# Patient Record
Sex: FEMALE | Race: WHITE | NOT HISPANIC OR LATINO | Employment: PART TIME | URBAN - NONMETROPOLITAN AREA
[De-identification: names, ages, dates, MRNs, and addresses within clinical notes are randomized per-mention and may not be internally consistent; named-entity substitution may affect disease eponyms.]

---

## 2017-03-20 ENCOUNTER — OFFICE VISIT - GICH (OUTPATIENT)
Dept: FAMILY MEDICINE | Facility: OTHER | Age: 47
End: 2017-03-20

## 2017-03-20 ENCOUNTER — HISTORY (OUTPATIENT)
Dept: FAMILY MEDICINE | Facility: OTHER | Age: 47
End: 2017-03-20

## 2017-03-20 DIAGNOSIS — J40 BRONCHITIS: ICD-10-CM

## 2017-03-20 LAB — INFLUENZA ANTIGEN - HISTORICAL: NORMAL

## 2017-07-11 ENCOUNTER — OFFICE VISIT - GICH (OUTPATIENT)
Dept: FAMILY MEDICINE | Facility: OTHER | Age: 47
End: 2017-07-11

## 2017-07-11 ENCOUNTER — HISTORY (OUTPATIENT)
Dept: FAMILY MEDICINE | Facility: OTHER | Age: 47
End: 2017-07-11

## 2017-07-11 DIAGNOSIS — J45.909 UNCOMPLICATED ASTHMA: ICD-10-CM

## 2017-11-01 ENCOUNTER — OFFICE VISIT - GICH (OUTPATIENT)
Dept: FAMILY MEDICINE | Facility: OTHER | Age: 47
End: 2017-11-01

## 2017-11-01 ENCOUNTER — HISTORY (OUTPATIENT)
Dept: FAMILY MEDICINE | Facility: OTHER | Age: 47
End: 2017-11-01

## 2017-11-01 ENCOUNTER — HOSPITAL ENCOUNTER (OUTPATIENT)
Dept: RADIOLOGY | Facility: OTHER | Age: 47
End: 2017-11-01
Attending: PHYSICIAN ASSISTANT

## 2017-11-01 DIAGNOSIS — I82.622 ACUTE EMBOLISM AND THROMBOSIS OF DEEP VEIN OF LEFT UPPER EXTREMITY (H): ICD-10-CM

## 2017-11-01 DIAGNOSIS — M79.602 PAIN OF LEFT ARM: ICD-10-CM

## 2017-11-01 DIAGNOSIS — M79.89 OTHER SPECIFIED SOFT TISSUE DISORDERS (CODE): ICD-10-CM

## 2017-11-01 LAB
ABSOLUTE BASOPHILS - HISTORICAL: 0.1 THOU/CU MM
ABSOLUTE EOSINOPHILS - HISTORICAL: 0.1 THOU/CU MM
ABSOLUTE IMMATURE GRANULOCYTES(METAS,MYELOS,PROS) - HISTORICAL: 0.1 THOU/CU MM
ABSOLUTE LYMPHOCYTES - HISTORICAL: 3.1 THOU/CU MM (ref 0.9–2.9)
ABSOLUTE MONOCYTES - HISTORICAL: 0.6 THOU/CU MM
ABSOLUTE NEUTROPHILS - HISTORICAL: 10.3 THOU/CU MM (ref 1.7–7)
ANION GAP - HISTORICAL: 10 (ref 5–18)
BASOPHILS # BLD AUTO: 0.4 %
BUN SERPL-MCNC: 8 MG/DL (ref 7–25)
BUN/CREAT RATIO - HISTORICAL: 10
CALCIUM SERPL-MCNC: 9.2 MG/DL (ref 8.6–10.3)
CHLORIDE SERPLBLD-SCNC: 105 MMOL/L (ref 98–107)
CO2 SERPL-SCNC: 24 MMOL/L (ref 21–31)
CREAT SERPL-MCNC: 0.78 MG/DL (ref 0.7–1.3)
D-DIMER, QUANTITATIVE NG/ML - HISTORICAL: 823 NG/ML
EOSINOPHIL NFR BLD AUTO: 0.8 %
ERYTHROCYTE [DISTWIDTH] IN BLOOD BY AUTOMATED COUNT: 12.3 % (ref 11.5–15.5)
GFR IF NOT AFRICAN AMERICAN - HISTORICAL: >60 ML/MIN/1.73M2
GLUCOSE SERPL-MCNC: 99 MG/DL (ref 70–105)
HCT VFR BLD AUTO: 45.7 % (ref 33–51)
HEMOGLOBIN: 15.4 G/DL (ref 12–16)
IMMATURE GRANULOCYTES(METAS,MYELOS,PROS) - HISTORICAL: 0.4 %
LYMPHOCYTES NFR BLD AUTO: 21.9 % (ref 20–44)
MCH RBC QN AUTO: 30.1 PG (ref 26–34)
MCHC RBC AUTO-ENTMCNC: 33.7 G/DL (ref 32–36)
MCV RBC AUTO: 89 FL (ref 80–100)
MONOCYTES NFR BLD AUTO: 4.4 %
NEUTROPHILS NFR BLD AUTO: 72.1 % (ref 42–72)
PLATELET # BLD AUTO: 294 THOU/CU MM (ref 140–440)
PMV BLD: 10.2 FL (ref 6.5–11)
POTASSIUM SERPL-SCNC: 4.3 MMOL/L (ref 3.5–5.1)
RED BLOOD COUNT - HISTORICAL: 5.12 MIL/CU MM (ref 4–5.2)
SODIUM SERPL-SCNC: 139 MMOL/L (ref 133–143)
WHITE BLOOD COUNT - HISTORICAL: 14.2 THOU/CU MM (ref 4.5–11)

## 2017-11-06 ENCOUNTER — ANTICOAGULATION - GICH (OUTPATIENT)
Dept: INTERNAL MEDICINE | Facility: OTHER | Age: 47
End: 2017-11-06

## 2017-11-06 ENCOUNTER — COMMUNICATION - GICH (OUTPATIENT)
Dept: FAMILY MEDICINE | Facility: OTHER | Age: 47
End: 2017-11-06

## 2017-11-06 DIAGNOSIS — I82.622 ACUTE EMBOLISM AND THROMBOSIS OF DEEP VEIN OF LEFT UPPER EXTREMITY (H): ICD-10-CM

## 2017-11-06 LAB — INR - HISTORICAL: 1

## 2017-11-09 ENCOUNTER — ANTICOAGULATION - GICH (OUTPATIENT)
Dept: INTERNAL MEDICINE | Facility: OTHER | Age: 47
End: 2017-11-09

## 2017-11-09 ENCOUNTER — HISTORY (OUTPATIENT)
Dept: ONCOLOGY | Facility: OTHER | Age: 47
End: 2017-11-09

## 2017-11-09 ENCOUNTER — OFFICE VISIT - GICH (OUTPATIENT)
Dept: ONCOLOGY | Facility: OTHER | Age: 47
End: 2017-11-09

## 2017-11-09 DIAGNOSIS — I82.622 ACUTE EMBOLISM AND THROMBOSIS OF DEEP VEIN OF LEFT UPPER EXTREMITY (H): ICD-10-CM

## 2017-11-09 LAB
A/G RATIO - HISTORICAL: 1.2 (ref 1–2)
ABSOLUTE BASOPHILS - HISTORICAL: 0.1 THOU/CU MM
ABSOLUTE EOSINOPHILS - HISTORICAL: 0.2 THOU/CU MM
ABSOLUTE IMMATURE GRANULOCYTES(METAS,MYELOS,PROS) - HISTORICAL: 0 THOU/CU MM
ABSOLUTE LYMPHOCYTES - HISTORICAL: 3.8 THOU/CU MM (ref 0.9–2.9)
ABSOLUTE MONOCYTES - HISTORICAL: 0.5 THOU/CU MM
ABSOLUTE NEUTROPHILS - HISTORICAL: 6.3 THOU/CU MM (ref 1.7–7)
ALBUMIN SERPL-MCNC: 4.2 G/DL (ref 3.5–5.7)
ALP SERPL-CCNC: 50 IU/L (ref 34–104)
ALT (SGPT) - HISTORICAL: 22 IU/L (ref 7–52)
ANION GAP - HISTORICAL: 13 (ref 5–18)
AST SERPL-CCNC: 17 IU/L (ref 13–39)
BASOPHILS # BLD AUTO: 0.5 %
BILIRUB SERPL-MCNC: 0.2 MG/DL (ref 0.3–1)
BUN SERPL-MCNC: 9 MG/DL (ref 7–25)
BUN/CREAT RATIO - HISTORICAL: 12
CALCIUM SERPL-MCNC: 9.6 MG/DL (ref 8.6–10.3)
CHLORIDE SERPLBLD-SCNC: 102 MMOL/L (ref 98–107)
CO2 SERPL-SCNC: 24 MMOL/L (ref 21–31)
CREAT SERPL-MCNC: 0.74 MG/DL (ref 0.7–1.3)
D-DIMER, QUANTITATIVE NG/ML - HISTORICAL: 559 NG/ML
EOSINOPHIL NFR BLD AUTO: 1.4 %
ERYTHROCYTE [DISTWIDTH] IN BLOOD BY AUTOMATED COUNT: 12 % (ref 11.5–15.5)
ERYTHROCYTE [SEDIMENTATION RATE] IN BLOOD: 16 MM/HR
GFR IF NOT AFRICAN AMERICAN - HISTORICAL: >60 ML/MIN/1.73M2
GLOBULIN - HISTORICAL: 3.5 G/DL (ref 2–3.7)
GLUCOSE SERPL-MCNC: 93 MG/DL (ref 70–105)
HCT VFR BLD AUTO: 44.1 % (ref 33–51)
HEMOGLOBIN: 15.2 G/DL (ref 12–16)
IMMATURE GRANULOCYTES(METAS,MYELOS,PROS) - HISTORICAL: 0.2 %
INR - HISTORICAL: 1.4
LDH SERPL-CCNC: 170 IU/L (ref 140–271)
LYMPHOCYTES NFR BLD AUTO: 34.9 % (ref 20–44)
MCH RBC QN AUTO: 30.6 PG (ref 26–34)
MCHC RBC AUTO-ENTMCNC: 34.5 G/DL (ref 32–36)
MCV RBC AUTO: 89 FL (ref 80–100)
MONOCYTES NFR BLD AUTO: 4.6 %
NEUTROPHILS NFR BLD AUTO: 58.4 % (ref 42–72)
PLATELET # BLD AUTO: 461 THOU/CU MM (ref 140–440)
PMV BLD: 10.2 FL (ref 6.5–11)
POTASSIUM SERPL-SCNC: 4.1 MMOL/L (ref 3.5–5.1)
PROT SERPL-MCNC: 7.7 G/DL (ref 6.4–8.9)
RED BLOOD COUNT - HISTORICAL: 4.96 MIL/CU MM (ref 4–5.2)
RHEUMATOID FACTOR - HISTORICAL: <14 IU/ML
SODIUM SERPL-SCNC: 139 MMOL/L (ref 133–143)
WHITE BLOOD COUNT - HISTORICAL: 10.8 THOU/CU MM (ref 4.5–11)

## 2017-11-10 ENCOUNTER — COMMUNICATION - GICH (OUTPATIENT)
Dept: FAMILY MEDICINE | Facility: OTHER | Age: 47
End: 2017-11-10

## 2017-11-10 LAB
HOMOCYSTEINE, CARDIAC - HISTORICAL: 11.3 UMOL/L (ref 5–15.4)
Lab: 16 SEC

## 2017-11-13 ENCOUNTER — ANTICOAGULATION - GICH (OUTPATIENT)
Dept: INTERNAL MEDICINE | Facility: OTHER | Age: 47
End: 2017-11-13

## 2017-11-13 DIAGNOSIS — I82.622 ACUTE EMBOLISM AND THROMBOSIS OF DEEP VEIN OF LEFT UPPER EXTREMITY (H): ICD-10-CM

## 2017-11-13 LAB
ANA INTERPRETATION: NEGATIVE
DRVVT RATIO - HISTORICAL: 0.85
ELP INTERP,SERUM - HISTORICAL: NORMAL
ELP,ALBUMIN - HISTORICAL: 4.14 G/DL (ref 3.31–5.31)
ELP,ALPHA 1 - HISTORICAL: 0.37 G/DL (ref 0.19–0.42)
ELP,ALPHA 2 - HISTORICAL: 0.92 G/DL (ref 0.44–1.03)
ELP,BETA - HISTORICAL: 0.93 G/DL (ref 0.52–1.05)
ELP,GAMMA - HISTORICAL: 0.95 G/DL (ref 0.59–1.46)
INR - HISTORICAL: 2.4
Lab: 1.05
Lab: 107 % (ref 79–131)
PROT SERPL-MCNC: 7.3 G/DL (ref 6–8)
PTT-LA RATIO - HISTORICAL: 1.15

## 2017-11-14 ENCOUNTER — ANTICOAGULATION - GICH (OUTPATIENT)
Dept: INTERNAL MEDICINE | Facility: OTHER | Age: 47
End: 2017-11-14

## 2017-11-14 DIAGNOSIS — I82.622 ACUTE EMBOLISM AND THROMBOSIS OF DEEP VEIN OF LEFT UPPER EXTREMITY (H): ICD-10-CM

## 2017-11-14 LAB — INR - HISTORICAL: 2.5

## 2017-11-15 LAB
BETA 2 GP1 AB IGA - HISTORICAL: <4 CU
BETA 2 GP1 AB IGG - HISTORICAL: <6.4 CU
BETA 2 GP1 AB IGM - HISTORICAL: 6.4 CU

## 2017-11-16 ENCOUNTER — AMBULATORY - GICH (OUTPATIENT)
Dept: SCHEDULING | Facility: OTHER | Age: 47
End: 2017-11-16

## 2017-11-17 ENCOUNTER — ANTICOAGULATION - GICH (OUTPATIENT)
Dept: INTERNAL MEDICINE | Facility: OTHER | Age: 47
End: 2017-11-17

## 2017-11-17 DIAGNOSIS — I82.622 ACUTE EMBOLISM AND THROMBOSIS OF DEEP VEIN OF LEFT UPPER EXTREMITY (H): ICD-10-CM

## 2017-11-17 LAB — INR - HISTORICAL: 2.6

## 2017-11-20 ENCOUNTER — ANTICOAGULATION - GICH (OUTPATIENT)
Dept: INTERNAL MEDICINE | Facility: OTHER | Age: 47
End: 2017-11-20

## 2017-11-20 ENCOUNTER — AMBULATORY - GICH (OUTPATIENT)
Dept: ONCOLOGY | Facility: OTHER | Age: 47
End: 2017-11-20

## 2017-11-20 DIAGNOSIS — I82.622 ACUTE EMBOLISM AND THROMBOSIS OF DEEP VEIN OF LEFT UPPER EXTREMITY (H): ICD-10-CM

## 2017-11-20 LAB — INR - HISTORICAL: 3.7

## 2017-11-24 ENCOUNTER — ANTICOAGULATION - GICH (OUTPATIENT)
Dept: INTERNAL MEDICINE | Facility: OTHER | Age: 47
End: 2017-11-24

## 2017-11-24 DIAGNOSIS — I82.622 ACUTE EMBOLISM AND THROMBOSIS OF DEEP VEIN OF LEFT UPPER EXTREMITY (H): ICD-10-CM

## 2017-11-24 LAB — INR - HISTORICAL: 2.2

## 2017-11-27 ENCOUNTER — ANTICOAGULATION - GICH (OUTPATIENT)
Dept: INTERNAL MEDICINE | Facility: OTHER | Age: 47
End: 2017-11-27

## 2017-11-27 DIAGNOSIS — I82.622 ACUTE EMBOLISM AND THROMBOSIS OF DEEP VEIN OF LEFT UPPER EXTREMITY (H): ICD-10-CM

## 2017-11-27 LAB — INR - HISTORICAL: 3

## 2017-11-29 ENCOUNTER — OFFICE VISIT - GICH (OUTPATIENT)
Dept: ONCOLOGY | Facility: OTHER | Age: 47
End: 2017-11-29

## 2017-11-29 ENCOUNTER — HISTORY (OUTPATIENT)
Dept: ONCOLOGY | Facility: OTHER | Age: 47
End: 2017-11-29

## 2017-11-29 DIAGNOSIS — I82.622 ACUTE EMBOLISM AND THROMBOSIS OF DEEP VEIN OF LEFT UPPER EXTREMITY (H): ICD-10-CM

## 2017-12-01 ENCOUNTER — ANTICOAGULATION - GICH (OUTPATIENT)
Dept: INTERNAL MEDICINE | Facility: OTHER | Age: 47
End: 2017-12-01

## 2017-12-01 DIAGNOSIS — I82.622 ACUTE EMBOLISM AND THROMBOSIS OF DEEP VEIN OF LEFT UPPER EXTREMITY (H): ICD-10-CM

## 2017-12-01 LAB
FACTOR II GENE MUTATION - HISTORICAL: NORMAL
FACTOR II INTERPRETATION - HISTORICAL: NORMAL
FACTOR V LEIDEN - HISTORICAL: ABNORMAL
FACTOR V LEIDEN INTERPRETATION - HISTORICAL: ABNORMAL
INR - HISTORICAL: 2.2

## 2017-12-03 LAB
CARDIOLIPIN IGA - HISTORICAL: <1.4 CU
CARDIOLIPIN IGG - HISTORICAL: <2.6 CU
CARDIOLIPIN IGM - HISTORICAL: 4.4 CU

## 2017-12-06 ENCOUNTER — HISTORY (OUTPATIENT)
Dept: FAMILY MEDICINE | Facility: OTHER | Age: 47
End: 2017-12-06

## 2017-12-06 ENCOUNTER — COMMUNICATION - GICH (OUTPATIENT)
Dept: ONCOLOGY | Facility: OTHER | Age: 47
End: 2017-12-06

## 2017-12-06 ENCOUNTER — OFFICE VISIT - GICH (OUTPATIENT)
Dept: FAMILY MEDICINE | Facility: OTHER | Age: 47
End: 2017-12-06

## 2017-12-06 ENCOUNTER — HOSPITAL ENCOUNTER (OUTPATIENT)
Dept: RADIOLOGY | Facility: OTHER | Age: 47
End: 2017-12-06
Attending: FAMILY MEDICINE

## 2017-12-06 DIAGNOSIS — I82.622 ACUTE EMBOLISM AND THROMBOSIS OF DEEP VEIN OF LEFT UPPER EXTREMITY (H): ICD-10-CM

## 2017-12-06 DIAGNOSIS — M79.89 OTHER SPECIFIED SOFT TISSUE DISORDERS (CODE): ICD-10-CM

## 2017-12-06 LAB
A/G RATIO - HISTORICAL: 1.3 (ref 1–2)
ABSOLUTE BASOPHILS - HISTORICAL: 0.1 THOU/CU MM
ABSOLUTE EOSINOPHILS - HISTORICAL: 0.1 THOU/CU MM
ABSOLUTE IMMATURE GRANULOCYTES(METAS,MYELOS,PROS) - HISTORICAL: 0 THOU/CU MM
ABSOLUTE LYMPHOCYTES - HISTORICAL: 3.1 THOU/CU MM (ref 0.9–2.9)
ABSOLUTE MONOCYTES - HISTORICAL: 0.4 THOU/CU MM
ABSOLUTE NEUTROPHILS - HISTORICAL: 7.1 THOU/CU MM (ref 1.7–7)
ALBUMIN SERPL-MCNC: 4 G/DL (ref 3.5–5.7)
ALP SERPL-CCNC: 53 IU/L (ref 34–104)
ALT (SGPT) - HISTORICAL: 17 IU/L (ref 7–52)
ANION GAP - HISTORICAL: 5 (ref 5–18)
AST SERPL-CCNC: 12 IU/L (ref 13–39)
BASOPHILS # BLD AUTO: 0.5 %
BILIRUB SERPL-MCNC: 0.2 MG/DL (ref 0.3–1)
BUN SERPL-MCNC: 8 MG/DL (ref 7–25)
BUN/CREAT RATIO - HISTORICAL: 10
CALCIUM SERPL-MCNC: 8.6 MG/DL (ref 8.6–10.3)
CHLORIDE SERPLBLD-SCNC: 111 MMOL/L (ref 98–107)
CO2 SERPL-SCNC: 23 MMOL/L (ref 21–31)
CREAT SERPL-MCNC: 0.78 MG/DL (ref 0.7–1.3)
D-DIMER, QUANTITATIVE NG/ML - HISTORICAL: 292 NG/ML
EOSINOPHIL NFR BLD AUTO: 1.1 %
ERYTHROCYTE [DISTWIDTH] IN BLOOD BY AUTOMATED COUNT: 12.6 % (ref 11.5–15.5)
GFR IF NOT AFRICAN AMERICAN - HISTORICAL: >60 ML/MIN/1.73M2
GLOBULIN - HISTORICAL: 3 G/DL (ref 2–3.7)
GLUCOSE SERPL-MCNC: 76 MG/DL (ref 70–105)
HCT VFR BLD AUTO: 45.2 % (ref 33–51)
HEMOGLOBIN: 15.6 G/DL (ref 12–16)
IMMATURE GRANULOCYTES(METAS,MYELOS,PROS) - HISTORICAL: 0.3 %
INR - HISTORICAL: 2
LDH SERPL-CCNC: 152 IU/L (ref 140–271)
LYMPHOCYTES NFR BLD AUTO: 28.5 % (ref 20–44)
MCH RBC QN AUTO: 30.7 PG (ref 26–34)
MCHC RBC AUTO-ENTMCNC: 34.5 G/DL (ref 32–36)
MCV RBC AUTO: 89 FL (ref 80–100)
MONOCYTES NFR BLD AUTO: 3.4 %
NEUTROPHILS NFR BLD AUTO: 66.2 % (ref 42–72)
PLATELET # BLD AUTO: 379 THOU/CU MM (ref 140–440)
PMV BLD: 10.3 FL (ref 6.5–11)
POTASSIUM SERPL-SCNC: 4 MMOL/L (ref 3.5–5.1)
PROT SERPL-MCNC: 7 G/DL (ref 6.4–8.9)
PROTIME - HISTORICAL: 23.4 SEC (ref 11.9–15.2)
RED BLOOD COUNT - HISTORICAL: 5.08 MIL/CU MM (ref 4–5.2)
SODIUM SERPL-SCNC: 139 MMOL/L (ref 133–143)
WHITE BLOOD COUNT - HISTORICAL: 10.8 THOU/CU MM (ref 4.5–11)

## 2017-12-08 ENCOUNTER — ANTICOAGULATION - GICH (OUTPATIENT)
Dept: INTERNAL MEDICINE | Facility: OTHER | Age: 47
End: 2017-12-08

## 2017-12-08 DIAGNOSIS — I82.622 ACUTE EMBOLISM AND THROMBOSIS OF DEEP VEIN OF LEFT UPPER EXTREMITY (H): ICD-10-CM

## 2017-12-08 LAB — INR - HISTORICAL: 2.5

## 2017-12-09 ENCOUNTER — COMMUNICATION - GICH (OUTPATIENT)
Dept: FAMILY MEDICINE | Facility: OTHER | Age: 47
End: 2017-12-09

## 2017-12-09 DIAGNOSIS — I82.622 ACUTE EMBOLISM AND THROMBOSIS OF DEEP VEIN OF LEFT UPPER EXTREMITY (H): ICD-10-CM

## 2017-12-11 ENCOUNTER — HISTORY (OUTPATIENT)
Dept: RADIOLOGY | Facility: OTHER | Age: 47
End: 2017-12-11

## 2017-12-11 ENCOUNTER — HOSPITAL ENCOUNTER (OUTPATIENT)
Dept: RADIOLOGY | Facility: OTHER | Age: 47
End: 2017-12-11
Attending: FAMILY MEDICINE

## 2017-12-11 ENCOUNTER — COMMUNICATION - GICH (OUTPATIENT)
Dept: ONCOLOGY | Facility: OTHER | Age: 47
End: 2017-12-11

## 2017-12-11 DIAGNOSIS — I82.622 ACUTE EMBOLISM AND THROMBOSIS OF DEEP VEIN OF LEFT UPPER EXTREMITY (H): ICD-10-CM

## 2017-12-11 DIAGNOSIS — Z12.31 ENCOUNTER FOR SCREENING MAMMOGRAM FOR MALIGNANT NEOPLASM OF BREAST: ICD-10-CM

## 2017-12-15 ENCOUNTER — ANTICOAGULATION - GICH (OUTPATIENT)
Dept: INTERNAL MEDICINE | Facility: OTHER | Age: 47
End: 2017-12-15

## 2017-12-15 DIAGNOSIS — I82.622 ACUTE EMBOLISM AND THROMBOSIS OF DEEP VEIN OF LEFT UPPER EXTREMITY (H): ICD-10-CM

## 2017-12-15 LAB — INR - HISTORICAL: 2.7

## 2017-12-22 ENCOUNTER — AMBULATORY - GICH (OUTPATIENT)
Dept: LAB | Facility: OTHER | Age: 47
End: 2017-12-22

## 2017-12-22 DIAGNOSIS — I82.622 ACUTE EMBOLISM AND THROMBOSIS OF DEEP VEIN OF LEFT UPPER EXTREMITY (H): ICD-10-CM

## 2017-12-22 LAB
A/G RATIO - HISTORICAL: 1.4 (ref 1–2)
ABSOLUTE BASOPHILS - HISTORICAL: 0 THOU/CU MM
ABSOLUTE EOSINOPHILS - HISTORICAL: 0.1 THOU/CU MM
ABSOLUTE IMMATURE GRANULOCYTES(METAS,MYELOS,PROS) - HISTORICAL: 0 THOU/CU MM
ABSOLUTE LYMPHOCYTES - HISTORICAL: 3.4 THOU/CU MM (ref 0.9–2.9)
ABSOLUTE MONOCYTES - HISTORICAL: 0.5 THOU/CU MM
ABSOLUTE NEUTROPHILS - HISTORICAL: 5.2 THOU/CU MM (ref 1.7–7)
ALBUMIN SERPL-MCNC: 4.1 G/DL (ref 3.5–5.7)
ALP SERPL-CCNC: 52 IU/L (ref 34–104)
ALT (SGPT) - HISTORICAL: 17 IU/L (ref 7–52)
ANION GAP - HISTORICAL: 7 (ref 5–18)
AST SERPL-CCNC: 14 IU/L (ref 13–39)
BASOPHILS # BLD AUTO: 0.4 %
BILIRUB SERPL-MCNC: 0.3 MG/DL (ref 0.3–1)
BUN SERPL-MCNC: 8 MG/DL (ref 7–25)
BUN/CREAT RATIO - HISTORICAL: 10
CALCIUM SERPL-MCNC: 8.9 MG/DL (ref 8.6–10.3)
CHLORIDE SERPLBLD-SCNC: 103 MMOL/L (ref 98–107)
CO2 SERPL-SCNC: 24 MMOL/L (ref 21–31)
CREAT SERPL-MCNC: 0.77 MG/DL (ref 0.7–1.3)
EOSINOPHIL NFR BLD AUTO: 1.2 %
ERYTHROCYTE [DISTWIDTH] IN BLOOD BY AUTOMATED COUNT: 12.8 % (ref 11.5–15.5)
GFR IF NOT AFRICAN AMERICAN - HISTORICAL: >60 ML/MIN/1.73M2
GLOBULIN - HISTORICAL: 2.9 G/DL (ref 2–3.7)
GLUCOSE SERPL-MCNC: 91 MG/DL (ref 70–105)
HCT VFR BLD AUTO: 44.9 % (ref 33–51)
HEMOGLOBIN: 15.2 G/DL (ref 12–16)
IMMATURE GRANULOCYTES(METAS,MYELOS,PROS) - HISTORICAL: 0.3 %
LDH SERPL-CCNC: 154 IU/L (ref 140–271)
LYMPHOCYTES NFR BLD AUTO: 36.5 % (ref 20–44)
MCH RBC QN AUTO: 30.3 PG (ref 26–34)
MCHC RBC AUTO-ENTMCNC: 33.9 G/DL (ref 32–36)
MCV RBC AUTO: 89 FL (ref 80–100)
MONOCYTES NFR BLD AUTO: 5.4 %
NEUTROPHILS NFR BLD AUTO: 56.2 % (ref 42–72)
PLATELET # BLD AUTO: 360 THOU/CU MM (ref 140–440)
PMV BLD: 10.3 FL (ref 6.5–11)
POTASSIUM SERPL-SCNC: 3.8 MMOL/L (ref 3.5–5.1)
PROT SERPL-MCNC: 7 G/DL (ref 6.4–8.9)
RED BLOOD COUNT - HISTORICAL: 5.02 MIL/CU MM (ref 4–5.2)
SODIUM SERPL-SCNC: 134 MMOL/L (ref 133–143)
WHITE BLOOD COUNT - HISTORICAL: 9.3 THOU/CU MM (ref 4.5–11)

## 2017-12-27 ENCOUNTER — HISTORY (OUTPATIENT)
Dept: ONCOLOGY | Facility: OTHER | Age: 47
End: 2017-12-27

## 2017-12-27 ENCOUNTER — OFFICE VISIT - GICH (OUTPATIENT)
Dept: ONCOLOGY | Facility: OTHER | Age: 47
End: 2017-12-27

## 2017-12-27 ENCOUNTER — TRANSFERRED RECORDS (OUTPATIENT)
Dept: HEALTH INFORMATION MANAGEMENT | Facility: HOSPITAL | Age: 47
End: 2017-12-27

## 2017-12-27 DIAGNOSIS — I82.622 ACUTE EMBOLISM AND THROMBOSIS OF DEEP VEIN OF LEFT UPPER EXTREMITY (H): ICD-10-CM

## 2017-12-27 DIAGNOSIS — F17.200 NICOTINE DEPENDENCE, UNCOMPLICATED: ICD-10-CM

## 2017-12-27 NOTE — PROGRESS NOTES
Patient Information     Patient Name MRN Sex Daniela Calvo [id number] Female 1970      Progress Notes by Magda Kitchen MD at 2017  2:30 PM     Author:  Magda Kitchen MD Service:  (none) Author Type:  Physician     Filed:  2017  5:03 PM Encounter Date:  2017 Status:  Signed     :  Magda Kitchen MD (Physician)            This note has been dictated. The encounter number is [id number].

## 2017-12-28 NOTE — TELEPHONE ENCOUNTER
Patient Information     Patient Name MRN Sex Daniela Calvo [id number] Female 1970      Telephone Encounter by Maria C Mistry at 11/10/2017  1:11 PM     Author:  Maria C Mistry Service:  (none) Author Type:  (none)     Filed:  11/10/2017  1:12 PM Encounter Date:  11/10/2017 Status:  Signed     :  Maria C Mistry            Talked to pharmacy and gave verbal order for warfarin.   Maria C Mistry LPN ....................11/10/2017  1:12 PM

## 2017-12-28 NOTE — PROGRESS NOTES
Patient Information     Patient Name MRN Daniela Urbina [id number] Female 1970      Progress Notes signed by Magda Kitchen MD at 2017  1:16 PM      Author:  Magda Kitchen MD Service:  (none) Author Type:  Physician     Filed:  2017  1:16 PM Encounter Date:  2017 Status:  Signed     :  Magda Kitchen MD (Physician)            DATE OF SERVICE:  2017    HISTORY OF PRESENT ILLNESS:  Ms. Ladd returns for followup of left upper extremity DVT.  We had seen her in consultation 2017 for evaluation of left upper extremity DVT.  Apparently she had presented to [facility], on 2017 with left arm pain and redness associated with swelling.  It apparently happened overnight.  She woke up and noted pain on her left side.  Apparently it happened overnight when she developed acute onset of pain and swelling in the left upper extremity.  She subsequently was seen by [facility], who ordered a left upper extremity venous Doppler, which revealed an occlusive thrombus within the subclavian, axillary and basilic veins.  The cephalic antecubital and brachial veins were patent.  The patient was started on Lovenox and was transitioned to Coumadin.  She stated that she has a strong family history of DVT including her mother and grandmother.  She also stated her grandmother had history of CREST syndrome.  She stated that her mother had 4 stents in her groin.  She is a heavy smoker and smoked at least 1-1/2 packs per day for at least 30 years, cut back to less than a 1/1 pack per day recently.  She denies any antecedent trauma or previous surgery.  She worked at a [employer] store.      When we saw her on , she had been on Lovenox and her swelling had reduced considerably and she was transitioned to Coumadin.  We elected to rule out hypercoagulable state by obtaining antithrombin III levels which were negative.  Lupus profile was  obtained including beta 2 glycoprotein antibodies which were negative but  anticardiolipin antibodies were not drawn.  We also obtained antithrombin III activity, which was normal.  We obtained a homocysteine level, which was normal.  We also repeated the venous Doppler of the left upper extremity that revealed that there was improving left upper extremity DVT with residual thrombus in the left axillary vein and distal left subclavian vein.  They were still thrombosed, and the brachial and basilic veins were recanalized.  We also obtained scans to rule out occult malignancy and obtained a CT neck, which revealed subcutaneous nodule in right shoulder, likely a sebaceous cyst.  There was fat stranding in the left axillary region likely sequelae of an acute DVT.  Otherwise, no evidence of mass or lymphadenopathy in the neck.  She also had a CT chest which revealed a few very small subpleural ground glass opacities measuring 5 mm or less.  A 3-6 month followup was recommended.  CT abdomen and pelvis was essentially negative.  No evidence of mass or lymphadenopathy within the abdomen or pelvis.      The patient states that her swelling in the upper extremity has improved.  She still has carpal tunnel symptoms in her wrist, and she would like to resume using the brace.  She says she occasionally gets some right-sided neck pain.  Denies any shortness of breath, chest pain, abdominal pain, fevers, night sweats, weight loss.  She has not had her mammograms yet.  She does not have a factor V Leiden or prothrombin 2 mutation drawn.    PHYSICAL EXAMINATION:  GENERAL:  She is a middle-aged white female in no acute distress.  VITAL SIGNS:  Reveal blood pressure 146/92, pulse 96, temperature 98.2.  HEENT:  Atraumatic, normocephalic.  Oropharynx nonerythematous.  NECK:  Supple.  LUNGS:  Clear to auscultation and percussion.  HEART:  Regular rhythm.  S1, S2 normal.  ABDOMEN:  Soft.  Normoactive bowel sounds.  No masses.  LYMPHATICS:   No cervical, supraclavicular, axillary or inguinal nodes.  EXTREMITIES:  No edema.  NEUROLOGIC:  No focal findings.    LABORATORY DATA:  As above.  Serum protein electrophoresis was negative.  CBC:  White count 10.8, H and H 15.2/44.1, platelet count 461.  LYNDA is negative.  Rheumatoid factor is negative.  D-dimer is 559. BUN 9, creatinine 0.74.  LFTs are normal.  Calcium is normal.    ASSESSMENT AND PLAN:  Unprovoked left lower extremity deep venous thrombosis, rule out hypercoagulable state.  Patient is currently on Coumadin.  D-dimer has dropped.  She still has evidence of residual thrombosis in her left upper extremity.  Hypercoagulability workup has been negative so far.  She has not had a factor V Leiden, prothrombin 2 mutation or anticardiolipin antibodies.  I would like to have these drawn.  She will be due for mammograms as well.  We will see her in late December after her mammogram is obtained.  CBC, CMP, LDH.      Forty minutes was spent with the patient, greater than half of the time was spent on counseling and coordination of care.      CHYNA HENDRICKSON MD ACP/edmundo  D:11/29/2017 16:27:50  T:11/29/2017 18:11:38  VJID: [id number]  TJID: [id number]    cc:KYLAH KINGSTON MD, RUBI ASHTON

## 2017-12-28 NOTE — ADDENDUM NOTE
Patient Information     Patient Name MRN Daniela Urbina [id number] Female 1970      Addendum Note by Alycia Francis at 11/10/2017  8:35 AM     Author:  Alycia Francis Service:  (none) Author Type:  NURS- Registered Nurse     Filed:  11/10/2017  8:35 AM Encounter Date:  2017 Status:  Signed     :  Alycia Francis (NURS- Registered Nurse)       Addended by: ALYCIA FRANCIS on: 11/10/2017 08:35 AM        Modules accepted: Orders

## 2017-12-28 NOTE — PATIENT INSTRUCTIONS
Patient Information     Patient Name MRN Daniela Urbina [id number] Female 1970      Patient Instructions by Yoly Crowe RN at 2017  1:45 PM     Author:  Yoly Crowe RN Service:  (none) Author Type:  NURS- Registered Nurse     Filed:  2017  2:10 PM Encounter Date:  2017 Status:  Signed     :  Yoly Crowe RN (NURS- Registered Nurse)            Daniela Ladd is here today for initial visit in the Coumadin Clinic. Booklet reviewed: Guide to Coumadin/Warfarin Therapy.  Discussed the following items with patient:    What is Coumadin Therapy    What is a Protime Blood Test    What is the significance of my INR number    What can I expect at a Protime appointment    How often will I need a Protime test    What is my pink Protime card    What if I miss a dose of Coumadin/Warfarin    What if I am pregnant or planning to get pregnant    Patients INR goal range.    Possible side effects of Coumadin/Warfarin, including signs of bleeding or dangers of falling and hitting head.    Use of other medications while on Coumadin/Warfarin    Diet while on Coumadin/Warfarin    Sharing information with your other doctors.    What to do if you become sick.    Coumadin Clinic phone numbers and Emergency phone numbers given to patient.    Patient verbalized understanding.    2017 Details    Sun Mon  Fri Sat        1               2               3               4                 5               6      6 mg   See details      7      6 mg         8      6 mg         9               10               11                 12               13               14               15               16               17               18                 19               20               21               22               23               24               25                 26               27               28               29               30                  Date Details    11/06 This INR check       Date of next INR:  11/9/2017         How to take your warfarin dose     To take:  6 mg Take three of the 2 mg tablets.             Description          Take 6 mg x 3 days and recheck on Thurs 11/9/17. Yoly Crowe RN    11/6/2017  2:05 PM          Anticoagulation Summary as of 11/6/2017     INR goal 2.0-3.0    Today's INR 1.0    Next INR check 11/9/2017          Call your Anticoagulation Clinic at Dept: [phone]   if:   1. Any medications are started, stopped, or there is a change in dose.  2. You experience any bleeding that is not easily stopped or if it is recurrent.  3. You notice an increase in bruising or any bruising that does not heal.  4. You are scheduled for surgery, colonoscopy, dental extraction or any other procedure where you may need to stop your Coumadin (warfarin).

## 2017-12-28 NOTE — PROGRESS NOTES
Patient Information     Patient Name MRN Daniela Urbina [id number] Female 1970      Progress Notes by Krystin Black MD at 2017  1:30 PM     Author:  Krystin Black MD Service:  (none) Author Type:  Physician     Filed:  2017  1:52 PM Encounter Date:  2017 Status:  Signed     :  Krystin Black MD (Physician)            Subjective    Daniela Ladd is a 46 y.o. female who complains of a cough.Started on 7/3/2017 with initially head congestion and then a few days later had more chest symptoms.   Duration: 8 days.   Severity: moderate  Modifiers: Mucinex DM last 3 days.   Trend of symptoms: worsening  Fever: none  Cough is dry, paroxysmal and at no particular time of day.   Other symptoms include: runny nose  History of: no significant respiratory history  History of same illness: no prior history of similar illness  Ill contacts: others at home with similar illness  Tobacco use is usually 1- 1 1/2 PPD but now cannot take a deep breath so has reduced it. She did try the NicoDerm patches but got a skin reaction to the adhesive and had to stop using them.    Social History     Social History        Marital status:       Spouse name: Luigi     Number of children:  1     Years of education:  N/A     Occupational History        [employer]     Social History Main Topics        Smoking status:  Current Every Day Smoker     Packs/day: 0.50     Years: 30.00     Types: Cigarettes     Smokeless tobacco:  Never Used     Alcohol use  No     Drug use:  No     Sexual activity:  Yes     Partners: Male     Birth control/ protection: Surgical     Other Topics  Concern     Not on file      Social History Narrative     She is currently working part time at [employer]     Sibling 2 half brothers by her mother    Sibling 2 half brothers by her father    Son - Orlando Damico,      - Luigi                           Objective    /84  Pulse 88   "Temp 97.8  F (36.6  C) (Temporal)   Ht 1.7 m (5' 6.93\")  Wt 89.4 kg (197 lb)  SpO2 96%  BMI 30.92 kg/m2  General appearance: healthy, alert and cooperative. Scratchy voice.   Left Ear: normal; external ear canal and TM clear, nontender.  Right Ear: normal; external ear canal and TM clear, nontender.  Nose: clear rhinorrhea  Sinuses: normal; sinuses nontender  Oropharynx: mild erythema  Neck: normal; supple with no masses or nodes.  Lungs: mild and off and on inspiratory wheezing heard both lower lobes  Heart: not examined  CXR: not indicated      Assessment    1. Asthmatic bronchitis, unspecified asthma severity, uncomplicated            Plan    Meds as per orders-prescriptions for zithromax and prednisone.  May continue mucinex DM or fill the tessalon perles previously given.   Symptomatic therapy suggested: use increased fluids and rest and acetaminophen prn.  Call or return to clinic prn if these symptoms worsen or fail to improve as anticipated.  Krystin Black MD  1:42 PM 7/11/2017             "

## 2017-12-28 NOTE — PATIENT INSTRUCTIONS
Patient Information     Patient Name MRN Daniela Urbina [id number] Female 1970      Patient Instructions by Surekha Amador RN at 2017  9:00 AM     Author:  Surekha Amador RN Service:  (none) Author Type:  NURS- Registered Nurse     Filed:  2017  9:07 AM Encounter Date:  2017 Status:  Signed     :  Surekha Amador RN (NURS- Registered Nurse)            2017 Details    Sun  Fri Sat        1               2               3               4                 5               6               7               8               9               10               11                 12               13      6 mg   See details      14      8 mg         15               16               17               18                 19               20               21               22               23               24               25                 26               27               28               29               30                  Date Details    This INR check       Date of next INR:  2017         How to take your warfarin dose     To take:  6 mg Take three of the 2 mg tablets.    To take:  8 mg Take four of the 2 mg tablets.             Description          Take 6 mg today and recheck tomorrow 17. Continue with Lovenox.  Surekha Amador RN .............. 2017  9:07 AM          Anticoagulation Summary as of 2017     INR goal 2.0-3.0    Today's INR 2.4    Next INR check 2017          Call your Anticoagulation Clinic at Dept: [phone]   if:   1. Any medications are started, stopped, or there is a change in dose.  2. You experience any bleeding that is not easily stopped or if it is recurrent.  3. You notice an increase in bruising or any bruising that does not heal.  4. You are scheduled for surgery, colonoscopy, dental extraction or any other procedure where you may need to stop your Coumadin (warfarin).

## 2017-12-28 NOTE — PROGRESS NOTES
Patient Information     Patient Name MRN Sex Daniela Calvo [id number] Female 1970      Progress Notes by Meh[facility]. [facility]) at 2017 10:52 AM     Author:  Mehreen Reveles R.T. ([facility]) Service:  (none) Author Type:  [employer] - Registered Radiologic Technologist     Filed:  2017 10:52 AM Date of Service:  2017 10:52 AM Status:  Signed     :  Mehreen Reveles R.T. (AR[facility]) ([facility] - Registered Radiologic Technologist)            Falls Risk Criteria:    Age 65 and older or under age 4        Sensory deficits    Poor vision    Use of ambulatory aides    Impaired judgment    Unable to walk independently    Meets High Risk criteria for falls:  no

## 2017-12-28 NOTE — PROGRESS NOTES
Patient Information     Patient Name MRN Daniela Urbina [id number] Female 1970      Progress Notes signed by Magda Kitchen MD at 11/15/2017  6:25 PM      Author:  Magda Kitchen MD Service:  (none) Author Type:  Physician     Filed:  11/15/2017  6:25 PM Encounter Date:  2017 Status:  Signed     :  Magda Kitchen MD (Physician)            DATE OF SERVICE:  2017    HEMATOLOGY/ONCOLOGY CONSULTATION    REASON FOR CONSULTATION:  Left upper extremity DVT.    REQUESTING PHYSICIAN:   RUBI MEDINA PA-C AND DR. KINGSTON     Mrs. Ladd is a 47-year-old white female with a history of tobacco abuse who we are asked to evaluate concerning left upper extremity DVT.  Apparently she had presented to [facility] on 2017 with left arm pain and redness associated with swelling. It apparently happened overnight.  She woke up and noted pain on her left side. It apparently happened overnight when she developed acute onset of pain and swelling in the left upper extremity.  She subsequently was seen by [facility], who ordered a left upper extremity venous Doppler, which revealed an occlusive thrombus within the subclavian, axillary, and basilic veins.  The cephalic antecubital and brachial veins were patent.  Patient was started on Lovenox and is currently transitioned to Coumadin.  She states there is a strong family history of DVT including her mother and grandmother.  She also states that her grandmother has a history of CREST syndrome.  She states that her mother had 4 stents in her groin.  She is a heavy smoker.  She smoked at least 1-1/2 packs per day for at least 30 years.  She has cut back to less than a half pack per day recently.  Denies any antecedent trauma or previous surgery.  She is not obese. She does not have a sedentary job.  She works at a [employer] store.  She does note there is a family history of cancer.  Currently, since  starting Lovenox her left upper extremity pain and swelling have been considerably reduced. She is doing much better.     Her D-dimer upon diagnosis was 823.     PAST MEDICAL HISTORY:   As above.  History of tobacco abuse, history of PVCs, Raynaud's syndrome, hidradenitis suppurativa, recurrent eczema, depression--situational, abnormal Pap smears--status post cryotherapy August 2003.  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    PAST SURGICAL HISTORY:  Includes salpingo-oophorectomy, tonsillectomy, tubal ligation, wisdom teeth extraction, leg and ankle surgery.    ALLERGIES:  SHE IS ALLERGIC TO ADHESIVE TAPE, AUGMENTIN, AND FLEXERIL.    CURRENT MEDICATIONS:   1.  Warfarin 8 mg daily as per protime clinic.   2.  Aleve 440 mg b.i.d. p.r.n.   3.  Lactobacillus acidophilus 1 billion cell cap daily.    SOCIAL HISTORY:  She smoked at least 30 years, mostly 1 to 1-1/2 packs per day, recently cut down to a half pack per day.  Alcohol is negative.  She works at [employer] store as a .    FAMILY HISTORY:  Significant for DVT history in her mother and grandmother.  Also family history of cancer with a grandfather having lung cancer, grandmother having breast cancer.    REVIEW OF SYSTEMS:  As per HPI.   CNS is unremarkable.  No headaches.  RESPIRATORY:  Negative for shortness of breath, hemoptysis.  She does get occasional cough.  CARDIAC:  Negative for chest pain or palpitations.    GASTROINTESTINAL:  Negative for change in bowel habits.  She may have occasional abdominal pain.  MUSCULOSKELETAL: Significant for neck pain that comes and goes.  GENITOURINARY:  Negative for dysuria, urgency, frequency.   CONSTITUTIONAL:  Negative for fevers or night sweats.  She said she had a 10-12 pound weight loss recently due to dietary interventions.    PHYSICAL EXAMINATION:  GENERAL:  This is a well-developed, well-nourished white female in no acute distress.  VITAL SIGNS:  Blood pressure 110/76, pulse 72, temperature 97.  HEENT:  Atraumatic,  normocephalic.  Oropharynx nonerythematous.  NECK:  Supple.  LUNGS:  Clear to auscultation and percussion.  HEART:  Regular rhythm.  S1, S2 normal.  ABDOMEN:  Soft.  Normal bowel sounds. No masses or tenderness.    LYMPHATICS:  No cervical, supraclavicular, axillary, or inguinal nodes.  EXTREMITIES:  Without edema.  NEUROLOGIC:  Nonfocal.    LABORATORY DATA:  Pending.    IMPRESSION:    Unprovoked left upper extremity DVT, rule out hypercoagulable state.  Will obtain factor V Leiden, prothrombin 2 mutation, lupus anticoagulant, anticardiolipin antibodies, beta 2 glycoprotein antibodies, serum protein electrophoresis, homocysteine level, antithrombin III activity thrombin time, D-dimer.  Given her history of tobacco abuse and complaints of neck pain, we would like to rule out underlying malignancy, which can precipitate unprovoked DVTs, by obtaining a CT neck, chest, abdomen, and pelvis.  Otherwise, I will see the patient after the above workup.  She was encouraged to continue Coumadin for now and also discontinue smoking.  Otherwise, will see the patient after the above workup.     70 minutes were spent with the patient; greater than half the time was spent on counseling and coordination of care.      CHYNA HENDRICKSON MD ACP/  D:11/09/2017 16:49:32  T:11/09/2017 20:43:39  VJID: [id number]  TJID: [id number]    cc:KYLAH KINGSTON MD, RUBI ASHTON

## 2017-12-28 NOTE — PROGRESS NOTES
Patient Information     Patient Name MRN Sex Daniela Calvo [id number] Female 1970      Progress Notes by Ling Trammell PA-C at 2017  9:30 AM     Author:  Ling Trammell PA-C  Service:  (none) Author Type:  PHYS- Physician Assistant     Filed:  11/3/2017 12:28 PM  Encounter Date:  2017 Status:  Addendum     :  Ling Trammell PA-C (PHYS- Physician Assistant)        Related Notes: Original Note by Ling Trammell PA-C (PHYS- Physician Assistant) filed at 11/3/2017 12:28 PM            Nursing Notes:   Jessica Canales  2017  9:52 AM  Signed  Patient presents to the clinic for left arm pain and redness.  Patient states that this started yesterday.  Jessica Canales LPN........................2017  9:38 AM     Patient Due for :  Flu Shot--- Patient declines  Mammogram--- Patient is aware and will schedule.      HPI:    Daniela Ladd is a 47 y.o. female who presents for left arm redness and pain. Woke up yesterday on left side. Hx carpel tunnel. Wear braces while sleeping.  Arm sore yesterday. Left arm red up to left shoulder. Purple and blue on left upper arm. Fingers turning blue.  Still sore.  Left arm feels constantly flexed and heavy.  Feel fine otherwise.  Warm.  Better with putting arm up on head and elevated resting. Worse with putting arm down. Tingling on whole hand.  Left 5th finger itchy.  No fevers. Dry cough. She has PVCs - symptomatic. Better in the past few years. GM had crest syndrome and CHF.   No hx blood clots. Mom has had 4 stents in groin. Hx raynauds - this is different.   No arm injury. No personal history of blood clots. Mom and grandmother have both had blood clots.     Past Medical History:     Diagnosis  Date     Abnormal Pap smear     S/P cryotherapy Aug 2003.  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)      Depression     Situational related to marital discord      ECZEMA, RECURRENT     Atrophic dermatitis       HIDRADENITIS SUPPURATIVA 2010      Hx of pregnancy 10/00     III, para 1-0-2-1, vaginal delivery , two spontaneous first trimester miscarriage      Other acne     Facial acne       PELVIC  PAIN 2010     PVC's (premature ventricular contractions) 10/25/2013     Raynaud's syndrome 2010     TOBACCO ABUSE 2010       Past Surgical History:      Procedure  Laterality Date     LEG/ANKLE SURGERY Left     [place]       SALPINGO-OOPHORECTOMY  13    Planned Lap RSO       TONSILLECTOMY  1980s     TUBAL LIGATION  2004     WISDOM TEETH EXTRACTION         Social History      Substance Use Topics        Smoking status:  Current Every Day Smoker     Packs/day: 0.50     Years: 30.00     Types: Cigarettes     Smokeless tobacco:  Never Used     Alcohol use  No       Current Outpatient Prescriptions       Medication  Sig Dispense Refill     Lactobacillus acidophilus 1 billion cell cap Take 1 capsule by mouth once daily.       naproxen (ALEVE) 220 mg tablet Take 2 tablets by mouth every morning and 1 tablet by mouth in the evening as needed       No current facility-administered medications for this visit.      Medications have been reviewed by me and are current to the best of my knowledge and ability.      Allergies      Allergen   Reactions     Adhesive Tape-Silicones  Rash     Nicoderm patch      Augmentin [Amoxicillin-Pot Clavulanate]  Nausea Only     Flexeril [Cyclobenzaprine]  Rash       REVIEW OF SYSTEMS:  Refer to HPI.    EXAM:   Vitals:    /66  Pulse 88  Wt 87.1 kg (192 lb)  Breastfeeding? No  BMI 30.14 kg/m2    General Appearance: Pleasant, alert, appropriate appearance for age. No acute distress  Chest/Respiratory Exam: Normal chest wall and respirations. Clear to auscultation.  Cardiovascular Exam: Regular rate and rhythm. S1, S2, no murmur, click, gallop, or rubs.  Musculoskeletal Exam: Mild left anterior and superior shoulder pain with palpation. Left arm is minimally swollen throughout. Left arm is  minimally erythematous throughout as compared to the right arm.  Skin: no rash or abnormalities  Neurologic Exam: Nonfocal, symmetric DTRs, normal gross motor, tone coordination and no tremor.  Psychiatric Exam: Alert and oriented - appropriate affect.    PHQ Depression Screen  Date of PHQ exam: 11/01/17  Over the last 2 weeks, how often have you been bothered by any of the following problems?  1. Little interest or pleasure in doing things: 0 - Not at all  2. Feeling down, depressed, or hopeless: 0 - Not at all     Results for orders placed or performed in visit on 11/01/17      BASIC METABOLIC PANEL      Result  Value Ref Range    SODIUM 139 133 - 143 mmol/L    POTASSIUM 4.3 3.5 - 5.1 mmol/L    CHLORIDE 105 98 - 107 mmol/L    CO2,TOTAL 24 21 - 31 mmol/L    ANION GAP 10 5 - 18                    GLUCOSE 99 70 - 105 mg/dL    CALCIUM 9.2 8.6 - 10.3 mg/dL    BUN 8 7 - 25 mg/dL    CREATININE 0.78 0.70 - 1.30 mg/dL    BUN/CREAT RATIO           10                    GFR if African American >60 >60 ml/min/1.73m2    GFR if not African American >60 >60 ml/min/1.73m2   D-DIMER      Result  Value Ref Range    D-DIMER, QUANTITATIVE  823 (H) >199 - 230 ng/mL   CBC WITH AUTO DIFFERENTIAL      Result  Value Ref Range    WHITE BLOOD COUNT         14.2 (H) 4.5 - 11.0 thou/cu mm    RED BLOOD COUNT           5.12 4.00 - 5.20 mil/cu mm    HEMOGLOBIN                15.4 12.0 - 16.0 g/dL    HEMATOCRIT                45.7 33.0 - 51.0 %    MCV                       89 80 - 100 fL    MCH                       30.1 26.0 - 34.0 pg    MCHC                      33.7 32.0 - 36.0 g/dL    RDW                       12.3 11.5 - 15.5 %    PLATELET COUNT            294 140 - 440 thou/cu mm    MPV                       10.2 6.5 - 11.0 fL    NEUTROPHILS               72.1 (H) 42.0 - 72.0 %    LYMPHOCYTES               21.9 20.0 - 44.0 %    MONOCYTES                 4.4 <12.0 %    EOSINOPHILS               0.8 <8.0 %    BASOPHILS                 0.4  <3.0 %    IMMATURE GRANULOCYTES(METAS,MYELOS,PROS) 0.4 %    ABSOLUTE NEUTROPHILS      10.3 (H) 1.7 - 7.0 thou/cu mm    ABSOLUTE LYMPHOCYTES      3.1 (H) 0.9 - 2.9 thou/cu mm    ABSOLUTE MONOCYTES        0.6 <0.9 thou/cu mm    ABSOLUTE EOSINOPHILS      0.1 <0.5 thou/cu mm    ABSOLUTE BASOPHILS        0.1 <0.3 thou/cu mm    ABSOLUTE IMMATURE GRANULOCYTES(METAS,MYELOS,PROS) 0.1 <=0.3 thou/cu mm       11/1/2017  Procedure: US VENOUS UPPER EXTREMITY LEFT     HISTORY:  Left arm pain.     TECHNIQUE: Ultrasound of the left upper extremity venous system was performed.     COMPARISON: None.     FINDINGS:     There is occlusive thrombus within the subclavian, axillary, and basilic veins. The cephalic, antecubital, and brachial veins are patent.     IMPRESSION: Positive for DVT in the subclavian, axillary, and upper portion of the basilic vein.     These results were discussed with Dr. RUBI MEDINA on 11/1/2017 10:57 AM.            Electronically Signed By: Surekha Marrero M.D. on 11/1/2017 10:58 AM        ASSESSMENT AND PLAN:      ICD-10-CM    1. Arm DVT (deep venous thromboembolism), acute, left (HC) I82.622 enoxaparin (LOVENOX) 80 mg/0.8 mL injection      warfarin (COUMADIN) 2 mg tablet      AMB CONSULT TO HEMATOLOGY ONCOLOGY      AMB CONSULT TO ANTICOAGULATION CLINIC   2. Left arm pain M79.602 CBC WITH DIFFERENTIAL      BASIC METABOLIC PANEL      D-DIMER      US VENOUS UPPER EXTREMITY LEFT      CBC WITH DIFFERENTIAL      BASIC METABOLIC PANEL      D-DIMER      CBC WITH AUTO DIFFERENTIAL   3. Left arm swelling M79.89 CBC WITH DIFFERENTIAL      BASIC METABOLIC PANEL      D-DIMER      US VENOUS UPPER EXTREMITY LEFT      CBC WITH DIFFERENTIAL      BASIC METABOLIC PANEL      D-DIMER      CBC WITH AUTO DIFFERENTIAL       Completed lab testing. Patient had a mildly increased with blood cell count at 14.2. Otherwise unremarkable CBC. Patient had an unremarkable BMP. D-dimer was elevated at 823.    Completed the venous ultrasound  of the left upper extremity. Ultrasound was positive for DVT in the subclavian, axillary and upper portion of the basilic vein. Discussed results with radiologist.    Consulted with Dr. Colon in oncology. Patient will be started on Lovenox injections twice daily for 5 days along with Coumadin. Patient was started at 2 mg daily. Patient was referred to the anticoagulation clinic. Appointment scheduled for repeat INR on 11/6/2017.    Patient was referred to oncology to have a coagulation workup along with ruling out other concerns.    Gave warning signs and symptoms. Return to clinic or emergency room with any change or worsening of symptoms.    Greater than 25 minutes were spent in counseling and coordination of care.     Patient Instructions   Started on Lovenox injections. Also started on warfarin.  Return for recheck with oncologist to rule out concerns.  Referred to warfarin clinic.       Index [place]   Deep Vein Thrombosis   ________________________________________________________________________  KEY POINTS    Deep vein thrombosis is a blood clot that forms in a deep vein, usually in the legs.    You will need treatment with anticoagulant (blood thinner) medicines.    Tell all of your other healthcare providers, such as dentists or podiatrists, that you are taking a blood thinner.    Your healthcare provider will tell you what kinds of physical activity or physical therapy are safe for you.  ________________________________________________________________________  What is deep vein thrombosis?  Deep vein thrombosis (DVT) is a blood clot that forms in a deep-lying vein, usually in the legs. A blood clot in a deep vein may break loose and travel through your bloodstream. If the clot blocks an artery in your brain, it can cause a stroke. A blood clot that travels to your lungs can cause life-threatening problems.  What is the cause?  DVT may happen when your blood moves slower than normal through the deep  veins in your body, usually in your legs. Your blood may move slowly if you are bedridden after surgery or because of a serious illness, or if you sit still for a long time such as during a long plane flight. Blood clots are more likely to form after an injury, during a major illness, or while taking certain medicines such as birth control pills or hormone replacement therapy.  Your risk of having DVT increases if you have some conditions, including:    Being unable to walk    A stroke    Heart failure    Severe varicose veins    Poor blood flow in the legs over a long time    Some cancers    Pregnancy    Obesity (having too much body fat)    Blood clotting disorders that you are born with, such as sickle cell disease    Infections  Smoking cigarettes also increases the risk you will have a blood clot.  What are the symptoms?  You may have no symptoms until a clot blocks a major vein. When DVT blocks blood flow, symptoms may include:    Swelling, warmth, and pain    Red or bluish areas on the skin  How is it diagnosed?  Your healthcare provider will ask about your symptoms and medical history and examine you. Tests may include:    Blood tests    IPG (impedance plethysmogram), which uses a pressure cuff on your arm or leg to measure blood flow in the veins of the arm or leg    Ultrasound, which uses sound waves to show pictures of the veins in your arm or leg    A contrast venogram, which uses X-rays and a dye injected into a vein to show any blockages in your veins    CT scan or MRI scan, which use X-rays and a computer to show detailed pictures of the veins  How is it treated?  DVT is treated with anticoagulant medicines. These medicines are sometimes called blood thinners. They don t thin the blood, but they do decrease your blood s ability to clot. They are used to prevent a clot from getting bigger and to prevent new clots. You may start your treatment at the hospital. When you go home, you will keep taking a  blood thinner. There are 3 ways you may get blood thinning medicine. You may learn how to give yourself shots of your medicine, a home health nurse may visit to give you the medicine, or you may be switched to medicine that you can take by mouth.  Depending on what type of blood thinner medicine you take, you may need to have regular blood tests as long as you take the blood thinner. You may need to take a blood thinner for at least 6 months. If you have a condition that keeps you at high risk for blood clots, you may need to take a blood thinner for the rest of your life.  Some types of physical activity may increase your risk for more blood clots once you start treatment. Your healthcare provider will tell you what kinds of physical activity or physical therapy are safe for you.  How can I take care of myself?  Follow the full course of treatment prescribed by your healthcare provider. In addition:    Be sure to take the right amount of medicine at the right time each day.    Keep appointments for regular blood tests to check how well your blood clots.    Tell all of your other healthcare providers, such as dentists or podiatrists, that you are taking a blood thinner. Wear a medical alert bracelet or necklace that lists the drugs you take.    Before taking any new medicines, even nonprescription drugs, contact your healthcare provider. Most medicines, including some antibiotics, can change the effects of blood thinners. You can also ask your pharmacist about possible interactions if you get all your medicines at the same pharmacy.    Don t take aspirin unless your provider tells you to do so.    If you are currently being treated for DVT, do not massage your legs. Massage could cause the clot to break loose.  Ask your provider:    If you need follow up blood tests and how often you need to have them    How and when you will get your test results    How long it will take to recover    If there are activities you  should avoid and when you can return to your normal activities    How to take care of yourself at home    What symptoms or problems you should watch for and what to do if you have them  Make sure you know when you should come back for a checkup. Keep all appointments for provider visits or tests.  If you have been diagnosed with DVT and you suddenly have shortness of breath or chest pain, or you cough up blood, the clot may have broken loose and moved to your lungs. Call 911 or your local emergency service right away. This can be a life-threatening emergency.  How can I help prevent deep vein thrombosis?  You can help prevent DVT if you:    Keep your legs raised when you are in bed or sitting down. Keeping your legs up helps the return of blood from the leg veins.    Avoid sitting or standing for long periods of time. When you are traveling, move your feet and legs often. Go for short walks if possible.    Avoid crossing your legs and ankles when you sit.    Exercise as recommended by your healthcare provider.    Avoid wearing control-top pantyhose, leg garters, and other tight-fitting garments.    Keep a healthy weight. If you are overweight, try to lose some weight.    Stop smoking. Smoking increases the risk for blood clots.    Ask your provider if support hose or special stockings would be helpful for you help prevent clots. If so, make sure you know how to wear them correctly.  Developed by [employer].  Adult Advisor 2016.3 published by [employer].  Last modified: 2016-03-23  Last reviewed: 2015-08-27  This content is reviewed periodically and is subject to change as new health information becomes available. The information is intended to inform and educate and is not a replacement for medical evaluation, advice, diagnosis or treatment by a healthcare professional.  References   Adult Advisor 2016.3 Index    Copyright   2016 [employer], a division of [employer] All rights reserved.              Ling Trammell PA-C..................11/1/2017 9:53 AM

## 2017-12-28 NOTE — TELEPHONE ENCOUNTER
Patient Information     Patient Name MRN Sex Daniela Calvo [id number] Female 1970      Telephone Encounter by Yoly Crowe RN at 2017  2:07 PM     Author:  Yoly Crowe RN Service:  (none) Author Type:  NURS- Registered Nurse     Filed:  2017  2:18 PM Encounter Date:  2017 Status:  Signed     :  Yoly Crowe RN (NURS- Registered Nurse)            Patient og DR Thao was in protime today INR still only 1.0. Patient will need refill of lovenox to continue until next INR on Thurs. Please review prescription attached.  Yoly Crowe RN    2017  2:15 PM

## 2017-12-28 NOTE — PROGRESS NOTES
Patient Information     Patient Name MRN Daniela Urbina [id number] Female 1970      Progress Notes by Magda Kitchen MD at 2017  3:00 PM     Author:  Magda Kitchen MD Service:  (none) Author Type:  Physician     Filed:  2017  6:00 PM Encounter Date:  2017 Status:  Signed     :  Magda Kitchen MD (Physician)            This note has been dictated. The encounter number is [id number].

## 2017-12-29 ENCOUNTER — ANTICOAGULATION - GICH (OUTPATIENT)
Dept: INTERNAL MEDICINE | Facility: OTHER | Age: 47
End: 2017-12-29

## 2017-12-29 DIAGNOSIS — I82.622 ACUTE EMBOLISM AND THROMBOSIS OF DEEP VEIN OF LEFT UPPER EXTREMITY (H): ICD-10-CM

## 2017-12-29 LAB — INR - HISTORICAL: 2.3

## 2017-12-29 NOTE — PATIENT INSTRUCTIONS
Patient Information     Patient Name MRN Daniela Urbina [id number] Female 1970      Patient Instructions by Renee Kevin RN at 2017  2:15 PM     Author:  Renee Kevin RN Service:  (none) Author Type:  NURS- Registered Nurse     Filed:  2017  2:19 PM Encounter Date:  2017 Status:  Signed     :  Renee Kevin RN (NURS- Registered Nurse)            2017 Details    Sun  Fri Sat        1               2               3               4                 5               6               7               8               9      8 mg   See details      10      8 mg         11      8 mg           12      8 mg         13               14               15               16               17               18                 19               20               21               22               23               24               25                 26               27               28               29               30                  Date Details    This INR check       Date of next INR:  2017         How to take your warfarin dose     To take:  8 mg Take four of the 2 mg tablets.             Description          Take 8 mg x four days and recheck INR 17. Continue with Lovenox.  RENEE KEVIN RN ....................  2017   2:19 PM          Anticoagulation Summary as of 2017     INR goal 2.0-3.0    Today's INR 1.4!    Next INR check 2017          Call your Anticoagulation Clinic at Dept: [phone]   if:   1. Any medications are started, stopped, or there is a change in dose.  2. You experience any bleeding that is not easily stopped or if it is recurrent.  3. You notice an increase in bruising or any bruising that does not heal.  4. You are scheduled for surgery, colonoscopy, dental extraction or any other procedure where you may need to stop your Coumadin (warfarin).

## 2017-12-29 NOTE — PATIENT INSTRUCTIONS
Patient Information     Patient Name MRN Daniela Urbina [id number] Female 1970      Patient Instructions by Yoly Crowe RN at 2017  9:00 AM     Author:  Yoly Crowe RN Service:  (none) Author Type:  NURS- Registered Nurse     Filed:  2017  9:02 AM Encounter Date:  2017 Status:  Signed     :  Yoly Crowe RN (NURS- Registered Nurse)            2017 Details    Sun Mon Tue Wed Thu Fri Sat        1               2               3               4                 5               6               7               8               9               10               11                 12               13               14               15               16               17               18                 19               20               21               22               23               24               25                 26               27      4 mg   See details      28      8 mg         29      4 mg         30      8 mg            Date Details    This INR check               How to take your warfarin dose     To take:  4 mg Take two of the 2 mg tablets.    To take:  8 mg Take four of the 2 mg tablets.           2017 Details    Sun Mon Tue Wed Thu Fri Sat          1      4 mg         2                 3               4               5               6               7               8               9                 10               11               12               13               14               15               16                 17               18               19               20               21               22               23                 24               25               26               27               28               29               30                 31                      Date Details   No additional details    Date of next INR:  2017         How to take your warfarin dose     To take:  4 mg Take two of the 2 mg  tablets.             Description          Take 8 mg x 2 days and 4 mg x 2 days. Recheck on 12/1/17. Yoly Crowe RN    11/27/2017  9:02 AM                  Anticoagulation Summary as of 11/27/2017     INR goal 2.0-3.0    Today's INR 3.0    Next INR check 12/1/2017          Call your Anticoagulation Clinic at Dept: [phone]   if:   1. Any medications are started, stopped, or there is a change in dose.  2. You experience any bleeding that is not easily stopped or if it is recurrent.  3. You notice an increase in bruising or any bruising that does not heal.  4. You are scheduled for surgery, colonoscopy, dental extraction or any other procedure where you may need to stop your Coumadin (warfarin).

## 2017-12-29 NOTE — PATIENT INSTRUCTIONS
Patient Information     Patient Name MRN Daniela Urbina [id number] Female 1970      Patient Instructions by Yoly Crowe RN at 2017  9:00 AM     Author:  Yoly Crowe RN Service:  (none) Author Type:  NURS- Registered Nurse     Filed:  2017  9:02 AM Encounter Date:  2017 Status:  Signed     :  Yoly Crowe RN (NURS- Registered Nurse)            2017 Details    Sun  Sat        1               2               3               4                 5               6               7               8               9               10               11                 12               13               14      8 mg   See details      15      6 mg         16      8 mg         17      6 mg         18                 19               20               21               22               23               24               25                 26               27               28               29               30                  Date Details    This INR check       Date of next INR:  2017         How to take your warfarin dose     To take:  6 mg Take three of the 2 mg tablets.    To take:  8 mg Take four of the 2 mg tablets.             Description          Take 8 mg x 2 days and 6 mg x 1 day. Recheck on 17. Yoly Crowe RN    2017  8:58 AM            Anticoagulation Summary as of 2017     INR goal 2.0-3.0    Today's INR 2.5    Next INR check 2017          Call your Anticoagulation Clinic at Dept: [phone]   if:   1. Any medications are started, stopped, or there is a change in dose.  2. You experience any bleeding that is not easily stopped or if it is recurrent.  3. You notice an increase in bruising or any bruising that does not heal.  4. You are scheduled for surgery, colonoscopy, dental extraction or any other procedure where you may need to stop your Coumadin (warfarin).

## 2017-12-29 NOTE — PATIENT INSTRUCTIONS
Patient Information     Patient Name MRN Daniela Urbina [id number] Female 1970      Patient Instructions by Ling Trammell PA-C at 2017 11:47 AM     Author:  Ling Trammell PA-C  Service:  (none) Author Type:  PHYS- Physician Assistant     Filed:  2017 11:47 AM  Encounter Date:  2017 Status:  Addendum     :  Ling Trammell PA-C (PHYS- Physician Assistant)        Related Notes: Original Note by Ling Trammell PA-C (PHYS- Physician Assistant) filed at 2017 11:47 AM            Started on Lovenox injections. Also started on warfarin.  Return for recheck with oncologist to rule out concerns.  Referred to warfarin clinic.       Index Hebrew   Deep Vein Thrombosis   ________________________________________________________________________  KEY POINTS    Deep vein thrombosis is a blood clot that forms in a deep vein, usually in the legs.    You will need treatment with anticoagulant (blood thinner) medicines.    Tell all of your other healthcare providers, such as dentists or podiatrists, that you are taking a blood thinner.    Your healthcare provider will tell you what kinds of physical activity or physical therapy are safe for you.  ________________________________________________________________________  What is deep vein thrombosis?  Deep vein thrombosis (DVT) is a blood clot that forms in a deep-lying vein, usually in the legs. A blood clot in a deep vein may break loose and travel through your bloodstream. If the clot blocks an artery in your brain, it can cause a stroke. A blood clot that travels to your lungs can cause life-threatening problems.  What is the cause?  DVT may happen when your blood moves slower than normal through the deep veins in your body, usually in your legs. Your blood may move slowly if you are bedridden after surgery or because of a serious illness, or if you sit still for a long time such as during a long plane flight. Blood clots are  more likely to form after an injury, during a major illness, or while taking certain medicines such as birth control pills or hormone replacement therapy.  Your risk of having DVT increases if you have some conditions, including:    Being unable to walk    A stroke    Heart failure    Severe varicose veins    Poor blood flow in the legs over a long time    Some cancers    Pregnancy    Obesity (having too much body fat)    Blood clotting disorders that you are born with, such as sickle cell disease    Infections  Smoking cigarettes also increases the risk you will have a blood clot.  What are the symptoms?  You may have no symptoms until a clot blocks a major vein. When DVT blocks blood flow, symptoms may include:    Swelling, warmth, and pain    Red or bluish areas on the skin  How is it diagnosed?  Your healthcare provider will ask about your symptoms and medical history and examine you. Tests may include:    Blood tests    IPG (impedance plethysmogram), which uses a pressure cuff on your arm or leg to measure blood flow in the veins of the arm or leg    Ultrasound, which uses sound waves to show pictures of the veins in your arm or leg    A contrast venogram, which uses X-rays and a dye injected into a vein to show any blockages in your veins    CT scan or MRI scan, which use X-rays and a computer to show detailed pictures of the veins  How is it treated?  DVT is treated with anticoagulant medicines. These medicines are sometimes called blood thinners. They don t thin the blood, but they do decrease your blood s ability to clot. They are used to prevent a clot from getting bigger and to prevent new clots. You may start your treatment at the hospital. When you go home, you will keep taking a blood thinner. There are 3 ways you may get blood thinning medicine. You may learn how to give yourself shots of your medicine, a home health nurse may visit to give you the medicine, or you may be switched to medicine that you  can take by mouth.  Depending on what type of blood thinner medicine you take, you may need to have regular blood tests as long as you take the blood thinner. You may need to take a blood thinner for at least 6 months. If you have a condition that keeps you at high risk for blood clots, you may need to take a blood thinner for the rest of your life.  Some types of physical activity may increase your risk for more blood clots once you start treatment. Your healthcare provider will tell you what kinds of physical activity or physical therapy are safe for you.  How can I take care of myself?  Follow the full course of treatment prescribed by your healthcare provider. In addition:    Be sure to take the right amount of medicine at the right time each day.    Keep appointments for regular blood tests to check how well your blood clots.    Tell all of your other healthcare providers, such as dentists or podiatrists, that you are taking a blood thinner. Wear a medical alert bracelet or necklace that lists the drugs you take.    Before taking any new medicines, even nonprescription drugs, contact your healthcare provider. Most medicines, including some antibiotics, can change the effects of blood thinners. You can also ask your pharmacist about possible interactions if you get all your medicines at the same pharmacy.    Don t take aspirin unless your provider tells you to do so.    If you are currently being treated for DVT, do not massage your legs. Massage could cause the clot to break loose.  Ask your provider:    If you need follow up blood tests and how often you need to have them    How and when you will get your test results    How long it will take to recover    If there are activities you should avoid and when you can return to your normal activities    How to take care of yourself at home    What symptoms or problems you should watch for and what to do if you have them  Make sure you know when you should come back  for a checkup. Keep all appointments for provider visits or tests.  If you have been diagnosed with DVT and you suddenly have shortness of breath or chest pain, or you cough up blood, the clot may have broken loose and moved to your lungs. Call 911 or your local emergency service right away. This can be a life-threatening emergency.  How can I help prevent deep vein thrombosis?  You can help prevent DVT if you:    Keep your legs raised when you are in bed or sitting down. Keeping your legs up helps the return of blood from the leg veins.    Avoid sitting or standing for long periods of time. When you are traveling, move your feet and legs often. Go for short walks if possible.    Avoid crossing your legs and ankles when you sit.    Exercise as recommended by your healthcare provider.    Avoid wearing control-top pantyhose, leg garters, and other tight-fitting garments.    Keep a healthy weight. If you are overweight, try to lose some weight.    Stop smoking. Smoking increases the risk for blood clots.    Ask your provider if support hose or special stockings would be helpful for you help prevent clots. If so, make sure you know how to wear them correctly.  Developed by [employer].  Adult Advisor 2016.3 published by [employer].  Last modified: 2016-03-23  Last reviewed: 2015-08-27  This content is reviewed periodically and is subject to change as new health information becomes available. The information is intended to inform and educate and is not a replacement for medical evaluation, advice, diagnosis or treatment by a healthcare professional.  References   Adult Advisor 2016.3 Index    Copyright   2016 [employer], a division of [employer] All rights reserved.

## 2017-12-29 NOTE — PATIENT INSTRUCTIONS
Patient Information     Patient Name MRN Daniela Urbina [id number] Female 1970      Patient Instructions by Krystin Black MD at 2017  1:42 PM     Author:  Krystin Black MD  Service:  (none) Author Type:  Physician     Filed:  2017  1:43 PM  Encounter Date:  2017 Status:  Addendum     :  Krystin Black MD (Physician)        Related Notes: Original Note by Krystin Black MD (Physician) filed at 2017  1:42 PM               Index Chinese   Acute Bronchitis: Brief Version   What is acute bronchitis?  When you have acute bronchitis, the airways between your windpipe and your lungs are swollen and irritated. It is also called a chest cold.  What is the cause?  Acute bronchitis is most often caused by a virus, like a cold or the flu. Less often, it can be caused by bacteria.  Most of the time, it clears up in several days, but the cough can take longer to go away. It may take you longer to get better if:    You smoke cigarettes.    You have a heart or lung disease or other health problems.    There s a lot of air pollution or secondhand smoke where you live or work.  What are the symptoms?  You may:    Have a deep cough with yellowish or greenish mucus.    Feel pain in your chest when you breathe deeply or cough.    Wheeze or feel short of breath.    Have a fever or chills.  How can I take care of myself?  Rest at home and drink plenty of fluids to keep the mucus loose. Don t smoke, and stay away from others who are smoking. You should get better in a several days.  If your symptoms are severe or you have heart or lung disease or diabetes, you may need to see your healthcare provider or take medicine.  How can I help prevent acute bronchitis?  You can lower your chances of getting bronchitis if you wash your hands after using the restroom, coughing, sneezing, or blowing your nose. Also wash your hands before eating or touching your eyes.  Developed by  [employer].  Adult Advisor 2016.3 published by [employer].  Last modified: 2016-06-29  Last reviewed: 2016-06-08  This content is reviewed periodically and is subject to change as new health information becomes available. The information is intended to inform and educate and is not a replacement for medical evaluation, advice, diagnosis or treatment by a healthcare professional.  References   Adult Advisor 2016.3 Index    Copyright   2016 [employer], a division of [employer] All rights reserved.

## 2017-12-29 NOTE — PATIENT INSTRUCTIONS
Patient Information     Patient Name MRN Daniela Urbina [id number] Female 1970      Patient Instructions by Yoly Crowe RN at 2017  8:30 AM     Author:  Yoly Crowe RN Service:  (none) Author Type:  NURS- Registered Nurse     Filed:  2017  8:38 AM Encounter Date:  2017 Status:  Signed     :  Yoly Crowe RN (NURS- Registered Nurse)            2017 Details    Sun  Sat        1               2               3               4                 5               6               7               8               9               10               11                 12               13               14               15               16               17      8 mg   See details      18      8 mg           19      8 mg         20      6 mg         21               22               23               24               25                 26               27               28               29               30                  Date Details    This INR check       Date of next INR:  2017         How to take your warfarin dose     To take:  6 mg Take three of the 2 mg tablets.    To take:  8 mg Take four of the 2 mg tablets.             Description          Take 8 mg x 3 days 17. Yoly Crowe RN    2017  8:37 AM            Anticoagulation Summary as of 2017     INR goal 2.0-3.0    Today's INR 2.6    Next INR check 2017          Call your Anticoagulation Clinic at Dept: [phone]   if:   1. Any medications are started, stopped, or there is a change in dose.  2. You experience any bleeding that is not easily stopped or if it is recurrent.  3. You notice an increase in bruising or any bruising that does not heal.  4. You are scheduled for surgery, colonoscopy, dental extraction or any other procedure where you may need to stop your Coumadin (warfarin).

## 2017-12-29 NOTE — PATIENT INSTRUCTIONS
Patient Information     Patient Name MRN Daniela Urbina [id number] Female 1970      Patient Instructions by Surekha Amador RN at 2017  8:45 AM     Author:  Surekha Amador RN Service:  (none) Author Type:  NURS- Registered Nurse     Filed:  2017  8:45 AM Encounter Date:  2017 Status:  Signed     :  Surekha Amador RN (NURS- Registered Nurse)            2017 Details    Sun  Sat        1               2               3               4                 5               6               7               8               9               10               11                 12               13               14               15               16               17               18                 19               20               21               22               23               24      8 mg   See details      25      8 mg           26      8 mg         27      4 mg         28               29               30                  Date Details    This INR check       Date of next INR:  2017         How to take your warfarin dose     To take:  4 mg Take two of the 2 mg tablets.    To take:  8 mg Take four of the 2 mg tablets.             Description          Take 8 mg Fri, Sat, Sun. Recheck 17. Surekha Amador RN .............. 2017  8:42 AM                  Anticoagulation Summary as of 2017     INR goal 2.0-3.0    Today's INR 2.2    Next INR check 2017          Call your Anticoagulation Clinic at Dept: [phone]   if:   1. Any medications are started, stopped, or there is a change in dose.  2. You experience any bleeding that is not easily stopped or if it is recurrent.  3. You notice an increase in bruising or any bruising that does not heal.  4. You are scheduled for surgery, colonoscopy, dental extraction or any other procedure where you may need to stop your Coumadin (warfarin).

## 2017-12-29 NOTE — PATIENT INSTRUCTIONS
Patient Information     Patient Name MRDaniela Huertas [id number] Female 1970      Patient Instructions by Surekha Amador RN at 2017 10:15 AM     Author:  Surekha Amador RN Service:  (none) Author Type:  NURS- Registered Nurse     Filed:  2017 10:07 AM Encounter Date:  2017 Status:  Signed     :  Surekha Amador RN (NURS- Registered Nurse)            2017 Details    Sun  Sat        1               2               3               4                 5               6               7               8               9               10               11                 12               13               14               15               16               17               18                 19               20      4 mg   See details      21      4 mg         22      4 mg         23      8 mg         24      4 mg         25                 26               27               28               29               30                  Date Details    This INR check       Date of next INR:  2017         How to take your warfarin dose     To take:  4 mg Take two of the 2 mg tablets.    To take:  8 mg Take four of the 2 mg tablets.             Description          Take 4 mg x 3 days Monday, Tuesday, Wednesday. Take 8 mg Thursday. Recheck 17. Surehka Amador RN .............. 2017  10:07 AM              Anticoagulation Summary as of 2017     INR goal 2.0-3.0    Today's INR 3.7    Next INR check 2017          Call your Anticoagulation Clinic at Dept: [phone]   if:   1. Any medications are started, stopped, or there is a change in dose.  2. You experience any bleeding that is not easily stopped or if it is recurrent.  3. You notice an increase in bruising or any bruising that does not heal.  4. You are scheduled for surgery, colonoscopy, dental extraction or any other procedure where you may need to stop your  Coumadin (warfarin).

## 2017-12-30 NOTE — NURSING NOTE
Patient Information     Patient Name MRN Sex Daniela Calvo [id number] Female 1970      Nursing Note by Alycia Rodriguez at 2017  2:30 PM     Author:  Alycia Rodriguez Service:  (none) Author Type:  NURS- Registered Nurse     Filed:  2017  2:42 PM Encounter Date:  2017 Status:  Signed     :  Alycia Rodriguez (NURS- Registered Nurse)            Pt here for consult left arm DVT. Freda Thao MD primary care provider. Alycia Rodriguez RN..............2017 2:37 PM

## 2017-12-30 NOTE — NURSING NOTE
Patient Information     Patient Name MRN Sex Daniela Calvo [id number] Female 1970      Nursing Note by Jane Carolina at 2017  3:00 PM     Author:  Jane Carolina Service:  (none) Author Type:  (none)     Filed:  2017  3:49 PM Encounter Date:  2017 Status:  Signed     :  Jane Carolina            Patient presents for a follow up of LUE DVT.  Complains of her left arm swelling and being sore on and off, especially if she wears a tighter bra.  Has some right sided neck pain and felt a lump there on .  Left elbow pain since IV insertion there.  Jane Carolina CMA (AAMA).....................2017  3:02 PM

## 2017-12-30 NOTE — NURSING NOTE
Patient Information     Patient Name MRN Daniela Urbina [id number] Female 1970      Nursing Note by Alycia Rodriguez at 2017  3:00 PM     Author:  Alycia Rodriguez Service:  (none) Author Type:  NURS- Registered Nurse     Filed:  2017  3:21 PM Encounter Date:  2017 Status:  Signed     :  Alycia Rodriguez (NURS- Registered Nurse)            Labs ordered per provider and sent to be co-signed by provider. Alycia Rodriguez RN 2017  3:21 PM

## 2017-12-30 NOTE — NURSING NOTE
Patient Information     Patient Name MRN Daniela Urbina [id number] Female 1970      Nursing Note by Jessica Canales at 2017  9:30 AM     Author:  Jessica Canales Service:  (none) Author Type:  (none)     Filed:  2017  9:52 AM Encounter Date:  2017 Status:  Signed     :  Jessica Canales            Patient presents to the clinic for left arm pain and redness.  Patient states that this started yesterday.  Jessica Canales LPN........................2017  9:38 AM     Patient Due for :  Flu Shot--- Patient declines  Mammogram--- Patient is aware and will schedule.

## 2018-01-03 ENCOUNTER — AMBULATORY - GICH (OUTPATIENT)
Dept: ONCOLOGY | Facility: OTHER | Age: 48
End: 2018-01-03

## 2018-01-03 NOTE — PROGRESS NOTES
Patient Information     Patient Name MRN Sex Daniela Calvo [id number] Female 1970      Progress Notes by Freda Thao MD at 3/20/2017  2:00 PM     Author:  Freda Thao MD Service:  (none) Author Type:  Physician     Filed:  3/20/2017  5:56 PM Encounter Date:  3/20/2017 Status:  Signed     :  Freda Thao MD (Physician)            SUBJECTIVE:    Daniela Ladd is a 46 y.o. female who presents for cough for the past 5 days.  Has had a dry cough.  Has a pain in her chest from coughing so much.  Throat was burning.  Feels like sinuses are plugged, but nothing is coming out.  Has sneezed a couple of times.  Had felt feverish the first couple of days, but didn't check her temperature.  Cannot lay flat or she coughs.  Has had to sleep in recliner.  If she takes cold medicine, gets more pvcs.  She did take a small dose of nyquil last night.  Has been using a lot of aleve, which helps the achiness.    HPI  I personally reviewed medications/allergies/history listed below:     Allergies     Allergen  Reactions     Augmentin [Amoxicillin-Pot Clavulanate] Nausea Only     Flexeril [Cyclobenzaprine] Rash   ,   Family History       Problem   Relation Age of Onset     Allergies  Mother      Heart Disease  Mother      Diabetes  Mother      Other  Mother      Peripheral vascular disese       Unknown  Father      Heart Disease  Paternal Grandfather      CAD       Other  Other      endometriosis       Other  Brother      by her mother/by her father - lymphedema after knee surgery       Good Health  Brother      by her mother/by her father       Good Health  Son      Diabetes  Maternal Aunt      also has had a borderline ovarian mass (not benign or cancerous)       Cancer  Maternal Grandfather      lung cancer, dm       Cancer-breast  Maternal Grandmother      CREST syndrome, Raynaud's, scleroderma,  of CHF, small veins and arteries       Thyroid Disease  Other      several  members of family with thyroid disease.     ,   Current Outpatient Prescriptions on File Prior to Visit       Medication  Sig Dispense Refill     nicotine 14 mg/24 hr (NICODERM; HABITROL) 14 mg/24 hr patch Apply 1 Patch on dry, clean, hairless skin once daily. 30 Patch 0     nicotine 21 mg/24 hr (NICODERM; HABITROL) 21 mg/24 hr patch Apply 1 Patch on dry, clean, hairless skin once daily. 14 Patch 0     nicotine 7 mg/24 hr (NICODERM; HABITROL) 7 mg/24 hr patch Apply 1 Patch on dry, clean, hairless skin once daily. 30 Patch 0     No current facility-administered medications on file prior to visit.    ,   Past Medical History      Diagnosis   Date     Abnormal Pap smear       S/P cryotherapy Aug 2003.  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)      Depression       Situational related to marital discord      ECZEMA, RECURRENT       Atrophic dermatitis       HIDRADENITIS SUPPURATIVA  2010     Hx of pregnancy  10/00      III, para 1-0-2-1, vaginal delivery , two spontaneous first trimester miscarriage      Other acne       Facial acne       PELVIC  PAIN  2010     PVC's (premature ventricular contractions)  10/25/2013     Raynaud's syndrome  2010     TOBACCO ABUSE  2010   ,   Patient Active Problem List     Diagnosis  Code     TOBACCO ABUSE F17.200     RAYNAUD'S SYNDROME I73.00     HIDRADENITIS SUPPURATIVA L73.2     PARESTHESIA, HANDS R20.9     OTHER ACNE L70.8     PVC's (premature ventricular contractions) I49.3     Obesity (BMI 30-39.9) E66.9     Dyshidrotic hand dermatitis L30.1     Endometriosis N80.9     Migraine with aura and without status migrainosus, not intractable G43.109    and   Past Surgical History       Procedure   Laterality Date     Tonsillectomy   1980s     Gonzales teeth extraction        Tubal ligation   2004     Salpingo-oophorectomy   11     Planned Lap RSO       Leg/ankle surgery  Left      Lizzie       Social History     Social History        Marital status:        Spouse name: Luigi     Number of children:  1     Years of education:  N/A     Occupational History        [employer]     Social History Main Topics        Smoking status:  Current Every Day Smoker     Packs/day: 0.50     Years: 30.00     Types: Cigarettes     Smokeless tobacco:  Never Used     Alcohol use  No     Drug use:  No     Sexual activity:  Yes     Partners: Male     Birth control/ protection: Surgical     Other Topics  Concern     Not on file      Social History Narrative     She is currently working part time at [employer]     Sibling 2 half brothers by her mother    Sibling 2 half brothers by her father    Son - Orlando Damico, 1999     - Luigi                      REVIEW OF SYSTEMS:  Review of Systems   All other systems reviewed and are negative.      OBJECTIVE:  /88  Pulse 92  Temp 98.5  F (36.9  C) (Tympanic)  Wt 89.8 kg (198 lb)  LMP 03/09/2017  SpO2 97%  Breastfeeding? No  BMI 31.24 kg/m2    EXAM:   Physical Exam   Constitutional: She is well-developed, well-nourished, and in no distress.   HENT:   Head: Normocephalic.   Right Ear: External ear normal.   Left Ear: External ear normal.   Mouth/Throat: Oropharynx is clear and moist.   Eyes: Pupils are equal, round, and reactive to light.   Neck: Normal range of motion. Neck supple. No thyromegaly present.   Cardiovascular: Normal rate, regular rhythm and normal heart sounds.    No murmur heard.  Pulmonary/Chest: Effort normal and breath sounds normal. No respiratory distress. She has no wheezes. She has no rales.   Lymphadenopathy:     She has no cervical adenopathy.   Psychiatric: Affect normal.   PHQ Depression Screen  Date of PHQ exam: 03/20/17  Over the last 2 weeks, how often have you been bothered by any of the following problems?  1. Little interest or pleasure in doing things: 0 - Not at all  2. Feeling down, depressed, or hopeless: 0 - Not at all                                           Results for orders placed  or performed in visit on 03/20/17       INFLUENZA ANTIGEN A & B  CLINIC IN HOUSE       Result   Value Ref Range    INFLUENZA ANTIGEN                                 Negative for Influenza A antigen; Negative for Influenza B antigen         ASSESSMENT/PLAN:    ICD-10-CM    1. Bronchitis J40 benzonatate (TESSALON PERLES) 100 mg capsule      INFLUENZA ANTIGEN A & B  CLINIC IN HOUSE      INFLUENZA ANTIGEN A & B  CLINIC IN HOUSE      CANCELED: [facility] ONLY INFLUENZA A/B PCR      CANCELED: [facility] ONLY INFLUENZA A/B PCR      CANCELED: INFLUENZA ANTIGEN        Plan:    1. Rapid influenza screen was negative today. Discussed with patient that it still possible that she has had influenza, although at this point if she's had it for already 5 days, she is past the point where Tamiflu would be helpful. Recommended symptomatic care of her symptoms. Did give her prescription for Tessalon Perles for the cough. Follow-up if symptoms are worsening or not improving over the next several days.  Freda Thao MD

## 2018-01-12 ENCOUNTER — ANTICOAGULATION - GICH (OUTPATIENT)
Dept: INTERNAL MEDICINE | Facility: OTHER | Age: 48
End: 2018-01-12

## 2018-01-12 DIAGNOSIS — I82.622 ACUTE EMBOLISM AND THROMBOSIS OF DEEP VEIN OF LEFT UPPER EXTREMITY (H): ICD-10-CM

## 2018-01-12 LAB — INR - HISTORICAL: 2.5

## 2018-01-26 VITALS
HEART RATE: 92 BPM | OXYGEN SATURATION: 97 % | SYSTOLIC BLOOD PRESSURE: 118 MMHG | DIASTOLIC BLOOD PRESSURE: 88 MMHG | WEIGHT: 198 LBS | TEMPERATURE: 98.5 F | BODY MASS INDEX: 32.2 KG/M2

## 2018-01-26 VITALS
TEMPERATURE: 98.2 F | BODY MASS INDEX: 30.29 KG/M2 | WEIGHT: 193 LBS | HEART RATE: 96 BPM | SYSTOLIC BLOOD PRESSURE: 146 MMHG | HEIGHT: 67 IN | DIASTOLIC BLOOD PRESSURE: 92 MMHG

## 2018-01-26 VITALS
WEIGHT: 192 LBS | DIASTOLIC BLOOD PRESSURE: 84 MMHG | DIASTOLIC BLOOD PRESSURE: 66 MMHG | OXYGEN SATURATION: 96 % | SYSTOLIC BLOOD PRESSURE: 120 MMHG | HEART RATE: 88 BPM | WEIGHT: 197 LBS | HEART RATE: 88 BPM | BODY MASS INDEX: 30.92 KG/M2 | SYSTOLIC BLOOD PRESSURE: 122 MMHG | BODY MASS INDEX: 30.14 KG/M2 | TEMPERATURE: 97.8 F | HEIGHT: 67 IN

## 2018-01-26 VITALS
HEART RATE: 92 BPM | TEMPERATURE: 97 F | SYSTOLIC BLOOD PRESSURE: 110 MMHG | WEIGHT: 189 LBS | HEIGHT: 67 IN | BODY MASS INDEX: 29.66 KG/M2 | DIASTOLIC BLOOD PRESSURE: 76 MMHG

## 2018-02-09 ENCOUNTER — ANTICOAGULATION - GICH (OUTPATIENT)
Dept: INTERNAL MEDICINE | Facility: OTHER | Age: 48
End: 2018-02-09

## 2018-02-09 VITALS
DIASTOLIC BLOOD PRESSURE: 68 MMHG | SYSTOLIC BLOOD PRESSURE: 118 MMHG | WEIGHT: 198.6 LBS | HEART RATE: 80 BPM | TEMPERATURE: 97.1 F | BODY MASS INDEX: 31.17 KG/M2

## 2018-02-09 VITALS
HEIGHT: 67 IN | BODY MASS INDEX: 30.61 KG/M2 | DIASTOLIC BLOOD PRESSURE: 82 MMHG | WEIGHT: 195 LBS | HEART RATE: 86 BPM | TEMPERATURE: 98.4 F | SYSTOLIC BLOOD PRESSURE: 118 MMHG

## 2018-02-09 DIAGNOSIS — I82.622 ACUTE EMBOLISM AND THROMBOSIS OF DEEP VEIN OF LEFT UPPER EXTREMITY (H): ICD-10-CM

## 2018-02-09 LAB — INR - HISTORICAL: 1.9

## 2018-02-11 PROBLEM — I82.622 ACUTE EMBOLISM AND THROMBOSIS OF DEEP VEINS OF LEFT UPPER EXTREMITY: Status: ACTIVE | Noted: 2018-02-11

## 2018-02-12 NOTE — PATIENT INSTRUCTIONS
Patient Information     Patient Name MRN Daniela Urbina [id number] Female 1970      Patient Instructions by Yoly Crowe RN at 2017  8:45 AM     Author:  Yoly Crowe RN Service:  (none) Author Type:  NURS- Registered Nurse     Filed:  2017  8:50 AM Encounter Date:  2017 Status:  Signed     :  Yoly Crowe RN (NURS- Registered Nurse)            2017 Details    Sun Mon  Fri Sat          1      4 mg   See details      2      8 mg           3      8 mg         4      4 mg         5      8 mg         6      8 mg         7      8 mg         8      4 mg         9                 10               11               12               13               14               15               16                 17               18               19               20               21               22               23                 24               25               26               27               28               29               30                 31                      Date Details    This INR check       Date of next INR:  2017       How to take your warfarin dose     To take:  4 mg Take two of the 2 mg tablets.    To take:  8 mg Take four of the 2 mg tablets.             Description          Take 4 mg x 2 days and 8 mg x 5 days recheck in 1 week. Yoly Crowe RN    2017  8:50 AM                    Anticoagulation Summary as of 2017     INR goal 2.0-3.0    Today's INR 2.2    Next INR check 2017          Call your Anticoagulation Clinic at Dept: [phone]   if:   1. Any medications are started, stopped, or there is a change in dose.  2. You experience any bleeding that is not easily stopped or if it is recurrent.  3. You notice an increase in bruising or any bruising that does not heal.  4. You are scheduled for surgery, colonoscopy, dental extraction or any other procedure where you may need to stop your Coumadin  (warfarin).

## 2018-02-12 NOTE — TELEPHONE ENCOUNTER
Patient Information     Patient Name MRN Daniela Urbina [id number] Female 1970      Telephone Encounter by Alycia Rodriguez at 2017  9:02 AM     Author:  Alycia Rodriguez Service:  (none) Author Type:  NURS- Registered Nurse     Filed:  2017  9:10 AM Encounter Date:  2017 Status:  Signed     :  Alycia Rodriguez (NURS- Registered Nurse)            States that arm is more swollen. Advised to be seen today. Assisted pt in getting appointment today. Alycia Rodriguez RN..............2017 9:10 AM

## 2018-02-12 NOTE — NURSING NOTE
Patient Information     Patient Name MRN Daniela Urbina [id number] Female 1970      Nursing Note by Lisa Aguilar at 2017 10:00 AM     Author:  Lisa Aguilar Service:  (none) Author Type:  (none)     Filed:  2017 10:15 AM Encounter Date:  2017 Status:  Signed     :  Lisa Aguilar            Patient here for left arm swelling. Has a blood clot in her left arm. Talked to Dr santana nurse who told her she needs to have her arm re-ultrasounded.   Lisa Aguilar LPN ..........2017 9:58 AM

## 2018-02-12 NOTE — PROGRESS NOTES
Patient Information     Patient Name MRN Sex Daniela Calvo [id number] Female 1970      Progress Notes by [facility]. [facility] at 2017 11:15 AM     Author:  Mehreen Reveles R.T. (ARRT) Service:  (none) Author Type:  [employer] - Registered Radiologic Technologist     Filed:  2017 11:15 AM Date of Service:  2017 11:15 AM Status:  Signed     :  Mehreen Reveles R.T. (ARRT) ([employer] - Registered Radiologic Technologist)            Falls Risk Criteria:    Age 65 and older or under age 4        Sensory deficits    Poor vision    Use of ambulatory aides    Impaired judgment    Unable to walk independently    Meets High Risk criteria for falls:  no

## 2018-02-12 NOTE — PATIENT INSTRUCTIONS
Patient Information     Patient Name MRN Daniela Urbina [id number] Female 1970      Patient Instructions by Renee Kevin RN at 2017  9:15 AM     Author:  Renee Kevin RN Service:  (none) Author Type:  NURS- Registered Nurse     Filed:  2017  9:20 AM Encounter Date:  2017 Status:  Signed     :  Renee Kevin RN (NURS- Registered Nurse)            2017 Details    Sun  Fri Sat          1               2                 3               4               5               6               7               8      4 mg   See details      9      8 mg           10      8 mg         11      4 mg         12      8 mg         13      8 mg         14      8 mg         15      4 mg         16                 17               18               19               20               21               22               23                 24               25               26               27               28               29               30                 31                      Date Details    This INR check       Date of next INR:  12/15/2017         How to take your warfarin dose     To take:  4 mg Take two of the 2 mg tablets.    To take:  8 mg Take four of the 2 mg tablets.             Description          Continue same Warfarin dose and recheck in 1 week.  RENEE KEVIN RN   2017    9:17 AM                  Anticoagulation Summary as of 2017     INR goal 2.0-3.0    Today's INR 2.5    Next INR check 12/15/2017          Call your Anticoagulation Clinic at Dept: [phone]   if:   1. Any medications are started, stopped, or there is a change in dose.  2. You experience any bleeding that is not easily stopped or if it is recurrent.  3. You notice an increase in bruising or any bruising that does not heal.  4. You are scheduled for surgery, colonoscopy, dental extraction or any other procedure where you may need to stop your Coumadin  (warfarin).

## 2018-02-12 NOTE — TELEPHONE ENCOUNTER
Patient Information     Patient Name MRN Sex Daniela Calvo [id number] Female 1970      Telephone Encounter by Renee Kevin RN at 2017 10:11 AM     Author:  Renee Kevin RN Service:  (none) Author Type:  NURS- Registered Nurse     Filed:  2017 10:14 AM Encounter Date:  2017 Status:  Signed     :  Renee Kevin RN (NURS- Registered Nurse)            Anticoagulant    Office visit in the past 12 months.    Last visit with KYLAH KINGSTON was on: 2017 in [facility]  Next visit with KYLAH KINGSTON is on: No future appointment listed with this provider  Next visit with Family Practice is on: No future appointment listed in this department    Lab tests:  PT/INR at least monthly    INR (no units)    Date Value   2017 2.5 (H)     HEMOGLOBIN                (g/dL)    Date Value   2017 15.6     Prescription refilled per RN Medication Refill Policy.................... RENEE KEVIN RN ....................  2017   10:11 AM

## 2018-02-12 NOTE — PATIENT INSTRUCTIONS
Patient Information     Patient Name MRDaniela Huertas [id number] Female 1970      Patient Instructions by Fawn Wasserman RN at 12/15/2017  9:15 AM     Author:  Fawn Wasserman RN Service:  (none) Author Type:  NURS- Registered Nurse     Filed:  12/15/2017  9:17 AM Encounter Date:  12/15/2017 Status:  Signed     :  Fawn Wasserman RN (NURS- Registered Nurse)            2017 Details    Sun  Fri Sat          1               2                 3               4               5               6               7               8               9                 10               11               12               13               14               15      4 mg   See details      16      8 mg           17      8 mg         18      4 mg         19      8 mg         20      8 mg         21      8 mg         22      4 mg         23      8 mg           24      8 mg         25      4 mg         26      8 mg         27      8 mg         28      8 mg         29      4 mg         30                 31                      Date Details   12/15 This INR check 2.7       Date of next INR:  2017         How to take your warfarin dose     To take:  4 mg Take two of the 2 mg tablets.    To take:  8 mg Take four of the 2 mg tablets.             Description          Continue same Warfarin dose and recheck in 2 weeks FAWN WASSERMAN RN ....................  12/15/2017   9:17 AM                    Anticoagulation Summary as of 12/15/2017     INR goal 2.0-3.0    Today's INR 2.7    Next INR check 2017          Call your Anticoagulation Clinic at Dept: [phone]   if:   1. Any medications are started, stopped, or there is a change in dose.  2. You experience any bleeding that is not easily stopped or if it is recurrent.  3. You notice an increase in bruising or any bruising that does not heal.  4. You are scheduled for surgery, colonoscopy, dental extraction or any other procedure where  you may need to stop your Coumadin (warfarin).

## 2018-02-12 NOTE — PROGRESS NOTES
Patient Information     Patient Name MRN Sex Daniela Calvo [id number] Female 1970      Progress Notes by Lisa Dukes MD at 2017 10:00 AM     Author:  Lisa Dukes MD Service:  (none) Author Type:  Physician     Filed:  2017 12:44 PM Encounter Date:  2017 Status:  Signed     :  Lisa Dukes MD (Physician)            SUBJECTIVE:    Daniela Ladd is a 47 y.o. female who presents for increased arm pain with forearm swelling.    HPI Comments: Patient presents with increased arm pain, more swelling in her forearm. She was recently diagnosed with a DVT subclavian and axillary veins. Etiology is not clear at this time, she is undergoing a workup for hypercoagulability. She did have some improvement, last ultrasound was done on . INR has generally been within therapeutic range. She was advised to be seen today for an ultrasound due to the change in presentation.      REVIEW OF SYSTEMS:  ROS see history of present illness, review of systems is otherwise negative.    OBJECTIVE:  /68  Pulse 80  Temp 97.1  F (36.2  C) (Tympanic)  Wt 90.1 kg (198 lb 9.6 oz)  LMP 2017  BMI 31.17 kg/m2    EXAM:   Physical Exam   Constitutional: She is oriented to person, place, and time and well-developed, well-nourished, and in no distress.   Eyes: Conjunctivae are normal.   Cardiovascular: Normal rate.    Pulmonary/Chest: Effort normal.   Neurological: She is alert and oriented to person, place, and time.   Skin: No rash noted.   Psychiatric: Mood and affect normal.     HEMOGLOBIN                (g/dL)    Date Value   2017 15.6     HISTORY:  Arm DVT (deep venous thromboembolism), acute, left (HC).     TECHNIQUE: Ultrasound of the left upper extremity was performed..     COMPARISON: 2017     FINDINGS:     There is thrombus in the left subclavian and axillary veins, similar to the prior study. The brachial, cephalic, and basilic veins are patent and normally  compressible. The internal jugular vein is patent.     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      ASSESSMENT/PLAN:    ICD-10-CM    1. Arm DVT (deep venous thromboembolism), acute, left (HC) I82.622 PROTIME-INR      US VENOUS UPPER EXTREMITY LEFT      CBC AND DIFFERENTIAL      COMP METABOLIC PANEL      LD,TOTAL      D-DIMER,QUANTITATIVE      PROTIME-INR      CBC WITH AUTO DIFFERENTIAL   2. Left arm swelling M79.89 US VENOUS UPPER EXTREMITY LEFT        Plan:  Patient is reassured that ultrasound is unchanged. INR is within therapeutic range. Follow-up with hematology as scheduled.      Lisa Dukes MD

## 2018-02-12 NOTE — TELEPHONE ENCOUNTER
Patient Information     Patient Name MRN Daniela Urbina [id number] Female 1970      Telephone Encounter by Jessica Hayes at 2017  2:15 PM     Author:  Jessica Hayes Service:  (none) Author Type:  (none)     Filed:  2017  2:16 PM Encounter Date:  2017 Status:  Signed     :  Jessica Hayes            This patient is coming for lab on , please place order  Thank you

## 2018-02-12 NOTE — NURSING NOTE
Patient Information     Patient Name MRN Daniela Urbina [id number] Female 1970      Nursing Note by Jane Carolina at 2017  3:00 PM     Author:  Jane Carolina Service:  (none) Author Type:  (none)     Filed:  2017  5:12 PM Encounter Date:  2017 Status:  Signed     :  Jane Carolina            Patient presents for a follow up of LUE DVT.  No current complaints or side effects.   Jane Carolina CMA (AAMA).....................2017  2:59 PM

## 2018-02-12 NOTE — TELEPHONE ENCOUNTER
Patient Information     Patient Name MRN Sex Daniela Calvo [id number] Female 1970      Telephone Encounter by Alycia Rodriguez at 2017  2:55 PM     Author:  Alycia Rodriguez Service:  (none) Author Type:  NURS- Registered Nurse     Filed:  2017  2:56 PM Encounter Date:  2017 Status:  Signed     :  Alycia Rodriguez (NURS- Registered Nurse)            Labs ordered per provider and sent to be co-signed by provider. Alycia Rodriguez RN 2017  2:55 PM

## 2018-02-12 NOTE — NURSING NOTE
Patient Information     Patient Name MRN Sex Daniela Calvo [id number] Female 1970      Nursing Note by Alycia Rodriguez at 2017  3:00 PM     Author:  Alycia Rodriguez Service:  (none) Author Type:  NURS- Registered Nurse     Filed:  2017  3:46 PM Encounter Date:  2017 Status:  Signed     :  Alycia Rodriguez (NURS- Registered Nurse)            Labs, CT chest, venous doppler ultrasound ordered per provider and sent to be co-signed by provider. Alycia Rodriguez RN 2017  3:46 PM

## 2018-02-12 NOTE — PROGRESS NOTES
Patient Information     Patient Name MRN Daniela Urbina [id number] Female 1970      Progress Notes by Magda Kitchen MD at 2017  3:00 PM     Author:  Magda Kitchen MD Service:  (none) Author Type:  Physician     Filed:  2017  5:14 PM Encounter Date:  2017 Status:  Signed     :  Magda Kitchen MD (Physician)            This note has been dictated. The encounter number is [id number].

## 2018-02-12 NOTE — PROGRESS NOTES
Patient Information     Patient Name MRN Sex Daniela Calvo [id number] Female 1970      Progress Notes by Carmina Serra R.T. (ARRT) at 2017 10:21 AM     Author:  Carmina Serra R.T. (ARRT) Service:  (none) Author Type:  [employer] - Registered Radiologic Technologist     Filed:  2017 10:21 AM Date of Service:  2017 10:21 AM Status:  Signed     :  Carmina Serra R.T. (KATHERINET) ([employer] - Registered Radiologic Technologist)            Falls Risk Criteria:    Age 65 and older or under age 4        Sensory deficits    Poor vision    Use of ambulatory aides    Impaired judgment    Unable to walk independently    Meets High Risk criteria for falls:  no

## 2018-02-13 NOTE — PATIENT INSTRUCTIONS
Patient Information     Patient Name MRN Daniela Urbina [id number] Female 1970      Patient Instructions by Yoly Crowe RN at 2017 11:00 AM     Author:  Yoly Crowe RN Service:  (none) Author Type:  NURS- Registered Nurse     Filed:  2017 10:58 AM Encounter Date:  2017 Status:  Signed     :  Yoly Crowe RN (NURS- Registered Nurse)            2017 Details    Sun Mon Tue Wed Thu Fri Sat          1               2                 3               4               5               6               7               8               9                 10               11               12               13               14               15               16                 17               18               19               20               21               22               23                 24               25               26               27               28               29      4 mg   See details      30      8 mg           31      8 mg                Date Details    This INR check               How to take your warfarin dose     To take:  4 mg Take two of the 2 mg tablets.    To take:  8 mg Take four of the 2 mg tablets.           2018 Details    Sun Mon Tue Wed Thu Fri Sat      1      4 mg         2      8 mg         3      8 mg         4      8 mg         5      4 mg         6      8 mg           7      8 mg         8      4 mg         9      8 mg         10      8 mg         11      8 mg         12      4 mg         13                 14               15               16               17               18               19               20                 21               22               23               24               25               26               27                 28               29               30               31                   Date Details   No additional details    Date of next INR:  2018         How to take your  warfarin dose     To take:  4 mg Take two of the 2 mg tablets.    To take:  8 mg Take four of the 2 mg tablets.             Description          Continue same Warfarin dose and recheck in 2 weeks  Yoly Crowe RN    12/29/2017  10:57 AM                    Anticoagulation Summary as of 12/29/2017     INR goal 2.0-3.0    Today's INR 2.3    Next INR check 1/12/2018          Call your Anticoagulation Clinic at Dept: [phone]   if:   1. Any medications are started, stopped, or there is a change in dose.  2. You experience any bleeding that is not easily stopped or if it is recurrent.  3. You notice an increase in bruising or any bruising that does not heal.  4. You are scheduled for surgery, colonoscopy, dental extraction or any other procedure where you may need to stop your Coumadin (warfarin).

## 2018-02-13 NOTE — PATIENT INSTRUCTIONS
Patient Information     Patient Name MRN Daniela Urbina [id number] Female 1970      Patient Instructions by Yoly Crowe RN at 2018  8:45 AM     Author:  Yoly Crowe RN Service:  (none) Author Type:  NURS- Registered Nurse     Filed:  2018  8:45 AM Encounter Date:  2018 Status:  Signed     :  Yoly Crowe RN (NURS- Registered Nurse)            2018 Details    Sun Mon Tue Wed Thu Fri Sat      1               2               3               4               5               6                 7               8               9               10               11               12      4 mg   See details      13      8 mg           14      8 mg         15      4 mg         16      8 mg         17      8 mg         18      8 mg         19      4 mg         20      8 mg           21      8 mg         22      4 mg         23      8 mg         24      8 mg         25      8 mg         26      4 mg         27      8 mg           28      8 mg         29      4 mg         30      8 mg         31      8 mg             Date Details    This INR check               How to take your warfarin dose     To take:  4 mg Take two of the 2 mg tablets.    To take:  8 mg Take four of the 2 mg tablets.           2018 Details    Sun Mon Tue Wed Thu Fri Sat         1      8 mg         2      4 mg         3      8 mg           4      8 mg         5      4 mg         6      8 mg         7      8 mg         8      8 mg         9      4 mg         10                 11               12               13               14               15               16               17                 18               19               20               21               22               23               24                 25               26               27               28                   Date Details   No additional details    Date of next INR:  2018         How to take your warfarin  dose     To take:  4 mg Take two of the 2 mg tablets.    To take:  8 mg Take four of the 2 mg tablets.             Description          Continue same Warfarin dose and recheck in 1 month.  Yoly Crowe RN   1/12/2018    8:44 AM                      Anticoagulation Summary as of 1/12/2018     INR goal 2.0-3.0    Today's INR 2.5    Next INR check 2/9/2018          Call your Anticoagulation Clinic at Dept: [phone]   if:   1. Any medications are started, stopped, or there is a change in dose.  2. You experience any bleeding that is not easily stopped or if it is recurrent.  3. You notice an increase in bruising or any bruising that does not heal.  4. You are scheduled for surgery, colonoscopy, dental extraction or any other procedure where you may need to stop your Coumadin (warfarin).

## 2018-02-13 NOTE — PROGRESS NOTES
Patient Information     Patient Name MRN Daniela Urbina [id number] Female 1970      Progress Notes signed by Magda Kitchen MD at 2017  5:38 PM      Author:  Magda Kitchen MD Service:  (none) Author Type:  Physician     Filed:  2017  5:38 PM Encounter Date:  2017 Status:  Signed     :  Magda Kitchen MD (Physician)            DATE OF SERVICE:  2017    HEMATOLOGY/ONCOLOGY CLINIC NOTE    Mrs. Ladd returns for followup of left upper extremity DVT.  We had seen her in  consultation on 2017 for evaluation of left upper extremity DVT.  Apparently, she had presented to [facility] on 2017 with left arm pain and redness associated with swelling that apparently happened overnight.  She woke up and noted pain on her left side, probably happened overnight when she developed acute onset of pain and swelling in the left upper extremity.  Subsequently was seen by [facility], who ordered a left upper extremity venous Doppler, which revealed occlusive thrombus in subclavian, axillary and basilic veins.  Cephalic, antecubital and brachial veins were patent.  The patient was started on Lovenox and was transitioned to Coumadin.  She has a strong family history of DVT including her mother and grandmother.  She also stated that her grandmother had history of CREST syndrome.  She stated that her mother had 4 stents in her groin.  She was a heavy smoker and smoked at least 1-1/2 packs per day for at least 30 years and cut back to less than a pack per day recently.  She denies any antecedent trauma or previous surgery.  She worked at [employer] store.  When we saw her on 2017, she had been on Lovenox and her swelling had  reduced considerably.  She was transitioned to Coumadin.  We elected to rule out hypercoagulable state by obtaining antithrombin III levels which were negative.  Lupus profile was obtained including beta 2  glycoprotein antibodies which were negative, but anticardiolipin antibodies were not drawn.  We also obtained antithrombin III activity, which was normal.  We obtained a homocysteine level, which was normal.  We also repeated the venous Doppler of the left upper extremity that revealed that there was improving left upper extremity DVT with residual thrombus in the left axillary vein and distal left subclavian vein, that was still thrombosed in the brachial and cephalic basilic veins and were recanalized.  We obtained scans to rule out occult malignancy and obtained a CT neck which revealed subcutaneous nodule in the right shoulder, likely a sebaceous cyst.  There was fat stranding in the left axillary region, likely sequelae of an acute DVT.  Otherwise, no evidence of mass or lymphadenopathy in the neck.  She also had a CT chest which revealed a few very small subpleural ground glass opacities measuring 5 mm.  For this, a 3-6 month followup was recommended.  CT abdomen and pelvis was essentially negative.  No evidence of mass or lymphadenopathy within the abdomen or pelvis.  When we saw her last on 11/29/2017, she had not had her factor V Leiden or prothrombin 2 mutation drawn, as well as anticardiolipin antibodies.  Anticardiolipin antibodies were negative.  Prothrombin 2 mutation was negative, but factor V Leiden was positive with heterozygous mutation, consistent with increased risk of developing thrombosis.  The patient continues developing thrombosis.  She continues to smoke at least 1-1/2 packs per day.  She says she is trying to cut back.  She would like to try Nicorette gum.  Otherwise, she did have a repeat venous Doppler of the left upper extremity, which was essentially improved overall from the original ultrasound, but had not changed from the previous ultrasound done on 11/20/2017.  There was thrombus in the left subclavian and axillary veins.  The brachiocephalic and basilic veins were patent and  normally compressible.  Internal jugular vein was patent.  She also had bilateral mammograms which were normal.  Given the fact she has hypercoagulable state, she will need to be on lifelong Coumadin, especially if she continues to smoke.  She denies any shortness of breath, chest pain, abdominal pain, fevers, night sweats or weight loss.    PHYSICAL EXAMINATION:  GENERAL:  She is a middle-aged white female in no acute distress.  VITAL SIGNS:  Reveal blood pressure 118/82, pulse 86, temperature 98.4.  HEENT:  Atraumatic, normocephalic.  Oropharynx nonerythematous.  NECK:  Supple.  LUNGS:  Clear to auscultation and percussion.  HEART:  Regular rhythm, S1 and S2 normal.  ABDOMEN:  Soft, normoactive bowel sounds.  No masses, nontender.  LYMPHATICS:  No cervical, supraclavicular, axillary or inguinal nodes.  EXTREMITIES:  Reveals she has some mild swelling of the left upper extremity.  NEUROLOGIC:  Grossly nonfocal.      LABORATORIES:  As above.    IMPRESSION:  Unprovoked left  upper extremity deep venous thrombosis, likely due hypercoagulable state given the fact she is factor V Leiden heterozygote, as well as a smoker.  The patient was encouraged to discontinue smoking.  Otherwise, she will need lifelong Coumadin.  We have prescribed Nicorette gum for her.  Otherwise, she will return to clinic in 3 months for repeat CT chest as well as repeat venous Doppler of the left upper extremity, as well as CBC, CMP, LDH and D-dimer.    40 minutes was spent with the patient, greater than half of the time was spent on counseling and coordination of care.        CHYNA HENDRICKSON MD      ACP/an  D:12/27/2017 16:47:05  T:12/28/2017 09:50:05  VJID: [id number]  TJID: [id number]    cc:KYLAH KINGSTON MD, RUBI ASHTON

## 2018-02-13 NOTE — PATIENT INSTRUCTIONS
Patient Information     Patient Name MRN Daniela Urbina [id number] Female 1970      Patient Instructions by Yoly Crowe RN at 2018  9:00 AM     Author:  Yoly Crowe RN Service:  (none) Author Type:  NURS- Registered Nurse     Filed:  2018  9:00 AM Encounter Date:  2018 Status:  Signed     :  Yoly Crowe RN (NURS- Registered Nurse)            2018 Details    Sun Mon Tue Wed Thu Fri Sat         1               2               3                 4               5               6               7               8               9      8 mg   See details      10      8 mg           11      8 mg         12      4 mg         13      8 mg         14      8 mg         15      8 mg         16      4 mg         17      8 mg           18      8 mg         19      4 mg         20      8 mg         21      8 mg         22      8 mg         23      4 mg         24      8 mg           25      8 mg         26      4 mg         27      8 mg         28      8 mg             Date Details    This INR check               How to take your warfarin dose     To take:  4 mg Take two of the 2 mg tablets.    To take:  8 mg Take four of the 2 mg tablets.           2018 Details    Sun Mon Tue Wed Thu Fri Sat         1      8 mg         2      4 mg         3                 4               5               6               7               8               9               10                 11               12               13               14               15               16               17                 18               19               20               21               22               23               24                 25               26               27               28               29               30               31                Date Details   No additional details    Date of next INR:  3/2/2018         How to take your warfarin dose     To take:  4 mg Take two  of the 2 mg tablets.    To take:  8 mg Take four of the 2 mg tablets.             Description          Take extra 4 mg today then resume dose and recheck in 3 weeks. Yoly Crowe RN    2/9/2018  9:00 AM                        Anticoagulation Summary as of 2/9/2018     INR goal 2.0-3.0    Today's INR 1.9    Next INR check 3/2/2018          Call your Anticoagulation Clinic at Dept: [phone]   if:   1. Any medications are started, stopped, or there is a change in dose.  2. You experience any bleeding that is not easily stopped or if it is recurrent.  3. You notice an increase in bruising or any bruising that does not heal.  4. You are scheduled for surgery, colonoscopy, dental extraction or any other procedure where you may need to stop your Coumadin (warfarin).

## 2018-02-14 ENCOUNTER — OFFICE VISIT (OUTPATIENT)
Dept: FAMILY MEDICINE | Facility: OTHER | Age: 48
End: 2018-02-14
Attending: FAMILY MEDICINE
Payer: COMMERCIAL

## 2018-02-14 VITALS
BODY MASS INDEX: 31.17 KG/M2 | SYSTOLIC BLOOD PRESSURE: 130 MMHG | DIASTOLIC BLOOD PRESSURE: 90 MMHG | HEIGHT: 67 IN | WEIGHT: 198.6 LBS | HEART RATE: 84 BPM

## 2018-02-14 DIAGNOSIS — S86.819A STRAIN OF CALF MUSCLE, INITIAL ENCOUNTER: Primary | ICD-10-CM

## 2018-02-14 DIAGNOSIS — D68.51 FACTOR 5 LEIDEN MUTATION, HETEROZYGOUS (H): ICD-10-CM

## 2018-02-14 PROBLEM — L70.8 OTHER ACNE: Status: ACTIVE | Noted: 2018-02-14

## 2018-02-14 PROBLEM — I82.622 ARM DVT (DEEP VENOUS THROMBOEMBOLISM), ACUTE, LEFT (H): Status: ACTIVE | Noted: 2018-02-11

## 2018-02-14 PROCEDURE — 99213 OFFICE O/P EST LOW 20 MIN: CPT | Performed by: FAMILY MEDICINE

## 2018-02-14 RX ORDER — ACETAMINOPHEN 500 MG
1000 TABLET ORAL EVERY 6 HOURS PRN
COMMUNITY
Start: 2017-11-29

## 2018-02-14 RX ORDER — WARFARIN SODIUM 2 MG/1
TABLET ORAL
Refills: 0 | COMMUNITY
Start: 2017-12-11 | End: 2018-03-02

## 2018-02-14 NOTE — PATIENT INSTRUCTIONS
Preventing Deep Vein Thrombosis  Healthcare providers use the term venous thromboembolism (VTE) to describe two conditions, deep vein thrombosis (DVT) and pulmonary embolism (PE). They use the term VTE because the two conditions are very closely related. And, because their prevention and treatment are closely related.   DVT is a blood clot or thrombus in a deep vein. Most of these clots develop in the leg or thigh. But, they may form in a vein in the arm, or other part of the body.   Part of the blood clot may separate from the vein. This is called an embolus. It may travel to the lungs and form a pulmonary embolus. This can cut off the flow of blood to a portion of or to the entire lung. A blood clot in the lungs is a medical emergency and may cause death.  Over time, blood clots can also permanently damage veins. They must be treated right away to prevent problems.   Risk factors  Anyone can develop a blood clot. But the following risk factors make a blood clot more likely to happen:    Being inactive for a long period, such as when you re in the hospital, or traveling by plane or car    Injury to a vein from an accident, a broken bone, or surgery    Having blood clots in the past or a family history of blood clots    Blood clotting disorder    Recent surgery    Cancer and certain cancer treatments    Smoking  Other factors can also put you at higher risk for a blood clot. They include:    Age over 60 years    Pregnancy    Taking birth control pills or hormone replacement    Having other vein problems, such as varicose veins     Being overweight    Having a pacemaker or a central venous catheter. They increase the chance of a blood clot forming in an arm.    Injection drug use. This also increases the chance of a blood clot forming in an arm.  How to prevent DVT  Preventing a blood clot means improving blood flow back to your heart. To help prevent a blood clot:    Talk with your healthcare provider about a  program of regular exercise.    If your legs feel swollen or heavy, take a break and sit comfortably or lie down with your feet up.    Keep a healthy weight.    Quit smoking, if you smoke.    Avoid sitting, standing, or lying down for long periods without moving your legs and feet:    When traveling by car, make frequent stops to get out and move around.    On long airplane, train, or bus rides, get up and move around when possible.    If you can t get up, wiggle your toes and tighten your calves to keep your blood moving, as pictured below.  If you need to have surgery, talk with your healthcare provider about a plan to prevent blood clots.   If you are in the hospital, your risk for blood clots increases. Your healthcare provider may prescribe an anticoagulant medicine or a blood thinner to help prevent blood clots. Or your healthcare provider may prescribe a sequential compression device (SCD) or intermittent pneumatic compression (IPC). The device has sleeves that fit around your legs. It applies gentle pressure to help with blood flow and prevent blood clots. Remove the sleeves so that you do not trip or fall when you are walking, like when you use the bathroom or shower. If you need help removing the sleeves, ask the nurse or aid. You may also want to try the following:    When to seek immediate medical attention  If you have symptoms of a blood clot in your lungs, call 911 or get emergency help. The symptoms are:    Chest pain    Trouble breathing    Fast heartbeat    Coughing (may cough up blood)    Sweating    Fainting  When to call your healthcare provider  If you have symptoms of a blood clot, call your healthcare provider. The symptoms are:    Pain    Swelling    Redness or discoloration in a leg, arm, or other area   Date Last Reviewed: 5/1/2016 2000-2017 [employer]. [address] [place] [address], [place] [address]. All rights reserved. This information is not intended as a substitute for  professional medical care. Always follow your healthcare professional's instructions.

## 2018-02-14 NOTE — NURSING NOTE
Patient presents to clinic with right calf discomfort.    Deborah Dennis LPN ...... 2/14/2018 2:40 PM

## 2018-02-14 NOTE — PROGRESS NOTES
"  SUBJECTIVE:   Daniela Ladd is a 47 year old female who presents to clinic today for the following health issues:  Concerns for blood clot in her right calf and the muscle there at times feels \"tighter\" than the left. She was newly diagnosed with a clot in her left upper extremity and found to have factor V Leiden-heterozygous currently on Coumadin and is therapeutic.  She continues her Coumadin regularly.  She has noted no swelling of the foot ankle or changes in coloration of her right lower extremity.  She denies any respiratory symptoms dyspnea or other discomfort or pleuritic symptoms.  She also has had a bit of difference in calf size and ankle size since she had a previous left ankle fracture and long-term immobilization which cause some mild atrophy on that side.  She is also requesting change of PCP today which was done.    HPI    Patient Active Problem List   Diagnosis     Arm DVT (deep venous thromboembolism), acute, left (H)     Dyshidrotic hand dermatitis     Endometriosis     Migraine with aura and without status migrainosus, not intractable     Other acne     Raynaud's syndrome     Tobacco abuse     Factor 5 Leiden mutation, heterozygous (H)     Past Surgical History:   Procedure Laterality Date     ANKLE SURGERY      2010,[place]     EXTRACTION(S) DENTAL      No Comments Provided     LAPAROSCOPIC TUBAL LIGATION      08/2004     SALPINGO-OOPHORECTOMY BILATERAL      4/14/11,Planned Lap RSO     TONSILLECTOMY      1980s       Social History   Substance Use Topics     Smoking status: Current Every Day Smoker     Packs/day: 0.50     Years: 30.00     Types: Cigarettes     Smokeless tobacco: Never Used     Alcohol use No     Family History   Problem Relation Age of Onset     Allergy (Severe) Mother      Allergies     HEART DISEASE Mother      Heart Disease     DIABETES Mother      Diabetes     Other - See Comments Mother      Peripheral vascular disese     Unknown/Adopted Father      Unknown     " "HEART DISEASE Paternal Grandfather      Heart Disease,CAD     Other - See Comments Other      endometriosis     Other - See Comments Brother      by her mother/by her father - lymphedema after knee surgery     Family History Negative Brother      Good Health,by her mother/by her father     Family History Negative Son      Good Health     DIABETES Maternal Aunt      Diabetes,also has had a borderline ovarian mass (not benign or cancerous)     CANCER Maternal Grandfather      Cancer,lung cancer, dm     Breast Cancer Maternal Grandmother      Cancer-breast,CREST syndrome, Raynaud's, scleroderma,  of CHF, small veins and arteries     Thyroid Disease Other      Thyroid Disease,several members of family with thyroid disease.         Current Outpatient Prescriptions   Medication Sig Dispense Refill     acetaminophen (TYLENOL) 500 MG tablet Take 1,000 mg by mouth       warfarin (COUMADIN) 2 MG tablet TAKE 2 TABS BY MOUTH FOR 2 DAYS/WEEK (MON & FRI) AND 4 TABS BY MOUTH FOR 5 DAYS A WEEK.  0     Allergies   Allergen Reactions     Amoxicillin-Pot Clavulanate Nausea     Cyclobenzaprine Rash     No Clinical Screening - See Comments Rash     Nicoderm patch       Review of Systems     OBJECTIVE:     /90 (BP Location: Right arm, Patient Position: Sitting)  Pulse 84  Ht 5' 6.5\" (1.689 m)  Wt 198 lb 9.6 oz (90.1 kg)  LMP 2018  BMI 31.57 kg/m2  Body mass index is 31.57 kg/(m^2).  Physical Exam   Constitutional: She is oriented to person, place, and time. She appears well-developed and well-nourished.   Pulmonary/Chest: Effort normal and breath sounds normal. No respiratory distress.   Musculoskeletal:   Lower extremities appear normal in appearance without evidence of changes in coloration and normal capillary refill.  She has no significant edema.  Her right calf is only 0.5 mm greater than her left calf and has no significant tenderness.  Negative Homans.   Neurological: She is alert and oriented to person, " place, and time.   Psychiatric: She has a normal mood and affect. Judgment normal.   Nursing note and vitals reviewed.      Diagnostic Test Results:  None    ASSESSMENT/PLAN:       1. Factor 5 Leiden mutation, heterozygous (H)  Continue Coumadin at current dosing with follow-up at [facility] clinic.    2. Strain of calf muscle, initial encounter  Reassurance as her findings are very low risk for recurrence of clot while on Coumadin and with negative findings on exam of her right lower extremity.  I think this is musculoskeletal.  Moist heat use of topicals addressed.  Reassurance again given about what to look for if clotting should recur.  Follow-up with Dr Kitchen as planned.         Krystin Black MD  [facility]  Portions of this dictation were created using the Dragon [employer] voice recognition system. Proofreading was completed but there may be errors in text.

## 2018-02-14 NOTE — MR AVS SNAPSHOT
After Visit Summary   2/14/2018    Daniela Ladd    MRN: [id number]           Patient Information     Date Of Birth          1970        Visit Information        Provider Department      2/14/2018 2:30 PM Krystin Black MD [facility]        Today's Diagnoses     Strain of calf muscle, initial encounter    -  1    Factor 5 Leiden mutation, heterozygous (H)          Care Instructions      Preventing Deep Vein Thrombosis  Healthcare providers use the term venous thromboembolism (VTE) to describe two conditions, deep vein thrombosis (DVT) and pulmonary embolism (PE). They use the term VTE because the two conditions are very closely related. And, because their prevention and treatment are closely related.   DVT is a blood clot or thrombus in a deep vein. Most of these clots develop in the leg or thigh. But, they may form in a vein in the arm, or other part of the body.   Part of the blood clot may separate from the vein. This is called an embolus. It may travel to the lungs and form a pulmonary embolus. This can cut off the flow of blood to a portion of or to the entire lung. A blood clot in the lungs is a medical emergency and may cause death.  Over time, blood clots can also permanently damage veins. They must be treated right away to prevent problems.   Risk factors  Anyone can develop a blood clot. But the following risk factors make a blood clot more likely to happen:    Being inactive for a long period, such as when you re in the hospital, or traveling by plane or car    Injury to a vein from an accident, a broken bone, or surgery    Having blood clots in the past or a family history of blood clots    Blood clotting disorder    Recent surgery    Cancer and certain cancer treatments    Smoking  Other factors can also put you at higher risk for a blood clot. They include:    Age over 60 years    Pregnancy    Taking birth control pills or hormone  replacement    Having other vein problems, such as varicose veins     Being overweight    Having a pacemaker or a central venous catheter. They increase the chance of a blood clot forming in an arm.    Injection drug use. This also increases the chance of a blood clot forming in an arm.  How to prevent DVT  Preventing a blood clot means improving blood flow back to your heart. To help prevent a blood clot:    Talk with your healthcare provider about a program of regular exercise.    If your legs feel swollen or heavy, take a break and sit comfortably or lie down with your feet up.    Keep a healthy weight.    Quit smoking, if you smoke.    Avoid sitting, standing, or lying down for long periods without moving your legs and feet:    When traveling by car, make frequent stops to get out and move around.    On long airplane, train, or bus rides, get up and move around when possible.    If you can t get up, wiggle your toes and tighten your calves to keep your blood moving, as pictured below.  If you need to have surgery, talk with your healthcare provider about a plan to prevent blood clots.   If you are in the hospital, your risk for blood clots increases. Your healthcare provider may prescribe an anticoagulant medicine or a blood thinner to help prevent blood clots. Or your healthcare provider may prescribe a sequential compression device (SCD) or intermittent pneumatic compression (IPC). The device has sleeves that fit around your legs. It applies gentle pressure to help with blood flow and prevent blood clots. Remove the sleeves so that you do not trip or fall when you are walking, like when you use the bathroom or shower. If you need help removing the sleeves, ask the nurse or aid. You may also want to try the following:    When to seek immediate medical attention  If you have symptoms of a blood clot in your lungs, call 911 or get emergency help. The symptoms are:    Chest pain    Trouble breathing    Fast  "heartbeat    Coughing (may cough up blood)    Sweating    Fainting  When to call your healthcare provider  If you have symptoms of a blood clot, call your healthcare provider. The symptoms are:    Pain    Swelling    Redness or discoloration in a leg, arm, or other area   Date Last Reviewed: 5/1/2016 2000-2017 [email or web address]. [address] [place] [address]. All rights reserved. This information is not intended as a substitute for professional medical care. Always follow your healthcare professional's instructions.                Follow-ups after your visit        Your next 10 appointments already scheduled     Mar 02, 2018  9:15 AM CST   Anticoagulation Visit with GH ANTI COAG 1   [facility] ([facility]    [address]  [place] [address] [phone]              Who to contact     If you have questions or need follow up information about today's clinic visit or your schedule please contact [facility] directly at [phone].  Normal or non-critical lab and imaging results will be communicated to you by [employer], letter or phone within 4 business days after the clinic has received the results. If you do not hear from us within 7 days, please contact the clinic through [employer] or phone. If you have a critical or abnormal lab result, we will notify you by phone as soon as possible.  Submit refill requests through [employer] or call your pharmacy and they will forward the refill request to us. Please allow 3 business days for your refill to be completed.          Additional Information About Your Visit        [employer] Information     [employer] lets you send messages to your doctor, view your test results, renew your prescriptions, schedule appointments and more. To sign up, go to [email or web address] . Click on \"Log in\" on the left side of the screen, which will take you to the Welcome page. Then click on \"Sign " "up Now\" on the right side of the page.     You will be asked to enter the access code listed below, as well as some personal information. Please follow the directions to create your username and password.     Your access code is: [email or web address]  Expires: 5/15/2018  3:04 PM     Your access code will  in 90 days. If you need help or a new code, please call your [facility] or [phone].        Care EveryWhere ID     This is your Care EveryWhere ID. This could be used by other organizations to access your [place] medical records  [id number]        Your Vitals Were     Pulse Height Last Period BMI (Body Mass Index)          84 5' 6.5\" (1.689 m) 2018 31.57 kg/m2         Blood Pressure from Last 3 Encounters:   18 130/90   17 118/82   17 118/68    Weight from Last 3 Encounters:   18 198 lb 9.6 oz (90.1 kg)   17 195 lb (88.5 kg)   17 198 lb 9.6 oz (90.1 kg)              Today, you had the following     No orders found for display       Primary Care Provider Office Phone # Fax #    Krystinkat Black [phone]       [address] [facility] [address]        Equal Access to Services     [facility]: Hadii li wilde hadgenny Soaline, waaxda luqadaha, qaybta kaaljazz paul, job mayer . So [facility] [phone].    ATENCIÓN: Si habla español, tiene a egan disposición servicios gratuitos de asistencia lingüística. Ishmael al [phone].    We comply with applicable federal civil rights laws and [place] laws. We do not discriminate on the basis of race, color, national origin, age, disability, sex, sexual orientation, or gender identity.            Thank you!     Thank you for choosing [facility] hospitals  for your care. Our goal is always to provide you with excellent care. Hearing back from our patients is one way we can continue to improve our services. Please take a few minutes to complete the written " survey that you may receive in the mail after your visit with us. Thank you!             Your Updated Medication List - Protect others around you: Learn how to safely use, store and throw away your medicines at www.disposemymeds.org.          This list is accurate as of 2/14/18  3:21 PM.  Always use your most recent med list.                   Brand Name Dispense Instructions for use Diagnosis    acetaminophen 500 MG tablet    TYLENOL     Take 1,000 mg by mouth        warfarin 2 MG tablet    COUMADIN     TAKE 2 TABS BY MOUTH FOR 2 DAYS/WEEK (MON & FRI) AND 4 TABS BY MOUTH FOR 5 DAYS A WEEK.

## 2018-02-19 ENCOUNTER — HEALTH MAINTENANCE LETTER (OUTPATIENT)
Age: 48
End: 2018-02-19

## 2018-03-02 ENCOUNTER — TELEPHONE (OUTPATIENT)
Dept: ANTICOAGULATION | Facility: OTHER | Age: 48
End: 2018-03-02

## 2018-03-02 ENCOUNTER — ANTICOAGULATION THERAPY VISIT (OUTPATIENT)
Dept: ANTICOAGULATION | Facility: OTHER | Age: 48
End: 2018-03-02
Attending: FAMILY MEDICINE
Payer: COMMERCIAL

## 2018-03-02 DIAGNOSIS — I82.622 ARM DVT (DEEP VENOUS THROMBOEMBOLISM), ACUTE, LEFT (H): Primary | ICD-10-CM

## 2018-03-02 DIAGNOSIS — Z79.01 LONG-TERM (CURRENT) USE OF ANTICOAGULANTS: ICD-10-CM

## 2018-03-02 DIAGNOSIS — D68.51 FACTOR 5 LEIDEN MUTATION, HETEROZYGOUS (H): ICD-10-CM

## 2018-03-02 DIAGNOSIS — I82.622 ARM DVT (DEEP VENOUS THROMBOEMBOLISM), ACUTE, LEFT (H): ICD-10-CM

## 2018-03-02 LAB — INR POINT OF CARE: 2.3 (ref 2–3)

## 2018-03-02 PROCEDURE — 36416 COLLJ CAPILLARY BLOOD SPEC: CPT | Performed by: INTERNAL MEDICINE

## 2018-03-02 PROCEDURE — 99207 ZZC NO CHARGE NURSE ONLY: CPT | Performed by: INTERNAL MEDICINE

## 2018-03-02 PROCEDURE — 85610 PROTHROMBIN TIME: CPT | Mod: QW | Performed by: INTERNAL MEDICINE

## 2018-03-02 RX ORDER — WARFARIN SODIUM 2 MG/1
TABLET ORAL
Qty: 288 TABLET | Refills: 1 | Status: SHIPPED | OUTPATIENT
Start: 2018-03-02 | End: 2018-03-02

## 2018-03-02 RX ORDER — WARFARIN SODIUM 2 MG/1
TABLET ORAL
Qty: 288 TABLET | Refills: 1 | Status: SHIPPED | OUTPATIENT
Start: 2018-03-02 | End: 2018-03-08

## 2018-03-02 NOTE — PROGRESS NOTES
ANTICOAGULATION FOLLOW-UP CLINIC VISIT    Patient Name:  Daniela Ladd  Date:  3/2/2018  Contact Type:  Face to Face    SUBJECTIVE:     Patient Findings     Positives No Problem Findings           OBJECTIVE    INR Protime   Date Value Ref Range Status   03/02/2018 2.3 2 - 3 Final       ASSESSMENT / PLAN  INR assessment THER    Recheck INR In: 6 WEEKS    INR Location Clinic      Anticoagulation Summary as of 3/2/2018     INR goal 2.0-3.0   Today's INR 2.3   Maintenance plan 4 mg (2 mg x 2) on Mon, Fri; 8 mg (2 mg x 4) all other days   Full instructions 4 mg on Mon, Fri; 8 mg all other days   Weekly total 48 mg   No change documented Yoly Crowe, MARIPOSA   Next INR check 4/13/2018   Priority INR   Target end date Indefinite    Indications   Arm DVT (deep venous thromboembolism)  acute  left (H) [I82.622]  Factor 5 Leiden mutation  heterozygous (H) [D68.51]  Long-term (current) use of anticoagulants [Z79.01] [Z79.01]         Anticoagulation Episode Summary     INR check location     Preferred lab     Send INR reminders to  INR    Comments       Anticoagulation Care Providers     Provider Role Specialty Phone number    Krystin Black MD [facility] [phone]            See the Encounter Report to view Anticoagulation Flowsheet and Dosing Calendar (Go to Encounters tab in chart review, and find the Anticoagulation Therapy Visit)        Yoly Crowe, RN

## 2018-03-02 NOTE — TELEPHONE ENCOUNTER
Has the patient previously taken warfarin? yes  If yes, for what indication? DVT    Does the patient have any of the following indications for a higher range of 2.5-3.5:    Mitral position mechanical valve? no    Ritu-Shiley, Ball and Cage or Monoleaflet valve (regardless of position) no    Other (if yes, please explain) no

## 2018-03-02 NOTE — MR AVS SNAPSHOT
"              After Visit Summary   3/2/2018    Daniela Ladd    MRN: [id number]           Patient Information     Date Of Birth          1970        Visit Information        Provider Department      3/2/2018 9:15 AM  ANTI COAG 1 [facility]        Today's Diagnoses     Arm DVT (deep venous thromboembolism), acute, left (H)        Long-term (current) use of anticoagulants        Factor 5 Leiden mutation, heterozygous (H)           Follow-ups after your visit        Your next 10 appointments already scheduled     Apr 13, 2018  9:15 AM CDT   Anticoagulation Visit with  ANTI COAG 1   [facility]    [address]  [place] [address] [phone]              Who to contact     If you have questions or need follow up information about today's clinic visit or your schedule please contact [facility] directly at [phone].  Normal or non-critical lab and imaging results will be communicated to you by [employer], letter or phone within 4 business days after the clinic has received the results. If you do not hear from us within 7 days, please contact the clinic through [employer] or phone. If you have a critical or abnormal lab result, we will notify you by phone as soon as possible.  Submit refill requests through [employer] or call your pharmacy and they will forward the refill request to us. Please allow 3 business days for your refill to be completed.          Additional Information About Your Visit        [employer] Information     [employer] lets you send messages to your doctor, view your test results, renew your prescriptions, schedule appointments and more. To sign up, go to [email or web address] . Click on \"Log in\" on the left side of the screen, which will take you to the Welcome page. Then click on \"Sign up Now\" on the right side of the page.     You will be asked to enter the access code " listed below, as well as some personal information. Please follow the directions to create your username and password.     Your access code is: [email or web address]  Expires: 5/15/2018  3:04 PM     Your access code will  in 90 days. If you need help or a new code, please call your [place] clinic or [phone].        Care EveryWhere ID     This is your Care EveryWhere ID. This could be used by other organizations to access your [place] medical records  [id number]         Blood Pressure from Last 3 Encounters:   18 130/90   17 118/82   17 118/68    Weight from Last 3 Encounters:   18 90.1 kg (198 lb 9.6 oz)   17 88.5 kg (195 lb)   17 90.1 kg (198 lb 9.6 oz)              We Performed the Following     INR point of care          Today's Medication Changes          These changes are accurate as of 3/2/18  9:26 AM.  If you have any questions, ask your nurse or doctor.               Start taking these medicines.        Dose/Directions    warfarin 2 MG tablet   Commonly known as:  COUMADIN   Used for:  Arm DVT (deep venous thromboembolism), acute, left (H), Long-term (current) use of anticoagulants        TAKE 2 TABS BY MOUTH FOR 2 DAYS/WEEK (MON & FRI) AND 4 TABS BY MOUTH FOR 5 DAYS A WEEK.   Quantity:  288 tablet   Refills:  1            Where to get your medicines      These medications were sent to [place] [address] [facility] [place], [place] - [address] [facility] [address]     Phone:  [phone]     warfarin 2 MG tablet                Primary Care Provider Office Phone # Fax #    Krystin Black [phone]       [address]  [facility] [address]        Equal Access to Services     FRANKO BOOTH : Max Mcclendon, huong perrin, qagoldie kaashlee paul, job boston. [facility] [phone].    ATENCIÓN: Si habla español, tiene a egna disposición servicios gratuitos de  asistencia lingüística. Ishmael al [phone].    We comply with applicable federal civil rights laws and [place] laws. We do not discriminate on the basis of race, color, national origin, age, disability, sex, sexual orientation, or gender identity.            Thank you!     Thank you for choosing [facility]  for your care. Our goal is always to provide you with excellent care. Hearing back from our patients is one way we can continue to improve our services. Please take a few minutes to complete the written survey that you may receive in the mail after your visit with us. Thank you!             Your Updated Medication List - Protect others around you: Learn how to safely use, store and throw away your medicines at www.disposemymeds.org.          This list is accurate as of 3/2/18  9:26 AM.  Always use your most recent med list.                   Brand Name Dispense Instructions for use Diagnosis    acetaminophen 500 MG tablet    TYLENOL     Take 1,000 mg by mouth        warfarin 2 MG tablet    COUMADIN    288 tablet    TAKE 2 TABS BY MOUTH FOR 2 DAYS/WEEK (MON & FRI) AND 4 TABS BY MOUTH FOR 5 DAYS A WEEK.    Arm DVT (deep venous thromboembolism), acute, left (H), Long-term (current) use of anticoagulants

## 2018-03-02 NOTE — MR AVS SNAPSHOT
Daniela NANCY Ladd   3/2/2018 9:15 AM   Anticoagulation Therapy Visit    Description:  47 year old female   Provider:  JOCE ANTI COAG 1   Department:  [facility]           INR as of 3/2/2018     Today's INR 2.3      Anticoagulation Summary as of 3/2/2018     INR goal 2.0-3.0   Today's INR 2.3   Full instructions 4 mg on Mon, Fri; 8 mg all other days   Next INR check 4/13/2018    Indications   Arm DVT (deep venous thromboembolism)  acute  left (H) [I82.622]  Factor 5 Leiden mutation  heterozygous (H) [D68.51]  Long-term (current) use of anticoagulants [Z79.01] [Z79.01]         Description     Continue same dose and recheck in 6 weeks. ...............Yoly Crowe RN    3/2/2018    9:19 AM        March 2018 Details    Sun Mon Tue Wed Thu Fri Sat         1               2      4 mg   See details      3      8 mg           4      8 mg         5      4 mg         6      8 mg         7      8 mg         8      8 mg         9      4 mg         10      8 mg           11      8 mg         12      4 mg         13      8 mg         14      8 mg         15      8 mg         16      4 mg         17      8 mg           18      8 mg         19      4 mg         20      8 mg         21      8 mg         22      8 mg         23      4 mg         24      8 mg           25      8 mg         26      4 mg         27      8 mg         28      8 mg         29      8 mg         30      4 mg         31      8 mg          Date Details   03/02 This INR check               How to take your warfarin dose     To take:  4 mg Take 2 of the 2 mg tablets.    To take:  8 mg Take 4 of the 2 mg tablets.           April 2018 Details    Sun Mon Tue Wed Thu Fri Sat     1      8 mg         2      4 mg         3      8 mg         4      8 mg         5      8 mg         6      4 mg         7      8 mg           8      8 mg         9      4 mg         10      8 mg         11      8 mg         12      8 mg         13            14                 15                16               17               18               19               20               21                 22               23               24               25               26               27               28                 29               30                     Date Details   No additional details    Date of next INR:  4/13/2018         How to take your warfarin dose     To take:  4 mg Take 2 of the 2 mg tablets.    To take:  8 mg Take 4 of the 2 mg tablets.

## 2018-03-07 ENCOUNTER — TELEPHONE (OUTPATIENT)
Dept: ONCOLOGY | Facility: OTHER | Age: 48
End: 2018-03-07

## 2018-03-07 DIAGNOSIS — D68.51 FACTOR 5 LEIDEN MUTATION, HETEROZYGOUS (H): ICD-10-CM

## 2018-03-07 DIAGNOSIS — I82.622 ARM DVT (DEEP VENOUS THROMBOEMBOLISM), ACUTE, LEFT (H): Primary | ICD-10-CM

## 2018-03-07 NOTE — TELEPHONE ENCOUNTER
Lab ordered per verbal read back order from provider and sent to provider to co-sign.  Alycia Rodriguez RN...........3/7/2018 12:58 PM

## 2018-03-07 NOTE — TELEPHONE ENCOUNTER
Patient called and was wondering about a lab order that was in the old system and she was told it is no longer in the new system. Wondering if it just needs to be added in the new system. Please call pt in regards to this at her primary ph#. Thanks.    Susi Ayon on 3/7/2018 at 12:13 PM

## 2018-03-08 DIAGNOSIS — I82.622 ARM DVT (DEEP VENOUS THROMBOEMBOLISM), ACUTE, LEFT (H): Primary | ICD-10-CM

## 2018-03-08 DIAGNOSIS — Z79.01 LONG-TERM (CURRENT) USE OF ANTICOAGULANTS: ICD-10-CM

## 2018-03-08 RX ORDER — WARFARIN SODIUM 2 MG/1
TABLET ORAL
Qty: 288 TABLET | Refills: 0 | Status: SHIPPED | OUTPATIENT
Start: 2018-03-08 | End: 2018-03-21

## 2018-03-08 NOTE — TELEPHONE ENCOUNTER
Last INR on 03/02/18:  2.3  Last office visit with TJR was 02/14/18    Prescription approved per [facility] Refill Protocol.

## 2018-03-14 ENCOUNTER — HOSPITAL ENCOUNTER (OUTPATIENT)
Dept: CT IMAGING | Facility: OTHER | Age: 48
Discharge: HOME | End: 2018-03-14
Attending: INTERNAL MEDICINE | Admitting: INTERNAL MEDICINE
Payer: COMMERCIAL

## 2018-03-14 ENCOUNTER — HOSPITAL ENCOUNTER (OUTPATIENT)
Dept: ULTRASOUND IMAGING | Facility: OTHER | Age: 48
End: 2018-03-14
Attending: INTERNAL MEDICINE
Payer: COMMERCIAL

## 2018-03-14 DIAGNOSIS — I82.622 ARM DVT (DEEP VENOUS THROMBOEMBOLISM), ACUTE, LEFT (H): ICD-10-CM

## 2018-03-14 DIAGNOSIS — D68.51 FACTOR 5 LEIDEN MUTATION, HETEROZYGOUS (H): ICD-10-CM

## 2018-03-14 LAB
ALBUMIN SERPL-MCNC: 3.7 G/DL (ref 3.5–5.7)
ALP SERPL-CCNC: 44 U/L (ref 34–104)
ALT SERPL W P-5'-P-CCNC: 16 U/L (ref 7–52)
ANION GAP SERPL CALCULATED.3IONS-SCNC: 5 MMOL/L (ref 3–14)
AST SERPL W P-5'-P-CCNC: 13 U/L (ref 13–39)
BASOPHILS # BLD AUTO: 0 10E9/L (ref 0–0.2)
BASOPHILS NFR BLD AUTO: 0.4 %
BILIRUB SERPL-MCNC: 0.2 MG/DL (ref 0.3–1)
BUN SERPL-MCNC: 7 MG/DL (ref 7–25)
CALCIUM SERPL-MCNC: 8.5 MG/DL (ref 8.6–10.3)
CHLORIDE SERPL-SCNC: 103 MMOL/L (ref 98–107)
CO2 SERPL-SCNC: 25 MMOL/L (ref 21–31)
CREAT SERPL-MCNC: 0.74 MG/DL (ref 0.6–1.2)
D DIMER PPP DDU-MCNC: 260 NG/ML D-DU (ref 0–230)
DIFFERENTIAL METHOD BLD: NORMAL
EOSINOPHIL # BLD AUTO: 0.1 10E9/L (ref 0–0.7)
EOSINOPHIL NFR BLD AUTO: 1.1 %
ERYTHROCYTE [DISTWIDTH] IN BLOOD BY AUTOMATED COUNT: 12.9 % (ref 10–15)
GFR SERPL CREATININE-BSD FRML MDRD: 85 ML/MIN/1.7M2
GLUCOSE SERPL-MCNC: 94 MG/DL (ref 70–105)
HCT VFR BLD AUTO: 39.8 % (ref 35–47)
HGB BLD-MCNC: 13.6 G/DL (ref 11.7–15.7)
IMM GRANULOCYTES # BLD: 0 10E9/L (ref 0–0.4)
IMM GRANULOCYTES NFR BLD: 0.4 %
LDH SERPL L TO P-CCNC: 164 U/L (ref 140–271)
LYMPHOCYTES # BLD AUTO: 3.7 10E9/L (ref 0.8–5.3)
LYMPHOCYTES NFR BLD AUTO: 33.5 %
MCH RBC QN AUTO: 30.6 PG (ref 26.5–33)
MCHC RBC AUTO-ENTMCNC: 34.2 G/DL (ref 31.5–36.5)
MCV RBC AUTO: 90 FL (ref 78–100)
MONOCYTES # BLD AUTO: 0.5 10E9/L (ref 0–1.3)
MONOCYTES NFR BLD AUTO: 4.5 %
NEUTROPHILS # BLD AUTO: 6.6 10E9/L (ref 1.6–8.3)
NEUTROPHILS NFR BLD AUTO: 60.1 %
PLATELET # BLD AUTO: 328 10E9/L (ref 150–450)
POTASSIUM SERPL-SCNC: 4 MMOL/L (ref 3.5–5.1)
PROT SERPL-MCNC: 6.3 G/DL (ref 6.4–8.9)
RBC # BLD AUTO: 4.44 10E12/L (ref 3.8–5.2)
SODIUM SERPL-SCNC: 133 MMOL/L (ref 134–144)
WBC # BLD AUTO: 11 10E9/L (ref 4–11)

## 2018-03-14 PROCEDURE — 80053 COMPREHEN METABOLIC PANEL: CPT | Performed by: INTERNAL MEDICINE

## 2018-03-14 PROCEDURE — 36415 COLL VENOUS BLD VENIPUNCTURE: CPT | Performed by: INTERNAL MEDICINE

## 2018-03-14 PROCEDURE — 85025 COMPLETE CBC W/AUTO DIFF WBC: CPT | Performed by: INTERNAL MEDICINE

## 2018-03-14 PROCEDURE — 93971 EXTREMITY STUDY: CPT | Mod: LT

## 2018-03-14 PROCEDURE — 85379 FIBRIN DEGRADATION QUANT: CPT | Performed by: INTERNAL MEDICINE

## 2018-03-14 PROCEDURE — 83615 LACTATE (LD) (LDH) ENZYME: CPT | Performed by: INTERNAL MEDICINE

## 2018-03-14 PROCEDURE — 71260 CT THORAX DX C+: CPT

## 2018-03-14 PROCEDURE — 25000125 ZZHC RX 250: Performed by: INTERNAL MEDICINE

## 2018-03-14 RX ADMIN — IOHEXOL 100 ML: 350 INJECTION, SOLUTION INTRAVENOUS at 12:47

## 2018-03-14 NOTE — DISCHARGE INSTRUCTIONS
IV Contrast- Discharge Instructions After Your CT Scan      The IV contrast you received today will be filtered from your bloodstream by your kidneys during the next 24 hours and pass from the body in urine.  You will not be aware of this process and your urine will not change in color.  To help this process you should drink at least 4 additional glasses of water or juice today.  This reduces stress on your kidneys.    Most contrast reactions are immediate.  Should you develop symptoms of concern after discharge, contact the department at the number below.  After hours you should contact your personal physician.  If you develop breathing distress or wheezing, call 911.

## 2018-03-21 ENCOUNTER — ANTICOAGULATION THERAPY VISIT (OUTPATIENT)
Dept: ANTICOAGULATION | Facility: OTHER | Age: 48
End: 2018-03-21
Attending: INTERNAL MEDICINE
Payer: COMMERCIAL

## 2018-03-21 ENCOUNTER — ONCOLOGY VISIT (OUTPATIENT)
Dept: ONCOLOGY | Facility: OTHER | Age: 48
End: 2018-03-21
Attending: INTERNAL MEDICINE
Payer: COMMERCIAL

## 2018-03-21 VITALS
HEART RATE: 80 BPM | HEIGHT: 66 IN | DIASTOLIC BLOOD PRESSURE: 90 MMHG | TEMPERATURE: 97.1 F | WEIGHT: 202 LBS | BODY MASS INDEX: 32.47 KG/M2 | SYSTOLIC BLOOD PRESSURE: 132 MMHG

## 2018-03-21 DIAGNOSIS — Z79.01 LONG-TERM (CURRENT) USE OF ANTICOAGULANTS: ICD-10-CM

## 2018-03-21 DIAGNOSIS — I82.622 ARM DVT (DEEP VENOUS THROMBOEMBOLISM), ACUTE, LEFT (H): Primary | ICD-10-CM

## 2018-03-21 DIAGNOSIS — D68.51 FACTOR 5 LEIDEN MUTATION, HETEROZYGOUS (H): ICD-10-CM

## 2018-03-21 DIAGNOSIS — R91.8 PULMONARY NODULES: ICD-10-CM

## 2018-03-21 DIAGNOSIS — I82.622 ARM DVT (DEEP VENOUS THROMBOEMBOLISM), ACUTE, LEFT (H): ICD-10-CM

## 2018-03-21 LAB — INR POINT OF CARE: 1.7 (ref 2.5–3.5)

## 2018-03-21 PROCEDURE — 36416 COLLJ CAPILLARY BLOOD SPEC: CPT

## 2018-03-21 PROCEDURE — 99215 OFFICE O/P EST HI 40 MIN: CPT | Performed by: INTERNAL MEDICINE

## 2018-03-21 PROCEDURE — 85610 PROTHROMBIN TIME: CPT | Mod: QW

## 2018-03-21 RX ORDER — WARFARIN SODIUM 2 MG/1
TABLET ORAL
Qty: 288 TABLET | Refills: 0 | COMMUNITY
Start: 2018-03-21 | End: 2018-05-10

## 2018-03-21 ASSESSMENT — PAIN SCALES - GENERAL: PAINLEVEL: MILD PAIN (3)

## 2018-03-21 NOTE — NURSING NOTE
Parameters for INR changed to 2.5-3.5. Yoly SHIN RN in protime notified.  Alycia Rodriguez RN...........3/21/2018 10:02 AM

## 2018-03-21 NOTE — PROGRESS NOTES
Visit Date:   03/21/2018      HISTORY OF PRESENT ILLNESS:  Ms. Ladd returns for followup of left upper extremity DVT.  We had seen her in consultation on 11/09/2017 for evaluation of left upper extremity DVT.  Apparently, she had presented to [facility] on 11/01/2017 with left arm pain and redness associated with swelling that apparently happened overnight.  She woke up and noted pain in her left side, probably happening overnight when she developed acute onset of pain and swelling of the left upper extremity.  Subsequently, she was seen by [facility] who ordered a left upper extremity venous Doppler which revealed occlusive thrombus in subclavian, axillary and basilic veins.  Cephalic, antecubital and brachial veins were pending.  The patient was started on Lovenox and was transitioned to Coumadin.  She has a strong family history of DVT including her mother and grandmother.  She also stated that her grandmother had history of CREST syndrome.  She stated that her mother had 4 stents in her groin.  She was a heavy smoker and smoked at least 1-1/2 packs per day for at least 30 years and cut back to less than a pack per day.  She denied any antecedent trauma or previous surgery.  She worked at [employer] store.  We saw her on 11/09/2017, she had been on Lovenox and her swelling had reduced considerably.  She was transitioned to Coumadin.  We elected to rule out hypercoagulable state by obtaining antithrombin III levels which were negative.  Lupus profile was negative including beta 2 glycoprotein antibodies and anticardiolipin antibodies.  We also obtain antithrombin III activity which was normal.  We obtained homocystine levels which was normal.  Prothrombin 2 mutation was negative, but factor V Leiden was positive for heterozygous mutation consistent with increased risk of developing thrombosis.  We repeated the venous Doppler of the left upper extremity and it revealed there was improving left  upper extremity DVT with residual thrombus in the left axillary vein and distal left subclavian vein.  There was still thrombosis in the brachial and cephalic.  Basilic veins were recanalized.  Obtain scans to rule out occult malignancy including CT neck which revealed subcutaneous nodule in the right shoulder that was most likely a sebaceous cyst.  There was fat stranding in the left axillary region, likely sequelae of an acute DVT.  Otherwise, no evidence of mass or lymphadenopathy in the neck.  She also had a CT chest which revealed a few very small subpleural ground-glass opacities measuring 5 mm.  A 3-6 month followup was recommended.  CT of the abdomen and pelvis was essentially negative.  The patient continues to smoke at least 1-1/2 packs per day and was trying to cut back and was using Nicorette gum.  We felt that she would need to be on lifelong Coumadin so she continues to smoke.  She comes in now for followup.  She says she is doing reasonably well.  She does notice some occasional soreness over the left shoulder into the left chest.  She had a repeat venous Doppler of the left upper extremity which revealed that there was improvement in the left axillary vein, which was now patent.  The remainder of the left upper extremity venous structures cephalic, basilic and brachial and radial veins were patent.  Internal jugular vein was patent but this persisted in occlusive thrombus in the subclavian vein that was unchanged.  She also had a CT chest, which revealed numerous tiny pulmonary nodules again present.  They were unchanged and primarily in the upper lobes as sarcoidosis or other autoimmune disease within differential.  The patient otherwise states that her INRs have been on the low side of normal and occasionally subnormal subtherapeutic.  She has been getting them tested every 6 weeks.  She denies any shortness of breath, chest pain, abdominal pain, fevers, night sweats, weight loss.      PHYSICAL  EXAMINATION:   GENERAL:  She is a middle-aged white female in no acute distress.   VITAL SIGNS:  Reveal blood pressure 132/90, pulse 80, temperature 97.6.   HEENT:  Atraumatic, normocephalic.  Oropharynx nonerythematous.   NECK:  Supple.   LUNGS:  Clear to auscultation and percussion.     HEART:  Regular rhythm, S1, normal.   ABDOMEN:  Soft, normoactive bowel sounds.  No mass, nontender.   LYMPHATICS:  No cervical nodes.   EXTREMITIES:  No edema or swelling.  Negative Homans' sign.   NEUROLOGIC:  Grossly nonfocal.      LABORATORY DATA:  Reveals CBC is within normal limits.  BUN 7, creatinine 0.74.  LFTs are normal.  LDH is 164, D-dimer is 260.      IMPRESSION:     1.  Unprovoked left upper extremity deep venous thrombosis, likely due to hypercoagulable state given the fact she has factor V Leiden heterozygote, as well as smoker.  The patient still has a persistent subclavian thrombosis.  We would like to increase her parameters for Coumadin dosing for INR to be therapeutic between 2.5 to 3.5 INR.  She will have this tested on an every 2-week basis until we can repeat the venous Doppler of the left upper extremity.  The patient was encouraged to discontinue smoking.     2.  Pulmonary micronodules.  Rule out sarcoid versus possible autoimmune disease.  Given the her family history of CREST, we elected to refer this patient to Dr. Otto Ellis of pulmonary at [facility].  Otherwise, we will see the patient in approximately 2 months.  Repeat venous Doppler of the left lower extremity.  Repeat CBC, CMP, LDH, D-dimer.        Forty minutes was spent with the patient, greater than half the time spent in counseling and coordination of care.           CHYNA HENDRICKSON MD             D: 2018   T: 2018   MT: VALENTIN      Name:     MIKO SANTOS   MRN:      [id number]        Account:      [id number]   :      1970           Visit Date:   2018      Document: [id number]       cc: Freda  Master Ellis MD

## 2018-03-21 NOTE — MR AVS SNAPSHOT
Daniela Ladd   3/21/2018 10:15 AM   Anticoagulation Therapy Visit    Description:  47 year old female   Provider:  [facility]   Department:  [facility]           INR as of 3/21/2018     Today's INR 1.7!      Anticoagulation Summary as of 3/21/2018     INR goal 2.5-3.5   Today's INR 1.7!   Full instructions 8 mg every day   Next INR check 3/26/2018    Indications   Arm DVT (deep venous thromboembolism)  acute  left (H) [I82.622]  Factor 5 Leiden mutation  heterozygous (H) [D68.51]  Long-term (current) use of anticoagulants [Z79.01] [Z79.01]         Description     Increase dose and recheck in 1 week. .............Yoly Crowe RN.......... 3/21/2018  10:31 AM       Your next Anticoagulation Clinic appointment(s)     Apr 13, 2018  9:15 AM CDT   Anticoagulation Visit with [facility] ANTI COAG 1   [facility] ([facility])    [address]  [place] [address]   [phone]              March 2018 Details    Sun Mon Tue Wed Thu Fri Sat         1               2               3                 4               5               6               7               8               9               10                 11               12               13               14               15               16               17                 18               19               20               21      8 mg   See details      22      8 mg         23      8 mg         24      8 mg           25      8 mg         26            27               28               29               30               31                Date Details   03/21 This INR check       Date of next INR:  3/26/2018         How to take your warfarin dose     To take:  8 mg Take 4 of the 2 mg tablets.

## 2018-03-21 NOTE — MR AVS SNAPSHOT
After Visit Summary   3/21/2018    Daniela Ladd    MRN: [id number]           Patient Information     Date Of Birth          1970        Visit Information        Provider Department      3/21/2018 9:00 AM Magda Kitchen MD [facility]        Today's Diagnoses     Arm DVT (deep venous thromboembolism), acute, left (H)    -  1    Factor 5 Leiden mutation, heterozygous (H)        Pulmonary nodules           Follow-ups after your visit        Additional Services     PULMONARY MEDICINE REFERRAL       Your provider has referred you to: Dr. Ellis at [facility]-bilateral pulmonary micronodules    Please be aware that coverage of these services is subject to the terms and limitations of your health insurance plan.  Call member services at your health plan with any benefit or coverage questions.      Please bring the following with you to your appointment:    (1) Any X-Rays, CTs or MRIs which have been performed.  Contact the facility where they were done to arrange for  prior to your scheduled appointment.    (2) List of current medications   (3) This referral request   (4) Any documents/labs given to you for this referral                  Your next 10 appointments already scheduled     Mar 26, 2018  9:00 AM CDT   Anticoagulation Visit with GH ANTI COAG 1   [facility] ([facility])    [address]  [place] [address]   [phone]            Apr 13, 2018  9:15 AM CDT   Anticoagulation Visit with GH ANTI COAG 1   [facility] ([facility])    [address]  [place] [address]   [phone]            May 14, 2018  9:50 AM CDT   LAB with GH LAB   [facility] ([facility])    [address] [facility] [address]   [phone]           Please do not eat 10-12 hours before your appointment if you are  coming in fasting for labs on lipids, cholesterol, or glucose (sugar). This does not apply to pregnant women. Water, hot tea and black coffee (with nothing added) are okay. Do not drink other fluids, diet soda or chew gum.            May 14, 2018 10:15 AM CDT   US VENOUS with GIL   [facility]    [address]  [facility] [address]   [phone]           Please bring a list of your medicines (including vitamins, minerals and over-the-counter drugs). Also, tell your doctor about any allergies you may have. Wear comfortable clothes and leave your valuables at home.  You do not need to do anything special to prepare for your exam.  Please call the Imaging Department at your exam site with any questions.            May 23, 2018 11:00 AM CDT   Return Visit with Magda Kitchen MD   [facility]    [address]  [place] [address]   [phone]              Future tests that were ordered for you today     Open Future Orders        Priority Expected Expires Ordered    CBC with platelets differential Routine 5/14/2018 7/21/2018 3/21/2018    Comprehensive metabolic panel Routine 5/14/2018 7/21/2018 3/21/2018    Lactate Dehydrogenase Routine 5/14/2018 7/21/2018 3/21/2018    D-Dimer (HI,GH) Routine 5/14/2018 7/21/2018 3/21/2018    US Upper Extremity Venous Duplex Left Routine 5/14/2018 7/21/2018 3/21/2018            Who to contact     If you have questions or need follow up information about today's clinic visit or your schedule please contact [facility] directly at [phone].  Normal or non-critical lab and imaging results will be communicated to you by MyChart, letter or phone within 4 business days after the clinic has received the results. If you do not hear from us within 7 days, please contact the clinic through MyChart or phone. If you have a  "critical or abnormal lab result, we will notify you by phone as soon as possible.  Submit refill requests through [employer] or call your pharmacy and they will forward the refill request to us. Please allow 3 business days for your refill to be completed.          Additional Information About Your Visit        [employer] Information     [employer] gives you secure access to your electronic health record. If you see a primary care provider, you can also send messages to your care team and make appointments. If you have questions, please call your primary care clinic.  If you do not have a primary care provider, please call [phone] and they will assist you.        Care EveryWhere ID     This is your Care EveryWhere ID. This could be used by other organizations to access your [place] medical records  [id number]        Your Vitals Were     Pulse Temperature Height BMI (Body Mass Index)          80 97.1  F (36.2  C) (Tympanic) 1.689 m (5' 6.5\") 32.12 kg/m2         Blood Pressure from Last 3 Encounters:   03/21/18 132/90   02/14/18 130/90   12/27/17 118/82    Weight from Last 3 Encounters:   03/21/18 91.6 kg (202 lb)   02/14/18 90.1 kg (198 lb 9.6 oz)   12/27/17 88.5 kg (195 lb)              We Performed the Following     PULMONARY MEDICINE REFERRAL          Today's Medication Changes          These changes are accurate as of 3/21/18  5:20 PM.  If you have any questions, ask your nurse or doctor.               These medicines have changed or have updated prescriptions.        Dose/Directions    warfarin 2 MG tablet   Commonly known as:  COUMADIN   This may have changed:  See the new instructions.   Used for:  Arm DVT (deep venous thromboembolism), acute, left (H), Long-term (current) use of anticoagulants        TAKE  4 TABS BY MOUTH DAILY or as directed by protime clinic   Quantity:  288 tablet   Refills:  0                Primary Care Provider Office Phone # Fax #    Krystinkat Black [phone]    "    [address]  [facility] [address]        Equal Access to Services     NATALIATOMAS EMMY : Hadii aad ku hadpoonamyao Paigealine, wacodida marychuy, qajenyta raeganashkanrian paul, job jarvisbaironmarcie [place]. So [facility] [phone].    ATENCIÓN: Si habla español, tiene a egan disposición servicios gratuitos de asistencia lingüística. Llame al [phone].    We comply with applicable federal civil rights laws and [place] laws. We do not discriminate on the basis of race, color, national origin, age, disability, sex, sexual orientation, or gender identity.            Thank you!     Thank you for choosing [facility]  for your care. Our goal is always to provide you with excellent care. Hearing back from our patients is one way we can continue to improve our services. Please take a few minutes to complete the written survey that you may receive in the mail after your visit with us. Thank you!             Your Updated Medication List - Protect others around you: Learn how to safely use, store and throw away your medicines at www.disposemymeds.org.          This list is accurate as of 3/21/18  5:20 PM.  Always use your most recent med list.                   Brand Name Dispense Instructions for use Diagnosis    acetaminophen 500 MG tablet    TYLENOL     Take 1,000 mg by mouth        warfarin 2 MG tablet    COUMADIN    288 tablet    TAKE  4 TABS BY MOUTH DAILY or as directed by protime clinic    Arm DVT (deep venous thromboembolism), acute, left (H), Long-term (current) use of anticoagulants

## 2018-03-21 NOTE — NURSING NOTE
Patient presents in the clinic for a 3 month follow up in regard to hr DVT.  Mary Jo Zepeda LPN 3/21/2018 9:14 AM

## 2018-03-24 ENCOUNTER — MYC MEDICAL ADVICE (OUTPATIENT)
Dept: ONCOLOGY | Facility: OTHER | Age: 48
End: 2018-03-24

## 2018-03-26 ENCOUNTER — ANTICOAGULATION THERAPY VISIT (OUTPATIENT)
Dept: ANTICOAGULATION | Facility: OTHER | Age: 48
End: 2018-03-26
Attending: FAMILY MEDICINE
Payer: COMMERCIAL

## 2018-03-26 DIAGNOSIS — I82.622 ARM DVT (DEEP VENOUS THROMBOEMBOLISM), ACUTE, LEFT (H): ICD-10-CM

## 2018-03-26 DIAGNOSIS — Z79.01 LONG-TERM (CURRENT) USE OF ANTICOAGULANTS: ICD-10-CM

## 2018-03-26 DIAGNOSIS — D68.51 FACTOR 5 LEIDEN MUTATION, HETEROZYGOUS (H): ICD-10-CM

## 2018-03-26 LAB — INR POINT OF CARE: 2 (ref 2–3)

## 2018-03-26 PROCEDURE — 36416 COLLJ CAPILLARY BLOOD SPEC: CPT

## 2018-03-26 PROCEDURE — 85610 PROTHROMBIN TIME: CPT | Mod: QW

## 2018-03-26 NOTE — PROGRESS NOTES
ANTICOAGULATION FOLLOW-UP CLINIC VISIT    Patient Name:  Daniela Ladd  Date:  3/26/2018  Contact Type:  Face to Face    SUBJECTIVE:     Patient Findings     Positives No Problem Findings           OBJECTIVE    INR Protime   Date Value Ref Range Status   03/26/2018 2.0 2.0 - 3.0 Final       ASSESSMENT / PLAN  INR assessment SUB range increased   Recheck INR In: 1 WEEK    INR Location Clinic      Anticoagulation Summary as of 3/26/2018     INR goal 2.5-3.5   Today's INR 2.0!   Maintenance plan 10 mg (2 mg x 5) on Mon; 8 mg (2 mg x 4) all other days   Full instructions 10 mg on Mon; 8 mg all other days   Weekly total 58 mg   Plan last modified Renee Kevin RN (3/26/2018)   Next INR check 3/30/2018   Priority INR   Target end date Indefinite    Indications   Arm DVT (deep venous thromboembolism)  acute  left (H) [I82.622]  Factor 5 Leiden mutation  heterozygous (H) [D68.51]  Long-term (current) use of anticoagulants [Z79.01] [Z79.01]         Anticoagulation Episode Summary     INR check location     Preferred lab     Send INR reminders to  INR    Comments INR goal changed per Dr Kitchen 3/21/18 he wants INR checked q 2 weeks        Anticoagulation Care Providers     Provider Role Specialty Phone number    Krystin Black MD [facility] [phone]            See the Encounter Report to view Anticoagulation Flowsheet and Dosing Calendar (Go to Encounters tab in chart review, and find the Anticoagulation Therapy Visit)        Renee Kevin RN

## 2018-03-26 NOTE — MR AVS SNAPSHOT
Daniela Ladd   3/26/2018 9:00 AM   Anticoagulation Therapy Visit    Description:  47 year old female   Provider:  [facility]   Department:  [facility]           INR as of 3/26/2018     Today's INR 2.0!      Anticoagulation Summary as of 3/26/2018     INR goal 2.5-3.5   Today's INR 2.0!   Full instructions 10 mg on Mon; 8 mg all other days   Next INR check 3/30/2018    Indications   Arm DVT (deep venous thromboembolism)  acute  left (H) [I82.622]  Factor 5 Leiden mutation  heterozygous (H) [D68.51]  Long-term (current) use of anticoagulants [Z79.01] [Z79.01]         Description     Take 10 mg today, 8 mg x thee days, recheck INR on 03/30/18.  Renee Kevin ....................  3/26/2018   9:04 AM            Your next Anticoagulation Clinic appointment(s)     Apr 13, 2018  9:15 AM CDT   Anticoagulation Visit with SKYLER ANTI COAG 1   [facility]    [address]  [place] [address]   [phone]              March 2018 Details    Sun Mon Tue Wed Thu Fri Sat         1               2               3                 4               5               6               7               8               9               10                 11               12               13               14               15               16               17                 18               19               20               21               22               23               24                 25               26      10 mg   See details      27      8 mg         28      8 mg         29      8 mg         30            31                Date Details   03/26 This INR check       Date of next INR:  3/30/2018         How to take your warfarin dose     To take:  8 mg Take 4 of the 2 mg tablets.    To take:  10 mg Take 5 of the 2 mg tablets.

## 2018-03-27 ENCOUNTER — MYC MEDICAL ADVICE (OUTPATIENT)
Dept: FAMILY MEDICINE | Facility: OTHER | Age: 48
End: 2018-03-27

## 2018-03-30 ENCOUNTER — ANTICOAGULATION THERAPY VISIT (OUTPATIENT)
Dept: ANTICOAGULATION | Facility: OTHER | Age: 48
End: 2018-03-30
Attending: FAMILY MEDICINE
Payer: COMMERCIAL

## 2018-03-30 DIAGNOSIS — D68.51 FACTOR 5 LEIDEN MUTATION, HETEROZYGOUS (H): ICD-10-CM

## 2018-03-30 DIAGNOSIS — I82.622 ARM DVT (DEEP VENOUS THROMBOEMBOLISM), ACUTE, LEFT (H): ICD-10-CM

## 2018-03-30 DIAGNOSIS — Z79.01 LONG-TERM (CURRENT) USE OF ANTICOAGULANTS: ICD-10-CM

## 2018-03-30 LAB — INR POINT OF CARE: 2.4 (ref 2.5–3.5)

## 2018-03-30 PROCEDURE — 36416 COLLJ CAPILLARY BLOOD SPEC: CPT

## 2018-03-30 PROCEDURE — 85610 PROTHROMBIN TIME: CPT | Mod: QW

## 2018-03-30 PROCEDURE — 99207 ZZC NO CHARGE NURSE ONLY: CPT

## 2018-03-30 NOTE — MR AVS SNAPSHOT
Daniela NANCY Ladd   3/30/2018 8:45 AM   Anticoagulation Therapy Visit    Description:  47 year old female   Provider:  [facility]   Department:  [facility]           INR as of 3/30/2018     Today's INR 2.4!      Anticoagulation Summary as of 3/30/2018     INR goal 2.5-3.5   Today's INR 2.4!   Full instructions 10 mg on Mon; 8 mg all other days   Next INR check 4/6/2018    Indications   Arm DVT (deep venous thromboembolism)  acute  left (H) [I82.622]  Factor 5 Leiden mutation  heterozygous (H) [D68.51]  Long-term (current) use of anticoagulants [Z79.01] [Z79.01]         Description     Continue same Warfarin dose and recheck in 1 week  Yoly Crowe RN   3/30/2018    8:49 AM        March 2018 Details    Sun Mon Tue Wed Thu Fri Sat         1               2               3                 4               5               6               7               8               9               10                 11               12               13               14               15               16               17                 18               19               20               21               22               23               24                 25               26               27               28               29               30      8 mg   See details      31      8 mg          Date Details   03/30 This INR check               How to take your warfarin dose     To take:  8 mg Take 4 of the 2 mg tablets.           April 2018 Details    Sun Mon Tue Wed Thu Fri Sat     1      8 mg         2      10 mg         3      8 mg         4      8 mg         5      8 mg         6            7                 8               9               10               11               12               13               14                 15               16               17               18               19               20               21                 22               23               24               25               26                27               28                 29               30                     Date Details   No additional details    Date of next INR:  4/6/2018         How to take your warfarin dose     To take:  8 mg Take 4 of the 2 mg tablets.    To take:  10 mg Take 5 of the 2 mg tablets.

## 2018-03-30 NOTE — PROGRESS NOTES
ANTICOAGULATION FOLLOW-UP CLINIC VISIT    Patient Name:  Daniela Ladd  Date:  3/30/2018  Contact Type:  Face to Face    SUBJECTIVE:     Patient Findings     Positives No Problem Findings           OBJECTIVE    INR Protime   Date Value Ref Range Status   03/30/2018 2.4 (A) 2.5 - 3.5 Final       ASSESSMENT / PLAN  INR assessment SUB    Recheck INR In: 1 WEEK    INR Location Clinic      Anticoagulation Summary as of 3/30/2018     INR goal 2.5-3.5   Today's INR 2.4!   Maintenance plan 10 mg (2 mg x 5) on Mon; 8 mg (2 mg x 4) all other days   Full instructions 10 mg on Mon; 8 mg all other days   Weekly total 58 mg   No change documented Yoly Crowe RN   Plan last modified Renee Kevin RN (3/26/2018)   Next INR check 4/6/2018   Priority INR   Target end date Indefinite    Indications   Arm DVT (deep venous thromboembolism)  acute  left (H) [I82.622]  Factor 5 Leiden mutation  heterozygous (H) [D68.51]  Long-term (current) use of anticoagulants [Z79.01] [Z79.01]         Anticoagulation Episode Summary     INR check location     Preferred lab     Send INR reminders to  INR    Comments INR goal changed per Dr Kitchen 3/21/18 he wants INR checked q 2 weeks        Anticoagulation Care Providers     Provider Role Specialty Phone number    Krystin Black MD [facility] [phone]            See the Encounter Report to view Anticoagulation Flowsheet and Dosing Calendar (Go to Encounters tab in chart review, and find the Anticoagulation Therapy Visit)        Yoly Crowe, RN

## 2018-04-06 ENCOUNTER — ANTICOAGULATION THERAPY VISIT (OUTPATIENT)
Dept: ANTICOAGULATION | Facility: OTHER | Age: 48
End: 2018-04-06
Attending: FAMILY MEDICINE
Payer: COMMERCIAL

## 2018-04-06 DIAGNOSIS — D68.51 FACTOR 5 LEIDEN MUTATION, HETEROZYGOUS (H): ICD-10-CM

## 2018-04-06 DIAGNOSIS — Z79.01 LONG-TERM (CURRENT) USE OF ANTICOAGULANTS: ICD-10-CM

## 2018-04-06 DIAGNOSIS — I82.622 ARM DVT (DEEP VENOUS THROMBOEMBOLISM), ACUTE, LEFT (H): ICD-10-CM

## 2018-04-06 LAB — INR POINT OF CARE: 2.8 (ref 2.5–3.5)

## 2018-04-06 PROCEDURE — 99207 ZZC NO CHARGE NURSE ONLY: CPT

## 2018-04-06 PROCEDURE — 36416 COLLJ CAPILLARY BLOOD SPEC: CPT

## 2018-04-06 PROCEDURE — 85610 PROTHROMBIN TIME: CPT | Mod: QW

## 2018-04-06 NOTE — PROGRESS NOTES
ANTICOAGULATION FOLLOW-UP CLINIC VISIT    Patient Name:  Daniela Ladd  Date:  4/6/2018  Contact Type:  Face to Face    SUBJECTIVE:     Patient Findings     Positives No Problem Findings           OBJECTIVE    INR Protime   Date Value Ref Range Status   04/06/2018 2.8 2.5 - 3.5 Final       ASSESSMENT / PLAN  INR assessment THER    Recheck INR In: 1 WEEK    INR Location Clinic      Anticoagulation Summary as of 4/6/2018     INR goal 2.5-3.5   Today's INR 2.8   Maintenance plan 10 mg (2 mg x 5) on Mon; 8 mg (2 mg x 4) all other days   Full instructions 10 mg on Mon; 8 mg all other days   Weekly total 58 mg   No change documented Yoly Crowe RN   Plan last modified Renee Kevin RN (3/26/2018)   Next INR check 4/13/2018   Priority INR   Target end date Indefinite    Indications   Arm DVT (deep venous thromboembolism)  acute  left (H) [I82.622]  Factor 5 Leiden mutation  heterozygous (H) [D68.51]  Long-term (current) use of anticoagulants [Z79.01] [Z79.01]         Anticoagulation Episode Summary     INR check location     Preferred lab     Send INR reminders to  INR    Comments INR goal changed per Dr Kitchen 3/21/18 he wants INR checked q 2 weeks        Anticoagulation Care Providers     Provider Role Specialty Phone number    Krystin Black MD [facility] [phone]            See the Encounter Report to view Anticoagulation Flowsheet and Dosing Calendar (Go to Encounters tab in chart review, and find the Anticoagulation Therapy Visit)        Yoly Crowe, RN

## 2018-04-06 NOTE — MR AVS SNAPSHOT
Daniela Ladd   4/6/2018 9:15 AM   Anticoagulation Therapy Visit    Description:  47 year old female   Provider:  [facility]   Department:  [facility]           INR as of 4/6/2018     Today's INR 2.8      Anticoagulation Summary as of 4/6/2018     INR goal 2.5-3.5   Today's INR 2.8   Full instructions 10 mg on Mon; 8 mg all other days   Next INR check 4/13/2018    Indications   Arm DVT (deep venous thromboembolism)  acute  left (H) [I82.622]  Factor 5 Leiden mutation  heterozygous (H) [D68.51]  Long-term (current) use of anticoagulants [Z79.01] [Z79.01]         Description     Continue same Warfarin dose and recheck in 1 week.  Yoly Crowe RN   4/6/2018    9:10 AM        Your next Anticoagulation Clinic appointment(s)     Apr 06, 2018  9:15 AM CDT   Anticoagulation Visit with [facility]   [facility] ([facility])    [address]  [facility] [address]   [phone]              April 2018 Details    Sun Mon Tue Wed Thu Fri Sat     1               2               3               4               5               6      8 mg   See details      7      8 mg           8      8 mg         9      10 mg         10      8 mg         11      8 mg         12      8 mg         13            14                 15               16               17               18               19               20               21                 22               23               24               25               26               27               28                 29               30                     Date Details   04/06 This INR check       Date of next INR:  4/13/2018         How to take your warfarin dose     To take:  8 mg Take 4 of the 2 mg tablets.    To take:  10 mg Take 5 of the 2 mg tablets.

## 2018-04-13 ENCOUNTER — ANTICOAGULATION THERAPY VISIT (OUTPATIENT)
Dept: ANTICOAGULATION | Facility: OTHER | Age: 48
End: 2018-04-13
Attending: FAMILY MEDICINE
Payer: COMMERCIAL

## 2018-04-13 DIAGNOSIS — Z79.01 LONG-TERM (CURRENT) USE OF ANTICOAGULANTS: ICD-10-CM

## 2018-04-13 DIAGNOSIS — D68.51 FACTOR 5 LEIDEN MUTATION, HETEROZYGOUS (H): ICD-10-CM

## 2018-04-13 DIAGNOSIS — I82.622 ARM DVT (DEEP VENOUS THROMBOEMBOLISM), ACUTE, LEFT (H): ICD-10-CM

## 2018-04-13 LAB — INR POINT OF CARE: 2.6 (ref 2.5–3.5)

## 2018-04-13 PROCEDURE — 99207 ZZC NO CHARGE NURSE ONLY: CPT

## 2018-04-13 PROCEDURE — 36416 COLLJ CAPILLARY BLOOD SPEC: CPT

## 2018-04-13 PROCEDURE — 85610 PROTHROMBIN TIME: CPT | Mod: QW

## 2018-04-13 NOTE — PROGRESS NOTES
ANTICOAGULATION FOLLOW-UP CLINIC VISIT    Patient Name:  Daniela Ladd  Date:  4/13/2018  Contact Type:  Face to Face    SUBJECTIVE:     Patient Findings     Positives No Problem Findings           OBJECTIVE    INR Protime   Date Value Ref Range Status   04/13/2018 2.6 2.5 - 3.5 Final       ASSESSMENT / PLAN  INR assessment THER    Recheck INR In: 1 WEEK    INR Location Clinic      Anticoagulation Summary as of 4/13/2018     INR goal 2.5-3.5   Today's INR 2.6   Maintenance plan 10 mg (2 mg x 5) on Mon, Fri; 8 mg (2 mg x 4) all other days   Full instructions 10 mg on Mon, Fri; 8 mg all other days   Weekly total 60 mg   Plan last modified Yoly Crowe RN (4/13/2018)   Next INR check 4/20/2018   Priority INR   Target end date Indefinite    Indications   Arm DVT (deep venous thromboembolism)  acute  left (H) [I82.622]  Factor 5 Leiden mutation  heterozygous (H) [D68.51]  Long-term (current) use of anticoagulants [Z79.01] [Z79.01]         Anticoagulation Episode Summary     INR check location     Preferred lab     Send INR reminders to  INR    Comments INR goal changed per Dr Kitchen 3/21/18 he wants INR checked q 2 weeks        Anticoagulation Care Providers     Provider Role Specialty Phone number    Krystin Black MD [facility] [phone]            See the Encounter Report to view Anticoagulation Flowsheet and Dosing Calendar (Go to Encounters tab in chart review, and find the Anticoagulation Therapy Visit)        Yoly Crowe RN

## 2018-04-13 NOTE — MR AVS SNAPSHOT
Daniela Ladd   4/13/2018 10:30 AM   Anticoagulation Therapy Visit    Description:  47 year old female   Provider:  [facility]   Department:  [facility]           INR as of 4/13/2018     Today's INR 2.6      Anticoagulation Summary as of 4/13/2018     INR goal 2.5-3.5   Today's INR 2.6   Full instructions 10 mg on Mon, Fri; 8 mg all other days   Next INR check 4/20/2018    Indications   Arm DVT (deep venous thromboembolism)  acute  left (H) [I82.622]  Factor 5 Leiden mutation  heterozygous (H) [D68.51]  Long-term (current) use of anticoagulants [Z79.01] [Z79.01]         Description     Increase dose and recheck in 1 week. Yoly Crowe RN    4/13/2018  10:34 AM        April 2018 Details    Sun Mon Tue Wed Thu Fri Sat     1               2               3               4               5               6               7                 8               9               10               11               12               13      10 mg   See details      14      8 mg           15      8 mg         16      10 mg         17      8 mg         18      8 mg         19      8 mg         20            21                 22               23               24               25               26               27               28                 29               30                     Date Details   04/13 This INR check       Date of next INR:  4/20/2018         How to take your warfarin dose     To take:  8 mg Take 4 of the 2 mg tablets.    To take:  10 mg Take 5 of the 2 mg tablets.

## 2018-04-20 ENCOUNTER — ANTICOAGULATION THERAPY VISIT (OUTPATIENT)
Dept: ANTICOAGULATION | Facility: OTHER | Age: 48
End: 2018-04-20
Attending: FAMILY MEDICINE
Payer: COMMERCIAL

## 2018-04-20 DIAGNOSIS — D68.51 FACTOR 5 LEIDEN MUTATION, HETEROZYGOUS (H): ICD-10-CM

## 2018-04-20 DIAGNOSIS — Z79.01 LONG-TERM (CURRENT) USE OF ANTICOAGULANTS: ICD-10-CM

## 2018-04-20 DIAGNOSIS — I82.622 ARM DVT (DEEP VENOUS THROMBOEMBOLISM), ACUTE, LEFT (H): ICD-10-CM

## 2018-04-20 LAB — INR POINT OF CARE: 2.5 (ref 2.5–3.5)

## 2018-04-20 PROCEDURE — 99207 ZZC NO CHARGE NURSE ONLY: CPT

## 2018-04-20 PROCEDURE — 85610 PROTHROMBIN TIME: CPT | Mod: QW

## 2018-04-20 PROCEDURE — 36416 COLLJ CAPILLARY BLOOD SPEC: CPT

## 2018-04-20 NOTE — PROGRESS NOTES
ANTICOAGULATION FOLLOW-UP CLINIC VISIT    Patient Name:  Daniela Ladd  Date:  4/20/2018  Contact Type:  Face to Face    SUBJECTIVE:     Patient Findings     Positives No Problem Findings           OBJECTIVE    INR Protime   Date Value Ref Range Status   04/20/2018 2.5 2.5 - 3.5 Final       ASSESSMENT / PLAN  INR assessment THER    Recheck INR In: 1 WEEK    INR Location Clinic      Anticoagulation Summary as of 4/20/2018     INR goal 2.5-3.5   Today's INR 2.5   Maintenance plan 10 mg (2 mg x 5) on Mon, Wed, Fri; 8 mg (2 mg x 4) all other days   Full instructions 10 mg on Mon, Wed, Fri; 8 mg all other days   Weekly total 62 mg   Plan last modified Yoly Crowe, RN (4/20/2018)   Next INR check 4/27/2018   Priority INR   Target end date Indefinite    Indications   Arm DVT (deep venous thromboembolism)  acute  left (H) [I82.622]  Factor 5 Leiden mutation  heterozygous (H) [D68.51]  Long-term (current) use of anticoagulants [Z79.01] [Z79.01]         Anticoagulation Episode Summary     INR check location     Preferred lab     Send INR reminders to  INR    Comments INR goal changed per Dr Kitchen 3/21/18 he wants INR checked q 2 weeks        Anticoagulation Care Providers     Provider Role Specialty Phone number    Krystin Black MD [facility] [phone]            See the Encounter Report to view Anticoagulation Flowsheet and Dosing Calendar (Go to Encounters tab in chart review, and find the Anticoagulation Therapy Visit)        Yoly Crowe, RN

## 2018-04-20 NOTE — MR AVS SNAPSHOT
Daniela Ladd   4/20/2018 9:00 AM   Anticoagulation Therapy Visit    Description:  47 year old female   Provider:  [facility]   Department:  [facility]           INR as of 4/20/2018     Today's INR 2.5      Anticoagulation Summary as of 4/20/2018     INR goal 2.5-3.5   Today's INR 2.5   Full instructions 10 mg on Mon, Wed, Fri; 8 mg all other days   Next INR check 4/27/2018    Indications   Arm DVT (deep venous thromboembolism)  acute  left (H) [I82.622]  Factor 5 Leiden mutation  heterozygous (H) [D68.51]  Long-term (current) use of anticoagulants [Z79.01] [Z79.01]         Description     Increase dose and recheck in 1 week. .............Yoly Crowe RN.......... 4/20/2018  8:53 AM        Your next Anticoagulation Clinic appointment(s)     Apr 20, 2018  9:00 AM CDT   Anticoagulation Visit with [facility]   [facility] ([facility])    [address]  [facility] [address]   [phone]              April 2018 Details    Sun Mon Tue Wed Thu Fri Sat     1               2               3               4               5               6               7                 8               9               10               11               12               13               14                 15               16               17               18               19               20      10 mg   See details      21      8 mg           22      8 mg         23      10 mg         24      8 mg         25      10 mg         26      8 mg         27            28                 29               30                     Date Details   04/20 This INR check       Date of next INR:  4/27/2018         How to take your warfarin dose     To take:  8 mg Take 4 of the 2 mg tablets.    To take:  10 mg Take 5 of the 2 mg tablets.

## 2018-04-27 ENCOUNTER — ANTICOAGULATION THERAPY VISIT (OUTPATIENT)
Dept: ANTICOAGULATION | Facility: OTHER | Age: 48
End: 2018-04-27
Attending: FAMILY MEDICINE
Payer: COMMERCIAL

## 2018-04-27 DIAGNOSIS — I82.622 ARM DVT (DEEP VENOUS THROMBOEMBOLISM), ACUTE, LEFT (H): ICD-10-CM

## 2018-04-27 DIAGNOSIS — Z79.01 LONG-TERM (CURRENT) USE OF ANTICOAGULANTS: ICD-10-CM

## 2018-04-27 DIAGNOSIS — D68.51 FACTOR 5 LEIDEN MUTATION, HETEROZYGOUS (H): ICD-10-CM

## 2018-04-27 LAB — INR POINT OF CARE: 2.8 (ref 2.5–3.5)

## 2018-04-27 PROCEDURE — 99207 ZZC NO CHARGE NURSE ONLY: CPT

## 2018-04-27 PROCEDURE — 36416 COLLJ CAPILLARY BLOOD SPEC: CPT

## 2018-04-27 PROCEDURE — 85610 PROTHROMBIN TIME: CPT | Mod: QW

## 2018-04-27 NOTE — PROGRESS NOTES
ANTICOAGULATION FOLLOW-UP CLINIC VISIT    Patient Name:  Daniela Ladd  Date:  4/27/2018  Contact Type:  Face to Face    SUBJECTIVE:     Patient Findings     Positives No Problem Findings           OBJECTIVE    INR Protime   Date Value Ref Range Status   04/27/2018 2.8 2.5 - 3.5 Final       ASSESSMENT / PLAN  INR assessment THER    Recheck INR In: 1 WEEK    INR Location Clinic      Anticoagulation Summary as of 4/27/2018     INR goal 2.5-3.5   Today's INR 2.8   Maintenance plan 10 mg (2 mg x 5) on Mon, Wed, Fri; 8 mg (2 mg x 4) all other days   Full instructions 10 mg on Mon, Wed, Fri; 8 mg all other days   Weekly total 62 mg   No change documented Yoly Crowe RN   Plan last modified Yoly Crowe RN (4/20/2018)   Next INR check 5/4/2018   Priority INR   Target end date Indefinite    Indications   Arm DVT (deep venous thromboembolism)  acute  left (H) [I82.622]  Factor 5 Leiden mutation  heterozygous (H) [D68.51]  Long-term (current) use of anticoagulants [Z79.01] [Z79.01]         Anticoagulation Episode Summary     INR check location     Preferred lab     Send INR reminders to  INR    Comments INR goal changed per Dr Kitchen 3/21/18 he wants INR checked q 2 weeks        Anticoagulation Care Providers     Provider Role Specialty Phone number    Krystin Black MD [facility] [phone]            See the Encounter Report to view Anticoagulation Flowsheet and Dosing Calendar (Go to Encounters tab in chart review, and find the Anticoagulation Therapy Visit)        Yoly Crowe RN

## 2018-04-27 NOTE — MR AVS SNAPSHOT
Daniela NANCY Ladd   4/27/2018 9:00 AM   Anticoagulation Therapy Visit    Description:  47 year old female   Provider:  [facility]   Department:  [facility]           INR as of 4/27/2018     Today's INR 2.8      Anticoagulation Summary as of 4/27/2018     INR goal 2.5-3.5   Today's INR 2.8   Full instructions 10 mg on Mon, Wed, Fri; 8 mg all other days   Next INR check 5/4/2018    Indications   Arm DVT (deep venous thromboembolism)  acute  left (H) [I82.622]  Factor 5 Leiden mutation  heterozygous (H) [D68.51]  Long-term (current) use of anticoagulants [Z79.01] [Z79.01]         Description     Continue same Warfarin dose and recheck in 1 week.  Yoly Crowe RN   4/27/2018    9:12 AM        April 2018 Details    Sun Mon Tue Wed Thu Fri Sat     1               2               3               4               5               6               7                 8               9               10               11               12               13               14                 15               16               17               18               19               20               21                 22               23               24               25               26               27      10 mg   See details      28      8 mg           29      8 mg         30      10 mg               Date Details   04/27 This INR check               How to take your warfarin dose     To take:  8 mg Take 4 of the 2 mg tablets.    To take:  10 mg Take 5 of the 2 mg tablets.           May 2018 Details    Sun Mon Tue Wed Thu Fri Sat       1      8 mg         2      10 mg         3      8 mg         4            5                 6               7               8               9               10               11               12                 13               14               15               16               17               18               19                 20               21               22               23               24                25               26                 27               28               29               30               31                  Date Details   No additional details    Date of next INR:  5/4/2018         How to take your warfarin dose     To take:  8 mg Take 4 of the 2 mg tablets.    To take:  10 mg Take 5 of the 2 mg tablets.

## 2018-05-01 ENCOUNTER — OFFICE VISIT (OUTPATIENT)
Dept: FAMILY MEDICINE | Facility: OTHER | Age: 48
End: 2018-05-01
Attending: FAMILY MEDICINE
Payer: COMMERCIAL

## 2018-05-01 VITALS
HEIGHT: 67 IN | WEIGHT: 200 LBS | TEMPERATURE: 97.5 F | HEART RATE: 80 BPM | SYSTOLIC BLOOD PRESSURE: 124 MMHG | DIASTOLIC BLOOD PRESSURE: 82 MMHG | BODY MASS INDEX: 31.39 KG/M2

## 2018-05-01 DIAGNOSIS — Z72.0 TOBACCO ABUSE: ICD-10-CM

## 2018-05-01 DIAGNOSIS — D68.51 FACTOR 5 LEIDEN MUTATION, HETEROZYGOUS (H): Primary | ICD-10-CM

## 2018-05-01 DIAGNOSIS — I82.602 BLOOD CLOT OF VEIN IN SHOULDER AREA, LEFT: ICD-10-CM

## 2018-05-01 PROCEDURE — 99213 OFFICE O/P EST LOW 20 MIN: CPT | Performed by: FAMILY MEDICINE

## 2018-05-01 NOTE — PATIENT INSTRUCTIONS
Preventing Deep Vein Thrombosis  Healthcare providers use the term venous thromboembolism (VTE) to describe two conditions: deep vein thrombosis (DVT) and pulmonary embolism (PE). They use the term VTE because the two conditions are very closely related. And because their prevention and treatment are closely related.   DVT is a blood clot or thrombus in a deep vein. Most of these clots develop in the leg or thigh. But they may form in a vein in the arm, or other part of the body.   Part of the blood clot may separate from the vein. This is called an embolus. It may travel to the lungs and form a pulmonary embolus. This can cut off the flow of blood to a portion of or to the entire lung. A blood clot in the lungs is a medical emergency and may cause death.  Over time, blood clots can also damage veins. They must be treated right away to prevent problems.   Risk factors  Anyone can develop a blood clot. But the following things make a blood clot more likely to happen:    Being inactive for a long period, such as when you re in the hospital, or traveling by plane or car    Injury to a vein from an accident, a broken bone, or surgery    Having blood clots in the past or a family history of blood clots    Blood clotting disorder    Recent surgery    Cancer and certain cancer treatments    Smoking  Other things can also put you at higher risk for a blood clot. They include:    Age over 60 years    Pregnancy    Taking birth control pills or hormone replacement    Having other vein problems, such as varicose veins     Being overweight    Having a pacemaker or a central venous catheter. They increase the chance of a blood clot forming in an arm.    Injection drug use. This also increases the chance of a blood clot forming in an arm.  How to prevent DVT  Preventing a blood clot means improving blood flow back to your heart. To help prevent a blood clot:    Talk with your healthcare provider about a program of regular  exercise.    If your legs feel swollen or heavy, take a break and sit comfortably or lie down with your feet up.    Keep a healthy weight.    Quit smoking, if you smoke.    Don't sit, stand, or lie down for long periods without moving your legs and feet:  ? When traveling by car, stop often to get out and move around.  ? On long airplane, train, or bus rides, get up and move around when possible.  ? If you can t get up, wiggle your toes and tighten your calves to keep your blood moving, as pictured below.  If you need to have surgery, talk with your healthcare provider about a plan to prevent blood clots.   If you are in the hospital, your risk for blood clots increases. Your healthcare provider may prescribe blood-thinner medicine (anticoagulant) to help prevent blood clots. Or your healthcare provider may prescribe a sequential compression device (SCD) or intermittent pneumatic compression (IPC). The device has sleeves that fit around your legs. It puts gentle pressure on your legs to help with blood flow and prevent blood clots. Remove the sleeves so that you do not trip or fall when you are walking, like when you use the bathroom or shower. If you need help removing the sleeves, ask the nurse or aid. You may also want to try the following:    Call 911  If you have symptoms of a blood clot in your lungs, call 911. The symptoms are:    Chest pain    Trouble breathing    Fast heartbeat    Coughing (may cough up blood)    Sweating    Fainting  When to call your healthcare provider  If you have symptoms of a blood clot, call your healthcare provider. The symptoms are:    Pain    Swelling    Redness or discoloration in a leg, arm, or other area   Date Last Reviewed: 5/1/2016 2000-2017 [employer]. [address] [place] [address], [place] [address]. All rights reserved. This information is not intended as a substitute for professional medical care. Always follow your healthcare professional's  instructions.

## 2018-05-01 NOTE — MR AVS SNAPSHOT
After Visit Summary   5/1/2018    Daniela Ladd    MRN: [id number]           Patient Information     Date Of Birth          1970        Visit Information        Provider Department      5/1/2018 7:45 AM Krystin Black MD [facility]        Today's Diagnoses     Factor 5 Leiden mutation, heterozygous (H)    -  1    Blood clot of vein in shoulder area, left        Tobacco abuse          Care Instructions      Preventing Deep Vein Thrombosis  Healthcare providers use the term venous thromboembolism (VTE) to describe two conditions: deep vein thrombosis (DVT) and pulmonary embolism (PE). They use the term VTE because the two conditions are very closely related. And because their prevention and treatment are closely related.   DVT is a blood clot or thrombus in a deep vein. Most of these clots develop in the leg or thigh. But they may form in a vein in the arm, or other part of the body.   Part of the blood clot may separate from the vein. This is called an embolus. It may travel to the lungs and form a pulmonary embolus. This can cut off the flow of blood to a portion of or to the entire lung. A blood clot in the lungs is a medical emergency and may cause death.  Over time, blood clots can also damage veins. They must be treated right away to prevent problems.   Risk factors  Anyone can develop a blood clot. But the following things make a blood clot more likely to happen:    Being inactive for a long period, such as when you re in the hospital, or traveling by plane or car    Injury to a vein from an accident, a broken bone, or surgery    Having blood clots in the past or a family history of blood clots    Blood clotting disorder    Recent surgery    Cancer and certain cancer treatments    Smoking  Other things can also put you at higher risk for a blood clot. They include:    Age over 60 years    Pregnancy    Taking birth control pills or hormone  replacement    Having other vein problems, such as varicose veins     Being overweight    Having a pacemaker or a central venous catheter. They increase the chance of a blood clot forming in an arm.    Injection drug use. This also increases the chance of a blood clot forming in an arm.  How to prevent DVT  Preventing a blood clot means improving blood flow back to your heart. To help prevent a blood clot:    Talk with your healthcare provider about a program of regular exercise.    If your legs feel swollen or heavy, take a break and sit comfortably or lie down with your feet up.    Keep a healthy weight.    Quit smoking, if you smoke.    Don't sit, stand, or lie down for long periods without moving your legs and feet:  ? When traveling by car, stop often to get out and move around.  ? On long airplane, train, or bus rides, get up and move around when possible.  ? If you can t get up, wiggle your toes and tighten your calves to keep your blood moving, as pictured below.  If you need to have surgery, talk with your healthcare provider about a plan to prevent blood clots.   If you are in the hospital, your risk for blood clots increases. Your healthcare provider may prescribe blood-thinner medicine (anticoagulant) to help prevent blood clots. Or your healthcare provider may prescribe a sequential compression device (SCD) or intermittent pneumatic compression (IPC). The device has sleeves that fit around your legs. It puts gentle pressure on your legs to help with blood flow and prevent blood clots. Remove the sleeves so that you do not trip or fall when you are walking, like when you use the bathroom or shower. If you need help removing the sleeves, ask the nurse or aid. You may also want to try the following:    Call 911  If you have symptoms of a blood clot in your lungs, call 911. The symptoms are:    Chest pain    Trouble breathing    Fast heartbeat    Coughing (may cough up blood)    Sweating    Fainting  When  to call your healthcare provider  If you have symptoms of a blood clot, call your healthcare provider. The symptoms are:    Pain    Swelling    Redness or discoloration in a leg, arm, or other area   Date Last Reviewed: 5/1/2016 2000-2017 [employer]. [address] [place] [address]. All rights reserved. This information is not intended as a substitute for professional medical care. Always follow your healthcare professional's instructions.                Follow-ups after your visit        Your next 10 appointments already scheduled     May 04, 2018  9:15 AM CDT   Anticoagulation Visit with GH ANTI COAG 1   [facility] ([facility])    [address] [facility] [address]   [phone]            May 11, 2018  9:15 AM CDT   Anticoagulation Visit with GH ANTI COAG [address] [place] [address] ([facility])    [address] [place] [address]   [phone]            May 14, 2018  9:50 AM CDT   LAB with GH LAB   [facility] ([facility])    [address] [place] [address]   [phone]           Please do not eat 10-12 hours before your appointment if you are coming in fasting for labs on lipids, cholesterol, or glucose (sugar). This does not apply to pregnant women. Water, hot tea and black coffee (with nothing added) are okay. Do not drink other fluids, diet soda or chew gum.            May 14, 2018 10:15 AM CDT   US VENOUS with GHUSSP   [facility] ([facility])    [facility] [address]   [phone]           Please bring a list of your medicines (including vitamins, minerals and over-the-counter drugs). Also, tell your doctor about any allergies you may have. Wear comfortable clothes and leave your valuables at home.  You do not need to do anything special to  prepare for your exam.  Please call the Imaging Department at your exam site with any questions.            May 18, 2018  9:30 AM CDT   Anticoagulation Visit with GH ANTI COAG 1   [facility] ([facility])    [address]  [place] [address]   [phone]            May 23, 2018 11:00 AM CDT   Return Visit with Magda Kitchen MD   [facility] ([facility])    [address]  [place] [address]   [phone]            May 25, 2018  9:15 AM CDT   Anticoagulation Visit with GH ANTI COAG 1   [facility] ([facility])    [address]  [place] [address]   [phone]              Who to contact     If you have questions or need follow up information about today's clinic visit or your schedule please contact [facility] directly at [phone].  Normal or non-critical lab and imaging results will be communicated to you by Receptorhart, letter or phone within 4 business days after the clinic has received the results. If you do not hear from us within 7 days, please contact the clinic through [employer] or phone. If you have a critical or abnormal lab result, we will notify you by phone as soon as possible.  Submit refill requests through [email or web address] or call your pharmacy and they will forward the refill request to us. Please allow 3 business days for your refill to be completed.          Additional Information About Your Visit        Receptorhart Information     [email or web address] gives you secure access to your electronic health record. If you see a primary care provider, you can also send messages to your care team and make appointments. If you have questions, please call your primary care clinic.  If you do not have a primary care provider, please call [phone] and they will assist you.        Care EveryWhere ID     This is your Care EveryWhere  "ID. This could be used by other organizations to access your [facility] medical records  [id number]        Your Vitals Were     Pulse Temperature Height Last Period BMI (Body Mass Index)       80 97.5  F (36.4  C) 5' 6.5\" (1.689 m) 04/16/2018 31.8 kg/m2        Blood Pressure from Last 3 Encounters:   05/01/18 124/82   03/21/18 132/90   02/14/18 130/90    Weight from Last 3 Encounters:   05/01/18 200 lb (90.7 kg)   03/21/18 202 lb (91.6 kg)   02/14/18 198 lb 9.6 oz (90.1 kg)              Today, you had the following     No orders found for display       Primary Care Provider Office Phone # Fax #    Krystinkat Black [phone] [address] [facility] [address]        Equal Access to Services     [facility]: Hadii li wilde hadasho Soaline, waaxda luqadaha, qaybta kaalmada adeegyada, job mayer . So [facility] [phone].    ATENCIÓN: Si habla español, tiene a egan disposición servicios gratuitos de asistencia lingüística. Ishmael al [phone].    We comply with applicable federal civil rights laws and [place] laws. We do not discriminate on the basis of race, color, national origin, age, disability, sex, sexual orientation, or gender identity.            Thank you!     Thank you for choosing [facility]  for your care. Our goal is always to provide you with excellent care. Hearing back from our patients is one way we can continue to improve our services. Please take a few minutes to complete the written survey that you may receive in the mail after your visit with us. Thank you!             Your Updated Medication List - Protect others around you: Learn how to safely use, store and throw away your medicines at www.disposemymeds.org.          This list is accurate as of 5/1/18  9:50 AM.  Always use your most recent med list.                   Brand Name Dispense Instructions for use Diagnosis    acetaminophen 500 MG tablet    TYLENOL "     Take 1,000 mg by mouth        warfarin 2 MG tablet    COUMADIN    288 tablet    TAKE 10 mg x 3 days/week, and 8 mg x 4 days/week, or [facility]    Arm DVT (deep venous thromboembolism), acute, left (H), Long-term (current) use of anticoagulants

## 2018-05-01 NOTE — NURSING NOTE
Patient presents to clinic stating that she knows that she has a DVT. This has been an ongoing issue.  Livier Powell ....................  5/1/2018   7:43 AM

## 2018-05-01 NOTE — PROGRESS NOTES
SUBJECTIVE:   Daniela Ladd is a 47 year old female who presents to clinic today for the following health issues:  Had clot in veins of left upper extremity and here with some questions today.   She still has residual clot in the subclavian that is persistent and likely will be with improvement in the left axillary vein on most recent ultrasound in March.  She just had questions regarding this.  She is noticing some increase in the visibility of veins in her left upper extremity anteriorly and also across the left breast.  At times gets a tingly sensation in the left breast likely from this rerouting.  She is on a FB site that provides support for people who have experienced a clot and finds that helpful.  Considering getting a machine to do her own protimes and discussed.      HPI    Patient Active Problem List   Diagnosis     Blood clot of vein in shoulder area, left     Dyshidrotic hand dermatitis     Endometriosis     Migraine with aura and without status migrainosus, not intractable     Other acne     Raynaud's syndrome     Tobacco abuse     Factor 5 Leiden mutation, heterozygous (H)     Long-term (current) use of anticoagulants [Z79.01]     Pulmonary nodules     Past Surgical History:   Procedure Laterality Date     ANKLE SURGERY      2010,[place]     EXTRACTION(S) DENTAL      No Comments Provided     LAPAROSCOPIC TUBAL LIGATION      08/2004     SALPINGO-OOPHORECTOMY BILATERAL      4/14/11,Planned Lap RSO     TONSILLECTOMY      1980s       Social History   Substance Use Topics     Smoking status: Current Every Day Smoker     Packs/day: 0.50     Years: 30.00     Types: Cigarettes     Smokeless tobacco: Never Used     Alcohol use No     Family History   Problem Relation Age of Onset     Allergy (Severe) Mother      Allergies     HEART DISEASE Mother      Heart Disease     DIABETES Mother      Diabetes     Other - See Comments Mother      Peripheral vascular disese     Unknown/Adopted Father       "Suspected cardiac.      HEART DISEASE Paternal Grandfather      Heart Disease,CAD     Other - See Comments Other      endometriosis     Other - See Comments Brother      by her mother/by her father - lymphedema after knee surgery     Family History Negative Brother      Good Health,by her mother/by her father     Family History Negative Son      Good Health     CANCER Maternal Grandfather      Cancer,lung cancer, dm     Breast Cancer Maternal Grandmother      Cancer-breast,CREST syndrome, Raynaud's, scleroderma,  of CHF, small veins and arteries     DIABETES Maternal Aunt      Diabetes,also has had a borderline ovarian mass (not benign or cancerous)     Thyroid Disease Other      Thyroid Disease,several members of family with thyroid disease.     CLOTTING DISORDER Paternal Uncle      Factor V         Current Outpatient Prescriptions   Medication Sig Dispense Refill     acetaminophen (TYLENOL) 500 MG tablet Take 1,000 mg by mouth       warfarin (COUMADIN) 2 MG tablet TAKE 10 mg x 3 days/week, and 8 mg x 4 days/week, or as directed by protime clinic 288 tablet 0     Allergies   Allergen Reactions     Amoxicillin-Pot Clavulanate Nausea     Cyclobenzaprine Rash     No Clinical Screening - See Comments Rash     Nicoderm patch       Review of Systems     OBJECTIVE:     /82  Pulse 80  Temp 97.5  F (36.4  C)  Ht 5' 6.5\" (1.689 m)  Wt 200 lb (90.7 kg)  LMP 2018  BMI 31.8 kg/m2  Body mass index is 31.8 kg/(m^2).   Wt Readings from Last 3 Encounters:   18 200 lb (90.7 kg)   18 202 lb (91.6 kg)   18 198 lb 9.6 oz (90.1 kg)       Physical Exam   Musculoskeletal:   Left upper extremity with less edema  Veins slightly more prominent   Nursing note and vitals reviewed.      Diagnostic Test Results:  none     ASSESSMENT/PLAN:       ICD-10-CM    1. Factor 5 Leiden mutation, heterozygous (H) D68.51    2. Blood clot of vein in shoulder area, left I82.602    3. Tobacco abuse Z72.0    Plan:  CT " findings of her pulmonary nodules stable. Consider just follow up in 6-12 months.  Continues to follow up with Dr Kitchen.  PT/INR with protime clinic.  WORK ON SMOKING CESSATION! Reluctant to try chantix.   Weight gain discussed.   Krystin Black MD  [facility]  Portions of this dictation were created using the Dragon Nuance voice recognition system. Proofreading was completed but there may be errors in text.

## 2018-05-04 ENCOUNTER — ANTICOAGULATION THERAPY VISIT (OUTPATIENT)
Dept: ANTICOAGULATION | Facility: OTHER | Age: 48
End: 2018-05-04
Attending: FAMILY MEDICINE
Payer: COMMERCIAL

## 2018-05-04 DIAGNOSIS — Z79.01 LONG-TERM (CURRENT) USE OF ANTICOAGULANTS: ICD-10-CM

## 2018-05-04 DIAGNOSIS — I82.602 BLOOD CLOT OF VEIN IN SHOULDER AREA, LEFT: ICD-10-CM

## 2018-05-04 DIAGNOSIS — D68.51 FACTOR 5 LEIDEN MUTATION, HETEROZYGOUS (H): ICD-10-CM

## 2018-05-04 LAB — INR POINT OF CARE: 2.5 (ref 0.86–1.14)

## 2018-05-04 PROCEDURE — 36416 COLLJ CAPILLARY BLOOD SPEC: CPT

## 2018-05-04 PROCEDURE — 85610 PROTHROMBIN TIME: CPT | Mod: QW

## 2018-05-04 NOTE — MR AVS SNAPSHOT
Daniela Ladd   5/4/2018 9:15 AM   Anticoagulation Therapy Visit    Description:  47 year old female   Provider:  SKYLER ANTI COAG 1   Department:   Anticoag           INR as of 5/4/2018     Today's INR 2.5      Anticoagulation Summary as of 5/4/2018     INR goal 2.5-3.5   Today's INR 2.5   Full instructions 10 mg on Mon, Wed, Fri; 8 mg all other days   Next INR check 5/10/2018    Indications   Blood clot of vein in shoulder area  left [I82.602]  Factor 5 Leiden mutation  heterozygous (H) [D68.51]  Long-term (current) use of anticoagulants [Z79.01] [Z79.01]         Description     Continue same Warfarin dose and recheck in 1 week.  Yoly Crowe RN   5/4/2018    9:17 AM      Your next Anticoagulation Clinic appointment(s)     May 11, 2018  9:15 AM CDT   Anticoagulation Visit with  ANTI COAG 1   [facility] ([facility])    [address]  [place] [address]   [phone]            May 18, 2018  9:30 AM CDT   Anticoagulation Visit with  ANTI COAG 1   [facility]    [address]  [place] [address]   [phone]              May 2018 Details    Sun Mon Tue Wed Thu Fri Sat       1               2               3               4      10 mg   See details      5      8 mg           6      8 mg         7      10 mg         8      8 mg         9      10 mg         10            11               12                 13               14               15               16               17               18               19                 20               21               22               23               24               25               26                 27               28               29               30               31                  Date Details   05/04 This INR check       Date of next INR:  5/10/2018         How to take your warfarin dose     To take:  8 mg Take 4 of the 2 mg  tablets.    To take:  10 mg Take 5 of the 2 mg tablets.

## 2018-05-04 NOTE — PROGRESS NOTES
ANTICOAGULATION FOLLOW-UP CLINIC VISIT    Patient Name:  Daniela Ladd  Date:  5/4/2018  Contact Type:  Face to Face    SUBJECTIVE:     Patient Findings     Positives No Problem Findings           OBJECTIVE    INR Protime   Date Value Ref Range Status   05/04/2018 2.5 (A) 0.86 - 1.14 Final       ASSESSMENT / PLAN  INR assessment THER    Recheck INR In: 1 WEEK    INR Location Clinic      Anticoagulation Summary as of 5/4/2018     INR goal 2.5-3.5   Today's INR 2.5   Maintenance plan 10 mg (2 mg x 5) on Mon, Wed, Fri; 8 mg (2 mg x 4) all other days   Full instructions 10 mg on Mon, Wed, Fri; 8 mg all other days   Weekly total 62 mg   No change documented Yoly Crowe RN   Plan last modified Yoly Crowe RN (4/20/2018)   Next INR check 5/10/2018   Priority INR   Target end date Indefinite    Indications   Blood clot of vein in shoulder area  left [I82.602]  Factor 5 Leiden mutation  heterozygous (H) [D68.51]  Long-term (current) use of anticoagulants [Z79.01] [Z79.01]         Anticoagulation Episode Summary     INR check location     Preferred lab     Send INR reminders to  INR    Comments INR goal changed per Dr Kitchen 3/21/18 he wants INR checked q 2 weeks        Anticoagulation Care Providers     Provider Role Specialty Phone number    Krystin Black MD [facility] [phone]            See the Encounter Report to view Anticoagulation Flowsheet and Dosing Calendar (Go to Encounters tab in chart review, and find the Anticoagulation Therapy Visit)        Yoly Crowe RN

## 2018-05-10 ENCOUNTER — ANTICOAGULATION THERAPY VISIT (OUTPATIENT)
Dept: ANTICOAGULATION | Facility: OTHER | Age: 48
End: 2018-05-10
Attending: FAMILY MEDICINE
Payer: COMMERCIAL

## 2018-05-10 DIAGNOSIS — I82.622 ARM DVT (DEEP VENOUS THROMBOEMBOLISM), ACUTE, LEFT (H): ICD-10-CM

## 2018-05-10 DIAGNOSIS — Z79.01 LONG-TERM (CURRENT) USE OF ANTICOAGULANTS: ICD-10-CM

## 2018-05-10 LAB — INR POINT OF CARE: 2.3 (ref 2.5–3.5)

## 2018-05-10 PROCEDURE — 85610 PROTHROMBIN TIME: CPT | Mod: QW

## 2018-05-10 PROCEDURE — 36416 COLLJ CAPILLARY BLOOD SPEC: CPT

## 2018-05-10 RX ORDER — WARFARIN SODIUM 2 MG/1
TABLET ORAL
Qty: 396 TABLET | Refills: 0 | Status: SHIPPED | OUTPATIENT
Start: 2018-05-10 | End: 2018-07-05

## 2018-05-10 NOTE — MR AVS SNAPSHOT
Daniela Ladd   5/10/2018 9:30 AM   Anticoagulation Therapy Visit    Description:  47 year old female   Provider:  SKYLER ANTI COAG 1   Department:  [facility]           INR as of 5/10/2018     Today's INR 2.3!      Anticoagulation Summary as of 5/10/2018     INR goal 2.5-3.5   Today's INR 2.3!   Full instructions 8 mg on Mon, Fri; 10 mg all other days   Next INR check 5/17/2018    Indications   Blood clot of vein in shoulder area  left [I82.602]  Factor 5 Leiden mutation  heterozygous (H) [D68.51]  Long-term (current) use of anticoagulants [Z79.01] [Z79.01]         Description     Increase Warfarin/Coumadin and recheck INR in 1 week.  Renee Kevin ....................  5/10/2018   9:50 AM          Your next Anticoagulation Clinic appointment(s)     May 17, 2018  8:45 AM CDT   Anticoagulation Visit with  ANTI COAG 1   [facility]    [address]  [place] [address]   [phone]            May 24, 2018  9:15 AM CDT   Anticoagulation Visit with  ANTI COAG 1   [facility]    [address]  [place] [address]   [phone]              May 2018 Details    Sun Mon Tue Wed Thu Fri Sat       1               2               3               4               5                 6               7               8               9               10      10 mg   See details      11      8 mg         12      10 mg           13      10 mg         14      8 mg         15      10 mg         16      10 mg         17            18               19                 20               21               22               23               24               25               26                 27               28               29               30               31                  Date Details   05/10 This INR check       Date of next INR:  5/17/2018         How to take your warfarin dose      To take:  8 mg Take 4 of the 2 mg tablets.    To take:  10 mg Take 5 of the 2 mg tablets.

## 2018-05-10 NOTE — PROGRESS NOTES
ANTICOAGULATION FOLLOW-UP CLINIC VISIT    Patient Name:  Daniela Ladd  Date:  5/10/2018  Contact Type:  Face to Face    SUBJECTIVE:     Patient Findings     Positives No Problem Findings           OBJECTIVE    INR Protime   Date Value Ref Range Status   05/10/2018 2.3 (A) 2.5 - 3.5 Final       ASSESSMENT / PLAN  INR assessment SUB    Recheck INR In: 1 WEEK    INR Location Clinic      Anticoagulation Summary as of 5/10/2018     INR goal 2.5-3.5   Today's INR 2.3!   Maintenance plan 8 mg (2 mg x 4) on Mon, Fri; 10 mg (2 mg x 5) all other days   Full instructions 8 mg on Mon, Fri; 10 mg all other days   Weekly total 66 mg   Plan last modified Renee Kevin RN (5/10/2018)   Next INR check 5/17/2018   Priority INR   Target end date Indefinite    Indications   Blood clot of vein in shoulder area  left [I82.602]  Factor 5 Leiden mutation  heterozygous (H) [D68.51]  Long-term (current) use of anticoagulants [Z79.01] [Z79.01]         Anticoagulation Episode Summary     INR check location     Preferred lab     Send INR reminders to  INR    Comments INR goal changed per Dr Kitchen 3/21/18 he wants INR checked q 2 weeks        Anticoagulation Care Providers     Provider Role Specialty Phone number    Krystin Black MD [facility] [phone]            See the Encounter Report to view Anticoagulation Flowsheet and Dosing Calendar (Go to Encounters tab in chart review, and find the Anticoagulation Therapy Visit)        Renee Kevin RN

## 2018-05-14 ENCOUNTER — HOSPITAL ENCOUNTER (OUTPATIENT)
Dept: ULTRASOUND IMAGING | Facility: OTHER | Age: 48
Discharge: HOME | End: 2018-05-14
Attending: INTERNAL MEDICINE | Admitting: INTERNAL MEDICINE
Payer: COMMERCIAL

## 2018-05-14 DIAGNOSIS — I82.622 ARM DVT (DEEP VENOUS THROMBOEMBOLISM), ACUTE, LEFT (H): ICD-10-CM

## 2018-05-14 DIAGNOSIS — D68.51 FACTOR 5 LEIDEN MUTATION, HETEROZYGOUS (H): ICD-10-CM

## 2018-05-14 LAB
ALBUMIN SERPL-MCNC: 4.2 G/DL (ref 3.5–5.7)
ALP SERPL-CCNC: 53 U/L (ref 34–104)
ALT SERPL W P-5'-P-CCNC: 31 U/L (ref 7–52)
ANION GAP SERPL CALCULATED.3IONS-SCNC: 9 MMOL/L (ref 3–14)
AST SERPL W P-5'-P-CCNC: 20 U/L (ref 13–39)
BASOPHILS # BLD AUTO: 0 10E9/L (ref 0–0.2)
BASOPHILS NFR BLD AUTO: 0.4 %
BILIRUB SERPL-MCNC: 0.3 MG/DL (ref 0.3–1)
BUN SERPL-MCNC: 5 MG/DL (ref 7–25)
CALCIUM SERPL-MCNC: 9 MG/DL (ref 8.6–10.3)
CHLORIDE SERPL-SCNC: 104 MMOL/L (ref 98–107)
CO2 SERPL-SCNC: 23 MMOL/L (ref 21–31)
CREAT SERPL-MCNC: 0.72 MG/DL (ref 0.6–1.2)
D DIMER PPP DDU-MCNC: 387 NG/ML D-DU (ref 0–230)
DIFFERENTIAL METHOD BLD: NORMAL
EOSINOPHIL # BLD AUTO: 0.1 10E9/L (ref 0–0.7)
EOSINOPHIL NFR BLD AUTO: 0.7 %
ERYTHROCYTE [DISTWIDTH] IN BLOOD BY AUTOMATED COUNT: 12.5 % (ref 10–15)
GFR SERPL CREATININE-BSD FRML MDRD: 88 ML/MIN/1.7M2
GLUCOSE SERPL-MCNC: 107 MG/DL (ref 70–105)
HCT VFR BLD AUTO: 42.2 % (ref 35–47)
HGB BLD-MCNC: 14.8 G/DL (ref 11.7–15.7)
IMM GRANULOCYTES # BLD: 0 10E9/L (ref 0–0.4)
IMM GRANULOCYTES NFR BLD: 0.2 %
LDH SERPL L TO P-CCNC: 195 U/L (ref 140–271)
LYMPHOCYTES # BLD AUTO: 3.5 10E9/L (ref 0.8–5.3)
LYMPHOCYTES NFR BLD AUTO: 33.2 %
MCH RBC QN AUTO: 30.8 PG (ref 26.5–33)
MCHC RBC AUTO-ENTMCNC: 35.1 G/DL (ref 31.5–36.5)
MCV RBC AUTO: 88 FL (ref 78–100)
MONOCYTES # BLD AUTO: 0.4 10E9/L (ref 0–1.3)
MONOCYTES NFR BLD AUTO: 3.4 %
NEUTROPHILS # BLD AUTO: 6.5 10E9/L (ref 1.6–8.3)
NEUTROPHILS NFR BLD AUTO: 62.1 %
PLATELET # BLD AUTO: 345 10E9/L (ref 150–450)
POTASSIUM SERPL-SCNC: 4 MMOL/L (ref 3.5–5.1)
PROT SERPL-MCNC: 6.9 G/DL (ref 6.4–8.9)
RBC # BLD AUTO: 4.8 10E12/L (ref 3.8–5.2)
SODIUM SERPL-SCNC: 136 MMOL/L (ref 134–144)
WBC # BLD AUTO: 10.4 10E9/L (ref 4–11)

## 2018-05-14 PROCEDURE — 85379 FIBRIN DEGRADATION QUANT: CPT | Performed by: INTERNAL MEDICINE

## 2018-05-14 PROCEDURE — 36415 COLL VENOUS BLD VENIPUNCTURE: CPT | Performed by: INTERNAL MEDICINE

## 2018-05-14 PROCEDURE — 93971 EXTREMITY STUDY: CPT | Mod: LT

## 2018-05-14 PROCEDURE — 85025 COMPLETE CBC W/AUTO DIFF WBC: CPT | Performed by: INTERNAL MEDICINE

## 2018-05-14 PROCEDURE — 80053 COMPREHEN METABOLIC PANEL: CPT | Performed by: INTERNAL MEDICINE

## 2018-05-14 PROCEDURE — 83615 LACTATE (LD) (LDH) ENZYME: CPT | Performed by: INTERNAL MEDICINE

## 2018-05-17 ENCOUNTER — ANTICOAGULATION THERAPY VISIT (OUTPATIENT)
Dept: ANTICOAGULATION | Facility: OTHER | Age: 48
End: 2018-05-17
Attending: FAMILY MEDICINE
Payer: COMMERCIAL

## 2018-05-17 DIAGNOSIS — Z79.01 LONG-TERM (CURRENT) USE OF ANTICOAGULANTS: ICD-10-CM

## 2018-05-17 DIAGNOSIS — I82.602 BLOOD CLOT OF VEIN IN SHOULDER AREA, LEFT: ICD-10-CM

## 2018-05-17 DIAGNOSIS — D68.51 FACTOR 5 LEIDEN MUTATION, HETEROZYGOUS (H): ICD-10-CM

## 2018-05-17 LAB — INR POINT OF CARE: 2.5 (ref 0.86–1.14)

## 2018-05-17 PROCEDURE — 85610 PROTHROMBIN TIME: CPT | Mod: QW

## 2018-05-17 PROCEDURE — 36416 COLLJ CAPILLARY BLOOD SPEC: CPT

## 2018-05-17 NOTE — MR AVS SNAPSHOT
Daniela Ladd   5/17/2018 8:45 AM   Anticoagulation Therapy Visit    Description:  47 year old female   Provider:  SK[facility] ANTI COAG 1   Department:  [facility]           INR as of 5/17/2018     Today's INR 2.5      Anticoagulation Summary as of 5/17/2018     INR goal 2.5-3.5   Today's INR 2.5   Full instructions 8 mg on Mon, Fri; 10 mg all other days   Next INR check 5/24/2018    Indications   Blood clot of vein in shoulder area  left [I82.602]  Factor 5 Leiden mutation  heterozygous (H) [D68.51]  Long-term (current) use of anticoagulants [Z79.01] [Z79.01]         Description     Continue same Warfarin dose and recheck in 1 week.  Yoly Crowe RN   5/17/2018    8:50 AM        Your next Anticoagulation Clinic appointment(s)     May 24, 2018  9:15 AM CDT   Anticoagulation Visit with  ANTI COAG 1   [facility]    [address]  [place] [address]   [phone]            May 31, 2018  9:15 AM CDT   Anticoagulation Visit with  ANTI COAG 1   [facility] ([facility])    [address]  [place] [facility] [facility] [address]   [phone]              May 2018 Details    Sun Mon Tue Wed Thu Fri Sat       1               2               3               4               5                 6               7               8               9               10               11               12                 13               14               15               16               17      10 mg   See details      18      8 mg         19      10 mg           20      10 mg         21      8 mg         22      10 mg         23      10 mg         24            25               26                 27               28               29               30               31                  Date Details   05/17 This INR check       Date of next INR:  5/24/2018         How to take your warfarin dose     To take:  8 mg Take 4 of the  2 mg tablets.    To take:  10 mg Take 5 of the 2 mg tablets.

## 2018-05-17 NOTE — PROGRESS NOTES
ANTICOAGULATION FOLLOW-UP CLINIC VISIT    Patient Name:  Daniela Ladd  Date:  5/17/2018  Contact Type:  Face to Face    SUBJECTIVE:     Patient Findings     Positives OTC meds (talked about taking a viactiv multivitamin)           OBJECTIVE    INR Protime   Date Value Ref Range Status   05/17/2018 2.5 (A) 0.86 - 1.14 Final       ASSESSMENT / PLAN  INR assessment THER    Recheck INR In: 1 WEEK    INR Location Clinic      Anticoagulation Summary as of 5/17/2018     INR goal 2.5-3.5   Today's INR 2.5   Maintenance plan 8 mg (2 mg x 4) on Mon, Fri; 10 mg (2 mg x 5) all other days   Full instructions 8 mg on Mon, Fri; 10 mg all other days   Weekly total 66 mg   No change documented Yoly Crowe RN   Plan last modified Renee Kevin RN (5/10/2018)   Next INR check 5/24/2018   Priority INR   Target end date Indefinite    Indications   Blood clot of vein in shoulder area  left [I82.602]  Factor 5 Leiden mutation  heterozygous (H) [D68.51]  Long-term (current) use of anticoagulants [Z79.01] [Z79.01]         Anticoagulation Episode Summary     INR check location     Preferred lab     Send INR reminders to  INR    Comments INR goal changed per Dr Kitchen 3/21/18 he wants INR checked q 2 weeks        Anticoagulation Care Providers     Provider Role Specialty Phone number    Krystin Black MD [facility] [phone]            See the Encounter Report to view Anticoagulation Flowsheet and Dosing Calendar (Go to Encounters tab in chart review, and find the Anticoagulation Therapy Visit)        Yoly Crowe, RN

## 2018-05-23 ENCOUNTER — MEDICAL CORRESPONDENCE (OUTPATIENT)
Dept: HEALTH INFORMATION MANAGEMENT | Facility: OTHER | Age: 48
End: 2018-05-23

## 2018-05-23 ENCOUNTER — ONCOLOGY VISIT (OUTPATIENT)
Dept: ONCOLOGY | Facility: OTHER | Age: 48
End: 2018-05-23
Attending: INTERNAL MEDICINE
Payer: COMMERCIAL

## 2018-05-23 VITALS
HEART RATE: 94 BPM | HEIGHT: 66 IN | SYSTOLIC BLOOD PRESSURE: 110 MMHG | WEIGHT: 199 LBS | BODY MASS INDEX: 31.98 KG/M2 | OXYGEN SATURATION: 96 % | TEMPERATURE: 97.3 F | DIASTOLIC BLOOD PRESSURE: 76 MMHG

## 2018-05-23 DIAGNOSIS — D68.51 FACTOR 5 LEIDEN MUTATION, HETEROZYGOUS (H): ICD-10-CM

## 2018-05-23 DIAGNOSIS — I82.602 BLOOD CLOT OF VEIN IN SHOULDER AREA, LEFT: Primary | ICD-10-CM

## 2018-05-23 PROCEDURE — 99215 OFFICE O/P EST HI 40 MIN: CPT | Performed by: INTERNAL MEDICINE

## 2018-05-23 ASSESSMENT — PAIN SCALES - GENERAL: PAINLEVEL: NO PAIN (0)

## 2018-05-23 NOTE — NURSING NOTE
Patient presents for a follow up regarding Factor 5 mutation with a history of a clot.  No current concerns or complaints.  She did bring in a form to complete for a PT/INR self testing unit.  Jane Carolina CMA ([facility])................ 5/23/2018 11:04 AM

## 2018-05-23 NOTE — PROGRESS NOTES
Visit Date:   05/23/2018      HISTORY OF PRESENT ILLNESS:  Ms. Ladd returns for followup of left upper extremity DVT.  We had seen her in consultation on 11/09/2017 for evaluation of left upper extremity DVT.  Apparently she had presented to [facility], on 11/01/2017 with left arm pain, redness associated with swelling that apparently happened overnight.  She woke up and noted pain in her left side, probably happening overnight when she developed acute onset of pain and swelling in left upper extremity.  Subsequently, she was seen by [facility], who ordered left extremity venous Doppler which revealed occlusive thrombus in the subclavian, axillary and basilic veins.  The cephalic, antecubital and brachial veins were patent .  The patient was started on Lovenox and was transitioned to Coumadin.  She had a strong family history of DVT including her mother and grandmother.  She also stated that her grandmother had a history of CREST syndrome.  She stated that her mother had 4 stents in her groin.  She was a heavy smoker and smoked at least 1-1/2 packs per day for at least 30 years and cut back to less than a pack per day.  She denied any antecedent trauma or previous surgery.  She worked at [employer] store.        When we saw her on 11/09/2017, she had been on Lovenox and the swelling had reduced considerably.  She was transitioned to Coumadin.  We elected to rule out hypercoagulable state by obtaining antithrombin III levels, which were negative.  Lupus profile was negative including beta-2 glycoprotein antibodies and anticardiolipin antibodies.  We also obtained antithrombin III activity, which was normal.  We obtained homocysteine levels, which was normal.  Prothrombin II mutation was negative, but factor V Leiden was positive for heterozygous mutation consistent with increased risk of developing thrombosis.  We repeated the venous Doppler of the left upper extremity which revealed there was  improving left upper extremity DVT with residual thrombus in the left axillary vein, distal left subclavian vein.  There was still thrombosis in the subclavian vein.  Basilic veins were recanalized.  We obtained scans to rule out occult malignancy including CT neck which revealed subcutaneous nodule in the right shoulder that was most likely a sebaceous cyst.  There was fat stranding in the left axillary region, likely sequelae of an acute DVT.  Otherwise, no evidence of mass or lymphadenopathy in neck.        She also had a CT chest which revealed a few very small subpleural ground-glass opacities measuring 5 mm.  A 3- to 6-month followup was recommended.  CT of the abdomen and pelvis was essentially negative.  The patient was a smoker and continues to smoke at least 1-1/2 packs per day.  She was attempting to quit and was using Nicorette gum.  We felt that she would need to be on lifelong Coumadin as she continues to smoke.  Subsequently, she had a repeat venous Doppler of left upper extremity which revealed there was improvement in the left axillary vein, which was now patent.  The remainder of the left upper extremity venous structures, cephalic, basilic and brachial and radial veins were patent.  Internal jugular vein was patent, but this persistent and occlusive thrombus in the subclavian vein was unchanged.  She also had a CT chest which revealed numerous tiny pulmonary nodules again present.  They were unchanged and primarily in the upper lobes, and sarcoidosis or other autoimmune disease was in the differential.        The patient otherwise stated that her INRs have been on the low side of normal and occasionally subnormal or subtherapeutic.  When we saw her last, the plan was to increase her Coumadin to reflect an INR of 2.5 to 3.5.  She says it has been below 2.5 at least a couple times with increased Coumadin dosage, but otherwise she is doing relatively well.  She denies shortness of breath, chest pain,  abdominal pain, left upper extremity swelling.  Overall, she is doing well.  She did have a repeat venous Doppler which according to the radiologist showed increased collaterals with stable thrombosis of left subclavian vein.  Otherwise, the remaining veins of the left arm were patent, including the axillary vein.      PHYSICAL EXAMINATION:   GENERAL:  She is a middle-aged white female in no acute distress.   VITAL SIGNS:  Blood pressure 142/94, pulse 106, temperature 97.3.   HEENT:  Atraumatic, normocephalic.  Oropharynx nonerythematous.   NECK:  Supple.   LUNGS:  Clear to auscultation and percussion.   HEART:  Regular rhythm.  S1, S2 normal.   ABDOMEN:  Soft, normoactive bowel sounds, no masses, nontender.   LYMPHATICS:  No cervical, supraclavicular, axillary or inguinal nodes.   EXTREMITIES:  No edema.   NEUROLOGIC:  Nonfocal.      LABORATORY:  Reveals D-dimer of 387.  CBC within normal limits.  BUN 5, creatinine 0.72.  LFTs are normal.  LDH is normal.      IMPRESSION:    1.  Unprovoked left upper extremity deep venous thrombosis, likely due to hypercoagulable state given the fact she is factor V Leiden heterozygote as well as smoker.  The patient still had persistent subclavian thrombosis, so we elected to increase her parameters for Coumadin dosing to the INR to be therapeutic between 2.5 to 3.5 INR.  Repeat venous Doppler shows improvement with increased collaterals.  Plan is to see the patient in 6 months and repeat venous Doppler of left upper extremity.  We will also obtain D-dimer.     2.  History of pulmonary micronodules.  We had referred the patient to Dr. Ellis, but the patient has not as of yet shown up for appointment.  We suspect this is sarcoid versus possible autoimmune disease.        Forty minutes was spent with patient, greater than half the time spent in counseling and coordination of care.          CHYNA HENDRICKSON MD             D: 05/23/2018   T: 05/23/2018   MT: LIDA      Name:      MIKO SANTOS   MRN:      [id number]        Account:      [id number]   :      1970           Visit Date:   2018      Document: [id number]       cc: Freda Ellis MD

## 2018-05-23 NOTE — MR AVS SNAPSHOT
After Visit Summary   5/23/2018    Daniela Ladd    MRN: [id number]           Patient Information     Date Of Birth          1970        Visit Information        Provider Department      5/23/2018 11:00 AM Magda Kitchen MD [facility]        Today's Diagnoses     Blood clot of vein in shoulder area, left    -  1    Factor 5 Leiden mutation, heterozygous (H)           Follow-ups after your visit        Your next 10 appointments already scheduled     May 24, 2018  9:15 AM CDT   Anticoagulation Visit with GH ANTI COAG 1   [facility] ([facility]    [address]  [place] [address]   [phone]            May 31, 2018  9:15 AM CDT   Anticoagulation Visit with GH ANTI COAG 1   [facility] ([facility])    [address]  [place] [address]   [phone]              Future tests that were ordered for you today     Open Future Orders        Priority Expected Expires Ordered    CBC with platelets differential Routine 11/5/2018 12/31/2018 5/23/2018    Comprehensive metabolic panel Routine 11/5/2018 12/31/2018 5/23/2018    Lactate Dehydrogenase Routine 11/5/2018 12/31/2018 5/23/2018    D-Dimer (HI,GH) Routine 11/5/2018 12/31/2018 5/23/2018    US Upper Extremity Venous Duplex Left Routine 11/5/2018 12/31/2018 5/23/2018            Who to contact     If you have questions or need follow up information about today's clinic visit or your schedule please contact [facility] directly at [phone].  Normal or non-critical lab and imaging results will be communicated to you by MyChart, letter or phone within 4 business days after the clinic has received the results. If you do not hear from us within 7 days, please contact the clinic through MyChart or phone. If you have a critical or abnormal lab result, we will notify you by phone as soon as  "possible.  Submit refill requests through [employer] or call your pharmacy and they will forward the refill request to us. Please allow 3 business days for your refill to be completed.          Additional Information About Your Visit        [employer] Information     [employer] gives you secure access to your electronic health record. If you see a primary care provider, you can also send messages to your care team and make appointments. If you have questions, please call your primary care clinic.  If you do not have a primary care provider, please call [phone] and they will assist you.        Care EveryWhere ID     This is your Care EveryWhere ID. This could be used by other organizations to access your [place] medical records  [id number]        Your Vitals Were     Pulse Temperature Height Last Period Pulse Oximetry BMI (Body Mass Index)    94 97.3  F (36.3  C) (Tympanic) 1.689 m (5' 6.5\") 05/11/2018 96% 31.64 kg/m2       Blood Pressure from Last 3 Encounters:   05/23/18 110/76   05/01/18 124/82   03/21/18 132/90    Weight from Last 3 Encounters:   05/23/18 90.3 kg (199 lb)   05/01/18 90.7 kg (200 lb)   03/21/18 91.6 kg (202 lb)               Primary Care Provider Office Phone # Fax #    Krystin Vy Black [phone] [address] [facility] [address]        Equal Access to Services     [facility] AH: Hadii li wilde hadasho Soartemali, waaxda luqadaha, qaybta kaalmada humberto, job [place]. So [facility] [phone].    ATENCIÓN: Si habla español, tiene a egan disposición servicios gratuitos de asistencia lingüística. Llame al [phone].    We comply with applicable federal civil rights laws and [place] laws. We do not discriminate on the basis of race, color, national origin, age, disability, sex, sexual orientation, or gender identity.            Thank you!     Thank you for choosing [facility]  for your care. Our goal is always to " provide you with excellent care. Hearing back from our patients is one way we can continue to improve our services. Please take a few minutes to complete the written survey that you may receive in the mail after your visit with us. Thank you!             Your Updated Medication List - Protect others around you: Learn how to safely use, store and throw away your medicines at www.disposemymeds.org.          This list is accurate as of 5/23/18  5:03 PM.  Always use your most recent med list.                   Brand Name Dispense Instructions for use Diagnosis    acetaminophen 500 MG tablet    TYLENOL     Take 1,000 mg by mouth        warfarin 2 MG tablet    COUMADIN    396 tablet    TAKE 10 mg x five days/week, and 8 mg x two days/week (Mondays and Fridays), or as directed by protime clinic    Arm DVT (deep venous thromboembolism), acute, left (H), Long-term (current) use of anticoagulants

## 2018-05-24 ENCOUNTER — ANTICOAGULATION THERAPY VISIT (OUTPATIENT)
Dept: ANTICOAGULATION | Facility: OTHER | Age: 48
End: 2018-05-24
Attending: FAMILY MEDICINE
Payer: COMMERCIAL

## 2018-05-24 DIAGNOSIS — Z79.01 LONG-TERM (CURRENT) USE OF ANTICOAGULANTS: ICD-10-CM

## 2018-05-24 DIAGNOSIS — D68.51 FACTOR 5 LEIDEN MUTATION, HETEROZYGOUS (H): ICD-10-CM

## 2018-05-24 DIAGNOSIS — I82.602 BLOOD CLOT OF VEIN IN SHOULDER AREA, LEFT: ICD-10-CM

## 2018-05-24 LAB — INR POINT OF CARE: 3 (ref 0.86–1.14)

## 2018-05-24 PROCEDURE — 36416 COLLJ CAPILLARY BLOOD SPEC: CPT

## 2018-05-24 PROCEDURE — 85610 PROTHROMBIN TIME: CPT | Mod: QW

## 2018-05-24 NOTE — MR AVS SNAPSHOT
Daniela Ladd   5/24/2018 9:15 AM   Anticoagulation Therapy Visit    Description:  47 year old female   Provider:  SKYL[facility] AN[facility] COASARAHI 1   Department:  [facility]           INR as of 5/24/2018     Today's INR 3.0      Anticoagulation Summary as of 5/24/2018     INR goal 2.5-3.5   Today's INR 3.0   Full warfarin instructions 8 mg on Mon, Fri; 10 mg all other days   Next INR check 5/31/2018    Indications   Blood clot of vein in shoulder area  left [I82.602]  Factor 5 Leiden mutation  heterozygous (H) [D68.51]  Long-term (current) use of anticoagulants [Z79.01] [Z79.01]         Description     Continue same Warfarin dose and recheck in 1 week  Yoly Crowe RN   5/24/2018    9:15 AM        Your next Anticoagulation Clinic appointment(s)     May 31, 2018  9:15 AM CDT   Anticoagulation Visit with GH ANTI COAG 1   [facility] ([facility])    [address]  [place] [address]   [phone]              May 2018 Details    Sun Mon Tue Wed Thu Fri Sat       1               2               3               4               5                 6               7               8               9               10               11               12                 13               14               15               16               17               18               19                 20               21               22               23               24      10 mg   See details      25      8 mg         26      10 mg           27      10 mg         28      8 mg         29      10 mg         30      10 mg         31               Date Details   05/24 This INR check       Date of next INR:  5/31/2018         How to take your warfarin dose     To take:  8 mg Take 4 of the 2 mg tablets.    To take:  10 mg Take 5 of the 2 mg tablets.

## 2018-05-24 NOTE — PROGRESS NOTES
ANTICOAGULATION FOLLOW-UP CLINIC VISIT    Patient Name:  Daniela Ladd  Date:  5/24/2018  Contact Type:  Face to Face    SUBJECTIVE:     Patient Findings     Positives No Problem Findings           OBJECTIVE    INR Protime   Date Value Ref Range Status   05/24/2018 3.0 (A) 0.86 - 1.14 Final       ASSESSMENT / PLAN  INR assessment THER    Recheck INR In: 1 WEEK    INR Location Clinic      Anticoagulation Summary as of 5/24/2018     INR goal 2.5-3.5   Today's INR 3.0   Warfarin maintenance plan 8 mg (2 mg x 4) on Mon, Fri; 10 mg (2 mg x 5) all other days   Full warfarin instructions 8 mg on Mon, Fri; 10 mg all other days   Weekly warfarin total 66 mg   No change documented Yoly Crowe RN   Plan last modified Renee Kevin RN (5/10/2018)   Next INR check 5/31/2018   Priority INR   Target end date Indefinite    Indications   Blood clot of vein in shoulder area  left [I82.602]  Factor 5 Leiden mutation  heterozygous (H) [D68.51]  Long-term (current) use of anticoagulants [Z79.01] [Z79.01]         Anticoagulation Episode Summary     INR check location     Preferred lab     Send INR reminders to  INR    Comments INR goal changed per Dr Kitchen 3/21/18 he wants INR checked q 2 weeks        Anticoagulation Care Providers     Provider Role Specialty Phone number    Krystin Black MD [facility] [phone]            See the Encounter Report to view Anticoagulation Flowsheet and Dosing Calendar (Go to Encounters tab in chart review, and find the Anticoagulation Therapy Visit)        Yoly Crowe RN

## 2018-05-31 ENCOUNTER — ANTICOAGULATION THERAPY VISIT (OUTPATIENT)
Dept: ANTICOAGULATION | Facility: OTHER | Age: 48
End: 2018-05-31
Attending: FAMILY MEDICINE
Payer: COMMERCIAL

## 2018-05-31 DIAGNOSIS — I82.602 BLOOD CLOT OF VEIN IN SHOULDER AREA, LEFT: ICD-10-CM

## 2018-05-31 DIAGNOSIS — D68.51 FACTOR 5 LEIDEN MUTATION, HETEROZYGOUS (H): ICD-10-CM

## 2018-05-31 DIAGNOSIS — Z79.01 LONG-TERM (CURRENT) USE OF ANTICOAGULANTS: ICD-10-CM

## 2018-05-31 LAB — INR POINT OF CARE: 3.3 (ref 2.5–3.5)

## 2018-05-31 PROCEDURE — 36416 COLLJ CAPILLARY BLOOD SPEC: CPT

## 2018-05-31 PROCEDURE — 85610 PROTHROMBIN TIME: CPT | Mod: QW

## 2018-05-31 NOTE — MR AVS SNAPSHOT
Daniela Ladd   5/31/2018 9:15 AM   Anticoagulation Therapy Visit    Description:  47 year old female   Provider:  SKYLER ANTI ZACHARIAH 1   Department:  [facility]           INR as of 5/31/2018     Today's INR 3.3      Anticoagulation Summary as of 5/31/2018     INR goal 2.5-3.5   Today's INR 3.3   Full warfarin instructions 8 mg on Mon, Fri; 10 mg all other days   Next INR check 6/7/2018    Indications   Blood clot of vein in shoulder area  left [I82.602]  Factor 5 Leiden mutation  heterozygous (H) [D68.51]  Long-term (current) use of anticoagulants [Z79.01] [Z79.01]         Description     Continue same dose of Coumadin/Warfarinand recheck INR in 1 week.  Renee Kevin ....................  5/31/2018   9:07 AM          Your next Anticoagulation Clinic appointment(s)     May 31, 2018  9:15 AM CDT   Anticoagulation Visit with GH ANTI COAG 1   [facility] ([facility])    [address]  [place] [address]   [phone]              May 2018 Details    Sun Mon Tue Wed Thu Fri Sat       1               2               3               4               5                 6               7               8               9               10               11               12                 13               14               15               16               17               18               19                 20               21               22               23               24               25               26                 27               28               29               30               31      10 mg   See details         Date Details   05/31 This INR check               How to take your warfarin dose     To take:  10 mg Take 5 of the 2 mg tablets.           June 2018 Details    Sun Mon Tue Wed Thu Fri Sat          1      8 mg         2      10 mg           3      10 mg         4      8 mg         5      10 mg         6      10 mg         7            8                9                 10               11               12               13               14               15               16                 17               18               19               20               21               22               23                 24               25               26               27               28               29               30                Date Details   No additional details    Date of next INR:  6/7/2018         How to take your warfarin dose     To take:  8 mg Take 4 of the 2 mg tablets.    To take:  10 mg Take 5 of the 2 mg tablets.

## 2018-05-31 NOTE — PROGRESS NOTES
ANTICOAGULATION FOLLOW-UP CLINIC VISIT    Patient Name:  Daniela Ladd  Date:  5/31/2018  Contact Type:  Face to Face    SUBJECTIVE:        OBJECTIVE    INR Protime   Date Value Ref Range Status   05/31/2018 3.3 2.5 - 3.5 Final       ASSESSMENT / PLAN  INR assessment THER    Recheck INR In: 1 WEEK    INR Location Clinic      Anticoagulation Summary as of 5/31/2018     INR goal 2.5-3.5   Today's INR 3.3   Warfarin maintenance plan 8 mg (2 mg x 4) on Mon, Fri; 10 mg (2 mg x 5) all other days   Full warfarin instructions 8 mg on Mon, Fri; 10 mg all other days   Weekly warfarin total 66 mg   Plan last modified Renee Kevin RN (5/10/2018)   Next INR check 6/7/2018   Priority INR   Target end date Indefinite    Indications   Blood clot of vein in shoulder area  left [I82.602]  Factor 5 Leiden mutation  heterozygous (H) [D68.51]  Long-term (current) use of anticoagulants [Z79.01] [Z79.01]         Anticoagulation Episode Summary     INR check location     Preferred lab     Send INR reminders to  INR    Comments INR goal changed per Dr Kitchen 3/21/18 he wants INR checked q 2 weeks        Anticoagulation Care Providers     Provider Role Specialty Phone number    Krystin Black MD [facility] [phone]            See the Encounter Report to view Anticoagulation Flowsheet and Dosing Calendar (Go to Encounters tab in chart review, and find the Anticoagulation Therapy Visit)        Renee Kevin RN                 ANTICOAGULATION FOLLOW-UP CLINIC VISIT    Patient Name:  Daniela Ladd  Date:  5/31/2018  Contact Type:  Face to Face    SUBJECTIVE:        OBJECTIVE    INR Protime   Date Value Ref Range Status   05/31/2018 3.3 2.5 - 3.5 Final       ASSESSMENT / PLAN  INR assessment THER    Recheck INR In: 1 WEEK    INR Location Clinic      Anticoagulation Summary as of 5/31/2018     INR goal 2.5-3.5   Today's INR 3.3   Warfarin maintenance plan 8 mg (2 mg x 4) on Mon, Fri; 10 mg  (2 mg x 5) all other days   Full warfarin instructions 8 mg on Mon, Fri; 10 mg all other days   Weekly warfarin total 66 mg   No change documented Renee Kevin RN   Plan last modified Renee Kevin RN (5/10/2018)   Next INR check 6/7/2018   Priority INR   Target end date Indefinite    Indications   Blood clot of vein in shoulder area  left [I82.602]  Factor 5 Leiden mutation  heterozygous (H) [D68.51]  Long-term (current) use of anticoagulants [Z79.01] [Z79.01]         Anticoagulation Episode Summary     INR check location     Preferred lab     Send INR reminders to  INR    Comments INR goal changed per Dr Kitchen 3/21/18 he wants INR checked q 2 weeks        Anticoagulation Care Providers     Provider Role Specialty Phone number    Krystin Black MD [facility] [phone]            See the Encounter Report to view Anticoagulation Flowsheet and Dosing Calendar (Go to Encounters tab in chart review, and find the Anticoagulation Therapy Visit)        Renee Kevin RN

## 2018-06-02 ENCOUNTER — MYC MEDICAL ADVICE (OUTPATIENT)
Dept: ONCOLOGY | Facility: OTHER | Age: 48
End: 2018-06-02

## 2018-06-07 ENCOUNTER — ANTICOAGULATION THERAPY VISIT (OUTPATIENT)
Dept: ANTICOAGULATION | Facility: OTHER | Age: 48
End: 2018-06-07
Attending: FAMILY MEDICINE
Payer: COMMERCIAL

## 2018-06-07 DIAGNOSIS — I82.602 BLOOD CLOT OF VEIN IN SHOULDER AREA, LEFT: ICD-10-CM

## 2018-06-07 DIAGNOSIS — D68.51 FACTOR 5 LEIDEN MUTATION, HETEROZYGOUS (H): ICD-10-CM

## 2018-06-07 DIAGNOSIS — Z79.01 LONG-TERM (CURRENT) USE OF ANTICOAGULANTS: ICD-10-CM

## 2018-06-07 LAB — INR POINT OF CARE: 3.4 (ref 0.86–1.14)

## 2018-06-07 PROCEDURE — 85610 PROTHROMBIN TIME: CPT | Mod: QW

## 2018-06-07 PROCEDURE — 99207 ZZC NO CHARGE NURSE ONLY: CPT

## 2018-06-07 PROCEDURE — 36416 COLLJ CAPILLARY BLOOD SPEC: CPT

## 2018-06-07 NOTE — MR AVS SNAPSHOT
Daniela Ladd   6/7/2018 9:00 AM   Anticoagulation Therapy Visit    Description:  47 year old female   Provider:  SKYLER ANTI COAG 2   Department:   Anticoag           INR as of 6/7/2018     Today's INR 3.4      Anticoagulation Summary as of 6/7/2018     INR goal 2.5-3.5   Today's INR 3.4   Full warfarin instructions 8 mg on Mon, Fri; 10 mg all other days   Next INR check 6/14/2018    Indications   Blood clot of vein in shoulder area  left [I82.602]  Factor 5 Leiden mutation  heterozygous (H) [D68.51]  Long-term (current) use of anticoagulants [Z79.01] [Z79.01]         Description     Continue same Warfarin dose and recheck in 1 week  Yoly Crowe RN   6/7/2018    8:55 AM        Your next Anticoagulation Clinic appointment(s)     Jun 07, 2018  9:00 AM CDT   Anticoagulation Visit with  ANTI COAG 2   [facility] ([facility])    [address]   [place] [facility] [address]   [phone]            Jun 14, 2018  9:15 AM CDT   Anticoagulation Visit with  ANTI COAG 2   [facility] ([facility])    [address]  [place] [facility] [address]   [phone]              June 2018 Details    Sun Mon Tue Wed Thu Fri Sat          1               2                 3               4               5               6               7      10 mg   See details      8      8 mg         9      10 mg           10      10 mg         11      8 mg         12      10 mg         13      10 mg         14            15               16                 17               18               19               20               21               22               23                 24               25               26               27               28               29               30                Date Details   06/07 This INR check       Date of next INR:  6/14/2018         How to take your warfarin dose     To take:  8 mg Take 4 of the 2 mg  tablets.    To take:  10 mg Take 5 of the 2 mg tablets.

## 2018-06-07 NOTE — PROGRESS NOTES
ANTICOAGULATION FOLLOW-UP CLINIC VISIT    Patient Name:  Daniela Ladd  Date:  6/7/2018  Contact Type:  Face to Face    SUBJECTIVE:     Patient Findings     Positives No Problem Findings           OBJECTIVE    INR Protime   Date Value Ref Range Status   06/07/2018 3.4 (A) 0.86 - 1.14 Final       ASSESSMENT / PLAN  INR assessment THER    Recheck INR In: 1 WEEK    INR Location Clinic      Anticoagulation Summary as of 6/7/2018     INR goal 2.5-3.5   Today's INR 3.4   Warfarin maintenance plan 8 mg (2 mg x 4) on Mon, Fri; 10 mg (2 mg x 5) all other days   Full warfarin instructions 8 mg on Mon, Fri; 10 mg all other days   Weekly warfarin total 66 mg   No change documented Yoly Crowe RN   Plan last modified Renee Kevin RN (5/10/2018)   Next INR check 6/14/2018   Priority INR   Target end date Indefinite    Indications   Blood clot of vein in shoulder area  left [I82.602]  Factor 5 Leiden mutation  heterozygous (H) [D68.51]  Long-term (current) use of anticoagulants [Z79.01] [Z79.01]         Anticoagulation Episode Summary     INR check location     Preferred lab     Send INR reminders to  INR    Comments INR goal changed per Dr Kitchen 3/21/18 he wants INR checked q 2 weeks        Anticoagulation Care Providers     Provider Role Specialty Phone number    Krystin Black MD [facility] [phone]            See the Encounter Report to view Anticoagulation Flowsheet and Dosing Calendar (Go to Encounters tab in chart review, and find the Anticoagulation Therapy Visit)        Yoly Crowe RN

## 2018-06-13 ENCOUNTER — ANTICOAGULATION THERAPY VISIT (OUTPATIENT)
Dept: ANTICOAGULATION | Facility: OTHER | Age: 48
End: 2018-06-13
Attending: FAMILY MEDICINE
Payer: COMMERCIAL

## 2018-06-13 DIAGNOSIS — Z79.01 LONG-TERM (CURRENT) USE OF ANTICOAGULANTS: ICD-10-CM

## 2018-06-13 DIAGNOSIS — D68.51 FACTOR 5 LEIDEN MUTATION, HETEROZYGOUS (H): ICD-10-CM

## 2018-06-13 DIAGNOSIS — I82.602 BLOOD CLOT OF VEIN IN SHOULDER AREA, LEFT: ICD-10-CM

## 2018-06-13 LAB — INR POINT OF CARE: 3.1 (ref 0.86–1.14)

## 2018-06-13 PROCEDURE — 36416 COLLJ CAPILLARY BLOOD SPEC: CPT

## 2018-06-13 PROCEDURE — 85610 PROTHROMBIN TIME: CPT | Mod: QW

## 2018-06-13 PROCEDURE — 99207 ZZC NO CHARGE NURSE ONLY: CPT

## 2018-06-13 NOTE — MR AVS SNAPSHOT
Daniela Ladd   6/13/2018 8:00 AM   Anticoagulation Therapy Visit    Description:  47 year old female   Provider:  [facility] ANTI [facility]AG 1   Department:  [facility]           INR as of 6/13/2018     Today's INR 3.1      Anticoagulation Summary as of 6/13/2018     INR goal 2.5-3.5   Today's INR 3.1   Full warfarin instructions 8 mg on Mon, Fri; 10 mg all other days   Next INR check 6/21/2018    Indications   Blood clot of vein in shoulder area  left [I82.602]  Factor 5 Leiden mutation  heterozygous (H) [D68.51]  Long-term (current) use of anticoagulants [Z79.01] [Z79.01]         Description     Continue on current dose and recheck 6/21/2018.  Shira Cage RN  ....................  6/13/2018   7:50 AM        Your next Anticoagulation Clinic appointment(s)     Jun 13, 2018  8:00 AM CDT   Anticoagulation Visit with  ANTI COAG 1   [facility]    [address]  [place] [address]   [phone]            Jun 21, 2018  8:45 AM CDT   Anticoagulation Visit with  ANTI COAG 1   [facility] ([facility]    [address]  [place] [address]   [phone]              June 2018 Details    Sun Mon Tue Wed Thu Fri Sat          1               2                 3               4               5               6               7               8               9                 10               11               12               13      10 mg   See details      14      10 mg         15      8 mg         16      10 mg           17      10 mg         18      8 mg         19      10 mg         20      10 mg         21            22               23                 24               25               26               27               28               29               30                Date Details   06/13 This INR check       Date of next INR:  6/21/2018         How to take your warfarin dose     To take:  8  mg Take 4 of the 2 mg tablets.    To take:  10 mg Take 5 of the 2 mg tablets.

## 2018-06-13 NOTE — PROGRESS NOTES
ANTICOAGULATION FOLLOW-UP CLINIC VISIT    Patient Name:  Daniela Ladd  Date:  6/13/2018  Contact Type:  Face to Face    SUBJECTIVE:     Patient Findings     Positives No Problem Findings           OBJECTIVE    INR Protime   Date Value Ref Range Status   06/13/2018 3.1 (A) 0.86 - 1.14 Final       ASSESSMENT / PLAN  INR assessment THER    Recheck INR In: 1 WEEK    INR Location Clinic      Anticoagulation Summary as of 6/13/2018     INR goal 2.5-3.5   Today's INR 3.1   Warfarin maintenance plan 8 mg (2 mg x 4) on Mon, Fri; 10 mg (2 mg x 5) all other days   Full warfarin instructions 8 mg on Mon, Fri; 10 mg all other days   Weekly warfarin total 66 mg   No change documented Shira Cage RN   Plan last modified Renee Kevin RN (5/10/2018)   Next INR check 6/21/2018   Priority INR   Target end date Indefinite    Indications   Blood clot of vein in shoulder area  left [I82.602]  Factor 5 Leiden mutation  heterozygous (H) [D68.51]  Long-term (current) use of anticoagulants [Z79.01] [Z79.01]         Anticoagulation Episode Summary     INR check location     Preferred lab     Send INR reminders to  INR    Comments INR goal changed per Dr Kitchen 3/21/18 he wants INR checked q 2 weeks        Anticoagulation Care Providers     Provider Role Specialty Phone number    Krystin Black MD [facility] [phone]            See the Encounter Report to view Anticoagulation Flowsheet and Dosing Calendar (Go to Encounters tab in chart review, and find the Anticoagulation Therapy Visit)        Shira Cage, MARIPOSA

## 2018-06-14 ENCOUNTER — TELEPHONE (OUTPATIENT)
Dept: ONCOLOGY | Facility: OTHER | Age: 48
End: 2018-06-14

## 2018-06-14 NOTE — TELEPHONE ENCOUNTER
Unable to complete call as number says it has been disconnected.  Will route back to reception for review.  Jane Carolina CMA ([facility])................ 6/14/2018 11:43 AM

## 2018-06-21 ENCOUNTER — ANTICOAGULATION THERAPY VISIT (OUTPATIENT)
Dept: ANTICOAGULATION | Facility: OTHER | Age: 48
End: 2018-06-21
Attending: FAMILY MEDICINE
Payer: COMMERCIAL

## 2018-06-21 DIAGNOSIS — Z79.01 LONG-TERM (CURRENT) USE OF ANTICOAGULANTS: ICD-10-CM

## 2018-06-21 DIAGNOSIS — D68.51 FACTOR 5 LEIDEN MUTATION, HETEROZYGOUS (H): ICD-10-CM

## 2018-06-21 DIAGNOSIS — I82.602 BLOOD CLOT OF VEIN IN SHOULDER AREA, LEFT: ICD-10-CM

## 2018-06-21 LAB — INR POINT OF CARE: 3.4 (ref 0.86–1.14)

## 2018-06-21 PROCEDURE — 99207 ZZC NO CHARGE NURSE ONLY: CPT

## 2018-06-21 PROCEDURE — 85610 PROTHROMBIN TIME: CPT | Mod: QW

## 2018-06-21 PROCEDURE — 36416 COLLJ CAPILLARY BLOOD SPEC: CPT

## 2018-06-21 NOTE — MR AVS SNAPSHOT
Daniela Ladd   6/21/2018 8:45 AM   Anticoagulation Therapy Visit    Description:  47 year old female   Provider:  [facility] ANTI [facility]AG 1   Department:  [facility]           INR as of 6/21/2018     Today's INR 3.4      Anticoagulation Summary as of 6/21/2018     INR goal 2.5-3.5   Today's INR 3.4   Full warfarin instructions 8 mg on Mon, Fri; 10 mg all other days   Next INR check 6/28/2018    Indications   Blood clot of vein in shoulder area  left [I82.602]  Factor 5 Leiden mutation  heterozygous (H) [D68.51]  Long-term (current) use of anticoagulants [Z79.01] [Z79.01]         Description     Continue current dose and recheck 1 week 6/28/2018.  Shira Cage RN  ....................  6/21/2018   8:49 AM        Your next Anticoagulation Clinic appointment(s)     Jun 28, 2018  9:00 AM CDT   Anticoagulation Visit with  ANTI COAG 1   [facility]    [address]  [place] [address]   [phone]            Jul 05, 2018  9:00 AM CDT   Anticoagulation Visit with  ANTI COAG 2   [facility]    [address]  [place] [address]   [phone]              June 2018 Details    Sun Mon Tue Wed Thu Fri Sat          1               2                 3               4               5               6               7               8               9                 10               11               12               13               14               15               16                 17               18               19               20               21      10 mg   See details      22      8 mg         23      10 mg           24      10 mg         25      8 mg         26      10 mg         27      10 mg         28            29               30                Date Details   06/21 This INR check       Date of next INR:  6/28/2018         How to take your warfarin dose     To take:  8 mg  Take 4 of the 2 mg tablets.    To take:  10 mg Take 5 of the 2 mg tablets.

## 2018-06-21 NOTE — PROGRESS NOTES
ANTICOAGULATION FOLLOW-UP CLINIC VISIT    Patient Name:  Daniela Ladd  Date:  6/21/2018  Contact Type:  Face to Face    SUBJECTIVE:        OBJECTIVE    INR Protime   Date Value Ref Range Status   06/21/2018 3.4 (A) 0.86 - 1.14 Final       ASSESSMENT / PLAN  No question data found.  Anticoagulation Summary as of 6/21/2018     INR goal 2.5-3.5   Today's INR 3.4   Warfarin maintenance plan 8 mg (2 mg x 4) on Mon, Fri; 10 mg (2 mg x 5) all other days   Full warfarin instructions 8 mg on Mon, Fri; 10 mg all other days   Weekly warfarin total 66 mg   No change documented Shira Cage RN   Plan last modified Renee Kevin RN (5/10/2018)   Next INR check 6/28/2018   Priority INR   Target end date Indefinite    Indications   Blood clot of vein in shoulder area  left [I82.602]  Factor 5 Leiden mutation  heterozygous (H) [D68.51]  Long-term (current) use of anticoagulants [Z79.01] [Z79.01]         Anticoagulation Episode Summary     INR check location     Preferred lab     Send INR reminders to  INR    Comments INR goal changed per Dr Kitchen 3/21/18 he wants INR checked q 2 weeks        Anticoagulation Care Providers     Provider Role Specialty Phone number    Krystin Black MD [facility] [phone]            See the Encounter Report to view Anticoagulation Flowsheet and Dosing Calendar (Go to Encounters tab in chart review, and find the Anticoagulation Therapy Visit)        Shira Cage, RN

## 2018-06-22 ENCOUNTER — TRANSFERRED RECORDS (OUTPATIENT)
Dept: HEALTH INFORMATION MANAGEMENT | Facility: OTHER | Age: 48
End: 2018-06-22

## 2018-06-22 ENCOUNTER — ANTICOAGULATION THERAPY VISIT (OUTPATIENT)
Dept: ANTICOAGULATION | Facility: OTHER | Age: 48
End: 2018-06-22
Payer: COMMERCIAL

## 2018-06-22 DIAGNOSIS — I82.602 BLOOD CLOT OF VEIN IN SHOULDER AREA, LEFT: ICD-10-CM

## 2018-06-22 DIAGNOSIS — D68.51 FACTOR 5 LEIDEN MUTATION, HETEROZYGOUS (H): ICD-10-CM

## 2018-06-22 DIAGNOSIS — Z79.01 LONG-TERM (CURRENT) USE OF ANTICOAGULANTS: ICD-10-CM

## 2018-06-22 LAB — INR PPP: 3.7 (ref 2.5–3.5)

## 2018-06-22 PROCEDURE — 99207 ZZC NO CHARGE NURSE ONLY: CPT | Performed by: FAMILY MEDICINE

## 2018-06-22 NOTE — PROGRESS NOTES
ANTICOAGULATION FOLLOW-UP CLINIC VISIT    Patient Name:  Daniela Ladd  Date:  6/22/2018  Contact Type:  [facility]    SUBJECTIVE:     Patient Findings     Positives Unexplained INR or factor level change           OBJECTIVE    INR   Date Value Ref Range Status   06/22/2018 3.7 (A) 2.5 - 3.5 Final     Comment:     New Home monitor       ASSESSMENT / PLAN  INR assessment SUPRA    Recheck INR In: 1 DAY in home-dosing/testing instructions given.    INR Location Home INR      Anticoagulation Summary as of 6/22/2018     INR goal 2.5-3.5   Today's INR 3.7!   Warfarin maintenance plan 8 mg (2 mg x 4) on Mon, Fri; 10 mg (2 mg x 5) all other days   Full warfarin instructions 8 mg on Mon, Fri; 10 mg all other days   Weekly warfarin total 66 mg   Plan last modified Renee Kevni RN (5/10/2018)   Next INR check    Priority INR   Target end date Indefinite    Indications   Blood clot of vein in shoulder area  left [I82.602]  Factor 5 Leiden mutation  heterozygous (H) [D68.51]  Long-term (current) use of anticoagulants [Z79.01] [Z79.01]         Anticoagulation Episode Summary     INR check location     Preferred lab     Send INR reminders to  INR    Comments INR goal changed per Dr Kitchen 3/21/18 he wants INR checked q 2 weeks        Anticoagulation Care Providers     Provider Role Specialty Phone number    Krystin Black MD [facility] [phone]            See the Encounter Report to view Anticoagulation Flowsheet and Dosing Calendar (Go to Encounters tab in chart review, and find the Anticoagulation Therapy Visit)        Shira Cage RN

## 2018-06-22 NOTE — MR AVS SNAPSHOT
Daniela CRUZ Julee   6/22/2018   Anticoagulation Therapy Visit    Description:  47 year old female   Provider:  Krystin Black MD   Department:  [facility]           INR as of 6/22/2018     Today's INR 3.7!      Anticoagulation Summary as of 6/22/2018     INR goal 2.5-3.5   Today's INR 3.7!   Full warfarin instructions 8 mg on Mon, Fri; 10 mg all other days   Next INR check 6/23/2018    Indications   Blood clot of vein in shoulder area  left [I82.602]  Factor 5 Leiden mutation  heterozygous (H) [D68.51]  Long-term (current) use of anticoagulants [Z79.01] [Z79.01]         Description     Patient used in home monitor today for first time.  INR  3.7.  Advised to continue current dose as per protocol and she can recheck tomorrow and if 3.9 reduce the 10 mg to 8 mg Saturday night.  If 4.0-4.6 tomorrow; omit Saturday dose, then continue current dose, and recheck Monday.  Discussed low and high INR values and when to present to ED. Patient will recheck Monday and weekly/as prescribed thereafter.  Shira Cage RN  ....................  6/22/2018   6:30 PM        Your next Anticoagulation Clinic appointment(s)     Jun 28, 2018  9:00 AM CDT   Anticoagulation Visit with GH ANTI COAG 1   [facility]    [address]  [place] [address]   [phone]            Jul 05, 2018  9:00 AM CDT   Anticoagulation Visit with GH ANTI COAG 2   [facility]    [address]  [place] [address]   [phone]              June 2018 Details    Sun Mon Tue Wed Thu Fri Sat          1               2                 3               4               5               6               7               8               9                 10               11               12               13               14               15               16                 17               18               19                20               21               22      8 mg   See details      23              24               25               26               27               28               29               30                Date Details   06/22 This INR check       Date of next INR:  6/23/2018         How to take your warfarin dose     To take:  8 mg Take 4 of the 2 mg tablets.    To take:  10 mg Take 5 of the 2 mg tablets.

## 2018-06-28 ENCOUNTER — TRANSFERRED RECORDS (OUTPATIENT)
Dept: HEALTH INFORMATION MANAGEMENT | Facility: OTHER | Age: 48
End: 2018-06-28

## 2018-07-05 ENCOUNTER — ANTICOAGULATION THERAPY VISIT (OUTPATIENT)
Dept: ANTICOAGULATION | Facility: OTHER | Age: 48
End: 2018-07-05
Attending: FAMILY MEDICINE
Payer: COMMERCIAL

## 2018-07-05 ENCOUNTER — TRANSFERRED RECORDS (OUTPATIENT)
Dept: HEALTH INFORMATION MANAGEMENT | Facility: OTHER | Age: 48
End: 2018-07-05

## 2018-07-05 DIAGNOSIS — Z79.01 LONG-TERM (CURRENT) USE OF ANTICOAGULANTS: ICD-10-CM

## 2018-07-05 DIAGNOSIS — I82.622 ARM DVT (DEEP VENOUS THROMBOEMBOLISM), ACUTE, LEFT (H): ICD-10-CM

## 2018-07-05 DIAGNOSIS — I82.602 BLOOD CLOT OF VEIN IN SHOULDER AREA, LEFT: ICD-10-CM

## 2018-07-05 DIAGNOSIS — D68.51 FACTOR 5 LEIDEN MUTATION, HETEROZYGOUS (H): ICD-10-CM

## 2018-07-05 LAB — INR POINT OF CARE: 4.2 (ref 0.86–1.14)

## 2018-07-05 PROCEDURE — 99207 ZZC NO CHARGE NURSE ONLY: CPT

## 2018-07-05 PROCEDURE — 85610 PROTHROMBIN TIME: CPT | Mod: QW

## 2018-07-05 PROCEDURE — 36416 COLLJ CAPILLARY BLOOD SPEC: CPT

## 2018-07-05 RX ORDER — WARFARIN SODIUM 2 MG/1
TABLET ORAL
Qty: 396 TABLET | Refills: 0 | COMMUNITY
Start: 2018-07-05 | End: 2018-08-03

## 2018-07-05 NOTE — PROGRESS NOTES
ANTICOAGULATION FOLLOW-UP CLINIC VISIT    Patient Name:  Daniela Ladd  Date:  7/5/2018  Contact Type:  Face to Face    SUBJECTIVE:     Patient Findings     Positives Unexplained INR or factor level change           OBJECTIVE    INR Protime   Date Value Ref Range Status   07/05/2018 4.2 (A) 0.86 - 1.14 Final       ASSESSMENT / PLAN  INR assessment SUPRA    Recheck INR In: 2 WEEKS    INR Location Clinic      Anticoagulation Summary as of 7/5/2018     INR goal 2.5-3.5   Today's INR 4.2!   Warfarin maintenance plan 10 mg (2 mg x 5) on Mon, Fri; 8 mg (2 mg x 4) all other days   Full warfarin instructions 10 mg on Mon, Fri; 8 mg all other days   Weekly warfarin total 60 mg   Plan last modified Yoly Crowe, MARIPOSA (7/5/2018)   Next INR check 7/19/2018   Priority INR   Target end date Indefinite    Indications   Blood clot of vein in shoulder area  left [I82.602]  Factor 5 Leiden mutation  heterozygous (H) [D68.51]  Long-term (current) use of anticoagulants [Z79.01] [Z79.01]         Anticoagulation Episode Summary     INR check location     Preferred lab     Send INR reminders to  INR    Comments INR goal changed per Dr Kithcen 3/21/18 he wants INR checked q 2 weeks        Anticoagulation Care Providers     Provider Role Specialty Phone number    Krystin Black MD [facility] [phone]            See the Encounter Report to view Anticoagulation Flowsheet and Dosing Calendar (Go to Encounters tab in chart review, and find the Anticoagulation Therapy Visit)        Yoly Crowe, RN

## 2018-07-05 NOTE — MR AVS SNAPSHOT
Daniela Ladd   7/5/2018 8:15 AM   Anticoagulation Therapy Visit    Description:  47 year old female   Provider:  [facility] ANTI COAG 1   Department:  [facility]           INR as of 7/5/2018     Today's INR 4.2!      Anticoagulation Summary as of 7/5/2018     INR goal 2.5-3.5   Today's INR 4.2!   Full warfarin instructions 10 mg on Mon, Fri; 8 mg all other days   Next INR check 7/19/2018    Indications   Blood clot of vein in shoulder area  left [I82.602]  Factor 5 Leiden mutation  heterozygous (H) [D68.51]  Long-term (current) use of anticoagulants [Z79.01] [Z79.01]         Description     Decrease dose and recheck in 2 weeks.  ..............Yoly Crowe RN.............  7/5/2018    8:02 AM        Your next Anticoagulation Clinic appointment(s)     Jul 05, 2018  8:15 AM CDT   Anticoagulation Visit with SK[facility]ER ANTI COAG 1   [facility]    [address]  [facility] [address]   [phone]              July 2018 Details    Sun Mon Tue Wed Thu Fri Sat     1               2               3               4               5      8 mg   See details      6      10 mg         7      8 mg           8      8 mg         9      10 mg         10      8 mg         11      8 mg         12      8 mg         13      10 mg         14      8 mg           15      8 mg         16      10 mg         17      8 mg         18      8 mg         19            20               21                 22               23               24               25               26               27               28                 29               30               31                    Date Details   07/05 This INR check       Date of next INR:  7/19/2018         How to take your warfarin dose     To take:  8 mg Take 4 of the 2 mg tablets.    To take:  10 mg Take 5 of the 2 mg tablets.

## 2018-07-10 ENCOUNTER — TRANSFERRED RECORDS (OUTPATIENT)
Dept: HEALTH INFORMATION MANAGEMENT | Facility: OTHER | Age: 48
End: 2018-07-10

## 2018-07-13 ENCOUNTER — TELEPHONE (OUTPATIENT)
Dept: ANTICOAGULATION | Facility: OTHER | Age: 48
End: 2018-07-13

## 2018-07-13 ENCOUNTER — OFFICE VISIT (OUTPATIENT)
Dept: FAMILY MEDICINE | Facility: OTHER | Age: 48
End: 2018-07-13
Attending: NURSE PRACTITIONER
Payer: COMMERCIAL

## 2018-07-13 VITALS
HEART RATE: 100 BPM | WEIGHT: 201.2 LBS | BODY MASS INDEX: 31.58 KG/M2 | HEIGHT: 67 IN | TEMPERATURE: 97.2 F | DIASTOLIC BLOOD PRESSURE: 80 MMHG | SYSTOLIC BLOOD PRESSURE: 130 MMHG

## 2018-07-13 DIAGNOSIS — J34.89 SINUS PRESSURE: ICD-10-CM

## 2018-07-13 DIAGNOSIS — R05.8 NON-PRODUCTIVE COUGH: ICD-10-CM

## 2018-07-13 DIAGNOSIS — J01.00 ACUTE NON-RECURRENT MAXILLARY SINUSITIS: Primary | ICD-10-CM

## 2018-07-13 PROCEDURE — 99213 OFFICE O/P EST LOW 20 MIN: CPT | Performed by: NURSE PRACTITIONER

## 2018-07-13 RX ORDER — DOXYCYCLINE 100 MG/1
100 CAPSULE ORAL 2 TIMES DAILY
Qty: 14 CAPSULE | Refills: 0 | Status: SHIPPED | OUTPATIENT
Start: 2018-07-13 | End: 2018-07-20

## 2018-07-13 ASSESSMENT — PAIN SCALES - GENERAL: PAINLEVEL: SEVERE PAIN (7)

## 2018-07-13 NOTE — PATIENT INSTRUCTIONS
Doxycycline twice daily x 7 days     Monitor INR levels as the antibiotic can increase your levels and cause increased risk of bleeding      Follow up as needed      Sinusitis (Antibiotic Treatment)    The sinuses are air-filled spaces within the bones of the face. They connect to the inside of the nose. Sinusitis is an inflammation of the tissue that lines the sinuses. Sinusitis can occur during a cold. It can also happen due to allergies to pollens and other particles in the air. Sinusitis can cause symptoms of sinus congestion and a feeling of fullness. A sinus infection causes fever, headache, and facial pain. There is often green or yellow fluid draining from the nose or into the back of the throat (post-nasal drip). You have been given antibiotics to treat this condition.  Home care    Take the full course of antibiotics as instructed. Do not stop taking them, even when you feel better.    Drink plenty of water, hot tea, and other liquids. This may help thin nasal mucus. It also may help your sinuses drain fluids.    Heat may help soothe painful areas of your face. Use a towel soaked in hot water. Or,  the shower and direct the warm spray onto your face. Using a vaporizer along with a menthol rub at night may also help soothe symptoms.     An expectorant with guaifenesin may help thin nasal mucus and help your sinuses drain fluids.    You can use an over-the-counter decongestant, unless a similar medicine was prescribed to you. Nasal sprays work the fastest. Use one that contains phenylephrine or oxymetazoline. First blow your nose gently. Then use the spray. Do not use these medicines more often than directed on the label. If you do, your symptoms may get worse. You may also take pills that contain pseudoephedrine. Don t use products that combine multiple medicines. This is because side effects may be increased. Read labels. You can also ask the pharmacist for help. (People with high blood pressure  should not use decongestants. They can raise blood pressure.)    Over-the-counter antihistamines may help if allergies contributed to your sinusitis.      Do not use nasal rinses or irrigation during an acute sinus infection, unless your healthcare provider tells you to. Rinsing may spread the infection to other areas in your sinuses.    Use acetaminophen or ibuprofen to control pain, unless another pain medicine was prescribed to you. If you have chronic liver or kidney disease or ever had a stomach ulcer, talk with your healthcare provider before using these medicines. (Aspirin should never be taken by anyone under age 18 who is ill with a fever. It may cause severe liver damage.)    Don't smoke. This can make symptoms worse.  Follow-up care  Follow up with your healthcare provider or our staff if you are better in 1 week.  When to seek medical advice  Call your healthcare provider if any of these occur:    Facial pain or headache that gets worse    Stiff neck    Unusual drowsiness or confusion    Swelling of your forehead or eyelids    Vision problems, such as blurred or double vision    Fever of 100.4 F (38 C) or higher, or as directed by your healthcare provider    Seizure    Breathing problems    Symptoms don't go away in 10 days  Prevention  Here are steps you can take to help prevent an infection:    Keep good hand washing habits.    Don t have close contact with people who have sore throats, colds, or other upper respiratory infections.    Don t smoke, and stay away from secondhand smoke.    Stay up to date with of your vaccines.  Date Last Reviewed: 11/1/2017 2000-2017 [employer]. [address] [place] [address], [place] 3169667. All rights reserved. This information is not intended as a substitute for professional medical care. Always follow your healthcare professional's instructions.

## 2018-07-13 NOTE — TELEPHONE ENCOUNTER
Spoke with patient she has appt on 7/17/18. We will check her INR on that day. Yoly Crowe RN    7/13/2018  12:36 PM

## 2018-07-13 NOTE — NURSING NOTE
Patient presents with head congestion, cough nonproductive starting last Saturday. Patient has been taking mucinex and tylenol with no relief. Alyssa Sandoval LPN .............7/13/2018  11:28 AM

## 2018-07-13 NOTE — MR AVS SNAPSHOT
After Visit Summary   7/13/2018    Daniela Ladd    MRN: [id number]           Patient Information     Date Of Birth          1970        Visit Information        Provider Department      7/13/2018 11:15 AM Coral Duckworth NP [facility]        Today's Diagnoses     Acute non-recurrent maxillary sinusitis    -  1    Sinus pressure        Non-productive cough          Care Instructions    Doxycycline twice daily x 7 days     Monitor INR levels as the antibiotic can increase your levels and cause increased risk of bleeding      Follow up as needed      Sinusitis (Antibiotic Treatment)    The sinuses are air-filled spaces within the bones of the face. They connect to the inside of the nose. Sinusitis is an inflammation of the tissue that lines the sinuses. Sinusitis can occur during a cold. It can also happen due to allergies to pollens and other particles in the air. Sinusitis can cause symptoms of sinus congestion and a feeling of fullness. A sinus infection causes fever, headache, and facial pain. There is often green or yellow fluid draining from the nose or into the back of the throat (post-nasal drip). You have been given antibiotics to treat this condition.  Home care    Take the full course of antibiotics as instructed. Do not stop taking them, even when you feel better.    Drink plenty of water, hot tea, and other liquids. This may help thin nasal mucus. It also may help your sinuses drain fluids.    Heat may help soothe painful areas of your face. Use a towel soaked in hot water. Or,  the shower and direct the warm spray onto your face. Using a vaporizer along with a menthol rub at night may also help soothe symptoms.     An expectorant with guaifenesin may help thin nasal mucus and help your sinuses drain fluids.    You can use an over-the-counter decongestant, unless a similar medicine was prescribed to you. Nasal sprays work the fastest. Use one that  contains phenylephrine or oxymetazoline. First blow your nose gently. Then use the spray. Do not use these medicines more often than directed on the label. If you do, your symptoms may get worse. You may also take pills that contain pseudoephedrine. Don t use products that combine multiple medicines. This is because side effects may be increased. Read labels. You can also ask the pharmacist for help. (People with high blood pressure should not use decongestants. They can raise blood pressure.)    Over-the-counter antihistamines may help if allergies contributed to your sinusitis.      Do not use nasal rinses or irrigation during an acute sinus infection, unless your healthcare provider tells you to. Rinsing may spread the infection to other areas in your sinuses.    Use acetaminophen or ibuprofen to control pain, unless another pain medicine was prescribed to you. If you have chronic liver or kidney disease or ever had a stomach ulcer, talk with your healthcare provider before using these medicines. (Aspirin should never be taken by anyone under age 18 who is ill with a fever. It may cause severe liver damage.)    Don't smoke. This can make symptoms worse.  Follow-up care  Follow up with your healthcare provider or our staff if you are better in 1 week.  When to seek medical advice  Call your healthcare provider if any of these occur:    Facial pain or headache that gets worse    Stiff neck    Unusual drowsiness or confusion    Swelling of your forehead or eyelids    Vision problems, such as blurred or double vision    Fever of 100.4 F (38 C) or higher, or as directed by your healthcare provider    Seizure    Breathing problems    Symptoms don't go away in 10 days  Prevention  Here are steps you can take to help prevent an infection:    Keep good hand washing habits.    Don t have close contact with people who have sore throats, colds, or other upper respiratory infections.    Don t smoke, and stay away from  secondhand smoke.    Stay up to date with of your vaccines.  Date Last Reviewed: 11/1/2017 2000-2017 [employer]. [address] [place] [address], [place] [address]. All rights reserved. This information is not intended as a substitute for professional medical care. Always follow your healthcare professional's instructions.                Follow-ups after your visit        Your next 10 appointments already scheduled     Jul 17, 2018  7:45 AM CDT   Anticoagulation Visit with [facility]   [facility]    [address]  [place] [address] [phone]              Who to contact     If you have questions or need follow up information about today's clinic visit or your schedule please contact [facility] directly at [phone].  Normal or non-critical lab and imaging results will be communicated to you by [email or web address], letter or phone within 4 business days after the clinic has received the results. If you do not hear from us within 7 days, please contact the clinic through [email or web address] or phone. If you have a critical or abnormal lab result, we will notify you by phone as soon as possible.  Submit refill requests through [email or web address] or call your pharmacy and they will forward the refill request to us. Please allow 3 business days for your refill to be completed.          Additional Information About Your Visit        [email or web address] Information     [email or web address] gives you secure access to your electronic health record. If you see a primary care provider, you can also send messages to your care team and make appointments. If you have questions, please call your primary care clinic.  If you do not have a primary care provider, please call [phone] and they will assist you.        Care EveryWhere ID     This is your Care EveryWhere ID. This could be used by other organizations to access your [place] medical records  [id number]        Your  "Vitals Were     Pulse Temperature Height Breastfeeding? BMI (Body Mass Index)       100 97.2  F (36.2  C) (Tympanic) 5' 6.73\" (1.695 m) No 31.77 kg/m2        Blood Pressure from Last 3 Encounters:   07/13/18 130/80   05/23/18 110/76   05/01/18 124/82    Weight from Last 3 Encounters:   07/13/18 201 lb 3.2 oz (91.3 kg)   05/23/18 199 lb (90.3 kg)   05/01/18 200 lb (90.7 kg)              Today, you had the following     No orders found for display         Today's Medication Changes          These changes are accurate as of 7/13/18 12:02 PM.  If you have any questions, ask your nurse or doctor.               Start taking these medicines.        Dose/Directions    doxycycline 100 MG capsule   Commonly known as:  VIBRAMYCIN   Used for:  Sinus pressure, Acute non-recurrent maxillary sinusitis   Started by:  Coral Duckworth NP        Dose:  100 mg   Take 1 capsule (100 mg) by mouth 2 times daily for 7 days   Quantity:  14 capsule   Refills:  0            Where to get your medicines      These medications were sent to [facility] [address] [facility] - [place], [place] - [address]  [address] [facility] [address]     Phone:  [phone]     doxycycline 100 MG capsule                Primary Care Provider Office Phone # Fax #    Krystin Vy Black [phone]       [address] [facility] [address]        Equal Access to Services     [facility] Campus[facility] AH: Hadii li ku hadasho Soomaali, waaxda luqadaha, qaybta kaalmada adeegyada, job [place]. So [facility] [phone].    ATENCIÓN: Si habla español, tiene a egan disposición servicios gratuitos de asistencia lingüística. Llame al [phone].    We comply with applicable federal civil rights laws and [place] laws. We do not discriminate on the basis of race, color, national origin, age, disability, sex, sexual orientation, or gender identity.            Thank you!     Thank you for choosing [facility] AND " [facility]  for your care. Our goal is always to provide you with excellent care. Hearing back from our patients is one way we can continue to improve our services. Please take a few minutes to complete the written survey that you may receive in the mail after your visit with us. Thank you!             Your Updated Medication List - Protect others around you: Learn how to safely use, store and throw away your medicines at www.disposemymeds.org.          This list is accurate as of 7/13/18 12:02 PM.  Always use your most recent med list.                   Brand Name Dispense Instructions for use Diagnosis    acetaminophen 500 MG tablet    TYLENOL     Take 1,000 mg by mouth        doxycycline 100 MG capsule    VIBRAMYCIN    14 capsule    Take 1 capsule (100 mg) by mouth 2 times daily for 7 days    Sinus pressure, Acute non-recurrent maxillary sinusitis       warfarin 2 MG tablet    COUMADIN    396 tablet    TAKE 10 mg x 2 days/week, and 8 mg x 5 days/week  or as directed by protime clinic    Arm DVT (deep venous thromboembolism), acute, left (H), Long-term (current) use of anticoagulants

## 2018-07-13 NOTE — TELEPHONE ENCOUNTER
Patient would like to discuss recent antibiotic she is on and how it would effect her warfrin. Please call her back in regards to this. Thank You!

## 2018-07-13 NOTE — PROGRESS NOTES
HPI:    Daniela Ladd is a 47 year old female  who presents to clinic today for sinus concerns.    Started with sore throat 6 days ago, lasted 1 day.   Head congestion and cough for the past 6 days and continues to worsen.  Entire face feels congested and plugged and with pressure, not able to get any nasal drainage out. Mild ear pressure.   Pressure into teeth.  Cough has been non productive.  This morning was able to cough up a little phlegm.  Chest feels congested.  Chest heaviness like a weight on chest.  No chest pain or palpitations (hx of PVCs but denies any current or recent symptoms).  No pain with deep breathing or coughing.  No shortness of breath.  Low grade fever initially, none since.  Having hot flashes - attributes to perimenopause.    No change in appetite.  Minimal energy, low.      Daily smoker but has been trying to quit for a while.    Taking Mucinex, last dose was yesterday morning, doesn't seem to be helping.  Taking Tylenol.       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        Past Medical History:   Diagnosis Date     Abdominal pain     2010     Contact dermatitis     Atrophic dermatitis     Hidradenitis suppurativa     2010     Major depressive disorder, single episode     ,Situational related to marital discord     Nicotine dependence, uncomplicated     2010     Other acne     Facial acne     Personal history of other medical treatment (CODE)     10/00, III, para 1-0-2-1, vaginal delivery , two spontaneous first trimester miscarriage     Personal history of other medical treatment (CODE)     S/P cryotherapy Aug 2003.  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/25/2013     Past Surgical History:   Procedure Laterality Date     ANKLE SURGERY      ,[facility]     EXTRACTION(S) DENTAL      No Comments  "Provided     LAPAROSCOPIC TUBAL LIGATION      08/2004     SALPINGO-OOPHORECTOMY BILATERAL      4/14/11,Planned Lap RSO     TONSILLECTOMY      1980s     Social History   Substance Use Topics     Smoking status: Current Every Day Smoker     Packs/day: 0.75     Years: 30.00     Types: Cigarettes     Smokeless tobacco: Never Used     Alcohol use No     Current Outpatient Prescriptions   Medication Sig Dispense Refill     acetaminophen (TYLENOL) 500 MG tablet Take 1,000 mg by mouth       warfarin (COUMADIN) 2 MG tablet TAKE 10 mg x 2 days/week, and 8 mg x 5 days/week  or [facility] 0     Allergies   Allergen Reactions     Amoxicillin-Pot Clavulanate Nausea     Cyclobenzaprine Rash     No Clinical Screening - See Comments Rash     Nicoderm patch         Past medical history, past surgical history, current medications and allergies reviewed and accurate to the best of my knowledge.        ROS:  Refer to HPI    /80 (BP Location: Right arm, Patient Position: Sitting, Cuff Size: Adult Regular)  Pulse 100  Temp 97.2  F (36.2  C) (Tympanic)  Ht 5' 6.73\" (1.695 m)  Wt 201 lb 3.2 oz (91.3 kg)  Breastfeeding? No  BMI 31.77 kg/m2    EXAM:  General Appearance: Well appearing adult female, appropriate appearance for age. No acute distress  Head: normocephalic, atraumatic  Ears: Left TM grey, translucent with bony landmarks appreciated, no erythema, no effusion, no bulging, no purulence.  Right TM grey, translucent with bony landmarks appreciated, no erythema, no effusion, no bulging, no purulence.  Left auditory canal clear.  Right auditory canal clear.  Normal external ears, non tender.  Eyes: conjunctivae normal without erythema or irritation, no drainage or crusting, no eyelid swelling, pupils equal   Orophayrnx: moist mucous membranes, posterior pharynx without erythema, tonsils surgically absent, mild post nasal drip seen, no trismus.    Sinuses:  generalized sinus tenderness upon " palpation of the frontal and maxillary sinuses  Nose:  Bilateral nares: mild erythema, no edema, no drainage or congestion   Neck: supple without adenopathy  Respiratory: normal chest wall and respirations.  Normal effort.  Clear to auscultation bilaterally, no wheezing, crackles or rhonchi.  No increased work of breathing.  No cough appreciated.   Cardiac: RRR with no murmurs  Musculoskeletal:  Normal gait.  Equal movement of bilateral upper extremities.  Equal movement of bilateral lower extremities.    Psychological: normal affect, alert and pleasant          ASSESSMENT/PLAN:    ICD-10-CM    1. Acute non-recurrent maxillary sinusitis J01.00 doxycycline (VIBRAMYCIN) 100 MG capsule   2. Sinus pressure J34.89 doxycycline (VIBRAMYCIN) 100 MG capsule   3. Non-productive cough R05          Discussed treating sinus pressure and inflammation first with short course of steroid but patient is not interested.  Discussed antibiotic stewardship and interaction with warfarin requiring extra monitoring of INR values.  Patient still wants to proceed with course of antibiotics.    Doxycycline 100 mg BID x 7 days (allergy to Amoxicillin/Augmentin and Cefdinir is higher risk of interaction with warfarin than Doxycycline).    Encouraged fluids  Symptomatic treatment - salt water gargles, honey, elevation, humidifier, sinus rinse/netti pot, saline nasal spray, OTC Flonase or Nasacort spray, lozenges, etc     Tylenol PRN    Mucinex PRN    Discussed warning signs/symptoms indicative of need to f/u    Follow up if symptoms persist or worsen or concerns

## 2018-07-15 ENCOUNTER — TRANSFERRED RECORDS (OUTPATIENT)
Dept: HEALTH INFORMATION MANAGEMENT | Facility: OTHER | Age: 48
End: 2018-07-15

## 2018-07-17 ENCOUNTER — ANTICOAGULATION THERAPY VISIT (OUTPATIENT)
Dept: ANTICOAGULATION | Facility: OTHER | Age: 48
End: 2018-07-17
Attending: FAMILY MEDICINE
Payer: COMMERCIAL

## 2018-07-17 DIAGNOSIS — D68.51 FACTOR 5 LEIDEN MUTATION, HETEROZYGOUS (H): ICD-10-CM

## 2018-07-17 DIAGNOSIS — Z79.01 LONG-TERM (CURRENT) USE OF ANTICOAGULANTS: ICD-10-CM

## 2018-07-17 DIAGNOSIS — I82.602 BLOOD CLOT OF VEIN IN SHOULDER AREA, LEFT: ICD-10-CM

## 2018-07-17 LAB — INR POINT OF CARE: 4 (ref 0.86–1.14)

## 2018-07-17 PROCEDURE — 85610 PROTHROMBIN TIME: CPT | Mod: QW

## 2018-07-17 PROCEDURE — 36416 COLLJ CAPILLARY BLOOD SPEC: CPT

## 2018-07-17 PROCEDURE — 99207 ZZC NO CHARGE NURSE ONLY: CPT

## 2018-07-17 NOTE — MR AVS SNAPSHOT
Daniela Ladd   7/17/2018 7:45 AM   Anticoagulation Therapy Visit    Description:  47 year old female   Provider:  [facility]   Department:  [facility]           INR as of 7/17/2018     Today's INR 4.0!      Anticoagulation Summary as of 7/17/2018     INR goal 2.5-3.5   Today's INR 4.0!   Full warfarin instructions 8 mg every day   Next INR check 7/26/2018    Indications   Blood clot of vein in shoulder area  left [I82.602]  Factor 5 Leiden mutation  heterozygous (H) [D68.51]  Long-term (current) use of anticoagulants [Z79.01] [Z79.01]         Description     Take 8 mg daily and recheck on 7/26/18. Yoly Crowe RN    7/17/2018  7:49 AM        July 2018 Details    Sun Mon Tue Wed Thu Fri Sat     1               2               3               4               5               6               7                 8               9               10               11               12               13               14                 15               16               17      8 mg   See details      18      8 mg         19      8 mg         20      8 mg         21      8 mg           22      8 mg         23      8 mg         24      8 mg         25      8 mg         26            27               28                 29               30               31                    Date Details   07/17 This INR check       Date of next INR:  7/26/2018         How to take your warfarin dose     To take:  8 mg Take 4 of the 2 mg tablets.

## 2018-07-17 NOTE — PROGRESS NOTES
ANTICOAGULATION FOLLOW-UP CLINIC VISIT    Patient Name:  Daniela Ladd  Date:  7/17/2018  Contact Type:  Face to Face    SUBJECTIVE:     Patient Findings     Positives Antibiotic use or infection (started doxy on 7/14/18 x 7 days)           OBJECTIVE    INR Protime   Date Value Ref Range Status   07/17/2018 4.0 (A) 0.86 - 1.14 Final       ASSESSMENT / PLAN  INR assessment SUPRA    Recheck INR In: 10 DAYS    INR Location Clinic      Anticoagulation Summary as of 7/17/2018     INR goal 2.5-3.5   Today's INR 4.0!   Warfarin maintenance plan 8 mg (2 mg x 4) every day   Full warfarin instructions 8 mg every day   Weekly warfarin total 56 mg   Plan last modified Yoly Crowe, RN (7/17/2018)   Next INR check 7/26/2018   Priority INR   Target end date Indefinite    Indications   Blood clot of vein in shoulder area  left [I82.602]  Factor 5 Leiden mutation  heterozygous (H) [D68.51]  Long-term (current) use of anticoagulants [Z79.01] [Z79.01]         Anticoagulation Episode Summary     INR check location     Preferred lab     Send INR reminders to  INR    Comments INR goal changed per Dr Kitchen 3/21/18 he wants INR checked q 2 weeks        Anticoagulation Care Providers     Provider Role Specialty Phone number    Krystin Black MD [facility] [phone]            See the Encounter Report to view Anticoagulation Flowsheet and Dosing Calendar (Go to Encounters tab in chart review, and find the Anticoagulation Therapy Visit)        Yoly Crowe, RN

## 2018-07-23 ENCOUNTER — TRANSFERRED RECORDS (OUTPATIENT)
Dept: HEALTH INFORMATION MANAGEMENT | Facility: OTHER | Age: 48
End: 2018-07-23

## 2018-07-23 NOTE — PROGRESS NOTES
Patient Information     Patient Name  Daniela Ladd MRN  [id number] Sex  Female   1970      Letter by Pro Vasquez MD at      Author:  Pro Vasquez MD Service:  (none) Author Type:  (none)    Filed:   Date of Service:   Status:  (Other)       [facility]  [address]  [facility] [address]  [phone]         Daniela Ladd   [address]  [facility] [address]      2017  Date of Breast Imagin2017 10:40 AM    Dear Ms. Ladd:  We are pleased to inform you that the result of your recent breast imaging examination is normal/benign (not cancer). A report of your results was sent to your health care provider(s).    Your mammogram shows that your breast tissue is dense.  Dense breast tissue is relatively common and is found in more than 50 percent of women. Dense breast tissue may be associated with a slight increased risk of breast cancer and may make cancer more difficult to detect by mammogram. However, the actual risk of breast cancer for women with dense breast tissue is still low. This information is given to you to raise your own awareness and help you talk with your primary care provider. Together, you can decide which screening options are right for you.     Your images will become part of your medical file here at [facility] and will be available for your continuing care. You are responsible for informing any new health care provider or breast imaging facility of the date and location of this examination.    Mammography remains the gold standard and is the most accurate method for early detection. Mammograms are the only medical imaging test shown to reduce breast cancer deaths. Not all cancers are found through mammography. If you notice any new changes in your breast(s) please inform your healthcare provider.     Thank you for choosing [facility] to participate in your healthcare needs.      [facility] Recommendations for Early Breast Cancer Detection   in Women without Symptoms  When to start having mammograms to screen for breast cancer, and how often to have them, is a personal decision. It should be based on your preferences, your values and your risk for developing breast cancer. [facility] recommends that you and your health care provider together determine when mammograms are right for you.    [facility] recommends the following guidelines for women who have an average risk for breast cancer, based on American Cancer Society guidelines:    Age 40 to 44: Mammograms are optional.     Age 45 to 54: Have a mammogram every year.           Age 55 and older: Have a mammogram every year, or transition to having one every 2 years. Continue to have mammograms as long as your health is good.  If you have a higher than average risk for breast cancer, your health care provider may recommend a different schedule.

## 2018-07-26 ENCOUNTER — ANTICOAGULATION THERAPY VISIT (OUTPATIENT)
Dept: ANTICOAGULATION | Facility: OTHER | Age: 48
End: 2018-07-26
Attending: FAMILY MEDICINE
Payer: COMMERCIAL

## 2018-07-26 DIAGNOSIS — Z79.01 LONG-TERM (CURRENT) USE OF ANTICOAGULANTS: ICD-10-CM

## 2018-07-26 DIAGNOSIS — D68.51 FACTOR 5 LEIDEN MUTATION, HETEROZYGOUS (H): ICD-10-CM

## 2018-07-26 DIAGNOSIS — I82.602 BLOOD CLOT OF VEIN IN SHOULDER AREA, LEFT: ICD-10-CM

## 2018-07-26 LAB — INR POINT OF CARE: 2.9 (ref 0.86–1.14)

## 2018-07-26 PROCEDURE — 99207 ZZC NO CHARGE NURSE ONLY: CPT

## 2018-07-26 PROCEDURE — 85610 PROTHROMBIN TIME: CPT | Mod: QW

## 2018-07-26 PROCEDURE — 36416 COLLJ CAPILLARY BLOOD SPEC: CPT

## 2018-07-26 NOTE — PROGRESS NOTES
ANTICOAGULATION FOLLOW-UP CLINIC VISIT    Patient Name:  Daniela Ladd  Date:  7/26/2018  Contact Type:  Face to Face    SUBJECTIVE:     Patient Findings     Positives No Problem Findings           OBJECTIVE    INR Protime   Date Value Ref Range Status   07/26/2018 2.9 (A) 0.86 - 1.14 Final       ASSESSMENT / PLAN  INR assessment THER    Recheck INR In: 2 WEEKS    INR Location Clinic      Anticoagulation Summary as of 7/26/2018     INR goal 2.5-3.5   Today's INR 2.9   Warfarin maintenance plan 8 mg (2 mg x 4) every day   Full warfarin instructions 8 mg every day   Weekly warfarin total 56 mg   No change documented Yoly Crowe RN   Plan last modified Yoly Crowe RN (7/17/2018)   Next INR check 8/9/2018   Priority INR   Target end date Indefinite    Indications   Blood clot of vein in shoulder area  left [I82.602]  Factor 5 Leiden mutation  heterozygous (H) [D68.51]  Long-term (current) use of anticoagulants [Z79.01] [Z79.01]         Anticoagulation Episode Summary     INR check location     Preferred lab     Send INR reminders to  INR    Comments INR goal changed per Dr Kitchen 3/21/18 he wants INR checked q 2 weeks        Anticoagulation Care Providers     Provider Role Specialty Phone number    Krystin Black MD [facility] [phone]            See the Encounter Report to view Anticoagulation Flowsheet and Dosing Calendar (Go to Encounters tab in chart review, and find the Anticoagulation Therapy Visit)        Yoly Crowe, RN

## 2018-07-26 NOTE — MR AVS SNAPSHOT
Daniela Ladd   7/26/2018 8:15 AM   Anticoagulation Therapy Visit    Description:  47 year old female   Provider:  [facility]   Department:  [facility]           INR as of 7/26/2018     Today's INR 2.9      Anticoagulation Summary as of 7/26/2018     INR goal 2.5-3.5   Today's INR 2.9   Full warfarin instructions 8 mg every day   Next INR check 8/9/2018    Indications   Blood clot of vein in shoulder area  left [I82.602]  Factor 5 Leiden mutation  heterozygous (H) [D68.51]  Long-term (current) use of anticoagulants [Z79.01] [Z79.01]         Description     Continue same dose and recheck in 2-3 weeks. ...............Yoly Crowe RN    7/26/2018    7:57 AM        Your next Anticoagulation Clinic appointment(s)     Jul 26, 2018  8:15 AM CDT   Anticoagulation Visit with [facility]   [facility] ([facility])    [address]  [place] [address]   [phone]              July 2018 Details    Sun Mon Tue Wed Thu Fri Sat     1               2               3               4               5               6               7                 8               9               10               11               12               13               14                 15               16               17               18               19               20               21                 22               23               24               25               26      8 mg   See details      27      8 mg         28      8 mg           29      8 mg         30      8 mg         31      8 mg              Date Details   07/26 This INR check               How to take your warfarin dose     To take:  8 mg Take 4 of the 2 mg tablets.           August 2018 Details    Sun Mon Tue Wed Thu Fri Sat        1      8 mg         2      8 mg         3      8 mg         4      8 mg           5      8 mg         6      8 mg         7      8 mg         8      8 mg         9             10               11                 12               13               14               15               16               17               18                 19               20               21               22               23               24               25                 26               27               28               29               30               31                 Date Details   No additional details    Date of next INR:  8/9/2018         How to take your warfarin dose     To take:  8 mg Take 4 of the 2 mg tablets.

## 2018-08-01 ENCOUNTER — TRANSFERRED RECORDS (OUTPATIENT)
Dept: HEALTH INFORMATION MANAGEMENT | Facility: OTHER | Age: 48
End: 2018-08-01

## 2018-08-03 ENCOUNTER — ANTICOAGULATION THERAPY VISIT (OUTPATIENT)
Dept: FAMILY MEDICINE | Facility: OTHER | Age: 48
End: 2018-08-03

## 2018-08-03 ENCOUNTER — TELEPHONE (OUTPATIENT)
Dept: FAMILY MEDICINE | Facility: OTHER | Age: 48
End: 2018-08-03

## 2018-08-03 ENCOUNTER — APPOINTMENT (OUTPATIENT)
Dept: CT IMAGING | Facility: OTHER | Age: 48
End: 2018-08-03
Attending: PHYSICIAN ASSISTANT
Payer: COMMERCIAL

## 2018-08-03 ENCOUNTER — APPOINTMENT (OUTPATIENT)
Dept: ULTRASOUND IMAGING | Facility: OTHER | Age: 48
End: 2018-08-03
Attending: PHYSICIAN ASSISTANT
Payer: COMMERCIAL

## 2018-08-03 ENCOUNTER — HOSPITAL ENCOUNTER (EMERGENCY)
Facility: OTHER | Age: 48
Discharge: HOME | End: 2018-08-03
Attending: PHYSICIAN ASSISTANT | Admitting: PHYSICIAN ASSISTANT
Payer: COMMERCIAL

## 2018-08-03 VITALS
HEART RATE: 90 BPM | BODY MASS INDEX: 31.39 KG/M2 | HEIGHT: 67 IN | TEMPERATURE: 98.2 F | DIASTOLIC BLOOD PRESSURE: 91 MMHG | RESPIRATION RATE: 20 BRPM | SYSTOLIC BLOOD PRESSURE: 120 MMHG | WEIGHT: 200 LBS | OXYGEN SATURATION: 99 %

## 2018-08-03 DIAGNOSIS — D68.51 FACTOR 5 LEIDEN MUTATION, HETEROZYGOUS (H): ICD-10-CM

## 2018-08-03 DIAGNOSIS — I82.602 BLOOD CLOT OF VEIN IN SHOULDER AREA, LEFT: ICD-10-CM

## 2018-08-03 DIAGNOSIS — N94.89 VAGINAL CRAMPING: ICD-10-CM

## 2018-08-03 DIAGNOSIS — Z79.01 LONG-TERM (CURRENT) USE OF ANTICOAGULANTS: ICD-10-CM

## 2018-08-03 DIAGNOSIS — I82.622 ARM DVT (DEEP VENOUS THROMBOEMBOLISM), ACUTE, LEFT (H): ICD-10-CM

## 2018-08-03 DIAGNOSIS — N92.0 MENORRHAGIA WITH REGULAR CYCLE: ICD-10-CM

## 2018-08-03 LAB
ABO + RH BLD: NORMAL
ABO + RH BLD: NORMAL
ALBUMIN SERPL-MCNC: 4.1 G/DL (ref 3.5–5.7)
ALBUMIN UR-MCNC: NEGATIVE MG/DL
ALP SERPL-CCNC: 51 U/L (ref 34–104)
ALT SERPL W P-5'-P-CCNC: 16 U/L (ref 7–52)
ANION GAP SERPL CALCULATED.3IONS-SCNC: 7 MMOL/L (ref 3–14)
APPEARANCE UR: CLEAR
AST SERPL W P-5'-P-CCNC: 15 U/L (ref 13–39)
B-HCG SERPL-ACNC: <1 IU/L
BACTERIA #/AREA URNS HPF: ABNORMAL /HPF
BASOPHILS # BLD AUTO: 0 10E9/L (ref 0–0.2)
BASOPHILS NFR BLD AUTO: 0.4 %
BILIRUB SERPL-MCNC: 0.3 MG/DL (ref 0.3–1)
BILIRUB UR QL STRIP: NEGATIVE
BLD GP AB SCN SERPL QL: NORMAL
BLOOD BANK CMNT PATIENT-IMP: NORMAL
BUN SERPL-MCNC: 8 MG/DL (ref 7–25)
CALCIUM SERPL-MCNC: 9.2 MG/DL (ref 8.6–10.3)
CHLORIDE SERPL-SCNC: 103 MMOL/L (ref 98–107)
CO2 SERPL-SCNC: 26 MMOL/L (ref 21–31)
COLOR UR AUTO: YELLOW
CREAT SERPL-MCNC: 0.73 MG/DL (ref 0.6–1.2)
DIFFERENTIAL METHOD BLD: NORMAL
EOSINOPHIL # BLD AUTO: 0.1 10E9/L (ref 0–0.7)
EOSINOPHIL NFR BLD AUTO: 0.9 %
ERYTHROCYTE [DISTWIDTH] IN BLOOD BY AUTOMATED COUNT: 12.6 % (ref 10–15)
GFR SERPL CREATININE-BSD FRML MDRD: 86 ML/MIN/1.7M2
GLUCOSE SERPL-MCNC: 117 MG/DL (ref 70–105)
GLUCOSE UR STRIP-MCNC: NEGATIVE MG/DL
HCG UR QL: NEGATIVE
HCT VFR BLD AUTO: 44.5 % (ref 35–47)
HGB BLD-MCNC: 15.1 G/DL (ref 11.7–15.7)
HGB UR QL STRIP: ABNORMAL
IMM GRANULOCYTES # BLD: 0 10E9/L (ref 0–0.4)
IMM GRANULOCYTES NFR BLD: 0.3 %
INR PPP: 2.25 (ref 0–1.3)
KETONES UR STRIP-MCNC: NEGATIVE MG/DL
LEUKOCYTE ESTERASE UR QL STRIP: NEGATIVE
LYMPHOCYTES # BLD AUTO: 2.7 10E9/L (ref 0.8–5.3)
LYMPHOCYTES NFR BLD AUTO: 25.6 %
MCH RBC QN AUTO: 30.4 PG (ref 26.5–33)
MCHC RBC AUTO-ENTMCNC: 33.9 G/DL (ref 31.5–36.5)
MCV RBC AUTO: 90 FL (ref 78–100)
MONOCYTES # BLD AUTO: 0.4 10E9/L (ref 0–1.3)
MONOCYTES NFR BLD AUTO: 3.6 %
MUCOUS THREADS #/AREA URNS LPF: PRESENT /LPF
NEUTROPHILS # BLD AUTO: 7.2 10E9/L (ref 1.6–8.3)
NEUTROPHILS NFR BLD AUTO: 69.2 %
NITRATE UR QL: NEGATIVE
PH UR STRIP: 6 PH (ref 5–9)
PLATELET # BLD AUTO: 350 10E9/L (ref 150–450)
POTASSIUM SERPL-SCNC: 3.8 MMOL/L (ref 3.5–5.1)
PROT SERPL-MCNC: 7.2 G/DL (ref 6.4–8.9)
RBC # BLD AUTO: 4.96 10E12/L (ref 3.8–5.2)
RBC #/AREA URNS AUTO: ABNORMAL /HPF
SODIUM SERPL-SCNC: 136 MMOL/L (ref 134–144)
SOURCE: ABNORMAL
SP GR UR STRIP: <1.005 (ref 1–1.03)
SPECIMEN EXP DATE BLD: NORMAL
UROBILINOGEN UR STRIP-ACNC: 0.2 EU/DL (ref 0.2–1)
WBC # BLD AUTO: 10.5 10E9/L (ref 4–11)
WBC #/AREA URNS AUTO: ABNORMAL /HPF

## 2018-08-03 PROCEDURE — 99285 EMERGENCY DEPT VISIT HI MDM: CPT | Mod: Z6 | Performed by: PHYSICIAN ASSISTANT

## 2018-08-03 PROCEDURE — 36415 COLL VENOUS BLD VENIPUNCTURE: CPT | Performed by: PHYSICIAN ASSISTANT

## 2018-08-03 PROCEDURE — 85610 PROTHROMBIN TIME: CPT | Performed by: PHYSICIAN ASSISTANT

## 2018-08-03 PROCEDURE — 74177 CT ABD & PELVIS W/CONTRAST: CPT

## 2018-08-03 PROCEDURE — 76856 US EXAM PELVIC COMPLETE: CPT

## 2018-08-03 PROCEDURE — 86850 RBC ANTIBODY SCREEN: CPT | Performed by: PHYSICIAN ASSISTANT

## 2018-08-03 PROCEDURE — 36415 COLL VENOUS BLD VENIPUNCTURE: CPT | Mod: 91 | Performed by: PHYSICIAN ASSISTANT

## 2018-08-03 PROCEDURE — 86901 BLOOD TYPING SEROLOGIC RH(D): CPT | Performed by: PHYSICIAN ASSISTANT

## 2018-08-03 PROCEDURE — 99285 EMERGENCY DEPT VISIT HI MDM: CPT | Mod: 25 | Performed by: PHYSICIAN ASSISTANT

## 2018-08-03 PROCEDURE — 81001 URINALYSIS AUTO W/SCOPE: CPT | Performed by: PHYSICIAN ASSISTANT

## 2018-08-03 PROCEDURE — 25000125 ZZHC RX 250: Performed by: PHYSICIAN ASSISTANT

## 2018-08-03 PROCEDURE — 81025 URINE PREGNANCY TEST: CPT | Performed by: PHYSICIAN ASSISTANT

## 2018-08-03 PROCEDURE — 96374 THER/PROPH/DIAG INJ IV PUSH: CPT | Performed by: PHYSICIAN ASSISTANT

## 2018-08-03 PROCEDURE — 86900 BLOOD TYPING SEROLOGIC ABO: CPT | Performed by: PHYSICIAN ASSISTANT

## 2018-08-03 PROCEDURE — 84702 CHORIONIC GONADOTROPIN TEST: CPT | Performed by: PHYSICIAN ASSISTANT

## 2018-08-03 PROCEDURE — 80053 COMPREHEN METABOLIC PANEL: CPT | Performed by: PHYSICIAN ASSISTANT

## 2018-08-03 PROCEDURE — 85025 COMPLETE CBC W/AUTO DIFF WBC: CPT | Performed by: PHYSICIAN ASSISTANT

## 2018-08-03 PROCEDURE — 25000128 H RX IP 250 OP 636: Performed by: PHYSICIAN ASSISTANT

## 2018-08-03 RX ORDER — FENTANYL CITRATE 50 UG/ML
25 INJECTION, SOLUTION INTRAMUSCULAR; INTRAVENOUS
Status: DISCONTINUED | OUTPATIENT
Start: 2018-08-03 | End: 2018-08-03 | Stop reason: HOSPADM

## 2018-08-03 RX ORDER — SODIUM CHLORIDE 9 MG/ML
1000 INJECTION, SOLUTION INTRAVENOUS CONTINUOUS
Status: DISCONTINUED | OUTPATIENT
Start: 2018-08-03 | End: 2018-08-03 | Stop reason: HOSPADM

## 2018-08-03 RX ORDER — TRAMADOL HYDROCHLORIDE 50 MG/1
50 TABLET ORAL EVERY 6 HOURS PRN
Qty: 10 TABLET | Refills: 0 | Status: SHIPPED | OUTPATIENT
Start: 2018-08-03 | End: 2018-08-06

## 2018-08-03 RX ADMIN — FENTANYL CITRATE 25 MCG: 50 INJECTION INTRAMUSCULAR; INTRAVENOUS at 12:58

## 2018-08-03 RX ADMIN — IOHEXOL 100 ML: 350 INJECTION, SOLUTION INTRAVENOUS at 13:14

## 2018-08-03 RX ADMIN — SODIUM CHLORIDE 1000 ML: 900 INJECTION, SOLUTION INTRAVENOUS at 12:58

## 2018-08-03 ASSESSMENT — ENCOUNTER SYMPTOMS
NECK STIFFNESS: 0
ABDOMINAL PAIN: 1
DIFFICULTY URINATING: 0
HEADACHES: 0
FEVER: 0
COLOR CHANGE: 0
EYE REDNESS: 0
CONFUSION: 0
ARTHRALGIAS: 0
SHORTNESS OF BREATH: 0

## 2018-08-03 NOTE — ED AVS SNAPSHOT
"    [facility]: [phone]                                              INTERAGENCY TRANSFER FORM - LAB / IMAGING / EKG / EMG RESULTS   8/3/2018                    Hospital Admission Date: 8/3/2018  MIKO SANTOS   : 1970  Sex: Female        Attending Provider: Otilio De Santiago PA-C     Allergies:  Amoxicillin-pot Clavulanate, Cyclobenzaprine, No Clinical Screening - See Comments    Infection:  None   Service:  EMERGENCY ME    Ht:  1.689 m (5' 6.5\")   Wt:  90.7 kg (200 lb)   Admission Wt:  90.7 kg (200 lb)    BMI:  31.8 kg/m 2   BSA:  2.06 m 2            Patient PCP Information     Provider PCP Type    Krystin Black MD General         Lab Results - 3 Days      Urine Microscopic [[id number]] (Abnormal)  Resulted: 18 1316, Result status: Final result    Ordering provider: Otilio De Santiago PA-C  18 1302 Resulting lab: [facility]    Specimen Information    Type Source Collected On     18 1302          Components       Value Reference Range Flag Lab   WBC Urine 0 - 5 OTO5^0 - 5 /HPF  [facility]   RBC Urine 5-10 OTO2^O - 2 /HPF A [facility]   Bacteria Urine Few NEG^Negative /HPF A [facility]   Mucous Urine Present NEG^Negative /LPF A [facility]            *UA reflex to Microscopic [[id number]] (Abnormal)  Resulted: 18 1315, Result status: Final result    Ordering provider: Otilio De Santiago PA-C  18 1126 Resulting lab: [facility]    Specimen Information    Type Source Collected On   Midstream Urine Urine Midstream 18 1302          Components       Value Reference Range Flag Lab   Color Urine Yellow   [facility]   Appearance Urine Clear   [facility]   Glucose Urine Negative NEG^Negative mg/dL  [facility]   Bilirubin Urine Negative NEG^Negative  [facility]   Ketones Urine Negative NEG^Negative mg/dL  [facility]   Specific Gravity Urine <1.005 1.000 - 1.030  [facility]   Blood Urine Large " NEG^Negative A [facility]   pH Urine 6.0 5.0 - 9.0 pH  [facility]   Protein Albumin Urine Negative NEG^Negative mg/dL  [facility]   Urobilinogen Urine 0.2 0.2 - 1.0 EU/dL  [facility]   Nitrite Urine Negative NEG^Negative  [facility]   Leukocyte Esterase Urine Negative NEG^Negative  [facility]   Source Midstream Urine   [facility]            HCG qualitative urine (UPT) [[id number]]  Resulted: 08/03/18 1313, Result status: Final result    Ordering provider: Otilio De Santiago PA-C  08/03/18 1126 Resulting lab: [facility]    Specimen Information    Type Source Collected On   Urine Urine Midstream 08/03/18 1302          Components       Value Reference Range Flag Lab   HCG Qual Urine Negative NEG^Negative  [facility]   Comment:         This test is for screening purposes.  Results should be interpreted along with   the clinical picture.  Confirmation testing is available if warranted by   ordering [id number], HCG Quantitative Pregnancy.              HCG quantitative pregnancy (blood) [[id number]]  Resulted: 08/03/18 1244, Result status: Final result    Ordering provider: Otilio De Santiago PA-C  08/03/18 1216 Resulting lab: [facility]    Specimen Information    Type Source Collected On   Blood  08/03/18 1221          Components       Value Reference Range Flag Lab   HCG Quantitative Serum <1 IU/L  [facility]   Comment:         Comment:  Non-Pregnant      0 - 5                                     FOR GESTATIONAL ASSESSMENT-See table below                                     Approx. Gest. Age   Approx. HCG range                                     1-2 weeks            mIu/mL  2-3 weeks           100-5000 mIu/mL  3-4 weeks           500-59855 mIu/mL  4-5 weeks           1000-11140 mIu/mL  5-6 weeks           13301-199579 mIu/mL  6-8 weeks           64103-459792 mIu/mL  8-12 weeks          30814-002820 mIu/mL              Comprehensive metabolic panel [[id number]] (Abnormal)   Resulted: 08/03/18 1218, Result status: Final result    Ordering provider: Otilio De Santiago PA-C  08/03/18 1126 Resulting lab: [facility]    Specimen Information    Type Source Collected On   Blood  08/03/18 1149          Components       Value Reference Range Flag Lab   Sodium 136 134 - 144 mmol/L  [facility]   Potassium 3.8 3.5 - 5.1 mmol/L  [facility]   Chloride 103 98 - 107 mmol/L  [facility]   Carbon Dioxide 26 21 - 31 mmol/L  [facility]   Anion Gap 7 3 - 14 mmol/L  [facility]   Glucose 117 70 - 105 mg/dL H [facility]   Urea Nitrogen 8 7 - 25 mg/dL  [facility]   Creatinine 0.73 0.60 - 1.20 mg/dL  [facility]   GFR Estimate 86 >60 mL/min/1.7m2  [facility]   GFR Estimate If Black >90 >60 mL/min/1.7m2  [facility]   Calcium 9.2 8.6 - 10.3 mg/dL  [facility]   Bilirubin Total 0.3 0.3 - 1.0 mg/dL  [facility]   Albumin 4.1 3.5 - 5.7 g/dL  [facility]   Protein Total 7.2 6.4 - 8.9 g/dL  [facility]   Alkaline Phosphatase 51 34 - 104 U/L  [facility]   ALT 16 7 - 52 U/L  [facility]   AST 15 13 - 39 U/L  [facility]            INR [[id number]] (Abnormal)  Resulted: 08/03/18 1204, Result status: Final result    Ordering provider: Otilio De Santiago PA-C  08/03/18 1126 Resulting lab: [facility]    Specimen Information    Type Source Collected On   Blood  08/03/18 1149          Components       Value Reference Range Flag Lab   INR 2.25 0 - 1.3 H [facility]            CBC with platelets differential [[id number]]  Resulted: 08/03/18 1157, Result status: Final result    Ordering provider: Otilio De Santiago PA-C  08/03/18 1126 Resulting lab: [facility]    Specimen Information    Type Source Collected On   Blood  08/03/18 1149          Components       Value Reference Range Flag Lab   WBC 10.5 4.0 - 11.0 10e9/L  [facility]   RBC Count 4.96 3.8 - 5.2 10e12/L  [facility]   Hemoglobin 15.1 11.7 - 15.7 g/dL  [facility]   Hematocrit 44.5 35.0 - 47.0 %  [facility]   MCV 90  78 - 100 fl  [facility]   MCH 30.4 26.5 - 33.0 pg  [facility]   MCHC 33.9 31.5 - 36.5 g/dL  [facility]   RDW 12.6 10.0 - 15.0 %  [facility]   Platelet Count 350 150 - 450 10e9/L  [facility]   Diff Method Automated Method   [facility]   % Neutrophils 69.2 %  [facility]   % Lymphocytes 25.6 %  [facility]   % Monocytes 3.6 %  [facility]   % Eosinophils 0.9 %  [facility]   % Basophils 0.4 %  [facility]   % Immature Granulocytes 0.3 %  [facility]   Absolute Neutrophil 7.2 1.6 - 8.3 10e9/L  [facility]   Absolute Lymphocytes 2.7 0.8 - 5.3 10e9/L  [facility]   Absolute Monocytes 0.4 0.0 - 1.3 10e9/L  [facility]   Absolute Eosinophils 0.1 0.0 - 0.7 10e9/L  [facility]   Absolute Basophils 0.0 0.0 - 0.2 10e9/L  [facility]   Abs Immature Granulocytes 0.0 0 - 0.4 10e9/L  [facility]            Testing Performed By     Lab - Abbreviation Name Director Address Valid Date Range    1743 - [facility] Unknown [address]  [facility] [address] 08/07/15 0948 - Present            Unresulted Labs     None         Imaging Results - 3 Days      CT Abdomen Pelvis w Contrast [[id number]]  Resulted: 08/03/18 1332, Result status: Final result    Ordering provider: Otilio De Santiago PA-C  08/03/18 1249 Resulted by: Ross Reagan MD    Performed: 08/03/18 1303 - 08/03/18 1324 Resulting lab: RADIOLOGY RESULTS    Narrative:       PROCEDURE:  CT ABDOMEN PELVIS W CONTRAST    HISTORY: RLQ abdominal pain;     TECHNIQUE:  Helical CT of the abdomen and pelvis was performed  following injection of intravenous contrast.    COMPARISON:  11/20/17.    MEDS/CONTRAST: 100 ml Omnipaque 350    FINDINGS:      Limited images through the lung bases demonstrate no focal  consolidation or mass.  The heart size is normal. No pericardial or  pleural effusions are seen.    The liver demonstrates no mass or intrahepatic biliary ductal  dilatation.  The gallbladder, spleen, pancreas and adrenal glands are  unremarkable.   Symmetric nephrograms are present without evidence of  hydronephrosis. There is no abdominal aortic aneurysm.    The bowel is normal in caliber. The appendix is normal.    No free fluid, free air or adenopathy is present.  No suspicious  osseous lesions are identified.      Impression:       IMPRESSION:      Negative stable CT of the abdomen and pelvis.    NICKO VILLAREAL MD      US Pelvic Complete w Transvaginal [[id number]]  Resulted: 08/03/18 1219, Result status: Final result    Ordering provider: Otilio De Santiago PA-C  08/03/18 1133 Resulted by: Flip Garrett MD    Performed: 08/03/18 1141 - 08/03/18 1217 Resulting lab: RADIOLOGY RESULTS    Narrative:         PROCEDURE: US PELVIC COMPLETE WITH TRANSVAGINAL    HISTORY:  pelvic pain, RLQ abdominal pain and vaginal bleeding; .     TECHNIQUE: Transabdominal and transvaginal ultrasound of the pelvis.    COMPARISON: CT abdomen and pelvis 11/20/2018    FINDINGS:     Uterus: Unremarkable .  Endometrium: 5 mm.    Right ovary: Surgically absent    Left ovary: Unremarkable  Left ovary size: 2.7 x 2.2 x 2.5 cm    Trace free fluid is seen.      Impression:       IMPRESSION: Unremarkable pelvic ultrasound. Surgically absent right  ovary.    FLIP GARRETT MD      Testing Performed By     Lab - Abbreviation Name Director Address Valid Date Range    104 - Rad [facility]ts RADIOLOGY RESULTS Unknown Unknown 02/16/05 1553 - Present            Encounter-Level Documents:     There are no encounter-level documents.      Order-Level Documents:     There are no order-level documents.

## 2018-08-03 NOTE — ED AVS SNAPSHOT
[facility]    [address]    [place] [address]    Phone:  [phone]    Fax:  [phone]                                       Daniela Ladd   MRN: [id number]    Department:  [facility]   Date of Visit:  8/3/2018           After Visit Summary Signature Page     I have received my discharge instructions, and my questions have been answered. I have discussed any challenges I see with this plan with the nurse or doctor.    ..........................................................................................................................................  Patient/Patient Representative Signature      ..........................................................................................................................................  Patient Representative Print Name and Relationship to Patient    ..................................................               ................................................  Date                                            Time    ..........................................................................................................................................  Reviewed by Signature/Title    ...................................................              ..............................................  Date                                                            Time

## 2018-08-03 NOTE — DISCHARGE INSTRUCTIONS
Understanding Uterine Bleeding  Your uterine bleeding may be heavy. Or you may have bleeding between periods. These problems may be caused by hormonal imbalance. Or they can be caused by uterine growths, an intrauterine device (IUD), bleeding disorder, or pregnancy.  Hormonal imbalance  Your menstrual cycle is controlled by hormones. The hormones include estrogen and progesterone. Sometimes there is too much or too little of one or both of these hormones, causing an imbalance. This can cause heavy periods. Or it can cause bleeding between periods. Causes of hormonal imbalance can include:    Hormonal changes in teens and in women nearing menopause    Diabetes, thyroid disease, or other medical problems    Obesity    Stress    Strenuous exercise    Anorexia, an eating disorder    Pregnancy  Uterine growths  There are different kinds of uterine growths. These include:    Fibroids. These are round knots of noncancer (benign) muscle tissue in the uterus.    Polyps. These are soft tissue growths in the uterine lining. They often extend into the uterus.    Adenomyosis. This is when the uterine lining grows into the muscle wall.    Hyperplasia. This is when the uterine lining gets too thick or grows too much.    Endometrial cancer. This is uncontrolled growth of part of the uterine lining.  Other causes of uterine bleeding  There are other causes of uterine bleeding. These include:    IUD (intrauterine device). This is a method of birth control. Some IUDs contain hormones.    Bleeding disorders. This is when the blood can't clot properly.  Treatment  Your healthcare provider can help diagnose the cause of your bleeding problem. He or she will work with you to plan treatment as needed.  Date Last Reviewed: 6/1/2017 2000-2017 [employer]. [address] [place] [address], [place] [address]. All rights reserved. This information is not intended as a substitute for professional medical care. Always follow your  healthcare professional's instructions.          Heavy Menstrual Bleeding    Heavy menstrual bleeding means that your periods are heavier or longer than usual. You may soak through a pad or tampon every 1 to 3 hours on the heaviest days of your period. You may also pass large, dark clots. And your periods may last longer than 7 days.  If you have heavy periods often, this can cause a problem called anemia. With anemia, your red blood cell count is too low. Red blood cells are needed because they help carry oxygen throughout your body. Severe anemia may cause you to look pale and feel weak or tired. You might also become short of breath easily.  There are many possible causes of heavy menstrual bleeding. Hormonal imbalance is the most common cause. Having benign growths in your uterus, such as fibroids or polyps, is another cause. Taking certain medicines or having certain health problems or bleeding disorders are also causes.  To treat heavy menstrual bleeding, your healthcare provider may prescribe medicines first. If these don t help, you may need further testing and treatments.  Home care  Medicines  If you re prescribed medicines, be sure to take them as directed.    To help control heavy bleeding, any of the following may be used:  ? Hormone therapy (this includes all methods of hormonal birth control such as pills, shots, cream, ring, patch, or hormone-releasing IUD)  ? Nonsteroidal anti-inflammatory drugs (NSAIDs), such as ibuprofen  ? Antifibrinolytic medicines, such as transexamic acid    To help treat anemia, iron supplements may be prescribed.            General care    Get plenty of rest if you tire easily. Avoid heavy exertion.    To help relieve pain or cramping, try using a heating pad on the lower belly or back. A warm bath may also help.  Follow-up care  Follow up with your healthcare provider, or as advised.  When to seek medical advice  Call your healthcare provider right away if any of these  occur:    Heavier bleeding (soaking 1 pad or tampon every hour for 3 hours)    Heavy bleeding that lasts longer than 1 week    Fever of 100.4 F (38 C) or higher, or as directed by your provider    Pain or cramping that gets worse instead of better    Signs of anemia such as pale skin, extreme fatigue or weakness, or shortness of breath    Dizziness or fainting  Date Last Reviewed: 10/1/2017    8395-3117 [employer]. [address] [place] [address]. All rights reserved. This information is not intended as a substitute for professional medical care. Always follow your healthcare professional's instructions.

## 2018-08-03 NOTE — ED PROVIDER NOTES
History   No chief complaint on file.    HPI Comments: This is a 48-year-old female who has had increasing vaginal bleeding as well as pain since Tuesday (3 Days Ago).  The patient does have a history of blood clots and she has a filter in her subclavian due to a factor V Leiden.  Currently on Coumadin for anticoagulation.  He does have a history of her right ovary being excised as well.  She does have a left ovary.  She has been having some right lower quadrant pain as well as vaginal cramping.  She states it hurts to walk cannot stand straight up without pain, and the only way she gets relief is to lie flat.  Did check her INR at home today and was 2.7.  Otherwise she has had endometriosis in the past.    The history is provided by the patient.         Problem List:    Patient Active Problem List    Diagnosis Date Noted     Pulmonary nodules 03/21/2018     Priority: Medium     CT finding       Long-term (current) use of anticoagulants [Z79.01] 03/02/2018     Priority: Medium     Other acne 02/14/2018     Priority: Medium     Overview:   Facial acne       Factor 5 Leiden mutation, heterozygous (H) 02/14/2018     Priority: Medium     Blood clot of vein in shoulder area, left 02/11/2018     Priority: Medium     Subclavian, basilic and axillary 11/2017       Endometriosis 02/25/2016     Priority: Medium     Migraine with aura and without status migrainosus, not intractable 02/25/2016     Priority: Medium     Dyshidrotic hand dermatitis 01/22/2015     Priority: Medium     Raynaud's syndrome 09/27/2010     Priority: Medium     Tobacco abuse 09/27/2010     Priority: Medium        Past Medical History:    Past Medical History:   Diagnosis Date     Abdominal pain      Contact dermatitis      Hidradenitis suppurativa      Major depressive disorder, single episode      Nicotine dependence, uncomplicated      Other acne      Personal history of other medical treatment (CODE)      Personal history of other medical treatment  (CODE)      Raynaud's syndrome without gangrene      Ventricular premature depolarization        Past Surgical History:    Past Surgical History:   Procedure Laterality Date     ANKLE SURGERY      ,[place]     EXTRACTION(S) DENTAL      No Comments Provided     LAPAROSCOPIC TUBAL LIGATION      2004     SALPINGO-OOPHORECTOMY BILATERAL      13,Planned Lap RSO     TONSILLECTOMY      1980s       Family History:    Family History   Problem Relation Age of Onset     Allergy (Severe) Mother      Allergies     HEART DISEASE Mother      Heart Disease     Diabetes Mother      Diabetes     Other - See Comments Mother      Peripheral vascular disese     Unknown/Adopted Father      Suspected cardiac.      HEART DISEASE Paternal Grandfather      Heart Disease,CAD     Other - See Comments Other      endometriosis     Other - See Comments Brother      by her mother/by her father - lymphedema after knee surgery     Family History Negative Brother      Good Health,by her mother/by her father     Family History Negative Son      Good Health     Cancer Maternal Grandfather      Cancer,lung cancer, dm     Breast Cancer Maternal Grandmother      Cancer-breast,CREST syndrome, Raynaud's, scleroderma,  of CHF, small veins and arteries     Diabetes Maternal Aunt      Diabetes,also has had a borderline ovarian mass (not benign or cancerous)     Thyroid Disease Other      Thyroid Disease,several members of family with thyroid disease.     CLOTTING DISORDER Paternal Uncle      Factor V       Social History:  Marital Status:   [2]  Social History   Substance Use Topics     Smoking status: Current Every Day Smoker     Packs/day: 0.75     Years: 30.00     Types: Cigarettes     Smokeless tobacco: Never Used     Alcohol use No        Medications:      acetaminophen (TYLENOL) 500 MG tablet   warfarin (COUMADIN) 2 MG tablet         Review of Systems   Constitutional: Negative for fever.   HENT: Negative for congestion.    Eyes:  Negative for redness.   Respiratory: Negative for shortness of breath.    Cardiovascular: Negative for chest pain.   Gastrointestinal: Positive for abdominal pain.   Genitourinary: Positive for vaginal bleeding and vaginal pain. Negative for difficulty urinating.   Musculoskeletal: Negative for arthralgias and neck stiffness.   Skin: Negative for color change.   Neurological: Negative for headaches.   Psychiatric/Behavioral: Negative for confusion.       Physical Exam          Physical Exam   Constitutional: She is oriented to person, place, and time. No distress.   HENT:   Head: Atraumatic.   Mouth/Throat: Oropharynx is clear and moist. No oropharyngeal exudate.   Eyes: Pupils are equal, round, and reactive to light. No scleral icterus.   Cardiovascular: Normal heart sounds and intact distal pulses.    Pulmonary/Chest: Breath sounds normal. No respiratory distress.   Abdominal: Soft. Bowel sounds are normal. There is tenderness.   Abdomen is soft but minimal increased tenderness in the right lower quadrant.  No rebound or masses.  Bowel sounds are normal.   Genitourinary:   Genitourinary Comments: Bleeding is controlled and exam deferred at this time.   Musculoskeletal: She exhibits no edema or tenderness.   Neurological: She is alert and oriented to person, place, and time.   Skin: Skin is warm. No rash noted. She is not diaphoretic.       ED Course     ED Course     Procedures           Results for orders placed or performed during the hospital encounter of 08/03/18 (from the past 24 hour(s))   CBC with platelets differential   Result Value Ref Range    WBC 10.5 4.0 - 11.0 10e9/L    RBC Count 4.96 3.8 - 5.2 10e12/L    Hemoglobin 15.1 11.7 - 15.7 g/dL    Hematocrit 44.5 35.0 - 47.0 %    MCV 90 78 - 100 fl    MCH 30.4 26.5 - 33.0 pg    MCHC 33.9 31.5 - 36.5 g/dL    RDW 12.6 10.0 - 15.0 %    Platelet Count 350 150 - 450 10e9/L    Diff Method Automated Method     % Neutrophils 69.2 %    % Lymphocytes 25.6 %    %  Monocytes 3.6 %    % Eosinophils 0.9 %    % Basophils 0.4 %    % Immature Granulocytes 0.3 %    Absolute Neutrophil 7.2 1.6 - 8.3 10e9/L    Absolute Lymphocytes 2.7 0.8 - 5.3 10e9/L    Absolute Monocytes 0.4 0.0 - 1.3 10e9/L    Absolute Eosinophils 0.1 0.0 - 0.7 10e9/L    Absolute Basophils 0.0 0.0 - 0.2 10e9/L    Abs Immature Granulocytes 0.0 0 - 0.4 10e9/L   INR   Result Value Ref Range    INR 2.25 (H) 0 - 1.3   Comprehensive metabolic panel   Result Value Ref Range    Sodium 136 134 - 144 mmol/L    Potassium 3.8 3.5 - 5.1 mmol/L    Chloride 103 98 - 107 mmol/L    Carbon Dioxide 26 21 - 31 mmol/L    Anion Gap 7 3 - 14 mmol/L    Glucose 117 (H) 70 - 105 mg/dL    Urea Nitrogen 8 7 - 25 mg/dL    Creatinine 0.73 0.60 - 1.20 mg/dL    GFR Estimate 86 >60 mL/min/1.7m2    GFR Estimate If Black >90 >60 mL/min/1.7m2    Calcium 9.2 8.6 - 10.3 mg/dL    Bilirubin Total 0.3 0.3 - 1.0 mg/dL    Albumin 4.1 3.5 - 5.7 g/dL    Protein Total 7.2 6.4 - 8.9 g/dL    Alkaline Phosphatase 51 34 - 104 U/L    ALT 16 7 - 52 U/L    AST 15 13 - 39 U/L   ABO/Rh type and screen   Result Value Ref Range    ABO B     RH(D) Pos     Antibody Screen Neg     Test Valid Only At [facility]        Specimen Expires 08/06/2018    US Pelvic Complete w Transvaginal    Narrative    PROCEDURE: US PELVIC COMPLETE WITH TRANSVAGINAL    HISTORY:  pelvic pain, RLQ abdominal pain and vaginal bleeding; .     TECHNIQUE: Transabdominal and transvaginal ultrasound of the pelvis.    COMPARISON: CT abdomen and pelvis 11/20/2018    FINDINGS:     Uterus: Unremarkable .  Endometrium: 5 mm.    Right ovary: Surgically absent    Left ovary: Unremarkable  Left ovary size: 2.7 x 2.2 x 2.5 cm    Trace free fluid is seen.      Impression    IMPRESSION: Unremarkable pelvic ultrasound. Surgically absent right  ovary.    FLIP GERRY, MD   HCG quantitative pregnancy (blood)   Result Value Ref Range    HCG Quantitative Serum <1 IU/L   *UA reflex to Microscopic    Result Value Ref Range    Color Urine Yellow     Appearance Urine Clear     Glucose Urine Negative NEG^Negative mg/dL    Bilirubin Urine Negative NEG^Negative    Ketones Urine Negative NEG^Negative mg/dL    Specific Gravity Urine <1.005 1.000 - 1.030    Blood Urine Large (A) NEG^Negative    pH Urine 6.0 5.0 - 9.0 pH    Protein Albumin Urine Negative NEG^Negative mg/dL    Urobilinogen Urine 0.2 0.2 - 1.0 EU/dL    Nitrite Urine Negative NEG^Negative    Leukocyte Esterase Urine Negative NEG^Negative    Source Midstream Urine    HCG qualitative urine (UPT)   Result Value Ref Range    HCG Qual Urine Negative NEG^Negative   Urine Microscopic   Result Value Ref Range    WBC Urine 0 - 5 OTO5^0 - 5 /HPF    RBC Urine 5-10 (A) OTO2^O - 2 /HPF    Bacteria Urine Few (A) NEG^Negative /HPF    Mucous Urine Present (A) NEG^Negative /LPF   CT Abdomen Pelvis w Contrast    Narrative    PROCEDURE:  CT ABDOMEN PELVIS W CONTRAST    HISTORY: RLQ abdominal pain;     TECHNIQUE:  Helical CT of the abdomen and pelvis was performed  following injection of intravenous contrast.    COMPARISON:  11/20/17.    MEDS/CONTRAST: 100 ml Omnipaque 350    FINDINGS:      Limited images through the lung bases demonstrate no focal  consolidation or mass.  The heart size is normal. No pericardial or  pleural effusions are seen.    The liver demonstrates no mass or intrahepatic biliary ductal  dilatation.  The gallbladder, spleen, pancreas and adrenal glands are  unremarkable.  Symmetric nephrograms are present without evidence of  hydronephrosis. There is no abdominal aortic aneurysm.    The bowel is normal in caliber. The appendix is normal.    No free fluid, free air or adenopathy is present.  No suspicious  osseous lesions are identified.      Impression    IMPRESSION:      Negative stable CT of the abdomen and pelvis.    NICKO VILLAREAL MD       Medications   0.9% sodium chloride BOLUS (0 mLs Intravenous Stopped 8/3/18 2818)     Followed by   sodium  chloride 0.9% infusion (not administered)   fentaNYL (PF) (SUBLIMAZE) injection 25 mcg (25 mcg Intravenous Given 8/3/18 1258)   iohexol (OMNIPAQUE) 350 mg/mL solution 100 mL (100 mLs Intravenous Given 8/3/18 1314)       Assessments & Plan (with Medical Decision Making)     I have reviewed the nursing notes.    I have reviewed the findings, diagnosis, plan and need for follow up with the patient.      New Prescriptions    TRAMADOL (ULTRAM) 50 MG TABLET    Take 1 tablet (50 mg) by mouth every 6 hours as needed for severe pain       Final diagnoses:   Menorrhagia with regular cycle   Factor 5 Leiden mutation, heterozygous (H)   Vaginal cramping     Afebrile.  Vital signs stable.  3 day history of vaginal bleeding and cramping.  History of factor V Leiden.  IV established and she was given fluids.  Blood was typed and screened.  CBC shows normal white blood cells and normal hemoglobin at 5.1.  CMP is unremarkable.  Her INR is 2.25 which is lower than her reading at home which was 2.7.  Ultrasound pelvic shows surgically absent right ovary but otherwise unremarkable.  Patient with continued concerns and given her right lower quadrant pain concerns for possible appendicitis.  CT abdomen and pelvis was done and this shows no evidence of hydronephrosis no free fluid or free air or adenopathy.  CT is negative.  Reassurance was given.  Discussed with patient given her age and continued dysmenorrhagia referral to OB/GYN.  Did talk with Dr. Krish Salazar about this patient and a follow-up was made on 8/6/2018 at 1130 for further evaluation.  Rx for short course of Ultram for pain relief.  I will up sooner if there are any other concerns problems or questions  8/3/2018   [facility]     Otilio De Santiago PA-C  08/03/18 4915

## 2018-08-03 NOTE — MR AVS SNAPSHOT
Daniela NANCY Ladd   8/3/2018   Anticoagulation Therapy Visit    Description:  48 year old female   Provider:  Krystin Black MD   Department:   Family Practice           INR as of 8/3/2018     Today's INR 2.25!      Anticoagulation Summary as of 8/3/2018     INR goal 2.5-3.5   Today's INR 2.25!   Full warfarin instructions 8/3: 10 mg; Otherwise 8 mg every day   Next INR check 8/6/2018    Indications   Blood clot of vein in shoulder area  left [I82.602]  Factor 5 Leiden mutation  heterozygous (H) [D68.51]  Long-term (current) use of anticoagulants [Z79.01] [Z79.01]         Description     PLEASE SEE NOTE:  Increase today's dose from 8 mg to 10 mg...then resume current dose.  Monitor over the weekend and present to PX clinic Monday. PT states home INR was 2.7 this morning.  Shira Cage RN  ....................  8/3/2018   2:29 PM        Your next Anticoagulation Clinic appointment(s)     Aug 06, 2018  9:15 AM CDT   [facility]  [facility]    [address]  [place] [address]   [phone]            Aug 09, 2018  7:45 AM CDT   [facility] with  [facility]AG [facility]   [facility]    [address]  [place] [address]   [phone]              August 2018 Details    Sun Mon Tue Wed Thu Fri Sat        1               2               3      10 mg   See details      4      8 mg           5      8 mg         6            7               8               9               10               11                 12               13               14               15               16               17               18                 19               20               21               22               23               24               25                 26               27               28               29               30               31                  Date Details   08/03 This INR check       Date of next INR:  8/6/2018         How to take your warfarin dose     To take:  8 mg Take 4 of the 2 mg tablets.    To take:  10 mg Take 5 of the 2 mg tablets.

## 2018-08-03 NOTE — PROGRESS NOTES
Pt contacted PX from [facility] due to vaginal bleeding/abdominal pain.  States INR at home this morning was in goal range at 2.7 (2.5-3.5).  INR lab draw at [facility] today is 2.25 and patient is requesting dosing instructions over the weekend.  Patient states she feels better when her INR is higher in goal area and that this also helps with menstrual cramping/bleeding.  Pt also started on pain medication in hospital.  Instructed patient to increase today's dose from 8 mg to 10 mg and then resume current dose.  Patient will monitor over the weekend and will adjust diet/or present to ED as appropriate.  Patient will present to PX clinic Monday 8/3/2018 for f/u and as per pt request.    Shira Cage RN  ....................  8/3/2018   2:27 PM        ANTICOAGULATION FOLLOW-UP CLINIC VISIT    Patient Name:  Daniela Ladd  Date:  8/3/2018  Contact Type:  Telephone    SUBJECTIVE:     Patient Findings     Positives Hospital admission    Comments 8/3/2018           OBJECTIVE    INR   Date Value Ref Range Status   08/03/2018 2.25 (H) 0 - 1.3 Final       ASSESSMENT / PLAN  INR assessment SUB    Recheck INR In: 3 DAYS    INR Location Clinic      Anticoagulation Summary as of 8/3/2018     INR goal 2.5-3.5   Today's INR 2.25!   Warfarin maintenance plan 8 mg (2 mg x 4) every day   Full warfarin instructions 8/3: 10 mg; Otherwise 8 mg every day   Weekly warfarin total 56 mg   Plan last modified Yoly Crowe RN (7/17/2018)   Next INR check 8/6/2018   Priority INR   Target end date Indefinite    Indications   Blood clot of vein in shoulder area  left [I82.602]  Factor 5 Leiden mutation  heterozygous (H) [D68.51]  Long-term (current) use of anticoagulants [Z79.01] [Z79.01]         Anticoagulation Episode Summary     INR check location     Preferred lab     Send INR reminders to  INR    Comments INR goal changed per Dr Kitchen 3/21/18 he wants INR checked q 2 weeks        Anticoagulation Care Providers     Provider  Role Specialty Phone number    Katiebrenda Krystin Mcclellan MD [facility] [phone]            See the Encounter Report to view Anticoagulation Flowsheet and Dosing Calendar (Go to Encounters tab in chart review, and find the Anticoagulation Therapy Visit)        Shira Cage RN

## 2018-08-03 NOTE — ED AVS SNAPSHOT
[facility]    [address]    [place] [address]    Phone:  [phone]    Fax:  [phone]                                       Daniela Ladd   MRN: [id number]    Department:  [facility]   Date of Visit:  8/3/2018           Patient Information     Date Of Birth          1970        Your diagnoses for this visit were:     Menorrhagia with regular cycle     Factor 5 Leiden mutation, heterozygous (H)     Vaginal cramping        You were seen by Otilio De Santiago PA-C.      Follow-up Information     Go to Krish Salazar MD.    Specialty:  OB/Gyn    Why:  as scheduled on Monday, 8/6/18 at 11:30    Contact information:    [address]  [place] [address]  [phone]          Discharge Instructions         Understanding Uterine Bleeding  Your uterine bleeding may be heavy. Or you may have bleeding between periods. These problems may be caused by hormonal imbalance. Or they can be caused by uterine growths, an intrauterine device (IUD), bleeding disorder, or pregnancy.  Hormonal imbalance  Your menstrual cycle is controlled by hormones. The hormones include estrogen and progesterone. Sometimes there is too much or too little of one or both of these hormones, causing an imbalance. This can cause heavy periods. Or it can cause bleeding between periods. Causes of hormonal imbalance can include:    Hormonal changes in teens and in women nearing menopause    Diabetes, thyroid disease, or other medical problems    Obesity    Stress    Strenuous exercise    Anorexia, an eating disorder    Pregnancy  Uterine growths  There are different kinds of uterine growths. These include:    Fibroids. These are round knots of noncancer (benign) muscle tissue in the uterus.    Polyps. These are soft tissue growths in the uterine lining. They often extend into the uterus.    Adenomyosis. This is when the uterine lining grows into the muscle  wall.    Hyperplasia. This is when the uterine lining gets too thick or grows too much.    Endometrial cancer. This is uncontrolled growth of part of the uterine lining.  Other causes of uterine bleeding  There are other causes of uterine bleeding. These include:    IUD (intrauterine device). This is a method of birth control. Some IUDs contain hormones.    Bleeding disorders. This is when the blood can't clot properly.  Treatment  Your healthcare provider can help diagnose the cause of your bleeding problem. He or she will work with you to plan treatment as needed.  Date Last Reviewed: 6/1/2017 2000-2017 [employer]. [address] [place] [address]. All rights reserved. This information is not intended as a substitute for professional medical care. Always follow your healthcare professional's instructions.          Heavy Menstrual Bleeding    Heavy menstrual bleeding means that your periods are heavier or longer than usual. You may soak through a pad or tampon every 1 to 3 hours on the heaviest days of your period. You may also pass large, dark clots. And your periods may last longer than 7 days.  If you have heavy periods often, this can cause a problem called anemia. With anemia, your red blood cell count is too low. Red blood cells are needed because they help carry oxygen throughout your body. Severe anemia may cause you to look pale and feel weak or tired. You might also become short of breath easily.  There are many possible causes of heavy menstrual bleeding. Hormonal imbalance is the most common cause. Having benign growths in your uterus, such as fibroids or polyps, is another cause. Taking certain medicines or having certain health problems or bleeding disorders are also causes.  To treat heavy menstrual bleeding, your healthcare provider may prescribe medicines first. If these don t help, you may need further testing and treatments.  Home care  Medicines  If you re prescribed  medicines, be sure to take them as directed.    To help control heavy bleeding, any of the following may be used:  ? Hormone therapy (this includes all methods of hormonal birth control such as pills, shots, cream, ring, patch, or hormone-releasing IUD)  ? Nonsteroidal anti-inflammatory drugs (NSAIDs), such as ibuprofen  ? Antifibrinolytic medicines, such as transexamic acid    To help treat anemia, iron supplements may be prescribed.            General care    Get plenty of rest if you tire easily. Avoid heavy exertion.    To help relieve pain or cramping, try using a heating pad on the lower belly or back. A warm bath may also help.  Follow-up care  Follow up with your healthcare provider, or as advised.  When to seek medical advice  Call your healthcare provider right away if any of these occur:    Heavier bleeding (soaking 1 pad or tampon every hour for 3 hours)    Heavy bleeding that lasts longer than 1 week    Fever of 100.4 F (38 C) or higher, or as directed by your provider    Pain or cramping that gets worse instead of better    Signs of anemia such as pale skin, extreme fatigue or weakness, or shortness of breath    Dizziness or fainting  Date Last Reviewed: 10/1/2017    2250-2426 [employer]. [address] [place] [address]. All rights reserved. This information is not intended as a substitute for professional medical care. Always follow your healthcare professional's instructions.          Your next 10 appointments already scheduled     Aug 06, 2018  9:15 AM CDT   [facility]  [facility]   [facility]    [address]  [place] [address]   [phone]            Aug 06, 2018 11:15 AM CDT   CONSULT with Krish Salazar MD   [facility]    [address]  [place] [address]   [phone]            Aug 09, 2018  7:45 AM CDT    Anticoagulation Visit with  ANTI COAG 2   [facility]    [address]  [place] [address] [phone]              24 Hour Appointment Hotline     To schedule an appointment at [facility], please call [phone]. If you don't have a family doctor or clinic, we will help you find one. [facility] are conveniently located to serve the needs of you and your family.           Review of your medicines      START taking        Dose / Directions Last dose taken    traMADol 50 MG tablet   Commonly known as:  ULTRAM   Dose:  50 mg   Quantity:  10 tablet        Take 1 tablet (50 mg) by mouth every 6 hours as needed for severe pain   Refills:  0          Our records show that you are taking the medicines listed below. If these are incorrect, please call your family doctor or clinic.        Dose / Directions Last dose taken    acetaminophen 500 MG tablet   Commonly known as:  TYLENOL   Dose:  1000 mg        Take 1,000 mg by mouth   Refills:  0        warfarin 2 MG tablet   Commonly known as:  COUMADIN   Quantity:  396 tablet        Take 8 mg daily  or as directed by protime clinic   Refills:  0                Information about OPIOIDS     PRESCRIPTION OPIOIDS: WHAT YOU NEED TO KNOW   We gave you an opioid (narcotic) pain medicine. It is important to manage your pain, but opioids are not always the best choice. You should first try all the other options your care team gave you. Take this medicine for as short a time (and as few doses) as possible.     These medicines have risks:    DO NOT drive when on new or higher doses of pain medicine. These medicines can affect your alertness and reaction times, and you could be arrested for driving under the influence (DUI). If you need to use opioids long-term, talk to your care team about driving.    DO NOT operate heave machinery    DO NOT do any other dangerous activities while taking these  medicines.     DO NOT drink any alcohol while taking these medicines.      If the opioid prescribed includes acetaminophen, DO NOT take with any other medicines that contain acetaminophen. Read all labels carefully. Look for the word  acetaminophen  or  Tylenol.  Ask your pharmacist if you have questions or are unsure.    You can get addicted to pain medicines, especially if you have a history of addiction (chemical, alcohol or substance dependence). Talk to your care team about ways to reduce this risk.    Store your pills in a secure place, locked if possible. We will not replace any lost or stolen medicine. If you don t finish your medicine, please throw away (dispose) as directed by your pharmacist. The [place] Pollution Control Agency has more information about safe disposal: https://www.pca.[facility].[place]/living-green/managing-unwanted-medications.     All opioids tend to cause constipation. Drink plenty of water and eat foods that have a lot of fiber, such as fruits, vegetables, prune juice, apple juice and high-fiber cereal. Take a laxative (Miralax, milk of magnesia, Colace, Senna) if you don t move your bowels at least every other day.         Prescriptions were sent or printed at these locations (1 Prescription)                   [facility] [address] [place] [facility] [place], [place] - [address]   [address] [facility] [address]    Telephone:  [phone]   Fax:  [phone]   Hours:                  Printed at Department/Unit printer (1 of 1)         traMADol (ULTRAM) 50 MG tablet                Procedures and tests performed during your visit     *UA reflex to Microscopic    ABO/Rh type and screen    CBC with platelets differential    CT Abdomen Pelvis w Contrast    Comprehensive metabolic panel    HCG qualitative urine (UPT)    HCG quantitative pregnancy (blood)    INR    US Pelvic Complete w Transvaginal    Urine Microscopic      Orders Needing Specimen Collection     None      Pending  Results     No orders found from 8/1/2018 to 8/4/2018.            Pending Culture Results     No orders found from 8/1/2018 to 8/4/2018.            Pending Results Instructions     If you had any lab results that were not finalized at the time of your Discharge, you can call the ED Lab Result RN at [phone]. You will be contacted by this team for any positive Lab results or changes in treatment. The nurses are available 7 days a week from 10A to 6:30P.  You can leave a message 24 hours per day and they will return your call.        Thank you for choosing [facility]       Thank you for choosing [facility] for your care. Our goal is always to provide you with excellent care. Hearing back from our patients is one way we can continue to improve our services. Please take a few minutes to complete the written survey that you may receive in the mail after you visit with us. Thank you!        [facility] Information     [employer] gives you secure access to your electronic health record. If you see a primary care provider, you can also send messages to your care team and make appointments. If you have questions, please call your primary care clinic.  If you do not have a primary care provider, please call [phone] and they will assist you.        Care EveryWhere ID     This is your Care EveryWhere ID. This could be used by other organizations to access your [facility] medical records  [id number]        Equal Access to Services     FRANKO BOOTH : Hadii li Mcclendon, wacodida lumylaadaha, qaybta kaaljazz paul, job [place]. So [facility] [phone].    ATENCIÓN: Si habla español, tiene a egna disposición servicios gratuitos de asistencia lingüística. Llame al [phone].    We comply with applicable federal civil rights laws and [place] laws. We do not discriminate on the basis of race, color, national origin, age, disability, sex, sexual orientation, or gender identity.            After  Visit Summary       This is your record. Keep this with you and show to your community pharmacist(s) and doctor(s) at your next visit.

## 2018-08-03 NOTE — ED TRIAGE NOTES
Pt admit to [facility], ambulatory.  Pt reports hx of blood clot in subclavian vein (it's still there), she has factor 5 and is on blood thinners to keep her INR therapeutic for her, LMP was 6/9/18, she started bleeding again on Tuesday and has terrible cramping and heavy bleeding, passing clots but they're getting smaller.  On Wed she felt a stabbing pain in her RLQ, went home from work, took an ibuprofen, it didn't help her pain, tylenol didn't help either.  It hurts to walk, she can't stand straight up without pain, the only way she gets pain relief is to lie flat.  Pain is in her RLQ and she doesn't have an ovary or tube on that side (hx of endometriosis), pt does not increase with palpation.  Pt also has back pain and pain in her upper thing (right side).  INR at home is 2.7 (therapeutic range).

## 2018-08-06 ENCOUNTER — ANTICOAGULATION THERAPY VISIT (OUTPATIENT)
Dept: ANTICOAGULATION | Facility: OTHER | Age: 48
End: 2018-08-06
Attending: FAMILY MEDICINE
Payer: COMMERCIAL

## 2018-08-06 ENCOUNTER — OFFICE VISIT (OUTPATIENT)
Dept: OBGYN | Facility: OTHER | Age: 48
End: 2018-08-06
Attending: OBSTETRICS & GYNECOLOGY
Payer: COMMERCIAL

## 2018-08-06 VITALS
BODY MASS INDEX: 32.3 KG/M2 | SYSTOLIC BLOOD PRESSURE: 124 MMHG | DIASTOLIC BLOOD PRESSURE: 80 MMHG | HEIGHT: 66 IN | HEART RATE: 72 BPM | WEIGHT: 201 LBS

## 2018-08-06 DIAGNOSIS — N94.6 DYSMENORRHEA: ICD-10-CM

## 2018-08-06 DIAGNOSIS — I82.602 BLOOD CLOT OF VEIN IN SHOULDER AREA, LEFT: ICD-10-CM

## 2018-08-06 DIAGNOSIS — N80.9 ENDOMETRIOSIS: ICD-10-CM

## 2018-08-06 DIAGNOSIS — D68.51 FACTOR 5 LEIDEN MUTATION, HETEROZYGOUS (H): ICD-10-CM

## 2018-08-06 DIAGNOSIS — N92.0 EXCESSIVE OR FREQUENT MENSTRUATION: Primary | ICD-10-CM

## 2018-08-06 DIAGNOSIS — Z79.01 LONG-TERM (CURRENT) USE OF ANTICOAGULANTS: ICD-10-CM

## 2018-08-06 LAB — INR POINT OF CARE: 3 (ref 2.5–3.58)

## 2018-08-06 PROCEDURE — 99203 OFFICE O/P NEW LOW 30 MIN: CPT | Performed by: OBSTETRICS & GYNECOLOGY

## 2018-08-06 PROCEDURE — 85610 PROTHROMBIN TIME: CPT | Mod: QW

## 2018-08-06 PROCEDURE — 36416 COLLJ CAPILLARY BLOOD SPEC: CPT

## 2018-08-06 RX ORDER — WARFARIN SODIUM 2 MG/1
8 TABLET ORAL DAILY
Qty: 375 TABLET | Refills: 0 | Status: SHIPPED | OUTPATIENT
Start: 2018-08-06 | End: 2018-08-27

## 2018-08-06 ASSESSMENT — PAIN SCALES - GENERAL: PAINLEVEL: NO PAIN (0)

## 2018-08-06 NOTE — NURSING NOTE
Follow up ER - Consult for Vaginal Bleeding - Patient also has Factor 5 Leiden mutation. Patient states minimal bleeding.   Lisa Cortes LPN........................8/6/2018  11:13 AM

## 2018-08-06 NOTE — TELEPHONE ENCOUNTER
"Refill request from [facility] for:  warfarin (COUMADIN) 2 MG tablet  Last INR 8/6/2018  LOV with PCP 5/1/2018  Refilled    Requested Prescriptions   Pending Prescriptions Disp Refills     warfarin (COUMADIN) 2 MG tablet [Pharmacy Med Name: WARFARIN SODIUM 2 MG TABLET] 396 tablet 0     Sig: TAKE 10 MG BY MOUTH X 5 DAYS/WEEK, AND 8 MG X 2 DAYS/WEEK(MON&FRI), OR AS DIRECTED BY [facility] CL[facility]    Vitamin K Antagonists Failed    8/3/2018  1:13 AM       Failed - INR is within goal in the past 6 weeks    Confirm INR is within goal in the past 6 weeks.     Recent Labs   Lab Test 08/06/18   INR  3.0            Passed - Recent (12 mo) or future (30 days) visit within the authorizing provider's specialty    Patient had office visit in the last 12 months or has a visit in the next 30 days with authorizing provider or within the authorizing provider's specialty.  See \"Patient Info\" tab in inbasket, or \"Choose Columns\" in Meds & Orders section of the refill encounter.           Passed - Patient is 18 years of age or older       Passed - Patient is not pregnant       Passed - No positive pregnancy on file in past 12 months        Shira Cage RN  ....................  8/6/2018   3:55 PM      "

## 2018-08-06 NOTE — MR AVS SNAPSHOT
Daniela Ladd   8/6/2018 9:15 AM   Anticoagulation Therapy Visit    Description:  48 year old female   Provider:  [facility]   Department:  [facility]           INR as of 8/6/2018     Today's INR 3.0      Anticoagulation Summary as of 8/6/2018     INR goal 2.5-3.5   Today's INR 3.0   Full warfarin instructions 8 mg every day   Next INR check 8/13/2018    Indications   Blood clot of vein in shoulder area  left [I82.602]  Factor 5 Leiden mutation  heterozygous (H) [D68.51]  Long-term (current) use of anticoagulants [Z79.01] [Z79.01]         Description     Continue same dose of Coumadin/Warfarinand recheck INR in 1week.  Renee Kevin ....................  8/6/2018   9:23 AM          Your next Anticoagulation Clinic appointment(s)     Aug 09, 2018  7:45 AM CDT   Anticoagulation Visit with [facility]   [facility]    [address]  [facility] [address]   [phone]              August 2018 Details    Sun Mon Tue Wed Thu Fri Sat        1               2               3               4                 5               6      8 mg   See details      7      8 mg         8      8 mg         9      8 mg         10      8 mg         11      8 mg           12      8 mg         13            14               15               16               17               18                 19               20               21               22               23               24               25                 26               27               28               29               30               31                 Date Details   08/06 This INR check       Date of next INR:  8/13/2018         How to take your warfarin dose     To take:  8 mg Take 4 of the 2 mg tablets.

## 2018-08-06 NOTE — PROGRESS NOTES
ANTICOAGULATION FOLLOW-UP CLINIC VISIT    Patient Name:  Daniela Ladd  Date:  8/6/2018  Contact Type:  Face to Face    SUBJECTIVE:     Patient Findings     Positives Bleed (ER/Hosp visit) (vaginal bleeding/menstral)           OBJECTIVE    INR Protime   Date Value Ref Range Status   08/06/2018 3.0 2.5 - 3.58 Final       ASSESSMENT / PLAN  INR assessment THER    Recheck INR In: 1 WEEK    INR Location Clinic      Anticoagulation Summary as of 8/6/2018     INR goal 2.5-3.5   Today's INR 3.0   Warfarin maintenance plan 8 mg (2 mg x 4) every day   Full warfarin instructions 8 mg every day   Weekly warfarin total 56 mg   Plan last modified Yoly Crowe RN (7/17/2018)   Next INR check 8/13/2018   Priority INR   [facility] end date Indefinite    Indications   Blood clot of vein in shoulder area  left [I82.602]  Factor 5 Leiden mutation  heterozygous (H) [D68.51]  Long-term (current) use of anticoagulants [Z79.01] [Z79.01]         Anticoagulation Episode Summary     INR check location     Preferred lab     Send INR reminders to  INR    Comments INR goal changed per Dr Kitchen 3/21/18 he wants INR checked q 2 weeks        Anticoagulation Care Providers     Provider Role Specialty Phone number    Krystin Black MD [facility] [phone]            See the Encounter Report to view Anticoagulation Flowsheet and Dosing Calendar (Go to Encounters tab in chart review, and find the Anticoagulation Therapy Visit)        Renee Kevin RN

## 2018-08-06 NOTE — LETTER
2018       RE: Daniela Ladd  [address] [address] [address]     Dear Colleague,    Thank you for referring your patient, Daniela Ladd, to the [facility] at [facility]. Please see a copy of my visit note below.    Antonieta is referred by Dr. Tyler Black and the emergency room department for follow-up of heavy bleeding    Antonieta is a 48-year-old  3 para 1 AB 2 who is known Leiden factor V heterozygous.  Does have a blood clot currently in the left subclavian and is on Coumadin 2 mg a day.  She follows with hematologist at [facility] in [place].  She is going to be on Coumadin the rest of her life at this point in time.  For the most part has fairly regular menses flow has been variable LMP was  which was accompanied by his severe cramping and large clots resolved spontaneously.He had both a CT scan and pelvic ultrasound.  The ultrasound is personally reviewed by myself and shows normal sized uterus with a thin endometrium at 4-5 mm.  Right ovary surgically absent.  Left ovary is unremarkable and normal size no free fluid    Patient had a right salpingo-oophorectomy for endometriosis and pelvic pain approximately 8 years ago.  She also had a tubal ligation performed on the left side.    Patient is otherwise without gynecologic concern    Problem list is reviewed on epic again is significant for factor V Leiden endometriosis and Raynaud's syndrome    Past surgical history as above    Medications reviewed in epic    Allergies reviewed on epic    Tobacco 1 pack per day    GYN  and GI review of systems are otherwise negative last Pap smear 2016    Physical exam limited to vitals patient is alert orientated ×3 appears in no acute distress blood pressure 124/80 pulse 68 weight 201     Assessment    1.  Menorrhagia with accompanying dysmenorrhea.  Worsened worsened with anticoagulation therapy.   Unremarkable ultrasound    2.  Status post right salpingo-nephrectomy for pain and endometriosis    3.  Leiden factor V with known thrombosis    4.  Chronic anticoagulation for treatment of #3    Plan    The above discussed at length with the patient.  We reviewed options of utilizing a progesterone IUD such as Mirena.  We also discussed the option of uterine ablation.  Pros and cons of each discussed. Patient also had questions regarding hysterectomy.    My recommendation to the patient would be to consider a Mirena.  Literature is given.  She will call back if she would like to proceed.    40 minutes spent with the patient the majority in counseling time    Again, thank you for allowing me to participate in the care of your patient.      Sincerely,    Krish Salazar MD

## 2018-08-06 NOTE — MR AVS SNAPSHOT
After Visit Summary   8/6/2018    Daniela Ladd    MRN: [id number]           Patient Information     Date Of Birth          1970        Visit Information        Provider Department      8/6/2018 11:15 AM Krish Salazar MD [facility]        Today's Diagnoses     Excessive or frequent menstruation    -  1    Factor 5 Leiden mutation, heterozygous (H)        Endometriosis        Dysmenorrhea           Follow-ups after your visit        Your next 10 appointments already scheduled     Aug 09, 2018  1:45 PM CDT   [facility]azar [facility]   [facility]    [address] [facility] [address] [phone]           Bring a current list of meds and any records pertaining to this visit. For Physicals, please bring immunization records and any forms needing to be filled out. Please arrive 10 minutes early to complete paperwork.            Aug 13, 2018 12:15 PM CDT   Anticoagulation Visit with  ANTI COAG 2   [facility]    [address] [facility] [address] [phone]              Who to contact     If you have questions or need follow up information about today's clinic visit or your schedule please contact [facility] directly at [phone].  Normal or non-critical lab and imaging results will be communicated to you by MyChart, letter or phone within 4 business days after the clinic has received the results. If you do not hear from us within 7 days, please contact the clinic through MyChart or phone. If you have a critical or abnormal lab result, we will notify you by phone as soon as possible.  Submit refill requests through [employer] or call your pharmacy and they will forward the refill request to us. Please allow 3 business days for your refill to be completed.          Additional Information  "About Your Visit        MyChart Information     [employer] gives you secure access to your electronic health record. If you see a primary care provider, you can also send messages to your care team and make appointments. If you have questions, please call your primary care clinic.  If you do not have a primary care provider, please call [phone] and they will assist you.        Care EveryWhere ID     This is your Care EveryWhere ID. This could be used by other organizations to access your [place] medical records  [id number]        Your Vitals Were     Pulse Height Last Period Breastfeeding? BMI (Body Mass Index)       72 1.676 m (5' 6\") 07/31/2018 No 32.44 kg/m2        Blood Pressure from Last 3 Encounters:   08/06/18 124/80   08/03/18 (!) 120/91   07/13/18 130/80    Weight from Last 3 Encounters:   08/06/18 91.2 kg (201 lb)   08/03/18 90.7 kg (200 lb)   07/13/18 91.3 kg (201 lb 3.2 oz)              Today, you had the following     No orders found for display       Primary Care Provider Office Phone # Fax #    Krystinkat Black [phone]       [address] [facility] [address]        Equal Access to Services     FRANKO BOOTH : Hadii li wilde hadasho Soomaali, waaxda luqadaha, qaybta kaalmada adeegyada, job [place]. So [facility] [phone].    ATENCIÓN: Si habla español, tiene a egan disposición servicios gratuitos de asistencia lingüística. Llame al [phone].    We comply with applicable federal civil rights laws and [place] laws. We do not discriminate on the basis of race, color, national origin, age, disability, sex, sexual orientation, or gender identity.            Thank you!     Thank you for choosing [facility] AND [facility]  for your care. Our goal is always to provide you with excellent care. Hearing back from our patients is one way we can continue to improve our services. Please take a few minutes to complete the written survey that " you may receive in the mail after your visit with us. Thank you!             Your Updated Medication List - Protect others around you: Learn how to safely use, store and throw away your medicines at www.disposemymeds.org.          This list is accurate as of 8/6/18 12:21 PM.  Always use your most recent med list.                   Brand Name Dispense Instructions for use Diagnosis    acetaminophen 500 MG tablet    TYLENOL     Take 1,000 mg by mouth        warfarin 2 MG tablet    COUMADIN    396 tablet    Take 8 mg daily  or [facility]    Arm DVT (deep venous thromboembolism), acute, left (H), Long-term (current) use of anticoagulants

## 2018-08-06 NOTE — PROGRESS NOTES
Antonieta is referred by Dr. Tyler Black and the emergency room department for follow-up of heavy bleeding    Antonieta is a 48-year-old  3 para 1 AB 2 who is known Leiden factor V heterozygous.  Does have a blood clot currently in the left subclavian and is on Coumadin 2 mg a day.  She follows with hematologist at [facility] in [place].  She is going to be on Coumadin the rest of her life at this point in time.  For the most part has fairly regular menses flow has been variable LMP was  which was accompanied by his severe cramping and large clots resolved spontaneously.He had both a CT scan and pelvic ultrasound.  The ultrasound is personally reviewed by myself and shows normal sized uterus with a thin endometrium at 4-5 mm.  Right ovary surgically absent.  Left ovary is unremarkable and normal size no free fluid    Patient had a right salpingo-oophorectomy for endometriosis and pelvic pain approximately 8 years ago.  She also had a tubal ligation performed on the left side.    Patient is otherwise without gynecologic concern    Problem list is reviewed on epic again is significant for factor V Leiden endometriosis and Raynaud's syndrome    Past surgical history as above    Medications reviewed in epic    Allergies reviewed on epic    Tobacco 1 pack per day    GYN  and GI review of systems are otherwise negative last Pap smear 2016    Physical exam limited to vitals patient is alert orientated ×3 appears in no acute distress blood pressure 124/80 pulse 68 weight 201     Assessment    1.  Menorrhagia with accompanying dysmenorrhea.  Worsened worsened with anticoagulation therapy.  Unremarkable ultrasound    2.  Status post right salpingo-nephrectomy for pain and endometriosis    3.  Leiden factor V with known thrombosis    4.  Chronic anticoagulation for treatment of #3    Plan    The above discussed at length with the patient.  We reviewed options of utilizing a progesterone IUD such as  Mirena.  We also discussed the option of uterine ablation.  Pros and cons of each discussed. Patient also had questions regarding hysterectomy.    My recommendation to the patient would be to consider a Mirena.  Literature is given.  She will call back if she would like to proceed.    40 minutes spent with the patient the majority in counseling time

## 2018-08-09 ENCOUNTER — OFFICE VISIT (OUTPATIENT)
Dept: OBGYN | Facility: OTHER | Age: 48
End: 2018-08-09
Attending: OBSTETRICS & GYNECOLOGY
Payer: COMMERCIAL

## 2018-08-09 VITALS
WEIGHT: 203 LBS | DIASTOLIC BLOOD PRESSURE: 72 MMHG | SYSTOLIC BLOOD PRESSURE: 110 MMHG | HEART RATE: 68 BPM | BODY MASS INDEX: 32.77 KG/M2

## 2018-08-09 DIAGNOSIS — Z30.430 ENCOUNTER FOR IUD INSERTION: ICD-10-CM

## 2018-08-09 DIAGNOSIS — N92.0 EXCESSIVE OR FREQUENT MENSTRUATION: Primary | ICD-10-CM

## 2018-08-09 PROCEDURE — 58300 INSERT INTRAUTERINE DEVICE: CPT | Performed by: OBSTETRICS & GYNECOLOGY

## 2018-08-09 PROCEDURE — 25000125 ZZHC RX 250: Performed by: OBSTETRICS & GYNECOLOGY

## 2018-08-09 RX ADMIN — LEVONORGESTREL: 52 INTRAUTERINE DEVICE INTRAUTERINE at 14:58

## 2018-08-09 ASSESSMENT — PAIN SCALES - GENERAL: PAINLEVEL: NO PAIN (0)

## 2018-08-09 NOTE — NURSING NOTE
Patient presents for IUD insertion.   Prior to the start of the procedure and with procedural staff participation, I verbally confirmed the patient s identity using two indicators, relevant allergies, that the procedure was appropriate and matched the consent or emergent situation, and that the correct equipment/implants were available. Immediately prior to starting the procedure I conducted the Time Out with the procedural staff and re-confirmed the patient s name, procedure, and site/side. (The Joint Commission universal protocol was followed.)  Yes    Sedation (Moderate or Deep): None    Lisa Cortes LPN........................8/9/2018  1:49 PM

## 2018-08-09 NOTE — PROGRESS NOTES
See my note from 8/6/18    Routine pre-procedure instructions  Timeout  Betadine prep to cervix  Cervix carefully dilated  Mirena placed to anteriorly 8 cm, released in usual fashion and pressed to fundus.  The strings were cut to 3 cms of length.  Self string check in 4-6 weeks  Routine post-procedure instructions    Tolerated well

## 2018-08-09 NOTE — MR AVS SNAPSHOT
After Visit Summary   8/9/2018    Daniela Ladd    MRN: [id number]           Patient Information     Date Of Birth          1970        Visit Information        Provider Department      8/9/2018 1:45 PM Krish Salazar MD [facility]        Today's Diagnoses     Excessive or frequent menstruation    -  1    Encounter for IUD insertion           Follow-ups after your visit        Your next 10 appointments already scheduled     Aug 13, 2018 12:15 PM CDT   Anticoagulation Visit with GH ANTI COAG 2   [facility]    [address]  [place] [address] [phone]              Who to contact     If you have questions or need follow up information about today's clinic visit or your schedule please contact [facility] directly at [phone].  Normal or non-critical lab and imaging results will be communicated to you by Connect[employer]ard[employer], letter or phone within 4 business days after the clinic has received the results. If you do not hear from us within 7 days, please contact the clinic through Connect[employer]ard[employer] or phone. If you have a critical or abnormal lab result, we will notify you by phone as soon as possible.  Submit refill requests through [employer] or call your pharmacy and they will forward the refill request to us. Please allow 3 business days for your refill to be completed.          Additional Information About Your Visit        MyChart Information     [employer] gives you secure access to your electronic health record. If you see a primary care provider, you can also send messages to your care team and make appointments. If you have questions, please call your primary care clinic.  If you do not have a primary care provider, please call [phone] and they will assist you.        Care EveryWhere ID     This is your Care EveryWhere ID. This could be used by other organizations to access your  [place] medical records  [id number]        Your Vitals Were     Pulse Last Period Breastfeeding? BMI (Body Mass Index)          68 07/31/2018 No 32.77 kg/m2         Blood Pressure from Last 3 Encounters:   08/09/18 110/72   08/06/18 124/80   08/03/18 (!) 120/91    Weight from Last 3 Encounters:   08/09/18 92.1 kg (203 lb)   08/06/18 91.2 kg (201 lb)   08/03/18 90.7 kg (200 lb)              We Performed the Following     INSERTION INTRAUTERINE DEVICE          Today's Medication Changes          These changes are accurate as of 8/9/18  2:23 PM.  If you have any questions, ask your nurse or doctor.               Start taking these medicines.        Dose/Directions    levonorgestrel 20 MCG/24HR IUD   Commonly known as:  MIRENA   Used for:  Excessive or frequent menstruation   Started by:  Krish Salazar MD        Dose:  1 each   1 each (20 mcg) by Intrauterine route once for 1 dose   Quantity:  1 each   Refills:  0            Where to get your medicines      Some of these will need a paper prescription and others can be bought over the counter.  Ask your nurse if you have questions.     You don't need a prescription for these medications     levonorgestrel 20 MCG/24HR IUD                Primary Care Provider Office Phone # Fax #    Krystin Vy Black [phone] [address] [facility] [address]        Equal Access to Services     [facility] AH: Hadii li villarrealo Soaline, waaxda luqadaha, qaybta kaalmada humberto, job [place] . So [facility] [phone].    ATENCIÓN: Si habla español, tiene a egan disposición servicios gratuitos de asistencia lingüística. Llame al [phone].    We comply with applicable federal civil rights laws and [place] laws. We do not discriminate on the basis of race, color, national origin, age, disability, sex, sexual orientation, or gender identity.            Thank you!     Thank you for choosing [facility] AND  [facility]  for your care. Our goal is always to provide you with excellent care. Hearing back from our patients is one way we can continue to improve our services. Please take a few minutes to complete the written survey that you may receive in the mail after your visit with us. Thank you!             Your Updated Medication List - Protect others around you: Learn how to safely use, store and throw away your medicines at www.disposemymeds.org.          This list is accurate as of 8/9/18  2:23 PM.  Always use your most recent med list.                   Brand Name Dispense Instructions for use Diagnosis    acetaminophen 500 MG tablet    TYLENOL     Take 1,000 mg by mouth        levonorgestrel 20 MCG/24HR IUD    MIRENA    1 each    1 each (20 mcg) by Intrauterine route once for 1 dose    Excessive or frequent menstruation       warfarin 2 MG tablet    COUMADIN    375 tablet    Take 4 tablets (8 mg) by mouth daily Or as directed by protime clinic    Arm DVT (deep venous thromboembolism), acute, left (H), Long-term (current) use of anticoagulants

## 2018-08-09 NOTE — LETTER
8/9/2018       RE: Daniela Ladd  [address] [address] [address]     Dear Colleague,    Thank you for referring your patient, aDniela Ladd, to the [facility]. Please see a copy of my visit note below.    See my note from 8/6/18    Routine pre-procedure instructions  Timeout  Betadine prep to cervix  Cervix carefully dilated  Mirena placed to anteriorly 8 cm, released in usual fashion and pressed to fundus.  The strings were cut to 3 cms of length.  Self string check in 4-6 weeks  Routine post-procedure instructions    Tolerated well    Again, thank you for allowing me to participate in the care of your patient.      Sincerely,    Krish Salazar MD

## 2018-08-10 ENCOUNTER — TRANSFERRED RECORDS (OUTPATIENT)
Dept: HEALTH INFORMATION MANAGEMENT | Facility: OTHER | Age: 48
End: 2018-08-10

## 2018-08-13 ENCOUNTER — ANTICOAGULATION THERAPY VISIT (OUTPATIENT)
Dept: ANTICOAGULATION | Facility: OTHER | Age: 48
End: 2018-08-13
Attending: FAMILY MEDICINE
Payer: COMMERCIAL

## 2018-08-13 DIAGNOSIS — I82.602 BLOOD CLOT OF VEIN IN SHOULDER AREA, LEFT: ICD-10-CM

## 2018-08-13 DIAGNOSIS — Z79.01 LONG-TERM (CURRENT) USE OF ANTICOAGULANTS: ICD-10-CM

## 2018-08-13 DIAGNOSIS — D68.51 FACTOR 5 LEIDEN MUTATION, HETEROZYGOUS (H): ICD-10-CM

## 2018-08-13 LAB — INR POINT OF CARE: 2.6 (ref 0.86–1.14)

## 2018-08-13 PROCEDURE — 36416 COLLJ CAPILLARY BLOOD SPEC: CPT

## 2018-08-13 PROCEDURE — 85610 PROTHROMBIN TIME: CPT | Mod: QW

## 2018-08-13 NOTE — MR AVS SNAPSHOT
Daniela NANCY Ladd   8/13/2018 12:15 PM   Anticoagulation Therapy Visit    Description:  48 year old female   Provider:  JOCE ANTI COAG 2   Department:  [facility]           INR as of 8/13/2018     Today's INR 2.6      Anticoagulation Summary as of 8/13/2018     INR goal 2.5-3.5   Today's INR 2.6   Full warfarin instructions 10 mg on Mon, Fri; 8 mg all other days   Next INR check 8/27/2018    Indications   Blood clot of vein in shoulder area  left [I82.602]  Factor 5 Leiden mutation  heterozygous (H) [D68.51]  Long-term (current) use of anticoagulants [Z79.01] [Z79.01]         Description     Increase dose and recheck in 2 weeks. .............Yoly Crowe RN.......... 8/13/2018  12:17 PM        August 2018 Details    Sun Mon Tue Wed Thu Fri Sat        1               2               3               4                 5               6               7               8               9               10               11                 12               13      10 mg   See details      14      8 mg         15      8 mg         16      8 mg         17      10 mg         18      8 mg           19      8 mg         20      10 mg         21      8 mg         22      8 mg         23      8 mg         24      10 mg         25      8 mg           26      8 mg         27            28               29               30               31                 Date Details   08/13 This INR check       Date of next INR:  8/27/2018         How to take your warfarin dose     To take:  8 mg Take 4 of the 2 mg tablets.    To take:  10 mg Take 5 of the 2 mg tablets.

## 2018-08-13 NOTE — PROGRESS NOTES
ANTICOAGULATION FOLLOW-UP CLINIC VISIT    Patient Name:  Daniela Ladd  Date:  8/13/2018  Contact Type:  Face to Face    SUBJECTIVE:     Patient Findings     Positives No Problem Findings           OBJECTIVE    INR Protime   Date Value Ref Range Status   08/13/2018 2.6 (A) 0.86 - 1.14 Final       ASSESSMENT / PLAN  INR assessment THER    Recheck INR In: 2 WEEKS    INR Location Clinic      Anticoagulation Summary as of 8/13/2018     INR goal 2.5-3.5   Today's INR 2.6   Warfarin maintenance plan 10 mg (2 mg x 5) on Mon, Fri; 8 mg (2 mg x 4) all other days   Full warfarin instructions 10 mg on Mon, Fri; 8 mg all other days   Weekly warfarin total 60 mg   Plan last modified Yoly Crowe RN (8/13/2018)   Next INR check 8/27/2018   Priority INR   Target end date Indefinite    Indications   Blood clot of vein in shoulder area  left [I82.602]  Factor 5 Leiden mutation  heterozygous (H) [D68.51]  Long-term (current) use of anticoagulants [Z79.01] [Z79.01]         Anticoagulation Episode Summary     INR check location     Preferred lab     Send INR reminders to  INR    Comments INR goal changed per Dr Kitchen 3/21/18 he wants INR checked q 2 weeks        Anticoagulation Care Providers     Provider Role Specialty Phone number    Krystin Black MD [facility] [phone]            See the Encounter Report to view Anticoagulation Flowsheet and Dosing Calendar (Go to Encounters tab in chart review, and find the Anticoagulation Therapy Visit)        Yoly Crowe RN

## 2018-08-16 ENCOUNTER — TRANSFERRED RECORDS (OUTPATIENT)
Dept: HEALTH INFORMATION MANAGEMENT | Facility: OTHER | Age: 48
End: 2018-08-16

## 2018-08-20 ENCOUNTER — TRANSFERRED RECORDS (OUTPATIENT)
Dept: HEALTH INFORMATION MANAGEMENT | Facility: OTHER | Age: 48
End: 2018-08-20

## 2018-08-24 ENCOUNTER — OFFICE VISIT (OUTPATIENT)
Dept: FAMILY MEDICINE | Facility: OTHER | Age: 48
End: 2018-08-24
Attending: FAMILY MEDICINE
Payer: COMMERCIAL

## 2018-08-24 VITALS
WEIGHT: 203 LBS | SYSTOLIC BLOOD PRESSURE: 125 MMHG | HEART RATE: 88 BPM | TEMPERATURE: 97.4 F | DIASTOLIC BLOOD PRESSURE: 75 MMHG | BODY MASS INDEX: 31.86 KG/M2 | HEIGHT: 67 IN

## 2018-08-24 DIAGNOSIS — R60.1 GENERALIZED EDEMA: Primary | ICD-10-CM

## 2018-08-24 PROCEDURE — 99213 OFFICE O/P EST LOW 20 MIN: CPT | Performed by: FAMILY MEDICINE

## 2018-08-24 RX ORDER — FUROSEMIDE 20 MG
TABLET ORAL
Qty: 30 TABLET | Refills: 1 | Status: SHIPPED | OUTPATIENT
Start: 2018-08-24 | End: 2019-01-14

## 2018-08-24 ASSESSMENT — PAIN SCALES - GENERAL: PAINLEVEL: NO PAIN (0)

## 2018-08-24 NOTE — MR AVS SNAPSHOT
After Visit Summary   8/24/2018    Daniela Ladd    MRN: [id number]           Patient Information     Date Of Birth          1970        Visit Information        Provider Department      8/24/2018 11:00 AM Krystin Black MD [facility]        Today's Diagnoses     Generalized edema    -  1      Care Instructions      Endometrial Ablation  Endometrial ablation is an outpatient surgery that can reduce or stop heavy menstrual bleeding. Ablation destroys the lining of the uterus. This surgery is for women who do not want to have any more children and who have not yet entered menopause. It should not be used by women with endometrial hyperplasia or cancer of the uterus. Surgery takes less than an hour, and you can go home later that day.Endometrial ablation is an outpatient surgery that can reduce or stop heavy menstrual bleeding. Ablation destroys the lining of the uterus. This surgery is for women who do not want to have any more children and who have not yet entered menopause. It should not be used by women with endometrial hyperplasia or cancer of the uterus. Surgery takes less than an hour, and you can go home later that day.  Preparing for surgery    You may be given medicine by mouth or injection for a few weeks or months before your surgery. This thins the lining of the uterus and reduces bleeding.    Your healthcare provider may recommend other procedures to check the inside of your uterus before the ablation is done.     The day before surgery, you may be given medicine or a special substance (laminaria) may be put into the opening to the uterus (cervix). This widens the opening.    To help prevent problems with anesthesia, do not eat or drink anything 10 hours before surgery.  Your surgery     Destroying the lining with heat, freezing, or electric current prevents the lining from growing back.        You ll be given anesthesia so you stay comfortable and  relaxed. You will not feel pain during surgery.    Your uterus may be filled with fluid. This puts pressure on the lining to help reduce bleeding. It also allows your healthcare provider to see inside your uterus.    Your healthcare provider puts a small telescope-like instrument through the cervix. This scope may be connected to a video monitor. This helps your healthcare provider see and control the ablation process. At the end of the scope, a device using heat, extreme cold, or electric current destroys the uterine lining. Instead of the scope, your healthcare provider may use another device that both expands and destroys the uterine lining. After being inserted into your uterus, it also uses heat or other energy to destroy the lining. Your healthcare provider will choose the device that s best for you.  Your recovery    You may have cramping or aching in your abdomen after surgery. Your healthcare provider can give you pain medicine.    You may also have a bloody or watery discharge or bleeding for days or weeks. Use sanitary pads, not tampons.    Don t have sex or play active sports for 2 weeks after surgery.    You can likely return to work in 2 days.    Ask your healthcare provider about using contraception after an ablation.    Your healthcare provider will see you in about 6 weeks to check how well you are healing.  Call your healthcare provider if you have any of the following after surgery:    Chills    Fever of 100.4 F (38 C) or higher, or as directed by your healthcare provider    Frequent urination for 24 hours    Heavy vaginal bleeding    Nausea    Persistent or increased abdominal pain    Shortness of breath   Date Last Reviewed: 6/1/2017 2000-2017 [employer]. [address] [place] [address], [place] [address]. All rights reserved. This information is not intended as a substitute for professional medical care. Always follow your healthcare professional's instructions.                 "Follow-ups after your visit        Your next 10 appointments already scheduled     Aug 27, 2018  8:15 AM CDT   Anticoagulation Visit with [facility] ANTI COAG 2   [facility]    [address]  [place] [address] [phone]              Who to contact     If you have questions or need follow up information about today's clinic visit or your schedule please contact [facility] directly at [phone].  Normal or non-critical lab and imaging results will be communicated to you by [employer], letter or phone within 4 business days after the clinic has received the results. If you do not hear from us within 7 days, please contact the clinic through [employer] or phone. If you have a critical or abnormal lab result, we will notify you by phone as soon as possible.  Submit refill requests through [employer] or call your pharmacy and they will forward the refill request to us. Please allow 3 business days for your refill to be completed.          Additional Information About Your Visit        [id number] Information     [employer] gives you secure access to your electronic health record. If you see a primary care provider, you can also send messages to your care team and make appointments. If you have questions, please call your primary care clinic.  If you do not have a primary care provider, please call [phone] and they will assist you.        Care EveryWhere ID     This is your Care EveryWhere ID. This could be used by other organizations to access your [facility] medical records  [id number]        Your Vitals Were     Pulse Temperature Height Last Period Breastfeeding? BMI (Body Mass Index)    88 97.4  F (36.3  C) (Tympanic) 5' 6.5\" (1.689 m) 08/14/2018 No 32.27 kg/m2       Blood Pressure from Last 3 Encounters:   08/24/18 125/75   08/09/18 110/72   08/06/18 124/80    Weight from Last 3 Encounters:   08/24/18 203 lb (92.1 kg)   08/09/18 203 lb " (92.1 kg)   08/06/18 201 lb (91.2 kg)              Today, you had the following     No orders found for display         Today's Medication Changes          These changes are accurate as of 8/24/18 11:59 PM.  If you have any questions, ask your nurse or doctor.               Start taking these medicines.        Dose/Directions    furosemide 20 MG tablet   Commonly known as:  LASIX   Used for:  Generalized edema   Started by:  Krystin Black MD        Take 1/2 tablet in the morning for edema as needed.   Quantity:  30 tablet   Refills:  1            Where to get your medicines      These medications were sent to [facility] [address] [facility] - [place], [place] - [address]  [address] [facility] [address]     Phone:  [phone]     furosemide 20 MG tablet                Primary Care Provider Office Phone # Fax #    Krystin Black [phone]       [address]  [facility] [address]        Equal Access to Services     [facility] Hospital[facility] : Hadii aad ku hadasho Soomaali, waaxda luqadaha, qaybta kaalmada adeegyada, waxay idiin haystephen myaer . So [facility] [phone].    ATENCIÓN: Si habla español, tiene a egan disposición servicios gratuitos de asistencia lingüística. Llame al [phone].    We comply with applicable federal civil rights laws and [place] laws. We do not discriminate on the basis of race, color, national origin, age, disability, sex, sexual orientation, or gender identity.            Thank you!     Thank you for choosing [facility]  for your care. Our goal is always to provide you with excellent care. Hearing back from our patients is one way we can continue to improve our services. Please take a few minutes to complete the written survey that you may receive in the mail after your visit with us. Thank you!             Your Updated Medication List - Protect others around you: Learn how to safely use, store and throw away your  medicines at www.disposemymeds.org.          This list is accurate as of 8/24/18 11:59 PM.  Always use your most recent med list.                   Brand Name Dispense Instructions for use Diagnosis    acetaminophen 500 MG tablet    TYLENOL     Take 1,000 mg by mouth        furosemide 20 MG tablet    LASIX    30 tablet    Take 1/2 tablet in the morning for edema as needed.    Generalized edema       levonorgestrel 20 MCG/24HR IUD    MIRENA    1 each    1 each (20 mcg) by Intrauterine route once for 1 dose        nicotine polacrilex 2 MG gum    NICORETTE          warfarin 2 MG tablet    COUMADIN    375 tablet    Take 4 tablets (8 mg) by mouth daily Or as directed by [facility] clinic    Arm DVT (deep venous thromboembolism), acute, left (H), Long-term (current) use of anticoagulants

## 2018-08-24 NOTE — PROGRESS NOTES
SUBJECTIVE:   Daniela Ladd is a 48 year old female who presents to clinic today for the following health issues:  1) Had IUD placed about 3 weeks ago and notes some bloating everywhere and some acne outbreak. She has some light bleeding this week which is likely her menses   2) continued worry about recurrence of the edema in her left upper extremity after DVT.  She feels like her hand and arm feels somewhat more full and swollen especially with the bloating that she is experiencing as noted in #1.  She is on anticoagulation and continues to monitor closely.  She has known factor V and is followed by hematology.  When she tells me where it is bothering her it is around the area of the lateral epicondyle of the left side and has noted no increase in swelling of her hand.    HPI    Patient Active Problem List   Diagnosis     Blood clot of vein in shoulder area, left     Dyshidrotic hand dermatitis     Endometriosis     Migraine with aura and without status migrainosus, not intractable     Other acne     Raynaud's syndrome     Tobacco abuse     Factor 5 Leiden mutation, heterozygous (H)     Long-term (current) use of anticoagulants [Z79.01]     Pulmonary nodules     Past Surgical History:   Procedure Laterality Date     ANKLE SURGERY      2010,[place]     EXTRACTION(S) DENTAL      No Comments Provided     LAPAROSCOPIC TUBAL LIGATION      08/2004     SALPINGO-OOPHORECTOMY BILATERAL      4/14/11,Planned Lap RSO     TONSILLECTOMY      1980s       Social History   Substance Use Topics     Smoking status: Current Every Day Smoker     Packs/day: 1.00     Years: 30.00     Types: Cigarettes     Smokeless tobacco: Never Used     Alcohol use No     Family History   Problem Relation Age of Onset     Allergy (Severe) Mother      Allergies     HEART DISEASE Mother      Heart Disease     Diabetes Mother      Diabetes     Other - See Comments Mother      Peripheral vascular disese     Unknown/Adopted Father      Suspected  "cardiac.      HEART DISEASE Paternal Grandfather      Heart Disease,CAD     Other - See Comments Other      endometriosis     Other - See Comments Brother      by her mother/by her father - lymphedema after knee surgery     Family History Negative Brother      Good Health,by her mother/by her father     Family History Negative Son      Good Health     Cancer Maternal Grandfather      Cancer,lung cancer, dm     Breast Cancer Maternal Grandmother      Cancer-breast,CREST syndrome, Raynaud's, scleroderma,  of CHF, small veins and arteries     Diabetes Maternal Aunt      Diabetes,also has had a borderline ovarian mass (not benign or cancerous)     Thyroid Disease Other      Thyroid Disease,several members of family with thyroid disease.     CLOTTING DISORDER Paternal Uncle      Factor V         Current Outpatient Prescriptions   Medication Sig Dispense Refill     acetaminophen (TYLENOL) 500 MG tablet Take 1,000 mg by mouth       furosemide (LASIX) 20 MG tablet Take 1/2 tablet in the morning for edema as needed. 30 tablet 1     levonorgestrel (MIRENA) 20 MCG/24HR IUD 1 each (20 mcg) by Intrauterine route once for 1 dose 1 each 0     nicotine polacrilex (NICORETTE) 2 MG gum   2     warfarin (COUMADIN) 2 MG tablet Take 4 tablets (8 mg) by mouth daily Or as directed by protime clinic 375 tablet 0     Allergies   Allergen Reactions     Amoxicillin-Pot Clavulanate Nausea     Cyclobenzaprine Rash     No Clinical Screening - See Comments Rash     Nicoderm patch       Review of Systems     OBJECTIVE:     BP (!) 146/100 (BP Location: Right arm, Patient Position: Sitting, Cuff Size: Adult Regular)  Pulse 88  Temp 97.4  F (36.3  C) (Tympanic)  Ht 5' 6.5\" (1.689 m)  Wt 203 lb (92.1 kg)  LMP 2018  Breastfeeding? No  BMI 32.27 kg/m2  Body mass index is 32.27 kg/(m^2).   Wt Readings from Last 3 Encounters:   18 203 lb (92.1 kg)   18 203 lb (92.1 kg)   18 201 lb (91.2 kg)       Physical Exam "   Constitutional: She appears well-developed. No distress.   Musculoskeletal:   No significant edema of either hand and there is no discrepancy in size of her left left to right upper extremities.  The area she indicates on her left elbow region seems to be musculoskeletal.  No concerns at this point for recurrence of DVT.   Nursing note and vitals reviewed.      Diagnostic Test Results:  none     ASSESSMENT/PLAN:         ICD-10-CM    1. Generalized edema R60.1 furosemide (LASIX) 20 MG tablet     Plan:  She is willing to continue the Mirena IUD and see if this helps with her vaginal bleeding since anything further done would be a surgical.  The acne she experienced earlier in the week and the generalized edema are likely related to the fact that she is now having a light menses.  Reassured that I do not have concerns about her left upper extremity today.  Remain on Coumadin and close follow-up of her INRs.  If she feels that side effects of Mirena IUD are intolerable she should follow-up with Dr. Krish Salazar regarding other alternatives and removal of IUD if desired.  Low-dose Lasix given to take when she feels like she is very edematous but not daily.  10 mg dose recommended.  Krystin Black MD  [facility]    Portions of this dictation were created using the Dragon [employer] voice recognition system. Proofreading was completed but there may be errors in text.

## 2018-08-24 NOTE — NURSING NOTE
No chief complaint on file.    Pt present to clinic today to check on her possible blood clot in her arm and for some issues with water retention.  Medication Reconciliation: complete    Glenda Duran LPN

## 2018-08-24 NOTE — PATIENT INSTRUCTIONS
Endometrial Ablation  Endometrial ablation is an outpatient surgery that can reduce or stop heavy menstrual bleeding. Ablation destroys the lining of the uterus. This surgery is for women who do not want to have any more children and who have not yet entered menopause. It should not be used by women with endometrial hyperplasia or cancer of the uterus. Surgery takes less than an hour, and you can go home later that day.Endometrial ablation is an outpatient surgery that can reduce or stop heavy menstrual bleeding. Ablation destroys the lining of the uterus. This surgery is for women who do not want to have any more children and who have not yet entered menopause. It should not be used by women with endometrial hyperplasia or cancer of the uterus. Surgery takes less than an hour, and you can go home later that day.  Preparing for surgery    You may be given medicine by mouth or injection for a few weeks or months before your surgery. This thins the lining of the uterus and reduces bleeding.    Your healthcare provider may recommend other procedures to check the inside of your uterus before the ablation is done.     The day before surgery, you may be given medicine or a special substance (laminaria) may be put into the opening to the uterus (cervix). This widens the opening.    To help prevent problems with anesthesia, do not eat or drink anything 10 hours before surgery.  Your surgery     Destroying the lining with heat, freezing, or electric current prevents the lining from growing back.        You ll be given anesthesia so you stay comfortable and relaxed. You will not feel pain during surgery.    Your uterus may be filled with fluid. This puts pressure on the lining to help reduce bleeding. It also allows your healthcare provider to see inside your uterus.    Your healthcare provider puts a small telescope-like instrument through the cervix. This scope may be connected to a video monitor. This helps your  healthcare provider see and control the ablation process. At the end of the scope, a device using heat, extreme cold, or electric current destroys the uterine lining. Instead of the scope, your healthcare provider may use another device that both expands and destroys the uterine lining. After being inserted into your uterus, it also uses heat or other energy to destroy the lining. Your healthcare provider will choose the device that s best for you.  Your recovery    You may have cramping or aching in your abdomen after surgery. Your healthcare provider can give you pain medicine.    You may also have a bloody or watery discharge or bleeding for days or weeks. Use sanitary pads, not tampons.    Don t have sex or play active sports for 2 weeks after surgery.    You can likely return to work in 2 days.    Ask your healthcare provider about using contraception after an ablation.    Your healthcare provider will see you in about 6 weeks to check how well you are healing.  Call your healthcare provider if you have any of the following after surgery:    Chills    Fever of 100.4 F (38 C) or higher, or as directed by your healthcare provider    Frequent urination for 24 hours    Heavy vaginal bleeding    Nausea    Persistent or increased abdominal pain    Shortness of breath   Date Last Reviewed: 6/1/2017 2000-2017 [employer]. [address] [place] [address], [place] [address]. All rights reserved. This information is not intended as a substitute for professional medical care. Always follow your healthcare professional's instructions.

## 2018-08-25 ASSESSMENT — PATIENT HEALTH QUESTIONNAIRE - PHQ9: SUM OF ALL RESPONSES TO PHQ QUESTIONS 1-9: 1

## 2018-08-27 ENCOUNTER — ANTICOAGULATION THERAPY VISIT (OUTPATIENT)
Dept: ANTICOAGULATION | Facility: OTHER | Age: 48
End: 2018-08-27
Attending: FAMILY MEDICINE
Payer: COMMERCIAL

## 2018-08-27 DIAGNOSIS — Z79.01 LONG-TERM (CURRENT) USE OF ANTICOAGULANTS: ICD-10-CM

## 2018-08-27 DIAGNOSIS — D68.51 FACTOR 5 LEIDEN MUTATION, HETEROZYGOUS (H): ICD-10-CM

## 2018-08-27 DIAGNOSIS — I82.602 BLOOD CLOT OF VEIN IN SHOULDER AREA, LEFT: ICD-10-CM

## 2018-08-27 DIAGNOSIS — I82.622 ARM DVT (DEEP VENOUS THROMBOEMBOLISM), ACUTE, LEFT (H): Primary | ICD-10-CM

## 2018-08-27 LAB — INR POINT OF CARE: 3.3 (ref 0.86–1.14)

## 2018-08-27 PROCEDURE — 36416 COLLJ CAPILLARY BLOOD SPEC: CPT

## 2018-08-27 PROCEDURE — 85610 PROTHROMBIN TIME: CPT | Mod: QW

## 2018-08-27 RX ORDER — WARFARIN SODIUM 2 MG/1
TABLET ORAL
Qty: 375 TABLET | Refills: 0 | COMMUNITY
Start: 2018-08-27 | End: 2018-10-15

## 2018-08-27 NOTE — MR AVS SNAPSHOT
Daniela CRUZ Julee   8/27/2018 8:15 AM   Anticoagulation Therapy Visit    Description:  48 year old female   Provider:  [facility]   Department:  [facility]           INR as of 8/27/2018     Today's INR 3.3      Anticoagulation Summary as of 8/27/2018     INR goal 2.5-3.5   Today's INR 3.3   Full warfarin instructions 10 mg on Mon, Fri; 8 mg all other days   Next INR check 9/24/2018    Indications   Blood clot of vein in shoulder area  left [I82.602]  Factor 5 Leiden mutation  heterozygous (H) [D68.51]  Long-term (current) use of anticoagulants [Z79.01] [Z79.01]         Description     Continue same Warfarin dose and recheck in 1 month.  Yoly Crowe RN   8/27/2018    8:15 AM      August 2018 Details    Sun Mon Tue Wed Thu Fri Sat        1               2               3               4                 5               6               7               8               9               10               11                 12               13               14               15               16               17               18                 19               20               21               22               23               24               25                 26               27      10 mg   See details      28      8 mg         29      8 mg         30      8 mg         31      10 mg           Date Details   08/27 This INR check               How to take your warfarin dose     To take:  8 mg Take 4 of the 2 mg tablets.    To take:  10 mg Take 5 of the 2 mg tablets.           September 2018 Details    Sun Mon Tue Wed Thu Fri Sat           1      8 mg           2      8 mg         3      10 mg         4      8 mg         5      8 mg         6      8 mg         7      10 mg         8      8 mg           9      8 mg         10      10 mg         11      8 mg         12      8 mg         13      8 mg         14      10 mg         15      8 mg           16      8 mg         17      10 mg         18      8 mg          19      8 mg         20      8 mg         21      10 mg         22      8 mg           23      8 mg         24            25               26               27               28               29                 30                      Date Details   No additional details    Date of next INR:  9/24/2018         How to take your warfarin dose     To take:  8 mg Take 4 of the 2 mg tablets.    To take:  10 mg Take 5 of the 2 mg tablets.

## 2018-09-03 ENCOUNTER — TRANSFERRED RECORDS (OUTPATIENT)
Dept: HEALTH INFORMATION MANAGEMENT | Facility: OTHER | Age: 48
End: 2018-09-03

## 2018-09-09 ENCOUNTER — TRANSFERRED RECORDS (OUTPATIENT)
Dept: HEALTH INFORMATION MANAGEMENT | Facility: OTHER | Age: 48
End: 2018-09-09

## 2018-09-10 ENCOUNTER — TRANSFERRED RECORDS (OUTPATIENT)
Dept: HEALTH INFORMATION MANAGEMENT | Facility: OTHER | Age: 48
End: 2018-09-10

## 2018-09-14 ENCOUNTER — OFFICE VISIT (OUTPATIENT)
Dept: OBGYN | Facility: OTHER | Age: 48
End: 2018-09-14
Attending: OBSTETRICS & GYNECOLOGY
Payer: COMMERCIAL

## 2018-09-14 VITALS
DIASTOLIC BLOOD PRESSURE: 72 MMHG | WEIGHT: 203 LBS | SYSTOLIC BLOOD PRESSURE: 124 MMHG | BODY MASS INDEX: 32.27 KG/M2 | HEART RATE: 72 BPM | RESPIRATION RATE: 16 BRPM

## 2018-09-14 DIAGNOSIS — N92.0 EXCESSIVE OR FREQUENT MENSTRUATION: ICD-10-CM

## 2018-09-14 DIAGNOSIS — Z79.01 LONG-TERM (CURRENT) USE OF ANTICOAGULANTS: ICD-10-CM

## 2018-09-14 DIAGNOSIS — Z30.431 IUD CHECK UP: Primary | ICD-10-CM

## 2018-09-14 PROCEDURE — 99212 OFFICE O/P EST SF 10 MIN: CPT | Performed by: OBSTETRICS & GYNECOLOGY

## 2018-09-14 ASSESSMENT — PAIN SCALES - GENERAL: PAINLEVEL: NO PAIN (0)

## 2018-09-14 NOTE — MR AVS SNAPSHOT
After Visit Summary   9/14/2018    Daniela Ladd    MRN: [id number]           Patient Information     Date Of Birth          1970        Visit Information        Provider Department      9/14/2018 9:30 AM Krish Salazar MD [facility]        Today's Diagnoses     IUD check up    -  1    Long-term (current) use of anticoagulants [Z79.01]        Excessive or frequent menstruation           Follow-ups after your visit        Who to contact     If you have questions or need follow up information about today's clinic visit or your schedule please contact [facility] directly at [phone].  Normal or non-critical lab and imaging results will be communicated to you by LRNhart, letter or phone within 4 business days after the clinic has received the results. If you do not hear from us within 7 days, please contact the clinic through Cartela ABt or phone. If you have a critical or abnormal lab result, we will notify you by phone as soon as possible.  Submit refill requests through Plastic Jungle or call your pharmacy and they will forward the refill request to us. Please allow 3 business days for your refill to be completed.          Additional Information About Your Visit        MyChart Information     [employer] gives you secure access to your electronic health record. If you see a primary care provider, you can also send messages to your care team and make appointments. If you have questions, please call your primary care clinic.  If you do not have a primary care provider, please call [phone] and they will assist you.        Care EveryWhere ID     This is your Care EveryWhere ID. This could be used by other organizations to access your [facility] medical records  [id number]        Your Vitals Were     Pulse Respirations Breastfeeding? BMI (Body Mass Index)          72 16 No 32.27 kg/m2         Blood Pressure from Last 3 Encounters:   09/14/18 124/72    08/24/18 125/75   08/09/18 110/72    Weight from Last 3 Encounters:   09/14/18 92.1 kg (203 lb)   08/24/18 92.1 kg (203 lb)   08/09/18 92.1 kg (203 lb)              Today, you had the following     No orders found for display       Primary Care Provider Office Phone # Fax #    Krystin Vy Black [phone]       [address] [facility] [address]        Equal Access to Services     [facility] : Hadii aad ku hadasho Soomaali, waaxda luqadaha, qaybta kaalmada adeegyada, waxmalachi perez haystephen mayer . So [facility] [phone].    ATENCIÓN: Si habla español, tiene a egan disposición servicios gratuitos de asistencia lingüística. Llame al [phone].    We comply with applicable federal civil rights laws and [place] laws. We do not discriminate on the basis of race, color, national origin, age, disability, sex, sexual orientation, or gender identity.            Thank you!     Thank you for choosing [facility]  for your care. Our goal is always to provide you with excellent care. Hearing back from our patients is one way we can continue to improve our services. Please take a few minutes to complete the written survey that you may receive in the mail after your visit with us. Thank you!             Your Updated Medication List - Protect others around you: Learn how to safely use, store and throw away your medicines at www.disposemymeds.org.          This list is accurate as of 9/14/18  9:49 AM.  Always use your most recent med list.                   Brand Name Dispense Instructions for use Diagnosis    acetaminophen 500 MG tablet    TYLENOL     Take 1,000 mg by mouth        furosemide 20 MG tablet    LASIX    30 tablet    Take 1/2 tablet in the morning for edema as needed.    Generalized edema       levonorgestrel 20 MCG/24HR IUD    MIRENA    1 each    1 each (20 mcg) by Intrauterine route once for 1 dose        nicotine polacrilex 2 MG gum    NICORETTE           warfarin 2 MG tablet    COUMADIN    375 tablet    Take 10 mg x 2 days and 8 mg x 5 days/week Or as directed by [facility] clinic    Arm DVT (deep venous thromboembolism), acute, left (H), Long-term (current) use of anticoagulants

## 2018-09-14 NOTE — PROGRESS NOTES
Details here for follow-up.  A month ago we had placed Mirena IUD for heavier bleeding.  Ultrasound prior to that it showed a 5 mm or less endometrial plate.  Bleeding was thought to be excessive due to her being on chronic anticoagulation (Coumadin).    She has had some daily spotting.  Is not sure that she is feeling her IUD strings.  Again has some markedly anteflexed uterus.  Otherwise doing very well.  Feels she has more energy.    On examination vitals are as listed.  Speculum exam does show a small amount of dark blood in the vagina.  Strings are visible and are the length that they were cut at.  Cervix is very posterior.    Patient is reassured.  We are going to give her 1 more month to see if her daily bleeding settles down.  She will call back if it has not been we will consider a couple months of daily norethindrone.

## 2018-09-14 NOTE — NURSING NOTE
"Chief Complaint   Patient presents with     IUD     Follow up IUD - Check        Patient presents for IUD Check, she is unsure if she can feel her strings.  She is also having some light continuous bleeding since this was placed.   Lisa Cortes LPN........................9/14/2018  9:26 AM     Initial There were no vitals taken for this visit. Estimated body mass index is 32.27 kg/(m^2) as calculated from the following:    Height as of 8/24/18: 1.689 m (5' 6.5\").    Weight as of 8/24/18: 92.1 kg (203 lb).  Medication Reconciliation: complete    Lisa Cortes LPN  "

## 2018-09-17 ENCOUNTER — TRANSFERRED RECORDS (OUTPATIENT)
Dept: HEALTH INFORMATION MANAGEMENT | Facility: OTHER | Age: 48
End: 2018-09-17

## 2018-09-24 ENCOUNTER — TRANSFERRED RECORDS (OUTPATIENT)
Dept: HEALTH INFORMATION MANAGEMENT | Facility: OTHER | Age: 48
End: 2018-09-24

## 2018-10-01 ENCOUNTER — TRANSFERRED RECORDS (OUTPATIENT)
Dept: HEALTH INFORMATION MANAGEMENT | Facility: OTHER | Age: 48
End: 2018-10-01

## 2018-10-01 ENCOUNTER — ANTICOAGULATION THERAPY VISIT (OUTPATIENT)
Dept: ANTICOAGULATION | Facility: OTHER | Age: 48
End: 2018-10-01
Payer: COMMERCIAL

## 2018-10-01 DIAGNOSIS — D68.51 FACTOR 5 LEIDEN MUTATION, HETEROZYGOUS (H): ICD-10-CM

## 2018-10-01 DIAGNOSIS — Z79.01 LONG-TERM (CURRENT) USE OF ANTICOAGULANTS: ICD-10-CM

## 2018-10-01 DIAGNOSIS — I82.602 BLOOD CLOT OF VEIN IN SHOULDER AREA, LEFT: ICD-10-CM

## 2018-10-01 LAB — INR PPP: 3.9

## 2018-10-01 PROCEDURE — 99207 ZZC NO CHARGE NURSE ONLY: CPT | Performed by: FAMILY MEDICINE

## 2018-10-01 NOTE — PROGRESS NOTES
ANTICOAGULATION FOLLOW-UP CLINIC VISIT    Patient Name:  Daniela Ladd  Date:  10/1/2018  Contact Type:  Face to Face    SUBJECTIVE:     Patient Findings     Positives Change in diet/appetite (states she let up on how much she was having for greens since her number was on low side last week)           OBJECTIVE    INR   Date Value Ref Range Status   10/01/2018 3.9  Final       ASSESSMENT / PLAN  INR assessment SUPRA    Recheck INR In: 4 WEEKS    INR Location Clinic      Anticoagulation Summary as of 10/1/2018     INR goal 2.5-3.5   Today's INR 3.9!   Warfarin maintenance plan 10 mg (2 mg x 5) on Mon, Fri; 8 mg (2 mg x 4) all other days   Full warfarin instructions 10 mg on Mon, Fri; 8 mg all other days   Weekly warfarin total 60 mg   No change documented Yoly Crowe RN   Plan last modified Yoly Crowe RN (8/13/2018)   Next INR check 10/29/2018   Priority INR   Target end date Indefinite    Indications   Blood clot of vein in shoulder area  left [I82.602]  Factor 5 Leiden mutation  heterozygous (H) [D68.51]  Long-term (current) use of anticoagulants [Z79.01] [Z79.01]         Anticoagulation Episode Summary     INR check location     Preferred lab     Send INR reminders to  INR    Comments INR goal changed per Dr Kitchen 3/21/18 has her own machine and checks INR weekly        Anticoagulation Care Providers     Provider Role Specialty Phone number    Krystin Black MD [facility] [phone]            See the Encounter Report to view Anticoagulation Flowsheet and Dosing Calendar (Go to Encounters tab in chart review, and find the Anticoagulation Therapy Visit)        Yoly Crowe RN

## 2018-10-01 NOTE — MR AVS SNAPSHOT
Daniela NANCY Ladd   10/1/2018   Anticoagulation Therapy Visit    Description:  48 year old female   Provider:  Krystin Black MD   Department:   Anticoag           INR as of 10/1/2018     Today's INR 3.9!      Anticoagulation Summary as of 10/1/2018     INR goal 2.5-3.5   Today's INR 3.9!   Full warfarin instructions 10 mg on Mon, Fri; 8 mg all other days   Next INR check 10/29/2018    Indications   Blood clot of vein in shoulder area  left [I82.602]  Factor 5 Leiden mutation  heterozygous (H) [D68.51]  Long-term (current) use of anticoagulants [Z79.01] [Z79.01]         Description     Continue same Warfarin dose and recheck in 1 month.  Yoly Crowe RN   10/1/2018    9:29 AM      October 2018 Details    Sun Mon Tue Wed Thu Fri Sat      1      10 mg   See details      2      8 mg         3      8 mg         4      8 mg         5      10 mg         6      8 mg           7      8 mg         8      10 mg         9      8 mg         10      8 mg         11      8 mg         12      10 mg         13      8 mg           14      8 mg         15      10 mg         16      8 mg         17      8 mg         18      8 mg         19      10 mg         20      8 mg           21      8 mg         22      10 mg         23      8 mg         24      8 mg         25      8 mg         26      10 mg         27      8 mg           28      8 mg         29            30               31                   Date Details   10/01 This INR check       Date of next INR:  10/29/2018         How to take your warfarin dose     To take:  8 mg Take 4 of the 2 mg tablets.    To take:  10 mg Take 5 of the 2 mg tablets.

## 2018-10-03 ENCOUNTER — TELEPHONE (OUTPATIENT)
Dept: ONCOLOGY | Facility: OTHER | Age: 48
End: 2018-10-03

## 2018-10-03 DIAGNOSIS — I82.602 BLOOD CLOT OF VEIN IN SHOULDER AREA, LEFT: Primary | ICD-10-CM

## 2018-10-03 DIAGNOSIS — R91.8 PULMONARY NODULES: ICD-10-CM

## 2018-10-03 NOTE — TELEPHONE ENCOUNTER
States that she has been having a feeling of tightness around left rib area. History left upper extremity DVT. States no pain or difficulty breathing. Wondering about having scan of area when she has her follow up ultrasound left arm. To Magda Kitchen MD  To address.  Alycia Rodriguez RN...........10/3/2018 10:29 AM

## 2018-10-07 DIAGNOSIS — Z79.01 LONG TERM CURRENT USE OF ANTICOAGULANT THERAPY: ICD-10-CM

## 2018-10-07 DIAGNOSIS — I82.622 ARM DVT (DEEP VENOUS THROMBOEMBOLISM), ACUTE, LEFT (H): ICD-10-CM

## 2018-10-08 ENCOUNTER — HOSPITAL ENCOUNTER (OUTPATIENT)
Dept: CT IMAGING | Facility: OTHER | Age: 48
Discharge: HOME | End: 2018-10-08
Attending: INTERNAL MEDICINE | Admitting: INTERNAL MEDICINE
Payer: COMMERCIAL

## 2018-10-08 ENCOUNTER — HOSPITAL ENCOUNTER (OUTPATIENT)
Dept: ULTRASOUND IMAGING | Facility: OTHER | Age: 48
End: 2018-10-08
Attending: INTERNAL MEDICINE
Payer: COMMERCIAL

## 2018-10-08 ENCOUNTER — HOSPITAL ENCOUNTER (OUTPATIENT)
Dept: CT IMAGING | Facility: OTHER | Age: 48
End: 2018-10-08
Attending: INTERNAL MEDICINE
Payer: COMMERCIAL

## 2018-10-08 ENCOUNTER — TRANSFERRED RECORDS (OUTPATIENT)
Dept: HEALTH INFORMATION MANAGEMENT | Facility: OTHER | Age: 48
End: 2018-10-08

## 2018-10-08 ENCOUNTER — ANTICOAGULATION THERAPY VISIT (OUTPATIENT)
Dept: FAMILY MEDICINE | Facility: OTHER | Age: 48
End: 2018-10-08
Payer: COMMERCIAL

## 2018-10-08 DIAGNOSIS — I82.602 BLOOD CLOT OF VEIN IN SHOULDER AREA, LEFT: ICD-10-CM

## 2018-10-08 DIAGNOSIS — R91.8 PULMONARY NODULES: ICD-10-CM

## 2018-10-08 DIAGNOSIS — D68.51 FACTOR 5 LEIDEN MUTATION, HETEROZYGOUS (H): ICD-10-CM

## 2018-10-08 LAB
ALBUMIN SERPL-MCNC: 4.3 G/DL (ref 3.5–5.7)
ALP SERPL-CCNC: 41 U/L (ref 34–104)
ALT SERPL W P-5'-P-CCNC: 16 U/L (ref 7–52)
ANION GAP SERPL CALCULATED.3IONS-SCNC: 5 MMOL/L (ref 3–14)
AST SERPL W P-5'-P-CCNC: 14 U/L (ref 13–39)
BASOPHILS # BLD AUTO: 0 10E9/L (ref 0–0.2)
BASOPHILS NFR BLD AUTO: 0.4 %
BILIRUB SERPL-MCNC: 0.4 MG/DL (ref 0.3–1)
BUN SERPL-MCNC: 8 MG/DL (ref 7–25)
CALCIUM SERPL-MCNC: 9.2 MG/DL (ref 8.6–10.3)
CHLORIDE SERPL-SCNC: 105 MMOL/L (ref 98–107)
CO2 SERPL-SCNC: 27 MMOL/L (ref 21–31)
CREAT SERPL-MCNC: 0.76 MG/DL (ref 0.6–1.2)
D DIMER PPP DDU-MCNC: 287 NG/ML D-DU (ref 0–230)
DIFFERENTIAL METHOD BLD: NORMAL
EOSINOPHIL # BLD AUTO: 0.1 10E9/L (ref 0–0.7)
EOSINOPHIL NFR BLD AUTO: 0.6 %
ERYTHROCYTE [DISTWIDTH] IN BLOOD BY AUTOMATED COUNT: 12.5 % (ref 10–15)
GFR SERPL CREATININE-BSD FRML MDRD: 82 ML/MIN/1.7M2
GLUCOSE SERPL-MCNC: 94 MG/DL (ref 70–105)
HCT VFR BLD AUTO: 45.5 % (ref 35–47)
HGB BLD-MCNC: 15.2 G/DL (ref 11.7–15.7)
IMM GRANULOCYTES # BLD: 0 10E9/L (ref 0–0.4)
IMM GRANULOCYTES NFR BLD: 0.2 %
INR PPP: 3.6 (ref 2.5–3.5)
LDH SERPL L TO P-CCNC: 150 U/L (ref 140–271)
LYMPHOCYTES # BLD AUTO: 3.4 10E9/L (ref 0.8–5.3)
LYMPHOCYTES NFR BLD AUTO: 31.6 %
MCH RBC QN AUTO: 29.7 PG (ref 26.5–33)
MCHC RBC AUTO-ENTMCNC: 33.4 G/DL (ref 31.5–36.5)
MCV RBC AUTO: 89 FL (ref 78–100)
MONOCYTES # BLD AUTO: 0.5 10E9/L (ref 0–1.3)
MONOCYTES NFR BLD AUTO: 4.2 %
NEUTROPHILS # BLD AUTO: 6.7 10E9/L (ref 1.6–8.3)
NEUTROPHILS NFR BLD AUTO: 63 %
PLATELET # BLD AUTO: 363 10E9/L (ref 150–450)
POTASSIUM SERPL-SCNC: 4.2 MMOL/L (ref 3.5–5.1)
PROT SERPL-MCNC: 7.1 G/DL (ref 6.4–8.9)
RBC # BLD AUTO: 5.12 10E12/L (ref 3.8–5.2)
SODIUM SERPL-SCNC: 137 MMOL/L (ref 134–144)
WBC # BLD AUTO: 10.6 10E9/L (ref 4–11)

## 2018-10-08 PROCEDURE — 99207 ZZC NO CHARGE NURSE ONLY: CPT | Performed by: FAMILY MEDICINE

## 2018-10-08 PROCEDURE — 74177 CT ABD & PELVIS W/CONTRAST: CPT

## 2018-10-08 PROCEDURE — 85379 FIBRIN DEGRADATION QUANT: CPT | Performed by: INTERNAL MEDICINE

## 2018-10-08 PROCEDURE — 71260 CT THORAX DX C+: CPT

## 2018-10-08 PROCEDURE — 93971 EXTREMITY STUDY: CPT | Mod: LT

## 2018-10-08 PROCEDURE — 80053 COMPREHEN METABOLIC PANEL: CPT | Performed by: INTERNAL MEDICINE

## 2018-10-08 PROCEDURE — 83615 LACTATE (LD) (LDH) ENZYME: CPT | Performed by: INTERNAL MEDICINE

## 2018-10-08 PROCEDURE — 25500064 ZZH RX 255 OP 636: Performed by: INTERNAL MEDICINE

## 2018-10-08 PROCEDURE — 85025 COMPLETE CBC W/AUTO DIFF WBC: CPT | Performed by: INTERNAL MEDICINE

## 2018-10-08 PROCEDURE — 36415 COLL VENOUS BLD VENIPUNCTURE: CPT | Performed by: INTERNAL MEDICINE

## 2018-10-08 RX ORDER — WARFARIN SODIUM 2 MG/1
TABLET ORAL
Qty: 360 TABLET | Refills: 0 | Status: SHIPPED | OUTPATIENT
Start: 2018-10-08 | End: 2018-10-26

## 2018-10-08 RX ADMIN — IOHEXOL 100 ML: 350 INJECTION, SOLUTION INTRAVENOUS at 13:10

## 2018-10-08 NOTE — PROGRESS NOTES
ANTICOAGULATION FOLLOW-UP CLINIC VISIT    Patient Name:  Daniela Ladd  Date:  10/8/2018  Contact Type:  Face to Face    SUBJECTIVE:     Patient Findings     Positives No Problem Findings    Comments Per phone call with patient           OBJECTIVE    INR   Date Value Ref Range Status   10/08/2018 3.6 (A) 2.5 - 3.5 Final       ASSESSMENT / PLAN  INR assessment THER    Recheck INR In: 1 WEEK    INR Location Clinic      Anticoagulation Summary as of 10/8/2018     INR goal 2.5-3.5   Today's INR 3.6!   Warfarin maintenance plan 10 mg (2 mg x 5) on Mon, Fri; 8 mg (2 mg x 4) all other days   Full warfarin instructions 10 mg on Mon, Fri; 8 mg all other days   Weekly warfarin total 60 mg   No change documented Renee Kevin RN   Plan last modified Yoly Crowe RN (8/13/2018)   Next INR check 10/15/2018   Priority INR   Target end date Indefinite    Indications   Blood clot of vein in shoulder area  left [I82.602]  Factor 5 Leiden mutation  heterozygous (H) [D68.51]  Long-term (current) use of anticoagulants [Z79.01] [Z79.01]         Anticoagulation Episode Summary     INR check location     Preferred lab     Send INR reminders to  INR    Comments INR goal changed per Dr Kitchen 3/21/18 has her own machine and checks INR weekly        Anticoagulation Care Providers     Provider Role Specialty Phone number    Krystin Black MD [facility] [phone]            See the Encounter Report to view Anticoagulation Flowsheet and Dosing Calendar (Go to Encounters tab in chart review, and find the Anticoagulation Therapy Visit)        Renee Kevin RN                 ANTICOAGULATION FOLLOW-UP CLINIC VISIT    Patient Name:  Daniela Ladd  Date:  10/8/2018  Contact Type:  INR received via fax from [employer]    SUBJECTIVE:     Patient Findings     Positives No Problem Findings    Comments Per phone call with patient           OBJECTIVE    INR   Date Value Ref Range Status    10/08/2018 3.6 (A) 2.5 - 3.5 Final       ASSESSMENT / PLAN  INR assessment THER    Recheck INR In: 1 WEEK    INR Location Clinic      Anticoagulation Summary as of 10/8/2018     INR goal 2.5-3.5   Today's INR 3.6!   Warfarin maintenance plan 10 mg (2 mg x 5) on Mon, Fri; 8 mg (2 mg x 4) all other days   Full warfarin instructions 10 mg on Mon, Fri; 8 mg all other days   Weekly warfarin total 60 mg   No change documented Renee Kevin RN   Plan last modified Yoly Crowe RN (8/13/2018)   Next INR check 10/15/2018   Priority INR   Target end date Indefinite    Indications   Blood clot of vein in shoulder area  left [I82.602]  Factor 5 Leiden mutation  heterozygous (H) [D68.51]  Long-term (current) use of anticoagulants [Z79.01] [Z79.01]         Anticoagulation Episode Summary     INR check location     Preferred lab     Send INR reminders to  INR    Comments INR goal changed per Dr Kitchen 3/21/18 has her own machine and checks INR weekly        Anticoagulation Care Providers     Provider Role Specialty Phone number    Krystin Black MD Responsible [facility] [phone]            See the Encounter Report to view Anticoagulation Flowsheet and Dosing Calendar (Go to Encounters tab in chart review, and find the Anticoagulation Therapy Visit)     No encounter, INR received via fax. Assessment via telephone call.  Instructions given over the telephone to patient.  She verbalized understanding.      Renee Kevin RN

## 2018-10-08 NOTE — TELEPHONE ENCOUNTER
Last INR 3.6: 10/08/18 (today)  Last office visit with Dr. Black: 08/24/18  Prescription approved per [facility].

## 2018-10-08 NOTE — MR AVS SNAPSHOT
Daniela NANCY Ladd   10/8/2018   Anticoagulation Therapy Visit    Description:  48 year old female   Provider:  Krystin Black MD   Department:   Family Practice           INR as of 10/8/2018     Today's INR 3.6!      Anticoagulation Summary as of 10/8/2018     INR goal 2.5-3.5   Today's INR 3.6!   Full warfarin instructions 10 mg on Mon, Fri; 8 mg all other days   Next INR check 10/15/2018    Indications   Blood clot of vein in shoulder area  left [I82.602]  Factor 5 Leiden mutation  heterozygous (H) [D68.51]  Long-term (current) use of anticoagulants [Z79.01] [Z79.01]         Description     Continue same dose of Coumadin/Warfarinand recheck INR in one week.  Renee Kevin ....................  10/8/2018   11:54 AM          October 2018 Details    Sun Mon Tue Wed Thu Fri Sat      1               2               3               4               5               6                 7               8      10 mg   See details      9      8 mg         10      8 mg         11      8 mg         12      10 mg         13      8 mg           14      8 mg         15            16               17               18               19               20                 21               22               23               24               25               26               27                 28               29               30               31                   Date Details   10/08 This INR check       Date of next INR:  10/15/2018         How to take your warfarin dose     To take:  8 mg Take 4 of the 2 mg tablets.    To take:  10 mg Take 5 of the 2 mg tablets.

## 2018-10-15 ENCOUNTER — TRANSFERRED RECORDS (OUTPATIENT)
Dept: HEALTH INFORMATION MANAGEMENT | Facility: OTHER | Age: 48
End: 2018-10-15

## 2018-10-15 ENCOUNTER — ANTICOAGULATION THERAPY VISIT (OUTPATIENT)
Dept: FAMILY MEDICINE | Facility: OTHER | Age: 48
End: 2018-10-15
Payer: COMMERCIAL

## 2018-10-15 DIAGNOSIS — I82.602 BLOOD CLOT OF VEIN IN SHOULDER AREA, LEFT: ICD-10-CM

## 2018-10-15 DIAGNOSIS — D68.51 FACTOR 5 LEIDEN MUTATION, HETEROZYGOUS (H): ICD-10-CM

## 2018-10-15 LAB — INR PPP: 3.4

## 2018-10-15 PROCEDURE — 99207 ZZC NO CHARGE NURSE ONLY: CPT | Performed by: FAMILY MEDICINE

## 2018-10-15 NOTE — MR AVS SNAPSHOT
Daniela NANCY Ladd   10/15/2018   Anticoagulation Therapy Visit    Description:  48 year old female   Provider:  Krystin Black MD   Department:   Family Practice           INR as of 10/15/2018     Today's INR 3.4      Anticoagulation Summary as of 10/15/2018     INR goal 2.5-3.5   Today's INR 3.4   Full warfarin instructions 10 mg on Mon, Fri; 8 mg all other days   Next INR check 10/22/2018    Indications   Blood clot of vein in shoulder area  left [I82.602]  Factor 5 Leiden mutation  heterozygous (H) [D68.51]  Long-term (current) use of anticoagulants [Z79.01] [Z79.01]         Description     Continue same dose of Coumadin/Warfarinand recheck INR in one week.  Renee Kevin ....................  10/15/2018   9:40 AM          October 2018 Details    Sun Mon Tue Wed Thu Fri Sat      1               2               3               4               5               6                 7               8               9               10               11               12               13                 14               15      10 mg   See details      16      8 mg         17      8 mg         18      8 mg         19      10 mg         20      8 mg           21      8 mg         22            23               24               25               26               27                 28               29               30               31                   Date Details   10/15 This INR check       Date of next INR:  10/22/2018         How to take your warfarin dose     To take:  8 mg Take 4 of the 2 mg tablets.    To take:  10 mg Take 5 of the 2 mg tablets.

## 2018-10-15 NOTE — PROGRESS NOTES
ANTICOAGULATION FOLLOW-UP CLINIC VISIT    Patient Name:  Daniela Ladd  Date:  10/15/2018  Contact Type:  Telephone    SUBJECTIVE:     Patient Findings     Positives No Problem Findings    Comments No bleeding  Per phone call with patient.           OBJECTIVE    INR   Date Value Ref Range Status   10/15/2018 3.4  Final       ASSESSMENT / PLAN  INR assessment THER    Recheck INR In: 1 WEEK    INR Location Clinic      Anticoagulation Summary as of 10/15/2018     INR goal 2.5-3.5   Today's INR 3.4   Warfarin maintenance plan 10 mg (2 mg x 5) on Mon, Fri; 8 mg (2 mg x 4) all other days   Full warfarin instructions 10 mg on Mon, Fri; 8 mg all other days   Weekly warfarin total 60 mg   No change documented Renee Kevin RN   Plan last modified Yoly Crowe RN (8/13/2018)   Next INR check 10/22/2018   Priority INR   Target end date Indefinite    Indications   Blood clot of vein in shoulder area  left [I82.602]  Factor 5 Leiden mutation  heterozygous (H) [D68.51]  Long-term (current) use of anticoagulants [Z79.01] [Z79.01]         Anticoagulation Episode Summary     INR check location     Preferred lab     Send INR reminders to  INR    Comments INR goal changed per Dr Kitchen 3/21/18 has her own machine and checks INR weekly        Anticoagulation Care Providers     Provider Role Specialty Phone number    Krystin Black MD [facility] [phone]            See the Encounter Report to view Anticoagulation Flowsheet and Dosing Calendar (Go to Encounters tab in chart review, and find the Anticoagulation Therapy Visit)     No encounter, INR received via fax.  Assessment and instructions per phone call from patient.  Patient verbalized understanding.      Renee Kevin, MARIPOSA

## 2018-10-16 ENCOUNTER — ONCOLOGY VISIT (OUTPATIENT)
Dept: ONCOLOGY | Facility: OTHER | Age: 48
End: 2018-10-16
Attending: INTERNAL MEDICINE
Payer: COMMERCIAL

## 2018-10-16 VITALS
SYSTOLIC BLOOD PRESSURE: 126 MMHG | OXYGEN SATURATION: 98 % | DIASTOLIC BLOOD PRESSURE: 88 MMHG | TEMPERATURE: 97.2 F | HEIGHT: 66 IN | HEART RATE: 88 BPM | BODY MASS INDEX: 32.24 KG/M2 | WEIGHT: 200.6 LBS

## 2018-10-16 DIAGNOSIS — I82.602 BLOOD CLOT OF VEIN IN SHOULDER AREA, LEFT: ICD-10-CM

## 2018-10-16 DIAGNOSIS — D68.51 FACTOR 5 LEIDEN MUTATION, HETEROZYGOUS (H): Primary | ICD-10-CM

## 2018-10-16 PROCEDURE — 99215 OFFICE O/P EST HI 40 MIN: CPT | Performed by: INTERNAL MEDICINE

## 2018-10-16 ASSESSMENT — PAIN SCALES - GENERAL: PAINLEVEL: NO PAIN (0)

## 2018-10-16 NOTE — NURSING NOTE
"Chief Complaint   Patient presents with     RECHECK     Factor V Leiden mutation, Blood clot L shoulder   No current concerns or complaints.     Initial /88  Pulse 88  Temp 97.2  F (36.2  C) (Tympanic)  Ht 1.689 m (5' 6.5\")  Wt 91 kg (200 lb 9.6 oz)  SpO2 98%  BMI 31.9 kg/m2 Estimated body mass index is 31.9 kg/(m^2) as calculated from the following:    Height as of this encounter: 1.689 m (5' 6.5\").    Weight as of this encounter: 91 kg (200 lb 9.6 oz).  Medication Reconciliation: complete    Jane Carolina CMA ([facility])................ 10/16/2018 8:42 AM   "

## 2018-10-16 NOTE — NURSING NOTE
Lab orders placed per verbal order from Dr Kitchen that were read back and verified.  Jane Carolina CMA ([facility])................ 10/16/2018 9:19 AM

## 2018-10-16 NOTE — MR AVS SNAPSHOT
After Visit Summary   10/16/2018    Daniela Ladd    MRN: [id number]           Patient Information     Date Of Birth          1970        Visit Information        Provider Department      10/16/2018 8:30 AM Magda Kitchen MD [facility]        Today's Diagnoses     Factor 5 Leiden mutation, heterozygous (H)    -  1    Blood clot of vein in shoulder area, left           Follow-ups after your visit        Your next 10 appointments already scheduled     Apr 09, 2019  8:00 AM CDT   [facility] with [facility]   [facility]    [address] [facility] [address]   [phone]           Please do not eat 10-12 hours before your appointment if you are coming in fasting for labs on lipids, cholesterol, or glucose (sugar). This does not apply to pregnant women. Water, hot tea and black coffee (with nothing added) are okay. Do not drink other fluids, diet soda or chew gum.            Apr 16, 2019  8:15 AM CDT   Return Visit with RAUL Holman [facility] ([facility])    [address]  [place] [address]   [phone]              Future tests that were ordered for you today     Open Future Orders        Priority Expected Expires Ordered    Comprehensive metabolic panel Routine 4/1/2019 4/30/2019 10/16/2018    CBC with platelets differential Routine 4/1/2019 4/30/2019 10/16/2018    D-Dimer (HI,GH) Routine 4/1/2019 4/30/2019 10/16/2018    Lactate Dehydrogenase Routine 4/1/2019 4/30/2019 10/16/2018            Who to contact     If you have questions or need follow up information about today's clinic visit or your schedule please contact [facility] directly at [phone].  Normal or non-critical lab and imaging results will be communicated to you by MyChart, letter or phone within 4 business days after the clinic has  "received the results. If you do not hear from us within 7 days, please contact the clinic through [employer] or phone. If you have a critical or abnormal lab result, we will notify you by phone as soon as possible.  Submit refill requests through [employer] Command or call your pharmacy and they will forward the refill request to us. Please allow 3 business days for your refill to be completed.          Additional Information About Your Visit        ConsumrharSubtleData Information     [employer] Command gives you secure access to your electronic health record. If you see a primary care provider, you can also send messages to your care team and make appointments. If you have questions, please call your primary care clinic.  If you do not have a primary care provider, please call [phone] and they will assist you.        Care EveryWhere ID     This is your Care EveryWhere ID. This could be used by other organizations to access your [facility] medical records  [id number]        Your Vitals Were     Pulse Temperature Height Pulse Oximetry BMI (Body Mass Index)       88 97.2  F (36.2  C) (Tympanic) 1.689 m (5' 6.5\") 98% 31.9 kg/m2        Blood Pressure from Last 3 Encounters:   10/16/18 126/88   09/14/18 124/72   08/24/18 125/75    Weight from Last 3 Encounters:   10/16/18 91 kg (200 lb 9.6 oz)   09/14/18 92.1 kg (203 lb)   08/24/18 92.1 kg (203 lb)               Primary Care Provider Office Phone # Fax #    Krystin Vy Black [phone] [address] [facility] [address]        Equal Access to Services     [facility]: Hadii li wilde hadasho Soomaali, waaxda luqadaha, qaybta kaalmada job paul. So [facility] [phone].    ATENCIÓN: Si habla español, tiene a egan disposición servicios gratuitos de asistencia lingüística. Llame al [phone].    We comply with applicable federal civil rights laws and [place] laws. We do not discriminate on the basis of race, color, national " origin, age, disability, sex, sexual orientation, or gender identity.            Thank you!     Thank you for choosing [facility]  for your care. Our goal is always to provide you with excellent care. Hearing back from our patients is one way we can continue to improve our services. Please take a few minutes to complete the written survey that you may receive in the mail after your visit with us. Thank you!             Your Updated Medication List - Protect others around you: Learn how to safely use, store and throw away your medicines at www.disposemymeds.org.          This list is accurate as of 10/16/18  4:26 PM.  Always use your most recent med list.                   Brand Name Dispense Instructions for use Diagnosis    acetaminophen 500 MG tablet    TYLENOL     Take 1,000 mg by mouth        furosemide 20 MG tablet    LASIX    30 tablet    Take 1/2 tablet in the morning for edema as needed.    Generalized edema       levonorgestrel 20 MCG/24HR IUD    MIRENA    1 each    1 each (20 mcg) by Intrauterine route once for 1 dose        nicotine polacrilex 2 MG gum    NICORETTE          warfarin 2 MG tablet    COUMADIN    360 tablet    Take 10 mg x two days/week (Mondays and Fridays), and 8 mg x five days/week.  Or as directed by the [facility] clinic.    Arm DVT (deep venous thromboembolism), acute, left (H), Long term current use of anticoagulant therapy

## 2018-10-16 NOTE — PROGRESS NOTES
Visit Date:   10/16/2018      HISTORY OF PRESENT ILLNESS:  Ms. Ladd returns for followup of left upper extremity DVT.  We had 1 her in consultation 11/09/2017 for evaluation of left upper extremity DVT.  Apparently she presented to [facility] on 11/01/2017 with left arm pain, redness associated with swelling, that apparently happened overnight.  She woke up and noted pain in her left side, probably happening overnight when she developed acute onset of pain and swelling in the left upper extremity.  Subsequently, she was seen by [facility] who ordered left upper extremity venous Doppler which revealed occlusive thrombus in the subclavian, axillary, and basilic veins.  The cephalic, antecubital, and brachial veins were patent.  The patient was started on Lovenox, was transitioned to Coumadin.  She has a strong family history of DVT including her mother and grandmother.  She also stated that her grandfather has a history of CREST syndrome.  She stated that her mother had 4 stents in her groin.  She was a heavy smoker and smoked at least 1-1/2 packs per day for at least 30 years and cut back to less than a pack per day.  She denied antecedent trauma or previous surgery.  She worked at [employer] store.        When we saw her on 11/09/2017, she had been on Lovenox and the swelling had reduced considerably.  She was transitioned to Coumadin.  We elected to rule out hypercoagulable state by obtaining antithrombin III levels which were negative.  Lupus profile was negative including beta 2 glycoprotein antibodies with anticardiolipin antibodies.  We also obtained antithrombin III activity which was normal.  We obtained homocysteine levels which were normal.  Prothrombin 2 mutation was negative.  Factor V Leiden was positive for heterozygous mutation consistent with increased risk of developing thrombosis.  We repeated the venous Doppler of the left upper extremity which revealed there was improving  left upper extremity DVT with residual thrombus in the left axillary vein, distal left subclavian vein.  There was still thrombosis in the subclavian vein.  Basilic veins were recanalized.  We obtained scans to rule out occult malignancy including CT neck which revealed a subcutaneous nodule in the right shoulder that was most likely a sebaceous cyst.  There was fat stranding in the left axillary region, likely sequelae of acute DVT.  Otherwise, no evidence of mass or lymphadenopathy in neck.  She also had a CT chest which revealed a few very small subpleural ground-glass opacities measuring 5 mm.  6-month followup was recommended.  CT abdomen and pelvis was essentially negative.  The patient was a smoker and continued to smoke at least 1-1/2 packs per day.  She was attempting to quit and was using Nicorette gum.  We felt that she would need to be on lifelong Coumadin if she continued to smoke.        Subsequently, she had a repeat venous Doppler left upper extremity which revealed there was improvement in the left axillary vein, thrombosis which was not patent.  The remainder of the left upper extremity venous structure, cephalic, basilic, brachial, and radial veins were patent.  Internal jugular vein was patent, but there was persistent occlusive thrombus of the vein which was unchanged.  She had a CT chest which revealed numerous tiny pulmonary nodules again present.  They were unchanged and primarily in the upper lobes.  Sarcoidosis or other autoimmune disease was in the differential.  The patient otherwise stated that her INRs have been on the low side of normal and occasionally subnormal or subtherapeutic.  When we saw her subsequently, we recommended to increase her Coumadin dosing to reflect an INR of 2.5 to 3.5.  Otherwise, she was doing relatively well.  She did have a repeat venous Doppler which according to the radiologist showed increased collaterals with stable thrombosed left subclavian vein.         Apparently, she had contacted us with complaints of left ribcage pain.  We also recommended she see Dr. Ellis for history of pulmonary micronodules.  The patient did not keep that appointment.  Nonetheless, we repeated the CT chest which was essentially negative.  There were no rib abnormalities.  The tiny nodule at the pulmonary apices were chronic and unchanged.  CT abdomen and pelvis was consistent with stable to chronic hepatic steatosis with spurring at the gallbladder fossa.  An IUD was centrally located in the uterus, unchanged.  CT appearance of the chest with multiple tiny apical predominant pulmonary nodules.        The patient comes in today.  She says she is feeling better.  She does get occasional swelling in her left shoulder and she wonders if it is related to a blood clot.  She did have a repeat venous Doppler and this shows resolution of the left subclavian vein thrombosis.  There is no evidence of thrombosis.  Otherwise, no other complaint of shortness of breath, chest pain, abdominal pain, fevers, night sweats, weight loss.  She continues on Coumadin.      PHYSICAL EXAMINATION:  She is a middle-aged white female in no acute distress.   VITAL SIGNS:  Reveal blood pressure is 126/88, pulse 88, temperature 97.2.   HEENT:  Atraumatic, normocephalic.  Oropharynx is nonerythematous.   NECK:  Supple.   LUNGS:  Clear to auscultation and percussion.   HEART:  Regular rhythm.  S1, S2 normal.   ABDOMEN:  Soft, normoactive bowel sounds.  No masses.  Nontender.   LYMPHATICS:  No cervical or supraclavicular nodes.   EXTREMITIES:  Trace ankle edema.     NEUROLOGIC:  Nonfocal.      LABORATORY DATA:  Revealed D-dimer is 287.  CBC is within normal limits.  BUN is 8, creatinine 0.76.  LFTs are normal.      ASSESSMENT AND PLAN:   1.  Unprovoked left upper extremity DVT, likely due to hypercoagulable state given the fact she has factor V Leiden heterozygous as well as smoker.  She has not quit smoking.  She  says she is attempting to, but has not quit.  She continues to smoke at least 1 pack per day.  Her left upper extremity DVT is resolved.  The plan is to continue Coumadin to maintain therapeutic INR between 2.5 to 3.5.  Otherwise, we will see the patient in 6 months, obtain CBC, CMP, LDH, D-dimer.   2.  History of pulmonary micronodules, likely nonmalignant.  Suspect sarcoid versus possible autoimmune disease.  We will continue to monitor.      40 minutes was spent with the patient.  Greater than half the time was spent in counseling and coordination of care.          CHYNA HENDRICKSON MD             D: 10/16/2018   T: 10/16/2018   MT: NTS      Name:     MIKO SANTOS   MRN:      [id number]        Account:      [id number]   :      1970           Visit Date:   10/16/2018      Document: [id number]       cc: Freda Ellis MD

## 2018-10-17 ENCOUNTER — MYC MEDICAL ADVICE (OUTPATIENT)
Dept: ONCOLOGY | Facility: OTHER | Age: 48
End: 2018-10-17

## 2018-10-22 ENCOUNTER — TRANSFERRED RECORDS (OUTPATIENT)
Dept: HEALTH INFORMATION MANAGEMENT | Facility: OTHER | Age: 48
End: 2018-10-22

## 2018-10-22 ENCOUNTER — ANTICOAGULATION THERAPY VISIT (OUTPATIENT)
Dept: ANTICOAGULATION | Facility: OTHER | Age: 48
End: 2018-10-22
Payer: COMMERCIAL

## 2018-10-22 DIAGNOSIS — I82.602 BLOOD CLOT OF VEIN IN SHOULDER AREA, LEFT: ICD-10-CM

## 2018-10-22 DIAGNOSIS — D68.51 FACTOR 5 LEIDEN MUTATION, HETEROZYGOUS (H): ICD-10-CM

## 2018-10-22 LAB — INR PPP: 3.8

## 2018-10-22 PROCEDURE — 99207 ZZC NO CHARGE NURSE ONLY: CPT | Performed by: FAMILY MEDICINE

## 2018-10-22 NOTE — MR AVS SNAPSHOT
Daniela NANCY Ladd   10/22/2018   Anticoagulation Therapy Visit    Description:  48 year old female   Provider:  Krystin Black MD   Department:   Anticoag           INR as of 10/22/2018     Today's INR 3.8!      Anticoagulation Summary as of 10/22/2018     INR goal 2.5-3.5   Today's INR 3.8!   Full warfarin instructions 8 mg every day   Next INR check 10/29/2018    Indications   Blood clot of vein in shoulder area  left [I82.602]  Factor 5 Leiden mutation  heterozygous (H) [D68.51]  Long-term (current) use of anticoagulants [Z79.01] [Z79.01]         Description     Decrease dose and recheck in 2 weeks.  ..............Yoly Crowe RN.............  10/22/2018    8:19 AM        October 2018 Details    Sun Mon Tue Wed Thu Fri Sat      1               2               3               4               5               6                 7               8               9               10               11               12               13                 14               15               16               17               18               19               20                 21               22      8 mg   See details      23      8 mg         24      8 mg         25      8 mg         26      8 mg         27      8 mg           28      8 mg         29            30               31                   Date Details   10/22 This INR check       Date of next INR:  10/29/2018         How to take your warfarin dose     To take:  8 mg Take 4 of the 2 mg tablets.

## 2018-10-22 NOTE — PROGRESS NOTES
ANTICOAGULATION FOLLOW-UP CLINIC VISIT    Patient Name:  Daniela Ladd  Date:  10/22/2018  Contact Type:  Telephone/ spoke with patient regarding INR    SUBJECTIVE:     Patient Findings     Positives No Problem Findings           OBJECTIVE    INR   Date Value Ref Range Status   10/22/2018 3.8  Final       ASSESSMENT / PLAN  INR assessment SUPRA    Recheck INR In: 1 WEEK    INR Location [facility]      Anticoagulation Summary as of 10/22/2018     INR goal 2.5-3.5   Today's INR 3.8!   Warfarin maintenance plan 8 mg (2 mg x 4) every day   Full warfarin instructions 8 mg every day   Weekly warfarin total 56 mg   Plan last modified Yoly Crowe, MARIPOSA (10/22/2018)   Next INR check 10/29/2018   Priority INR   Target end date Indefinite    Indications   Blood clot of vein in shoulder area  left [I82.602]  Factor 5 Leiden mutation  heterozygous (H) [D68.51]  Long-term (current) use of anticoagulants [Z79.01] [Z79.01]         Anticoagulation Episode Summary     INR check location     Preferred lab     Send INR reminders to  INR    Comments INR goal changed per Dr Kitchen 3/21/18 has her own machine and checks INR weekly        Anticoagulation Care Providers     Provider Role Specialty Phone number    Krystin Black MD Responsible Family Practice [phone]            See the Encounter Report to view Anticoagulation Flowsheet and Dosing Calendar (Go to Encounters tab in chart review, and find the Anticoagulation Therapy Visit)        Yoly Crowe, RN

## 2018-10-26 ENCOUNTER — ANTICOAGULATION THERAPY VISIT (OUTPATIENT)
Dept: ANTICOAGULATION | Facility: OTHER | Age: 48
End: 2018-10-26
Attending: FAMILY MEDICINE
Payer: COMMERCIAL

## 2018-10-26 DIAGNOSIS — Z79.01 LONG TERM CURRENT USE OF ANTICOAGULANT THERAPY: ICD-10-CM

## 2018-10-26 DIAGNOSIS — I82.602 BLOOD CLOT OF VEIN IN SHOULDER AREA, LEFT: ICD-10-CM

## 2018-10-26 DIAGNOSIS — I82.622 ARM DVT (DEEP VENOUS THROMBOEMBOLISM), ACUTE, LEFT (H): ICD-10-CM

## 2018-10-26 DIAGNOSIS — D68.51 FACTOR 5 LEIDEN MUTATION, HETEROZYGOUS (H): ICD-10-CM

## 2018-10-26 LAB — INR POINT OF CARE: 3.7 (ref 0.86–1.14)

## 2018-10-26 PROCEDURE — 85610 PROTHROMBIN TIME: CPT | Mod: QW

## 2018-10-26 PROCEDURE — 36416 COLLJ CAPILLARY BLOOD SPEC: CPT

## 2018-10-26 RX ORDER — WARFARIN SODIUM 2 MG/1
TABLET ORAL
Qty: 360 TABLET | Refills: 0 | COMMUNITY
Start: 2018-10-26 | End: 2019-01-07

## 2018-10-26 NOTE — PROGRESS NOTES
ANTICOAGULATION FOLLOW-UP CLINIC VISIT    Patient Name:  Daniela Ladd  Date:  10/26/2018  Contact Type:  Face to Face    SUBJECTIVE:     Patient Findings     Positives Other complaints (right eye is blood shot. she check INR at home a couple days ago and INR was 4.1 she came in to verify INR)           OBJECTIVE    INR Protime   Date Value Ref Range Status   10/26/2018 3.7 (A) 0.86 - 1.14 Final       ASSESSMENT / PLAN  INR assessment SUPRA    Recheck INR In: 1 WEEK    INR Location Clinic      Anticoagulation Summary as of 10/26/2018     INR goal 2.5-3.5   Today's INR 3.7!   Warfarin maintenance plan 8 mg (2 mg x 4) every day   Full warfarin instructions 8 mg every day   Weekly warfarin total 56 mg   No change documented Yoly Crowe RN   Plan last modified Yoly Crowe RN (10/22/2018)   Next INR check 11/2/2018   Priority INR   Target end date Indefinite    Indications   Blood clot of vein in shoulder area  left [I82.602]  Factor 5 Leiden mutation  heterozygous (H) [D68.51]  Long-term (current) use of anticoagulants [Z79.01] [Z79.01]         Anticoagulation Episode Summary     INR check location     Preferred lab     Send INR reminders to  INR    Comments INR goal changed per Dr Kitchen 3/21/18 has her own machine and checks INR weekly        Anticoagulation Care Providers     Provider Role Specialty Phone number    Krystin Black MD [facility] [phone]            See the Encounter Report to view Anticoagulation Flowsheet and Dosing Calendar (Go to Encounters tab in chart review, and find the Anticoagulation Therapy Visit)        Yoly Crowe RN

## 2018-10-26 NOTE — MR AVS SNAPSHOT
Daniela CRUZ Julee   10/26/2018 1:45 PM   Anticoagulation Therapy Visit    Description:  48 year old female   Provider:  [facility]   Department:  [facility]           INR as of 10/26/2018     Today's INR 3.7!      Anticoagulation Summary as of 10/26/2018     INR goal 2.5-3.5   Today's INR 3.7!   Full warfarin instructions 8 mg every day   Next INR check 11/2/2018    Indications   Blood clot of vein in shoulder area  left [I82.602]  Factor 5 Leiden mutation  heterozygous (H) [D68.51]  Long-term (current) use of anticoagulants [Z79.01] [Z79.01]         Description     Continue same Warfarin dose and recheck in 1 week  Yoly Crowe RN   10/26/2018    2:06 PM        October 2018 Details    Sun Mon Tue Wed Thu Fri Sat      1               2               3               4               5               6                 7               8               9               10               11               12               13                 14               15               16               17               18               19               20                 21               22               23               24               25               26      8 mg   See details      27      8 mg           28      8 mg         29      8 mg         30      8 mg         31      8 mg             Date Details   10/26 This INR check               How to take your warfarin dose     To take:  8 mg Take 4 of the 2 mg tablets.           November 2018 Details    Sun Mon Tue Wed Thu Fri Sat         1      8 mg         2            3                 4               5               6               7               8               9               10                 11               12               13               14               15               16               17                 18               19               20               21               22               23               24                 25               26                27               28               29               30                 Date Details   No additional details    Date of next INR:  11/2/2018         How to take your warfarin dose     To take:  8 mg Take 4 of the 2 mg tablets.

## 2018-10-29 ENCOUNTER — TRANSFERRED RECORDS (OUTPATIENT)
Dept: HEALTH INFORMATION MANAGEMENT | Facility: OTHER | Age: 48
End: 2018-10-29

## 2018-10-29 ENCOUNTER — ANTICOAGULATION THERAPY VISIT (OUTPATIENT)
Dept: FAMILY MEDICINE | Facility: OTHER | Age: 48
End: 2018-10-29
Payer: COMMERCIAL

## 2018-10-29 DIAGNOSIS — D68.51 FACTOR 5 LEIDEN MUTATION, HETEROZYGOUS (H): ICD-10-CM

## 2018-10-29 DIAGNOSIS — I82.602 BLOOD CLOT OF VEIN IN SHOULDER AREA, LEFT: ICD-10-CM

## 2018-10-29 LAB — INR PPP: 1.7

## 2018-10-29 PROCEDURE — 99207 ZZC NO CHARGE NURSE ONLY: CPT | Performed by: FAMILY MEDICINE

## 2018-10-29 NOTE — PROGRESS NOTES
ANTICOAGULATION FOLLOW-UP CLINIC VISIT    Patient Name:  Daniela Ladd  Date:  10/29/2018  Contact Type:  Telephone/ spoke with patient regarding dosing     SUBJECTIVE:     Patient Findings     Positives Missed doses           OBJECTIVE    INR   Date Value Ref Range Status   10/29/2018 1.7  Final       ASSESSMENT / PLAN  INR assessment SUPRA    Recheck INR In: 1 WEEK    INR Location Home INR      Anticoagulation Summary as of 10/29/2018     INR goal 2.5-3.5   Today's INR 1.7!   Warfarin maintenance plan 8 mg (2 mg x 4) every day   Full warfarin instructions 10/29: 16 mg; Otherwise 8 mg every day   Weekly warfarin total 56 mg   Plan last modified Yoly Crowe, MARIPOSA (10/22/2018)   Next INR check 11/5/2018   Priority INR   Target end date Indefinite    Indications   Blood clot of vein in shoulder area  left [I82.602]  Factor 5 Leiden mutation  heterozygous (H) [D68.51]  Long-term (current) use of anticoagulants [Z79.01] [Z79.01]         Anticoagulation Episode Summary     INR check location     Preferred lab     Send INR reminders to  INR    Comments INR goal changed per Dr Kitchen 3/21/18 has her own machine and checks INR weekly        Anticoagulation Care Providers     Provider Role Specialty Phone number    Krysitn Black MD [facility] [phone]            See the Encounter Report to view Anticoagulation Flowsheet and Dosing Calendar (Go to Encounters tab in chart review, and find the Anticoagulation Therapy Visit)        Yoly Crowe, RN

## 2018-10-29 NOTE — MR AVS SNAPSHOT
Daniela L Julee   10/29/2018   Anticoagulation Therapy Visit    Description:  48 year old female   Provider:  Krystin Black MD   Department:   Family Practice           INR as of 10/29/2018     Today's INR 1.7!      Anticoagulation Summary as of 10/29/2018     INR goal 2.5-3.5   Today's INR 1.7!   Full warfarin instructions 10/29: 16 mg; Otherwise 8 mg every day   Next INR check 11/5/2018    Indications   Blood clot of vein in shoulder area  left [I82.602]  Factor 5 Leiden mutation  heterozygous (H) [D68.51]  Long-term (current) use of anticoagulants [Z79.01] [Z79.01]         Description     Take 16 mg today then resume 8 mg daily. Recheck in 1 week. Yoly Crowe RN    10/29/2018  8:44 AM        October 2018 Details    Sun Mon Tue Wed Thu Fri Sat      1               2               3               4               5               6                 7               8               9               10               11               12               13                 14               15               16               17               18               19               20                 21               22               23               24               25               26               27                 28               29      16 mg   See details      30      8 mg         31      8 mg             Date Details   10/29 This INR check               How to take your warfarin dose     To take:  8 mg Take 4 of the 2 mg tablets.    To take:  16 mg Take 8 of the 2 mg tablets.           November 2018 Details    Sun Mon Tue Wed Thu Fri Sat         1      8 mg         2      8 mg         3      8 mg           4      8 mg         5            6               7               8               9               10                 11               12               13               14               15               16               17                 18               19               20               21                22               23               24                 25               26               27               28               29               30                 Date Details   No additional details    Date of next INR:  11/5/2018         How to take your warfarin dose     To take:  8 mg Take 4 of the 2 mg tablets.

## 2018-11-10 ENCOUNTER — TRANSFERRED RECORDS (OUTPATIENT)
Dept: HEALTH INFORMATION MANAGEMENT | Facility: OTHER | Age: 48
End: 2018-11-10

## 2018-11-12 ENCOUNTER — ANTICOAGULATION THERAPY VISIT (OUTPATIENT)
Dept: ANTICOAGULATION | Facility: OTHER | Age: 48
End: 2018-11-12
Payer: COMMERCIAL

## 2018-11-12 DIAGNOSIS — I82.602 BLOOD CLOT OF VEIN IN SHOULDER AREA, LEFT: ICD-10-CM

## 2018-11-12 DIAGNOSIS — D68.51 FACTOR 5 LEIDEN MUTATION, HETEROZYGOUS (H): ICD-10-CM

## 2018-11-12 LAB — INR POINT OF CARE: 2.9 (ref 0.86–1.14)

## 2018-11-12 PROCEDURE — 36416 COLLJ CAPILLARY BLOOD SPEC: CPT | Performed by: FAMILY MEDICINE

## 2018-11-12 PROCEDURE — 85610 PROTHROMBIN TIME: CPT | Mod: QW | Performed by: FAMILY MEDICINE

## 2018-11-12 NOTE — PROGRESS NOTES
ANTICOAGULATION FOLLOW-UP CLINIC VISIT    Patient Name:  Daniela Ladd  Date:  11/12/2018  Contact Type:  Telephone/ patient to continue same dose    SUBJECTIVE:     Patient Findings     Positives No Problem Findings           OBJECTIVE    INR Protime   Date Value Ref Range Status   11/12/2018 2.9 (A) 0.86 - 1.14 Corrected       ASSESSMENT / PLAN  INR assessment THER    Recheck INR In: 3 WEEKS    INR Location Home INR      Anticoagulation Summary as of 11/12/2018     INR goal 2.5-3.5   Today's INR 2.9   Warfarin maintenance plan 8 mg (2 mg x 4) every day   Full warfarin instructions 8 mg every day   Weekly warfarin total 56 mg   No change documented Yoly Crowe RN   Plan last modified Yoly Crowe RN (10/22/2018)   Next INR check 12/3/2018   Priority INR   Target end date Indefinite    Indications   Blood clot of vein in shoulder area  left [I82.602]  Factor 5 Leiden mutation  heterozygous (H) [D68.51]  Long-term (current) use of anticoagulants [Z79.01] [Z79.01]         Anticoagulation Episode Summary     INR check location     Preferred lab     Send INR reminders to  INR    Comments INR goal changed per Dr Kitchen 3/21/18 has her own machine and checks INR weekly        Anticoagulation Care Providers     Provider Role Specialty Phone number    Krystin Black MD [facility] [phone]            See the Encounter Report to view Anticoagulation Flowsheet and Dosing Calendar (Go to Encounters tab in chart review, and find the Anticoagulation Therapy Visit)        Yoly Crowe RN

## 2018-11-12 NOTE — MR AVS SNAPSHOT
Daniela NANCY Ladd   11/12/2018   Anticoagulation Therapy Visit    Description:  48 year old female   Provider:  Krystin Black MD   Department:   Anticoag           INR as of 11/12/2018     Today's INR 2.9      Anticoagulation Summary as of 11/12/2018     INR goal 2.5-3.5   Today's INR 2.9   Full warfarin instructions 8 mg every day   Next INR check 12/3/2018    Indications   Blood clot of vein in shoulder area  left [I82.602]  Factor 5 Leiden mutation  heterozygous (H) [D68.51]  Long-term (current) use of anticoagulants [Z79.01] [Z79.01]         Description     Continue same dose and recheck in 2-3 weeks. ...............Yoly Crowe RN    11/12/2018    10:17 AM      November 2018 Details    Sun Mon Tue Wed Thu Fri Sat         1               2               3                 4               5               6               7               8               9               10                 11               12      8 mg   See details      13      8 mg         14      8 mg         15      8 mg         16      8 mg         17      8 mg           18      8 mg         19      8 mg         20      8 mg         21      8 mg         22      8 mg         23      8 mg         24      8 mg           25      8 mg         26      8 mg         27      8 mg         28      8 mg         29      8 mg         30      8 mg           Date Details   11/12 This INR check               How to take your warfarin dose     To take:  8 mg Take 4 of the 2 mg tablets.           December 2018 Details    Sun Mon Tue Wed Thu Fri Sat           1      8 mg           2      8 mg         3            4               5               6               7               8                 9               10               11               12               13               14               15                 16               17               18               19               20               21               22                 23                24               25               26               27               28               29                 30               31                     Date Details   No additional details    Date of next INR:  12/3/2018         How to take your warfarin dose     To take:  8 mg Take 4 of the 2 mg tablets.

## 2018-11-19 ENCOUNTER — TRANSFERRED RECORDS (OUTPATIENT)
Dept: HEALTH INFORMATION MANAGEMENT | Facility: OTHER | Age: 48
End: 2018-11-19

## 2018-11-26 ENCOUNTER — TRANSFERRED RECORDS (OUTPATIENT)
Dept: HEALTH INFORMATION MANAGEMENT | Facility: OTHER | Age: 48
End: 2018-11-26

## 2018-12-04 ENCOUNTER — ANTICOAGULATION THERAPY VISIT (OUTPATIENT)
Dept: ANTICOAGULATION | Facility: OTHER | Age: 48
End: 2018-12-04
Attending: FAMILY MEDICINE
Payer: COMMERCIAL

## 2018-12-04 DIAGNOSIS — I82.602 BLOOD CLOT OF VEIN IN SHOULDER AREA, LEFT: ICD-10-CM

## 2018-12-04 DIAGNOSIS — D68.51 FACTOR 5 LEIDEN MUTATION, HETEROZYGOUS (H): ICD-10-CM

## 2018-12-04 LAB — INR POINT OF CARE: 3.3 (ref 0.86–1.14)

## 2018-12-04 PROCEDURE — 36416 COLLJ CAPILLARY BLOOD SPEC: CPT

## 2018-12-04 PROCEDURE — 85610 PROTHROMBIN TIME: CPT | Mod: QW

## 2018-12-04 NOTE — PROGRESS NOTES
ANTICOAGULATION FOLLOW-UP CLINIC VISIT    Patient Name:  Daniela Ladd  Date:  12/4/2018  Contact Type:  Face to Face    SUBJECTIVE:     Patient Findings     Positives No Problem Findings           OBJECTIVE    INR Protime   Date Value Ref Range Status   12/04/2018 3.3 (A) 0.86 - 1.14 Final       ASSESSMENT / PLAN  INR assessment THER    Recheck INR In: 4 WEEKS    INR Location Clinic      Anticoagulation Summary as of 12/4/2018     INR goal 2.5-3.5   Today's INR 3.3   Warfarin maintenance plan 8 mg (2 mg x 4) every day   Full warfarin instructions 8 mg every day   Weekly warfarin total 56 mg   No change documented Yoly Crowe RN   Plan last modified Yoly Crowe RN (10/22/2018)   Next INR check 1/1/2019   Priority INR   Target end date Indefinite    Indications   Blood clot of vein in shoulder area  left [I82.602]  Factor 5 Leiden mutation  heterozygous (H) [D68.51]  Long-term (current) use of anticoagulants [Z79.01] [Z79.01]         Anticoagulation Episode Summary     INR check location     Preferred lab     Send INR reminders to  INR    Comments INR goal changed per Dr Kitchen 3/21/18 has her own machine and checks INR weekly        Anticoagulation Care Providers     Provider Role Specialty Phone number    Krystin Black MD [facility] [phone]            See the Encounter Report to view Anticoagulation Flowsheet and Dosing Calendar (Go to Encounters tab in chart review, and find the Anticoagulation Therapy Visit)    Uses own INR machine but ran out of test strips so she made OV.    Yoly Crowe, MARIPOSA

## 2018-12-04 NOTE — MR AVS SNAPSHOT
Daniela CRUZ Julee   12/4/2018 10:30 AM   Anticoagulation Therapy Visit    Description:  48 year old female   Provider:  [facility]   Department:  [facility]           INR as of 12/4/2018     Today's INR 3.3      Anticoagulation Summary as of 12/4/2018     INR goal 2.5-3.5   Today's INR 3.3   Full warfarin instructions 8 mg every day   Next INR check 1/1/2019    Indications   Blood clot of vein in shoulder area  left [I82.602]  Factor 5 Leiden mutation  heterozygous (H) [D68.51]  Long-term (current) use of anticoagulants [Z79.01] [Z79.01]         Description     Continue same Warfarin dose and recheck in 1 month.  Yoly Crowe RN   12/4/2018    10:36 AM        December 2018 Details    Sun Mon Tue Wed Thu Fri Sat           1                 2               3               4      8 mg   See details      5      8 mg         6      8 mg         7      8 mg         8      8 mg           9      8 mg         10      8 mg         11      8 mg         12      8 mg         13      8 mg         14      8 mg         15      8 mg           16      8 mg         17      8 mg         18      8 mg         19      8 mg         20      8 mg         21      8 mg         22      8 mg           23      8 mg         24      8 mg         25      8 mg         26      8 mg         27      8 mg         28      8 mg         29      8 mg           30      8 mg         31      8 mg               Date Details   12/04 This INR check               How to take your warfarin dose     To take:  8 mg Take 4 of the 2 mg tablets.           January 2019 Details    Sun Mon Tue Wed Thu Fri Sat       1            2               3               4               5                 6               7               8               9               10               11               12                 13               14               15               16               17               18               19                 20               21                22               23               24               25               26                 27               28               29               30               31                  Date Details   No additional details    Date of next INR:  1/1/2019         How to take your warfarin dose     To take:  8 mg Take 4 of the 2 mg tablets.

## 2018-12-09 ENCOUNTER — TRANSFERRED RECORDS (OUTPATIENT)
Dept: HEALTH INFORMATION MANAGEMENT | Facility: OTHER | Age: 48
End: 2018-12-09

## 2018-12-12 ENCOUNTER — HOSPITAL ENCOUNTER (OUTPATIENT)
Dept: MAMMOGRAPHY | Facility: OTHER | Age: 48
Discharge: HOME | End: 2018-12-12
Attending: FAMILY MEDICINE | Admitting: FAMILY MEDICINE
Payer: COMMERCIAL

## 2018-12-12 DIAGNOSIS — Z12.39 SCREENING BREAST EXAMINATION: ICD-10-CM

## 2018-12-12 PROCEDURE — 77063 BREAST TOMOSYNTHESIS BI: CPT

## 2018-12-23 ENCOUNTER — TRANSFERRED RECORDS (OUTPATIENT)
Dept: HEALTH INFORMATION MANAGEMENT | Facility: OTHER | Age: 48
End: 2018-12-23

## 2018-12-29 DIAGNOSIS — Z79.01 LONG TERM CURRENT USE OF ANTICOAGULANT THERAPY: ICD-10-CM

## 2018-12-29 DIAGNOSIS — I82.622 ARM DVT (DEEP VENOUS THROMBOEMBOLISM), ACUTE, LEFT (H): ICD-10-CM

## 2018-12-30 ENCOUNTER — TRANSFERRED RECORDS (OUTPATIENT)
Dept: HEALTH INFORMATION MANAGEMENT | Facility: OTHER | Age: 48
End: 2018-12-30

## 2018-12-31 RX ORDER — WARFARIN SODIUM 2 MG/1
TABLET ORAL
Qty: 336 TABLET | Refills: 0 | Status: SHIPPED | OUTPATIENT
Start: 2018-12-31 | End: 2019-01-07

## 2018-12-31 NOTE — TELEPHONE ENCOUNTER
Last INR 12/23/18: 3.0    Prescription approved per [facility] Refill Protocol.

## 2019-01-03 DIAGNOSIS — Z79.01 LONG TERM CURRENT USE OF ANTICOAGULANT THERAPY: ICD-10-CM

## 2019-01-03 DIAGNOSIS — I82.622 ARM DVT (DEEP VENOUS THROMBOEMBOLISM), ACUTE, LEFT (H): ICD-10-CM

## 2019-01-03 RX ORDER — WARFARIN SODIUM 2 MG/1
TABLET ORAL
Qty: 360 TABLET | Refills: 0 | OUTPATIENT
Start: 2019-01-03

## 2019-01-05 ENCOUNTER — TRANSFERRED RECORDS (OUTPATIENT)
Dept: HEALTH INFORMATION MANAGEMENT | Facility: OTHER | Age: 49
End: 2019-01-05

## 2019-01-07 ENCOUNTER — TELEPHONE (OUTPATIENT)
Dept: FAMILY MEDICINE | Facility: OTHER | Age: 49
End: 2019-01-07

## 2019-01-07 ENCOUNTER — ANTICOAGULATION THERAPY VISIT (OUTPATIENT)
Dept: ANTICOAGULATION | Facility: OTHER | Age: 49
End: 2019-01-07
Attending: FAMILY MEDICINE
Payer: COMMERCIAL

## 2019-01-07 DIAGNOSIS — I82.602 BLOOD CLOT OF VEIN IN SHOULDER AREA, LEFT: ICD-10-CM

## 2019-01-07 DIAGNOSIS — Z79.01 LONG TERM CURRENT USE OF ANTICOAGULANT THERAPY: ICD-10-CM

## 2019-01-07 DIAGNOSIS — I82.622 ARM DVT (DEEP VENOUS THROMBOEMBOLISM), ACUTE, LEFT (H): ICD-10-CM

## 2019-01-07 DIAGNOSIS — D68.51 FACTOR 5 LEIDEN MUTATION, HETEROZYGOUS (H): ICD-10-CM

## 2019-01-07 LAB — INR POINT OF CARE: 2.7 (ref 2–3)

## 2019-01-07 PROCEDURE — 85610 PROTHROMBIN TIME: CPT | Mod: QW

## 2019-01-07 PROCEDURE — 36416 COLLJ CAPILLARY BLOOD SPEC: CPT

## 2019-01-07 RX ORDER — WARFARIN SODIUM 2 MG/1
TABLET ORAL
Qty: 336 TABLET | Refills: 0 | COMMUNITY
Start: 2019-01-07 | End: 2019-03-29

## 2019-01-07 NOTE — TELEPHONE ENCOUNTER
Received INR from [facility], INR was 2.1 on 01/06/18.  Called patient and she stated she had rechecked INR later that same day and INR was 2.6.  She would like to come to [facility] to have her INR checked and to see if her machine correlates with our machine.  Appointment made.

## 2019-01-07 NOTE — PROGRESS NOTES
ANTICOAGULATION FOLLOW-UP CLINIC VISIT    Patient Name:  Daniela Ladd  Date:  2019  Contact Type:  Face to Face    SUBJECTIVE:     Patient Findings     Positives:   No Problem Findings    Comments:   Checked INR with [facility], INR was 2.1 on 18.  She stated she had rechecked INR later that same day and INR was 2.6.  She came to [facility] to have her INR checked and to see if her machine correlates with our machine.           OBJECTIVE    INR [facility]   Date Value Ref Range Status   2019 2.7 (A) 2.0 - 3.0 Final       ASSESSMENT / PLAN  INR assessment THER    Recheck INR In: 1 WEEK    INR Location Clinic      Anticoagulation Summary  As of 2019    INR goal:   2.5-3.5   TTR:   65.8 % (1.1 y)   INR used for dosin.7 (2019)   Warfarin maintenance plan:   10 mg (2 mg x 5) every Mon; 8 mg (2 mg x 4) all other days   Full warfarin instructions:   10 mg every Mon; 8 mg all other days   Weekly warfarin total:   58 mg   Plan last modified:   Renee Kevin RN (2019)   Next INR check:   2019   Priority:   INR   Target end date:   Indefinite    Indications    Blood clot of vein in shoulder area  left [I82.602]  Factor 5 Leiden mutation  heterozygous (H) [D68.51]  Long-term (current) use of anticoagulants [Z79.01] [Z79.01]             Anticoagulation Episode Summary     INR check location:       Preferred lab:       Send INR reminders to:    INR    Comments:   INR goal changed per Dr Kitchen 3/21/18 has her own machine and checks INR weekly        Anticoagulation Care Providers     Provider Role Specialty Phone number    Krystin Black MD [facility] [phone]            See the Encounter Report to view Anticoagulation Flowsheet and Dosing Calendar (Go to Encounters tab in chart review, and find the Anticoagulation Therapy Visit)        Renee Kevin RN

## 2019-01-12 ENCOUNTER — TRANSFERRED RECORDS (OUTPATIENT)
Dept: HEALTH INFORMATION MANAGEMENT | Facility: OTHER | Age: 49
End: 2019-01-12

## 2019-01-14 ENCOUNTER — OFFICE VISIT (OUTPATIENT)
Dept: FAMILY MEDICINE | Facility: OTHER | Age: 49
End: 2019-01-14
Attending: PHYSICIAN ASSISTANT
Payer: COMMERCIAL

## 2019-01-14 VITALS
RESPIRATION RATE: 20 BRPM | TEMPERATURE: 97.4 F | OXYGEN SATURATION: 98 % | HEART RATE: 98 BPM | WEIGHT: 200.25 LBS | SYSTOLIC BLOOD PRESSURE: 134 MMHG | DIASTOLIC BLOOD PRESSURE: 82 MMHG | BODY MASS INDEX: 31.43 KG/M2 | HEIGHT: 67 IN

## 2019-01-14 DIAGNOSIS — J01.00 ACUTE NON-RECURRENT MAXILLARY SINUSITIS: Primary | ICD-10-CM

## 2019-01-14 PROCEDURE — 99213 OFFICE O/P EST LOW 20 MIN: CPT | Performed by: PHYSICIAN ASSISTANT

## 2019-01-14 RX ORDER — FLUTICASONE PROPIONATE 50 MCG
2 SPRAY, SUSPENSION (ML) NASAL DAILY
Qty: 1 BOTTLE | Refills: 1 | Status: SHIPPED | OUTPATIENT
Start: 2019-01-14 | End: 2019-02-21

## 2019-01-14 ASSESSMENT — MIFFLIN-ST. JEOR: SCORE: 1573.02

## 2019-01-14 ASSESSMENT — PAIN SCALES - GENERAL: PAINLEVEL: MILD PAIN (2)

## 2019-01-14 NOTE — PROGRESS NOTES
Nursing Notes:   Surekha Naylor LPN  2019 12:50 PM  Signed  Patient presents to clinic experiencing sinus drainage with pressure, ear pain, dry cough, fatigue for past 8 days.    Medication Reconciliation: complete    Surekha Naylor LPN        HPI:    Daniela Ladd is a 48 year old female who presents for sinus drainage with pressure, right ear pain, dry cough, and fatigue for past 8 days. She explains her right ear began hurting yesterday and that she feels her congestion and pressure in her ear and behind her eyes has increased. She states she has experienced chills and sweats but she is not sure if she is just experiencing hot flashes or if it is a fever. She has tried Mucinex but felt that it dried her out too much. She has also tried tylenol, Vicks, and cough drops which have all helped to alleviate her symptoms. Her  has been experiencing similar symptoms for the past two weeks but his has started to clear now.  No problems breathing.  No wheezing or rattling.  Keeping food and fluids down.  No dehydration concerns.      Past Medical History:   Diagnosis Date     Abdominal pain     2010     Contact dermatitis     Atrophic dermatitis     Encounter for insertion of mirena IUD 2018     Hidradenitis suppurativa     2010     Major depressive disorder, single episode     ,Situational related to marital discord     Nicotine dependence, uncomplicated     2010     Other acne     Facial acne     Personal history of other medical treatment (CODE)     10/00, III, para 1-0-2-1, vaginal delivery , two spontaneous first trimester miscarriage     Personal history of other medical treatment (CODE)     S/P cryotherapy Aug 2003.  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/25/2013       Past Surgical History:   Procedure Laterality Date     ANKLE SURGERY      Lizzie      EXTRACTION(S) DENTAL      No Comments Provided     LAPAROSCOPIC TUBAL LIGATION      2004     SALPINGO-OOPHORECTOMY BILATERAL      11,Planned Lap RSO     TONSILLECTOMY      1980s       Family History   Problem Relation Age of Onset     Allergy (Severe) Mother         Allergies     Heart Disease Mother         Heart Disease     Diabetes Mother         Diabetes     Other - See Comments Mother         Peripheral vascular disese     Unknown/Adopted Father         Suspected cardiac.      Heart Disease Paternal Grandfather         Heart Disease,CAD     Other - See Comments Other         endometriosis     Other - See Comments Brother         by her mother/by her father - lymphedema after knee surgery     Family History Negative Brother         Good Health,by her mother/by her father     Family History Negative Son         Good Health     Cancer Maternal Grandfather         Cancer,lung cancer, dm     Breast Cancer Maternal Grandmother         Cancer-breast,CREST syndrome, Raynaud's, scleroderma,  of CHF, small veins and arteries     Diabetes Maternal Aunt         Diabetes,also has had a borderline ovarian mass (not benign or cancerous)     Thyroid Disease Other         Thyroid Disease,several members of family with thyroid disease.     Clotting Disorder Paternal Uncle         Factor V       Social History     Socioeconomic History     Marital status:      Spouse name: Luigi     Number of children: Not on file     Years of education: Not on file     Highest education level: Not on file   Social Needs     Financial resource strain: Not on file     Food insecurity - worry: Not on file     Food insecurity - inability: Not on file     Transportation needs - medical: Not on file     Transportation needs - non-medical: Not on file   Occupational History     Not on file   Tobacco Use     Smoking status: Current Every Day Smoker     Packs/day: 1.00     Years: 30.00     Pack years: 30.00     Types: Cigarettes      "Smokeless tobacco: Never Used   Substance and Sexual Activity     Alcohol use: No     Alcohol/week: 0.0 oz     Drug use: No     Sexual activity: Yes     Partners: Male     Birth control/protection: Surgical, IUD     Comment: Mirena Placed 8/9/2018   Other Topics Concern     Parent/sibling w/ CABG, MI or angioplasty before 65F 55M? Not Asked   Social History Narrative    She is currently working part time at [employer]     Sibling 2 half brothers by her mother    Sibling 2 half brothers by her father    Son - Orlando Damico, 1999     - Luigi       Current Outpatient Medications   Medication Sig Dispense Refill     acetaminophen (TYLENOL) 500 MG tablet Take 1,000 mg by mouth       fluticasone (FLONASE) 50 MCG/ACT nasal spray Spray 2 sprays into both nostrils daily 1 Bottle 1     levonorgestrel (MIRENA) 20 MCG/24HR IUD 1 each (20 mcg) by Intrauterine route once for 1 dose 1 each 0     nicotine polacrilex (NICORETTE) 2 MG gum   2     warfarin (COUMADIN) 2 MG tablet Take 10 mg x one day/week, and 8 mg x six days/week. OR AS DIRECTED 336 tablet 0       Allergies   Allergen Reactions     Amoxicillin-Pot Clavulanate Nausea     Cyclobenzaprine Rash     No Clinical Screening - See Comments Rash     Nicoderm patch       REVIEW OF SYSTEMS:  Refer to HPI.    EXAM:   Vitals:    /82 (BP Location: Right arm, Patient Position: Sitting, Cuff Size: Adult Large)   Pulse 98   Temp 97.4  F (36.3  C) (Tympanic)   Resp 20   Ht 1.689 m (5' 6.5\")   Wt 90.8 kg (200 lb 4 oz)   LMP  (LMP Unknown)   SpO2 98%   Breastfeeding? No   BMI 31.84 kg/m    General Appearance: Pleasant, alert, appropriate appearance for age. No acute distress  Ear Exam: Normal left TM, grey, translucent, bony landmarks appreciated. Right TM has a small amount of visible fluid behind it.   Left/Right TM: TM is not bulging. There is no pus appreciated.    Normal auditory canals and external ears. Non-tender.  Nose Exam: Normal external nose, mucus membranes, " and septum.  OroPharynx Exam:  Erythematous posterior pharynx with no exudates. Pain on palpation of right maxillary sinus. No sinus pain upon palpation of frontal, ethmoid, and and left maxillary sinuses.  Chest/Respiratory Exam: Normal chest wall and respirations. Clear to auscultation. No retractions appreciated.  Cardiovascular Exam: Regular rate and rhythm. S1, S2, no murmur, click, gallop, or rubs.  Lymphatic Exam: ACLN.  Skin: no rash or abnormalities  Psychiatric Exam: Alert and oriented - appropriate affect.    PHQ Depression Screen  PHQ-9 SCORE 8/24/2018   PHQ-9 Total Score 1       LABS:    Results for orders placed or performed in visit on 01/07/19   INR point of care   Result Value Ref Range    INR Protime 2.7 (A) 2.0 - 3.0         ASSESSMENT AND PLAN:    1. Acute non-recurrent maxillary sinusitis      Prescribed fluticasone (FLONASE) 50 MCG/ACT nasal spray. Discussed with patient that she may start with one spray per nostril daily and work up to two sprays daily if she needs to. Discussed OTC options such as saline nasal sprays and the kelsi pot for symptom relief.     Patient was started on Flonase nasal spray.  Symptoms are likely viral at this time.  Can call in the next few days for an antibiotic if needed if symptoms are not calming down or worsening as needed.      Patient Instructions   Sinus infection - Flonase has been sent to pharmacy.      May use symptomatic care with tylenol.  Using a humidifier works well to break up the congestion. You can also sleep propped up on a couple pillows to decrease symptoms at night.     Use a Neti Pot/sinusflush (Darin Med Sinus Rinse) 3 times daily to irrigate sinuses/mucosal tissue.     Please take tylenol as needed up to 4 times daily.  Treat symptomatically with warm salt water gargles.  Frequent swallows of cool liquid.  Oatmeal coats the throat and some patients find it soothes the pain.     Monitor for any fevers or chills. Return in 7-10 days if not  feeling better. Please call clinic with any questions or concerns. Please take in a lot of fluids and get rest.     You will need to be evaluated if you start to experience:  Fever higher than 102.5 F (39.2 C)   Sudden and severe pain in the face and head   Trouble seeing or seeing double   Trouble thinking clearly   Swelling or redness around 1 or both eyes   Trouble breathing or a stiff neck    * If you are a smoker, try to quit *    Call 9-1-1 or go to the emergency room if you:  Have trouble breathing   Are drooling because you cannot swallow your saliva   Have swelling of the neck or tongue   Cannot move your neck or have trouble opening your mouth       Ling Trammell PA-C..................1/14/2019 12:51 PM

## 2019-01-14 NOTE — NURSING NOTE
Patient presents to clinic experiencing sinus drainage with pressure, ear pain, dry cough, fatigue for past 8 days.    Medication Reconciliation: complete    Surekha Naylor LPN

## 2019-01-14 NOTE — PATIENT INSTRUCTIONS
Sinus infection - Flonase has been sent to pharmacy.     May use symptomatic care with tylenol.  Using a humidifier works well to break up the congestion. You can also sleep propped up on a couple pillows to decrease symptoms at night.     Use a Neti Pot/sinusflush (Darin Med Sinus Rinse) 3 times daily to irrigate sinuses/mucosal tissue.     Please take tylenol as needed up to 4 times daily.  Treat symptomatically with warm salt water gargles.  Frequent swallows of cool liquid.  Oatmeal coats the throat and some patients find it soothes the pain.     Monitor for any fevers or chills. Return in 7-10 days if not feeling better. Please call clinic with any questions or concerns. Please take in a lot of fluids and get rest.     You will need to be evaluated if you start to experience:  Fever higher than 102.5 F (39.2 C)   Sudden and severe pain in the face and head   Trouble seeing or seeing double   Trouble thinking clearly   Swelling or redness around 1 or both eyes   Trouble breathing or a stiff neck    * If you are a smoker, try to quit *    Call 9-1-1 or go to the emergency room if you:  Have trouble breathing   Are drooling because you cannot swallow your saliva   Have swelling of the neck or tongue   Cannot move your neck or have trouble opening your mouth

## 2019-01-18 ENCOUNTER — TRANSFERRED RECORDS (OUTPATIENT)
Dept: HEALTH INFORMATION MANAGEMENT | Facility: OTHER | Age: 49
End: 2019-01-18

## 2019-01-25 ENCOUNTER — TRANSFERRED RECORDS (OUTPATIENT)
Dept: HEALTH INFORMATION MANAGEMENT | Facility: OTHER | Age: 49
End: 2019-01-25

## 2019-02-02 ENCOUNTER — TRANSFERRED RECORDS (OUTPATIENT)
Dept: HEALTH INFORMATION MANAGEMENT | Facility: OTHER | Age: 49
End: 2019-02-02

## 2019-02-03 ENCOUNTER — MYC MEDICAL ADVICE (OUTPATIENT)
Dept: ONCOLOGY | Facility: OTHER | Age: 49
End: 2019-02-03

## 2019-02-08 ENCOUNTER — TRANSFERRED RECORDS (OUTPATIENT)
Dept: HEALTH INFORMATION MANAGEMENT | Facility: OTHER | Age: 49
End: 2019-02-08

## 2019-02-11 ENCOUNTER — NURSE TRIAGE (OUTPATIENT)
Dept: FAMILY MEDICINE | Facility: OTHER | Age: 49
End: 2019-02-11

## 2019-02-11 ENCOUNTER — TELEPHONE (OUTPATIENT)
Dept: FAMILY MEDICINE | Facility: OTHER | Age: 49
End: 2019-02-11

## 2019-02-11 DIAGNOSIS — J01.90 ACUTE NON-RECURRENT SINUSITIS, UNSPECIFIED LOCATION: Primary | ICD-10-CM

## 2019-02-11 RX ORDER — CEFDINIR 300 MG/1
300 CAPSULE ORAL 2 TIMES DAILY
Qty: 20 CAPSULE | Refills: 0 | Status: SHIPPED | OUTPATIENT
Start: 2019-02-11 | End: 2019-03-18

## 2019-02-11 NOTE — TELEPHONE ENCOUNTER
"S-(situation): Patient is requesting an antibiotic sent to [facility]    B-(background): 01/14/2019 OV-Symptoms are likely viral at this time.  Can call in the next few days for an antibiotic if needed if symptoms are not calming down or worsening as needed.    Patient states she was in clinic and seen JRO in January for sinus infection and was told to call back if symptoms did not improve. \"Never felt like it went away\".     A-(assessment): Head is plugged up. No fever. Chills and sweats over the weekend. Coughing.     R-(recommendations): Pt requests physician consideration and a callback today please    Elham CARROL Colon RN on 2/11/2019 at 8:49 AM      "

## 2019-02-11 NOTE — TELEPHONE ENCOUNTER
Sent cefdinir to the pharmacy for treatment. This will not interact with the warfarin.  Needs to be seen for recheck prior to future refills if symptoms do not calm down or worsen.  Ling Trammell PA-C..................2/11/2019 12:54 PM

## 2019-02-14 ENCOUNTER — TRANSFERRED RECORDS (OUTPATIENT)
Dept: HEALTH INFORMATION MANAGEMENT | Facility: OTHER | Age: 49
End: 2019-02-14

## 2019-02-17 ENCOUNTER — TRANSFERRED RECORDS (OUTPATIENT)
Dept: HEALTH INFORMATION MANAGEMENT | Facility: OTHER | Age: 49
End: 2019-02-17

## 2019-02-18 DIAGNOSIS — J01.00 ACUTE NON-RECURRENT MAXILLARY SINUSITIS: ICD-10-CM

## 2019-02-21 ENCOUNTER — TRANSFERRED RECORDS (OUTPATIENT)
Dept: HEALTH INFORMATION MANAGEMENT | Facility: OTHER | Age: 49
End: 2019-02-21

## 2019-02-21 ENCOUNTER — ANTICOAGULATION THERAPY VISIT (OUTPATIENT)
Dept: ANTICOAGULATION | Facility: OTHER | Age: 49
End: 2019-02-21
Payer: COMMERCIAL

## 2019-02-21 DIAGNOSIS — I82.602 BLOOD CLOT OF VEIN IN SHOULDER AREA, LEFT: ICD-10-CM

## 2019-02-21 DIAGNOSIS — D68.51 FACTOR 5 LEIDEN MUTATION, HETEROZYGOUS (H): ICD-10-CM

## 2019-02-21 LAB — INR PPP: 2.7

## 2019-02-21 PROCEDURE — 99207 ZZC NO CHARGE NURSE ONLY: CPT | Performed by: FAMILY MEDICINE

## 2019-02-21 RX ORDER — FLUTICASONE PROPIONATE 50 MCG
2 SPRAY, SUSPENSION (ML) NASAL DAILY
Qty: 1 BOTTLE | Refills: 1 | Status: SHIPPED | OUTPATIENT
Start: 2019-02-21 | End: 2019-03-18

## 2019-02-21 NOTE — TELEPHONE ENCOUNTER
[facility] [address] [place] sent Rx request for the following:      fluticasone (FLONASE) 50 MCG/ACT nasal spray  Sig: Spray 2 sprays into both nostrils daily  Last Written Prescription Date:  1/14/19  Last Fill Quantity: 1 Bottle,   # refills: 1    Last Office Visit: 1/14/19 (Ling Trammell- Sinusitis)  Future Office visit:     *Noted flonase was prescribed at [facility], for sinusitis. Pt was instructed to return if symptoms failed to improve. Per nurse triage encounter, dated 2/11, Pt called stating symptoms had not improved. Cefdinir ordered.     Prescription approved per [facility] Refill Protocol for #1 Bottle and 1 additional refill at this time, as symptoms have not completely resolved. Surekha Amador RN .............. 2/21/2019  7:56 AM

## 2019-02-21 NOTE — PROGRESS NOTES
ANTICOAGULATION FOLLOW-UP CLINIC VISIT    Patient Name:  Daniela Ladd  Date:  2019  Contact Type:  no called needed patient will continue same dosing    SUBJECTIVE:     Patient Findings     Positives:   No Problem Findings           OBJECTIVE    INR   Date Value Ref Range Status   2019 2.7  Final       ASSESSMENT / PLAN  INR assessment THER    Recheck INR In: 2 WEEKS    INR Location Home INR      Anticoagulation Summary  As of 2019    INR goal:   2.5-3.5   TTR:   68.8 % (1.3 y)   INR used for dosin.7 (2019)   Warfarin maintenance plan:   10 mg (2 mg x 5) every Mon; 8 mg (2 mg x 4) all other days   Full warfarin instructions:   10 mg every Mon; 8 mg all other days   Weekly warfarin total:   58 mg   No change documented:   Yoly Crowe RN   Plan last modified:   Renee Kevin RN (2019)   Next INR check:   3/7/2019   Priority:   INR   Target end date:   Indefinite    Indications    Blood clot of vein in shoulder area  left [I82.602]  Factor 5 Leiden mutation  heterozygous (H) [D68.51]  Long-term (current) use of anticoagulants [Z79.01] [Z79.01]             Anticoagulation Episode Summary     INR check location:       Preferred lab:       Send INR reminders to:    INR    Comments:   INR goal changed per Dr Kitchen 3/21/18 has her own machine and checks INR weekly        Anticoagulation Care Providers     Provider Role Specialty Phone number    Krystin Black MD [facility] [phone]            See the Encounter Report to view Anticoagulation Flowsheet and Dosing Calendar (Go to Encounters tab in chart review, and find the Anticoagulation Therapy Visit)        Yoly Crowe RN

## 2019-02-28 ENCOUNTER — TRANSFERRED RECORDS (OUTPATIENT)
Dept: HEALTH INFORMATION MANAGEMENT | Facility: OTHER | Age: 49
End: 2019-02-28

## 2019-02-28 ENCOUNTER — ANTICOAGULATION THERAPY VISIT (OUTPATIENT)
Dept: ANTICOAGULATION | Facility: OTHER | Age: 49
End: 2019-02-28
Payer: COMMERCIAL

## 2019-02-28 DIAGNOSIS — I82.602 BLOOD CLOT OF VEIN IN SHOULDER AREA, LEFT: ICD-10-CM

## 2019-02-28 DIAGNOSIS — D68.51 FACTOR 5 LEIDEN MUTATION, HETEROZYGOUS (H): ICD-10-CM

## 2019-02-28 LAB — INR POINT OF CARE: 3.2 (ref 0.86–1.14)

## 2019-02-28 PROCEDURE — 85610 PROTHROMBIN TIME: CPT | Mod: QW | Performed by: FAMILY MEDICINE

## 2019-02-28 PROCEDURE — 36416 COLLJ CAPILLARY BLOOD SPEC: CPT | Performed by: FAMILY MEDICINE

## 2019-02-28 NOTE — PROGRESS NOTES
ANTICOAGULATION FOLLOW-UP CLINIC VISIT    Patient Name:  Daniela Ladd  Date:  2/28/2019  Contact Type:  patient is in range no call made patient to continue same dose    SUBJECTIVE:     Patient Findings     Positives:   No Problem Findings           OBJECTIVE    INR Protime   Date Value Ref Range Status   02/28/2019 3.2 (A) 0.86 - 1.14 Corrected       ASSESSMENT / PLAN  INR assessment THER    Recheck INR In: 2 WEEKS    INR Location Home INR      Anticoagulation Summary  As of 2/28/2019    INR goal:   2.5-3.5   TTR:   69.3 % (1.3 y)   INR used for dosing:   3.2 (2/28/2019)   Warfarin maintenance plan:   10 mg (2 mg x 5) every Mon; 8 mg (2 mg x 4) all other days   Full warfarin instructions:   10 mg every Mon; 8 mg all other days   Weekly warfarin total:   58 mg   No change documented:   Yoly Crowe RN   Plan last modified:   Renee Kevin RN (1/7/2019)   Next INR check:   3/14/2019   Priority:   INR   Target end date:   Indefinite    Indications    Blood clot of vein in shoulder area  left [I82.602]  Factor 5 Leiden mutation  heterozygous (H) [D68.51]  Long-term (current) use of anticoagulants [Z79.01] [Z79.01]             Anticoagulation Episode Summary     INR check location:       Preferred lab:       Send INR reminders to:    INR    Comments:   INR goal changed per Dr Kitchen 3/21/18 has her own machine and checks INR weekly        Anticoagulation Care Providers     Provider Role Specialty Phone number    Krystin Black MD [facility] [phone]            See the Encounter Report to view Anticoagulation Flowsheet and Dosing Calendar (Go to Encounters tab in chart review, and find the Anticoagulation Therapy Visit)        Yoly Crowe, RN

## 2019-03-07 ENCOUNTER — ANTICOAGULATION THERAPY VISIT (OUTPATIENT)
Dept: ANTICOAGULATION | Facility: OTHER | Age: 49
End: 2019-03-07
Payer: COMMERCIAL

## 2019-03-07 ENCOUNTER — TRANSFERRED RECORDS (OUTPATIENT)
Dept: HEALTH INFORMATION MANAGEMENT | Facility: OTHER | Age: 49
End: 2019-03-07

## 2019-03-07 DIAGNOSIS — D68.51 FACTOR 5 LEIDEN MUTATION, HETEROZYGOUS (H): ICD-10-CM

## 2019-03-07 DIAGNOSIS — I82.602 BLOOD CLOT OF VEIN IN SHOULDER AREA, LEFT: ICD-10-CM

## 2019-03-07 LAB — INR PPP: 3

## 2019-03-07 NOTE — PROGRESS NOTES
ANTICOAGULATION FOLLOW-UP CLINIC VISIT    Patient Name:  Daniela Ladd  Date:  3/7/2019  Contact Type:  no call needed patient to continue smae dose    SUBJECTIVE:     Patient Findings     Positives:   No Problem Findings           OBJECTIVE    INR   Date Value Ref Range Status   03/07/2019 3.0  Final       ASSESSMENT / PLAN  INR assessment THER    Recheck INR In: 3 WEEKS    INR Location Home INR      Anticoagulation Summary  As of 3/7/2019    INR goal:   2.5-3.5   TTR:   69.7 % (1.3 y)   INR used for dosing:   3.0 (3/7/2019)   Warfarin maintenance plan:   10 mg (2 mg x 5) every Mon; 8 mg (2 mg x 4) all other days   Full warfarin instructions:   10 mg every Mon; 8 mg all other days   Weekly warfarin total:   58 mg   No change documented:   Yoly Crowe RN   Plan last modified:   Renee Kevin RN (1/7/2019)   Next INR check:   3/28/2019   Priority:   INR   Target end date:   Indefinite    Indications    Blood clot of vein in shoulder area  left [I82.602]  Factor 5 Leiden mutation  heterozygous (H) [D68.51]  Long-term (current) use of anticoagulants [Z79.01] [Z79.01]             Anticoagulation Episode Summary     INR check location:       Preferred lab:       Send INR reminders to:    INR    Comments:   INR goal changed per Dr Kitchen 3/21/18 has her own machine and checks INR weekly        Anticoagulation Care Providers     Provider Role Specialty Phone number    Krystin Black MD [facility] [phone]            See the Encounter Report to view Anticoagulation Flowsheet and Dosing Calendar (Go to Encounters tab in chart review, and find the Anticoagulation Therapy Visit)        Yoly Crowe RN

## 2019-03-13 ENCOUNTER — TRANSFERRED RECORDS (OUTPATIENT)
Dept: HEALTH INFORMATION MANAGEMENT | Facility: OTHER | Age: 49
End: 2019-03-13

## 2019-03-13 ENCOUNTER — ANTICOAGULATION THERAPY VISIT (OUTPATIENT)
Dept: ANTICOAGULATION | Facility: OTHER | Age: 49
End: 2019-03-13
Payer: COMMERCIAL

## 2019-03-13 DIAGNOSIS — I82.602 BLOOD CLOT OF VEIN IN SHOULDER AREA, LEFT: ICD-10-CM

## 2019-03-13 DIAGNOSIS — D68.51 FACTOR 5 LEIDEN MUTATION, HETEROZYGOUS (H): ICD-10-CM

## 2019-03-13 LAB — INR POINT OF CARE: 2.7 (ref 0.86–1.14)

## 2019-03-13 PROCEDURE — 85610 PROTHROMBIN TIME: CPT | Mod: QW | Performed by: FAMILY MEDICINE

## 2019-03-13 PROCEDURE — 36416 COLLJ CAPILLARY BLOOD SPEC: CPT | Performed by: FAMILY MEDICINE

## 2019-03-13 NOTE — PROGRESS NOTES
ANTICOAGULATION FOLLOW-UP CLINIC VISIT    Patient Name:  Daniela Ladd  Date:  3/13/2019  Contact Type:  Telephone/ home INR    SUBJECTIVE:     Patient Findings     Comments:   Per phone call with Daniela           OBJECTIVE    INR Protime   Date Value Ref Range Status   2019 2.7 (A) 0.86 - 1.14 Final       ASSESSMENT / PLAN  INR assessment THER    Recheck INR In: 1 WEEK    INR Location Home INR      Anticoagulation Summary  As of 3/13/2019    INR goal:   2.5-3.5   TTR:   70.1 % (1.3 y)   INR used for dosin.7 (3/13/2019)   Warfarin maintenance plan:   10 mg (2 mg x 5) every Mon; 8 mg (2 mg x 4) all other days   Full warfarin instructions:   10 mg every Mon; 8 mg all other days   Weekly warfarin total:   58 mg   No change documented:   Renee Kevin RN   Plan last modified:   Renee Kevin RN (2019)   Next INR check:   3/21/2019   Priority:   INR   Target end date:   Indefinite    Indications    Blood clot of vein in shoulder area  left [I82.602]  Factor 5 Leiden mutation  heterozygous (H) [D68.51]  Long-term (current) use of anticoagulants [Z79.01] [Z79.01]             Anticoagulation Episode Summary     INR check location:       Preferred lab:       Send INR reminders to:    INR    Comments:   INR goal changed per Dr Kitchen 3/21/18 has her own machine and checks INR weekly        Anticoagulation Care Providers     Provider Role Specialty Phone number    Krystin Black MD [facility] [phone]            See the Encounter Report to view Anticoagulation Flowsheet and Dosing Calendar (Go to Encounters tab in chart review, and find the Anticoagulation Therapy Visit)    Patient has [facility] monitoring machine..  Called patient to continue same dose.  Patient verbalized understanding.      Renee Kevin RN

## 2019-03-18 ENCOUNTER — OFFICE VISIT (OUTPATIENT)
Dept: FAMILY MEDICINE | Facility: OTHER | Age: 49
End: 2019-03-18
Attending: FAMILY MEDICINE
Payer: COMMERCIAL

## 2019-03-18 VITALS
RESPIRATION RATE: 16 BRPM | HEART RATE: 86 BPM | HEIGHT: 67 IN | BODY MASS INDEX: 31.96 KG/M2 | DIASTOLIC BLOOD PRESSURE: 86 MMHG | OXYGEN SATURATION: 100 % | WEIGHT: 203.6 LBS | SYSTOLIC BLOOD PRESSURE: 135 MMHG

## 2019-03-18 DIAGNOSIS — L60.0 INGROWN TOENAIL OF LEFT FOOT: ICD-10-CM

## 2019-03-18 DIAGNOSIS — Z00.00 HEALTH MAINTENANCE EXAMINATION: Primary | ICD-10-CM

## 2019-03-18 DIAGNOSIS — Z97.5 IUD (INTRAUTERINE DEVICE) IN PLACE: ICD-10-CM

## 2019-03-18 DIAGNOSIS — Z12.4 CERVICAL CANCER SCREENING: ICD-10-CM

## 2019-03-18 PROCEDURE — G0123 SCREEN CERV/VAG THIN LAYER: HCPCS | Performed by: FAMILY MEDICINE

## 2019-03-18 PROCEDURE — 87624 HPV HI-RISK TYP POOLED RSLT: CPT | Performed by: FAMILY MEDICINE

## 2019-03-18 PROCEDURE — 88142 CYTOPATH C/V THIN LAYER: CPT | Performed by: FAMILY MEDICINE

## 2019-03-18 PROCEDURE — 99396 PREV VISIT EST AGE 40-64: CPT | Performed by: FAMILY MEDICINE

## 2019-03-18 PROCEDURE — 40001026 ZZHCL STATISTICAL PAP TEST QC: Performed by: FAMILY MEDICINE

## 2019-03-18 ASSESSMENT — MIFFLIN-ST. JEOR: SCORE: 1588.21

## 2019-03-18 NOTE — PROGRESS NOTES
"Nursing Notes:   Livier Palma LPN  3/18/2019 11:05 AM  Signed  Patient presents to clinic for annual physical.  Livier Powell ....................  3/18/2019   11:03 AM      Annual exam  Daniela Ladd 48 year old G 3 P1  presents for annual exam.    Here for PAP and annual.  Also has a \"ingrown toenail on left\" 2nd toe, sore. Seems like her toenails are \"growing differently.    HPI:  Pt is not new to clinic  Body mass index is 32.37 kg/m .  Wt Readings from Last 3 Encounters:   03/18/19 92.4 kg (203 lb 9.6 oz)   01/14/19 90.8 kg (200 lb 4 oz)   10/16/18 91 kg (200 lb 9.6 oz)       Reviewed preventative issues as listed by clinical assistant  Exercises: Trying to be more active   Texting while driving:No   Uses seatbelt at all times.   Social History    Substance and Sexual Activity      Alcohol use: No        Alcohol/week: 0.0 oz    Allergies: Amoxicillin-pot clavulanate; Cyclobenzaprine; and No clinical screening - see comments  Meds:   Current Outpatient Medications   Medication Sig Dispense Refill     acetaminophen (TYLENOL) 500 MG tablet Take 1,000 mg by mouth       nicotine polacrilex (NICORETTE) 2 MG gum   2     warfarin (COUMADIN) 2 MG tablet Take 10 mg x one day/week, and 8 mg x six days/week. OR AS DIRECTED 336 tablet 0     levonorgestrel (MIRENA) 20 MCG/24HR IUD 1 each (20 mcg) by Intrauterine route once for 1 dose 1 each 0         Review of systems is negative except as listed in the HPI       Physical Exam   Appearance: well developed, well nourished, no acute distress  Head Exam normocephalic, no lesionsor deformities  Thyroid: no nodules, masses, tenderness, or enlargement  Breast exam: no masses or nipple discharge  Lungs: no rales, rhonchi, or wheezes  Heart: S1, S2, no murmur, rub, or gallop  Abdomen:soft, non-tender, no masses, no organomegaly, no hernia  Skin: no ulcers or lesions, no abnormal nevi  Left 2nd toe with slight ingrown lateral nail, mildly inflamed.   Lymph: no " cervical, axillary, or inguinal adenopathy  Extremities: no edema  Psych: orientated x3    Genitourinary Exam   Vulva: normal, no lesions or discharge  Urethral meatus: normal size and location, no lesions or discharge  Urethra: no tenderness or masses  Bladder: no fullness or tenderness  Vagina:normal appearance, no discharge, lesions.  No evidence of cystocele or rectocele.  Cervix: normal appearance, no lesions, IUD string visible. Clear mucus   Uterus: normal position, mobile, non-tender  Pap smear/HPV obtained  Adnexa: no palpable masses bilaterally    Impression:   1. Health maintenance examination    2. Cervical cancer screening    3. IUD (intrauterine device) in place    4. Ingrown toenail of left foot      Plan:   Continue current cares of nail as discussed and already improving.   Labs up to date.  Mammogram done in 12/2018.   Continue coumadin.   Krystin Black MD  11:41 AM 3/18/2019   Portions of this dictation were created using the Dragon Nuance voice recognition system. Proofreading was completed but there may be errors in text.

## 2019-03-18 NOTE — PATIENT INSTRUCTIONS
Patient Education     Prevention Guidelines, Women Ages 40 to 49  Screening tests and vaccines are an important part of managing your health. A screening test is done to find possible disorders or diseases in people who don't have any symptoms. The goal is to find a disease early so lifestyle changes can be made and you can be watched more closely to reduce the risk of disease, or to detect it early enough to treat it most effectively. Screening tests are not considered diagnostic, but are used to determine if more testing is needed. Health counseling is essential, too. Below are guidelines for these, for women ages 40 to 49. Talk with your healthcare provider to make sure you re up-to-date on what you need.  Screening Who needs it How often   Type 2 diabetes or prediabetes All women beginning at age 45 and women without symptoms at any age who are overweight or obese and have 1 or more additional risk factors for diabetes At least every 3 years1   Type 2 diabetes or prediabetes All women diagnosed with gestational diabetes Lifelong testing every 3 years   Type 2 diabetes All women with prediabetes Every year   Alcohol misuse All women in this age group At routine exams   Blood pressure All women in this age group Yearly checkup if your blood pressure is normal  Normal blood pressure is less than 120/80 mm Hg  If your blood pressure reading is higher than normal, follow the advice of your healthcare provider   Breast cancer All women at average risk in this age group Screening with a mammogram can start at age 40.2 Talk with your healthcare provider to help you decide when to start screening. At age 45 start yearly mammograms.3    Cervical cancer All women in this age group, except women who have had a complete hysterectomy Pap test every 3 years or Pap test plus human papilloma virus (HPV) test every 5 years   Chlamydia Women at increased risk for infection At routine exams if you're at risk or have symptoms    Depression All women in this age group At routine exams   Gonorrhea Sexually active women at increased risk for infection At routine exams   Hepatitis C Anyone at increased risk; 1 time for those born between 1945 and 1965 At routine exams   High cholesterol or triglycerides All women ages 45 and older who are at risk for coronary artery disease; younger women, talk with your healthcare provider At least every 5 years   HIV All women At routine exams. Those with risk factors for HIV should be tested at least annually.   Obesity All women in this age group At routine exams   Syphilis Women at increased risk for infection-talk with your healthcare provider At routine exams   Tuberculosis Women at increased risk for infection-talk with your healthcare provider Ask your healthcare provider   Vision All women in this age group Complete exam at age 40 and eye exams every 2 to 4 years. If you have a chronic disease, ask your healthcare provider how often you should have your eyes examined.4   Vaccine Who needs it How often   Chickenpox (varicella) All women in this age group who have no record of this infection or vaccine 2 doses; the second dose should be given at least 4 weeks after the first dose   Hepatitis A Women at increased risk for infection-talk with your healthcare provider 2 doses given 6 months apart   Hepatitis B Women at increased risk for infection-talk with your healthcare provider 3 doses over 6 months; second dose should be given 1 month after the first dose; the third dose should be given at least 2 months after the second dose and at least 4 months after the first dose   Haemophilus influenzae Type B (HIB) Women at increased risk 1 to 3 doses   Influenza (flu) All women in this age group Once a year   Measles, mumps, rubella (MMR) All women in this age group who have no record of these infections or vaccines 1 or 2 doses   Meningococcal Women at increased risk for infection-talk with your healthcare  provider 1 or more doses   Pneumococcal conjugate vaccine (PCV13) and pneumococcal polysaccharide vaccine (PPSV23) Women at increased risk for infection-talk with your healthcare provider 1 or 2 doses   Tetanus/diphtheria/pertussis (Td/Tdap) booster All women in this age group A one-time dose of Tdap instead of a Td booster after age 18, then Td every 10 years   Counseling Who needs it How often   BRCA gene mutation testing for breast and ovarian cancer susceptibility Women with increased risk for having gene mutation When your risk is known   Breast cancer and chemoprevention Women at high risk for breast cancer When your risk is known   Diet and exercise Women who are overweight or obese When diagnosed, and then at routine exams   Domestic violence Women at the age in which they are able to have children At routine exams   Sexually transmitted infection prevention Women at increased risk for infection-talk with your healthcare provider At routine exams   Use of tobacco and the health effects it can cause All women in this age group Every exam   1American Diabetes Association  2American College of Obstetricians and Gynecologists   3American Cancer Society  4American Academy of Ophthalmology  Date Last Reviewed: 11/1/2017 2000-2018 [employer]. [address] [place], [place] [id number]. All rights reserved. This information is not intended as a substitute for professional medical care. Always follow your healthcare professional's instructions.

## 2019-03-18 NOTE — NURSING NOTE
Patient presents to clinic for annual physical.  Livier Powell ....................  3/18/2019   11:03 AM

## 2019-03-19 DIAGNOSIS — Z79.01 LONG TERM CURRENT USE OF ANTICOAGULANT THERAPY: ICD-10-CM

## 2019-03-19 DIAGNOSIS — I82.622 ARM DVT (DEEP VENOUS THROMBOEMBOLISM), ACUTE, LEFT (H): ICD-10-CM

## 2019-03-20 ENCOUNTER — ANTICOAGULATION THERAPY VISIT (OUTPATIENT)
Dept: ANTICOAGULATION | Facility: OTHER | Age: 49
End: 2019-03-20
Payer: COMMERCIAL

## 2019-03-20 ENCOUNTER — TRANSFERRED RECORDS (OUTPATIENT)
Dept: HEALTH INFORMATION MANAGEMENT | Facility: OTHER | Age: 49
End: 2019-03-20

## 2019-03-20 DIAGNOSIS — D68.51 FACTOR 5 LEIDEN MUTATION, HETEROZYGOUS (H): ICD-10-CM

## 2019-03-20 DIAGNOSIS — I82.602 BLOOD CLOT OF VEIN IN SHOULDER AREA, LEFT: Primary | ICD-10-CM

## 2019-03-20 LAB — INR PPP: 2.6

## 2019-03-20 RX ORDER — WARFARIN SODIUM 2 MG/1
TABLET ORAL
Qty: 400 TABLET | Refills: 1 | Status: SHIPPED | OUTPATIENT
Start: 2019-03-20 | End: 2019-06-12

## 2019-03-20 NOTE — TELEPHONE ENCOUNTER
"Requested Prescriptions   Pending Prescriptions Disp Refills     warfarin (COUMADIN) 2 MG tablet [Pharmacy Med Name: WARFARIN SODIUM 2 MG TABLET] 375 tablet 0     Sig: TAKE 4 TABLETS (8 MG) BY MOUTH DAILY OR AS DIRECTED BY [facility]    Vitamin K Antagonists Failed - 3/19/2019  5:21 PM       Failed - INR is within goal in the past 6 weeks    Confirm INR is within goal in the past 6 weeks.     Recent Labs   Lab Test 03/20/19   INR 2.6                      Passed - Recent (12 mo) or future (30 days) visit within the authorizing provider's specialty    Patient had office visit in the last 12 months or has a visit in the next 30 days with authorizing provider or within the authorizing provider's specialty.  See \"Patient Info\" tab in inbasket, or \"Choose Columns\" in Meds & Orders section of the refill encounter.             Passed - Medication is active on med list       Passed - Patient is 18 years of age or older       Passed - Patient is not pregnant       Passed - No positive pregnancy on file in past 12 months      Prescription approved per [facility] Ref[facility].      "

## 2019-03-20 NOTE — PROGRESS NOTES
ANTICOAGULATION FOLLOW-UP CLINIC VISIT    Patient Name:  Daniela Ladd  Date:  3/20/2019  Contact Type:  no call needed patient to continue same dosing    SUBJECTIVE:     Patient Findings     Comments:   The patient was assessed for diet, medication, and activity level changes, missed or extra doses, bruising or bleeding, with no problem findings.             OBJECTIVE    INR   Date Value Ref Range Status   2019 2.6  Final       ASSESSMENT / PLAN  INR assessment THER    Recheck INR In: 2 WEEKS    INR Location Home INR      Anticoagulation Summary  As of 3/20/2019    INR goal:   2.5-3.5   TTR:   70.5 % (1.3 y)   INR used for dosin.6 (3/20/2019)   Warfarin maintenance plan:   10 mg (2 mg x 5) every Mon; 8 mg (2 mg x 4) all other days   Full warfarin instructions:   10 mg every Mon; 8 mg all other days   Weekly warfarin total:   58 mg   No change documented:   Yoly Crowe RN   Plan last modified:   Renee Kevin RN (2019)   Next INR check:   4/3/2019   Priority:   INR   Target end date:   Indefinite    Indications    Blood clot of vein in shoulder area  left [I82.602]  Factor 5 Leiden mutation  heterozygous (H) [D68.51]  Long-term (current) use of anticoagulants [Z79.01] [Z79.01]             Anticoagulation Episode Summary     INR check location:       Preferred lab:       Send INR reminders to:    INR    Comments:   INR goal changed per Dr Kitchen 3/21/18 has her own machine and checks INR weekly        Anticoagulation Care Providers     Provider Role Specialty Phone number    Krystin Black MD [facility] [phone]            See the Encounter Report to view Anticoagulation Flowsheet and Dosing Calendar (Go to Encounters tab in chart review, and find the Anticoagulation Therapy Visit)        Yoly Crowe RN

## 2019-03-25 LAB
COPATH REPORT: NORMAL
PAP: NORMAL

## 2019-03-26 ENCOUNTER — MYC MEDICAL ADVICE (OUTPATIENT)
Dept: FAMILY MEDICINE | Facility: OTHER | Age: 49
End: 2019-03-26

## 2019-03-26 LAB
FINAL DIAGNOSIS: NORMAL
HPV HR 12 DNA CVX QL NAA+PROBE: NEGATIVE
HPV16 DNA SPEC QL NAA+PROBE: NEGATIVE
HPV18 DNA SPEC QL NAA+PROBE: NEGATIVE
SPECIMEN DESCRIPTION: NORMAL
SPECIMEN SOURCE CVX/VAG CYTO: NORMAL

## 2019-03-27 ENCOUNTER — TRANSFERRED RECORDS (OUTPATIENT)
Dept: HEALTH INFORMATION MANAGEMENT | Facility: OTHER | Age: 49
End: 2019-03-27

## 2019-03-27 ENCOUNTER — ANTICOAGULATION THERAPY VISIT (OUTPATIENT)
Dept: ANTICOAGULATION | Facility: OTHER | Age: 49
End: 2019-03-27
Payer: COMMERCIAL

## 2019-03-27 DIAGNOSIS — D68.51 FACTOR 5 LEIDEN MUTATION, HETEROZYGOUS (H): ICD-10-CM

## 2019-03-27 DIAGNOSIS — I82.602 BLOOD CLOT OF VEIN IN SHOULDER AREA, LEFT: ICD-10-CM

## 2019-03-27 LAB — INR PPP: 3.2

## 2019-03-27 PROCEDURE — 99207 ZZC NO CHARGE NURSE ONLY: CPT | Performed by: FAMILY MEDICINE

## 2019-03-27 NOTE — PROGRESS NOTES
ANTICOAGULATION FOLLOW-UP CLINIC VISIT    Patient Name:  Daniela Ladd  Date:  3/27/2019  Contact Type:  no call needed patient to continue same dose    SUBJECTIVE:     Patient Findings     Comments:   The patient was assessed for diet, medication, and activity level changes, missed or extra doses, bruising or bleeding, with no problem findings.             OBJECTIVE    INR   Date Value Ref Range Status   03/27/2019 3.2  Final       ASSESSMENT / PLAN  INR assessment THER    Recheck INR In: 2 WEEKS    INR Location Home INR      Anticoagulation Summary  As of 3/27/2019    INR goal:   2.5-3.5   TTR:   70.9 % (1.4 y)   INR used for dosing:   3.2 (3/27/2019)   Warfarin maintenance plan:   10 mg (2 mg x 5) every Mon; 8 mg (2 mg x 4) all other days   Full warfarin instructions:   10 mg every Mon; 8 mg all other days   Weekly warfarin total:   58 mg   No change documented:   Yoly Crowe RN   Plan last modified:   Renee Kevin RN (1/7/2019)   Next INR check:   4/10/2019   Priority:   INR   Target end date:   Indefinite    Indications    Blood clot of vein in shoulder area  left [I82.602]  Factor 5 Leiden mutation  heterozygous (H) [D68.51]  Long-term (current) use of anticoagulants [Z79.01] [Z79.01]             Anticoagulation Episode Summary     INR check location:       Preferred lab:       Send INR reminders to:    INR    Comments:   INR goal changed per Dr Kitchen 3/21/18 has her own machine and checks INR weekly        Anticoagulation Care Providers     Provider Role Specialty Phone number    Krystin Black MD [facility] [phone]            See the Encounter Report to view Anticoagulation Flowsheet and Dosing Calendar (Go to Encounters tab in chart review, and find the Anticoagulation Therapy Visit)        Yoly Crowe RN

## 2019-03-29 ENCOUNTER — OFFICE VISIT (OUTPATIENT)
Dept: FAMILY MEDICINE | Facility: OTHER | Age: 49
End: 2019-03-29
Attending: NURSE PRACTITIONER
Payer: COMMERCIAL

## 2019-03-29 VITALS
DIASTOLIC BLOOD PRESSURE: 88 MMHG | WEIGHT: 199.8 LBS | SYSTOLIC BLOOD PRESSURE: 124 MMHG | RESPIRATION RATE: 16 BRPM | TEMPERATURE: 97.4 F | HEART RATE: 100 BPM | HEIGHT: 67 IN | BODY MASS INDEX: 31.36 KG/M2

## 2019-03-29 DIAGNOSIS — D68.51 FACTOR 5 LEIDEN MUTATION, HETEROZYGOUS (H): Primary | ICD-10-CM

## 2019-03-29 DIAGNOSIS — I82.602 BLOOD CLOT OF VEIN IN SHOULDER AREA, LEFT: ICD-10-CM

## 2019-03-29 LAB
ALBUMIN SERPL-MCNC: 4.3 G/DL (ref 3.5–5.7)
ALP SERPL-CCNC: 40 U/L (ref 34–104)
ALT SERPL W P-5'-P-CCNC: 22 U/L (ref 7–52)
ANION GAP SERPL CALCULATED.3IONS-SCNC: 7 MMOL/L (ref 3–14)
AST SERPL W P-5'-P-CCNC: 20 U/L (ref 13–39)
BASOPHILS # BLD AUTO: 0 10E9/L (ref 0–0.2)
BASOPHILS NFR BLD AUTO: 0.4 %
BILIRUB SERPL-MCNC: 0.3 MG/DL (ref 0.3–1)
BUN SERPL-MCNC: 7 MG/DL (ref 7–25)
CALCIUM SERPL-MCNC: 8.9 MG/DL (ref 8.6–10.3)
CHLORIDE SERPL-SCNC: 109 MMOL/L (ref 98–107)
CO2 SERPL-SCNC: 22 MMOL/L (ref 21–31)
CREAT SERPL-MCNC: 0.7 MG/DL (ref 0.6–1.2)
D DIMER PPP DDU-MCNC: 327 NG/ML D-DU (ref 0–230)
DIFFERENTIAL METHOD BLD: ABNORMAL
EOSINOPHIL # BLD AUTO: 0.1 10E9/L (ref 0–0.7)
EOSINOPHIL NFR BLD AUTO: 0.6 %
ERYTHROCYTE [DISTWIDTH] IN BLOOD BY AUTOMATED COUNT: 13 % (ref 10–15)
GFR SERPL CREATININE-BSD FRML MDRD: >90 ML/MIN/{1.73_M2}
GLUCOSE SERPL-MCNC: 87 MG/DL (ref 70–105)
HCT VFR BLD AUTO: 48.8 % (ref 35–47)
HGB BLD-MCNC: 16.5 G/DL (ref 11.7–15.7)
IMM GRANULOCYTES # BLD: 0 10E9/L (ref 0–0.4)
IMM GRANULOCYTES NFR BLD: 0.3 %
INR PPP: 3.45 (ref 0–1.3)
LDH SERPL L TO P-CCNC: 181 U/L (ref 140–271)
LYMPHOCYTES # BLD AUTO: 2.9 10E9/L (ref 0.8–5.3)
LYMPHOCYTES NFR BLD AUTO: 36.7 %
MCH RBC QN AUTO: 30.5 PG (ref 26.5–33)
MCHC RBC AUTO-ENTMCNC: 33.8 G/DL (ref 31.5–36.5)
MCV RBC AUTO: 90 FL (ref 78–100)
MONOCYTES # BLD AUTO: 0.4 10E9/L (ref 0–1.3)
MONOCYTES NFR BLD AUTO: 5.3 %
NEUTROPHILS # BLD AUTO: 4.5 10E9/L (ref 1.6–8.3)
NEUTROPHILS NFR BLD AUTO: 56.7 %
PLATELET # BLD AUTO: 318 10E9/L (ref 150–450)
POTASSIUM SERPL-SCNC: 4.1 MMOL/L (ref 3.5–5.1)
PROT SERPL-MCNC: 7.4 G/DL (ref 6.4–8.9)
RBC # BLD AUTO: 5.41 10E12/L (ref 3.8–5.2)
SODIUM SERPL-SCNC: 138 MMOL/L (ref 134–144)
WBC # BLD AUTO: 7.9 10E9/L (ref 4–11)

## 2019-03-29 PROCEDURE — 36415 COLL VENOUS BLD VENIPUNCTURE: CPT | Performed by: NURSE PRACTITIONER

## 2019-03-29 PROCEDURE — 99214 OFFICE O/P EST MOD 30 MIN: CPT | Performed by: NURSE PRACTITIONER

## 2019-03-29 PROCEDURE — 80053 COMPREHEN METABOLIC PANEL: CPT | Performed by: NURSE PRACTITIONER

## 2019-03-29 PROCEDURE — 83615 LACTATE (LD) (LDH) ENZYME: CPT | Performed by: NURSE PRACTITIONER

## 2019-03-29 PROCEDURE — 85025 COMPLETE CBC W/AUTO DIFF WBC: CPT | Performed by: NURSE PRACTITIONER

## 2019-03-29 PROCEDURE — 85379 FIBRIN DEGRADATION QUANT: CPT | Performed by: NURSE PRACTITIONER

## 2019-03-29 PROCEDURE — 85610 PROTHROMBIN TIME: CPT | Performed by: NURSE PRACTITIONER

## 2019-03-29 ASSESSMENT — MIFFLIN-ST. JEOR: SCORE: 1570.98

## 2019-03-29 ASSESSMENT — PAIN SCALES - GENERAL: PAINLEVEL: NO PAIN (0)

## 2019-03-29 NOTE — PROGRESS NOTES
HPI:    Daniela Ladd is a 48 year old female who presents to clinic today for concerns of left arm.  She reports she has factor V Leiden, diagnosed in 2017.  She had multiple subclavian blood clots at that time.  She is currently on chronic warfarin and reports that the blood clots have resolved.  Last week she was doing a lot of painting and washing walls.  She feels like she might have overused her arm.  She is having pain in her left armpit and is unable to wear her bra due to the discomfort.  She feels that she has had multiple small lumps in her left upper arm.  She is not sure if these have always been there or if this is new.  She does test her INRs weekly at home.  Her goal is 2.5-3.5.  She reports on Wednesday this was 3.2.  She is currently taking 10 g of warfarin on  and 8 mg daily the rest of the week.  She has been using heat and Tylenol for the arm pain.  Denies any fevers.  Reports that her arm does feel somewhat hot.    Past Medical History:   Diagnosis Date     Abdominal pain     2010     Contact dermatitis     Atrophic dermatitis     Encounter for insertion of mirena IUD 2018     Hidradenitis suppurativa     2010     Major depressive disorder, single episode     ,Situational related to marital discord     Nicotine dependence, uncomplicated     2010     Other acne     Facial acne     Personal history of other medical treatment (CODE)     10/00, III, para 1-0-2-1, vaginal delivery , two spontaneous first trimester miscarriage     Personal history of other medical treatment (CODE)     S/P cryotherapy Aug 2003.  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/25/2013       Current Outpatient Medications   Medication Sig Dispense Refill     acetaminophen (TYLENOL) 500 MG tablet Take 1,000 mg by mouth       nicotine polacrilex (NICORETTE) 2 MG gum   2     warfarin  "(COUMADIN) 2 MG tablet Take 10 mg x 1 day and 8 mg x 6 days/week Or as directed by [facility] clinic 400 tablet 1     levonorgestrel (MIRENA) 20 MCG/24HR IUD 1 each (20 mcg) by Intrauterine route once for 1 dose 1 each 0       Allergies   Allergen Reactions     Amoxicillin-Pot Clavulanate Nausea     Cyclobenzaprine Rash     No Clinical Screening - See Comments Rash     Nicoderm patch       ROS:  Pertinent positives and negatives are noted in HPI.    EXAM:  /88 (BP Location: Right arm, Patient Position: Sitting, Cuff Size: Adult Regular)   Pulse 100   Temp 97.4  F (36.3  C) (Tympanic)   Resp 16   Ht 1.689 m (5' 6.5\")   Wt 90.6 kg (199 lb 12.8 oz)   Breastfeeding? No   BMI 31.77 kg/m    General appearance: well appearing female, in no acute distress  Respiratory: clear to auscultation bilaterally  Cardiac: RRR with no murmurs  Musculoskeletal: Normal range of motion of upper extremities.  She does have some tenderness just below the left axilla.  No swelling noted to left arm.  Dermatological: no rashes or lesions  Psychological: normal affect, alert and pleasant  Lab:   Results for orders placed or performed in visit on 03/29/19   INR   Result Value Ref Range    INR 3.45 (H) 0 - 1.3   Lactate Dehydrogenase   Result Value Ref Range    Lactate Dehydrogenase 181 140 - 271 U/L   D-Dimer (HI,GH)   Result Value Ref Range    D-Dimer ng/mL 327 (H) 0 - 230 ng/ml D-DU   CBC with platelets differential   Result Value Ref Range    WBC 7.9 4.0 - 11.0 10e9/L    RBC Count 5.41 (H) 3.8 - 5.2 10e12/L    Hemoglobin 16.5 (H) 11.7 - 15.7 g/dL    Hematocrit 48.8 (H) 35.0 - 47.0 %    MCV 90 78 - 100 fl    MCH 30.5 26.5 - 33.0 pg    MCHC 33.8 31.5 - 36.5 g/dL    RDW 13.0 10.0 - 15.0 %    Platelet Count 318 150 - 450 10e9/L    Diff Method Automated Method     % Neutrophils 56.7 %    % Lymphocytes 36.7 %    % Monocytes 5.3 %    % Eosinophils 0.6 %    % Basophils 0.4 %    % Immature Granulocytes 0.3 %    Absolute Neutrophil 4.5 1.6 " - 8.3 10e9/L    Absolute Lymphocytes 2.9 0.8 - 5.3 10e9/L    Absolute Monocytes 0.4 0.0 - 1.3 10e9/L    Absolute Eosinophils 0.1 0.0 - 0.7 10e9/L    Absolute Basophils 0.0 0.0 - 0.2 10e9/L    Abs Immature Granulocytes 0.0 0 - 0.4 10e9/L   Comprehensive metabolic panel   Result Value Ref Range    Sodium 138 134 - 144 mmol/L    Potassium 4.1 3.5 - 5.1 mmol/L    Chloride 109 (H) 98 - 107 mmol/L    Carbon Dioxide 22 21 - 31 mmol/L    Anion Gap 7 3 - 14 mmol/L    Glucose 87 70 - 105 mg/dL    Urea Nitrogen 7 7 - 25 mg/dL    Creatinine 0.70 0.60 - 1.20 mg/dL    GFR Estimate >90 >60 mL/min/[1.73_m2]    GFR Estimate If Black >90 >60 mL/min/[1.73_m2]    Calcium 8.9 8.6 - 10.3 mg/dL    Bilirubin Total 0.3 0.3 - 1.0 mg/dL    Albumin 4.3 3.5 - 5.7 g/dL    Protein Total 7.4 6.4 - 8.9 g/dL    Alkaline Phosphatase 40 34 - 104 U/L    ALT 22 7 - 52 U/L    AST 20 13 - 39 U/L     ASSESSMENT AND PLAN:    1. Factor 5 Leiden mutation, heterozygous (H)    2. Blood clot of vein in shoulder area, left      Her INR is 2.45 today.  Her d-dimer is 327.  Remainder of her labs are stable.  I suspect this is related to overuse of the left arm due to the physical activity versus a blood clot.  We did discuss possible ultrasound.  Given the fact that her INR is higher than her goal blood clot is less likely.  We discussed symptomatic management as well as signs and symptoms that would warrant follow-up.  She would like to treat this symptomatically and follow-up as needed.  She does have a follow-up with hematology on April 16.      Peggy Hdez..................3/29/2019 9:38 AM      This document was prepared using voice generated software.  While every attempt was made for accuracy, grammatical errors may exist.

## 2019-03-29 NOTE — NURSING NOTE
"Patient presents to the clinic today for possible blood clots in her am.  Patient states she had a history of blood clots in her arm and shoulder and last October they had all dissolved.  Patient states that since Monday she has started noticing some of the same symptoms again, such as diarrhea, and the changing colors of her arm when she gets of the shower.  Patient also states the left side of her ribs hurt too.  Sharita Grace LPN 3/29/2019   9:42 AM     Chief Complaint   Patient presents with     Pain       Initial /88 (BP Location: Right arm, Patient Position: Sitting, Cuff Size: Adult Regular)   Pulse 100   Temp 97.4  F (36.3  C) (Tympanic)   Resp 16   Ht 1.689 m (5' 6.5\")   Wt 90.6 kg (199 lb 12.8 oz)   Breastfeeding? No   BMI 31.77 kg/m   Estimated body mass index is 31.77 kg/m  as calculated from the following:    Height as of this encounter: 1.689 m (5' 6.5\").    Weight as of this encounter: 90.6 kg (199 lb 12.8 oz).  Medication Reconciliation: complete    Sharita Grace LPN    "

## 2019-04-01 ENCOUNTER — TELEPHONE (OUTPATIENT)
Dept: LAB | Facility: OTHER | Age: 49
End: 2019-04-01

## 2019-04-01 DIAGNOSIS — I82.602 BLOOD CLOT OF VEIN IN SHOULDER AREA, LEFT: ICD-10-CM

## 2019-04-01 DIAGNOSIS — D68.51 FACTOR 5 LEIDEN MUTATION, HETEROZYGOUS (H): Primary | ICD-10-CM

## 2019-04-01 NOTE — TELEPHONE ENCOUNTER
" Lab orders needed. Pt has upcoming lab only appt 4/9/2019.    OV 10/16/2018 with Dr. Kitchen:  \"The plan is to continue Coumadin to maintain therapeutic INR between 2.5 to 3.5.  Otherwise, we will see the patient in 6 months, obtain CBC, CMP, LDH, D-dimer.\"    Labs ordered as written    Shira Cage RN  ....................  4/1/2019   11:56 AM       "

## 2019-04-03 ENCOUNTER — ANTICOAGULATION THERAPY VISIT (OUTPATIENT)
Dept: ANTICOAGULATION | Facility: OTHER | Age: 49
End: 2019-04-03
Payer: COMMERCIAL

## 2019-04-03 ENCOUNTER — TRANSFERRED RECORDS (OUTPATIENT)
Dept: HEALTH INFORMATION MANAGEMENT | Facility: OTHER | Age: 49
End: 2019-04-03

## 2019-04-03 DIAGNOSIS — D68.51 FACTOR 5 LEIDEN MUTATION, HETEROZYGOUS (H): ICD-10-CM

## 2019-04-03 DIAGNOSIS — I82.602 BLOOD CLOT OF VEIN IN SHOULDER AREA, LEFT: ICD-10-CM

## 2019-04-03 LAB — INR PPP: 3.3

## 2019-04-03 PROCEDURE — 99207 ZZC NO CHARGE NURSE ONLY: CPT | Performed by: FAMILY MEDICINE

## 2019-04-03 NOTE — PROGRESS NOTES
ANTICOAGULATION FOLLOW-UP CLINIC VISIT    Patient Name:  Daniela Ladd  Date:  4/3/2019  Contact Type:  no call needed patient to continue same dose    SUBJECTIVE:     Patient Findings     Comments:   The patient was assessed for diet, medication, and activity level changes, missed or extra doses, bruising or bleeding, with no problem findings.             OBJECTIVE    INR   Date Value Ref Range Status   04/03/2019 3.3  Final       ASSESSMENT / PLAN  INR assessment THER    Recheck INR In: 3 WEEKS    INR Location Home INR      Anticoagulation Summary  As of 4/3/2019    INR goal:   2.5-3.5   TTR:   71.3 % (1.4 y)   INR used for dosing:   3.3 (4/3/2019)   Warfarin maintenance plan:   10 mg (2 mg x 5) every Mon; 8 mg (2 mg x 4) all other days   Full warfarin instructions:   10 mg every Mon; 8 mg all other days   Weekly warfarin total:   58 mg   No change documented:   Yoly Crowe RN   Plan last modified:   Renee Kevin RN (1/7/2019)   Next INR check:   4/17/2019   Priority:   INR   Target end date:   Indefinite    Indications    Blood clot of vein in shoulder area  left [I82.602]  Factor 5 Leiden mutation  heterozygous (H) [D68.51]  Long-term (current) use of anticoagulants [Z79.01] [Z79.01]             Anticoagulation Episode Summary     INR check location:       Preferred lab:       Send INR reminders to:    INR    Comments:   INR goal changed per Dr Kitchen 3/21/18 has her own machine and checks INR weekly        Anticoagulation Care Providers     Provider Role Specialty Phone number    Krystin Black MD [facility] [phone]            See the Encounter Report to view Anticoagulation Flowsheet and Dosing Calendar (Go to Encounters tab in chart review, and find the Anticoagulation Therapy Visit)        Yoly Crowe RN

## 2019-04-10 ENCOUNTER — ANTICOAGULATION THERAPY VISIT (OUTPATIENT)
Dept: ANTICOAGULATION | Facility: OTHER | Age: 49
End: 2019-04-10
Payer: COMMERCIAL

## 2019-04-10 ENCOUNTER — TRANSFERRED RECORDS (OUTPATIENT)
Dept: HEALTH INFORMATION MANAGEMENT | Facility: OTHER | Age: 49
End: 2019-04-10

## 2019-04-10 DIAGNOSIS — D68.51 FACTOR 5 LEIDEN MUTATION, HETEROZYGOUS (H): ICD-10-CM

## 2019-04-10 DIAGNOSIS — I82.602 BLOOD CLOT OF VEIN IN SHOULDER AREA, LEFT: ICD-10-CM

## 2019-04-10 LAB — INR PPP: 2.8

## 2019-04-10 PROCEDURE — 99207 ZZC NO CHARGE NURSE ONLY: CPT | Performed by: FAMILY MEDICINE

## 2019-04-10 NOTE — PROGRESS NOTES
ANTICOAGULATION FOLLOW-UP CLINIC VISIT    Patient Name:  Daniela Ladd  Date:  4/10/2019  Contact Type:  no call needed patient to continue same dose    SUBJECTIVE:     Patient Findings     Comments:   The patient was assessed for diet, medication, and activity level changes, missed or extra doses, bruising or bleeding, with no problem findings.             OBJECTIVE    INR   Date Value Ref Range Status   04/10/2019 2.8  Final       ASSESSMENT / PLAN  INR assessment THER    Recheck INR In: 2 WEEKS    INR Location Home INR      Anticoagulation Summary  As of 4/10/2019    INR goal:   2.5-3.5   TTR:   71.7 % (1.4 y)   INR used for dosin.8 (4/10/2019)   Warfarin maintenance plan:   10 mg (2 mg x 5) every Mon; 8 mg (2 mg x 4) all other days   Full warfarin instructions:   10 mg every Mon; 8 mg all other days   Weekly warfarin total:   58 mg   Plan last modified:   Renee Kevin RN (2019)   Next INR check:      Priority:   INR   Target end date:   Indefinite    Indications    Blood clot of vein in shoulder area  left [I82.602]  Factor 5 Leiden mutation  heterozygous (H) [D68.51]  Long-term (current) use of anticoagulants [Z79.01] [Z79.01]             Anticoagulation Episode Summary     INR check location:       Preferred lab:       Send INR reminders to:    INR    Comments:   INR goal changed per Dr Kitchen 3/21/18 has her own machine         Anticoagulation Care Providers     Provider Role Specialty Phone number    KatiebrendaKrystin MD [facility] [phone]            See the Encounter Report to view Anticoagulation Flowsheet and Dosing Calendar (Go to Encounters tab in chart review, and find the Anticoagulation Therapy Visit)        Yoly Crowe RN

## 2019-04-16 ENCOUNTER — ONCOLOGY VISIT (OUTPATIENT)
Dept: ONCOLOGY | Facility: OTHER | Age: 49
End: 2019-04-16
Attending: NURSE PRACTITIONER
Payer: COMMERCIAL

## 2019-04-16 VITALS
BODY MASS INDEX: 32.47 KG/M2 | TEMPERATURE: 96.5 F | DIASTOLIC BLOOD PRESSURE: 70 MMHG | RESPIRATION RATE: 18 BRPM | HEART RATE: 88 BPM | SYSTOLIC BLOOD PRESSURE: 118 MMHG | WEIGHT: 202 LBS | HEIGHT: 66 IN

## 2019-04-16 DIAGNOSIS — I82.602 BLOOD CLOT OF VEIN IN SHOULDER AREA, LEFT: Primary | ICD-10-CM

## 2019-04-16 LAB
BASOPHILS # BLD AUTO: 0.1 10E9/L (ref 0–0.2)
BASOPHILS NFR BLD AUTO: 0.4 %
DIFFERENTIAL METHOD BLD: ABNORMAL
EOSINOPHIL # BLD AUTO: 0.1 10E9/L (ref 0–0.7)
EOSINOPHIL NFR BLD AUTO: 0.9 %
ERYTHROCYTE [DISTWIDTH] IN BLOOD BY AUTOMATED COUNT: 12.7 % (ref 10–15)
HCT VFR BLD AUTO: 47 % (ref 35–47)
HGB BLD-MCNC: 15.9 G/DL (ref 11.7–15.7)
IMM GRANULOCYTES # BLD: 0 10E9/L (ref 0–0.4)
IMM GRANULOCYTES NFR BLD: 0.2 %
LYMPHOCYTES # BLD AUTO: 3.7 10E9/L (ref 0.8–5.3)
LYMPHOCYTES NFR BLD AUTO: 27 %
MCH RBC QN AUTO: 30.4 PG (ref 26.5–33)
MCHC RBC AUTO-ENTMCNC: 33.8 G/DL (ref 31.5–36.5)
MCV RBC AUTO: 90 FL (ref 78–100)
MONOCYTES # BLD AUTO: 0.7 10E9/L (ref 0–1.3)
MONOCYTES NFR BLD AUTO: 5.1 %
NEUTROPHILS # BLD AUTO: 9.1 10E9/L (ref 1.6–8.3)
NEUTROPHILS NFR BLD AUTO: 66.4 %
PLATELET # BLD AUTO: 331 10E9/L (ref 150–450)
RBC # BLD AUTO: 5.23 10E12/L (ref 3.8–5.2)
WBC # BLD AUTO: 13.7 10E9/L (ref 4–11)

## 2019-04-16 PROCEDURE — 36415 COLL VENOUS BLD VENIPUNCTURE: CPT | Performed by: NURSE PRACTITIONER

## 2019-04-16 PROCEDURE — 99214 OFFICE O/P EST MOD 30 MIN: CPT | Performed by: NURSE PRACTITIONER

## 2019-04-16 PROCEDURE — 85025 COMPLETE CBC W/AUTO DIFF WBC: CPT | Performed by: NURSE PRACTITIONER

## 2019-04-16 ASSESSMENT — PAIN SCALES - GENERAL: PAINLEVEL: MILD PAIN (3)

## 2019-04-16 ASSESSMENT — MIFFLIN-ST. JEOR: SCORE: 1580.89

## 2019-04-16 NOTE — NURSING NOTE
"Patient present for follow up of DVT left arm.  Alycia Rodriguez RN...........4/16/2019 8:20 AM  Chief Complaint   Patient presents with     RECHECK     DVT       Initial /70   Pulse 88   Temp 96.5  F (35.8  C)   Resp 18   Ht 1.689 m (5' 6.5\")   Wt 91.6 kg (202 lb)   BMI 32.12 kg/m   Estimated body mass index is 32.12 kg/m  as calculated from the following:    Height as of this encounter: 1.689 m (5' 6.5\").    Weight as of this encounter: 91.6 kg (202 lb).  Medication Reconciliation: complete    Alycia Rodriguez RN     "

## 2019-04-16 NOTE — PROGRESS NOTES
Oncology/Hematology Follow-up Visit:  April 16, 2019  Diagnosis:DVT    History Of Present Illness:  Patient presents for followup of left upper extremity DVT. Patient was seen in consultation on 11/09/2017 for evaluation of left upper extremity DVT. Patient presented to [facility] on 11/01/2017 with left arm pain, redness associated with swelling, that apparently happened overnight.  She woke up and noted pain in her left side, probably happening overnight when she developed acute onset of pain and swelling in the left upper extremity. A left upper extremity venous Doppler revealed occlusive thrombus in the subclavian, axillary, and basilic veins.  The cephalic, antecubital, and brachial veins were patent.  The patient was started on Lovenox, was transitioned to Coumadin.  She has a strong family history of DVT including her mother and grandmother.  She also stated that her grandfather has a history of CREST syndrome.  She stated that her mother had 4 stents in her groin.  She was a heavy smoker and smoked at least 1-1/2 packs per day for at least 30 years and cut back to less than a pack per day.  She denied antecedent trauma or previous surgery.  She worked at [employer] store.     When we saw her on 11/09/2017, she had been on Lovenox and the swelling had reduced considerably.  She was transitioned to Coumadin.  We elected to rule out hypercoagulable state by obtaining antithrombin III levels which were negative.  Lupus profile was negative including beta 2 glycoprotein antibodies with anticardiolipin antibodies.  We also obtained antithrombin III activity which was normal.  We obtained homocysteine levels which were normal.  Prothrombin 2 mutation was negative.  Factor V Leiden was positive for heterozygous mutation consistent with increased risk of developing thrombosis.  We repeated the venous Doppler of the left upper extremity which revealed there was improving left upper extremity DVT with  residual thrombus in the left axillary vein, distal left subclavian vein.  There was still thrombosis in the subclavian vein.  Basilic veins were recanalized.  We obtained scans to rule out occult malignancy including CT neck which revealed a subcutaneous nodule in the right shoulder that was most likely a sebaceous cyst.  There was fat stranding in the left axillary region, likely sequelae of acute DVT.  Otherwise, no evidence of mass or lymphadenopathy in neck.  She also had a CT chest which revealed a few very small subpleural ground-glass opacities measuring 5 mm.  6-month followup was recommended.  CT abdomen and pelvis was essentially negative.  The patient was a smoker and continued to smoke at least 1-1/2 packs per day.  We felt that she would need to be on lifelong Coumadin if she continued to smoke.     Patient had a repeat venous Doppler left upper extremity which revealed there was improvement in the left axillary vein, thrombosis which was not patent.  The remainder of the left upper extremity venous structure, cephalic, basilic, brachial, and radial veins were patent.  Internal jugular vein was patent, but there was persistent occlusive thrombus of the vein which was unchanged.  She had a CT chest which revealed numerous tiny pulmonary nodules again present.  They were unchanged and primarily in the upper lobes.  Sarcoidosis or other autoimmune disease was in the differential. We also recommended she see Dr. Ellis for history of pulmonary micronodules.  The patient did not keep that appointment. The patient otherwise stated that her INRs have been on the low side of normal and occasionally subnormal or subtherapeutic.  When we saw her subsequently, we recommended to increase her Coumadin dosing to reflect an INR of 2.5 to 3.5.  Otherwise, she was doing relatively well.  She did have a repeat venous Doppler which according to the radiologist showed increased collaterals with stable thrombosed left  subclavian vein. We repeated the CT chest which was essentially negative.  There were no rib abnormalities.  The tiny nodule at the pulmonary apices were chronic and unchanged.  CT abdomen and pelvis was consistent with stable to chronic hepatic steatosis with spurring at the gallbladder fossa.  An IUD was centrally located in the uterus, unchanged. CT appearance of the chest with multiple tiny apical predominant pulmonary nodules.     Patient continues on the coumadin. She states she was seen by primary care at the end of March for swelling of the left arm. Doppler was not done as it was felt that symptoms were due to overuse of the left arm. Patient states she does get some swelling and pain in her left arm and axilla due to overuse of her arm. She states the pain and swelling did resolve. She states this has also happened in the past. She has no swelling, pain or erythema at this time. Patient continues to smoke about 1ppd. She has some occasional shortness of breath which she relates to her smoking. She is otherwise doing well and has no new concerns at this time.     Review Of Systems:  Review Of Systems  Eyes/Ears/Nose/Throat: denies new vision or hearing changes  Respiratory: occasional shortness of breath which she relates to smoking,  She denies cough or hemoptysis  Cardiovascular:denies chest pain or palpitations  Gastrointestinal: denies abdominal pain or bowel changes  Genitourinary: denies dysuria or hematuria  Musculoskeletal: denies new bone pain or muscle weakness  Neurologic: reports occasional headaches, no neuropathy  Hematologic/Lymphatic/Immunologic: denies fevers, chills, night sweats      Nursing Notes:   Alycia Rodriguez RN  4/16/2019  8:22 AM  Signed  Patient present for follow up of DVT left arm.  Alycia Rodriguez RN...........4/16/2019 8:20 AM  Chief Complaint   Patient presents with     RECHECK     DVT       Initial /70   Pulse 88   Temp 96.5  F (35.8  C)   Resp 18   Ht 1.689 m (5'  "6.5\")   Wt 91.6 kg (202 lb)   BMI 32.12 kg/m    Estimated body mass index is 32.12 kg/m  as calculated from the following:    Height as of this encounter: 1.689 m (5' 6.5\").    Weight as of this encounter: 91.6 kg (202 lb).  Medication Reconciliation: complete    Alycia Rodriguez RN       Past medical, social, surgical, and family histories reviewed.    Allergies:  Allergies as of 04/16/2019 - Reviewed 04/16/2019   Allergen Reaction Noted     Amoxicillin-pot clavulanate Nausea 05/10/2013     Cyclobenzaprine Rash 10/09/2012     No clinical screening - see comments Rash 07/11/2017       Current Medications:  Current Outpatient Medications   Medication Sig Dispense Refill     acetaminophen (TYLENOL) 500 MG tablet Take 1,000 mg by mouth       warfarin (COUMADIN) 2 MG tablet Take 10 mg x 1 day and 8 mg x 6 days/week Or as directed by [facility] 400 tablet 1     levonorgestrel (MIRENA) 20 MCG/24HR IUD 1 each (20 mcg) by Intrauterine route once for 1 dose 1 each 0     nicotine polacrilex (NICORETTE) 2 MG gum   2        Physical Exam:  /70   Pulse 88   Temp 96.5  F (35.8  C)   Resp 18   Ht 1.689 m (5' 6.5\")   Wt 91.6 kg (202 lb)   BMI 32.12 kg/m      GENERAL APPEARANCE: 48 year old female, alert and no distress     NECK: no adenopathy, no asymmetry or masses     LYMPHATICS: No cervical, supraclavicular, axillary lymphadenopathy     RESP: lungs clear to auscultation - no rales, rhonchi or wheezes     CARDIOVASCULAR: regular rates and rhythm, normal S1 S2, no murmur.     ABDOMEN:  soft, nontender, no HSM or masses and bowel sounds normal     MUSCULOSKELETAL: extremities normal- no gross deformities noted, No edema b/l LE.     SKIN: no suspicious lesions or rashes     PSYCHIATRIC: mentation appears normal and affect normal    LABORATORY DATA:  WBC 13.7, Hemoglobin 15.9, hematocrit 47.0. Platelets 331, 000. D-dimer 327. LFTs are normal.     Laboratory/Imaging Studies  Anticoagulation Therapy Visit on " 04/10/2019   Component Date Value Ref Range Status     INR 04/10/2019 2.8   Final   Anticoagulation Therapy Visit on 04/03/2019   Component Date Value Ref Range Status     INR 04/03/2019 3.3   Final        ASSESSMENT/PLAN:  1.  Unprovoked left upper extremity DVT, likely due to hypercoagulable state given the fact she has factor V Leiden heterozygous as well as smoker.  She has not quit smoking. She continues to smoke at least 1 pack per day.  Her left upper extremity DVT is resolved per ultrasound from 10/8/18. Patient was seen by primary care at the end of March for swelling of the left arm. Doppler was not done as it was felt that symptoms were due to overuse of the left arm. Patient states she does get some swelling and pain in her left arm and axilla due to overuse of her arm. She states the pain and swelling did resolve. We discussed the need for a doppler at this time and patient does not feel this is necessary as her symptoms have resolved. The plan is to continue Coumadin to maintain therapeutic INR between 2.5 to 3.5.  WBC today is slightly elevated. Patient has no signs of infection, temp is normal. Patient to follow up with primary provider if she develops fever, signs of infection. Otherwise, we will see the patient in 6 months, obtain CBC, CMP, LDH, D-dimer.   2.  History of pulmonary micronodules, likely nonmalignant.  Suspect sarcoid versus possible autoimmune disease. Patient was scheduled to see pulmonology but did not keep that appointment.  We will continue to monitor.      Twenty five minutes spent with patient with greater than 50% of that time spent counseling patient regarding disease process, interpretation of lab results, discussing recent symptoms of left arm swelling and signs to be aware of for recurrent DVT, discussing ongoing surveillance and coordination of care

## 2019-04-17 ENCOUNTER — TRANSFERRED RECORDS (OUTPATIENT)
Dept: HEALTH INFORMATION MANAGEMENT | Facility: OTHER | Age: 49
End: 2019-04-17

## 2019-04-17 ENCOUNTER — ANTICOAGULATION THERAPY VISIT (OUTPATIENT)
Dept: ANTICOAGULATION | Facility: OTHER | Age: 49
End: 2019-04-17
Payer: COMMERCIAL

## 2019-04-17 DIAGNOSIS — I82.602 BLOOD CLOT OF VEIN IN SHOULDER AREA, LEFT: ICD-10-CM

## 2019-04-17 DIAGNOSIS — D68.51 FACTOR 5 LEIDEN MUTATION, HETEROZYGOUS (H): ICD-10-CM

## 2019-04-17 LAB — INR PPP: 3

## 2019-04-17 PROCEDURE — 99207 ZZC NO CHARGE NURSE ONLY: CPT | Performed by: FAMILY MEDICINE

## 2019-04-17 NOTE — PROGRESS NOTES
ANTICOAGULATION FOLLOW-UP CLINIC VISIT    Patient Name:  Daniela Ladd  Date:  4/17/2019  Contact Type:  no call needed patient to continue same dose    SUBJECTIVE:     Patient Findings     Comments:   The patient was assessed for diet, medication, and activity level changes, missed or extra doses, bruising or bleeding, with no problem findings.             OBJECTIVE    INR   Date Value Ref Range Status   04/17/2019 3.0  Final       ASSESSMENT / PLAN  INR assessment THER    Recheck INR In: 2 WEEKS    INR Location Home INR      Anticoagulation Summary  As of 4/17/2019    INR goal:   2.5-3.5   TTR:   72.1 % (1.4 y)   INR used for dosing:   3.0 (4/17/2019)   Warfarin maintenance plan:   10 mg (2 mg x 5) every Mon; 8 mg (2 mg x 4) all other days   Full warfarin instructions:   10 mg every Mon; 8 mg all other days   Weekly warfarin total:   58 mg   No change documented:   Yoly Crowe RN   Plan last modified:   Renee Kevin RN (1/7/2019)   Next INR check:   5/8/2019   Priority:   INR   Target end date:   Indefinite    Indications    Blood clot of vein in shoulder area  left [I82.602]  Factor 5 Leiden mutation  heterozygous (H) [D68.51]  Long-term (current) use of anticoagulants [Z79.01] [Z79.01]             Anticoagulation Episode Summary     INR check location:       Preferred lab:       Send INR reminders to:    INR    Comments:   INR goal changed per Dr Kitchen 3/21/18 has her own machine         Anticoagulation Care Providers     Provider Role Specialty Phone number    Krystin Black MD [facility] [phone]            See the Encounter Report to view Anticoagulation Flowsheet and Dosing Calendar (Go to Encounters tab in chart review, and find the Anticoagulation Therapy Visit)        Yoly Crowe, RN

## 2019-05-01 ENCOUNTER — ANTICOAGULATION THERAPY VISIT (OUTPATIENT)
Dept: ANTICOAGULATION | Facility: OTHER | Age: 49
End: 2019-05-01
Payer: COMMERCIAL

## 2019-05-01 ENCOUNTER — TRANSFERRED RECORDS (OUTPATIENT)
Dept: HEALTH INFORMATION MANAGEMENT | Facility: OTHER | Age: 49
End: 2019-05-01

## 2019-05-01 DIAGNOSIS — I82.602 BLOOD CLOT OF VEIN IN SHOULDER AREA, LEFT: ICD-10-CM

## 2019-05-01 DIAGNOSIS — D68.51 FACTOR 5 LEIDEN MUTATION, HETEROZYGOUS (H): ICD-10-CM

## 2019-05-01 LAB — INR PPP: 3.1

## 2019-05-01 PROCEDURE — 99207 ZZC NO CHARGE NURSE ONLY: CPT | Performed by: FAMILY MEDICINE

## 2019-05-01 NOTE — PROGRESS NOTES
ANTICOAGULATION FOLLOW-UP CLINIC VISIT    Patient Name:  Daniela Ladd  Date:  5/1/2019  Contact Type:  no call needed patient to continue same dose    SUBJECTIVE:     Patient Findings     Comments:   The patient was assessed for diet, medication, and activity level changes, missed or extra doses, bruising or bleeding, with no problem findings.             OBJECTIVE    INR   Date Value Ref Range Status   05/01/2019 3.1  Final       ASSESSMENT / PLAN  INR assessment THER    Recheck INR In: 3 WEEKS    INR Location Home INR      Anticoagulation Summary  As of 5/1/2019    INR goal:   2.5-3.5   TTR:   72.9 % (1.5 y)   INR used for dosing:   3.1 (5/1/2019)   Warfarin maintenance plan:   10 mg (2 mg x 5) every Mon; 8 mg (2 mg x 4) all other days   Full warfarin instructions:   10 mg every Mon; 8 mg all other days   Weekly warfarin total:   58 mg   No change documented:   Yoly Crowe RN   Plan last modified:   Renee Kevin RN (1/7/2019)   Next INR check:   5/22/2019   Priority:   INR   Target end date:   Indefinite    Indications    Blood clot of vein in shoulder area  left [I82.602]  Factor 5 Leiden mutation  heterozygous (H) [D68.51]  Long-term (current) use of anticoagulants [Z79.01] [Z79.01]             Anticoagulation Episode Summary     INR check location:       Preferred lab:       Send INR reminders to:    INR    Comments:   INR goal changed per Dr Kitchen 3/21/18 has her own machine         Anticoagulation Care Providers     Provider Role Specialty Phone number    Krystin Black MD [facility] [phone]            See the Encounter Report to view Anticoagulation Flowsheet and Dosing Calendar (Go to Encounters tab in chart review, and find the Anticoagulation Therapy Visit)        Yoly Crowe, RN

## 2019-05-08 ENCOUNTER — ANTICOAGULATION THERAPY VISIT (OUTPATIENT)
Dept: ANTICOAGULATION | Facility: OTHER | Age: 49
End: 2019-05-08
Payer: COMMERCIAL

## 2019-05-08 ENCOUNTER — TRANSFERRED RECORDS (OUTPATIENT)
Dept: HEALTH INFORMATION MANAGEMENT | Facility: OTHER | Age: 49
End: 2019-05-08

## 2019-05-08 DIAGNOSIS — I82.602 BLOOD CLOT OF VEIN IN SHOULDER AREA, LEFT: ICD-10-CM

## 2019-05-08 DIAGNOSIS — D68.51 FACTOR 5 LEIDEN MUTATION, HETEROZYGOUS (H): ICD-10-CM

## 2019-05-08 LAB — INR PPP: 2.9

## 2019-05-08 NOTE — PROGRESS NOTES
ANTICOAGULATION FOLLOW-UP CLINIC VISIT    Patient Name:  Daniela Ladd  Date:  2019  Contact Type:  no call needed patient to continue same dose    SUBJECTIVE:  Patient Findings     Comments:   The patient was assessed for diet, medication, and activity level changes, missed or extra doses, bruising or bleeding, with no problem findings.          Clinical Outcomes     Negatives:   Major bleeding event, Thromboembolic event, Anticoagulation-related hospital admission, Anticoagulation-related ED visit, Anticoagulation-related fatality    Comments:   The patient was assessed for diet, medication, and activity level changes, missed or extra doses, bruising or bleeding, with no problem findings.             OBJECTIVE    INR   Date Value Ref Range Status   2019 2.9  Final       ASSESSMENT / PLAN  INR assessment THER    Recheck INR In: 2 WEEKS    INR Location Home INR      Anticoagulation Summary  As of 2019    INR goal:   2.5-3.5   TTR:   73.2 % (1.5 y)   INR used for dosin.9 (2019)   Warfarin maintenance plan:   10 mg (2 mg x 5) every Mon; 8 mg (2 mg x 4) all other days   Full warfarin instructions:   10 mg every Mon; 8 mg all other days   Weekly warfarin total:   58 mg   No change documented:   Yoly Crowe RN   Plan last modified:   Renee Kevin RN (2019)   Next INR check:   2019   Priority:   INR   Target end date:   Indefinite    Indications    Blood clot of vein in shoulder area  left [I82.602]  Factor 5 Leiden mutation  heterozygous (H) [D68.51]  Long-term (current) use of anticoagulants [Z79.01] [Z79.01]             Anticoagulation Episode Summary     INR check location:       Preferred lab:       Send INR reminders to:    INR    Comments:   INR goal changed per Dr Kitchen 3/21/18 has her own machine         Anticoagulation Care Providers     Provider Role Specialty Phone number    Krystin Black MD [facility] [phone]            See  the Encounter Report to view Anticoagulation Flowsheet and Dosing Calendar (Go to Encounters tab in chart review, and find the Anticoagulation Therapy Visit)        Yoly Crowe RN

## 2019-05-15 ENCOUNTER — TRANSFERRED RECORDS (OUTPATIENT)
Dept: HEALTH INFORMATION MANAGEMENT | Facility: OTHER | Age: 49
End: 2019-05-15

## 2019-05-15 ENCOUNTER — ANTICOAGULATION THERAPY VISIT (OUTPATIENT)
Dept: ANTICOAGULATION | Facility: OTHER | Age: 49
End: 2019-05-15
Payer: COMMERCIAL

## 2019-05-15 DIAGNOSIS — D68.51 FACTOR 5 LEIDEN MUTATION, HETEROZYGOUS (H): ICD-10-CM

## 2019-05-15 DIAGNOSIS — I82.602 BLOOD CLOT OF VEIN IN SHOULDER AREA, LEFT: ICD-10-CM

## 2019-05-15 LAB — INR PPP: 2.7

## 2019-05-15 NOTE — PROGRESS NOTES
ANTICOAGULATION FOLLOW-UP CLINIC VISIT    Patient Name:  Daniela Ladd  Date:  5/15/2019  Contact Type:  no call needed patient to continue same dose    SUBJECTIVE:  Patient Findings         Clinical Outcomes     Negatives:   Major bleeding event, Thromboembolic event, Anticoagulation-related hospital admission, Anticoagulation-related ED visit, Anticoagulation-related fatality           OBJECTIVE    INR   Date Value Ref Range Status   05/15/2019 2.7  Final       ASSESSMENT / PLAN  INR assessment THER    Recheck INR In: 2 WEEKS    INR Location Home INR      Anticoagulation Summary  As of 5/15/2019    INR goal:   2.5-3.5   TTR:   73.6 % (1.5 y)   INR used for dosin.7 (5/15/2019)   Warfarin maintenance plan:   10 mg (2 mg x 5) every Mon; 8 mg (2 mg x 4) all other days   Full warfarin instructions:   10 mg every Mon; 8 mg all other days   Weekly warfarin total:   58 mg   No change documented:   Yoly Crowe RN   Plan last modified:   Renee Kevin RN (2019)   Next INR check:   2019   Priority:   INR   Target end date:   Indefinite    Indications    Blood clot of vein in shoulder area  left [I82.602]  Factor 5 Leiden mutation  heterozygous (H) [D68.51]  Long-term (current) use of anticoagulants [Z79.01] [Z79.01]             Anticoagulation Episode Summary     INR check location:       Preferred lab:       Send INR reminders to:    INR    Comments:   INR goal changed per Dr Kitchen 3/21/18 has her own machine         Anticoagulation Care Providers     Provider Role Specialty Phone number    Krystin Black MD [facility] [phone]            See the Encounter Report to view Anticoagulation Flowsheet and Dosing Calendar (Go to Encounters tab in chart review, and find the Anticoagulation Therapy Visit)        Yoly Crowe, RN

## 2019-05-22 ENCOUNTER — TRANSFERRED RECORDS (OUTPATIENT)
Dept: HEALTH INFORMATION MANAGEMENT | Facility: OTHER | Age: 49
End: 2019-05-22

## 2019-05-22 ENCOUNTER — ANTICOAGULATION THERAPY VISIT (OUTPATIENT)
Dept: ANTICOAGULATION | Facility: OTHER | Age: 49
End: 2019-05-22
Payer: COMMERCIAL

## 2019-05-22 DIAGNOSIS — I82.602 BLOOD CLOT OF VEIN IN SHOULDER AREA, LEFT: ICD-10-CM

## 2019-05-22 DIAGNOSIS — D68.51 FACTOR 5 LEIDEN MUTATION, HETEROZYGOUS (H): ICD-10-CM

## 2019-05-22 LAB — INR PPP: 3

## 2019-05-22 NOTE — PROGRESS NOTES
ANTICOAGULATION FOLLOW-UP CLINIC VISIT    Patient Name:  Daniela Ladd  Date:  5/22/2019  Contact Type:  no call needed patient to continue same dose    SUBJECTIVE:  Patient Findings         Clinical Outcomes     Negatives:   Major bleeding event, Thromboembolic event, Anticoagulation-related hospital admission, Anticoagulation-related ED visit, Anticoagulation-related fatality           OBJECTIVE    INR   Date Value Ref Range Status   05/22/2019 3.0  Final       ASSESSMENT / PLAN  INR assessment THER    Recheck INR In: 2 WEEKS    INR Location Home INR      Anticoagulation Summary  As of 5/22/2019    INR goal:   2.5-3.5   TTR:   73.9 % (1.5 y)   INR used for dosing:   3.0 (5/22/2019)   Warfarin maintenance plan:   10 mg (2 mg x 5) every Mon; 8 mg (2 mg x 4) all other days   Full warfarin instructions:   10 mg every Mon; 8 mg all other days   Weekly warfarin total:   58 mg   No change documented:   Yoly Crowe RN   Plan last modified:   Renee Kevin RN (1/7/2019)   Next INR check:   6/5/2019   Priority:   INR   Target end date:   Indefinite    Indications    Blood clot of vein in shoulder area  left [I82.602]  Factor 5 Leiden mutation  heterozygous (H) [D68.51]  Long-term (current) use of anticoagulants [Z79.01] [Z79.01]             Anticoagulation Episode Summary     INR check location:       Preferred lab:       Send INR reminders to:    INR    Comments:   INR goal changed per Dr Kitchen 3/21/18 has her own machine         Anticoagulation Care Providers     Provider Role Specialty Phone number    Krystin Black MD [facility] [phone]            See the Encounter Report to view Anticoagulation Flowsheet and Dosing Calendar (Go to Encounters tab in chart review, and find the Anticoagulation Therapy Visit)        Yoly Crowe, RN

## 2019-05-29 ENCOUNTER — TRANSFERRED RECORDS (OUTPATIENT)
Dept: HEALTH INFORMATION MANAGEMENT | Facility: OTHER | Age: 49
End: 2019-05-29

## 2019-05-29 ENCOUNTER — ANTICOAGULATION THERAPY VISIT (OUTPATIENT)
Dept: ANTICOAGULATION | Facility: OTHER | Age: 49
End: 2019-05-29
Payer: COMMERCIAL

## 2019-05-29 DIAGNOSIS — I82.602 BLOOD CLOT OF VEIN IN SHOULDER AREA, LEFT: ICD-10-CM

## 2019-05-29 DIAGNOSIS — D68.51 FACTOR 5 LEIDEN MUTATION, HETEROZYGOUS (H): ICD-10-CM

## 2019-05-29 LAB — INR PPP: 2.7

## 2019-05-29 NOTE — PROGRESS NOTES
ANTICOAGULATION FOLLOW-UP CLINIC VISIT    Patient Name:  Daniela Ladd  Date:  2019  Contact Type:  no call needed patient to continue same dose    SUBJECTIVE:  Patient Findings     Comments:   The patient was assessed for diet, medication, and activity level changes, missed or extra doses, bruising or bleeding, with no problem findings.          Clinical Outcomes     Negatives:   Major bleeding event, Thromboembolic event, Anticoagulation-related hospital admission, Anticoagulation-related ED visit, Anticoagulation-related fatality    Comments:   The patient was assessed for diet, medication, and activity level changes, missed or extra doses, bruising or bleeding, with no problem findings.             OBJECTIVE    INR   Date Value Ref Range Status   2019 2.7  Final       ASSESSMENT / PLAN  INR assessment THER    Recheck INR In: 2 WEEKS    INR Location Home INR      Anticoagulation Summary  As of 2019    INR goal:   2.5-3.5   TTR:   74.2 % (1.5 y)   INR used for dosin.7 (2019)   Warfarin maintenance plan:   10 mg (2 mg x 5) every Mon; 8 mg (2 mg x 4) all other days   Full warfarin instructions:   10 mg every Mon; 8 mg all other days   Weekly warfarin total:   58 mg   No change documented:   Yoly Crowe RN   Plan last modified:   Renee Kevin RN (2019)   Next INR check:   2019   Priority:   INR   Target end date:   Indefinite    Indications    Blood clot of vein in shoulder area  left [I82.602]  Factor 5 Leiden mutation  heterozygous (H) [D68.51]  Long-term (current) use of anticoagulants [Z79.01] [Z79.01]             Anticoagulation Episode Summary     INR check location:       Preferred lab:       Send INR reminders to:    INR    Comments:   INR goal changed per Dr Kitchen 3/21/18 has her own machine         Anticoagulation Care Providers     Provider Role Specialty Phone number    Krystin Black MD [facility] [phone]             See the Encounter Report to view Anticoagulation Flowsheet and Dosing Calendar (Go to Encounters tab in chart review, and find the Anticoagulation Therapy Visit)        Yoly Crowe RN

## 2019-05-30 ENCOUNTER — MYC MEDICAL ADVICE (OUTPATIENT)
Dept: FAMILY MEDICINE | Facility: OTHER | Age: 49
End: 2019-05-30

## 2019-06-05 ENCOUNTER — ANTICOAGULATION THERAPY VISIT (OUTPATIENT)
Dept: ANTICOAGULATION | Facility: OTHER | Age: 49
End: 2019-06-05
Payer: COMMERCIAL

## 2019-06-05 ENCOUNTER — TRANSFERRED RECORDS (OUTPATIENT)
Dept: HEALTH INFORMATION MANAGEMENT | Facility: OTHER | Age: 49
End: 2019-06-05

## 2019-06-05 DIAGNOSIS — I82.602 BLOOD CLOT OF VEIN IN SHOULDER AREA, LEFT: ICD-10-CM

## 2019-06-05 DIAGNOSIS — D68.51 FACTOR 5 LEIDEN MUTATION, HETEROZYGOUS (H): ICD-10-CM

## 2019-06-05 LAB — INR PPP: 2.5

## 2019-06-05 NOTE — PROGRESS NOTES
ANTICOAGULATION FOLLOW-UP CLINIC VISIT    Patient Name:  Daniela Ladd  Date:  2019  Contact Type:  no call patient no dose changed needed    SUBJECTIVE:  Patient Findings     Comments:   Call from patient stating that she does not feel well at 2.5 (range 2.5-3.5), requesting to have a dose adjustment. Upon review, add 2 mg to dosing per week.          Clinical Outcomes     Negatives:   Major bleeding event, Thromboembolic event, Anticoagulation-related hospital admission, Anticoagulation-related ED visit, Anticoagulation-related fatality    Comments:   Call from patient stating that she does not feel well at 2.5 (range 2.5-3.5), requesting to have a dose adjustment. Upon review, add 2 mg to dosing per week.             OBJECTIVE    INR   Date Value Ref Range Status   2019 2.5  Final       ASSESSMENT / PLAN  INR assessment THER    Recheck INR In: 2 WEEKS    INR Location Home INR      Anticoagulation Summary  As of 2019    INR goal:   2.5-3.5   TTR:   74.5 % (1.6 y)   INR used for dosin.5 (2019)   Warfarin maintenance plan:   10 mg (2 mg x 5) every Mon; 8 mg (2 mg x 4) all other days   Full warfarin instructions:   : 10 mg; Otherwise 10 mg every Mon; 8 mg all other days   Weekly warfarin total:   58 mg   Plan last modified:   Renee Keivn RN (2019)   Next INR check:   2019   Priority:   INR   Target end date:   Indefinite    Indications    Blood clot of vein in shoulder area  left [I82.602]  Factor 5 Leiden mutation  heterozygous (H) [D68.51]  Long-term (current) use of anticoagulants [Z79.01] [Z79.01]             Anticoagulation Episode Summary     INR check location:       Preferred lab:       Send INR reminders to:    INR    Comments:   INR goal changed per Dr Kitchen 3/21/18 has her own machine         Anticoagulation Care Providers     Provider Role Specialty Phone number    Krystin Black MD [facility] [phone]            See the  Encounter Report to view Anticoagulation Flowsheet and Dosing Calendar (Go to Encounters tab in chart review, and find the Anticoagulation Therapy Visit)        Yoly Crowe RN

## 2019-06-05 NOTE — ADDENDUM NOTE
Addended by: ELDER DOZIER on: 6/5/2019 11:22 AM     Modules accepted: Level of Service, SmartSet

## 2019-06-12 ENCOUNTER — ANTICOAGULATION THERAPY VISIT (OUTPATIENT)
Dept: ANTICOAGULATION | Facility: OTHER | Age: 49
End: 2019-06-12
Payer: COMMERCIAL

## 2019-06-12 ENCOUNTER — TRANSFERRED RECORDS (OUTPATIENT)
Dept: HEALTH INFORMATION MANAGEMENT | Facility: OTHER | Age: 49
End: 2019-06-12

## 2019-06-12 DIAGNOSIS — I82.622 ARM DVT (DEEP VENOUS THROMBOEMBOLISM), ACUTE, LEFT (H): ICD-10-CM

## 2019-06-12 DIAGNOSIS — D68.51 FACTOR 5 LEIDEN MUTATION, HETEROZYGOUS (H): ICD-10-CM

## 2019-06-12 DIAGNOSIS — I82.602 BLOOD CLOT OF VEIN IN SHOULDER AREA, LEFT: ICD-10-CM

## 2019-06-12 DIAGNOSIS — Z79.01 LONG TERM CURRENT USE OF ANTICOAGULANT THERAPY: ICD-10-CM

## 2019-06-12 LAB — INR PPP: 2.8

## 2019-06-12 RX ORDER — WARFARIN SODIUM 2 MG/1
TABLET ORAL
Qty: 400 TABLET | Refills: 1
Start: 2019-06-12 | End: 2019-09-04

## 2019-06-12 NOTE — PROGRESS NOTES
ANTICOAGULATION FOLLOW-UP CLINIC VISIT    Patient Name:  Daniela Ladd  Date:  2019  Contact Type:  Telephone/ Spoke with patient, reviewed dosing    SUBJECTIVE:  Patient Findings     Comments:   The patient was assessed for diet, medication, and activity level changes, missed or extra doses, bruising or bleeding, with no problem findings.          Clinical Outcomes     Negatives:   Major bleeding event, Thromboembolic event, Anticoagulation-related hospital admission, Anticoagulation-related ED visit, Anticoagulation-related fatality    Comments:   The patient was assessed for diet, medication, and activity level changes, missed or extra doses, bruising or bleeding, with no problem findings.             OBJECTIVE    INR   Date Value Ref Range Status   2019 2.8  Final       ASSESSMENT / PLAN  INR assessment THER    Recheck INR In: 1 WEEK    INR Location Home INR      Anticoagulation Summary  As of 2019    INR goal:   2.5-3.5   TTR:   74.8 % (1.6 y)   INR used for dosin.8 (2019)   Warfarin maintenance plan:   10 mg (2 mg x 5) every Mon, Wed; 8 mg (2 mg x 4) all other days   Full warfarin instructions:   10 mg every Mon, Wed; 8 mg all other days   Weekly warfarin total:   60 mg   Plan last modified:   Hawa Vernon RN (2019)   Next INR check:   2019   Priority:   INR   Target end date:   Indefinite    Indications    Blood clot of vein in shoulder area  left [I82.602]  Factor 5 Leiden mutation  heterozygous (H) [D68.51]  Long-term (current) use of anticoagulants [Z79.01] [Z79.01]             Anticoagulation Episode Summary     INR check location:       Preferred lab:       Send INR reminders to:    INR    Comments:   INR goal changed per Dr Kitchen 3/21/18 has her own machine         Anticoagulation Care Providers     Provider Role Specialty Phone number    Krystin Black MD [facility] [phone]            See the Encounter Report to view  Anticoagulation Flowsheet and Dosing Calendar (Go to Encounters tab in chart review, and find the Anticoagulation Therapy Visit)        Hawa Vernon RN

## 2019-06-19 ENCOUNTER — ANTICOAGULATION THERAPY VISIT (OUTPATIENT)
Dept: ANTICOAGULATION | Facility: OTHER | Age: 49
End: 2019-06-19
Payer: COMMERCIAL

## 2019-06-19 DIAGNOSIS — I82.602 BLOOD CLOT OF VEIN IN SHOULDER AREA, LEFT: ICD-10-CM

## 2019-06-19 DIAGNOSIS — Z79.01 LONG TERM CURRENT USE OF ANTICOAGULANT THERAPY: ICD-10-CM

## 2019-06-19 DIAGNOSIS — D68.51 FACTOR 5 LEIDEN MUTATION, HETEROZYGOUS (H): ICD-10-CM

## 2019-06-19 LAB — INR PPP: 2.9

## 2019-06-19 NOTE — PROGRESS NOTES
ANTICOAGULATION FOLLOW-UP CLINIC VISIT    Patient Name:  Daniela Ladd  Date:  2019  Contact Type:  no call needed patient to continue same dose    SUBJECTIVE:  Patient Findings         Clinical Outcomes     Negatives:   Major bleeding event, Thromboembolic event, Anticoagulation-related hospital admission, Anticoagulation-related ED visit, Anticoagulation-related fatality           OBJECTIVE    INR   Date Value Ref Range Status   2019 2.9  Final       ASSESSMENT / PLAN  INR assessment THER    Recheck INR In: 1 WEEK    INR Location Home INR      Anticoagulation Summary  As of 2019    INR goal:   2.5-3.5   TTR:   75.1 % (1.6 y)   INR used for dosin.9 (2019)   Warfarin maintenance plan:   10 mg (2 mg x 5) every Mon, Wed; 8 mg (2 mg x 4) all other days   Full warfarin instructions:   10 mg every Mon, Wed; 8 mg all other days   Weekly warfarin total:   60 mg   No change documented:   Yoly Crowe RN   Plan last modified:   Hawa Vernon RN (2019)   Next INR check:   2019   Priority:   INR   Target end date:   Indefinite    Indications    Blood clot of vein in shoulder area  left [I82.602]  Factor 5 Leiden mutation  heterozygous (H) [D68.51]  Long-term (current) use of anticoagulants [Z79.01] [Z79.01]             Anticoagulation Episode Summary     INR check location:       Preferred lab:       Send INR reminders to:    INR    Comments:   INR goal changed per Dr Kitchen 3/21/18 has her own machine         Anticoagulation Care Providers     Provider Role Specialty Phone number    Krystin Black MD [facility] [phone]            See the Encounter Report to view Anticoagulation Flowsheet and Dosing Calendar (Go to Encounters tab in chart review, and find the Anticoagulation Therapy Visit)        Yoly Crowe, RN

## 2019-06-20 ENCOUNTER — TRANSFERRED RECORDS (OUTPATIENT)
Dept: HEALTH INFORMATION MANAGEMENT | Facility: OTHER | Age: 49
End: 2019-06-20

## 2019-06-26 ENCOUNTER — ANTICOAGULATION THERAPY VISIT (OUTPATIENT)
Dept: ANTICOAGULATION | Facility: OTHER | Age: 49
End: 2019-06-26
Payer: COMMERCIAL

## 2019-06-26 ENCOUNTER — TRANSFERRED RECORDS (OUTPATIENT)
Dept: HEALTH INFORMATION MANAGEMENT | Facility: OTHER | Age: 49
End: 2019-06-26

## 2019-06-26 DIAGNOSIS — Z79.01 LONG TERM CURRENT USE OF ANTICOAGULANT THERAPY: ICD-10-CM

## 2019-06-26 DIAGNOSIS — I82.622 ARM DVT (DEEP VENOUS THROMBOEMBOLISM), ACUTE, LEFT (H): ICD-10-CM

## 2019-06-26 DIAGNOSIS — D68.51 FACTOR 5 LEIDEN MUTATION, HETEROZYGOUS (H): ICD-10-CM

## 2019-06-26 DIAGNOSIS — I82.602 BLOOD CLOT OF VEIN IN SHOULDER AREA, LEFT: ICD-10-CM

## 2019-06-26 LAB — INR PPP: 3.1

## 2019-06-26 RX ORDER — WARFARIN SODIUM 2 MG/1
8 TABLET ORAL DAILY
Qty: 410 TABLET | Refills: 1 | Status: SHIPPED | OUTPATIENT
Start: 2019-06-26 | End: 2019-07-03

## 2019-06-26 NOTE — PROGRESS NOTES
ANTICOAGULATION FOLLOW-UP CLINIC VISIT    Patient Name:  Daniela Ladd  Date:  6/26/2019  Contact Type:  Face to Face    SUBJECTIVE:  Patient Findings     Comments:   The patient was assessed for diet, medication, and activity level changes, missed or extra doses, bruising or bleeding, with no problem findings.          Clinical Outcomes     Negatives:   Major bleeding event, Thromboembolic event, Anticoagulation-related hospital admission, Anticoagulation-related ED visit, Anticoagulation-related fatality    Comments:   The patient was assessed for diet, medication, and activity level changes, missed or extra doses, bruising or bleeding, with no problem findings.             OBJECTIVE    INR   Date Value Ref Range Status   06/26/2019 3.1  Final       ASSESSMENT / PLAN  INR assessment THER    Recheck INR In: 1 WEEK    INR Location Clinic      Anticoagulation Summary  As of 6/26/2019    INR goal:   2.5-3.5   TTR:   75.4 % (1.6 y)   INR used for dosing:   3.1 (6/26/2019)   Warfarin maintenance plan:   10 mg (2 mg x 5) every Mon, Wed; 8 mg (2 mg x 4) all other days   Full warfarin instructions:   10 mg every Mon, Wed; 8 mg all other days   Weekly warfarin total:   60 mg   No change documented:   Yoly Crowe RN   Plan last modified:   Hawa Vernon RN (6/12/2019)   Next INR check:   7/3/2019   Priority:   INR   Target end date:   Indefinite    Indications    Blood clot of vein in shoulder area  left [I82.602]  Factor 5 Leiden mutation  heterozygous (H) [D68.51]  Long-term (current) use of anticoagulants [Z79.01] [Z79.01]             Anticoagulation Episode Summary     INR check location:       Preferred lab:       Send INR reminders to:    INR    Comments:   INR goal changed per Dr Kitchen 3/21/18 has her own machine         Anticoagulation Care Providers     Provider Role Specialty Phone number    Krystin Black MD [facility] [phone]            See the Encounter Report to view  Anticoagulation Flowsheet and Dosing Calendar (Go to Encounters tab in chart review, and find the Anticoagulation Therapy Visit)        Yoly Crowe RN

## 2019-06-26 NOTE — TELEPHONE ENCOUNTER
"Requested Prescriptions   Pending Prescriptions Disp Refills     warfarin (COUMADIN) 2 MG tablet [Pharmacy Med Name: WARFARIN SODIUM 2 MG TABLET] 410 tablet 1     Sig: Take 4 tablets (8 mg) by mouth daily Take 10 mg x 2 days and 8 mg x 5 days/week Or as directed by [facility]       Vitamin K Antagonists Failed - 6/26/2019  2:10 AM        Failed - INR is within goal in the past 6 weeks     Confirm INR is within goal in the past 6 weeks.     Recent Labs   Lab Test 06/26/19   INR 3.1                       Passed - Recent (12 mo) or future (30 days) visit within the authorizing provider's specialty     Patient had office visit in the last 12 months or has a visit in the next 30 days with authorizing provider or within the authorizing provider's specialty.  See \"Patient Info\" tab in inbasket, or \"Choose Columns\" in Meds & Orders section of the refill encounter.              Passed - Medication is active on med list        Passed - Patient is 18 years of age or older        Passed - Patient is not pregnant        Passed - No positive pregnancy on file in past 12 months        Prescription approved per [facility] Refill Protocol.    "

## 2019-07-03 ENCOUNTER — ANTICOAGULATION THERAPY VISIT (OUTPATIENT)
Dept: ANTICOAGULATION | Facility: OTHER | Age: 49
End: 2019-07-03
Payer: COMMERCIAL

## 2019-07-03 ENCOUNTER — TRANSFERRED RECORDS (OUTPATIENT)
Dept: HEALTH INFORMATION MANAGEMENT | Facility: OTHER | Age: 49
End: 2019-07-03

## 2019-07-03 DIAGNOSIS — D68.51 FACTOR 5 LEIDEN MUTATION, HETEROZYGOUS (H): ICD-10-CM

## 2019-07-03 DIAGNOSIS — I82.602 BLOOD CLOT OF VEIN IN SHOULDER AREA, LEFT: ICD-10-CM

## 2019-07-03 DIAGNOSIS — Z79.01 LONG TERM CURRENT USE OF ANTICOAGULANT THERAPY: ICD-10-CM

## 2019-07-03 LAB — INR PPP: 2.6

## 2019-07-03 NOTE — PROGRESS NOTES
ANTICOAGULATION FOLLOW-UP CLINIC VISIT    Patient Name:  Daniela Ladd  Date:  7/3/2019  Contact Type:  home INR monitor    SUBJECTIVE:  Patient Findings         Clinical Outcomes     Negatives:   Major bleeding event, Thromboembolic event, Anticoagulation-related hospital admission, Anticoagulation-related ED visit, Anticoagulation-related fatality           OBJECTIVE    INR   Date Value Ref Range Status   2019 2.6  Final       ASSESSMENT / PLAN  INR assessment THER    Recheck INR In: 1 WEEK    INR Location Home INR      Anticoagulation Summary  As of 7/3/2019    INR goal:   2.5-3.5   TTR:   75.7 % (1.6 y)   INR used for dosin.6 (7/3/2019)   Warfarin maintenance plan:   10 mg (2 mg x 5) every Mon, Wed; 8 mg (2 mg x 4) all other days   Full warfarin instructions:   10 mg every Mon, Wed; 8 mg all other days   Weekly warfarin total:   60 mg   No change documented:   Renee Kevin RN   Plan last modified:   Hawa Vernon RN (2019)   Next INR check:   7/10/2019   Priority:   INR   Target end date:   Indefinite    Indications    Blood clot of vein in shoulder area  left [I82.602]  Factor 5 Leiden mutation  heterozygous (H) [D68.51]  Long-term (current) use of anticoagulants [Z79.01] [Z79.01]             Anticoagulation Episode Summary     INR check location:       Preferred lab:       Send INR reminders to:    INR    Comments:   INR goal changed per Dr Kitchen 3/21/18 has her own machine         Anticoagulation Care Providers     Provider Role Specialty Phone number    Krystin Black MD [facility] [phone]            See the Encounter Report to view Anticoagulation Flowsheet and Dosing Calendar (Go to Encounters tab in chart review, and find the Anticoagulation Therapy Visit)        Renee Kevin RN

## 2019-07-10 ENCOUNTER — ANTICOAGULATION THERAPY VISIT (OUTPATIENT)
Dept: ANTICOAGULATION | Facility: OTHER | Age: 49
End: 2019-07-10
Payer: COMMERCIAL

## 2019-07-10 ENCOUNTER — TRANSFERRED RECORDS (OUTPATIENT)
Dept: HEALTH INFORMATION MANAGEMENT | Facility: OTHER | Age: 49
End: 2019-07-10

## 2019-07-10 DIAGNOSIS — D68.51 FACTOR 5 LEIDEN MUTATION, HETEROZYGOUS (H): ICD-10-CM

## 2019-07-10 DIAGNOSIS — Z79.01 LONG TERM CURRENT USE OF ANTICOAGULANT THERAPY: ICD-10-CM

## 2019-07-10 DIAGNOSIS — I82.602 BLOOD CLOT OF VEIN IN SHOULDER AREA, LEFT: ICD-10-CM

## 2019-07-10 LAB — INR PPP: 2.8

## 2019-07-10 NOTE — PROGRESS NOTES
ANTICOAGULATION FOLLOW-UP CLINIC VISIT    Patient Name:  Daniela Ladd  Date:  7/10/2019  Contact Type:  no call needed patient to continue same dose    SUBJECTIVE:  Patient Findings         Clinical Outcomes     Negatives:   Major bleeding event, Thromboembolic event, Anticoagulation-related hospital admission, Anticoagulation-related ED visit, Anticoagulation-related fatality           OBJECTIVE    INR   Date Value Ref Range Status   07/10/2019 2.8  Final       ASSESSMENT / PLAN  INR assessment THER    Recheck INR In: 2 WEEKS    INR Location Home INR      Anticoagulation Summary  As of 7/10/2019    INR goal:   2.5-3.5   TTR:   76.0 % (1.6 y)   INR used for dosin.8 (7/10/2019)   Warfarin maintenance plan:   10 mg (2 mg x 5) every Mon, Wed; 8 mg (2 mg x 4) all other days   Full warfarin instructions:   10 mg every Mon, Wed; 8 mg all other days   Weekly warfarin total:   60 mg   No change documented:   Yoly Crowe RN   Plan last modified:   Hawa Vernon RN (2019)   Next INR check:   2019   Priority:   INR   Target end date:   Indefinite    Indications    Blood clot of vein in shoulder area  left [I82.602]  Factor 5 Leiden mutation  heterozygous (H) [D68.51]  Long-term (current) use of anticoagulants [Z79.01] [Z79.01]             Anticoagulation Episode Summary     INR check location:       Preferred lab:       Send INR reminders to:    INR    Comments:   INR goal changed per Dr Kitchen 3/21/18 has her own machine         Anticoagulation Care Providers     Provider Role Specialty Phone number    Krystin Black MD [facility] [phone]            See the Encounter Report to view Anticoagulation Flowsheet and Dosing Calendar (Go to Encounters tab in chart review, and find the Anticoagulation Therapy Visit)        Yoly Crowe, RN

## 2019-07-17 ENCOUNTER — ANTICOAGULATION THERAPY VISIT (OUTPATIENT)
Dept: ANTICOAGULATION | Facility: OTHER | Age: 49
End: 2019-07-17
Payer: COMMERCIAL

## 2019-07-17 ENCOUNTER — TRANSFERRED RECORDS (OUTPATIENT)
Dept: HEALTH INFORMATION MANAGEMENT | Facility: OTHER | Age: 49
End: 2019-07-17

## 2019-07-17 DIAGNOSIS — Z79.01 LONG TERM CURRENT USE OF ANTICOAGULANT THERAPY: ICD-10-CM

## 2019-07-17 DIAGNOSIS — D68.51 FACTOR 5 LEIDEN MUTATION, HETEROZYGOUS (H): ICD-10-CM

## 2019-07-17 DIAGNOSIS — I82.602 BLOOD CLOT OF VEIN IN SHOULDER AREA, LEFT: ICD-10-CM

## 2019-07-17 LAB — INR PPP: 3.1

## 2019-07-17 NOTE — PROGRESS NOTES
ANTICOAGULATION FOLLOW-UP CLINIC VISIT    Patient Name:  Daniela Ladd  Date:  7/17/2019  Contact Type:  no call needed patient to continue same dose    SUBJECTIVE:  Patient Findings         Clinical Outcomes     Negatives:   Major bleeding event, Thromboembolic event, Anticoagulation-related hospital admission, Anticoagulation-related ED visit, Anticoagulation-related fatality           OBJECTIVE    INR   Date Value Ref Range Status   07/17/2019 3.1  Final       ASSESSMENT / PLAN  INR assessment THER    Recheck INR In: 2 WEEKS    INR Location Home INR      Anticoagulation Summary  As of 7/17/2019    INR goal:   2.5-3.5   TTR:   76.3 % (1.7 y)   INR used for dosing:   3.1 (7/17/2019)   Warfarin maintenance plan:   10 mg (2 mg x 5) every Mon, Wed; 8 mg (2 mg x 4) all other days   Full warfarin instructions:   10 mg every Mon, Wed; 8 mg all other days   Weekly warfarin total:   60 mg   No change documented:   Yoly Crowe RN   Plan last modified:   Hawa Vernon RN (6/12/2019)   Next INR check:   7/31/2019   Priority:   INR   Target end date:   Indefinite    Indications    Blood clot of vein in shoulder area  left [I82.602]  Factor 5 Leiden mutation  heterozygous (H) [D68.51]  Long-term (current) use of anticoagulants [Z79.01] [Z79.01]             Anticoagulation Episode Summary     INR check location:       Preferred lab:       Send INR reminders to:    INR    Comments:   INR goal changed per Dr Kitchen 3/21/18 has her own machine         Anticoagulation Care Providers     Provider Role Specialty Phone number    Krystin Black MD [facility] [phone]            See the Encounter Report to view Anticoagulation Flowsheet and Dosing Calendar (Go to Encounters tab in chart review, and find the Anticoagulation Therapy Visit)        Yoly Crowe, RN

## 2019-07-24 ENCOUNTER — ANTICOAGULATION THERAPY VISIT (OUTPATIENT)
Dept: ANTICOAGULATION | Facility: OTHER | Age: 49
End: 2019-07-24
Payer: COMMERCIAL

## 2019-07-24 ENCOUNTER — TRANSFERRED RECORDS (OUTPATIENT)
Dept: HEALTH INFORMATION MANAGEMENT | Facility: OTHER | Age: 49
End: 2019-07-24

## 2019-07-24 DIAGNOSIS — I82.602 BLOOD CLOT OF VEIN IN SHOULDER AREA, LEFT: ICD-10-CM

## 2019-07-24 DIAGNOSIS — Z79.01 LONG TERM CURRENT USE OF ANTICOAGULANT THERAPY: ICD-10-CM

## 2019-07-24 DIAGNOSIS — D68.51 FACTOR 5 LEIDEN MUTATION, HETEROZYGOUS (H): ICD-10-CM

## 2019-07-24 LAB — INR PPP: 3.3

## 2019-07-24 NOTE — PROGRESS NOTES
ANTICOAGULATION FOLLOW-UP CLINIC VISIT    Patient Name:  Danieal Ladd  Date:  7/24/2019  Contact Type:  no call needed patient to continue same dose    SUBJECTIVE:  Patient Findings         Clinical Outcomes     Negatives:   Major bleeding event, Thromboembolic event, Anticoagulation-related hospital admission, Anticoagulation-related ED visit, Anticoagulation-related fatality           OBJECTIVE    INR   Date Value Ref Range Status   07/24/2019 3.3  Final       ASSESSMENT / PLAN  INR assessment THER    Recheck INR In: 1 WEEK    INR Location Home INR      Anticoagulation Summary  As of 7/24/2019    INR goal:   2.5-3.5   TTR:   76.6 % (1.7 y)   INR used for dosing:   3.3 (7/24/2019)   Warfarin maintenance plan:   10 mg (2 mg x 5) every Mon, Wed; 8 mg (2 mg x 4) all other days   Full warfarin instructions:   10 mg every Mon, Wed; 8 mg all other days   Weekly warfarin total:   60 mg   No change documented:   Argelia Laird RN   Plan last modified:   Hawa Vernon RN (6/12/2019)   Next INR check:   7/31/2019   Priority:   INR   Target end date:   Indefinite    Indications    Blood clot of vein in shoulder area  left [I82.602]  Factor 5 Leiden mutation  heterozygous (H) [D68.51]  Long-term (current) use of anticoagulants [Z79.01] [Z79.01]             Anticoagulation Episode Summary     INR check location:       Preferred lab:       Send INR reminders to:    INR    Comments:   INR goal changed per Dr Kitchen 3/21/18 has her own machine         Anticoagulation Care Providers     Provider Role Specialty Phone number    Krystin Black MD [facility] [phone]            See the Encounter Report to view Anticoagulation Flowsheet and Dosing Calendar (Go to Encounters tab in chart review, and find the Anticoagulation Therapy Visit)        Argelia Laird RN

## 2019-07-31 ENCOUNTER — TRANSFERRED RECORDS (OUTPATIENT)
Dept: HEALTH INFORMATION MANAGEMENT | Facility: OTHER | Age: 49
End: 2019-07-31

## 2019-07-31 LAB — INR PPP: 3

## 2019-08-01 ENCOUNTER — ANTICOAGULATION THERAPY VISIT (OUTPATIENT)
Dept: ANTICOAGULATION | Facility: OTHER | Age: 49
End: 2019-08-01
Payer: COMMERCIAL

## 2019-08-01 DIAGNOSIS — Z79.01 LONG TERM CURRENT USE OF ANTICOAGULANT THERAPY: ICD-10-CM

## 2019-08-01 DIAGNOSIS — D68.51 FACTOR 5 LEIDEN MUTATION, HETEROZYGOUS (H): ICD-10-CM

## 2019-08-01 DIAGNOSIS — I82.602 BLOOD CLOT OF VEIN IN SHOULDER AREA, LEFT: ICD-10-CM

## 2019-08-01 NOTE — PROGRESS NOTES
ANTICOAGULATION FOLLOW-UP CLINIC VISIT    Patient Name:  Daniela Ladd  Date:  8/1/2019  Contact Type:  no call needed patient to continue same dose    SUBJECTIVE:  Patient Findings         Clinical Outcomes     Negatives:   Major bleeding event, Thromboembolic event, Anticoagulation-related hospital admission, Anticoagulation-related ED visit, Anticoagulation-related fatality           OBJECTIVE    INR   Date Value Ref Range Status   07/31/2019 3.0  Final       ASSESSMENT / PLAN  INR assessment THER    Recheck INR In: 2 WEEKS    INR Location Home INR      Anticoagulation Summary  As of 8/1/2019    INR goal:   2.5-3.5   TTR:   76.8 % (1.7 y)   INR used for dosing:   3.0 (7/31/2019)   Warfarin maintenance plan:   10 mg (2 mg x 5) every Mon, Wed; 8 mg (2 mg x 4) all other days   Full warfarin instructions:   10 mg every Mon, Wed; 8 mg all other days   Weekly warfarin total:   60 mg   No change documented:   Yoly Crowe RN   Plan last modified:   Hawa Vernon RN (6/12/2019)   Next INR check:   8/15/2019   Priority:   INR   Target end date:   Indefinite    Indications    Blood clot of vein in shoulder area  left [I82.602]  Factor 5 Leiden mutation  heterozygous (H) [D68.51]  Long-term (current) use of anticoagulants [Z79.01] [Z79.01]             Anticoagulation Episode Summary     INR check location:       Preferred lab:       Send INR reminders to:    INR    Comments:   INR goal changed per Dr Kitchen 3/21/18 has her own machine         Anticoagulation Care Providers     Provider Role Specialty Phone number    Krystin Black MD [facility] [phone]            See the Encounter Report to view Anticoagulation Flowsheet and Dosing Calendar (Go to Encounters tab in chart review, and find the Anticoagulation Therapy Visit)        Yoly Crowe, RN

## 2019-08-07 ENCOUNTER — TRANSFERRED RECORDS (OUTPATIENT)
Dept: HEALTH INFORMATION MANAGEMENT | Facility: OTHER | Age: 49
End: 2019-08-07

## 2019-08-07 ENCOUNTER — ANTICOAGULATION THERAPY VISIT (OUTPATIENT)
Dept: ANTICOAGULATION | Facility: OTHER | Age: 49
End: 2019-08-07
Payer: COMMERCIAL

## 2019-08-07 DIAGNOSIS — D68.51 FACTOR 5 LEIDEN MUTATION, HETEROZYGOUS (H): ICD-10-CM

## 2019-08-07 DIAGNOSIS — Z79.01 LONG TERM CURRENT USE OF ANTICOAGULANT THERAPY: ICD-10-CM

## 2019-08-07 DIAGNOSIS — I82.602 BLOOD CLOT OF VEIN IN SHOULDER AREA, LEFT: ICD-10-CM

## 2019-08-07 LAB — INR PPP: 2.9

## 2019-08-07 NOTE — PROGRESS NOTES
ANTICOAGULATION FOLLOW-UP CLINIC VISIT    Patient Name:  Daniela Ladd  Date:  2019  Contact Type:  no call needed patient to continue same dose    SUBJECTIVE:  Patient Findings         Clinical Outcomes     Negatives:   Major bleeding event, Thromboembolic event, Anticoagulation-related hospital admission, Anticoagulation-related ED visit, Anticoagulation-related fatality           OBJECTIVE    INR   Date Value Ref Range Status   2019 2.9  Final       ASSESSMENT / PLAN  INR assessment THER    Recheck INR In: 1 WEEK    INR Location Home INR      Anticoagulation Summary  As of 2019    INR goal:   2.5-3.5   TTR:   77.1 % (1.7 y)   INR used for dosin.9 (2019)   Warfarin maintenance plan:   10 mg (2 mg x 5) every Mon, Wed; 8 mg (2 mg x 4) all other days   Full warfarin instructions:   10 mg every Mon, Wed; 8 mg all other days   Weekly warfarin total:   60 mg   No change documented:   Yoly Crowe RN   Plan last modified:   Hawa Vernon RN (2019)   Next INR check:   2019   Priority:   INR   Target end date:   Indefinite    Indications    Blood clot of vein in shoulder area  left [I82.602]  Factor 5 Leiden mutation  heterozygous (H) [D68.51]  Long-term (current) use of anticoagulants [Z79.01] [Z79.01]             Anticoagulation Episode Summary     INR check location:       Preferred lab:       Send INR reminders to:    INR    Comments:   INR goal changed per Dr Kitchen 3/21/18 has her own machine         Anticoagulation Care Providers     Provider Role Specialty Phone number    Krystin Black MD [facility] [phone]            See the Encounter Report to view Anticoagulation Flowsheet and Dosing Calendar (Go to Encounters tab in chart review, and find the Anticoagulation Therapy Visit)        Yoly Crowe, RN

## 2019-08-14 ENCOUNTER — ANTICOAGULATION THERAPY VISIT (OUTPATIENT)
Dept: ANTICOAGULATION | Facility: OTHER | Age: 49
End: 2019-08-14
Payer: COMMERCIAL

## 2019-08-14 ENCOUNTER — TRANSFERRED RECORDS (OUTPATIENT)
Dept: HEALTH INFORMATION MANAGEMENT | Facility: OTHER | Age: 49
End: 2019-08-14

## 2019-08-14 DIAGNOSIS — Z79.01 LONG TERM CURRENT USE OF ANTICOAGULANT THERAPY: ICD-10-CM

## 2019-08-14 DIAGNOSIS — I82.602 BLOOD CLOT OF VEIN IN SHOULDER AREA, LEFT: ICD-10-CM

## 2019-08-14 DIAGNOSIS — D68.51 FACTOR 5 LEIDEN MUTATION, HETEROZYGOUS (H): ICD-10-CM

## 2019-08-14 LAB — INR PPP: 2.9

## 2019-08-14 NOTE — PROGRESS NOTES
ANTICOAGULATION FOLLOW-UP CLINIC VISIT    Patient Name:  Daniela Ladd  Date:  2019  Contact Type:  no call needed patient to continue same dose    SUBJECTIVE:  Patient Findings         Clinical Outcomes     Negatives:   Major bleeding event, Thromboembolic event, Anticoagulation-related hospital admission, Anticoagulation-related ED visit, Anticoagulation-related fatality           OBJECTIVE    INR   Date Value Ref Range Status   2019 2.9  Final       ASSESSMENT / PLAN  INR assessment THER    Recheck INR In: 2 WEEKS    INR Location Home INR      Anticoagulation Summary  As of 2019    INR goal:   2.5-3.5   TTR:   77.3 % (1.7 y)   INR used for dosin.9 (2019)   Warfarin maintenance plan:   10 mg (2 mg x 5) every Mon, Wed; 8 mg (2 mg x 4) all other days   Full warfarin instructions:   10 mg every Mon, Wed; 8 mg all other days   Weekly warfarin total:   60 mg   No change documented:   Yoly Crowe RN   Plan last modified:   Hawa Vernon RN (2019)   Next INR check:   2019   Priority:   INR   Target end date:   Indefinite    Indications    Blood clot of vein in shoulder area  left [I82.602]  Factor 5 Leiden mutation  heterozygous (H) [D68.51]  Long-term (current) use of anticoagulants [Z79.01] [Z79.01]             Anticoagulation Episode Summary     INR check location:       Preferred lab:       Send INR reminders to:    INR    Comments:   INR goal changed per Dr Kitchen 3/21/18 has her own machine         Anticoagulation Care Providers     Provider Role Specialty Phone number    Krystin Black MD [facility] [phone]            See the Encounter Report to view Anticoagulation Flowsheet and Dosing Calendar (Go to Encounters tab in chart review, and find the Anticoagulation Therapy Visit)        Yoly Crowe, RN

## 2019-08-21 ENCOUNTER — ANTICOAGULATION THERAPY VISIT (OUTPATIENT)
Dept: ANTICOAGULATION | Facility: OTHER | Age: 49
End: 2019-08-21
Payer: COMMERCIAL

## 2019-08-21 ENCOUNTER — TRANSFERRED RECORDS (OUTPATIENT)
Dept: HEALTH INFORMATION MANAGEMENT | Facility: OTHER | Age: 49
End: 2019-08-21

## 2019-08-21 DIAGNOSIS — I82.602 BLOOD CLOT OF VEIN IN SHOULDER AREA, LEFT: ICD-10-CM

## 2019-08-21 DIAGNOSIS — D68.51 FACTOR 5 LEIDEN MUTATION, HETEROZYGOUS (H): ICD-10-CM

## 2019-08-21 DIAGNOSIS — Z79.01 LONG TERM CURRENT USE OF ANTICOAGULANT THERAPY: ICD-10-CM

## 2019-08-21 LAB — INR PPP: 2.7

## 2019-08-21 NOTE — PROGRESS NOTES
ANTICOAGULATION FOLLOW-UP CLINIC VISIT    Patient Name:  Daniela Ladd  Date:  2019  Contact Type:  no call needed patient to continue same dose    SUBJECTIVE:  Patient Findings         Clinical Outcomes     Negatives:   Major bleeding event, Thromboembolic event, Anticoagulation-related hospital admission, Anticoagulation-related ED visit, Anticoagulation-related fatality           OBJECTIVE    INR   Date Value Ref Range Status   2019 2.7  Final       ASSESSMENT / PLAN  INR assessment THER    Recheck INR In: 1 WEEK    INR Location Home INR      Anticoagulation Summary  As of 2019    INR goal:   2.5-3.5   TTR:   77.6 % (1.8 y)   INR used for dosin.7 (2019)   Warfarin maintenance plan:   10 mg (2 mg x 5) every Mon, Wed; 8 mg (2 mg x 4) all other days   Full warfarin instructions:   10 mg every Mon, Wed; 8 mg all other days   Weekly warfarin total:   60 mg   No change documented:   Yoly Crowe RN   Plan last modified:   Hawa Vernon RN (2019)   Next INR check:   2019   Priority:   INR   Target end date:   Indefinite    Indications    Blood clot of vein in shoulder area  left [I82.602]  Factor 5 Leiden mutation  heterozygous (H) [D68.51]  Long-term (current) use of anticoagulants [Z79.01] [Z79.01]             Anticoagulation Episode Summary     INR check location:       Preferred lab:       Send INR reminders to:    INR    Comments:   INR goal changed per Dr Kitchen 3/21/18 has her own machine         Anticoagulation Care Providers     Provider Role Specialty Phone number    Krystin Black MD [facility] [phone]            See the Encounter Report to view Anticoagulation Flowsheet and Dosing Calendar (Go to Encounters tab in chart review, and find the Anticoagulation Therapy Visit)        Yoly Crowe, RN

## 2019-08-28 ENCOUNTER — TRANSFERRED RECORDS (OUTPATIENT)
Dept: HEALTH INFORMATION MANAGEMENT | Facility: OTHER | Age: 49
End: 2019-08-28

## 2019-08-28 ENCOUNTER — ANTICOAGULATION THERAPY VISIT (OUTPATIENT)
Dept: ANTICOAGULATION | Facility: OTHER | Age: 49
End: 2019-08-28
Payer: COMMERCIAL

## 2019-08-28 DIAGNOSIS — I82.602 BLOOD CLOT OF VEIN IN SHOULDER AREA, LEFT: ICD-10-CM

## 2019-08-28 DIAGNOSIS — D68.51 FACTOR 5 LEIDEN MUTATION, HETEROZYGOUS (H): ICD-10-CM

## 2019-08-28 DIAGNOSIS — Z79.01 LONG TERM CURRENT USE OF ANTICOAGULANT THERAPY: ICD-10-CM

## 2019-08-28 LAB — INR PPP: 2.9

## 2019-08-28 NOTE — PROGRESS NOTES
ANTICOAGULATION FOLLOW-UP CLINIC VISIT    Patient Name:  Daniela Ladd  Date:  2019  Contact Type:  no call needed patient to continue same dose    SUBJECTIVE:  Patient Findings         Clinical Outcomes     Negatives:   Major bleeding event, Thromboembolic event, Anticoagulation-related hospital admission, Anticoagulation-related ED visit, Anticoagulation-related fatality           OBJECTIVE    INR   Date Value Ref Range Status   2019 2.9  Final       ASSESSMENT / PLAN  INR assessment THER    Recheck INR In: 1 WEEK    INR Location Home INR      Anticoagulation Summary  As of 2019    INR goal:   2.5-3.5   TTR:   77.8 % (1.8 y)   INR used for dosin.9 (2019)   Warfarin maintenance plan:   10 mg (2 mg x 5) every Mon, Wed; 8 mg (2 mg x 4) all other days   Full warfarin instructions:   10 mg every Mon, Wed; 8 mg all other days   Weekly warfarin total:   60 mg   No change documented:   Yoly Crowe RN   Plan last modified:   Hawa Vernon RN (2019)   Next INR check:   2019   Priority:   INR   Target end date:   Indefinite    Indications    Blood clot of vein in shoulder area  left [I82.602]  Factor 5 Leiden mutation  heterozygous (H) [D68.51]  Long-term (current) use of anticoagulants [Z79.01] [Z79.01]             Anticoagulation Episode Summary     INR check location:       Preferred lab:       Send INR reminders to:    INR    Comments:   INR goal changed per Dr Kitchen 3/21/18 has her own machine         Anticoagulation Care Providers     Provider Role Specialty Phone number    Krystin Black MD [facility] [phone]            See the Encounter Report to view Anticoagulation Flowsheet and Dosing Calendar (Go to Encounters tab in chart review, and find the Anticoagulation Therapy Visit)        Yoly Crowe, RN

## 2019-09-04 ENCOUNTER — TRANSFERRED RECORDS (OUTPATIENT)
Dept: HEALTH INFORMATION MANAGEMENT | Facility: OTHER | Age: 49
End: 2019-09-04

## 2019-09-04 ENCOUNTER — ANTICOAGULATION THERAPY VISIT (OUTPATIENT)
Dept: ANTICOAGULATION | Facility: OTHER | Age: 49
End: 2019-09-04
Payer: COMMERCIAL

## 2019-09-04 DIAGNOSIS — D68.51 FACTOR 5 LEIDEN MUTATION, HETEROZYGOUS (H): ICD-10-CM

## 2019-09-04 DIAGNOSIS — I82.622 ARM DVT (DEEP VENOUS THROMBOEMBOLISM), ACUTE, LEFT (H): ICD-10-CM

## 2019-09-04 DIAGNOSIS — Z79.01 LONG TERM CURRENT USE OF ANTICOAGULANT THERAPY: ICD-10-CM

## 2019-09-04 DIAGNOSIS — I82.602 BLOOD CLOT OF VEIN IN SHOULDER AREA, LEFT: ICD-10-CM

## 2019-09-04 LAB — INR PPP: 2.5

## 2019-09-04 RX ORDER — WARFARIN SODIUM 2 MG/1
TABLET ORAL
Qty: 400 TABLET | Refills: 1
Start: 2019-09-04 | End: 2019-12-26

## 2019-09-04 NOTE — PROGRESS NOTES
ANTICOAGULATION FOLLOW-UP CLINIC VISIT    Patient Name:  Daniela Ladd  Date:  2019  Contact Type: spoke to to patient reviewed dosing    SUBJECTIVE:  Patient Findings         Clinical Outcomes     Negatives:   Major bleeding event, Thromboembolic event, Anticoagulation-related hospital admission, Anticoagulation-related ED visit, Anticoagulation-related fatality           OBJECTIVE    INR   Date Value Ref Range Status   2019 2.5  Final       ASSESSMENT / PLAN  INR assessment THER    Recheck INR In: 1 WEEK    INR Location Home INR      Anticoagulation Summary  As of 2019    INR goal:   2.5-3.5   TTR:   78.1 % (1.8 y)   INR used for dosin.5 (2019)   Warfarin maintenance plan:   10 mg (2 mg x 5) every Mon, Wed, Fri; 8 mg (2 mg x 4) all other days   Full warfarin instructions:   10 mg every Mon, Wed, Fri; 8 mg all other days   Weekly warfarin total:   62 mg   Plan last modified:   Yoly Crowe RN (2019)   Next INR check:   2019   Priority:   INR   Target end date:   Indefinite    Indications    Blood clot of vein in shoulder area  left [I82.602]  Factor 5 Leiden mutation  heterozygous (H) [D68.51]  Long-term (current) use of anticoagulants [Z79.01] [Z79.01]             Anticoagulation Episode Summary     INR check location:       Preferred lab:       Send INR reminders to:    INR    Comments:   INR goal changed per Dr Kitchen 3/21/18 has her own machine         Anticoagulation Care Providers     Provider Role Specialty Phone number    Krystin Black MD [facility] [phone]            See the Encounter Report to view Anticoagulation Flowsheet and Dosing Calendar (Go to Encounters tab in chart review, and find the Anticoagulation Therapy Visit)        Yoly Crowe, RN

## 2019-09-11 ENCOUNTER — ANTICOAGULATION THERAPY VISIT (OUTPATIENT)
Dept: ANTICOAGULATION | Facility: OTHER | Age: 49
End: 2019-09-11
Payer: COMMERCIAL

## 2019-09-11 ENCOUNTER — TRANSFERRED RECORDS (OUTPATIENT)
Dept: HEALTH INFORMATION MANAGEMENT | Facility: OTHER | Age: 49
End: 2019-09-11

## 2019-09-11 DIAGNOSIS — Z79.01 LONG TERM CURRENT USE OF ANTICOAGULANT THERAPY: ICD-10-CM

## 2019-09-11 DIAGNOSIS — D68.51 FACTOR 5 LEIDEN MUTATION, HETEROZYGOUS (H): ICD-10-CM

## 2019-09-11 DIAGNOSIS — I82.602 BLOOD CLOT OF VEIN IN SHOULDER AREA, LEFT: ICD-10-CM

## 2019-09-11 LAB — INR PPP: 3.4 (ref 0.9–1.1)

## 2019-09-11 NOTE — PROGRESS NOTES
ANTICOAGULATION FOLLOW-UP CLINIC VISIT    Patient Name:  Daniela Ladd  Date:  9/11/2019  Contact Type:  no call needed patient to continue same dose    SUBJECTIVE:  Patient Findings         Clinical Outcomes     Negatives:   Major bleeding event, Thromboembolic event, Anticoagulation-related hospital admission, Anticoagulation-related ED visit, Anticoagulation-related fatality           OBJECTIVE    INR   Date Value Ref Range Status   09/11/2019 3.4  Final       ASSESSMENT / PLAN  INR assessment THER    Recheck INR In: 1 WEEK    INR Location Home INR      Anticoagulation Summary  As of 9/11/2019    INR goal:   2.5-3.5   TTR:   78.3 % (1.8 y)   INR used for dosing:   3.4 (9/11/2019)   Warfarin maintenance plan:   10 mg (2 mg x 5) every Mon, Wed, Fri; 8 mg (2 mg x 4) all other days   Full warfarin instructions:   10 mg every Mon, Wed, Fri; 8 mg all other days   Weekly warfarin total:   62 mg   No change documented:   Yoly Crowe RN   Plan last modified:   Yoly Crowe RN (9/4/2019)   Next INR check:   9/18/2019   Priority:   INR   Target end date:   Indefinite    Indications    Blood clot of vein in shoulder area  left [I82.602]  Factor 5 Leiden mutation  heterozygous (H) [D68.51]  Long-term (current) use of anticoagulants [Z79.01] [Z79.01]             Anticoagulation Episode Summary     INR check location:       Preferred lab:       Send INR reminders to:    INR    Comments:   INR goal changed per Dr Kitchen 3/21/18 has her own machine         Anticoagulation Care Providers     Provider Role Specialty Phone number    Krystin Black MD [facility] [phone]            See the Encounter Report to view Anticoagulation Flowsheet and Dosing Calendar (Go to Encounters tab in chart review, and find the Anticoagulation Therapy Visit)        Yoly Crowe RN

## 2019-09-15 ENCOUNTER — OFFICE VISIT (OUTPATIENT)
Dept: FAMILY MEDICINE | Facility: OTHER | Age: 49
End: 2019-09-15
Attending: PHYSICIAN ASSISTANT
Payer: COMMERCIAL

## 2019-09-15 VITALS
OXYGEN SATURATION: 95 % | RESPIRATION RATE: 16 BRPM | DIASTOLIC BLOOD PRESSURE: 76 MMHG | SYSTOLIC BLOOD PRESSURE: 116 MMHG | HEIGHT: 67 IN | WEIGHT: 207 LBS | TEMPERATURE: 97.6 F | HEART RATE: 76 BPM | BODY MASS INDEX: 32.49 KG/M2

## 2019-09-15 DIAGNOSIS — J20.9 ACUTE BRONCHITIS, UNSPECIFIED ORGANISM: Primary | ICD-10-CM

## 2019-09-15 PROCEDURE — 99214 OFFICE O/P EST MOD 30 MIN: CPT | Performed by: PHYSICIAN ASSISTANT

## 2019-09-15 PROCEDURE — 25000125 ZZHC RX 250: Performed by: PHYSICIAN ASSISTANT

## 2019-09-15 PROCEDURE — 94640 AIRWAY INHALATION TREATMENT: CPT | Performed by: PHYSICIAN ASSISTANT

## 2019-09-15 RX ORDER — DEXAMETHASONE SODIUM PHOSPHATE 4 MG/ML
10 VIAL (ML) INJECTION ONCE
Status: COMPLETED | OUTPATIENT
Start: 2019-09-15 | End: 2019-09-15

## 2019-09-15 RX ORDER — ALBUTEROL SULFATE 1.25 MG/3ML
1.25 SOLUTION RESPIRATORY (INHALATION) EVERY 6 HOURS PRN
Status: DISCONTINUED | OUTPATIENT
Start: 2019-09-15 | End: 2021-01-21

## 2019-09-15 RX ORDER — ALBUTEROL SULFATE 90 UG/1
2 AEROSOL, METERED RESPIRATORY (INHALATION) EVERY 4 HOURS PRN
Qty: 6.7 G | Refills: 0 | Status: SHIPPED | OUTPATIENT
Start: 2019-09-15 | End: 2021-01-21

## 2019-09-15 RX ORDER — CEFDINIR 300 MG/1
300 CAPSULE ORAL 2 TIMES DAILY
Qty: 20 CAPSULE | Refills: 0 | Status: SHIPPED | OUTPATIENT
Start: 2019-09-15 | End: 2019-10-21

## 2019-09-15 RX ADMIN — ALBUTEROL SULFATE 1.25 MG: 1.25 SOLUTION RESPIRATORY (INHALATION) at 10:43

## 2019-09-15 RX ADMIN — DEXAMETHASONE SODIUM PHOSPHATE 10 MG: 4 INJECTION, SOLUTION INTRAMUSCULAR; INTRAVENOUS at 10:57

## 2019-09-15 ASSESSMENT — MIFFLIN-ST. JEOR: SCORE: 1598.64

## 2019-09-15 ASSESSMENT — PAIN SCALES - GENERAL: PAINLEVEL: MODERATE PAIN (5)

## 2019-09-15 NOTE — PROGRESS NOTES
SUBJECTIVE:  Daniela Ladd is a 49 year old female who presents to the clinic today for evaluation of cough/cold symptoms  Onset 10 days ago, course was getting better and then got worse past 2 days  Associated symptoms: Dry and productive cough, harsh barky cough, nasal drainage, PND, chest is tight, she can't take a deep inspiration or she will cough. Head and neck pain from coughing.   No fever, sweats, chills  Patient is on warfarin and states she has to be careful what medication she takes to stay in control.     Treatments - humidifier, Vicks vapor rub, Mucinex, Flonase a few days. Saline spray  Exposures - not known  History of asthma - none  Environmental allergies is - not known  Tobacco use or second hand smoke exposure history - 0.75 ppd. She has nicorette gum and is she is trying to quit  Factor V and is on Warfarin. Her INR is 3.6, she tests at home, this was her result yesterday      Past Medical History:   Diagnosis Date     Abdominal pain     2010     Contact dermatitis     Atrophic dermatitis     Encounter for insertion of mirena IUD 2018     Hidradenitis suppurativa     2010     Major depressive disorder, single episode     ,Situational related to marital discord     Nicotine dependence, uncomplicated     2010     Other acne     Facial acne     Personal history of other medical treatment (CODE)     10/00, III, para 1-0-2-1, vaginal delivery , two spontaneous first trimester miscarriage     Personal history of other medical treatment (CODE)     S/P cryotherapy Aug 2003.  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         Allergies   Allergen Reactions     Amoxicillin-Pot Clavulanate Nausea     Cyclobenzaprine Rash     No Clinical Screening - See Comments Rash     Nicoderm patch         ROS  General fatigue, no fever  HENT - POSITIVE per HPI  Respiratory POSITIVE per HPI  Abdomen  Negative  Skin no rash      OBJECTIVE:  Exam:  Vitals:    09/15/19 1008   BP: 116/76   BP Location: Left arm   Patient Position: Sitting   Cuff Size: Adult Large   Pulse: 76   Resp: 20   Temp: 97.6  F (36.4  C)   TempSrc: Tympanic   SpO2: 96%   Weight: 93.9 kg (207 lb)   Height: 1.689 m (5' 6.5\")     General: healthy, alert and no distress, appears hydarated, vital signs stable   Head: NORMAL - atraumatic, nontender.  Ears: normal canals, TMs bilaterally  Eyes: NORMAL - no injection no discharge, no periorbital swelling.  Nose: ABNORMAL - swollen nasal turbinates.  No sinus tenderness  Neck: supple, non-tender, free range of motion, no adenopathy  Throat: ABNORMAL -mild erythema, no exudates or petechiae.  Resp: ABNORMAL - shallow inspiration, otherwise she will cough, inspiratory wheezing throughout  Cardiac: NORMAL - regular rate and rhythm without murmur.    ASSESSMENT:    (J20.9) Acute bronchitis, unspecified organism  (primary encounter diagnosis)  Plan: dexamethasone (DECADRON) oral solution (inj         used orally) 10 mg, albuterol (ACCUNEB)         nebulizer solution 1.25 mg,         INHALATION/NEBULIZER TREATMENT, INITIAL,         albuterol (PROAIR HFA/PROVENTIL HFA/VENTOLIN         HFA) 108 (90 Base) MCG/ACT inhaler, cefdinir         (OMNICEF) 300 MG capsule    Acute Bronchitis  Shallow breathing with inspiratory wheezing throughout  Dexamethasone and albuterol neb given in clinic with improved air " flow, mild inspiratory wheezing persists, lungs are otherwise clear  Rest, fluids  Humidified air, vicks vapor rub  Albuterol  inhaler every 4-6 hours - schedule today and tomorrow, and then as needed  OTC Mucinex, take as directed  Print prescription for cefdinir if symptoms persist or worsen, take 1 tablet twice daily for 5-10 days  Return to clinic if symptoms persist or worsen  Patient received verbal and written instruction including review of warning signs    Alyssa Payne PA-C on 9/15/2019 at 6:03 PM

## 2019-09-15 NOTE — PATIENT INSTRUCTIONS
Acute Bronchitis  Shallow breathing with inspiratory wheezing throughout  Dexamethasone and albuterol neb given in clinic with improved air flow, mild inspiratory wheezing persists  Rest, fluids  Humidified air, vicks vapor rub  Albuterol  inhaler every 4-6 hours - schedule today and tomorrow, and then as needed  OTC Mucinex, take as directed  Print prescription for cefdinir if symptoms persist or worsen, take 1 tablet twice daily for 5-10 days  Return to clinic if symptoms persist or worsen  Seek immediate care for    Coughing up blood    Worsening weakness, drowsiness, headache, or stiff neck    Trouble breathing, wheezing, or pain with breathing    Patient Education     Acute Bronchitis  Your healthcare provider has told you that you have acute bronchitis. Bronchitis is infection or inflammation of the bronchial tubes (airways in the lungs). Normally, air moves easily in and out of the airways. Bronchitis narrows the airways, making it harder for air to flow in and out of the lungs. This causes symptoms such as shortness of breath, coughing up yellow or green mucus, and wheezing. Bronchitis can be acute or chronic. Acute means the condition comes on quickly and goes away in a short time, usually within 3 to 10 days. Chronic means a condition lasts a long time and often comes back.    What causes acute bronchitis?  Acute bronchitis almost always starts as a viral respiratory infection, such as a cold or the flu. Certain factors make it more likely for a cold or flu to turn into bronchitis. These include being very young, being elderly, having a heart or lung problem, or having a weak immune system. Cigarette smoking also makes bronchitis more likely.  When bronchitis develops, the airways become swollen. The airways may also become infected with bacteria. This is known as a secondary infection.  Diagnosing acute bronchitis  Your healthcare provider will examine you and ask about your symptoms and health history.  You may also have a sputum culture to test the fluid in your lungs. Chest X-rays may be done to look for infection in the lungs.  Treating acute bronchitis  Bronchitis usually clears up as the cold or flu goes away. You can help feel better faster by doing the following:    Take medicine as directed. You may be told to take ibuprofen or other over-the-counter medicines. These help relieve inflammation in your bronchial tubes. Your healthcare provider may prescribe an inhaler to help open up the bronchial tubes. Most of the time, acute bronchitis is caused by a viral infection. Antibiotics are usually not prescribed for viral infections.    Drink plenty of fluids, such as water, juice, or warm soup. Fluids loosen mucus so that you can cough it up. This helps you breathe more easily. Fluids also prevent dehydration.    Make sure you get plenty of rest.    Do not smoke. Do not allow anyone else to smoke in your home.  Recovery and follow-up  Follow up with your doctor as you are told. You will likely feel better in a week or two. But a dry cough can linger beyond that time. Let your doctor know if you still have symptoms (other than a dry cough) after 2 weeks, or if you re prone to getting bronchial infections. Take steps to protect yourself from future infections. These steps include stopping smoking and avoiding tobacco smoke, washing your hands often, and getting a yearly flu shot.  When to call your healthcare provider  Call the healthcare provider if you have any of the following:    Fever of 100.4 F (38.0 C) or higher, or as advised    Symptoms that get worse, or new symptoms    Trouble breathing    Symptoms that don t start to improve within a week, or within 3 days of taking antibiotics   Date Last Reviewed: 12/1/2016 2000-2018 [employer]. [address] [place] [address], [place] [address]. All rights reserved. This information is not intended as a substitute for professional medical care. Always  follow your healthcare professional's instructions.

## 2019-09-15 NOTE — NURSING NOTE
"Chief Complaint   Patient presents with     Cough     Pt present to clinic today for a cough and congestion she has had for 10 days.  Initial /76 (BP Location: Left arm, Patient Position: Sitting, Cuff Size: Adult Large)   Pulse 76   Temp 97.6  F (36.4  C) (Tympanic)   Resp 20   Ht 1.689 m (5' 6.5\")   Wt 93.9 kg (207 lb)   BMI 32.91 kg/m   Estimated body mass index is 32.91 kg/m  as calculated from the following:    Height as of this encounter: 1.689 m (5' 6.5\").    Weight as of this encounter: 93.9 kg (207 lb).  Medication Reconciliation: complete    Glenda Duran LPN  "

## 2019-09-16 ENCOUNTER — TRANSFERRED RECORDS (OUTPATIENT)
Dept: HEALTH INFORMATION MANAGEMENT | Facility: OTHER | Age: 49
End: 2019-09-16

## 2019-09-16 ENCOUNTER — ANTICOAGULATION THERAPY VISIT (OUTPATIENT)
Dept: ANTICOAGULATION | Facility: OTHER | Age: 49
End: 2019-09-16
Payer: COMMERCIAL

## 2019-09-16 DIAGNOSIS — Z79.01 LONG TERM CURRENT USE OF ANTICOAGULANT THERAPY: ICD-10-CM

## 2019-09-16 DIAGNOSIS — D68.51 FACTOR 5 LEIDEN MUTATION, HETEROZYGOUS (H): ICD-10-CM

## 2019-09-16 DIAGNOSIS — I82.622 ARM DVT (DEEP VENOUS THROMBOEMBOLISM), ACUTE, LEFT (H): ICD-10-CM

## 2019-09-16 DIAGNOSIS — I82.602 BLOOD CLOT OF VEIN IN SHOULDER AREA, LEFT: ICD-10-CM

## 2019-09-16 LAB — INR PPP: 3.3 (ref 0.9–1.1)

## 2019-09-16 RX ORDER — WARFARIN SODIUM 2 MG/1
TABLET ORAL
Qty: 400 TABLET | Refills: 0 | Status: SHIPPED | OUTPATIENT
Start: 2019-09-16 | End: 2019-09-25

## 2019-09-16 NOTE — TELEPHONE ENCOUNTER
Last INR 3.3:  Today  Last office visit with Dr. Black: 03/18/19  Prescription approved per [facility] Refill Protocol.

## 2019-09-16 NOTE — PROGRESS NOTES
ANTICOAGULATION FOLLOW-UP CLINIC VISIT    Patient Name:  Daniela Ladd  Date:  9/16/2019  Contact Type:  no call needed aptient to continue same dose    SUBJECTIVE:  Patient Findings     Positives:   Change in medications (taking cefdinir x 10 days)             OBJECTIVE    INR   Date Value Ref Range Status   09/16/2019 3.3  Final       ASSESSMENT / PLAN  INR assessment THER    Recheck INR In: 3 DAYS    INR Location Home INR      Anticoagulation Summary  As of 9/16/2019    INR goal:   2.5-3.5   TTR:   78.5 % (1.8 y)   INR used for dosing:   3.3 (9/16/2019)   Warfarin maintenance plan:   10 mg (2 mg x 5) every Mon, Wed, Fri; 8 mg (2 mg x 4) all other days   Full warfarin instructions:   10 mg every Mon, Wed, Fri; 8 mg all other days   Weekly warfarin total:   62 mg   No change documented:   Yoly Crowe RN   Plan last modified:   Yoly Crowe RN (9/4/2019)   Next INR check:   9/18/2019   Priority:   INR   Target end date:   Indefinite    Indications    Blood clot of vein in shoulder area  left [I82.602]  Factor 5 Leiden mutation  heterozygous (H) [D68.51]  Long-term (current) use of anticoagulants [Z79.01] [Z79.01]             Anticoagulation Episode Summary     INR check location:       Preferred lab:       Send INR reminders to:    INR    Comments:   INR goal changed per Dr Kitchen 3/21/18 has her own machine         Anticoagulation Care Providers     Provider Role Specialty Phone number    Krystin Black MD [facility] [phone]            See the Encounter Report to view Anticoagulation Flowsheet and Dosing Calendar (Go to Encounters tab in chart review, and find the Anticoagulation Therapy Visit)        Yoly Crowe RN

## 2019-09-18 ENCOUNTER — TRANSFERRED RECORDS (OUTPATIENT)
Dept: HEALTH INFORMATION MANAGEMENT | Facility: OTHER | Age: 49
End: 2019-09-18

## 2019-09-18 ENCOUNTER — ANTICOAGULATION THERAPY VISIT (OUTPATIENT)
Dept: ANTICOAGULATION | Facility: OTHER | Age: 49
End: 2019-09-18
Payer: COMMERCIAL

## 2019-09-18 DIAGNOSIS — I82.602 BLOOD CLOT OF VEIN IN SHOULDER AREA, LEFT: ICD-10-CM

## 2019-09-18 DIAGNOSIS — D68.51 FACTOR 5 LEIDEN MUTATION, HETEROZYGOUS (H): ICD-10-CM

## 2019-09-18 DIAGNOSIS — Z79.01 LONG TERM CURRENT USE OF ANTICOAGULANT THERAPY: ICD-10-CM

## 2019-09-18 LAB — INR PPP: 3.5 (ref 0.9–1.1)

## 2019-09-18 NOTE — PROGRESS NOTES
ANTICOAGULATION FOLLOW-UP CLINIC VISIT    Patient Name:  Daniela Ladd  Date:  9/18/2019  Contact Type:  no call needed patient to continue same dose    SUBJECTIVE:  Patient Findings     Positives:   Change in medications (taking antibiotic)             OBJECTIVE    INR   Date Value Ref Range Status   09/18/2019 3.5  Final       ASSESSMENT / PLAN  INR assessment THER    Recheck INR In: 1 WEEK    INR Location Home INR      Anticoagulation Summary  As of 9/18/2019    INR goal:   2.5-3.5   TTR:   78.5 % (1.8 y)   INR used for dosing:   3.5 (9/18/2019)   Warfarin maintenance plan:   10 mg (2 mg x 5) every Mon, Wed, Fri; 8 mg (2 mg x 4) all other days   Full warfarin instructions:   10 mg every Mon, Wed, Fri; 8 mg all other days   Weekly warfarin total:   62 mg   No change documented:   Yoly Crowe RN   Plan last modified:   Yoly Crowe RN (9/4/2019)   Next INR check:   9/25/2019   Priority:   INR   Target end date:   Indefinite    Indications    Blood clot of vein in shoulder area  left [I82.602]  Factor 5 Leiden mutation  heterozygous (H) [D68.51]  Long-term (current) use of anticoagulants [Z79.01] [Z79.01]             Anticoagulation Episode Summary     INR check location:       Preferred lab:       Send INR reminders to:    INR    Comments:   INR goal changed per Dr Kitchen 3/21/18 has her own machine         Anticoagulation Care Providers     Provider Role Specialty Phone number    Krystin Black MD [facility] [phone]            See the Encounter Report to view Anticoagulation Flowsheet and Dosing Calendar (Go to Encounters tab in chart review, and find the Anticoagulation Therapy Visit)        Yoly Crowe RN

## 2019-09-25 ENCOUNTER — ANTICOAGULATION THERAPY VISIT (OUTPATIENT)
Dept: ANTICOAGULATION | Facility: OTHER | Age: 49
End: 2019-09-25
Payer: COMMERCIAL

## 2019-09-25 ENCOUNTER — TRANSFERRED RECORDS (OUTPATIENT)
Dept: HEALTH INFORMATION MANAGEMENT | Facility: OTHER | Age: 49
End: 2019-09-25

## 2019-09-25 DIAGNOSIS — Z79.01 LONG TERM CURRENT USE OF ANTICOAGULANT THERAPY: ICD-10-CM

## 2019-09-25 DIAGNOSIS — I82.602 BLOOD CLOT OF VEIN IN SHOULDER AREA, LEFT: ICD-10-CM

## 2019-09-25 DIAGNOSIS — D68.51 FACTOR 5 LEIDEN MUTATION, HETEROZYGOUS (H): ICD-10-CM

## 2019-09-25 LAB — INR PPP: 3.2 (ref 0.9–1.1)

## 2019-09-25 NOTE — PROGRESS NOTES
ANTICOAGULATION FOLLOW-UP CLINIC VISIT    Patient Name:  Daniela Ladd  Date:  9/25/2019  Contact Type:  no call needed patient to continue same dose    SUBJECTIVE:  Patient Findings         Clinical Outcomes     Negatives:   Major bleeding event, Thromboembolic event, Anticoagulation-related hospital admission, Anticoagulation-related ED visit, Anticoagulation-related fatality           OBJECTIVE    INR   Date Value Ref Range Status   09/25/2019 3.2  Final       ASSESSMENT / PLAN  INR assessment THER    Recheck INR In: 2 WEEKS    INR Location Home INR      Anticoagulation Summary  As of 9/25/2019    INR goal:   2.5-3.5   TTR:   78.7 % (1.9 y)   INR used for dosing:   3.2 (9/25/2019)   Warfarin maintenance plan:   10 mg (2 mg x 5) every Mon, Wed, Fri; 8 mg (2 mg x 4) all other days   Full warfarin instructions:   10 mg every Mon, Wed, Fri; 8 mg all other days   Weekly warfarin total:   62 mg   No change documented:   Yoly Crowe RN   Plan last modified:   Yoly Crowe RN (9/4/2019)   Next INR check:   10/9/2019   Priority:   INR   Target end date:   Indefinite    Indications    Blood clot of vein in shoulder area  left [I82.602]  Factor 5 Leiden mutation  heterozygous (H) [D68.51]  Long-term (current) use of anticoagulants [Z79.01] [Z79.01]             Anticoagulation Episode Summary     INR check location:       Preferred lab:       Send INR reminders to:    INR    Comments:   INR goal changed per Dr Kitchen 3/21/18 has her own machine         Anticoagulation Care Providers     Provider Role Specialty Phone number    Krystin Black MD [facility] [phone]            See the Encounter Report to view Anticoagulation Flowsheet and Dosing Calendar (Go to Encounters tab in chart review, and find the Anticoagulation Therapy Visit)        Yoly Crowe RN

## 2019-10-01 ENCOUNTER — TRANSFERRED RECORDS (OUTPATIENT)
Dept: HEALTH INFORMATION MANAGEMENT | Facility: OTHER | Age: 49
End: 2019-10-01

## 2019-10-01 ENCOUNTER — ANTICOAGULATION THERAPY VISIT (OUTPATIENT)
Dept: ANTICOAGULATION | Facility: OTHER | Age: 49
End: 2019-10-01
Payer: COMMERCIAL

## 2019-10-01 DIAGNOSIS — I82.602 BLOOD CLOT OF VEIN IN SHOULDER AREA, LEFT: ICD-10-CM

## 2019-10-01 DIAGNOSIS — Z79.01 LONG TERM CURRENT USE OF ANTICOAGULANT THERAPY: ICD-10-CM

## 2019-10-01 DIAGNOSIS — D68.51 FACTOR 5 LEIDEN MUTATION, HETEROZYGOUS (H): ICD-10-CM

## 2019-10-01 LAB — INR PPP: 3.4 (ref 0.9–1.1)

## 2019-10-01 NOTE — PROGRESS NOTES
ANTICOAGULATION FOLLOW-UP CLINIC VISIT    Patient Name:  Daniela Ladd  Date:  10/1/2019  Contact Type:  no call needed patient to continue same dose    SUBJECTIVE:  Patient Findings         Clinical Outcomes     Negatives:   Major bleeding event, Thromboembolic event, Anticoagulation-related hospital admission, Anticoagulation-related ED visit, Anticoagulation-related fatality           OBJECTIVE    INR   Date Value Ref Range Status   10/01/2019 3.4  Final       ASSESSMENT / PLAN  INR assessment THER    Recheck INR In: 1 WEEK    INR Location Home INR      Anticoagulation Summary  As of 10/1/2019    INR goal:   2.5-3.5   TTR:   78.9 % (1.9 y)   INR used for dosing:   3.4 (10/1/2019)   Warfarin maintenance plan:   10 mg (2 mg x 5) every Mon, Wed, Fri; 8 mg (2 mg x 4) all other days   Full warfarin instructions:   10 mg every Mon, Wed, Fri; 8 mg all other days   Weekly warfarin total:   62 mg   No change documented:   Yoly Crowe RN   Plan last modified:   Yoly Crowe RN (9/4/2019)   Next INR check:   10/15/2019   Priority:   INR   Target end date:   Indefinite    Indications    Blood clot of vein in shoulder area  left [I82.602]  Factor 5 Leiden mutation  heterozygous (H) [D68.51]  Long-term (current) use of anticoagulants [Z79.01] [Z79.01]             Anticoagulation Episode Summary     INR check location:       Preferred lab:       Send INR reminders to:    INR    Comments:   INR goal changed per Dr Kitchen 3/21/18 has her own machine         Anticoagulation Care Providers     Provider Role Specialty Phone number    Krystin Black MD [facility] [phone]            See the Encounter Report to view Anticoagulation Flowsheet and Dosing Calendar (Go to Encounters tab in chart review, and find the Anticoagulation Therapy Visit)        Yoly Crowe RN

## 2019-10-04 ENCOUNTER — TRANSFERRED RECORDS (OUTPATIENT)
Dept: HEALTH INFORMATION MANAGEMENT | Facility: OTHER | Age: 49
End: 2019-10-04

## 2019-10-04 LAB — INR PPP: 3.2 (ref 0.9–1.1)

## 2019-10-07 ENCOUNTER — ANTICOAGULATION THERAPY VISIT (OUTPATIENT)
Dept: ANTICOAGULATION | Facility: OTHER | Age: 49
End: 2019-10-07
Payer: COMMERCIAL

## 2019-10-07 DIAGNOSIS — I82.602 BLOOD CLOT OF VEIN IN SHOULDER AREA, LEFT: ICD-10-CM

## 2019-10-07 DIAGNOSIS — D68.51 FACTOR 5 LEIDEN MUTATION, HETEROZYGOUS (H): ICD-10-CM

## 2019-10-07 DIAGNOSIS — Z79.01 LONG TERM CURRENT USE OF ANTICOAGULANT THERAPY: ICD-10-CM

## 2019-10-07 LAB — INR PPP: 3.2

## 2019-10-07 NOTE — PROGRESS NOTES
ANTICOAGULATION FOLLOW-UP CLINIC VISIT    Patient Name:  Daniela Ladd  Date:  10/7/2019  Contact Type:  no call needed patient to continue same dose    SUBJECTIVE:  Patient Findings         Clinical Outcomes     Negatives:   Major bleeding event, Thromboembolic event, Anticoagulation-related hospital admission, Anticoagulation-related ED visit, Anticoagulation-related fatality           OBJECTIVE    INR   Date Value Ref Range Status   10/07/2019 3.2  Final       ASSESSMENT / PLAN  INR assessment THER    Recheck INR In: 1 WEEK    INR Location Home INR      Anticoagulation Summary  As of 10/7/2019    INR goal:   2.5-3.5   TTR:   79.1 % (1.9 y)   INR used for dosing:   3.2 (10/7/2019)   Warfarin maintenance plan:   10 mg (2 mg x 5) every Mon, Wed, Fri; 8 mg (2 mg x 4) all other days   Full warfarin instructions:   10 mg every Mon, Wed, Fri; 8 mg all other days   Weekly warfarin total:   62 mg   No change documented:   Yoly Crowe RN   Plan last modified:   Yoly Crowe RN (9/4/2019)   Next INR check:   10/14/2019   Priority:   INR   Target end date:   Indefinite    Indications    Blood clot of vein in shoulder area  left [I82.602]  Factor 5 Leiden mutation  heterozygous (H) [D68.51]  Long-term (current) use of anticoagulants [Z79.01] [Z79.01]             Anticoagulation Episode Summary     INR check location:       Preferred lab:       Send INR reminders to:    INR    Comments:   INR goal changed per Dr Kitchen 3/21/18 has her own machine         Anticoagulation Care Providers     Provider Role Specialty Phone number    Krystin Black MD [facility] [phone]            See the Encounter Report to view Anticoagulation Flowsheet and Dosing Calendar (Go to Encounters tab in chart review, and find the Anticoagulation Therapy Visit)        Yoly Crowe RN

## 2019-10-08 LAB — INR PPP: 3.1 (ref 0.9–1.1)

## 2019-10-15 ENCOUNTER — ANTICOAGULATION THERAPY VISIT (OUTPATIENT)
Dept: ANTICOAGULATION | Facility: OTHER | Age: 49
End: 2019-10-15
Payer: COMMERCIAL

## 2019-10-15 ENCOUNTER — TRANSFERRED RECORDS (OUTPATIENT)
Dept: HEALTH INFORMATION MANAGEMENT | Facility: OTHER | Age: 49
End: 2019-10-15

## 2019-10-15 DIAGNOSIS — Z79.01 LONG TERM CURRENT USE OF ANTICOAGULANT THERAPY: ICD-10-CM

## 2019-10-15 DIAGNOSIS — I82.602 BLOOD CLOT OF VEIN IN SHOULDER AREA, LEFT: ICD-10-CM

## 2019-10-15 DIAGNOSIS — D68.51 FACTOR 5 LEIDEN MUTATION, HETEROZYGOUS (H): ICD-10-CM

## 2019-10-15 LAB — INR PPP: 2.9 (ref 0.9–1.1)

## 2019-10-15 NOTE — PROGRESS NOTES
ANTICOAGULATION FOLLOW-UP CLINIC VISIT    Patient Name:  Daniela Ladd  Date:  10/15/2019  Contact Type:  No call needed patient to continue same dose    SUBJECTIVE:  Patient Findings         Clinical Outcomes     Negatives:   Major bleeding event, Thromboembolic event, Anticoagulation-related hospital admission, Anticoagulation-related ED visit, Anticoagulation-related fatality           OBJECTIVE    INR   Date Value Ref Range Status   10/15/2019 2.9  Final       ASSESSMENT / PLAN  INR assessment THER    Recheck INR In: 1 WEEK    INR Location Home INR      Anticoagulation Summary  As of 10/15/2019    INR goal:   2.5-3.5   TTR:   79.3 % (1.9 y)   INR used for dosin.9 (10/15/2019)   Warfarin maintenance plan:   10 mg (2 mg x 5) every Mon, Wed, Fri; 8 mg (2 mg x 4) all other days   Full warfarin instructions:   10 mg every Mon, Wed, Fri; 8 mg all other days   Weekly warfarin total:   62 mg   No change documented:   Yoly Crowe RN   Plan last modified:   Yoly Crowe RN (2019)   Next INR check:   10/29/2019   Priority:   INR   Target end date:   Indefinite    Indications    Blood clot of vein in shoulder area  left [I82.602]  Factor 5 Leiden mutation  heterozygous (H) [D68.51]  Long-term (current) use of anticoagulants [Z79.01] [Z79.01]             Anticoagulation Episode Summary     INR check location:       Preferred lab:       Send INR reminders to:    INR    Comments:   INR goal changed per Dr Kitchen 3/21/18 has her own machine         Anticoagulation Care Providers     Provider Role Specialty Phone number    Krystin Black MD [facility] [phone]            See the Encounter Report to view Anticoagulation Flowsheet and Dosing Calendar (Go to Encounters tab in chart review, and find the Anticoagulation Therapy Visit)        Yoly Crwoe RN

## 2019-10-16 DIAGNOSIS — I82.602 BLOOD CLOT OF VEIN IN SHOULDER AREA, LEFT: ICD-10-CM

## 2019-10-16 LAB
ALBUMIN SERPL-MCNC: 4.3 G/DL (ref 3.5–5.7)
ALP SERPL-CCNC: 45 U/L (ref 34–104)
ALT SERPL W P-5'-P-CCNC: 20 U/L (ref 7–52)
ANION GAP SERPL CALCULATED.3IONS-SCNC: 7 MMOL/L (ref 3–14)
AST SERPL W P-5'-P-CCNC: 16 U/L (ref 13–39)
BASOPHILS # BLD AUTO: 0 10E9/L (ref 0–0.2)
BASOPHILS NFR BLD AUTO: 0.4 %
BILIRUB SERPL-MCNC: 0.2 MG/DL (ref 0.3–1)
BUN SERPL-MCNC: 7 MG/DL (ref 7–25)
CALCIUM SERPL-MCNC: 9.1 MG/DL (ref 8.6–10.3)
CHLORIDE SERPL-SCNC: 103 MMOL/L (ref 98–107)
CO2 SERPL-SCNC: 27 MMOL/L (ref 21–31)
CREAT SERPL-MCNC: 0.8 MG/DL (ref 0.6–1.2)
D DIMER PPP DDU-MCNC: 352 NG/ML D-DU (ref 0–230)
DIFFERENTIAL METHOD BLD: ABNORMAL
EOSINOPHIL # BLD AUTO: 0.1 10E9/L (ref 0–0.7)
EOSINOPHIL NFR BLD AUTO: 1.2 %
ERYTHROCYTE [DISTWIDTH] IN BLOOD BY AUTOMATED COUNT: 12.7 % (ref 10–15)
GFR SERPL CREATININE-BSD FRML MDRD: 77 ML/MIN/{1.73_M2}
GLUCOSE SERPL-MCNC: 101 MG/DL (ref 70–105)
HCT VFR BLD AUTO: 47.6 % (ref 35–47)
HGB BLD-MCNC: 15.7 G/DL (ref 11.7–15.7)
IMM GRANULOCYTES # BLD: 0 10E9/L (ref 0–0.4)
IMM GRANULOCYTES NFR BLD: 0.3 %
LDH SERPL L TO P-CCNC: 184 U/L (ref 140–271)
LYMPHOCYTES # BLD AUTO: 3.8 10E9/L (ref 0.8–5.3)
LYMPHOCYTES NFR BLD AUTO: 35.1 %
MCH RBC QN AUTO: 30.2 PG (ref 26.5–33)
MCHC RBC AUTO-ENTMCNC: 33 G/DL (ref 31.5–36.5)
MCV RBC AUTO: 92 FL (ref 78–100)
MONOCYTES # BLD AUTO: 0.5 10E9/L (ref 0–1.3)
MONOCYTES NFR BLD AUTO: 4.3 %
NEUTROPHILS # BLD AUTO: 6.4 10E9/L (ref 1.6–8.3)
NEUTROPHILS NFR BLD AUTO: 58.7 %
PLATELET # BLD AUTO: 366 10E9/L (ref 150–450)
POTASSIUM SERPL-SCNC: 4.3 MMOL/L (ref 3.5–5.1)
PROT SERPL-MCNC: 6.6 G/DL (ref 6.4–8.9)
RBC # BLD AUTO: 5.2 10E12/L (ref 3.8–5.2)
SODIUM SERPL-SCNC: 137 MMOL/L (ref 134–144)
WBC # BLD AUTO: 10.8 10E9/L (ref 4–11)

## 2019-10-16 PROCEDURE — 85379 FIBRIN DEGRADATION QUANT: CPT | Mod: ZL | Performed by: NURSE PRACTITIONER

## 2019-10-16 PROCEDURE — 83615 LACTATE (LD) (LDH) ENZYME: CPT | Mod: ZL | Performed by: NURSE PRACTITIONER

## 2019-10-16 PROCEDURE — 85025 COMPLETE CBC W/AUTO DIFF WBC: CPT | Mod: ZL | Performed by: NURSE PRACTITIONER

## 2019-10-16 PROCEDURE — 80053 COMPREHEN METABOLIC PANEL: CPT | Mod: ZL | Performed by: NURSE PRACTITIONER

## 2019-10-16 PROCEDURE — 36415 COLL VENOUS BLD VENIPUNCTURE: CPT | Mod: ZL | Performed by: NURSE PRACTITIONER

## 2019-10-21 ENCOUNTER — ONCOLOGY VISIT (OUTPATIENT)
Dept: ONCOLOGY | Facility: OTHER | Age: 49
End: 2019-10-21
Attending: NURSE PRACTITIONER
Payer: COMMERCIAL

## 2019-10-21 VITALS
RESPIRATION RATE: 14 BRPM | DIASTOLIC BLOOD PRESSURE: 60 MMHG | WEIGHT: 210 LBS | HEART RATE: 83 BPM | BODY MASS INDEX: 33.75 KG/M2 | HEIGHT: 66 IN | TEMPERATURE: 98.3 F | SYSTOLIC BLOOD PRESSURE: 112 MMHG

## 2019-10-21 DIAGNOSIS — I82.602 BLOOD CLOT OF VEIN IN SHOULDER AREA, LEFT: ICD-10-CM

## 2019-10-21 DIAGNOSIS — R91.8 PULMONARY NODULES: Primary | ICD-10-CM

## 2019-10-21 DIAGNOSIS — I82.622 ARM DVT (DEEP VENOUS THROMBOEMBOLISM), ACUTE, LEFT (H): ICD-10-CM

## 2019-10-21 PROCEDURE — 99214 OFFICE O/P EST MOD 30 MIN: CPT | Performed by: NURSE PRACTITIONER

## 2019-10-21 ASSESSMENT — MIFFLIN-ST. JEOR: SCORE: 1612.17

## 2019-10-21 ASSESSMENT — PAIN SCALES - GENERAL: PAINLEVEL: MODERATE PAIN (4)

## 2019-10-21 NOTE — PROGRESS NOTES
Oncology Follow-up Visit:  October 21, 2019  Diagnosis:DVT    History Of Present Illness:  Patient presents for followup of left upper extremity DVT. Patient was seen in consultation on 11/09/2017 for evaluation of left upper extremity DVT. Patient presented to [facility] on 11/01/2017 with left arm pain, redness associated with swelling, that apparently happened overnight.  She woke up and noted pain in her left side, probably happening overnight when she developed acute onset of pain and swelling in the left upper extremity. A left upper extremity venous Doppler revealed occlusive thrombus in the subclavian, axillary, and basilic veins.  The cephalic, antecubital, and brachial veins were patent.  The patient was started on Lovenox, was transitioned to Coumadin.  She has a strong family history of DVT including her mother and grandmother.  She also stated that her grandfather has a history of CREST syndrome.  She stated that her mother had 4 stents in her groin.  She was a heavy smoker and smoked at least 1-1/2 packs per day for at least 30 years and cut back to less than a pack per day.  She denied antecedent trauma or previous surgery.  She worked at [employer] store.     When we saw her on 11/09/2017, she had been on Lovenox and the swelling had reduced considerably.  She was transitioned to Coumadin.  We elected to rule out hypercoagulable state by obtaining antithrombin III levels which were negative.  Lupus profile was negative including beta 2 glycoprotein antibodies with anticardiolipin antibodies.  We also obtained antithrombin III activity which was normal.  We obtained homocysteine levels which were normal.  Prothrombin 2 mutation was negative.  Factor V Leiden was positive for heterozygous mutation consistent with increased risk of developing thrombosis.  We repeated the venous Doppler of the left upper extremity which revealed there was improving left upper extremity DVT with residual  thrombus in the left axillary vein, distal left subclavian vein.  There was still thrombosis in the subclavian vein.  Basilic veins were recanalized.  We obtained scans to rule out occult malignancy including CT neck which revealed a subcutaneous nodule in the right shoulder that was most likely a sebaceous cyst.  There was fat stranding in the left axillary region, likely sequelae of acute DVT.  Otherwise, no evidence of mass or lymphadenopathy in neck.  She also had a CT chest which revealed a few very small subpleural ground-glass opacities measuring 5 mm.  6-month followup was recommended.  CT abdomen and pelvis was essentially negative.  The patient was a smoker and continued to smoke at least 1-1/2 packs per day.  We felt that she would need to be on lifelong Coumadin if she continued to smoke.     Patient had a repeat venous Doppler left upper extremity which revealed there was improvement in the left axillary vein, thrombosis which was not patent.  The remainder of the left upper extremity venous structure, cephalic, basilic, brachial, and radial veins were patent.  Internal jugular vein was patent, but there was persistent occlusive thrombus of the vein which was unchanged.  She had a CT chest which revealed numerous tiny pulmonary nodules again present.  They were unchanged and primarily in the upper lobes.  Sarcoidosis or other autoimmune disease was in the differential. We also recommended she see Dr. Ellis for history of pulmonary micronodules.  The patient did not keep that appointment.  She did have a repeat venous Doppler which according to the radiologist showed increased collaterals with stable thrombosed left subclavian vein. We repeated the CT chest which was essentially negative.  There were no rib abnormalities.  The tiny nodule at the pulmonary apices were chronic and unchanged.  CT abdomen and pelvis was consistent with stable to chronic hepatic steatosis with spurring at the gallbladder  "fossa.     Patient continues on the coumadin. She continues to smoke at least 1ppd and would like to have prescription for nicorette gum renewed.  Patient states she does get some swelling and pain in her left arm and axilla, as well as left back pain due to overuse of her arm. Patient states the discomfort in the left back and left arm has been more noticeable over the past week. She also notes some discomfort in the left side of her neck. She denies increased swelling, redness or warmth to the area. Most recent d-dimer is slightly increased at 352, up from 327 at last visit. Patient denies any shortness of breath, chest pain. She has no other new concerns at this time. All other labs are stable.       Review Of Systems:  Review Of Systems  Eyes/Ears/Nose/Throat: denies new vision or hearing changes  Respiratory: No shortness of breath, dyspnea on exertion, cough, or hemoptysis  Cardiovascular: denies chest pain or palpitaitons  Gastrointestinal: denies abdominal pain, no bowel changes  Genitourinary: denies dysuria or hematuria  Musculoskeletal: reports left arm, left neck discomfort  Neurologic: denies headache, no dizziness  Hematologic/Lymphatic/Immunologic: denies fevers, chills, night sweats      Nursing Notes:   Alycia Rodriguez RN  10/21/2019  1:41 PM  Signed  Patient present for follow up of DVT.  Alycia Rodriguez RN...........10/21/2019 1:40 PM  Chief Complaint   Patient presents with     RECHECK     DVT       Initial /60   Pulse 83   Temp 98.3  F (36.8  C)   Resp 14   Ht 1.689 m (5' 6.5\")   Wt 95.3 kg (210 lb)   BMI 33.39 kg/m    Estimated body mass index is 33.39 kg/m  as calculated from the following:    Height as of this encounter: 1.689 m (5' 6.5\").    Weight as of this encounter: 95.3 kg (210 lb).  Medication Reconciliation: complete    Alycia Rodriguez RN       Past medical, social, surgical, and family histories reviewed.    Allergies:  Allergies as of 10/21/2019 - Reviewed 10/21/2019 " "  Allergen Reaction Noted     Amoxicillin-pot clavulanate Nausea 05/10/2013     Cyclobenzaprine Rash 10/09/2012     No clinical screening - see comments Rash 07/11/2017       Current Medications:  Current Outpatient Medications   Medication Sig Dispense Refill     acetaminophen (TYLENOL) 500 MG tablet Take 1,000 mg by mouth       albuterol (PROAIR HFA/PROVENTIL HFA/VENTOLIN HFA) 108 (90 Base) MCG/ACT inhaler Inhale 2 puffs into the lungs every 4 hours as needed for shortness of breath / dyspnea or wheezing 6.7 g 0     warfarin (COUMADIN) 2 MG tablet Take 10 mg x 3 days and 8 mg x 4 days/week [facility] 1     nicotine (NICORETTE) 4 MG gum Take 1 each every hour while awake as needed for nicotine craving 100 each 3        Physical Exam:  /60   Pulse 83   Temp 98.3  F (36.8  C)   Resp 14   Ht 1.689 m (5' 6.5\")   Wt 95.3 kg (210 lb)   BMI 33.39 kg/m      GENERAL APPEARANCE: 49 year old female, alert and no distress     NECK: no adenopathy, no asymmetry or masses     LYMPHATICS: No cervical, supraclavicular, axillary lymphadenopathy     RESP: lungs clear to auscultation - no rales, rhonchi or wheezes     CARDIOVASCULAR: regular rates and rhythm, normal S1 S2,  no murmur.     ABDOMEN:  soft, nontender, no HSM or masses and bowel sounds normal     MUSCULOSKELETAL: extremities normal- left arm slightly larger than right, stable. No edema b/l LE.     SKIN: no suspicious lesions or rashes     PSYCHIATRIC: mentation appears normal and affect normal    Laboratory/Imaging Studies  Orders Only on 10/16/2019   Component Date Value Ref Range Status     Lactate Dehydrogenase 10/16/2019 184  140 - 271 U/L Final     D-Dimer ng/mL 10/16/2019 352* 0 - 230 ng/ml D-DU Final     Sodium 10/16/2019 137  134 - 144 mmol/L Final     Potassium 10/16/2019 4.3  3.5 - 5.1 mmol/L Final     Chloride 10/16/2019 103  98 - 107 mmol/L Final     Carbon Dioxide 10/16/2019 27  21 - 31 mmol/L Final     Anion Gap " 10/16/2019 7  3 - 14 mmol/L Final     Glucose 10/16/2019 101  70 - 105 mg/dL Final     Urea Nitrogen 10/16/2019 7  7 - 25 mg/dL Final     Creatinine 10/16/2019 0.80  0.60 - 1.20 mg/dL Final     GFR Estimate 10/16/2019 77  >60 mL/min/[1.73_m2] Final     GFR Estimate If Black 10/16/2019 >90  >60 mL/min/[1.73_m2] Final     Calcium 10/16/2019 9.1  8.6 - 10.3 mg/dL Final     Bilirubin Total 10/16/2019 0.2* 0.3 - 1.0 mg/dL Final     Albumin 10/16/2019 4.3  3.5 - 5.7 g/dL Final     Protein Total 10/16/2019 6.6  6.4 - 8.9 g/dL Final     Alkaline Phosphatase 10/16/2019 45  34 - 104 U/L Final     ALT 10/16/2019 20  7 - 52 U/L Final     AST 10/16/2019 16  13 - 39 U/L Final     WBC 10/16/2019 10.8  4.0 - 11.0 10e9/L Final     RBC Count 10/16/2019 5.20  3.8 - 5.2 10e12/L Final     Hemoglobin 10/16/2019 15.7  11.7 - 15.7 g/dL Final     Hematocrit 10/16/2019 47.6* 35.0 - 47.0 % Final     MCV 10/16/2019 92  78 - 100 fl Final     MCH 10/16/2019 30.2  26.5 - 33.0 pg Final     MCHC 10/16/2019 33.0  31.5 - 36.5 g/dL Final     RDW 10/16/2019 12.7  10.0 - 15.0 % Final     Platelet Count 10/16/2019 366  150 - 450 10e9/L Final     Diff Method 10/16/2019 Automated Method   Final     % Neutrophils 10/16/2019 58.7  % Final     % Lymphocytes 10/16/2019 35.1  % Final     % Monocytes 10/16/2019 4.3  % Final     % Eosinophils 10/16/2019 1.2  % Final     % Basophils 10/16/2019 0.4  % Final     % Immature Granulocytes 10/16/2019 0.3  % Final     Absolute Neutrophil 10/16/2019 6.4  1.6 - 8.3 10e9/L Final     Absolute Lymphocytes 10/16/2019 3.8  0.8 - 5.3 10e9/L Final     Absolute Monocytes 10/16/2019 0.5  0.0 - 1.3 10e9/L Final     Absolute Eosinophils 10/16/2019 0.1  0.0 - 0.7 10e9/L Final     Absolute Basophils 10/16/2019 0.0  0.0 - 0.2 10e9/L Final     Abs Immature Granulocytes 10/16/2019 0.0  0 - 0.4 10e9/L Final   Anticoagulation Therapy Visit on 10/15/2019   Component Date Value Ref Range Status     INR 10/15/2019 2.9* 0.9 - 1.1 Final         ASSESSMENT/PLAN:  1.Unprovoked left upper extremity DVT, likely due to hypercoagulable state given the fact she has factor V Leiden heterozygous as well as smoker.  She has not quit smoking. She continues to smoke at least 1 pack per day. Prescription for nicorette gum was renewed.  Patient states she does get some swelling and pain in her left arm and axilla due to overuse of her arm. Patient states the discomfort in the left back and left arm has been more noticeable over the past week. She also notes some discomfort in the left side of her neck. Most recent d-dimer is slightly increased at 352, up from 327 at last visit. We will order an ultrasound of the left upper extremity/neck and will call patient with results. Otherwise, the plan is to continue Coumadin to maintain therapeutic INR between 2.5 to 3.5. We will see the patient in 6 months, obtain CBC, CMP, LDH, D-dimer.    2. History of pulmonary micronodules, likely nonmalignant.  Suspect sarcoid versus possible autoimmune disease. Patient was scheduled to see pulmonology but did not keep that appointment. Patient has not had recent imaging. She continues to smoke. Will have patient set up for a CT chest and will notify her of results.    Twenty five minutes spent with patient with greater than 50% of that time spent counseling patient regarding disease process, interpretation of labs, discussing recent left arm/neck discomfort, discussing smoking cessation and need for CT chest, discussing plan for ongoing surveillance and coordination of care

## 2019-10-21 NOTE — NURSING NOTE
"Patient present for follow up of DVT.  Alycia Rodriguez RN...........10/21/2019 1:40 PM  Chief Complaint   Patient presents with     RECHECK     DVT       Initial /60   Pulse 83   Temp 98.3  F (36.8  C)   Resp 14   Ht 1.689 m (5' 6.5\")   Wt 95.3 kg (210 lb)   BMI 33.39 kg/m   Estimated body mass index is 33.39 kg/m  as calculated from the following:    Height as of this encounter: 1.689 m (5' 6.5\").    Weight as of this encounter: 95.3 kg (210 lb).  Medication Reconciliation: complete    Alycia Rodriguez RN     "

## 2019-10-22 ENCOUNTER — TRANSFERRED RECORDS (OUTPATIENT)
Dept: HEALTH INFORMATION MANAGEMENT | Facility: OTHER | Age: 49
End: 2019-10-22

## 2019-10-22 ENCOUNTER — ANTICOAGULATION THERAPY VISIT (OUTPATIENT)
Dept: ANTICOAGULATION | Facility: OTHER | Age: 49
End: 2019-10-22
Payer: COMMERCIAL

## 2019-10-22 DIAGNOSIS — I82.602 BLOOD CLOT OF VEIN IN SHOULDER AREA, LEFT: ICD-10-CM

## 2019-10-22 DIAGNOSIS — Z79.01 LONG TERM CURRENT USE OF ANTICOAGULANT THERAPY: ICD-10-CM

## 2019-10-22 DIAGNOSIS — D68.51 FACTOR 5 LEIDEN MUTATION, HETEROZYGOUS (H): ICD-10-CM

## 2019-10-22 LAB — INR PPP: 2.6 (ref 0.9–1.1)

## 2019-10-22 NOTE — PROGRESS NOTES
ANTICOAGULATION FOLLOW-UP CLINIC VISIT    Patient Name:  Daniela Ladd  Date:  10/22/2019  Contact Type:  Telephone/ spoke with patient reviewed dosing    SUBJECTIVE:  Patient Findings         Clinical Outcomes     Negatives:   Major bleeding event, Thromboembolic event, Anticoagulation-related hospital admission, Anticoagulation-related ED visit, Anticoagulation-related fatality           OBJECTIVE    INR   Date Value Ref Range Status   10/22/2019 2.6  Final       ASSESSMENT / PLAN  INR assessment THER    Recheck INR In: 1 WEEK    INR Location Home INR      Anticoagulation Summary  As of 10/22/2019    INR goal:   2.5-3.5   TTR:   79.6 % (1.9 y)   INR used for dosin.6 (10/22/2019)   Warfarin maintenance plan:   10 mg (2 mg x 5) every Mon, Wed, Fri; 8 mg (2 mg x 4) all other days   Full warfarin instructions:   10 mg every Mon, Wed, Fri; 8 mg all other days   Weekly warfarin total:   62 mg   Plan last modified:   Yoly Crowe RN (10/22/2019)   Next INR check:   10/29/2019   Priority:   INR   Target end date:   Indefinite    Indications    Blood clot of vein in shoulder area  left [I82.602]  Factor 5 Leiden mutation  heterozygous (H) [D68.51]  Long-term (current) use of anticoagulants [Z79.01] [Z79.01]             Anticoagulation Episode Summary     INR check location:       Preferred lab:       Send INR reminders to:    INR    Comments:   INR goal changed per Dr Kitchen 3/21/18 has her own machine         Anticoagulation Care Providers     Provider Role Specialty Phone number    Krystin Black MD [facility] [phone]            See the Encounter Report to view Anticoagulation Flowsheet and Dosing Calendar (Go to Encounters tab in chart review, and find the Anticoagulation Therapy Visit)        Yoly Crowe, RN

## 2019-10-24 ENCOUNTER — HOSPITAL ENCOUNTER (OUTPATIENT)
Dept: ULTRASOUND IMAGING | Facility: OTHER | Age: 49
End: 2019-10-24
Attending: NURSE PRACTITIONER
Payer: COMMERCIAL

## 2019-10-24 ENCOUNTER — HOSPITAL ENCOUNTER (OUTPATIENT)
Dept: CT IMAGING | Facility: OTHER | Age: 49
Discharge: HOME | End: 2019-10-24
Attending: NURSE PRACTITIONER | Admitting: NURSE PRACTITIONER
Payer: COMMERCIAL

## 2019-10-24 DIAGNOSIS — I82.622 ARM DVT (DEEP VENOUS THROMBOEMBOLISM), ACUTE, LEFT (H): ICD-10-CM

## 2019-10-24 DIAGNOSIS — R91.8 PULMONARY NODULES: ICD-10-CM

## 2019-10-24 PROCEDURE — 71260 CT THORAX DX C+: CPT

## 2019-10-24 PROCEDURE — 25500064 ZZH RX 255 OP 636: Performed by: NURSE PRACTITIONER

## 2019-10-24 PROCEDURE — 93971 EXTREMITY STUDY: CPT | Mod: LT

## 2019-10-24 RX ADMIN — IOHEXOL 100 ML: 350 INJECTION, SOLUTION INTRAVENOUS at 13:55

## 2019-10-25 DIAGNOSIS — R91.8 PULMONARY NODULES: ICD-10-CM

## 2019-10-25 DIAGNOSIS — R93.89 ABNORMAL CT OF THE CHEST: Primary | ICD-10-CM

## 2019-10-25 NOTE — PROGRESS NOTES
Referral sent to [facility] pulmonary medicine for evaluation of abnormal CT chest, pulmonary nodules

## 2019-10-26 ENCOUNTER — TRANSFERRED RECORDS (OUTPATIENT)
Dept: HEALTH INFORMATION MANAGEMENT | Facility: OTHER | Age: 49
End: 2019-10-26

## 2019-10-26 LAB — INR PPP: 2.9 (ref 0.9–1.1)

## 2019-10-29 ENCOUNTER — TRANSFERRED RECORDS (OUTPATIENT)
Dept: HEALTH INFORMATION MANAGEMENT | Facility: OTHER | Age: 49
End: 2019-10-29

## 2019-10-29 ENCOUNTER — MYC MEDICAL ADVICE (OUTPATIENT)
Dept: ONCOLOGY | Facility: OTHER | Age: 49
End: 2019-10-29

## 2019-10-29 LAB — INR PPP: 2.7 (ref 0.9–1.1)

## 2019-11-04 ENCOUNTER — ANTICOAGULATION THERAPY VISIT (OUTPATIENT)
Dept: ANTICOAGULATION | Facility: OTHER | Age: 49
End: 2019-11-04
Payer: COMMERCIAL

## 2019-11-04 ENCOUNTER — TRANSFERRED RECORDS (OUTPATIENT)
Dept: HEALTH INFORMATION MANAGEMENT | Facility: OTHER | Age: 49
End: 2019-11-04

## 2019-11-04 DIAGNOSIS — I82.602 BLOOD CLOT OF VEIN IN SHOULDER AREA, LEFT: ICD-10-CM

## 2019-11-04 DIAGNOSIS — Z79.01 LONG TERM CURRENT USE OF ANTICOAGULANT THERAPY: ICD-10-CM

## 2019-11-04 DIAGNOSIS — D68.51 FACTOR 5 LEIDEN MUTATION, HETEROZYGOUS (H): ICD-10-CM

## 2019-11-04 LAB
INR POINT OF CARE: 3.2 (ref 0.86–1.14)
INR PPP: 3.2 (ref 0.8–1.1)

## 2019-11-04 PROCEDURE — 36416 COLLJ CAPILLARY BLOOD SPEC: CPT | Performed by: FAMILY MEDICINE

## 2019-11-04 PROCEDURE — 85610 PROTHROMBIN TIME: CPT | Mod: QW | Performed by: FAMILY MEDICINE

## 2019-11-04 NOTE — PROGRESS NOTES
ANTICOAGULATION FOLLOW-UP CLINIC VISIT    Patient Name:  Daniela Ladd  Date:  11/4/2019  Contact Type:  Fax from [facility]    SUBJECTIVE:  Patient Findings     Comments:    No encounter, INR received via fax.  INR in range so no telephone call.  Patient is to call INR clinic if any changes affecting INR.           Clinical Outcomes     Negatives:   Major bleeding event, Thromboembolic event, Anticoagulation-related hospital admission, Anticoagulation-related ED visit, Anticoagulation-related fatality    Comments:    No encounter, INR received via fax.  INR in range so no telephone call.  Patient is to call INR clinic if any changes affecting INR.              OBJECTIVE    INR   Date Value Ref Range Status   11/04/2019 3.2 (A) 0.8 - 1.1 Corrected     INR Protime   Date Value Ref Range Status   11/04/2019 3.2 (A) 0.86 - 1.14 Corrected       ASSESSMENT / PLAN  INR assessment THER    Recheck INR In: 1 WEEK    INR Location Clinic      Anticoagulation Summary  As of 11/4/2019    INR goal:   2.5-3.5   TTR:   79.9 % (2 y)   INR used for dosing:   3.2 (11/4/2019)   Warfarin maintenance plan:   10 mg (2 mg x 5) every Mon, Wed, Fri; 8 mg (2 mg x 4) all other days   Full warfarin instructions:   10 mg every Mon, Wed, Fri; 8 mg all other days   Weekly warfarin total:   62 mg   No change documented:   Renee Kevin RN   Plan last modified:   Yoly Crowe RN (10/22/2019)   Next INR check:   11/11/2019   Priority:   INR   Target end date:   Indefinite    Indications    Blood clot of vein in shoulder area  left [I82.602]  Factor 5 Leiden mutation  heterozygous (H) [D68.51]  Long-term (current) use of anticoagulants [Z79.01] [Z79.01]             Anticoagulation Episode Summary     INR check location:       Preferred lab:       Send INR reminders to:    INR    Comments:   INR goal changed per Dr Kitchen 3/21/18 has her own machine         Anticoagulation Care Providers     Provider Role Specialty Phone number     Krystin Black MD [facility] [phone]            See the Encounter Report to view Anticoagulation Flowsheet and Dosing Calendar (Go to Encounters tab in chart review, and find the Anticoagulation Therapy Visit)     No encounter, INR received via fax.  INR in range so no telephone call.  Patient is to call INR clinic if any changes affecting INR.       Renee Kevin RN

## 2019-11-13 ENCOUNTER — TRANSFERRED RECORDS (OUTPATIENT)
Dept: HEALTH INFORMATION MANAGEMENT | Facility: OTHER | Age: 49
End: 2019-11-13

## 2019-11-13 ENCOUNTER — ANTICOAGULATION THERAPY VISIT (OUTPATIENT)
Dept: ANTICOAGULATION | Facility: OTHER | Age: 49
End: 2019-11-13
Payer: COMMERCIAL

## 2019-11-13 DIAGNOSIS — I82.602 BLOOD CLOT OF VEIN IN SHOULDER AREA, LEFT: ICD-10-CM

## 2019-11-13 DIAGNOSIS — D68.51 FACTOR 5 LEIDEN MUTATION, HETEROZYGOUS (H): ICD-10-CM

## 2019-11-13 DIAGNOSIS — Z79.01 LONG TERM CURRENT USE OF ANTICOAGULANT THERAPY: ICD-10-CM

## 2019-11-13 LAB — INR PPP: 2.8 (ref 0.9–1.1)

## 2019-11-13 NOTE — PROGRESS NOTES
ANTICOAGULATION FOLLOW-UP CLINIC VISIT    Patient Name:  Daniela Ladd  Date:  2019  Contact Type:  no call needed patient to continue same dose    SUBJECTIVE:  Patient Findings         Clinical Outcomes     Negatives:   Major bleeding event, Thromboembolic event, Anticoagulation-related hospital admission, Anticoagulation-related ED visit, Anticoagulation-related fatality           OBJECTIVE    INR   Date Value Ref Range Status   2019 2.8  Final       ASSESSMENT / PLAN  INR assessment THER    Recheck INR In: 2 WEEKS    INR Location Home INR      Anticoagulation Summary  As of 2019    INR goal:   2.5-3.5   TTR:   80.2 % (2 y)   INR used for dosin.8 (2019)   Warfarin maintenance plan:   10 mg (2 mg x 5) every Mon, Wed, Fri; 8 mg (2 mg x 4) all other days   Full warfarin instructions:   10 mg every Mon, Wed, Fri; 8 mg all other days   Weekly warfarin total:   62 mg   No change documented:   Yoly Crowe RN   Plan last modified:   Yoly Crowe RN (10/22/2019)   Next INR check:   2019   Priority:   Maintenance   Target end date:   Indefinite    Indications    Blood clot of vein in shoulder area  left [I82.602]  Factor 5 Leiden mutation  heterozygous (H) [D68.51]  Long-term (current) use of anticoagulants [Z79.01] [Z79.01]             Anticoagulation Episode Summary     INR check location:       Preferred lab:       Send INR reminders to:    INR    Comments:   INR goal changed per Dr Kitchen 3/21/18 has her own machine         Anticoagulation Care Providers     Provider Role Specialty Phone number    Krystin Black MD [facility] [phone]            See the Encounter Report to view Anticoagulation Flowsheet and Dosing Calendar (Go to Encounters tab in chart review, and find the Anticoagulation Therapy Visit)        Yoly Crowe RN

## 2019-11-20 ENCOUNTER — TRANSFERRED RECORDS (OUTPATIENT)
Dept: HEALTH INFORMATION MANAGEMENT | Facility: OTHER | Age: 49
End: 2019-11-20

## 2019-11-20 ENCOUNTER — ANTICOAGULATION THERAPY VISIT (OUTPATIENT)
Dept: ANTICOAGULATION | Facility: OTHER | Age: 49
End: 2019-11-20
Payer: COMMERCIAL

## 2019-11-20 DIAGNOSIS — Z79.01 LONG TERM CURRENT USE OF ANTICOAGULANT THERAPY: ICD-10-CM

## 2019-11-20 DIAGNOSIS — D68.51 FACTOR 5 LEIDEN MUTATION, HETEROZYGOUS (H): ICD-10-CM

## 2019-11-20 DIAGNOSIS — I82.602 BLOOD CLOT OF VEIN IN SHOULDER AREA, LEFT: ICD-10-CM

## 2019-11-20 LAB — INR PPP: 2.6

## 2019-11-20 NOTE — PROGRESS NOTES
ANTICOAGULATION FOLLOW-UP CLINIC VISIT    Patient Name:  Daniela Ladd  Date:  2019  Contact Type:  no call needed patient to continue same dsoe    SUBJECTIVE:  Patient Findings         Clinical Outcomes     Negatives:   Major bleeding event, Thromboembolic event, Anticoagulation-related hospital admission, Anticoagulation-related ED visit, Anticoagulation-related fatality           OBJECTIVE    INR   Date Value Ref Range Status   2019 2.6  Final       ASSESSMENT / PLAN  INR assessment THER    Recheck INR In: 2 WEEKS    INR Location Home INR      Anticoagulation Summary  As of 2019    INR goal:   2.5-3.5   TTR:   80.4 % (2 y)   INR used for dosin.6 (2019)   Warfarin maintenance plan:   10 mg (2 mg x 5) every Mon, Wed, Fri; 8 mg (2 mg x 4) all other days   Full warfarin instructions:   10 mg every Mon, Wed, Fri; 8 mg all other days   Weekly warfarin total:   62 mg   No change documented:   Yoly Crowe RN   Plan last modified:   Yoly Crowe RN (10/22/2019)   Next INR check:   2019   Priority:   Maintenance   Target end date:   Indefinite    Indications    Blood clot of vein in shoulder area  left [I82.602]  Factor 5 Leiden mutation  heterozygous (H) [D68.51]  Long-term (current) use of anticoagulants [Z79.01] [Z79.01]             Anticoagulation Episode Summary     INR check location:       Preferred lab:       Send INR reminders to:    INR    Comments:   INR goal changed per Dr Kitchen 3/21/18 has her own machine         Anticoagulation Care Providers     Provider Role Specialty Phone number    Krystin Black MD [facility] [phone]            See the Encounter Report to view Anticoagulation Flowsheet and Dosing Calendar (Go to Encounters tab in chart review, and find the Anticoagulation Therapy Visit)        Yoly Crowe RN

## 2019-11-21 ENCOUNTER — TELEPHONE (OUTPATIENT)
Dept: ONCOLOGY | Facility: OTHER | Age: 49
End: 2019-11-21

## 2019-11-21 NOTE — TELEPHONE ENCOUNTER
Going to [facility]. Unable to get in with Dr. Sherrie Salazar until February. Will be seeing Dr. Nicole Alonso.  Alycia Rodriguez RN...........11/21/2019 10:05 AM

## 2019-11-26 ENCOUNTER — TRANSFERRED RECORDS (OUTPATIENT)
Dept: HEALTH INFORMATION MANAGEMENT | Facility: OTHER | Age: 49
End: 2019-11-26

## 2019-11-26 ENCOUNTER — ANTICOAGULATION THERAPY VISIT (OUTPATIENT)
Dept: ANTICOAGULATION | Facility: OTHER | Age: 49
End: 2019-11-26
Payer: COMMERCIAL

## 2019-11-26 DIAGNOSIS — D68.51 FACTOR 5 LEIDEN MUTATION, HETEROZYGOUS (H): ICD-10-CM

## 2019-11-26 DIAGNOSIS — Z79.01 LONG TERM CURRENT USE OF ANTICOAGULANT THERAPY: ICD-10-CM

## 2019-11-26 DIAGNOSIS — I82.602 BLOOD CLOT OF VEIN IN SHOULDER AREA, LEFT: ICD-10-CM

## 2019-11-26 LAB — INR PPP: 2.6

## 2019-11-26 NOTE — PROGRESS NOTES
ANTICOAGULATION FOLLOW-UP CLINIC VISIT    Patient Name:  Daniela Ladd  Date:  2019  Contact Type:  no call is needed patient to continue same dose    SUBJECTIVE:  Patient Findings         Clinical Outcomes     Negatives:   Major bleeding event, Thromboembolic event, Anticoagulation-related hospital admission, Anticoagulation-related ED visit, Anticoagulation-related fatality           OBJECTIVE    INR   Date Value Ref Range Status   2019 2.6  Final       ASSESSMENT / PLAN  INR assessment THER    Recheck INR In: 2 WEEKS    INR Location Home INR      Anticoagulation Summary  As of 2019    INR goal:   2.5-3.5   TTR:   100.0 % (1 y)   INR used for dosin.6 (2019)   Warfarin maintenance plan:   10 mg (2 mg x 5) every Mon, Wed, Fri; 8 mg (2 mg x 4) all other days   Full warfarin instructions:   10 mg every Mon, Wed, Fri; 8 mg all other days   Weekly warfarin total:   62 mg   No change documented:   Yoly Crowe RN   Plan last modified:   Yoly Crowe RN (10/22/2019)   Next INR check:   12/10/2019   Priority:   Maintenance   Target end date:   Indefinite    Indications    Blood clot of vein in shoulder area  left [I82.602]  Factor 5 Leiden mutation  heterozygous (H) [D68.51]  Long-term (current) use of anticoagulants [Z79.01] [Z79.01]             Anticoagulation Episode Summary     INR check location:       Preferred lab:       Send INR reminders to:    INR    Comments:   INR goal changed per Dr Kitchen 3/21/18 has her own machine         Anticoagulation Care Providers     Provider Role Specialty Phone number    Krystin Black MD [facility] [phone]            See the Encounter Report to view Anticoagulation Flowsheet and Dosing Calendar (Go to Encounters tab in chart review, and find the Anticoagulation Therapy Visit)        Yoly Crowe RN

## 2019-11-30 ENCOUNTER — TRANSFERRED RECORDS (OUTPATIENT)
Dept: HEALTH INFORMATION MANAGEMENT | Facility: OTHER | Age: 49
End: 2019-11-30

## 2019-11-30 LAB — INR PPP: 2.9

## 2019-12-02 ENCOUNTER — ANTICOAGULATION THERAPY VISIT (OUTPATIENT)
Dept: FAMILY MEDICINE | Facility: OTHER | Age: 49
End: 2019-12-02
Payer: COMMERCIAL

## 2019-12-02 DIAGNOSIS — Z79.01 LONG TERM CURRENT USE OF ANTICOAGULANT THERAPY: ICD-10-CM

## 2019-12-02 DIAGNOSIS — D68.51 FACTOR 5 LEIDEN MUTATION, HETEROZYGOUS (H): ICD-10-CM

## 2019-12-02 DIAGNOSIS — I82.602 BLOOD CLOT OF VEIN IN SHOULDER AREA, LEFT: ICD-10-CM

## 2019-12-02 NOTE — PROGRESS NOTES
ANTICOAGULATION FOLLOW-UP CLINIC VISIT    Patient Name:  Daniela Ladd  Date:  2019  Contact Type:  Face to Face    SUBJECTIVE:  Patient Findings     Comments:   No phone call due to INR being in range.  Patient has been instructed to call with new medications or changes.          Clinical Outcomes     Comments:   No phone call due to INR being in range.  Patient has been instructed to call with new medications or changes.             OBJECTIVE    INR   Date Value Ref Range Status   2019 2.9  Final       ASSESSMENT / PLAN  INR assessment THER    Recheck INR In: 1 WEEK    INR Location Home INR      Anticoagulation Summary  As of 2019    INR goal:   2.5-3.5   TTR:   100.0 % (1 y)   INR used for dosin.9 (2019)   Warfarin maintenance plan:   10 mg (2 mg x 5) every Mon, Wed, Fri; 8 mg (2 mg x 4) all other days   Full warfarin instructions:   10 mg every Mon, Wed, Fri; 8 mg all other days   Weekly warfarin total:   62 mg   Plan last modified:   Yoly Crowe RN (10/22/2019)   Next INR check:   2019   Priority:   Maintenance   Target end date:   Indefinite    Indications    Blood clot of vein in shoulder area  left [I82.602]  Factor 5 Leiden mutation  heterozygous (H) [D68.51]  Long-term (current) use of anticoagulants [Z79.01] [Z79.01]             Anticoagulation Episode Summary     INR check location:       Preferred lab:       Send INR reminders to:    INR    Comments:   INR goal changed per Dr Kitchen 3/21/18 has her own machine         Anticoagulation Care Providers     Provider Role Specialty Phone number    Krystin Black MD [facility] [phone]            See the Encounter Report to view Anticoagulation Flowsheet and Dosing Calendar (Go to Encounters tab in chart review, and find the Anticoagulation Therapy Visit)     No encounter, INR received via fax.  INR in range so no telephone call.  Patient is to call INR clinic if any changes affecting  INR.   .home    Renee Kevin RN

## 2019-12-04 ENCOUNTER — ANTICOAGULATION THERAPY VISIT (OUTPATIENT)
Dept: ANTICOAGULATION | Facility: OTHER | Age: 49
End: 2019-12-04
Payer: COMMERCIAL

## 2019-12-04 DIAGNOSIS — D68.51 FACTOR 5 LEIDEN MUTATION, HETEROZYGOUS (H): ICD-10-CM

## 2019-12-04 DIAGNOSIS — Z79.01 LONG TERM CURRENT USE OF ANTICOAGULANT THERAPY: ICD-10-CM

## 2019-12-04 DIAGNOSIS — I82.602 BLOOD CLOT OF VEIN IN SHOULDER AREA, LEFT: ICD-10-CM

## 2019-12-04 LAB — INR PPP: 2.9

## 2019-12-04 NOTE — PROGRESS NOTES
ANTICOAGULATION FOLLOW-UP CLINIC VISIT    Patient Name:  Daniela Ladd  Date:  2019  Contact Type:  no call needed patient to continue same dose    SUBJECTIVE:  Patient Findings         Clinical Outcomes     Negatives:   Major bleeding event, Thromboembolic event, Anticoagulation-related hospital admission, Anticoagulation-related ED visit, Anticoagulation-related fatality           OBJECTIVE    INR   Date Value Ref Range Status   2019 2.9  Final       ASSESSMENT / PLAN  INR assessment THER    Recheck INR In: 1 WEEK    INR Location Home INR      Anticoagulation Summary  As of 2019    INR goal:   2.5-3.5   TTR:   100.0 % (1 y)   INR used for dosin.9 (2019)   Warfarin maintenance plan:   10 mg (2 mg x 5) every Mon, Wed, Fri; 8 mg (2 mg x 4) all other days   Full warfarin instructions:   10 mg every Mon, Wed, Fri; 8 mg all other days   Weekly warfarin total:   62 mg   No change documented:   Yoly Crowe RN   Plan last modified:   Yoly Crowe RN (10/22/2019)   Next INR check:   2019   Priority:   Maintenance   Target end date:   Indefinite    Indications    Blood clot of vein in shoulder area  left [I82.602]  Factor 5 Leiden mutation  heterozygous (H) [D68.51]  Long-term (current) use of anticoagulants [Z79.01] [Z79.01]             Anticoagulation Episode Summary     INR check location:       Preferred lab:       Send INR reminders to:    INR    Comments:   INR goal changed per Dr Kitchen 3/21/18 has her own machine         Anticoagulation Care Providers     Provider Role Specialty Phone number    Krystin Black MD [facility] [phone]            See the Encounter Report to view Anticoagulation Flowsheet and Dosing Calendar (Go to Encounters tab in chart review, and find the Anticoagulation Therapy Visit)        Yoly Crowe RN

## 2019-12-05 ENCOUNTER — TRANSFERRED RECORDS (OUTPATIENT)
Dept: HEALTH INFORMATION MANAGEMENT | Facility: OTHER | Age: 49
End: 2019-12-05

## 2019-12-06 DIAGNOSIS — Z79.01 LONG TERM CURRENT USE OF ANTICOAGULANT THERAPY: ICD-10-CM

## 2019-12-06 DIAGNOSIS — I82.622 ARM DVT (DEEP VENOUS THROMBOEMBOLISM), ACUTE, LEFT (H): ICD-10-CM

## 2019-12-06 RX ORDER — WARFARIN SODIUM 2 MG/1
TABLET ORAL
Qty: 400 TABLET | Refills: 1 | Status: SHIPPED | OUTPATIENT
Start: 2019-12-06 | End: 2019-12-11

## 2019-12-06 NOTE — TELEPHONE ENCOUNTER
"Requested Prescriptions   Pending Prescriptions Disp Refills     warfarin ANTICOAGULANT (COUMADIN) 2 MG tablet [Pharmacy Med Name: WARFARIN SODIUM 2 MG TABLET] 400 tablet 0     Sig: TAKE 10 MG X THREE DAYS/WEEK AND 8 MG X FOUR DAYS/WEEK OR AS DIRECTED BY [facility]       Vitamin K Antagonists Failed - 12/6/2019  3:26 AM        Failed - INR is within goal in the past 6 weeks     Confirm INR is within goal in the past 6 weeks.     Recent Labs   Lab Test 12/04/19   INR 2.9                       Failed - Medication is active on med list        Passed - Recent (12 mo) or future (30 days) visit within the authorizing provider's specialty     Patient has had an office visit with the authorizing provider or a provider within the authorizing providers department within the previous 12 mos or has a future within next 30 days. See \"Patient Info\" tab in inbasket, or \"Choose Columns\" in Meds & Orders section of the refill encounter.              Passed - Patient is 18 years of age or older        Passed - Patient is not pregnant        Passed - No positive pregnancy on file in past 12 months        Prescription approved per [facility] Protocol.    "

## 2019-12-11 ENCOUNTER — ANTICOAGULATION THERAPY VISIT (OUTPATIENT)
Dept: ANTICOAGULATION | Facility: OTHER | Age: 49
End: 2019-12-11
Payer: COMMERCIAL

## 2019-12-11 ENCOUNTER — TRANSFERRED RECORDS (OUTPATIENT)
Dept: HEALTH INFORMATION MANAGEMENT | Facility: OTHER | Age: 49
End: 2019-12-11

## 2019-12-11 DIAGNOSIS — I82.622 ARM DVT (DEEP VENOUS THROMBOEMBOLISM), ACUTE, LEFT (H): ICD-10-CM

## 2019-12-11 DIAGNOSIS — Z79.01 LONG TERM CURRENT USE OF ANTICOAGULANT THERAPY: ICD-10-CM

## 2019-12-11 DIAGNOSIS — I82.602 BLOOD CLOT OF VEIN IN SHOULDER AREA, LEFT: ICD-10-CM

## 2019-12-11 DIAGNOSIS — D68.51 FACTOR 5 LEIDEN MUTATION, HETEROZYGOUS (H): ICD-10-CM

## 2019-12-11 LAB — INR PPP: 4 (ref 0.9–1.1)

## 2019-12-11 RX ORDER — WARFARIN SODIUM 2 MG/1
TABLET ORAL
Qty: 400 TABLET | Refills: 1
Start: 2019-12-11 | End: 2020-01-22

## 2019-12-11 NOTE — PROGRESS NOTES
ANTICOAGULATION FOLLOW-UP CLINIC VISIT    Patient Name:  Daniela Ladd  Date:  2019  Contact Type:  Telephone/ spoke with patient reviewed dosing    SUBJECTIVE:  Patient Findings     Comments:   Patient denies any identifiable changes that caused the supratherapeutic INR.           Clinical Outcomes     Negatives:   Major bleeding event, Thromboembolic event, Anticoagulation-related hospital admission, Anticoagulation-related ED visit, Anticoagulation-related fatality    Comments:   Patient denies any identifiable changes that caused the supratherapeutic INR.              OBJECTIVE    INR   Date Value Ref Range Status   2019 4.0  Final       ASSESSMENT / PLAN  INR assessment SUPRA    Recheck INR In: 1 WEEK    INR Location Home INR      Anticoagulation Summary  As of 2019    INR goal:   2.5-3.5   TTR:   99.1 % (1 y)   INR used for dosin.0! (2019)   Warfarin maintenance plan:   10 mg (2 mg x 5) every Mon, Fri; 8 mg (2 mg x 4) all other days   Full warfarin instructions:   : 6 mg; Otherwise 10 mg every Mon, Fri; 8 mg all other days   Weekly warfarin total:   60 mg   Plan last modified:   Yoly Crowe RN (2019)   Next INR check:   2019   Priority:   High   Target end date:   Indefinite    Indications    Blood clot of vein in shoulder area  left [I82.602]  Factor 5 Leiden mutation  heterozygous (H) [D68.51]  Long-term (current) use of anticoagulants [Z79.01] [Z79.01]             Anticoagulation Episode Summary     INR check location:       Preferred lab:       Send INR reminders to:    INR    Comments:   INR goal changed per Dr Kitchen 3/21/18 has her own machine         Anticoagulation Care Providers     Provider Role Specialty Phone number    Krystin Black MD [facility] [phone]            See the Encounter Report to view Anticoagulation Flowsheet and Dosing Calendar (Go to Encounters tab in chart review, and find the  Anticoagulation Therapy Visit)        Yoly Crowe, RN

## 2019-12-18 ENCOUNTER — ANTICOAGULATION THERAPY VISIT (OUTPATIENT)
Dept: ANTICOAGULATION | Facility: OTHER | Age: 49
End: 2019-12-18
Payer: COMMERCIAL

## 2019-12-18 ENCOUNTER — TRANSFERRED RECORDS (OUTPATIENT)
Dept: HEALTH INFORMATION MANAGEMENT | Facility: OTHER | Age: 49
End: 2019-12-18

## 2019-12-18 DIAGNOSIS — D68.51 FACTOR 5 LEIDEN MUTATION, HETEROZYGOUS (H): ICD-10-CM

## 2019-12-18 DIAGNOSIS — Z79.01 LONG TERM CURRENT USE OF ANTICOAGULANT THERAPY: ICD-10-CM

## 2019-12-18 DIAGNOSIS — I82.602 BLOOD CLOT OF VEIN IN SHOULDER AREA, LEFT: ICD-10-CM

## 2019-12-18 LAB — INR PPP: 2.9 (ref 0.9–1.1)

## 2019-12-18 NOTE — PROGRESS NOTES
ANTICOAGULATION FOLLOW-UP CLINIC VISIT    Patient Name:  Daniela Ladd  Date:  2019  Contact Type:  no call needed patient to continue same dose    SUBJECTIVE:  Patient Findings         Clinical Outcomes     Negatives:   Major bleeding event, Thromboembolic event, Anticoagulation-related hospital admission, Anticoagulation-related ED visit, Anticoagulation-related fatality           OBJECTIVE    INR   Date Value Ref Range Status   2019 2.9 (A) 0.90 - 1.10 Final       ASSESSMENT / PLAN  INR assessment THER    Recheck INR In: 1 WEEK    INR Location Home INR      Anticoagulation Summary  As of 2019    INR goal:   2.5-3.5   TTR:   98.3 % (1 y)   INR used for dosin.9 (2019)   Warfarin maintenance plan:   10 mg (2 mg x 5) every Mon, Fri; 8 mg (2 mg x 4) all other days   Full warfarin instructions:   10 mg every Mon, Fri; 8 mg all other days   Weekly warfarin total:   60 mg   No change documented:   Yoly Crowe RN   Plan last modified:   Yoly Crowe RN (2019)   Next INR check:   2019   Priority:   High   Target end date:   Indefinite    Indications    Blood clot of vein in shoulder area  left [I82.602]  Factor 5 Leiden mutation  heterozygous (H) [D68.51]  Long-term (current) use of anticoagulants [Z79.01] [Z79.01]             Anticoagulation Episode Summary     INR check location:       Preferred lab:       Send INR reminders to:    INR    Comments:   INR goal changed per Dr Kitchen 3/21/18 has her own machine         Anticoagulation Care Providers     Provider Role Specialty Phone number    Krystin Black MD [facility] [phone]            See the Encounter Report to view Anticoagulation Flowsheet and Dosing Calendar (Go to Encounters tab in chart review, and find the Anticoagulation Therapy Visit)        Yoly Crowe RN

## 2019-12-25 LAB — INR PPP: 2.8 (ref 0.9–1.1)

## 2019-12-26 ENCOUNTER — ANTICOAGULATION THERAPY VISIT (OUTPATIENT)
Dept: FAMILY MEDICINE | Facility: OTHER | Age: 49
End: 2019-12-26
Payer: COMMERCIAL

## 2019-12-26 ENCOUNTER — TRANSFERRED RECORDS (OUTPATIENT)
Dept: HEALTH INFORMATION MANAGEMENT | Facility: OTHER | Age: 49
End: 2019-12-26

## 2019-12-26 DIAGNOSIS — Z79.01 LONG TERM CURRENT USE OF ANTICOAGULANT THERAPY: ICD-10-CM

## 2019-12-26 DIAGNOSIS — D68.51 FACTOR 5 LEIDEN MUTATION, HETEROZYGOUS (H): ICD-10-CM

## 2019-12-26 DIAGNOSIS — I82.602 BLOOD CLOT OF VEIN IN SHOULDER AREA, LEFT: ICD-10-CM

## 2019-12-26 NOTE — PROGRESS NOTES
ANTICOAGULATION FOLLOW-UP CLINIC VISIT    Patient Name:  Daniela Ladd  Date:  2019  Contact Type:  fax from [employer]    SUBJECTIVE:  Patient Findings     Comments:   No phone call due to INR being in range.  Patient has been instructed to call with new medications or changes.          Clinical Outcomes     Comments:   No phone call due to INR being in range.  Patient has been instructed to call with new medications or changes.             OBJECTIVE    INR   Date Value Ref Range Status   2019 2.8 (A) 0.90 - 1.10 Final       ASSESSMENT / PLAN  INR assessment THER    Recheck INR In: 2 WEEKS    INR Location Home INR      Anticoagulation Summary  As of 2019    INR goal:   2.5-3.5   TTR:   98.3 % (1 y)   INR used for dosin.8 (2019)   Warfarin maintenance plan:   10 mg (2 mg x 5) every Mon, Fri; 8 mg (2 mg x 4) all other days   Full warfarin instructions:   10 mg every Mon, Fri; 8 mg all other days   Weekly warfarin total:   60 mg   No change documented:   Renee Kevin RN   Plan last modified:   Yoly Crowe RN (2019)   Next INR check:   2020   Priority:   High   Target end date:   Indefinite    Indications    Blood clot of vein in shoulder area  left [I82.602]  Factor 5 Leiden mutation  heterozygous (H) [D68.51]  Long-term (current) use of anticoagulants [Z79.01] [Z79.01]             Anticoagulation Episode Summary     INR check location:       Preferred lab:       Send INR reminders to:    INR    Comments:   INR goal changed per Dr Kitchen 3/21/18 has her own machine         Anticoagulation Care Providers     Provider Role Specialty Phone number    Krystin Black MD [facility] [phone]            See the Encounter Report to view Anticoagulation Flowsheet and Dosing Calendar (Go to Encounters tab in chart review, and find the Anticoagulation Therapy Visit)    No encounter, INR  received via fax.  No phone call due to INR being in  range.  Patient has been instructed to call with new medications or changes.    Renee Kevin RN

## 2019-12-31 ENCOUNTER — TRANSFERRED RECORDS (OUTPATIENT)
Dept: HEALTH INFORMATION MANAGEMENT | Facility: OTHER | Age: 49
End: 2019-12-31

## 2019-12-31 ENCOUNTER — ANTICOAGULATION THERAPY VISIT (OUTPATIENT)
Dept: ANTICOAGULATION | Facility: OTHER | Age: 49
End: 2019-12-31
Payer: COMMERCIAL

## 2019-12-31 DIAGNOSIS — D68.51 FACTOR 5 LEIDEN MUTATION, HETEROZYGOUS (H): ICD-10-CM

## 2019-12-31 DIAGNOSIS — I82.602 BLOOD CLOT OF VEIN IN SHOULDER AREA, LEFT: ICD-10-CM

## 2019-12-31 DIAGNOSIS — Z79.01 LONG TERM CURRENT USE OF ANTICOAGULANT THERAPY: ICD-10-CM

## 2019-12-31 LAB — INR PPP: 3.5 (ref 0.9–1.1)

## 2019-12-31 NOTE — PROGRESS NOTES
ANTICOAGULATION FOLLOW-UP CLINIC VISIT    Patient Name:  Daniela Ladd  Date:  12/31/2019  Contact Type:  No call as no change in pt dose    SUBJECTIVE:  Patient Findings         Clinical Outcomes     Negatives:   Major bleeding event, Thromboembolic event, Anticoagulation-related hospital admission, Anticoagulation-related ED visit, Anticoagulation-related fatality           OBJECTIVE    INR   Date Value Ref Range Status   12/31/2019 3.5 (A) 0.90 - 1.10 Final       ASSESSMENT / PLAN  INR assessment THER    Recheck INR In: 1 WEEK    INR Location Home INR      Anticoagulation Summary  As of 12/31/2019    INR goal:   2.5-3.5   TTR:   98.3 % (1 y)   INR used for dosing:   3.5 (12/31/2019)   Warfarin maintenance plan:   10 mg (2 mg x 5) every Mon, Fri; 8 mg (2 mg x 4) all other days   Full warfarin instructions:   10 mg every Mon, Fri; 8 mg all other days   Weekly warfarin total:   60 mg   No change documented:   Scar Erickson RN   Plan last modified:   Yoly Crowe RN (12/11/2019)   Next INR check:   1/7/2020   Priority:   Maintenance   Target end date:   Indefinite    Indications    Blood clot of vein in shoulder area  left [I82.602]  Factor 5 Leiden mutation  heterozygous (H) [D68.51]  Long-term (current) use of anticoagulants [Z79.01] [Z79.01]             Anticoagulation Episode Summary     INR check location:       Preferred lab:       Send INR reminders to:    INR    Comments:   INR goal changed per Dr Kitchen 3/21/18 has her own machine         Anticoagulation Care Providers     Provider Role Specialty Phone number    Krystin Black MD [facility] [phone]            See the Encounter Report to view Anticoagulation Flowsheet and Dosing Calendar (Go to Encounters tab in chart review, and find the Anticoagulation Therapy Visit)    Scar Erickson, MARIPOSA

## 2020-01-03 ENCOUNTER — TRANSFERRED RECORDS (OUTPATIENT)
Dept: HEALTH INFORMATION MANAGEMENT | Facility: OTHER | Age: 50
End: 2020-01-03

## 2020-01-03 LAB — INR PPP: 3.3 (ref 0.9–1.1)

## 2020-01-06 ENCOUNTER — ANTICOAGULATION THERAPY VISIT (OUTPATIENT)
Dept: ANTICOAGULATION | Facility: OTHER | Age: 50
End: 2020-01-06
Payer: COMMERCIAL

## 2020-01-06 DIAGNOSIS — I82.602 BLOOD CLOT OF VEIN IN SHOULDER AREA, LEFT: ICD-10-CM

## 2020-01-06 DIAGNOSIS — Z79.01 LONG TERM CURRENT USE OF ANTICOAGULANT THERAPY: ICD-10-CM

## 2020-01-06 DIAGNOSIS — D68.51 FACTOR 5 LEIDEN MUTATION, HETEROZYGOUS (H): ICD-10-CM

## 2020-01-06 NOTE — PROGRESS NOTES
ANTICOAGULATION FOLLOW-UP CLINIC VISIT    Patient Name:  Daniela Ladd  Date:  1/6/2020  Contact Type:  no call needed patient to continue same dose    SUBJECTIVE:  Patient Findings         Clinical Outcomes     Negatives:   Major bleeding event, Thromboembolic event, Anticoagulation-related hospital admission, Anticoagulation-related ED visit, Anticoagulation-related fatality           OBJECTIVE    INR   Date Value Ref Range Status   01/03/2020 3.3 (A) 0.90 - 1.10 Final       ASSESSMENT / PLAN  INR assessment THER    Recheck INR In: 2 WEEKS    INR Location Home INR      Anticoagulation Summary  As of 1/6/2020    INR goal:   2.5-3.5   TTR:   98.2 % (1 y)   INR used for dosing:   3.3 (1/3/2020)   Warfarin maintenance plan:   10 mg (2 mg x 5) every Mon, Fri; 8 mg (2 mg x 4) all other days   Full warfarin instructions:   10 mg every Mon, Fri; 8 mg all other days   Weekly warfarin total:   60 mg   No change documented:   Yoly Crowe RN   Plan last modified:   Yoly Crowe RN (12/11/2019)   Next INR check:   1/13/2020   Priority:   Maintenance   Target end date:   Indefinite    Indications    Blood clot of vein in shoulder area  left [I82.602]  Factor 5 Leiden mutation  heterozygous (H) [D68.51]  Long-term (current) use of anticoagulants [Z79.01] [Z79.01]             Anticoagulation Episode Summary     INR check location:       Preferred lab:       Send INR reminders to:    INR    Comments:   INR goal changed per Dr Kitchen 3/21/18 has her own machine         Anticoagulation Care Providers     Provider Role Specialty Phone number    Krystin Black MD [facility] [phone]            See the Encounter Report to view Anticoagulation Flowsheet and Dosing Calendar (Go to Encounters tab in chart review, and find the Anticoagulation Therapy Visit)        Yoly Crowe RN

## 2020-01-08 ENCOUNTER — TRANSFERRED RECORDS (OUTPATIENT)
Dept: HEALTH INFORMATION MANAGEMENT | Facility: OTHER | Age: 50
End: 2020-01-08

## 2020-01-08 ENCOUNTER — ANTICOAGULATION THERAPY VISIT (OUTPATIENT)
Dept: ANTICOAGULATION | Facility: OTHER | Age: 50
End: 2020-01-08
Payer: COMMERCIAL

## 2020-01-08 DIAGNOSIS — D68.51 FACTOR 5 LEIDEN MUTATION, HETEROZYGOUS (H): ICD-10-CM

## 2020-01-08 DIAGNOSIS — I82.602 BLOOD CLOT OF VEIN IN SHOULDER AREA, LEFT: ICD-10-CM

## 2020-01-08 DIAGNOSIS — Z79.01 LONG TERM CURRENT USE OF ANTICOAGULANT THERAPY: ICD-10-CM

## 2020-01-08 LAB — INR PPP: 3.7 (ref 0.9–1.1)

## 2020-01-08 NOTE — PROGRESS NOTES
ANTICOAGULATION FOLLOW-UP CLINIC VISIT    Patient Name:  Daniela Ladd  Date:  1/8/2020  Contact Type:  no call needed patient to continue same dose and receck in 1 week    SUBJECTIVE:  Patient Findings         Clinical Outcomes     Negatives:   Major bleeding event, Thromboembolic event, Anticoagulation-related hospital admission, Anticoagulation-related ED visit, Anticoagulation-related fatality           OBJECTIVE    INR   Date Value Ref Range Status   01/08/2020 3.7 (A) 0.90 - 1.10 Final       ASSESSMENT / PLAN  INR assessment SUPRA    Recheck INR In: 1 WEEK    INR Location Home INR      Anticoagulation Summary  As of 1/8/2020    INR goal:   2.5-3.5   TTR:   97.6 % (1 y)   INR used for dosing:   3.7! (1/8/2020)   Warfarin maintenance plan:   10 mg (2 mg x 5) every Mon, Fri; 8 mg (2 mg x 4) all other days   Full warfarin instructions:   10 mg every Mon, Fri; 8 mg all other days   Weekly warfarin total:   60 mg   No change documented:   Yoly Crowe RN   Plan last modified:   Yoly Crowe RN (12/11/2019)   Next INR check:   1/15/2020   Priority:   High   Target end date:   Indefinite    Indications    Blood clot of vein in shoulder area  left [I82.602]  Factor 5 Leiden mutation  heterozygous (H) [D68.51]  Long-term (current) use of anticoagulants [Z79.01] [Z79.01]             Anticoagulation Episode Summary     INR check location:       Preferred lab:       Send INR reminders to:    INR    Comments:   INR goal changed per Dr Kitchen 3/21/18 has her own machine         Anticoagulation Care Providers     Provider Role Specialty Phone number    Krystin Black MD [facility] [phone]            See the Encounter Report to view Anticoagulation Flowsheet and Dosing Calendar (Go to Encounters tab in chart review, and find the Anticoagulation Therapy Visit)        Yoly Crowe RN

## 2020-01-15 ENCOUNTER — ANTICOAGULATION THERAPY VISIT (OUTPATIENT)
Dept: FAMILY MEDICINE | Facility: OTHER | Age: 50
End: 2020-01-15
Payer: COMMERCIAL

## 2020-01-15 ENCOUNTER — TRANSFERRED RECORDS (OUTPATIENT)
Dept: HEALTH INFORMATION MANAGEMENT | Facility: OTHER | Age: 50
End: 2020-01-15

## 2020-01-15 DIAGNOSIS — D68.51 FACTOR 5 LEIDEN MUTATION, HETEROZYGOUS (H): ICD-10-CM

## 2020-01-15 DIAGNOSIS — Z79.01 LONG TERM CURRENT USE OF ANTICOAGULANT THERAPY: ICD-10-CM

## 2020-01-15 DIAGNOSIS — I82.602 BLOOD CLOT OF VEIN IN SHOULDER AREA, LEFT: ICD-10-CM

## 2020-01-15 LAB — INR PPP: 3.6 (ref 0.9–1.1)

## 2020-01-15 PROCEDURE — 99207 ZZC NO CHARGE NURSE ONLY: CPT | Performed by: PHYSICIAN ASSISTANT

## 2020-01-15 NOTE — PROGRESS NOTES
ANTICOAGULATION FOLLOW-UP CLINIC VISIT    Patient Name:  Daniela Ladd  Date:  1/15/2020  Contact Type:  Telephone/ spoke with patient reviewed dosing    SUBJECTIVE:  Patient Findings     Positives:   Other complaints (states she has cut her smoking to half of what she was previously doing)        Clinical Outcomes     Negatives:   Major bleeding event, Thromboembolic event, Anticoagulation-related hospital admission, Anticoagulation-related ED visit, Anticoagulation-related fatality           OBJECTIVE    INR   Date Value Ref Range Status   01/15/2020 3.6 (A) 0.90 - 1.10 Final       ASSESSMENT / PLAN  INR assessment SUPRA    Recheck INR In: 1 WEEK    INR Location Home INR      Anticoagulation Summary  As of 1/15/2020    INR goal:   2.5-3.5   TTR:   95.7 % (1 y)   INR used for dosing:   3.6! (1/15/2020)   Warfarin maintenance plan:   10 mg (2 mg x 5) every Mon, Fri; 8 mg (2 mg x 4) all other days   Full warfarin instructions:   10 mg every Mon, Fri; 8 mg all other days   Weekly warfarin total:   60 mg   Plan last modified:   Yoly Crowe RN (12/11/2019)   Next INR check:   1/22/2020   Priority:   High   Target end date:   Indefinite    Indications    Blood clot of vein in shoulder area  left [I82.602]  Factor 5 Leiden mutation  heterozygous (H) [D68.51]  Long-term (current) use of anticoagulants [Z79.01] [Z79.01]             Anticoagulation Episode Summary     INR check location:       Preferred lab:       Send INR reminders to:    INR    Comments:   INR goal changed per Dr Kitchen 3/21/18 has her own machine         Anticoagulation Care Providers     Provider Role Specialty Phone number    Krystin Black MD [facility] [phone]            See the Encounter Report to view Anticoagulation Flowsheet and Dosing Calendar (Go to Encounters tab in chart review, and find the Anticoagulation Therapy Visit)        Yoly Crowe RN

## 2020-01-21 DIAGNOSIS — I82.622 ARM DVT (DEEP VENOUS THROMBOEMBOLISM), ACUTE, LEFT (H): ICD-10-CM

## 2020-01-21 DIAGNOSIS — M79.622 PAIN OF LEFT UPPER ARM: Primary | ICD-10-CM

## 2020-01-21 NOTE — PROGRESS NOTES
Spoke with patient. Patient states she is having some pain and swelling in her left upper arm and left neck/chest. She states this has been going on for about a week. She feels it has not gotten any worse. She states she did get a massage and noticed the pain and swelling appear after the massage. She denies any shortness of breath or chest pain. Patient concerned about another left arm DVT and is scheduled for an appointment with us on Friday. Discussed with patient that if she is concerns about a DVT then she should go in to the ER to be evaluated today. Patient does not feel she needs to be seen immediately and would rather wait for appointment on Friday. Will order Doppler of left upper arm to be done asap. Patient was advised to go to the ER is she develops any new symptoms or if her symptoms worsen. She is in agreement.

## 2020-01-22 ENCOUNTER — HOSPITAL ENCOUNTER (OUTPATIENT)
Dept: ULTRASOUND IMAGING | Facility: OTHER | Age: 50
Discharge: HOME | End: 2020-01-22
Attending: NURSE PRACTITIONER | Admitting: NURSE PRACTITIONER
Payer: COMMERCIAL

## 2020-01-22 ENCOUNTER — TRANSFERRED RECORDS (OUTPATIENT)
Dept: HEALTH INFORMATION MANAGEMENT | Facility: OTHER | Age: 50
End: 2020-01-22

## 2020-01-22 ENCOUNTER — ANTICOAGULATION THERAPY VISIT (OUTPATIENT)
Dept: ANTICOAGULATION | Facility: OTHER | Age: 50
End: 2020-01-22
Payer: COMMERCIAL

## 2020-01-22 ENCOUNTER — ANTICOAGULATION THERAPY VISIT (OUTPATIENT)
Dept: ANTICOAGULATION | Facility: OTHER | Age: 50
End: 2020-01-22
Attending: PHYSICIAN ASSISTANT
Payer: COMMERCIAL

## 2020-01-22 DIAGNOSIS — D68.51 FACTOR 5 LEIDEN MUTATION, HETEROZYGOUS (H): ICD-10-CM

## 2020-01-22 DIAGNOSIS — I82.602 BLOOD CLOT OF VEIN IN SHOULDER AREA, LEFT: ICD-10-CM

## 2020-01-22 DIAGNOSIS — I82.622 ARM DVT (DEEP VENOUS THROMBOEMBOLISM), ACUTE, LEFT (H): ICD-10-CM

## 2020-01-22 DIAGNOSIS — M79.622 PAIN OF LEFT UPPER ARM: ICD-10-CM

## 2020-01-22 DIAGNOSIS — Z79.01 LONG TERM CURRENT USE OF ANTICOAGULANT THERAPY: ICD-10-CM

## 2020-01-22 LAB
INR POINT OF CARE: 4.4 (ref 0.86–1.14)
INR PPP: 4.4 (ref 0.9–1.1)

## 2020-01-22 PROCEDURE — 93971 EXTREMITY STUDY: CPT | Mod: LT

## 2020-01-22 PROCEDURE — 85610 PROTHROMBIN TIME: CPT | Mod: QW

## 2020-01-22 PROCEDURE — 36416 COLLJ CAPILLARY BLOOD SPEC: CPT

## 2020-01-22 RX ORDER — WARFARIN SODIUM 2 MG/1
TABLET ORAL
Qty: 400 TABLET | Refills: 1
Start: 2020-01-22 | End: 2020-02-05

## 2020-01-22 NOTE — PROGRESS NOTES
ANTICOAGULATION FOLLOW-UP CLINIC VISIT    Patient Name:  Daniela Ladd  Date:  2020  Contact Type:  Face to Face    SUBJECTIVE:  Patient Findings     Comments:   Wanted to verify her own INR machine         Clinical Outcomes     Negatives:   Major bleeding event, Thromboembolic event, Anticoagulation-related hospital admission, Anticoagulation-related ED visit, Anticoagulation-related fatality    Comments:   Wanted to verify her own INR machine            OBJECTIVE    INR   Date Value Ref Range Status   2020 4.4 (A) 0.90 - 1.10 Final     INR Protime   Date Value Ref Range Status   2020 4.4 (A) 0.86 - 1.14 Final       ASSESSMENT / PLAN  INR assessment SUPRA    Recheck INR In: 1 WEEK    INR Location Clinic      Anticoagulation Summary  As of 2020    INR goal:   2.5-3.5   TTR:   93.7 % (1 y)   INR used for dosin.4! (2020)   Warfarin maintenance plan:   10 mg (2 mg x 5) every Mon; 8 mg (2 mg x 4) all other days   Full warfarin instructions:   : 6 mg; Otherwise 10 mg every Mon; 8 mg all other days   Weekly warfarin total:   58 mg   No change documented:   Yoly Crowe RN   Plan last modified:   Yoly Crowe RN (2020)   Next INR check:   2020   Priority:   High   Target end date:   Indefinite    Indications    Blood clot of vein in shoulder area  left [I82.602]  Factor 5 Leiden mutation  heterozygous (H) [D68.51]  Long-term (current) use of anticoagulants [Z79.01] [Z79.01]             Anticoagulation Episode Summary     INR check location:       Preferred lab:       Send INR reminders to:    INR    Comments:   INR goal changed per Dr Kitchen 3/21/18 has her own machine         Anticoagulation Care Providers     Provider Role Specialty Phone number    PENELOPEglenda, Ling Rodriguez PA-C Responsible Physician Assistant [phone]            See the Encounter Report to view Anticoagulation Flowsheet and Dosing Calendar (Go to Encounters tab in chart review, and find  the Anticoagulation Therapy Visit)        Yoly Crowe RN

## 2020-01-22 NOTE — PROGRESS NOTES
ANTICOAGULATION FOLLOW-UP CLINIC VISIT    Patient Name:  Daniela Ladd  Date:  2020  Contact Type: spoke with patient reviewed dosing states she wants to come in make sure her machine is ok.      SUBJECTIVE:  Patient Findings         Clinical Outcomes     Negatives:   Major bleeding event, Thromboembolic event, Anticoagulation-related hospital admission, Anticoagulation-related ED visit, Anticoagulation-related fatality           OBJECTIVE    INR   Date Value Ref Range Status   2020 4.4 (A) 0.90 - 1.10 Final       ASSESSMENT / PLAN  INR assessment SUPRA    Recheck INR In: 1 WEEK    INR Location Home INR      Anticoagulation Summary  As of 2020    INR goal:   2.5-3.5   TTR:   93.7 % (1 y)   INR used for dosin.4! (2020)   Warfarin maintenance plan:   10 mg (2 mg x 5) every Mon; 8 mg (2 mg x 4) all other days   Full warfarin instructions:   : 6 mg; Otherwise 10 mg every Mon; 8 mg all other days   Weekly warfarin total:   58 mg   Plan last modified:   oYly Crowe RN (2020)   Next INR check:      Priority:   High   Target end date:   Indefinite    Indications    Blood clot of vein in shoulder area  left [I82.602]  Factor 5 Leiden mutation  heterozygous (H) [D68.51]  Long-term (current) use of anticoagulants [Z79.01] [Z79.01]             Anticoagulation Episode Summary     INR check location:       Preferred lab:       Send INR reminders to:    INR    Comments:   INR goal changed per Dr Kitchen 3/21/18 has her own machine         Anticoagulation Care Providers     Provider Role Specialty Phone number    Krystin Black MD [facility] [phone]            See the Encounter Report to view Anticoagulation Flowsheet and Dosing Calendar (Go to Encounters tab in chart review, and find the Anticoagulation Therapy Visit)        Yoly Crowe, RN

## 2020-01-29 ENCOUNTER — ANTICOAGULATION THERAPY VISIT (OUTPATIENT)
Dept: ANTICOAGULATION | Facility: OTHER | Age: 50
End: 2020-01-29
Payer: COMMERCIAL

## 2020-01-29 ENCOUNTER — TRANSFERRED RECORDS (OUTPATIENT)
Dept: HEALTH INFORMATION MANAGEMENT | Facility: OTHER | Age: 50
End: 2020-01-29

## 2020-01-29 DIAGNOSIS — Z79.01 LONG TERM CURRENT USE OF ANTICOAGULANT THERAPY: ICD-10-CM

## 2020-01-29 DIAGNOSIS — I82.602 BLOOD CLOT OF VEIN IN SHOULDER AREA, LEFT: ICD-10-CM

## 2020-01-29 DIAGNOSIS — D68.51 FACTOR 5 LEIDEN MUTATION, HETEROZYGOUS (H): ICD-10-CM

## 2020-01-29 LAB — INR PPP: 3.8 (ref 0.9–1.1)

## 2020-01-29 NOTE — PROGRESS NOTES
ANTICOAGULATION FOLLOW-UP CLINIC VISIT    Patient Name:  Daniela Ladd  Date:  1/29/2020  Contact Type:  Telephone/ spoke with patient reviewed dosing    SUBJECTIVE:  Patient Findings         Clinical Outcomes     Negatives:   Major bleeding event, Thromboembolic event, Anticoagulation-related hospital admission, Anticoagulation-related ED visit, Anticoagulation-related fatality           OBJECTIVE    INR   Date Value Ref Range Status   01/29/2020 3.8 (A) 0.90 - 1.10 Final       ASSESSMENT / PLAN  INR assessment SUPRA    Recheck INR In: 1 WEEK    INR Location Home INR      Anticoagulation Summary  As of 1/29/2020    INR goal:   2.5-3.5   TTR:   91.8 % (1 y)   INR used for dosing:   3.8! (1/29/2020)   Warfarin maintenance plan:   10 mg (2 mg x 5) every Mon; 8 mg (2 mg x 4) all other days   Full warfarin instructions:   10 mg every Mon; 8 mg all other days   Weekly warfarin total:   58 mg   No change documented:   Yoly Crowe RN   Plan last modified:   Yoly Crowe RN (1/22/2020)   Next INR check:   2/5/2020   Priority:   High   Target end date:   Indefinite    Indications    Blood clot of vein in shoulder area  left [I82.602]  Factor 5 Leiden mutation  heterozygous (H) [D68.51]  Long-term (current) use of anticoagulants [Z79.01] [Z79.01]             Anticoagulation Episode Summary     INR check location:       Preferred lab:       Send INR reminders to:    INR    Comments:   INR goal changed per Dr Kitchen 3/21/18 has her own machine         Anticoagulation Care Providers     Provider Role Specialty Phone number    Ling Trammell PA-C Responsible Physician Assistant [phone]            See the Encounter Report to view Anticoagulation Flowsheet and Dosing Calendar (Go to Encounters tab in chart review, and find the Anticoagulation Therapy Visit)        Yoly Crowe RN

## 2020-02-05 ENCOUNTER — ANTICOAGULATION THERAPY VISIT (OUTPATIENT)
Dept: ANTICOAGULATION | Facility: OTHER | Age: 50
End: 2020-02-05
Payer: COMMERCIAL

## 2020-02-05 ENCOUNTER — TRANSFERRED RECORDS (OUTPATIENT)
Dept: HEALTH INFORMATION MANAGEMENT | Facility: OTHER | Age: 50
End: 2020-02-05

## 2020-02-05 DIAGNOSIS — I82.602 BLOOD CLOT OF VEIN IN SHOULDER AREA, LEFT: ICD-10-CM

## 2020-02-05 DIAGNOSIS — I82.622 ARM DVT (DEEP VENOUS THROMBOEMBOLISM), ACUTE, LEFT (H): ICD-10-CM

## 2020-02-05 DIAGNOSIS — Z79.01 LONG TERM CURRENT USE OF ANTICOAGULANT THERAPY: ICD-10-CM

## 2020-02-05 DIAGNOSIS — D68.51 FACTOR 5 LEIDEN MUTATION, HETEROZYGOUS (H): ICD-10-CM

## 2020-02-05 LAB — INR PPP: 3.9 (ref 0.9–1.1)

## 2020-02-05 RX ORDER — WARFARIN SODIUM 2 MG/1
TABLET ORAL
Qty: 400 TABLET | Refills: 1
Start: 2020-02-05 | End: 2020-02-12

## 2020-02-05 NOTE — PROGRESS NOTES
ANTICOAGULATION FOLLOW-UP CLINIC VISIT    Patient Name:  Daniela Ladd  Date:  2/5/2020  Contact Type:  Telephone/ spoke with patient reviewed dosing    SUBJECTIVE:  Patient Findings     Positives:   Other complaints (states the only change she has med is cutting down her smoking which has been for a few weeks now.)             OBJECTIVE    INR   Date Value Ref Range Status   02/05/2020 3.9 (A) 0.90 - 1.10 Final       ASSESSMENT / PLAN  INR assessment SUPRA    Recheck INR In: 1 WEEK    INR Location Home INR      Anticoagulation Summary  As of 2/5/2020    INR goal:   2.5-3.5   TTR:   89.9 % (1 y)   INR used for dosing:   3.9! (2/5/2020)   Warfarin maintenance plan:   8 mg (2 mg x 4) every day   Full warfarin instructions:   2/5: 6 mg; Otherwise 8 mg every day   Weekly warfarin total:   56 mg   Plan last modified:   Yoly Crowe, RN (2/5/2020)   Next INR check:   2/12/2020   Priority:   High   Target end date:   Indefinite    Indications    Blood clot of vein in shoulder area  left [I82.602]  Factor 5 Leiden mutation  heterozygous (H) [D68.51]  Long-term (current) use of anticoagulants [Z79.01] [Z79.01]             Anticoagulation Episode Summary     INR check location:       Preferred lab:       Send INR reminders to:    INR    Comments:   INR goal changed per Dr Kitchen 3/21/18 has her own machine         Anticoagulation Care Providers     Provider Role Specialty Phone number    Valeri Ling Rodriguez PA-C Responsible Physician Assistant [phone]            See the Encounter Report to view Anticoagulation Flowsheet and Dosing Calendar (Go to Encounters tab in chart review, and find the Anticoagulation Therapy Visit)        Yoly Crowe, RN

## 2020-02-12 ENCOUNTER — TRANSFERRED RECORDS (OUTPATIENT)
Dept: HEALTH INFORMATION MANAGEMENT | Facility: OTHER | Age: 50
End: 2020-02-12

## 2020-02-12 ENCOUNTER — ANTICOAGULATION THERAPY VISIT (OUTPATIENT)
Dept: ANTICOAGULATION | Facility: OTHER | Age: 50
End: 2020-02-12
Payer: COMMERCIAL

## 2020-02-12 DIAGNOSIS — Z79.01 LONG TERM CURRENT USE OF ANTICOAGULANT THERAPY: ICD-10-CM

## 2020-02-12 DIAGNOSIS — I82.602 BLOOD CLOT OF VEIN IN SHOULDER AREA, LEFT: ICD-10-CM

## 2020-02-12 DIAGNOSIS — I82.622 ARM DVT (DEEP VENOUS THROMBOEMBOLISM), ACUTE, LEFT (H): ICD-10-CM

## 2020-02-12 DIAGNOSIS — D68.51 FACTOR 5 LEIDEN MUTATION, HETEROZYGOUS (H): ICD-10-CM

## 2020-02-12 LAB — INR PPP: 4.3 (ref 0.9–1.1)

## 2020-02-12 RX ORDER — WARFARIN SODIUM 2 MG/1
TABLET ORAL
Qty: 400 TABLET | Refills: 1
Start: 2020-02-12 | End: 2020-06-01

## 2020-02-12 NOTE — PROGRESS NOTES
ANTICOAGULATION FOLLOW-UP CLINIC VISIT    Patient Name:  Daniela Ladd  Date:  2020  Contact Type:  Telephone/ spoke with patient reviewed dosing    SUBJECTIVE:  Patient Findings     Positives:   Change in diet/appetite (states she has not been eating normal diet  last 4-5 days)             OBJECTIVE    INR   Date Value Ref Range Status   2020 4.3 (A) 0.90 - 1.10 Final       ASSESSMENT / PLAN  INR assessment SUPRA    Recheck INR In: 1 WEEK    INR Location Clinic      Anticoagulation Summary  As of 2020    INR goal:   2.5-3.5   TTR:   88.0 % (1 y)   INR used for dosin.3! (2020)   Warfarin maintenance plan:   4 mg (2 mg x 2) every Wed; 8 mg (2 mg x 4) all other days   Full warfarin instructions:   4 mg every Wed; 8 mg all other days   Weekly warfarin total:   52 mg   Plan last modified:   Yoly Crowe, RN (2020)   Next INR check:   2020   Priority:   High   Target end date:   Indefinite    Indications    Blood clot of vein in shoulder area  left [I82.602]  Factor 5 Leiden mutation  heterozygous (H) [D68.51]  Long-term (current) use of anticoagulants [Z79.01] [Z79.01]             Anticoagulation Episode Summary     INR check location:       Preferred lab:       Send INR reminders to:    INR    Comments:   INR goal changed per Dr Kitchen 3/21/18 has her own machine         Anticoagulation Care Providers     Provider Role Specialty Phone number    ValeriLing PA-C Responsible Physician Assistant [phone]            See the Encounter Report to view Anticoagulation Flowsheet and Dosing Calendar (Go to Encounters tab in chart review, and find the Anticoagulation Therapy Visit)        Yoly Crowe, RN                 
55 yo male with no PMHx presenting to ED with complaints of right hand laceration. Patient was working and picked up tiles while wearing gloves, and the edge of the tile cut him on the palmar aspect of the right hand below the thumb. patient denies pain, has no other complaints at this time

## 2020-02-15 ENCOUNTER — TRANSFERRED RECORDS (OUTPATIENT)
Dept: HEALTH INFORMATION MANAGEMENT | Facility: OTHER | Age: 50
End: 2020-02-15

## 2020-02-15 LAB — INR PPP: 2.9 (ref 0.9–1.1)

## 2020-02-17 ENCOUNTER — ANTICOAGULATION THERAPY VISIT (OUTPATIENT)
Dept: ANTICOAGULATION | Facility: OTHER | Age: 50
End: 2020-02-17
Payer: COMMERCIAL

## 2020-02-17 DIAGNOSIS — D68.51 FACTOR 5 LEIDEN MUTATION, HETEROZYGOUS (H): ICD-10-CM

## 2020-02-17 DIAGNOSIS — I82.602 BLOOD CLOT OF VEIN IN SHOULDER AREA, LEFT: ICD-10-CM

## 2020-02-17 DIAGNOSIS — Z79.01 LONG TERM CURRENT USE OF ANTICOAGULANT THERAPY: ICD-10-CM

## 2020-02-17 NOTE — PROGRESS NOTES
ANTICOAGULATION FOLLOW-UP CLINIC VISIT    Patient Name:  Daniela Ladd  Date:  2020  Contact Type:  fax from [facility] and phone call with patient    SUBJECTIVE:  Patient Findings     Comments:   No phone call due to INR being in range.  Patient has been instructed to call with new medications or changes.          Clinical Outcomes     Negatives:   Major bleeding event, Thromboembolic event, Anticoagulation-related hospital admission, Anticoagulation-related ED visit, Anticoagulation-related fatality    Comments:   No phone call due to INR being in range.  Patient has been instructed to call with new medications or changes.             OBJECTIVE    INR   Date Value Ref Range Status   02/15/2020 2.9 (A) 0.90 - 1.10 Final       ASSESSMENT / PLAN  INR assessment THER    Recheck INR In: 5 DAYS    INR Location Home INR      Anticoagulation Summary  As of 2020    INR goal:   2.5-3.5   TTR:   87.4 % (1 y)   INR used for dosin.9 (2/15/2020)   Warfarin maintenance plan:   4 mg (2 mg x 2) every Wed; 8 mg (2 mg x 4) all other days   Full warfarin instructions:   4 mg every Wed; 8 mg all other days   Weekly warfarin total:   52 mg   No change documented:   Renee Kevin RN   Plan last modified:   Yoly Crowe RN (2020)   Next INR check:   2020   Priority:   High   Target end date:   Indefinite    Indications    Blood clot of vein in shoulder area  left [I82.602]  Factor 5 Leiden mutation  heterozygous (H) [D68.51]  Long-term (current) use of anticoagulants [Z79.01] [Z79.01]             Anticoagulation Episode Summary     INR check location:       Preferred lab:       Send INR reminders to:    INR    Comments:   INR goal changed per Dr Kitchen 3/21/18 has her own machine         Anticoagulation Care Providers     Provider Role Specialty Phone number    Valeri, Ling Rodriguez PA-C Responsible Physician Assistant [phone]            See the Encounter Report to view Anticoagulation  Flowsheet and Dosing Calendar (Go to Encounters tab in chart review, and find the Anticoagulation Therapy Visit)    No encounter, INR received via fax and assessment via phone call. Verbal instructions given. Patient verbalized understanding.      Renee Kevin RN

## 2020-02-19 ENCOUNTER — ANTICOAGULATION THERAPY VISIT (OUTPATIENT)
Dept: ANTICOAGULATION | Facility: OTHER | Age: 50
End: 2020-02-19
Payer: COMMERCIAL

## 2020-02-19 ENCOUNTER — TRANSFERRED RECORDS (OUTPATIENT)
Dept: HEALTH INFORMATION MANAGEMENT | Facility: OTHER | Age: 50
End: 2020-02-19

## 2020-02-19 DIAGNOSIS — D68.51 FACTOR 5 LEIDEN MUTATION, HETEROZYGOUS (H): ICD-10-CM

## 2020-02-19 DIAGNOSIS — I82.602 BLOOD CLOT OF VEIN IN SHOULDER AREA, LEFT: ICD-10-CM

## 2020-02-19 DIAGNOSIS — Z79.01 LONG TERM CURRENT USE OF ANTICOAGULANT THERAPY: ICD-10-CM

## 2020-02-19 LAB — INR PPP: 3.1 (ref 0.9–1.1)

## 2020-02-19 NOTE — PROGRESS NOTES
ANTICOAGULATION FOLLOW-UP CLINIC VISIT    Patient Name:  Daniela Ladd  Date:  2/19/2020  Contact Type:  no call needed patient to continue same dose    SUBJECTIVE:  Patient Findings         Clinical Outcomes     Negatives:   Major bleeding event, Thromboembolic event, Anticoagulation-related hospital admission, Anticoagulation-related ED visit, Anticoagulation-related fatality           OBJECTIVE    INR   Date Value Ref Range Status   02/19/2020 3.1 (A) 0.90 - 1.10 Final       ASSESSMENT / PLAN  INR assessment THER    Recheck INR In: 1 WEEK    INR Location Home INR      Anticoagulation Summary  As of 2/19/2020    INR goal:   2.5-3.5   TTR:   87.5 % (1 y)   INR used for dosing:   3.1 (2/19/2020)   Warfarin maintenance plan:   4 mg (2 mg x 2) every Wed; 8 mg (2 mg x 4) all other days   Full warfarin instructions:   4 mg every Wed; 8 mg all other days   Weekly warfarin total:   52 mg   No change documented:   Yoly Crowe RN   Plan last modified:   Yoly Crowe RN (2/12/2020)   Next INR check:   2/26/2020   Priority:   High   Target end date:   Indefinite    Indications    Blood clot of vein in shoulder area  left [I82.602]  Factor 5 Leiden mutation  heterozygous (H) [D68.51]  Long-term (current) use of anticoagulants [Z79.01] [Z79.01]             Anticoagulation Episode Summary     INR check location:       Preferred lab:       Send INR reminders to:    INR    Comments:   INR goal changed per Dr Kitchen 3/21/18 has her own machine         Anticoagulation Care Providers     Provider Role Specialty Phone number    Ling Trammell PA-C Responsible Physician Assistant [phone]            See the Encounter Report to view Anticoagulation Flowsheet and Dosing Calendar (Go to Encounters tab in chart review, and find the Anticoagulation Therapy Visit)        Yoly Crowe RN

## 2020-02-26 ENCOUNTER — TRANSFERRED RECORDS (OUTPATIENT)
Dept: HEALTH INFORMATION MANAGEMENT | Facility: OTHER | Age: 50
End: 2020-02-26

## 2020-02-26 ENCOUNTER — ANTICOAGULATION THERAPY VISIT (OUTPATIENT)
Dept: ANTICOAGULATION | Facility: OTHER | Age: 50
End: 2020-02-26
Payer: COMMERCIAL

## 2020-02-26 DIAGNOSIS — I82.602 BLOOD CLOT OF VEIN IN SHOULDER AREA, LEFT: ICD-10-CM

## 2020-02-26 DIAGNOSIS — Z79.01 LONG TERM CURRENT USE OF ANTICOAGULANT THERAPY: ICD-10-CM

## 2020-02-26 DIAGNOSIS — D68.51 FACTOR 5 LEIDEN MUTATION, HETEROZYGOUS (H): ICD-10-CM

## 2020-02-26 LAB — INR PPP: 3.2 (ref 0.9–1.1)

## 2020-02-26 NOTE — PROGRESS NOTES
ANTICOAGULATION FOLLOW-UP CLINIC VISIT    Patient Name:  Daniela Ladd  Date:  2/26/2020  Contact Type:  fax from home monitoring    SUBJECTIVE:  Patient Findings     Comments:   No phone call due to INR being in range.  Patient has been instructed to call with new medications or changes.          Clinical Outcomes     Negatives:   Major bleeding event, Thromboembolic event, Anticoagulation-related hospital admission, Anticoagulation-related ED visit, Anticoagulation-related fatality    Comments:   No phone call due to INR being in range.  Patient has been instructed to call with new medications or changes.             OBJECTIVE    INR   Date Value Ref Range Status   02/26/2020 3.2 (A) 0.90 - 1.10 Final       ASSESSMENT / PLAN  INR assessment THER    Recheck INR In: 2 WEEKS    INR Location Home INR      Anticoagulation Summary  As of 2/26/2020    INR goal:   2.5-3.5   TTR:   87.5 % (1 y)   INR used for dosing:   3.2 (2/26/2020)   Warfarin maintenance plan:   4 mg (2 mg x 2) every Wed; 8 mg (2 mg x 4) all other days   Full warfarin instructions:   4 mg every Wed; 8 mg all other days   Weekly warfarin total:   52 mg   No change documented:   Renee Kevin RN   Plan last modified:   Yoly Crowe RN (2/12/2020)   Next INR check:   3/11/2020   Priority:   High   Target end date:   Indefinite    Indications    Blood clot of vein in shoulder area  left [I82.602]  Factor 5 Leiden mutation  heterozygous (H) [D68.51]  Long-term (current) use of anticoagulants [Z79.01] [Z79.01]             Anticoagulation Episode Summary     INR check location:       Preferred lab:       Send INR reminders to:    INR    Comments:   INR goal changed per Dr Kitchen 3/21/18 has her own machine         Anticoagulation Care Providers     Provider Role Specialty Phone number    PENELOPEglenda, Ling Rodriguez PA-C Responsible Physician Assistant [phone]            See the Encounter Report to view Anticoagulation Flowsheet and Dosing  Calendar (Go to Encounters tab in chart review, and find the Anticoagulation Therapy Visit)    No encounter, INR received via fax.  INR in range so no telephone call.  Patient is to call INR clinic if any changes affecting INR.       Renee Kevin RN

## 2020-03-04 ENCOUNTER — ANTICOAGULATION THERAPY VISIT (OUTPATIENT)
Dept: ANTICOAGULATION | Facility: OTHER | Age: 50
End: 2020-03-04
Payer: COMMERCIAL

## 2020-03-04 DIAGNOSIS — I82.602 BLOOD CLOT OF VEIN IN SHOULDER AREA, LEFT: ICD-10-CM

## 2020-03-04 DIAGNOSIS — Z79.01 LONG TERM CURRENT USE OF ANTICOAGULANT THERAPY: ICD-10-CM

## 2020-03-04 DIAGNOSIS — D68.51 FACTOR 5 LEIDEN MUTATION, HETEROZYGOUS (H): ICD-10-CM

## 2020-03-04 LAB — INR PPP: 3.2 (ref 0.9–1.1)

## 2020-03-04 NOTE — PROGRESS NOTES
ANTICOAGULATION FOLLOW-UP CLINIC VISIT    Patient Name:  Daniela Ladd  Date:  3/4/2020  Contact Type: Incoming fax with results.  No phone call due to INR being in range.  Patient has been instructed to call with new medications or changes.    SUBJECTIVE:  Patient Findings         Clinical Outcomes     Negatives:   Major bleeding event, Thromboembolic event, Anticoagulation-related hospital admission, Anticoagulation-related ED visit, Anticoagulation-related fatality           OBJECTIVE    INR   Date Value Ref Range Status   03/04/2020 3.2 (A) 0.90 - 1.10 Final       ASSESSMENT / PLAN  INR assessment THER    Recheck INR In: 1 WEEK    INR Location Home INR      Anticoagulation Summary  As of 3/4/2020    INR goal:   2.5-3.5   TTR:   87.5 % (1 y)   INR used for dosing:   3.2 (3/4/2020)   Warfarin maintenance plan:   4 mg (2 mg x 2) every Wed; 8 mg (2 mg x 4) all other days   Full warfarin instructions:   4 mg every Wed; 8 mg all other days   Weekly warfarin total:   52 mg   Plan last modified:   Yoly Crowe RN (2/12/2020)   Next INR check:   3/11/2020   Priority:   High   Target end date:   Indefinite    Indications    Blood clot of vein in shoulder area  left [I82.602]  Factor 5 Leiden mutation  heterozygous (H) [D68.51]  Long-term (current) use of anticoagulants [Z79.01] [Z79.01]             Anticoagulation Episode Summary     INR check location:       Preferred lab:       Send INR reminders to:    INR    Comments:   INR goal changed per Dr Kitchen 3/21/18 has her own machine         Anticoagulation Care Providers     Provider Role Specialty Phone number    PENELOPEglendaLing PA-C Responsible Physician Assistant [phone]            See the Encounter Report to view Anticoagulation Flowsheet and Dosing Calendar (Go to Encounters tab in chart review, and find the Anticoagulation Therapy Visit)        Janelle Wolfe RN

## 2020-03-11 ENCOUNTER — HEALTH MAINTENANCE LETTER (OUTPATIENT)
Age: 50
End: 2020-03-11

## 2020-03-11 ENCOUNTER — TRANSFERRED RECORDS (OUTPATIENT)
Dept: HEALTH INFORMATION MANAGEMENT | Facility: OTHER | Age: 50
End: 2020-03-11

## 2020-03-11 ENCOUNTER — ANTICOAGULATION THERAPY VISIT (OUTPATIENT)
Dept: ANTICOAGULATION | Facility: OTHER | Age: 50
End: 2020-03-11
Payer: COMMERCIAL

## 2020-03-11 DIAGNOSIS — D68.51 FACTOR 5 LEIDEN MUTATION, HETEROZYGOUS (H): ICD-10-CM

## 2020-03-11 DIAGNOSIS — I82.602 BLOOD CLOT OF VEIN IN SHOULDER AREA, LEFT: ICD-10-CM

## 2020-03-11 DIAGNOSIS — Z79.01 LONG TERM CURRENT USE OF ANTICOAGULANT THERAPY: ICD-10-CM

## 2020-03-11 LAB — INR PPP: 3.4 (ref 0.9–1.1)

## 2020-03-11 NOTE — PROGRESS NOTES
ANTICOAGULATION FOLLOW-UP CLINIC VISIT    Patient Name:  Daniela Ladd  Date:  3/11/2020  Contact Type:  fax from [facility]    SUBJECTIVE:  Patient Findings     Comments:   No phone call due to INR being in range.  Patient has been instructed to call with new medications or changes.          Clinical Outcomes     Negatives:   Major bleeding event, Thromboembolic event, Anticoagulation-related hospital admission, Anticoagulation-related ED visit, Anticoagulation-related fatality    Comments:   No phone call due to INR being in range.  Patient has been instructed to call with new medications or changes.             OBJECTIVE    INR   Date Value Ref Range Status   03/11/2020 3.4 (A) 0.90 - 1.10 Final       ASSESSMENT / PLAN  INR assessment THER    Recheck INR In: 2 WEEKS    INR Location Home INR      Anticoagulation Summary  As of 3/11/2020    INR goal:   2.5-3.5   TTR:   87.5 % (1 y)   INR used for dosing:   3.4 (3/11/2020)   Warfarin maintenance plan:   4 mg (2 mg x 2) every Wed; 8 mg (2 mg x 4) all other days   Full warfarin instructions:   4 mg every Wed; 8 mg all other days   Weekly warfarin total:   52 mg   No change documented:   Renee Kevin RN   Plan last modified:   Yoly Crowe RN (2/12/2020)   Next INR check:   3/25/2020   Priority:   High   Target end date:   Indefinite    Indications    Blood clot of vein in shoulder area  left [I82.602]  Factor 5 Leiden mutation  heterozygous (H) [D68.51]  Long-term (current) use of anticoagulants [Z79.01] [Z79.01]             Anticoagulation Episode Summary     INR check location:       Preferred lab:       Send INR reminders to:    INR    Comments:   INR goal changed per Dr Kitchen 3/21/18 has her own machine         Anticoagulation Care Providers     Provider Role Specialty Phone number    Valeri, Ling Rodriguez PA-C Responsible Physician Assistant [phone]            See the Encounter Report to view Anticoagulation  Flowsheet and Dosing Calendar (Go to Encounters tab in chart review, and find the Anticoagulation Therapy Visit)     No encounter, INR received via fax.  INR in range so no telephone call.  Patient is to call INR clinic if any changes affecting INR.       Renee Kevin RN

## 2020-03-18 ENCOUNTER — ANTICOAGULATION THERAPY VISIT (OUTPATIENT)
Dept: ANTICOAGULATION | Facility: OTHER | Age: 50
End: 2020-03-18
Payer: COMMERCIAL

## 2020-03-18 ENCOUNTER — TRANSFERRED RECORDS (OUTPATIENT)
Dept: HEALTH INFORMATION MANAGEMENT | Facility: OTHER | Age: 50
End: 2020-03-18

## 2020-03-18 DIAGNOSIS — Z79.01 LONG TERM CURRENT USE OF ANTICOAGULANT THERAPY: ICD-10-CM

## 2020-03-18 DIAGNOSIS — D68.51 FACTOR 5 LEIDEN MUTATION, HETEROZYGOUS (H): ICD-10-CM

## 2020-03-18 DIAGNOSIS — I82.602 BLOOD CLOT OF VEIN IN SHOULDER AREA, LEFT: ICD-10-CM

## 2020-03-18 LAB — INR PPP: 3 (ref 0.9–1.1)

## 2020-03-18 NOTE — PROGRESS NOTES
ANTICOAGULATION FOLLOW-UP CLINIC VISIT    Patient Name:  Daniela Ladd  Date:  3/18/2020  Contact Type:  no call needed patient to continue same dose    SUBJECTIVE:  Patient Findings         Clinical Outcomes     Negatives:   Major bleeding event, Thromboembolic event, Anticoagulation-related hospital admission, Anticoagulation-related ED visit, Anticoagulation-related fatality           OBJECTIVE    INR   Date Value Ref Range Status   03/18/2020 3.0 (A) 0.90 - 1.10 Final       ASSESSMENT / PLAN  INR assessment THER    Recheck INR In: 1 WEEK    INR Location Home INR      Anticoagulation Summary  As of 3/18/2020    INR goal:   2.5-3.5   TTR:   87.5 % (1 y)   INR used for dosing:   3.0 (3/18/2020)   Warfarin maintenance plan:   4 mg (2 mg x 2) every Wed; 8 mg (2 mg x 4) all other days   Full warfarin instructions:   4 mg every Wed; 8 mg all other days   Weekly warfarin total:   52 mg   No change documented:   Yoly Crowe RN   Plan last modified:   Yoly Crowe RN (2/12/2020)   Next INR check:   3/25/2020   Priority:   High   Target end date:   Indefinite    Indications    Blood clot of vein in shoulder area  left [I82.602]  Factor 5 Leiden mutation  heterozygous (H) [D68.51]  Long-term (current) use of anticoagulants [Z79.01] [Z79.01]             Anticoagulation Episode Summary     INR check location:       Preferred lab:       Send INR reminders to:    INR    Comments:   INR goal changed per Dr Kitchen 3/21/18 has her own machine         Anticoagulation Care Providers     Provider Role Specialty Phone number    Ling Trammell PA-C Responsible Physician Assistant [phone]            See the Encounter Report to view Anticoagulation Flowsheet and Dosing Calendar (Go to Encounters tab in chart review, and find the Anticoagulation Therapy Visit)        Yoly Crowe RN

## 2020-03-25 ENCOUNTER — ANTICOAGULATION THERAPY VISIT (OUTPATIENT)
Dept: ANTICOAGULATION | Facility: OTHER | Age: 50
End: 2020-03-25
Payer: COMMERCIAL

## 2020-03-25 ENCOUNTER — TRANSFERRED RECORDS (OUTPATIENT)
Dept: HEALTH INFORMATION MANAGEMENT | Facility: OTHER | Age: 50
End: 2020-03-25

## 2020-03-25 DIAGNOSIS — I82.602 BLOOD CLOT OF VEIN IN SHOULDER AREA, LEFT: Primary | ICD-10-CM

## 2020-03-25 DIAGNOSIS — Z79.01 LONG TERM CURRENT USE OF ANTICOAGULANT THERAPY: ICD-10-CM

## 2020-03-25 DIAGNOSIS — D68.51 FACTOR 5 LEIDEN MUTATION, HETEROZYGOUS (H): ICD-10-CM

## 2020-03-25 LAB — INR PPP: 2.9 (ref 0.9–1.1)

## 2020-03-25 NOTE — PROGRESS NOTES
ANTICOAGULATION FOLLOW-UP CLINIC VISIT    Patient Name:  Daniela Ladd  Date:  3/25/2020  Contact Type:  no call needed patient to continue same dose    SUBJECTIVE:  Patient Findings         Clinical Outcomes     Negatives:   Major bleeding event, Thromboembolic event, Anticoagulation-related hospital admission, Anticoagulation-related ED visit, Anticoagulation-related fatality           OBJECTIVE    INR   Date Value Ref Range Status   2020 2.9 (A) 0.90 - 1.10 Final       ASSESSMENT / PLAN  INR assessment THER    Recheck INR In: 1 WEEK    INR Location Home INR      Anticoagulation Summary  As of 3/25/2020    INR goal:   2.5-3.5   TTR:   87.5 % (1 y)   INR used for dosin.9 (3/25/2020)   Warfarin maintenance plan:   4 mg (2 mg x 2) every Wed; 8 mg (2 mg x 4) all other days   Full warfarin instructions:   4 mg every Wed; 8 mg all other days   Weekly warfarin total:   52 mg   No change documented:   Yoly Crowe RN   Plan last modified:   Yoly Crowe RN (2020)   Next INR check:   2020   Priority:   High   Target end date:   Indefinite    Indications    Blood clot of vein in shoulder area  left [I82.602]  Factor 5 Leiden mutation  heterozygous (H) [D68.51]  Long-term (current) use of anticoagulants [Z79.01] [Z79.01]             Anticoagulation Episode Summary     INR check location:       Preferred lab:       Send INR reminders to:    INR    Comments:   INR goal changed per Dr Kitchen 3/21/18 has her own machine         Anticoagulation Care Providers     Provider Role Specialty Phone number    Ling Trammell PA-C Responsible Physician Assistant [phone]            See the Encounter Report to view Anticoagulation Flowsheet and Dosing Calendar (Go to Encounters tab in chart review, and find the Anticoagulation Therapy Visit)        Yoly Crowe RN

## 2020-04-01 ENCOUNTER — ANTICOAGULATION THERAPY VISIT (OUTPATIENT)
Dept: ANTICOAGULATION | Facility: OTHER | Age: 50
End: 2020-04-01
Attending: PHYSICIAN ASSISTANT
Payer: COMMERCIAL

## 2020-04-01 ENCOUNTER — TRANSFERRED RECORDS (OUTPATIENT)
Dept: HEALTH INFORMATION MANAGEMENT | Facility: OTHER | Age: 50
End: 2020-04-01

## 2020-04-01 DIAGNOSIS — D68.51 FACTOR 5 LEIDEN MUTATION, HETEROZYGOUS (H): ICD-10-CM

## 2020-04-01 DIAGNOSIS — I82.602 BLOOD CLOT OF VEIN IN SHOULDER AREA, LEFT: ICD-10-CM

## 2020-04-01 DIAGNOSIS — Z79.01 LONG TERM CURRENT USE OF ANTICOAGULANT THERAPY: ICD-10-CM

## 2020-04-01 LAB — INR PPP: 3 (ref 0.9–1.1)

## 2020-04-01 NOTE — PROGRESS NOTES
ANTICOAGULATION FOLLOW-UP CLINIC VISIT    Patient Name:  Daniela Ladd  Date:  4/1/2020  Contact Type:  no call needed patient to continue same dose    SUBJECTIVE:  Patient Findings         Clinical Outcomes     Negatives:   Major bleeding event, Thromboembolic event, Anticoagulation-related hospital admission, Anticoagulation-related ED visit, Anticoagulation-related fatality           OBJECTIVE    INR   Date Value Ref Range Status   04/01/2020 3.0 (A) 0.90 - 1.10 Final       ASSESSMENT / PLAN  INR assessment THER    Recheck INR In: 2 WEEKS    INR Location Home INR      Anticoagulation Summary  As of 4/1/2020    INR goal:   2.5-3.5   TTR:   87.5 % (1 y)   INR used for dosing:   3.0 (4/1/2020)   Warfarin maintenance plan:   4 mg (2 mg x 2) every Wed; 8 mg (2 mg x 4) all other days   Full warfarin instructions:   4 mg every Wed; 8 mg all other days   Weekly warfarin total:   52 mg   No change documented:   Yoly Crowe RN   Plan last modified:   Yoly Crowe RN (2/12/2020)   Next INR check:   4/8/2020   Priority:   High   Target end date:   Indefinite    Indications    Blood clot of vein in shoulder area  left [I82.602]  Factor 5 Leiden mutation  heterozygous (H) [D68.51]  Long-term (current) use of anticoagulants [Z79.01] [Z79.01]             Anticoagulation Episode Summary     INR check location:       Preferred lab:       Send INR reminders to:    INR    Comments:   INR goal changed per Dr Kitchen 3/21/18 has her own machine         Anticoagulation Care Providers     Provider Role Specialty Phone number    Ling Trammell PA-C Referring Physician Assistant [phone]            See the Encounter Report to view Anticoagulation Flowsheet and Dosing Calendar (Go to Encounters tab in chart review, and find the Anticoagulation Therapy Visit)        Yoly Crowe RN

## 2020-04-08 ENCOUNTER — ANTICOAGULATION THERAPY VISIT (OUTPATIENT)
Dept: ANTICOAGULATION | Facility: OTHER | Age: 50
End: 2020-04-08
Attending: PHYSICIAN ASSISTANT
Payer: COMMERCIAL

## 2020-04-08 DIAGNOSIS — Z79.01 LONG TERM CURRENT USE OF ANTICOAGULANT THERAPY: ICD-10-CM

## 2020-04-08 DIAGNOSIS — I82.602 BLOOD CLOT OF VEIN IN SHOULDER AREA, LEFT: ICD-10-CM

## 2020-04-08 DIAGNOSIS — D68.51 FACTOR 5 LEIDEN MUTATION, HETEROZYGOUS (H): ICD-10-CM

## 2020-04-08 LAB — INR PPP: 2.7 (ref 0.9–1.1)

## 2020-04-08 NOTE — PROGRESS NOTES
ANTICOAGULATION FOLLOW-UP CLINIC VISIT    Patient Name:  Daniela Ladd  Date:  2020  Contact Type:  Telephone/ spoke with patient regarding dosing    SUBJECTIVE:  Patient Findings         Clinical Outcomes     Negatives:   Major bleeding event, Thromboembolic event, Anticoagulation-related hospital admission, Anticoagulation-related ED visit, Anticoagulation-related fatality           OBJECTIVE    INR   Date Value Ref Range Status   2020 2.7 (A) 0.90 - 1.10 Final       ASSESSMENT / PLAN  INR assessment THER    Recheck INR In: 1 WEEK    INR Location Home INR      Anticoagulation Summary  As of 2020    INR goal:   2.5-3.5   TTR:   87.5 % (1 y)   INR used for dosin.7 (2020)   Warfarin maintenance plan:   4 mg (2 mg x 2) every Wed; 8 mg (2 mg x 4) all other days   Full warfarin instructions:   4 mg every Wed; 8 mg all other days   Weekly warfarin total:   52 mg   No change documented:   Yoly Crowe RN   Plan last modified:   Yoly Crowe RN (2020)   Next INR check:   4/15/2020   Priority:   High   Target end date:   Indefinite    Indications    Blood clot of vein in shoulder area  left [I82.602]  Factor 5 Leiden mutation  heterozygous (H) [D68.51]  Long-term (current) use of anticoagulants [Z79.01] [Z79.01]             Anticoagulation Episode Summary     INR check location:       Preferred lab:       Send INR reminders to:    INR    Comments:   INR goal changed per Dr Kitchen 3/21/18 has her own machine         Anticoagulation Care Providers     Provider Role Specialty Phone number    Ling Trammell PA-C Referring Physician Assistant [phone]            See the Encounter Report to view Anticoagulation Flowsheet and Dosing Calendar (Go to Encounters tab in chart review, and find the Anticoagulation Therapy Visit)        Yoly Crowe RN

## 2020-04-12 ENCOUNTER — TRANSFERRED RECORDS (OUTPATIENT)
Dept: HEALTH INFORMATION MANAGEMENT | Facility: OTHER | Age: 50
End: 2020-04-12

## 2020-04-12 LAB — INR PPP: 2.6 (ref 0.9–1.1)

## 2020-04-13 ENCOUNTER — ANTICOAGULATION THERAPY VISIT (OUTPATIENT)
Dept: ANTICOAGULATION | Facility: OTHER | Age: 50
End: 2020-04-13
Attending: PHYSICIAN ASSISTANT
Payer: COMMERCIAL

## 2020-04-13 DIAGNOSIS — I82.602 BLOOD CLOT OF VEIN IN SHOULDER AREA, LEFT: ICD-10-CM

## 2020-04-13 DIAGNOSIS — D68.51 FACTOR 5 LEIDEN MUTATION, HETEROZYGOUS (H): ICD-10-CM

## 2020-04-13 DIAGNOSIS — Z79.01 LONG TERM CURRENT USE OF ANTICOAGULANT THERAPY: ICD-10-CM

## 2020-04-13 NOTE — PROGRESS NOTES
ANTICOAGULATION FOLLOW-UP CLINIC VISIT    Patient Name:  Daniela Ladd  Date:  2020  Contact Type:  no call needed patient to continue same dose    SUBJECTIVE:  Patient Findings         Clinical Outcomes     Negatives:   Major bleeding event, Thromboembolic event, Anticoagulation-related hospital admission, Anticoagulation-related ED visit, Anticoagulation-related fatality           OBJECTIVE    INR   Date Value Ref Range Status   2020 2.6 (A) 0.90 - 1.10 Final       ASSESSMENT / PLAN  INR assessment THER    Recheck INR In: 1 WEEK    INR Location Home INR      Anticoagulation Summary  As of 2020    INR goal:   2.5-3.5   TTR:   87.5 % (1 y)   INR used for dosin.6 (2020)   Warfarin maintenance plan:   4 mg (2 mg x 2) every Wed; 8 mg (2 mg x 4) all other days   Full warfarin instructions:   4 mg every Wed; 8 mg all other days   Weekly warfarin total:   52 mg   No change documented:   Yoly Crowe RN   Plan last modified:   Yoly Crowe RN (2020)   Next INR check:   2020   Priority:   High   Target end date:   Indefinite    Indications    Blood clot of vein in shoulder area  left [I82.602]  Factor 5 Leiden mutation  heterozygous (H) [D68.51]  Long-term (current) use of anticoagulants [Z79.01] [Z79.01]             Anticoagulation Episode Summary     INR check location:       Preferred lab:       Send INR reminders to:    INR    Comments:   INR goal changed per Dr Kitchen 3/21/18 has her own machine         Anticoagulation Care Providers     Provider Role Specialty Phone number    Ling Trammell PA-C Referring Physician Assistant [phone]            See the Encounter Report to view Anticoagulation Flowsheet and Dosing Calendar (Go to Encounters tab in chart review, and find the Anticoagulation Therapy Visit)        Yoly Crowe RN

## 2020-04-15 ENCOUNTER — ANTICOAGULATION THERAPY VISIT (OUTPATIENT)
Dept: ANTICOAGULATION | Facility: OTHER | Age: 50
End: 2020-04-15
Attending: PHYSICIAN ASSISTANT
Payer: COMMERCIAL

## 2020-04-15 ENCOUNTER — TRANSFERRED RECORDS (OUTPATIENT)
Dept: HEALTH INFORMATION MANAGEMENT | Facility: OTHER | Age: 50
End: 2020-04-15

## 2020-04-15 DIAGNOSIS — I82.602 BLOOD CLOT OF VEIN IN SHOULDER AREA, LEFT: ICD-10-CM

## 2020-04-15 DIAGNOSIS — Z79.01 LONG TERM CURRENT USE OF ANTICOAGULANT THERAPY: ICD-10-CM

## 2020-04-15 DIAGNOSIS — D68.51 FACTOR 5 LEIDEN MUTATION, HETEROZYGOUS (H): ICD-10-CM

## 2020-04-15 LAB — INR PPP: 2.7 (ref 0.9–1.1)

## 2020-04-15 NOTE — PROGRESS NOTES
ANTICOAGULATION FOLLOW-UP CLINIC VISIT    Patient Name:  Daniela Ladd  Date:  4/15/2020  Contact Type:  no call needed patient to continue smae dose    SUBJECTIVE:  Patient Findings         Clinical Outcomes     Negatives:   Major bleeding event, Thromboembolic event, Anticoagulation-related hospital admission, Anticoagulation-related ED visit, Anticoagulation-related fatality           OBJECTIVE    INR   Date Value Ref Range Status   04/15/2020 2.7 (A) 0.90 - 1.10 Final       ASSESSMENT / PLAN  INR assessment THER    Recheck INR In: 1 WEEK    INR Location Home INR      Anticoagulation Summary  As of 4/15/2020    INR goal:   2.5-3.5   TTR:   87.5 % (1 y)   INR used for dosin.7 (4/15/2020)   Warfarin maintenance plan:   4 mg (2 mg x 2) every Wed; 8 mg (2 mg x 4) all other days   Full warfarin instructions:   4 mg every Wed; 8 mg all other days   Weekly warfarin total:   52 mg   No change documented:   Yoly Crowe RN   Plan last modified:   Yoly Crowe RN (2020)   Next INR check:   2020   Priority:   High   Target end date:   Indefinite    Indications    Blood clot of vein in shoulder area  left [I82.602]  Factor 5 Leiden mutation  heterozygous (H) [D68.51]  Long-term (current) use of anticoagulants [Z79.01] [Z79.01]             Anticoagulation Episode Summary     INR check location:       Preferred lab:       Send INR reminders to:    INR    Comments:   INR goal changed per Dr Kitchen 3/21/18 has her own machine         Anticoagulation Care Providers     Provider Role Specialty Phone number    Ling Trammell PA-C Referring Physician Assistant [phone]            See the Encounter Report to view Anticoagulation Flowsheet and Dosing Calendar (Go to Encounters tab in chart review, and find the Anticoagulation Therapy Visit)        Yoly Crowe RN

## 2020-04-22 ENCOUNTER — ANTICOAGULATION THERAPY VISIT (OUTPATIENT)
Dept: ANTICOAGULATION | Facility: OTHER | Age: 50
End: 2020-04-22
Attending: PHYSICIAN ASSISTANT
Payer: COMMERCIAL

## 2020-04-22 ENCOUNTER — TRANSFERRED RECORDS (OUTPATIENT)
Dept: HEALTH INFORMATION MANAGEMENT | Facility: OTHER | Age: 50
End: 2020-04-22

## 2020-04-22 DIAGNOSIS — D68.51 FACTOR 5 LEIDEN MUTATION, HETEROZYGOUS (H): ICD-10-CM

## 2020-04-22 DIAGNOSIS — Z79.01 LONG TERM CURRENT USE OF ANTICOAGULANT THERAPY: ICD-10-CM

## 2020-04-22 DIAGNOSIS — I82.602 BLOOD CLOT OF VEIN IN SHOULDER AREA, LEFT: ICD-10-CM

## 2020-04-22 LAB — INR PPP: 2.6 (ref 0.9–1.1)

## 2020-04-22 NOTE — PROGRESS NOTES
ANTICOAGULATION FOLLOW-UP CLINIC VISIT    Patient Name:  Daniela Ladd  Date:  2020  Contact Type:  no call needed patient to continue same dose    SUBJECTIVE:  Patient Findings         Clinical Outcomes     Negatives:   Major bleeding event, Thromboembolic event, Anticoagulation-related hospital admission, Anticoagulation-related ED visit, Anticoagulation-related fatality           OBJECTIVE    INR   Date Value Ref Range Status   2020 2.6 (A) 0.90 - 1.10 Final       ASSESSMENT / PLAN  INR assessment THER    Recheck INR In: 1 WEEK    INR Location Home INR      Anticoagulation Summary  As of 2020    INR goal:   2.5-3.5   TTR:   87.5 % (1 y)   INR used for dosin.6 (2020)   Warfarin maintenance plan:   4 mg (2 mg x 2) every Wed; 8 mg (2 mg x 4) all other days   Full warfarin instructions:   4 mg every Wed; 8 mg all other days   Weekly warfarin total:   52 mg   No change documented:   Yoly Crowe RN   Plan last modified:   Yoly Crowe RN (2020)   Next INR check:   2020   Priority:   High   Target end date:   Indefinite    Indications    Blood clot of vein in shoulder area  left [I82.602]  Factor 5 Leiden mutation  heterozygous (H) [D68.51]  Long-term (current) use of anticoagulants [Z79.01] [Z79.01]             Anticoagulation Episode Summary     INR check location:       Preferred lab:       Send INR reminders to:    INR    Comments:   INR goal changed per Dr Kitchen 3/21/18 has her own machine         Anticoagulation Care Providers     Provider Role Specialty Phone number    Ling Trammell PA-C Referring Physician Assistant [phone]            See the Encounter Report to view Anticoagulation Flowsheet and Dosing Calendar (Go to Encounters tab in chart review, and find the Anticoagulation Therapy Visit)        Yoly Crowe RN

## 2020-05-06 ENCOUNTER — TRANSFERRED RECORDS (OUTPATIENT)
Dept: HEALTH INFORMATION MANAGEMENT | Facility: OTHER | Age: 50
End: 2020-05-06

## 2020-05-06 ENCOUNTER — ANTICOAGULATION THERAPY VISIT (OUTPATIENT)
Dept: ANTICOAGULATION | Facility: OTHER | Age: 50
End: 2020-05-06
Attending: PHYSICIAN ASSISTANT
Payer: COMMERCIAL

## 2020-05-06 DIAGNOSIS — Z79.01 LONG TERM CURRENT USE OF ANTICOAGULANT THERAPY: ICD-10-CM

## 2020-05-06 DIAGNOSIS — D68.51 FACTOR 5 LEIDEN MUTATION, HETEROZYGOUS (H): ICD-10-CM

## 2020-05-06 DIAGNOSIS — I82.602 BLOOD CLOT OF VEIN IN SHOULDER AREA, LEFT: ICD-10-CM

## 2020-05-06 LAB — INR PPP: 3 (ref 0.9–1.1)

## 2020-05-06 NOTE — PROGRESS NOTES
ANTICOAGULATION FOLLOW-UP CLINIC VISIT    Patient Name:  Daniela Ladd  Date:  5/6/2020  Contact Type:  no call needed patient to continue same dosing    SUBJECTIVE:  Patient Findings         Clinical Outcomes     Negatives:   Major bleeding event, Thromboembolic event, Anticoagulation-related hospital admission, Anticoagulation-related ED visit, Anticoagulation-related fatality           OBJECTIVE    INR   Date Value Ref Range Status   05/06/2020 3.0 (A) 0.90 - 1.10 Final       ASSESSMENT / PLAN  INR assessment THER    Recheck INR In: 1 WEEK    INR Location Home INR      Anticoagulation Summary  As of 5/6/2020    INR goal:   2.5-3.5   TTR:   87.5 % (1 y)   INR used for dosing:   3.0 (5/6/2020)   Warfarin maintenance plan:   10 mg (2 mg x 5) every Thu; 8 mg (2 mg x 4) all other days   Full warfarin instructions:   10 mg every Thu; 8 mg all other days   Weekly warfarin total:   58 mg   No change documented:   Yoly Crowe RN   Plan last modified:   Yoly Crowe RN (4/23/2020)   Next INR check:   5/13/2020   Priority:   High   Target end date:   Indefinite    Indications    Blood clot of vein in shoulder area  left [I82.602]  Factor 5 Leiden mutation  heterozygous (H) [D68.51]  Long-term (current) use of anticoagulants [Z79.01] [Z79.01]             Anticoagulation Episode Summary     INR check location:       Preferred lab:       Send INR reminders to:   [facility]    Comments:   INR goal changed per Dr Kitchen 3/21/18 has her own machine         Anticoagulation Care Providers     Provider Role Specialty Phone number    Ling Trammell PA-C Referring Physician Assistant [phone]            See the Encounter Report to view Anticoagulation Flowsheet and Dosing Calendar (Go to Encounters tab in chart review, and find the Anticoagulation Therapy Visit)        Yoly Crowe RN

## 2020-05-13 ENCOUNTER — ANTICOAGULATION THERAPY VISIT (OUTPATIENT)
Dept: ANTICOAGULATION | Facility: OTHER | Age: 50
End: 2020-05-13
Attending: PHYSICIAN ASSISTANT
Payer: COMMERCIAL

## 2020-05-13 DIAGNOSIS — Z79.01 LONG TERM CURRENT USE OF ANTICOAGULANT THERAPY: ICD-10-CM

## 2020-05-13 DIAGNOSIS — D68.51 FACTOR 5 LEIDEN MUTATION, HETEROZYGOUS (H): ICD-10-CM

## 2020-05-13 DIAGNOSIS — I82.602 BLOOD CLOT OF VEIN IN SHOULDER AREA, LEFT: ICD-10-CM

## 2020-05-13 LAB — INR PPP: 3.4 (ref 0.9–1.1)

## 2020-05-13 NOTE — PROGRESS NOTES
ANTICOAGULATION FOLLOW-UP CLINIC VISIT    Patient Name:  Daniela Ladd  Date:  5/13/2020  Contact Type:  No call needed patient to continue same dose    SUBJECTIVE:  Patient Findings         Clinical Outcomes     Negatives:   Major bleeding event, Thromboembolic event, Anticoagulation-related hospital admission, Anticoagulation-related ED visit, Anticoagulation-related fatality           OBJECTIVE    Recent labs: (last 7 days)     05/13/20   INR 3.4*       ASSESSMENT / PLAN  INR assessment THER    Recheck INR In: 1 WEEK    INR Location Home INR      Anticoagulation Summary  As of 5/13/2020    INR goal:   2.5-3.5   TTR:   87.5 % (1 y)   INR used for dosing:   3.4 (5/13/2020)   Warfarin maintenance plan:   10 mg (2 mg x 5) every Thu; 8 mg (2 mg x 4) all other days   Full warfarin instructions:   10 mg every Thu; 8 mg all other days   Weekly warfarin total:   58 mg   No change documented:   Yoly Crowe RN   Plan last modified:   Yoly Crowe RN (4/23/2020)   Next INR check:   5/20/2020   Priority:   High   Target end date:   Indefinite    Indications    Blood clot of vein in shoulder area  left [I82.602]  Factor 5 Leiden mutation  heterozygous (H) [D68.51]  Long-term (current) use of anticoagulants [Z79.01] [Z79.01]             Anticoagulation Episode Summary     INR check location:       Preferred lab:       Send INR reminders to:   [facility]    Comments:   INR goal changed per Dr Kitchen 3/21/18 has her own machine         Anticoagulation Care Providers     Provider Role Specialty Phone number    Ling Trammell PA-C Referring Physician Assistant [phone]            See the Encounter Report to view Anticoagulation Flowsheet and Dosing Calendar (Go to Encounters tab in chart review, and find the Anticoagulation Therapy Visit)        Yoly Crowe RN

## 2020-05-14 ENCOUNTER — TRANSFERRED RECORDS (OUTPATIENT)
Dept: HEALTH INFORMATION MANAGEMENT | Facility: OTHER | Age: 50
End: 2020-05-14

## 2020-05-20 ENCOUNTER — ANTICOAGULATION THERAPY VISIT (OUTPATIENT)
Dept: ANTICOAGULATION | Facility: OTHER | Age: 50
End: 2020-05-20
Attending: PHYSICIAN ASSISTANT
Payer: COMMERCIAL

## 2020-05-20 DIAGNOSIS — Z79.01 LONG TERM CURRENT USE OF ANTICOAGULANT THERAPY: ICD-10-CM

## 2020-05-20 DIAGNOSIS — D68.51 FACTOR 5 LEIDEN MUTATION, HETEROZYGOUS (H): ICD-10-CM

## 2020-05-20 DIAGNOSIS — I82.602 BLOOD CLOT OF VEIN IN SHOULDER AREA, LEFT: ICD-10-CM

## 2020-05-20 LAB — INR PPP: 3.1 (ref 0.9–1.1)

## 2020-05-20 NOTE — PROGRESS NOTES
ANTICOAGULATION FOLLOW-UP CLINIC VISIT    Patient Name:  Daniela Ladd  Date:  5/20/2020  Contact Type:  no call needed ok to continue same dose    SUBJECTIVE:  Patient Findings         Clinical Outcomes     Negatives:   Major bleeding event, Thromboembolic event, Anticoagulation-related hospital admission, Anticoagulation-related ED visit, Anticoagulation-related fatality           OBJECTIVE    Recent labs: (last 7 days)     05/20/20   INR 3.1*       ASSESSMENT / PLAN  INR assessment THER    Recheck INR In: 1 WEEK    INR Location Home INR      Anticoagulation Summary  As of 5/20/2020    INR goal:   2.5-3.5   TTR:   87.5 % (1 y)   INR used for dosing:   3.1 (5/20/2020)   Warfarin maintenance plan:   10 mg (2 mg x 5) every Thu; 8 mg (2 mg x 4) all other days   Full warfarin instructions:   10 mg every Thu; 8 mg all other days   Weekly warfarin total:   58 mg   No change documented:   Yoly Crowe RN   Plan last modified:   Yoly Crowe RN (4/23/2020)   Next INR check:   5/27/2020   Priority:   High   Target end date:   Indefinite    Indications    Blood clot of vein in shoulder area  left [I82.602]  Factor 5 Leiden mutation  heterozygous (H) [D68.51]  Long-term (current) use of anticoagulants [Z79.01] [Z79.01]             Anticoagulation Episode Summary     INR check location:       Preferred lab:       Send INR reminders to:   [facility]    Comments:   INR goal changed per Dr Kitchen 3/21/18 has her own machine         Anticoagulation Care Providers     Provider Role Specialty Phone number    Ling Trammell PA-C Referring Physician Assistant [phone]            See the Encounter Report to view Anticoagulation Flowsheet and Dosing Calendar (Go to Encounters tab in chart review, and find the Anticoagulation Therapy Visit)        Yoly Crowe RN                 
Strong peripheral pulses

## 2020-05-21 ENCOUNTER — TRANSFERRED RECORDS (OUTPATIENT)
Dept: HEALTH INFORMATION MANAGEMENT | Facility: OTHER | Age: 50
End: 2020-05-21

## 2020-05-27 ENCOUNTER — ANTICOAGULATION THERAPY VISIT (OUTPATIENT)
Dept: ANTICOAGULATION | Facility: OTHER | Age: 50
End: 2020-05-27
Attending: PHYSICIAN ASSISTANT
Payer: COMMERCIAL

## 2020-05-27 DIAGNOSIS — D68.51 FACTOR 5 LEIDEN MUTATION, HETEROZYGOUS (H): ICD-10-CM

## 2020-05-27 DIAGNOSIS — I82.602 BLOOD CLOT OF VEIN IN SHOULDER AREA, LEFT: ICD-10-CM

## 2020-05-27 DIAGNOSIS — Z79.01 LONG TERM CURRENT USE OF ANTICOAGULANT THERAPY: ICD-10-CM

## 2020-05-27 LAB — INR PPP: 3 (ref 0.9–1.1)

## 2020-05-27 NOTE — PROGRESS NOTES
ANTICOAGULATION FOLLOW-UP CLINIC VISIT    Patient Name:  Daniela Ladd  Date:  5/27/2020  Contact Type:  no call needed patient to continue same dose    SUBJECTIVE:  Patient Findings         Clinical Outcomes     Negatives:   Major bleeding event, Thromboembolic event, Anticoagulation-related hospital admission, Anticoagulation-related ED visit, Anticoagulation-related fatality           OBJECTIVE    Recent labs: (last 7 days)     05/27/20   INR 3.0*       ASSESSMENT / PLAN  INR assessment THER    Recheck INR In: 1 WEEK    INR Location Clinic      Anticoagulation Summary  As of 5/27/2020    INR goal:   2.5-3.5   TTR:   87.5 % (1 y)   INR used for dosing:   3.0 (5/27/2020)   Warfarin maintenance plan:   10 mg (2 mg x 5) every Thu; 8 mg (2 mg x 4) all other days   Full warfarin instructions:   10 mg every Thu; 8 mg all other days   Weekly warfarin total:   58 mg   No change documented:   Yoly Crowe RN   Plan last modified:   Yoly Crowe RN (4/23/2020)   Next INR check:   6/3/2020   Priority:   High   Target end date:   Indefinite    Indications    Blood clot of vein in shoulder area  left [I82.602]  Factor 5 Leiden mutation  heterozygous (H) [D68.51]  Long-term (current) use of anticoagulants [Z79.01] [Z79.01]             Anticoagulation Episode Summary     INR check location:       Preferred lab:       Send INR reminders to:   [facility]    Comments:   INR goal changed per Dr Kitchen 3/21/18 has her own machine         Anticoagulation Care Providers     Provider Role Specialty Phone number    Ling Trammell PA-C Referring Physician Assistant [phone]            See the Encounter Report to view Anticoagulation Flowsheet and Dosing Calendar (Go to Encounters tab in chart review, and find the Anticoagulation Therapy Visit)        Yoly Crowe RN

## 2020-05-28 ENCOUNTER — TRANSFERRED RECORDS (OUTPATIENT)
Dept: HEALTH INFORMATION MANAGEMENT | Facility: OTHER | Age: 50
End: 2020-05-28

## 2020-06-01 DIAGNOSIS — I82.622 ARM DVT (DEEP VENOUS THROMBOEMBOLISM), ACUTE, LEFT (H): ICD-10-CM

## 2020-06-01 DIAGNOSIS — Z79.01 LONG TERM CURRENT USE OF ANTICOAGULANT THERAPY: ICD-10-CM

## 2020-06-01 RX ORDER — WARFARIN SODIUM 2 MG/1
TABLET ORAL
Qty: 360 TABLET | Refills: 1 | Status: SHIPPED | OUTPATIENT
Start: 2020-06-01 | End: 2020-07-29

## 2020-06-01 NOTE — TELEPHONE ENCOUNTER
Last INR 3.0:  05/27/20  Prescription approved per [facility] Refill Protocol.

## 2020-06-03 ENCOUNTER — ANTICOAGULATION THERAPY VISIT (OUTPATIENT)
Dept: ANTICOAGULATION | Facility: OTHER | Age: 50
End: 2020-06-03
Attending: PHYSICIAN ASSISTANT
Payer: COMMERCIAL

## 2020-06-03 DIAGNOSIS — D68.51 FACTOR 5 LEIDEN MUTATION, HETEROZYGOUS (H): ICD-10-CM

## 2020-06-03 DIAGNOSIS — I82.602 BLOOD CLOT OF VEIN IN SHOULDER AREA, LEFT: ICD-10-CM

## 2020-06-03 DIAGNOSIS — Z79.01 LONG TERM CURRENT USE OF ANTICOAGULANT THERAPY: ICD-10-CM

## 2020-06-03 LAB — INR PPP: 2.7 (ref 0.9–1.1)

## 2020-06-03 NOTE — PROGRESS NOTES
ANTICOAGULATION FOLLOW-UP CLINIC VISIT    Patient Name:  Daniela Ladd  Date:  6/3/2020  Contact Type:  no call needed patient to continue same dose    SUBJECTIVE:  Patient Findings         Clinical Outcomes     Negatives:   Major bleeding event, Thromboembolic event, Anticoagulation-related hospital admission, Anticoagulation-related ED visit, Anticoagulation-related fatality           OBJECTIVE    Recent labs: (last 7 days)     20   INR 2.7*       ASSESSMENT / PLAN  INR assessment THER    Recheck INR In: 1 WEEK    INR Location Home INR      Anticoagulation Summary  As of 6/3/2020    INR goal:   2.5-3.5   TTR:   87.5 % (1 y)   INR used for dosin.7 (6/3/2020)   Warfarin maintenance plan:   10 mg (2 mg x 5) every Thu; 8 mg (2 mg x 4) all other days   Full warfarin instructions:   10 mg every Thu; 8 mg all other days   Weekly warfarin total:   58 mg   No change documented:   Yoly Crowe RN   Plan last modified:   Yoly Crowe RN (2020)   Next INR check:      Priority:   High   Target end date:   Indefinite    Indications    Blood clot of vein in shoulder area  left [I82.602]  Factor 5 Leiden mutation  heterozygous (H) [D68.51]  Long-term (current) use of anticoagulants [Z79.01] [Z79.01]             Anticoagulation Episode Summary     INR check location:       Preferred lab:       Send INR reminders to:   [facility]    Comments:   INR goal changed per Dr Kitchen 3/21/18 has her own machine         Anticoagulation Care Providers     Provider Role Specialty Phone number    Ling Trammell PA-C Referring Physician Assistant [phone]            See the Encounter Report to view Anticoagulation Flowsheet and Dosing Calendar (Go to Encounters tab in chart review, and find the Anticoagulation Therapy Visit)        Yoly Crowe RN

## 2020-06-04 ENCOUNTER — TRANSFERRED RECORDS (OUTPATIENT)
Dept: HEALTH INFORMATION MANAGEMENT | Facility: OTHER | Age: 50
End: 2020-06-04

## 2020-06-09 DIAGNOSIS — I82.622 ARM DVT (DEEP VENOUS THROMBOEMBOLISM), ACUTE, LEFT (H): ICD-10-CM

## 2020-06-09 LAB
ALBUMIN SERPL-MCNC: 4.2 G/DL (ref 3.5–5.7)
ALP SERPL-CCNC: 50 U/L (ref 34–104)
ALT SERPL W P-5'-P-CCNC: 17 U/L (ref 7–52)
ANION GAP SERPL CALCULATED.3IONS-SCNC: 8 MMOL/L (ref 6–17)
AST SERPL W P-5'-P-CCNC: 15 U/L (ref 13–39)
BASOPHILS # BLD AUTO: 0.1 10E9/L (ref 0–0.2)
BASOPHILS NFR BLD AUTO: 0.6 %
BILIRUB SERPL-MCNC: 0.3 MG/DL (ref 0.3–1)
BUN SERPL-MCNC: 9 MG/DL (ref 7–25)
CALCIUM SERPL-MCNC: 8.7 MG/DL (ref 8.6–10.3)
CHLORIDE SERPL-SCNC: 107 MMOL/L (ref 98–107)
CO2 SERPL-SCNC: 22 MMOL/L (ref 21–31)
CREAT SERPL-MCNC: 0.73 MG/DL (ref 0.6–1.2)
D DIMER PPP DDU-MCNC: 414 NG/ML D-DU (ref 0–230)
DIFFERENTIAL METHOD BLD: NORMAL
EOSINOPHIL # BLD AUTO: 0.1 10E9/L (ref 0–0.7)
EOSINOPHIL NFR BLD AUTO: 1.8 %
ERYTHROCYTE [DISTWIDTH] IN BLOOD BY AUTOMATED COUNT: 13.2 % (ref 10–15)
GFR SERPL CREATININE-BSD FRML MDRD: 85 ML/MIN/{1.73_M2}
GLUCOSE SERPL-MCNC: 123 MG/DL (ref 70–105)
HCT VFR BLD AUTO: 45.2 % (ref 35–47)
HGB BLD-MCNC: 15.3 G/DL (ref 11.7–15.7)
IMM GRANULOCYTES # BLD: 0 10E9/L (ref 0–0.4)
IMM GRANULOCYTES NFR BLD: 0.1 %
LDH SERPL L TO P-CCNC: 203 U/L (ref 140–271)
LYMPHOCYTES # BLD AUTO: 3 10E9/L (ref 0.8–5.3)
LYMPHOCYTES NFR BLD AUTO: 38.3 %
MCH RBC QN AUTO: 30.4 PG (ref 26.5–33)
MCHC RBC AUTO-ENTMCNC: 33.8 G/DL (ref 31.5–36.5)
MCV RBC AUTO: 90 FL (ref 78–100)
MONOCYTES # BLD AUTO: 0.4 10E9/L (ref 0–1.3)
MONOCYTES NFR BLD AUTO: 5.2 %
NEUTROPHILS # BLD AUTO: 4.2 10E9/L (ref 1.6–8.3)
NEUTROPHILS NFR BLD AUTO: 54 %
PLATELET # BLD AUTO: 321 10E9/L (ref 150–450)
POTASSIUM SERPL-SCNC: 4.1 MMOL/L (ref 3.5–5.1)
PROT SERPL-MCNC: 7 G/DL (ref 6.4–8.9)
RBC # BLD AUTO: 5.03 10E12/L (ref 3.8–5.2)
SODIUM SERPL-SCNC: 137 MMOL/L (ref 134–144)
WBC # BLD AUTO: 7.8 10E9/L (ref 4–11)

## 2020-06-09 PROCEDURE — 36415 COLL VENOUS BLD VENIPUNCTURE: CPT | Mod: ZL | Performed by: NURSE PRACTITIONER

## 2020-06-09 PROCEDURE — 85379 FIBRIN DEGRADATION QUANT: CPT | Mod: ZL | Performed by: NURSE PRACTITIONER

## 2020-06-09 PROCEDURE — 80053 COMPREHEN METABOLIC PANEL: CPT | Mod: ZL | Performed by: NURSE PRACTITIONER

## 2020-06-09 PROCEDURE — 83615 LACTATE (LD) (LDH) ENZYME: CPT | Mod: ZL | Performed by: NURSE PRACTITIONER

## 2020-06-09 PROCEDURE — 85025 COMPLETE CBC W/AUTO DIFF WBC: CPT | Mod: ZL | Performed by: NURSE PRACTITIONER

## 2020-06-10 ENCOUNTER — ANTICOAGULATION THERAPY VISIT (OUTPATIENT)
Dept: ANTICOAGULATION | Facility: OTHER | Age: 50
End: 2020-06-10
Attending: PHYSICIAN ASSISTANT
Payer: COMMERCIAL

## 2020-06-10 DIAGNOSIS — Z79.01 LONG TERM CURRENT USE OF ANTICOAGULANT THERAPY: ICD-10-CM

## 2020-06-10 DIAGNOSIS — I82.602 BLOOD CLOT OF VEIN IN SHOULDER AREA, LEFT: ICD-10-CM

## 2020-06-10 DIAGNOSIS — D68.51 FACTOR 5 LEIDEN MUTATION, HETEROZYGOUS (H): ICD-10-CM

## 2020-06-10 LAB — INR PPP: 2.7 (ref 0.9–1.1)

## 2020-06-10 NOTE — PROGRESS NOTES
ANTICOAGULATION FOLLOW-UP CLINIC VISIT    Patient Name:  Daniela Ladd  Date:  6/10/2020  Contact Type:  no call needed patient to continue same dose    SUBJECTIVE:  Patient Findings         Clinical Outcomes     Negatives:   Major bleeding event, Thromboembolic event, Anticoagulation-related hospital admission, Anticoagulation-related ED visit, Anticoagulation-related fatality           OBJECTIVE    Recent labs: (last 7 days)     06/10/20   INR 2.7*       ASSESSMENT / PLAN  INR assessment THER    Recheck INR In: 1 WEEK    INR Location Home INR      Anticoagulation Summary  As of 6/10/2020    INR goal:   2.5-3.5   TTR:   87.5 % (1 y)   INR used for dosin.7 (6/10/2020)   Warfarin maintenance plan:   10 mg (2 mg x 5) every Thu; 8 mg (2 mg x 4) all other days   Full warfarin instructions:   10 mg every Thu; 8 mg all other days   Weekly warfarin total:   58 mg   No change documented:   Yoly Crowe RN   Plan last modified:   Yoly Crowe RN (2020)   Next INR check:   2020   Priority:   High   Target end date:   Indefinite    Indications    Blood clot of vein in shoulder area  left [I82.602]  Factor 5 Leiden mutation  heterozygous (H) [D68.51]  Long-term (current) use of anticoagulants [Z79.01] [Z79.01]             Anticoagulation Episode Summary     INR check location:       Preferred lab:       Send INR reminders to:   [facility]    Comments:   INR goal changed per Dr Kitchen 3/21/18 has her own machine         Anticoagulation Care Providers     Provider Role Specialty Phone number    Ling Trammell PA-C Referring Physician Assistant [phone]            See the Encounter Report to view Anticoagulation Flowsheet and Dosing Calendar (Go to Encounters tab in chart review, and find the Anticoagulation Therapy Visit)        Yoly Crowe RN

## 2020-06-16 DIAGNOSIS — I82.622 ARM DVT (DEEP VENOUS THROMBOEMBOLISM), ACUTE, LEFT (H): Primary | ICD-10-CM

## 2020-06-17 ENCOUNTER — TRANSFERRED RECORDS (OUTPATIENT)
Dept: HEALTH INFORMATION MANAGEMENT | Facility: OTHER | Age: 50
End: 2020-06-17

## 2020-06-17 ENCOUNTER — ANTICOAGULATION THERAPY VISIT (OUTPATIENT)
Dept: ANTICOAGULATION | Facility: OTHER | Age: 50
End: 2020-06-17
Attending: PHYSICIAN ASSISTANT
Payer: COMMERCIAL

## 2020-06-17 DIAGNOSIS — Z79.01 LONG TERM CURRENT USE OF ANTICOAGULANT THERAPY: ICD-10-CM

## 2020-06-17 DIAGNOSIS — I82.602 BLOOD CLOT OF VEIN IN SHOULDER AREA, LEFT: ICD-10-CM

## 2020-06-17 DIAGNOSIS — D68.51 FACTOR 5 LEIDEN MUTATION, HETEROZYGOUS (H): ICD-10-CM

## 2020-06-17 LAB — INR PPP: 2.8 (ref 0.9–1.1)

## 2020-06-17 NOTE — PROGRESS NOTES
ANTICOAGULATION FOLLOW-UP CLINIC VISIT    Patient Name:  Dnaiela Ladd  Date:  2020  Contact Type:  no call needed patient to continue same dose    SUBJECTIVE:  Patient Findings         Clinical Outcomes     Negatives:   Major bleeding event, Thromboembolic event, Anticoagulation-related hospital admission, Anticoagulation-related ED visit, Anticoagulation-related fatality           OBJECTIVE    Recent labs: (last 7 days)     20   INR 2.8*       ASSESSMENT / PLAN  INR assessment THER    Recheck INR In: 1 WEEK    INR Location Home INR      Anticoagulation Summary  As of 2020    INR goal:   2.5-3.5   TTR:   87.5 % (1 y)   INR used for dosin.8 (2020)   Warfarin maintenance plan:   10 mg (2 mg x 5) every Thu; 8 mg (2 mg x 4) all other days   Full warfarin instructions:   10 mg every Thu; 8 mg all other days   Weekly warfarin total:   58 mg   No change documented:   Yoly Crowe RN   Plan last modified:   Yoly Crowe RN (2020)   Next INR check:   2020   Priority:   High   Target end date:   Indefinite    Indications    Blood clot of vein in shoulder area  left [I82.602]  Factor 5 Leiden mutation  heterozygous (H) [D68.51]  Long-term (current) use of anticoagulants [Z79.01] [Z79.01]             Anticoagulation Episode Summary     INR check location:       Preferred lab:       Send INR reminders to:   [facility]    Comments:   INR goal changed per Dr Kitchen 3/21/18 has her own machine         Anticoagulation Care Providers     Provider Role Specialty Phone number    Ling Trammell PA-C Referring Physician Assistant [phone]            See the Encounter Report to view Anticoagulation Flowsheet and Dosing Calendar (Go to Encounters tab in chart review, and find the Anticoagulation Therapy Visit)        Yoly Crowe RN

## 2020-06-19 ENCOUNTER — ONCOLOGY VISIT (OUTPATIENT)
Dept: ONCOLOGY | Facility: OTHER | Age: 50
End: 2020-06-19
Attending: NURSE PRACTITIONER
Payer: COMMERCIAL

## 2020-06-19 VITALS
OXYGEN SATURATION: 97 % | DIASTOLIC BLOOD PRESSURE: 86 MMHG | BODY MASS INDEX: 31.83 KG/M2 | HEIGHT: 67 IN | HEART RATE: 96 BPM | SYSTOLIC BLOOD PRESSURE: 120 MMHG | TEMPERATURE: 97.2 F | WEIGHT: 202.8 LBS | RESPIRATION RATE: 16 BRPM

## 2020-06-19 DIAGNOSIS — R91.8 PULMONARY NODULES: ICD-10-CM

## 2020-06-19 DIAGNOSIS — I82.622 ARM DVT (DEEP VENOUS THROMBOEMBOLISM), ACUTE, LEFT (H): Primary | ICD-10-CM

## 2020-06-19 PROCEDURE — 99214 OFFICE O/P EST MOD 30 MIN: CPT | Performed by: NURSE PRACTITIONER

## 2020-06-19 ASSESSMENT — PAIN SCALES - GENERAL: PAINLEVEL: NO PAIN (0)

## 2020-06-19 ASSESSMENT — MIFFLIN-ST. JEOR: SCORE: 1579.58

## 2020-06-19 NOTE — PROGRESS NOTES
Oncology Follow-up Visit:  June 19, 2020  Diagnosis:DVT    History Of Present Illness:  Patient presents for followup of left upper extremity DVT. Patient was seen in consultation on 11/09/2017 for evaluation of left upper extremity DVT. Patient presented to [facility] on 11/01/2017 with left arm pain, redness associated with swelling, that apparently happened overnight.  She woke up and noted pain in her left side, probably happening overnight when she developed acute onset of pain and swelling in the left upper extremity. A left upper extremity venous Doppler revealed occlusive thrombus in the subclavian, axillary, and basilic veins.  The cephalic, antecubital, and brachial veins were patent.  The patient was started on Lovenox, was transitioned to Coumadin.  She has a strong family history of DVT including her mother and grandmother.  She also stated that her grandfather has a history of CREST syndrome.  She stated that her mother had 4 stents in her groin.  She was a heavy smoker and smoked at least 1-1/2 packs per day for at least 30 years and cut back to less than a pack per day.  She denied antecedent trauma or previous surgery.  She worked at [employer] store.     When we saw her on 11/09/2017, she had been on Lovenox and the swelling had reduced considerably.  She was transitioned to Coumadin.  We elected to rule out hypercoagulable state by obtaining antithrombin III levels which were negative.  Lupus profile was negative including beta 2 glycoprotein antibodies with anticardiolipin antibodies.  We also obtained antithrombin III activity which was normal.  We obtained homocysteine levels which were normal.  Prothrombin 2 mutation was negative.  Factor V Leiden was positive for heterozygous mutation consistent with increased risk of developing thrombosis.  We repeated the venous Doppler of the left upper extremity which revealed there was improving left upper extremity DVT with residual thrombus  in the left axillary vein, distal left subclavian vein.  There was still thrombosis in the subclavian vein.  Basilic veins were recanalized.  We obtained scans to rule out occult malignancy including CT neck which revealed a subcutaneous nodule in the right shoulder that was most likely a sebaceous cyst.  There was fat stranding in the left axillary region, likely sequelae of acute DVT.  Otherwise, no evidence of mass or lymphadenopathy in neck.  She also had a CT chest which revealed a few very small subpleural ground-glass opacities measuring 5 mm.  6-month followup was recommended.  CT abdomen and pelvis was essentially negative.  The patient was a smoker and continued to smoke at least 1-1/2 packs per day.  We felt that she would need to be on lifelong Coumadin if she continued to smoke.     Patient had a repeat venous Doppler left upper extremity which revealed there was improvement in the left axillary vein, thrombosis which was not patent.  The remainder of the left upper extremity venous structure, cephalic, basilic, brachial, and radial veins were patent.  Internal jugular vein was patent, but there was persistent occlusive thrombus of the vein which was unchanged.  She had a CT chest which revealed numerous tiny pulmonary nodules again present.  They were unchanged and primarily in the upper lobes.  Sarcoidosis or other autoimmune disease was in the differential. We also recommended she see Dr. Ellis for history of pulmonary micronodules.  The patient did not keep that appointment.  She did have a repeat venous Doppler which according to the radiologist showed increased collaterals with stable thrombosed left subclavian vein. We repeated the CT chest which was essentially negative.  There were no rib abnormalities.  The tiny nodule at the pulmonary apices were chronic and unchanged.  CT abdomen and pelvis was consistent with stable to chronic hepatic steatosis with spurring at the gallbladder fossa.  "CT chest was repeated on 10//24/20 which showed numerous new tiny nodules. She was referred to pulmonology again and was seen in November 2019. She states she was to follow up again with them in May but appointment was cancelled due to covid. Patient would like CT scan repeated here before scheduling a follow up with pulmonology.   Patient continues on the coumadin. When patient was seen in October 2019, she complained of some swelling and pain in her left arm and axilla, as well as left back pain due to overuse of her arm. US of left arm was negative. Patient states she has been feeling well. She denies any chest pain, shortness of breath. She denies any pain or swelling of her extremities. Labs show an elevated d-dimer which has been elevated since her diagnosis. All other labs are stable.       Review Of Systems:  Review Of Systems  Eyes/Ears/Nose/Throat: denies new vision or hearing chnages  Respiratory: Reports occasional cough which she relates to smoking. No shortness of breath, dyspnea on exertion, or hemoptysis  Cardiovascular: denies chest pain or palpitations  Gastrointestinal: denies abdominal pain, no bowel changes  Genitourinary: denies dysuria or hematuria  Musculoskeletal: denies new bone pain or muscle weakness  Neurologic: denies headaches, no dizziness  Hematologic/Lymphatic/Immunologic: denies fevers, chills, night sweats      Nursing Notes:   Janelle Wolfe RN  6/19/2020  8:24 AM  Signed  Chief Complaint   Patient presents with     Follow Up     DVT       Initial BP (!) 120/90 (BP Location: Right arm, Patient Position: Sitting, Cuff Size: Adult Regular)   Pulse 96   Temp 97.2  F (36.2  C)   Resp 16   Ht 1.689 m (5' 6.5\")   Wt 92 kg (202 lb 12.8 oz)   SpO2 97%   Breastfeeding No   BMI 32.24 kg/m   Estimated body mass index is 32.24 kg/m  as calculated from the following:    Height as of this encounter: 1.689 m (5' 6.5\").    Weight as of this encounter: 92 kg (202 lb 12.8 " "oz).  Medication Reconciliation: complete    Janelle Wolfe RN      Past medical, social, surgical, and family histories reviewed.    Allergies:  Allergies as of 06/19/2020 - Reviewed 06/19/2020   Allergen Reaction Noted     Amoxicillin-pot clavulanate Nausea 05/10/2013     Cyclobenzaprine Rash 10/09/2012     No clinical screening - see comments Rash 07/11/2017       Current Medications:  Current Outpatient Medications   Medication Sig Dispense Refill     acetaminophen (TYLENOL) 500 MG tablet Take 1,000 mg by mouth       warfarin ANTICOAGULANT (COUMADIN) 2 MG tablet TAKE 10 MG X ON DAYS/WEEK AND 8 MG X SIX DAYS/WEEK OR AS DIRECTED BY [facility] 360 tablet 1     albuterol (PROAIR HFA/PROVENTIL HFA/VENTOLIN HFA) 108 (90 Base) MCG/ACT inhaler Inhale 2 puffs into the lungs every 4 hours as needed for shortness of breath / dyspnea or wheezing (Patient not taking: Reported on 6/19/2020) 6.7 g 0     nicotine (NICORETTE) 4 MG gum Take 1 each every hour while awake as needed for nicotine craving (Patient not taking: Reported on 6/19/2020) 100 each 3        Physical Exam:  /86 (BP Location: Right arm, Patient Position: Sitting, Cuff Size: Adult Regular)   Pulse 96   Temp 97.2  F (36.2  C)   Resp 16   Ht 1.689 m (5' 6.5\")   Wt 92 kg (202 lb 12.8 oz)   SpO2 97%   Breastfeeding No   BMI 32.24 kg/m      GENERAL APPEARANCE: 49 year old female, alert and no distress     NECK: no adenopathy, no asymmetry or masses     LYMPHATICS: No cervical, supraclavicular, axillary lymphadenopathy     RESP: lungs clear to auscultation - no rales, rhonchi or wheezes     CARDIOVASCULAR: regular rates and rhythm, normal S1 S2     ABDOMEN:  soft, nontender, no HSM or masses and bowel sounds normal     MUSCULOSKELETAL: extremities normal- no gross deformities noted,  No edema b/l LE.     SKIN: no suspicious lesions or rashes on exposed skin     PSYCHIATRIC: mentation appears normal and affect normal    Laboratory/Imaging " Studies  Anticoagulation Therapy Visit on 06/17/2020   Component Date Value Ref Range Status     INR 06/17/2020 2.8* 0.90 - 1.10 Final   Anticoagulation Therapy Visit on 06/10/2020   Component Date Value Ref Range Status     INR 06/10/2020 2.7* 0.90 - 1.10 Final   Orders Only on 06/09/2020   Component Date Value Ref Range Status     D-Dimer ng/mL 06/09/2020 414* 0 - 230 ng/ml D-DU Final     Lactate Dehydrogenase 06/09/2020 203  140 - 271 U/L Final     Sodium 06/09/2020 137  134 - 144 mmol/L Final     Potassium 06/09/2020 4.1  3.5 - 5.1 mmol/L Final     Chloride 06/09/2020 107  98 - 107 mmol/L Final     Carbon Dioxide 06/09/2020 22  21 - 31 mmol/L Final     Anion Gap 06/09/2020 8  6 - 17 mmol/L Final     Glucose 06/09/2020 123* 70 - 105 mg/dL Final     Urea Nitrogen 06/09/2020 9  7 - 25 mg/dL Final     Creatinine 06/09/2020 0.73  0.60 - 1.20 mg/dL Final     GFR Estimate 06/09/2020 85  >60 mL/min/[1.73_m2] Final     GFR Estimate If Black 06/09/2020 >90  >60 mL/min/[1.73_m2] Final     Calcium 06/09/2020 8.7  8.6 - 10.3 mg/dL Final     Bilirubin Total 06/09/2020 0.3  0.3 - 1.0 mg/dL Final     Albumin 06/09/2020 4.2  3.5 - 5.7 g/dL Final     Protein Total 06/09/2020 7.0  6.4 - 8.9 g/dL Final     Alkaline Phosphatase 06/09/2020 50  34 - 104 U/L Final     ALT 06/09/2020 17  7 - 52 U/L Final     AST 06/09/2020 15  13 - 39 U/L Final     WBC 06/09/2020 7.8  4.0 - 11.0 10e9/L Final     RBC Count 06/09/2020 5.03  3.8 - 5.2 10e12/L Final     Hemoglobin 06/09/2020 15.3  11.7 - 15.7 g/dL Final     Hematocrit 06/09/2020 45.2  35.0 - 47.0 % Final     MCV 06/09/2020 90  78 - 100 fl Final     MCH 06/09/2020 30.4  26.5 - 33.0 pg Final     MCHC 06/09/2020 33.8  31.5 - 36.5 g/dL Final     RDW 06/09/2020 13.2  10.0 - 15.0 % Final     Platelet Count 06/09/2020 321  150 - 450 10e9/L Final     Diff Method 06/09/2020 Automated Method   Final     % Neutrophils 06/09/2020 54.0  % Final     % Lymphocytes 06/09/2020 38.3  % Final     % Monocytes  06/09/2020 5.2  % Final     % Eosinophils 06/09/2020 1.8  % Final     % Basophils 06/09/2020 0.6  % Final     % Immature Granulocytes 06/09/2020 0.1  % Final     Absolute Neutrophil 06/09/2020 4.2  1.6 - 8.3 10e9/L Final     Absolute Lymphocytes 06/09/2020 3.0  0.8 - 5.3 10e9/L Final     Absolute Monocytes 06/09/2020 0.4  0.0 - 1.3 10e9/L Final     Absolute Eosinophils 06/09/2020 0.1  0.0 - 0.7 10e9/L Final     Absolute Basophils 06/09/2020 0.1  0.0 - 0.2 10e9/L Final     Abs Immature Granulocytes 06/09/2020 0.0  0 - 0.4 10e9/L Final        ASSESSMENT/PLAN:  1.Unprovoked left upper extremity DVT, likely due to hypercoagulable state given the fact she has factor V Leiden heterozygous as well as smoker.  She has not quit smoking. She continues to smoke at least 1 pack per day. When patient was seen in October 2019, she complained of some swelling and pain in her left arm and axilla, as well as left back pain due to overuse of her arm. US of left arm was negative. D-dimer is elevated and has been consistently since she was diagnosed. Discussed this with Dr Kitchen who feels we do not need to continue to check d-dimer as part of routine labs work. All other labs are stable. Otherwise, the plan is to continue Coumadin to maintain therapeutic INR between 2.5 to 3.5. We will see the patient in 6 months, obtain CBC, CMP, LDH     2. History of pulmonary micronodules, likely nonmalignant.  Suspect sarcoid versus possible autoimmune disease. Patient was scheduled to see pulmonology but did not keep that appointment. She continues to smoke. CT chest was repeated on 10//24/20 which showed numerous new tiny nodules. She was referred to pulmonology again and was seen in November 2019. She states she was to follow up again with them in May but appointment was cancelled due to covid. Patient would like CT scan repeated here before scheduling a follow up with pulmonology. Will order CT chest to be done now and will notify patient of  results. She is to schedule a follow up appointment with pulmonology to review scans as well.    Twenty five minutes spent with patient with greater than 50% of that time spent counseling patient regarding disease process, interpretation of labs, discussing plan for follow up and coordination of care

## 2020-06-19 NOTE — NURSING NOTE
"Chief Complaint   Patient presents with     Follow Up     DVT       Initial BP (!) 120/90 (BP Location: Right arm, Patient Position: Sitting, Cuff Size: Adult Regular)   Pulse 96   Temp 97.2  F (36.2  C)   Resp 16   Ht 1.689 m (5' 6.5\")   Wt 92 kg (202 lb 12.8 oz)   SpO2 97%   Breastfeeding No   BMI 32.24 kg/m   Estimated body mass index is 32.24 kg/m  as calculated from the following:    Height as of this encounter: 1.689 m (5' 6.5\").    Weight as of this encounter: 92 kg (202 lb 12.8 oz).  Medication Reconciliation: complete    Janelle Wolfe RN    "

## 2020-06-23 NOTE — NURSING NOTE
Patient declines contrast with scans. Radiology notified and order will be changed.  Alycia Rodriguez RN...........6/23/2020 12:49 PM

## 2020-06-24 ENCOUNTER — HOSPITAL ENCOUNTER (OUTPATIENT)
Dept: CT IMAGING | Facility: OTHER | Age: 50
Discharge: HOME | End: 2020-06-24
Attending: NURSE PRACTITIONER | Admitting: NURSE PRACTITIONER
Payer: COMMERCIAL

## 2020-06-24 ENCOUNTER — TRANSFERRED RECORDS (OUTPATIENT)
Dept: HEALTH INFORMATION MANAGEMENT | Facility: OTHER | Age: 50
End: 2020-06-24

## 2020-06-24 DIAGNOSIS — R91.8 PULMONARY NODULES: ICD-10-CM

## 2020-06-24 LAB — INR PPP: 3.2 (ref 0.9–1.1)

## 2020-06-24 PROCEDURE — 71250 CT THORAX DX C-: CPT

## 2020-06-25 ENCOUNTER — ANTICOAGULATION THERAPY VISIT (OUTPATIENT)
Dept: ANTICOAGULATION | Facility: OTHER | Age: 50
End: 2020-06-25
Attending: PHYSICIAN ASSISTANT
Payer: COMMERCIAL

## 2020-06-25 DIAGNOSIS — I82.602 BLOOD CLOT OF VEIN IN SHOULDER AREA, LEFT: ICD-10-CM

## 2020-06-25 DIAGNOSIS — D68.51 FACTOR 5 LEIDEN MUTATION, HETEROZYGOUS (H): ICD-10-CM

## 2020-06-25 DIAGNOSIS — Z79.01 LONG TERM CURRENT USE OF ANTICOAGULANT THERAPY: ICD-10-CM

## 2020-06-25 NOTE — PROGRESS NOTES
ANTICOAGULATION FOLLOW-UP CLINIC VISIT    Patient Name:  Daniela Ladd  Date:  6/25/2020  Contact Type:  no call needed patient to continue same dose    SUBJECTIVE:  Patient Findings         Clinical Outcomes     Negatives:   Major bleeding event, Thromboembolic event, Anticoagulation-related hospital admission, Anticoagulation-related ED visit, Anticoagulation-related fatality           OBJECTIVE    Recent labs: (last 7 days)     06/24/20   INR 3.2*       ASSESSMENT / PLAN  INR assessment THER    Recheck INR In: 1 WEEK    INR Location Home INR      Anticoagulation Summary  As of 6/25/2020    INR goal:   2.5-3.5   TTR:   87.5 % (1 y)   INR used for dosing:   3.2 (6/24/2020)   Warfarin maintenance plan:   10 mg (2 mg x 5) every Thu; 8 mg (2 mg x 4) all other days   Full warfarin instructions:   10 mg every Thu; 8 mg all other days   Weekly warfarin total:   58 mg   No change documented:   Yoly Crowe RN   Plan last modified:   Yoly Crowe RN (4/23/2020)   Next INR check:   7/2/2020   Priority:   High   Target end date:   Indefinite    Indications    Blood clot of vein in shoulder area  left [I82.602]  Factor 5 Leiden mutation  heterozygous (H) [D68.51]  Long-term (current) use of anticoagulants [Z79.01] [Z79.01]             Anticoagulation Episode Summary     INR check location:       Preferred lab:       Send INR reminders to:   [facility]    Comments:   INR goal changed per Dr Kitchen 3/21/18 has her own machine         Anticoagulation Care Providers     Provider Role Specialty Phone number    Ling Trammell PA-C Referring Physician Assistant [phone]            See the Encounter Report to view Anticoagulation Flowsheet and Dosing Calendar (Go to Encounters tab in chart review, and find the Anticoagulation Therapy Visit)        Yoly Crowe RN

## 2020-06-30 ENCOUNTER — ANTICOAGULATION THERAPY VISIT (OUTPATIENT)
Dept: ANTICOAGULATION | Facility: OTHER | Age: 50
End: 2020-06-30
Attending: PHYSICIAN ASSISTANT
Payer: COMMERCIAL

## 2020-06-30 ENCOUNTER — TRANSFERRED RECORDS (OUTPATIENT)
Dept: HEALTH INFORMATION MANAGEMENT | Facility: OTHER | Age: 50
End: 2020-06-30

## 2020-06-30 DIAGNOSIS — I82.602 BLOOD CLOT OF VEIN IN SHOULDER AREA, LEFT: ICD-10-CM

## 2020-06-30 DIAGNOSIS — D68.51 FACTOR 5 LEIDEN MUTATION, HETEROZYGOUS (H): ICD-10-CM

## 2020-06-30 DIAGNOSIS — Z79.01 LONG TERM CURRENT USE OF ANTICOAGULANT THERAPY: ICD-10-CM

## 2020-06-30 LAB — INR PPP: 3.2 (ref 0.9–1.1)

## 2020-06-30 NOTE — PROGRESS NOTES
ANTICOAGULATION FOLLOW-UP CLINIC VISIT    Patient Name:  Daniela Ladd  Date:  6/30/2020  Contact Type:  no call needed patient to continue same dose    SUBJECTIVE:  Patient Findings         Clinical Outcomes     Negatives:   Major bleeding event, Thromboembolic event, Anticoagulation-related hospital admission, Anticoagulation-related ED visit, Anticoagulation-related fatality           OBJECTIVE    Recent labs: (last 7 days)     06/30/20   INR 3.2*       ASSESSMENT / PLAN  INR assessment THER    Recheck INR In: 1 WEEK    INR Location Home INR      Anticoagulation Summary  As of 6/30/2020    INR goal:   2.5-3.5   TTR:   87.5 % (1 y)   INR used for dosing:   3.2 (6/30/2020)   Warfarin maintenance plan:   10 mg (2 mg x 5) every Thu; 8 mg (2 mg x 4) all other days   Full warfarin instructions:   10 mg every Thu; 8 mg all other days   Weekly warfarin total:   58 mg   No change documented:   Yoly Crowe RN   Plan last modified:   Yoly Crowe RN (4/23/2020)   Next INR check:   7/7/2020   Priority:   High   Target end date:   Indefinite    Indications    Blood clot of vein in shoulder area  left [I82.602]  Factor 5 Leiden mutation  heterozygous (H) [D68.51]  Long-term (current) use of anticoagulants [Z79.01] [Z79.01]             Anticoagulation Episode Summary     INR check location:       Preferred lab:       Send INR reminders to:   [facility]    Comments:   INR goal changed per Dr Kitchen 3/21/18 has her own machine         Anticoagulation Care Providers     Provider Role Specialty Phone number    Ling Trammell PA-C Referring Physician Assistant [phone]            See the Encounter Report to view Anticoagulation Flowsheet and Dosing Calendar (Go to Encounters tab in chart review, and find the Anticoagulation Therapy Visit)        Yoly Crowe RN

## 2020-07-08 ENCOUNTER — TRANSFERRED RECORDS (OUTPATIENT)
Dept: HEALTH INFORMATION MANAGEMENT | Facility: OTHER | Age: 50
End: 2020-07-08

## 2020-07-08 ENCOUNTER — ANTICOAGULATION THERAPY VISIT (OUTPATIENT)
Dept: ANTICOAGULATION | Facility: OTHER | Age: 50
End: 2020-07-08
Attending: PHYSICIAN ASSISTANT
Payer: COMMERCIAL

## 2020-07-08 DIAGNOSIS — Z79.01 LONG TERM CURRENT USE OF ANTICOAGULANT THERAPY: ICD-10-CM

## 2020-07-08 DIAGNOSIS — I82.602 BLOOD CLOT OF VEIN IN SHOULDER AREA, LEFT: ICD-10-CM

## 2020-07-08 DIAGNOSIS — D68.51 FACTOR 5 LEIDEN MUTATION, HETEROZYGOUS (H): ICD-10-CM

## 2020-07-08 LAB — INR PPP: 2.9 (ref 0.9–1.1)

## 2020-07-08 NOTE — PROGRESS NOTES
ANTICOAGULATION FOLLOW-UP CLINIC VISIT    Patient Name:  Daniela Ladd  Date:  2020  Contact Type:  no call needed patient to continue same dose    SUBJECTIVE:  Patient Findings         Clinical Outcomes     Negatives:   Major bleeding event, Thromboembolic event, Anticoagulation-related hospital admission, Anticoagulation-related ED visit, Anticoagulation-related fatality           OBJECTIVE    Recent labs: (last 7 days)     20   INR 2.9*       ASSESSMENT / PLAN  INR assessment THER    Recheck INR In: 2 WEEKS    INR Location Home INR      Anticoagulation Summary  As of 2020    INR goal:   2.5-3.5   TTR:   87.5 % (1 y)   INR used for dosin.9 (2020)   Warfarin maintenance plan:   10 mg (2 mg x 5) every Thu; 8 mg (2 mg x 4) all other days   Full warfarin instructions:   10 mg every Thu; 8 mg all other days   Weekly warfarin total:   58 mg   No change documented:   Yoly Crowe RN   Plan last modified:   Yoly Crowe RN (2020)   Next INR check:   7/15/2020   Priority:   High   Target end date:   Indefinite    Indications    Blood clot of vein in shoulder area  left [I82.602]  Factor 5 Leiden mutation  heterozygous (H) [D68.51]  Long-term (current) use of anticoagulants [Z79.01] [Z79.01]             Anticoagulation Episode Summary     INR check location:       Preferred lab:       Send INR reminders to:   [facility]    Comments:   INR goal changed per Dr Kitchen 3/21/18 has her own machine         Anticoagulation Care Providers     Provider Role Specialty Phone number    Ling Trammell PA-C Referring Physician Assistant [phone]            See the Encounter Report to view Anticoagulation Flowsheet and Dosing Calendar (Go to Encounters tab in chart review, and find the Anticoagulation Therapy Visit)        Yoly Crowe RN

## 2020-07-15 ENCOUNTER — ANTICOAGULATION THERAPY VISIT (OUTPATIENT)
Dept: ANTICOAGULATION | Facility: OTHER | Age: 50
End: 2020-07-15
Attending: PHYSICIAN ASSISTANT
Payer: COMMERCIAL

## 2020-07-15 ENCOUNTER — TRANSFERRED RECORDS (OUTPATIENT)
Dept: HEALTH INFORMATION MANAGEMENT | Facility: OTHER | Age: 50
End: 2020-07-15

## 2020-07-15 DIAGNOSIS — I82.602 BLOOD CLOT OF VEIN IN SHOULDER AREA, LEFT: ICD-10-CM

## 2020-07-15 DIAGNOSIS — Z79.01 LONG TERM CURRENT USE OF ANTICOAGULANT THERAPY: ICD-10-CM

## 2020-07-15 DIAGNOSIS — D68.51 FACTOR 5 LEIDEN MUTATION, HETEROZYGOUS (H): ICD-10-CM

## 2020-07-15 LAB — INR PPP: 2.7 (ref 0.9–1.1)

## 2020-07-15 NOTE — PROGRESS NOTES
ANTICOAGULATION FOLLOW-UP CLINIC VISIT    Patient Name:  Daniela Ladd  Date:  7/15/2020  Contact Type:  no call needed patient to continue same dose    SUBJECTIVE:  Patient Findings         Clinical Outcomes     Negatives:   Major bleeding event, Thromboembolic event, Anticoagulation-related hospital admission, Anticoagulation-related ED visit, Anticoagulation-related fatality           OBJECTIVE    Recent labs: (last 7 days)     07/15/20   INR 2.7*       ASSESSMENT / PLAN  INR assessment THER    Recheck INR In: 1 WEEK    INR Location Home INR      Anticoagulation Summary  As of 7/15/2020    INR goal:   2.5-3.5   TTR:   87.5 % (1 y)   INR used for dosin.7 (7/15/2020)   Warfarin maintenance plan:   10 mg (2 mg x 5) every Thu; 8 mg (2 mg x 4) all other days   Full warfarin instructions:   10 mg every Thu; 8 mg all other days   Weekly warfarin total:   58 mg   No change documented:   Yoly Crowe RN   Plan last modified:   Yoly Crowe RN (2020)   Next INR check:   2020   Priority:   High   Target end date:   Indefinite    Indications    Blood clot of vein in shoulder area  left [I82.602]  Factor 5 Leiden mutation  heterozygous (H) [D68.51]  Long-term (current) use of anticoagulants [Z79.01] [Z79.01]             Anticoagulation Episode Summary     INR check location:       Preferred lab:       Send INR reminders to:   [facility]    Comments:   INR goal changed per Dr Kitchen 3/21/18 has her own machine         Anticoagulation Care Providers     Provider Role Specialty Phone number    Ling Trammell PA-C Referring Physician Assistant [phone]            See the Encounter Report to view Anticoagulation Flowsheet and Dosing Calendar (Go to Encounters tab in chart review, and find the Anticoagulation Therapy Visit)        Yoly Crowe RN

## 2020-07-22 ENCOUNTER — ANTICOAGULATION THERAPY VISIT (OUTPATIENT)
Dept: ANTICOAGULATION | Facility: OTHER | Age: 50
End: 2020-07-22
Attending: PHYSICIAN ASSISTANT
Payer: COMMERCIAL

## 2020-07-22 DIAGNOSIS — Z79.01 LONG TERM CURRENT USE OF ANTICOAGULANT THERAPY: ICD-10-CM

## 2020-07-22 DIAGNOSIS — I82.602 BLOOD CLOT OF VEIN IN SHOULDER AREA, LEFT: ICD-10-CM

## 2020-07-22 DIAGNOSIS — D68.51 FACTOR 5 LEIDEN MUTATION, HETEROZYGOUS (H): ICD-10-CM

## 2020-07-22 LAB — INR PPP: 2.6 (ref 0.9–1.1)

## 2020-07-22 NOTE — PROGRESS NOTES
ANTICOAGULATION FOLLOW-UP CLINIC VISIT    Patient Name:  Daniela Ladd  Date:  2020  Contact Type:  no call needed patient to continue same dose    SUBJECTIVE:  Patient Findings         Clinical Outcomes     Negatives:   Major bleeding event, Thromboembolic event, Anticoagulation-related hospital admission, Anticoagulation-related ED visit, Anticoagulation-related fatality           OBJECTIVE    Recent labs: (last 7 days)     20   INR 2.6*       ASSESSMENT / PLAN  INR assessment THER    Recheck INR In: 1 WEEK    INR Location Home INR      Anticoagulation Summary  As of 2020    INR goal:   2.5-3.5   TTR:   87.5 % (1 y)   INR used for dosin.6 (2020)   Warfarin maintenance plan:   10 mg (2 mg x 5) every Thu; 8 mg (2 mg x 4) all other days   Full warfarin instructions:   10 mg every Thu; 8 mg all other days   Weekly warfarin total:   58 mg   No change documented:   Yoly Crowe RN   Plan last modified:   Yoly Crowe RN (2020)   Next INR check:   2020   Priority:   High   Target end date:   Indefinite    Indications    Blood clot of vein in shoulder area  left [I82.602]  Factor 5 Leiden mutation  heterozygous (H) [D68.51]  Long-term (current) use of anticoagulants [Z79.01] [Z79.01]             Anticoagulation Episode Summary     INR check location:       Preferred lab:       Send INR reminders to:   [facility]    Comments:   INR goal changed per Dr Kitchen 3/21/18 has her own machine         Anticoagulation Care Providers     Provider Role Specialty Phone number    Ling Trammell PA-C Referring Physician Assistant [phone]            See the Encounter Report to view Anticoagulation Flowsheet and Dosing Calendar (Go to Encounters tab in chart review, and find the Anticoagulation Therapy Visit)        Yoly Crowe RN

## 2020-07-23 ENCOUNTER — TRANSFERRED RECORDS (OUTPATIENT)
Dept: HEALTH INFORMATION MANAGEMENT | Facility: OTHER | Age: 50
End: 2020-07-23

## 2020-07-29 ENCOUNTER — TRANSFERRED RECORDS (OUTPATIENT)
Dept: HEALTH INFORMATION MANAGEMENT | Facility: OTHER | Age: 50
End: 2020-07-29

## 2020-07-29 ENCOUNTER — ANTICOAGULATION THERAPY VISIT (OUTPATIENT)
Dept: ANTICOAGULATION | Facility: OTHER | Age: 50
End: 2020-07-29
Attending: PHYSICIAN ASSISTANT
Payer: COMMERCIAL

## 2020-07-29 DIAGNOSIS — I82.622 ARM DVT (DEEP VENOUS THROMBOEMBOLISM), ACUTE, LEFT (H): ICD-10-CM

## 2020-07-29 DIAGNOSIS — D68.51 FACTOR 5 LEIDEN MUTATION, HETEROZYGOUS (H): ICD-10-CM

## 2020-07-29 DIAGNOSIS — I82.602 BLOOD CLOT OF VEIN IN SHOULDER AREA, LEFT: ICD-10-CM

## 2020-07-29 DIAGNOSIS — Z79.01 LONG TERM CURRENT USE OF ANTICOAGULANT THERAPY: ICD-10-CM

## 2020-07-29 LAB — INR PPP: 2.6 (ref 0.9–1.1)

## 2020-07-29 RX ORDER — WARFARIN SODIUM 2 MG/1
TABLET ORAL
Qty: 360 TABLET | Refills: 1 | COMMUNITY
Start: 2020-07-29 | End: 2020-12-22

## 2020-08-04 ENCOUNTER — TRANSFERRED RECORDS (OUTPATIENT)
Dept: HEALTH INFORMATION MANAGEMENT | Facility: OTHER | Age: 50
End: 2020-08-04

## 2020-08-04 ENCOUNTER — ANTICOAGULATION THERAPY VISIT (OUTPATIENT)
Dept: ANTICOAGULATION | Facility: OTHER | Age: 50
End: 2020-08-04
Attending: PHYSICIAN ASSISTANT
Payer: COMMERCIAL

## 2020-08-04 DIAGNOSIS — Z79.01 LONG TERM CURRENT USE OF ANTICOAGULANT THERAPY: ICD-10-CM

## 2020-08-04 DIAGNOSIS — D68.51 FACTOR 5 LEIDEN MUTATION, HETEROZYGOUS (H): ICD-10-CM

## 2020-08-04 DIAGNOSIS — I82.602 BLOOD CLOT OF VEIN IN SHOULDER AREA, LEFT: ICD-10-CM

## 2020-08-04 LAB — INR PPP: 2.4 (ref 0.9–1.1)

## 2020-08-04 NOTE — PROGRESS NOTES
ANTICOAGULATION FOLLOW-UP CLINIC VISIT    Patient Name:  Daniela Ladd  Date:  2020  Contact Type:  Telephone/ and fax from [facility]    SUBJECTIVE:  Patient Findings     Comments:   Per phone call with patient.        Clinical Outcomes     Negatives:   Major bleeding event, Thromboembolic event, Anticoagulation-related hospital admission, Anticoagulation-related ED visit, Anticoagulation-related fatality    Comments:   Per phone call with patient.           OBJECTIVE    Recent labs: (last 7 days)     20   INR 2.4*       ASSESSMENT / PLAN  INR assessment SUB    Recheck INR In: 1 WEEK    INR Location Home INR      Anticoagulation Summary  As of 2020    INR goal:   2.5-3.5   TTR:   86.7 % (1 y)   INR used for dosin.4! (2020)   Warfarin maintenance plan:   10 mg (2 mg x 5) every Tue, Fri; 8 mg (2 mg x 4) all other days   Full warfarin instructions:   10 mg every Tue, Fri; 8 mg all other days   Weekly warfarin total:   60 mg   Plan last modified:   Renee Kevin RN (2020)   Next INR check:   2020   Priority:   High   Target end date:   Indefinite    Indications    Blood clot of vein in shoulder area  left [I82.602]  Factor 5 Leiden mutation  heterozygous (H) [D68.51]  Long-term (current) use of anticoagulants [Z79.01] [Z79.01]             Anticoagulation Episode Summary     INR check location:       Preferred lab:       Send INR reminders to:   [facility]    Comments:   INR goal changed per Dr Kitchen 3/21/18 has her own machine         Anticoagulation Care Providers     Provider Role Specialty Phone number    Ling Trammell PA-C Referring Physician Assistant [phone]            See the Encounter Report to view Anticoagulation Flowsheet and Dosing Calendar (Go to Encounters tab in chart review, and find the Anticoagulation Therapy Visit)    No encounter, INR  received via fax.  Assessment and istructions via phone call with patient. Patient verbalized  understanding.      Renee Kevin RN

## 2020-08-12 ENCOUNTER — ANTICOAGULATION THERAPY VISIT (OUTPATIENT)
Dept: ANTICOAGULATION | Facility: OTHER | Age: 50
End: 2020-08-12
Attending: PHYSICIAN ASSISTANT
Payer: COMMERCIAL

## 2020-08-12 ENCOUNTER — TRANSFERRED RECORDS (OUTPATIENT)
Dept: HEALTH INFORMATION MANAGEMENT | Facility: OTHER | Age: 50
End: 2020-08-12

## 2020-08-12 DIAGNOSIS — I82.602 BLOOD CLOT OF VEIN IN SHOULDER AREA, LEFT: ICD-10-CM

## 2020-08-12 DIAGNOSIS — D68.51 FACTOR 5 LEIDEN MUTATION, HETEROZYGOUS (H): ICD-10-CM

## 2020-08-12 DIAGNOSIS — Z79.01 LONG TERM CURRENT USE OF ANTICOAGULANT THERAPY: ICD-10-CM

## 2020-08-12 LAB — INR PPP: 2.6 (ref 0.9–1.1)

## 2020-08-12 NOTE — PROGRESS NOTES
ANTICOAGULATION FOLLOW-UP CLINIC VISIT    Patient Name:  Daniela Ladd  Date:  2020  Contact Type:  no call needed pateint continue same dose    SUBJECTIVE:  Patient Findings         Clinical Outcomes     Negatives:   Major bleeding event, Thromboembolic event, Anticoagulation-related hospital admission, Anticoagulation-related ED visit, Anticoagulation-related fatality           OBJECTIVE    Recent labs: (last 7 days)     20   INR 2.6*       ASSESSMENT / PLAN  INR assessment THER    Recheck INR In: 1 WEEK    INR Location Home INR      Anticoagulation Summary  As of 2020    INR goal:   2.5-3.5   TTR:   85.6 % (1 y)   INR used for dosin.6 (2020)   Warfarin maintenance plan:   10 mg (2 mg x 5) every Tue, Fri; 8 mg (2 mg x 4) all other days   Full warfarin instructions:   10 mg every Tue, Fri; 8 mg all other days   Weekly warfarin total:   60 mg   No change documented:   Yoly Crowe RN   Plan last modified:   Renee Kevin RN (2020)   Next INR check:   2020   Priority:   High   Target end date:   Indefinite    Indications    Blood clot of vein in shoulder area  left [I82.602]  Factor 5 Leiden mutation  heterozygous (H) [D68.51]  Long-term (current) use of anticoagulants [Z79.01] [Z79.01]             Anticoagulation Episode Summary     INR check location:       Preferred lab:       Send INR reminders to:   [facility]    Comments:   INR goal changed per Dr Kitchen 3/21/18 has her own machine         Anticoagulation Care Providers     Provider Role Specialty Phone number    Ling Trammell PA-C Referring Physician Assistant [phone]            See the Encounter Report to view Anticoagulation Flowsheet and Dosing Calendar (Go to Encounters tab in chart review, and find the Anticoagulation Therapy Visit)        Yoly Crowe, RN

## 2020-08-19 ENCOUNTER — ANTICOAGULATION THERAPY VISIT (OUTPATIENT)
Dept: ANTICOAGULATION | Facility: OTHER | Age: 50
End: 2020-08-19
Attending: PHYSICIAN ASSISTANT
Payer: COMMERCIAL

## 2020-08-19 ENCOUNTER — TRANSFERRED RECORDS (OUTPATIENT)
Dept: HEALTH INFORMATION MANAGEMENT | Facility: OTHER | Age: 50
End: 2020-08-19

## 2020-08-19 DIAGNOSIS — I82.602 BLOOD CLOT OF VEIN IN SHOULDER AREA, LEFT: ICD-10-CM

## 2020-08-19 DIAGNOSIS — D68.51 FACTOR 5 LEIDEN MUTATION, HETEROZYGOUS (H): ICD-10-CM

## 2020-08-19 DIAGNOSIS — Z79.01 LONG TERM CURRENT USE OF ANTICOAGULANT THERAPY: ICD-10-CM

## 2020-08-19 LAB — INR PPP: 2.7 (ref 0.9–1.1)

## 2020-08-19 NOTE — PROGRESS NOTES
ANTICOAGULATION FOLLOW-UP CLINIC VISIT    Patient Name:  Daniela Ladd  Date:  2020  Contact Type:  no call needed patient to continue same dose    SUBJECTIVE:  Patient Findings         Clinical Outcomes     Negatives:   Major bleeding event, Thromboembolic event, Anticoagulation-related hospital admission, Anticoagulation-related ED visit, Anticoagulation-related fatality           OBJECTIVE    Recent labs: (last 7 days)     20   INR 2.7*       ASSESSMENT / PLAN  INR assessment THER    Recheck INR In: 1 WEEK    INR Location Home INR      Anticoagulation Summary  As of 2020    INR goal:   2.5-3.5   TTR:   85.6 % (1 y)   INR used for dosin.7 (2020)   Warfarin maintenance plan:   10 mg (2 mg x 5) every Tue, Fri; 8 mg (2 mg x 4) all other days   Full warfarin instructions:   10 mg every Tue, Fri; 8 mg all other days   Weekly warfarin total:   60 mg   No change documented:   Yoly Crowe RN   Plan last modified:   Renee Kevin RN (2020)   Next INR check:   2020   Priority:   High   Target end date:   Indefinite    Indications    Blood clot of vein in shoulder area  left [I82.602]  Factor 5 Leiden mutation  heterozygous (H) [D68.51]  Long-term (current) use of anticoagulants [Z79.01] [Z79.01]             Anticoagulation Episode Summary     INR check location:       Preferred lab:       Send INR reminders to:   [facility]    Comments:   INR goal changed per Dr Kitchen 3/21/18 has her own machine         Anticoagulation Care Providers     Provider Role Specialty Phone number    Ling Trammell PA-C Referring Physician Assistant [phone]            See the Encounter Report to view Anticoagulation Flowsheet and Dosing Calendar (Go to Encounters tab in chart review, and find the Anticoagulation Therapy Visit)        Yoly Crowe, RN

## 2020-08-26 ENCOUNTER — ANTICOAGULATION THERAPY VISIT (OUTPATIENT)
Dept: ANTICOAGULATION | Facility: OTHER | Age: 50
End: 2020-08-26
Attending: PHYSICIAN ASSISTANT
Payer: COMMERCIAL

## 2020-08-26 ENCOUNTER — TRANSFERRED RECORDS (OUTPATIENT)
Dept: HEALTH INFORMATION MANAGEMENT | Facility: OTHER | Age: 50
End: 2020-08-26

## 2020-08-26 DIAGNOSIS — D68.51 FACTOR 5 LEIDEN MUTATION, HETEROZYGOUS (H): ICD-10-CM

## 2020-08-26 DIAGNOSIS — Z79.01 LONG TERM CURRENT USE OF ANTICOAGULANT THERAPY: ICD-10-CM

## 2020-08-26 DIAGNOSIS — I82.602 BLOOD CLOT OF VEIN IN SHOULDER AREA, LEFT: ICD-10-CM

## 2020-08-26 LAB — INR PPP: 2.4 (ref 0.9–1.1)

## 2020-08-26 NOTE — PROGRESS NOTES
ANTICOAGULATION FOLLOW-UP CLINIC VISIT    Patient Name:  Daniela Ladd  Date:  2020  Contact Type:  Telephone/ Spoke with pt reviewed dosing.     SUBJECTIVE:  Patient Findings         Clinical Outcomes     Negatives:   Major bleeding event, Thromboembolic event, Anticoagulation-related hospital admission, Anticoagulation-related ED visit, Anticoagulation-related fatality           OBJECTIVE    Recent labs: (last 7 days)     20   INR 2.4*       ASSESSMENT / PLAN  INR assessment SUB    Recheck INR In: 1 WEEK    INR Location Home INR      Anticoagulation Summary  As of 2020    INR goal:   2.5-3.5   TTR:   85.0 % (1 y)   INR used for dosin.4! (2020)   Warfarin maintenance plan:   10 mg (2 mg x 5) every Mon, Wed, Fri; 8 mg (2 mg x 4) all other days   Full warfarin instructions:   10 mg every Mon, Wed, Fri; 8 mg all other days   Weekly warfarin total:   62 mg   Plan last modified:   Luisa Horton RN (2020)   Next INR check:   2020   Priority:   High   Target end date:   Indefinite    Indications    Blood clot of vein in shoulder area  left [I82.602]  Factor 5 Leiden mutation  heterozygous (H) [D68.51]  Long-term (current) use of anticoagulants [Z79.01] [Z79.01]             Anticoagulation Episode Summary     INR check location:       Preferred lab:       Send INR reminders to:   [facility]    Comments:   INR goal changed per Dr Kitchen 3/21/18 has her own machine         Anticoagulation Care Providers     Provider Role Specialty Phone number    PENELOPEja, Ling Rodriguez PA-C Referring Physician Assistant [phone]            See the Encounter Report to view Anticoagulation Flowsheet and Dosing Calendar (Go to Encounters tab in chart review, and find the Anticoagulation Therapy Visit)        Luisa Horton RN

## 2020-09-02 ENCOUNTER — TRANSFERRED RECORDS (OUTPATIENT)
Dept: HEALTH INFORMATION MANAGEMENT | Facility: OTHER | Age: 50
End: 2020-09-02

## 2020-09-02 ENCOUNTER — ANTICOAGULATION THERAPY VISIT (OUTPATIENT)
Dept: ANTICOAGULATION | Facility: OTHER | Age: 50
End: 2020-09-02
Attending: PHYSICIAN ASSISTANT
Payer: COMMERCIAL

## 2020-09-02 DIAGNOSIS — Z79.01 LONG TERM CURRENT USE OF ANTICOAGULANT THERAPY: ICD-10-CM

## 2020-09-02 DIAGNOSIS — D68.51 FACTOR 5 LEIDEN MUTATION, HETEROZYGOUS (H): ICD-10-CM

## 2020-09-02 DIAGNOSIS — I82.602 BLOOD CLOT OF VEIN IN SHOULDER AREA, LEFT: ICD-10-CM

## 2020-09-02 LAB — INR PPP: 2.4 (ref 0.9–1.1)

## 2020-09-02 NOTE — PROGRESS NOTES
ANTICOAGULATION FOLLOW-UP CLINIC VISIT    Patient Name:  Daniela Ladd  Date:  2020  Contact Type:  Telephone/ lm for patient regarding dosing    SUBJECTIVE:  Patient Findings         Clinical Outcomes     Negatives:   Major bleeding event, Thromboembolic event, Anticoagulation-related hospital admission, Anticoagulation-related ED visit, Anticoagulation-related fatality           OBJECTIVE    Recent labs: (last 7 days)     20   INR 2.4*       ASSESSMENT / PLAN  INR assessment SUB    Recheck INR In: 1 WEEK    INR Location Home INR      Anticoagulation Summary  As of 2020    INR goal:   2.5-3.5   TTR:   83.0 % (1 y)   INR used for dosin.4! (2020)   Warfarin maintenance plan:   10 mg (2 mg x 5) every Mon, Wed, Fri; 8 mg (2 mg x 4) all other days   Full warfarin instructions:   10 mg every Mon, Wed, Fri; 8 mg all other days   Weekly warfarin total:   62 mg   No change documented:   Yoly Crowe RN   Plan last modified:   Luisa Horton RN (2020)   Next INR check:   2020   Priority:   High   Target end date:   Indefinite    Indications    Blood clot of vein in shoulder area  left [I82.602]  Factor 5 Leiden mutation  heterozygous (H) [D68.51]  Long-term (current) use of anticoagulants [Z79.01] [Z79.01]             Anticoagulation Episode Summary     INR check location:       Preferred lab:       Send INR reminders to:   [facility]    Comments:   INR goal changed per Dr Kitchen 3/21/18 has her own machine         Anticoagulation Care Providers     Provider Role Specialty Phone number    Ling Trammell PA-C Referring Physician Assistant [phone]            See the Encounter Report to view Anticoagulation Flowsheet and Dosing Calendar (Go to Encounters tab in chart review, and find the Anticoagulation Therapy Visit)        Yoly Crowe, RN

## 2020-09-09 ENCOUNTER — ANTICOAGULATION THERAPY VISIT (OUTPATIENT)
Dept: ANTICOAGULATION | Facility: OTHER | Age: 50
End: 2020-09-09
Attending: PHYSICIAN ASSISTANT
Payer: COMMERCIAL

## 2020-09-09 ENCOUNTER — TRANSFERRED RECORDS (OUTPATIENT)
Dept: HEALTH INFORMATION MANAGEMENT | Facility: OTHER | Age: 50
End: 2020-09-09

## 2020-09-09 DIAGNOSIS — D68.51 FACTOR 5 LEIDEN MUTATION, HETEROZYGOUS (H): ICD-10-CM

## 2020-09-09 DIAGNOSIS — I82.602 BLOOD CLOT OF VEIN IN SHOULDER AREA, LEFT: ICD-10-CM

## 2020-09-09 DIAGNOSIS — Z79.01 LONG TERM CURRENT USE OF ANTICOAGULANT THERAPY: ICD-10-CM

## 2020-09-09 LAB — INR PPP: 2.4 (ref 0.9–1.1)

## 2020-09-09 NOTE — PROGRESS NOTES
ANTICOAGULATION FOLLOW-UP CLINIC VISIT    Patient Name:  Daniela Ladd  Date:  2020  Contact Type:  Telephone/ spoke with patient reviewed dosing    SUBJECTIVE:  Patient Findings         Clinical Outcomes     Negatives:   Major bleeding event, Thromboembolic event, Anticoagulation-related hospital admission, Anticoagulation-related ED visit, Anticoagulation-related fatality           OBJECTIVE    Recent labs: (last 7 days)     20   INR 2.4*       ASSESSMENT / PLAN  INR assessment SUB    Recheck INR In: 1 WEEK    INR Location Home INR      Anticoagulation Summary  As of 2020    INR goal:   2.5-3.5   TTR:   81.1 % (1 y)   INR used for dosin.4! (2020)   Warfarin maintenance plan:   8 mg (2 mg x 4) every Mon, Fri; 10 mg (2 mg x 5) all other days   Full warfarin instructions:   8 mg every Mon, Fri; 10 mg all other days   Weekly warfarin total:   66 mg   Plan last modified:   Yoly Crowe, RN (2020)   Next INR check:   2020   Priority:   High   Target end date:   Indefinite    Indications    Blood clot of vein in shoulder area  left [I82.602]  Factor 5 Leiden mutation  heterozygous (H) [D68.51]  Long-term (current) use of anticoagulants [Z79.01] [Z79.01]             Anticoagulation Episode Summary     INR check location:       Preferred lab:       Send INR reminders to:   [facility]    Comments:   INR goal changed per Dr Kitchen 3/21/18 has her own machine         Anticoagulation Care Providers     Provider Role Specialty Phone number    Ling Trammell PA-C Referring Physician Assistant [phone]            See the Encounter Report to view Anticoagulation Flowsheet and Dosing Calendar (Go to Encounters tab in chart review, and find the Anticoagulation Therapy Visit)        Yoly Crowe, RN

## 2020-09-16 ENCOUNTER — ANTICOAGULATION THERAPY VISIT (OUTPATIENT)
Dept: ANTICOAGULATION | Facility: OTHER | Age: 50
End: 2020-09-16
Attending: PHYSICIAN ASSISTANT
Payer: COMMERCIAL

## 2020-09-16 ENCOUNTER — TRANSFERRED RECORDS (OUTPATIENT)
Dept: HEALTH INFORMATION MANAGEMENT | Facility: OTHER | Age: 50
End: 2020-09-16

## 2020-09-16 DIAGNOSIS — D68.51 FACTOR 5 LEIDEN MUTATION, HETEROZYGOUS (H): ICD-10-CM

## 2020-09-16 DIAGNOSIS — I82.602 BLOOD CLOT OF VEIN IN SHOULDER AREA, LEFT: ICD-10-CM

## 2020-09-16 DIAGNOSIS — Z79.01 LONG TERM CURRENT USE OF ANTICOAGULANT THERAPY: ICD-10-CM

## 2020-09-16 LAB — INR PPP: 3.4 (ref 0.9–1.1)

## 2020-09-16 NOTE — PROGRESS NOTES
ANTICOAGULATION FOLLOW-UP CLINIC VISIT    Patient Name:  Daniela Ladd  Date:  9/16/2020  Contact Type:  No call needed patient to continue same dose    SUBJECTIVE:  Patient Findings         Clinical Outcomes     Negatives:   Major bleeding event, Thromboembolic event, Anticoagulation-related hospital admission, Anticoagulation-related ED visit, Anticoagulation-related fatality           OBJECTIVE    Recent labs: (last 7 days)     09/16/20   INR 3.4*       ASSESSMENT / PLAN  INR assessment THER    Recheck INR In: 1 WEEK    INR Location Home INR      Anticoagulation Summary  As of 9/16/2020    INR goal:   2.5-3.5   TTR:   80.9 % (1 y)   INR used for dosing:   3.4 (9/16/2020)   Warfarin maintenance plan:   8 mg (2 mg x 4) every Mon, Fri; 10 mg (2 mg x 5) all other days   Full warfarin instructions:   8 mg every Mon, Fri; 10 mg all other days   Weekly warfarin total:   66 mg   No change documented:   Yoly Crowe RN   Plan last modified:   Yoly Crowe RN (9/9/2020)   Next INR check:   9/23/2020   Priority:   High   Target end date:   Indefinite    Indications    Blood clot of vein in shoulder area  left [I82.602]  Factor 5 Leiden mutation  heterozygous (H) [D68.51]  Long-term (current) use of anticoagulants [Z79.01] [Z79.01]             Anticoagulation Episode Summary     INR check location:       Preferred lab:       Send INR reminders to:   [facility]    Comments:   INR goal changed per Dr Kitchen 3/21/18 has her own machine         Anticoagulation Care Providers     Provider Role Specialty Phone number    Ling Trmamell PA-C Referring Physician Assistant [phone]            See the Encounter Report to view Anticoagulation Flowsheet and Dosing Calendar (Go to Encounters tab in chart review, and find the Anticoagulation Therapy Visit)        Yoly Crowe RN

## 2020-09-22 NOTE — NURSING NOTE
Patient Information     Patient Name MRN Sex Daniela Calvo [id number] Female 1970      Nursing Note by Alycia Rodriguez at 2017  2:30 PM     Author:  Alycia Rodriguez Service:  (none) Author Type:  NURS- Registered Nurse     Filed:  2017  3:09 PM Encounter Date:  2017 Status:  Signed     :  Alycia Rodriguez (NURS- Registered Nurse)            Labs, venous doppler us, ct scans ordered per provider and sent to be co-signed by provider. Alycia Rodriguez RN 2017  3:09 PM              eyeglasses/brace

## 2020-09-23 ENCOUNTER — ANTICOAGULATION THERAPY VISIT (OUTPATIENT)
Dept: ANTICOAGULATION | Facility: OTHER | Age: 50
End: 2020-09-23
Attending: PHYSICIAN ASSISTANT
Payer: COMMERCIAL

## 2020-09-23 ENCOUNTER — TRANSFERRED RECORDS (OUTPATIENT)
Dept: HEALTH INFORMATION MANAGEMENT | Facility: OTHER | Age: 50
End: 2020-09-23

## 2020-09-23 DIAGNOSIS — I82.602 BLOOD CLOT OF VEIN IN SHOULDER AREA, LEFT: ICD-10-CM

## 2020-09-23 DIAGNOSIS — Z79.01 LONG TERM CURRENT USE OF ANTICOAGULANT THERAPY: ICD-10-CM

## 2020-09-23 DIAGNOSIS — D68.51 FACTOR 5 LEIDEN MUTATION, HETEROZYGOUS (H): ICD-10-CM

## 2020-09-23 LAB — INR PPP: 3.7 (ref 0.9–1.1)

## 2020-09-23 NOTE — PROGRESS NOTES
ANTICOAGULATION FOLLOW-UP CLINIC VISIT    Patient Name:  Daniela Ladd  Date:  9/23/2020  Contact Type:  Telephone/ spoke with patient reviewed dosing    SUBJECTIVE:  Patient Findings         Clinical Outcomes     Negatives:   Major bleeding event, Thromboembolic event, Anticoagulation-related hospital admission, Anticoagulation-related ED visit, Anticoagulation-related fatality           OBJECTIVE    Recent labs: (last 7 days)     09/23/20   INR 3.7*       ASSESSMENT / PLAN  INR assessment THER    Recheck INR In: 1 WEEK    INR Location Clinic      Anticoagulation Summary  As of 9/23/2020    INR goal:   2.5-3.5   TTR:   79.6 % (1 y)   INR used for dosing:   3.7! (9/23/2020)   Warfarin maintenance plan:   8 mg (2 mg x 4) every Mon, Wed, Fri; 10 mg (2 mg x 5) all other days   Full warfarin instructions:   8 mg every Mon, Wed, Fri; 10 mg all other days   Weekly warfarin total:   64 mg   Plan last modified:   Yoly Crowe, RN (9/23/2020)   Next INR check:   9/30/2020   Priority:   High   Target end date:   Indefinite    Indications    Blood clot of vein in shoulder area  left [I82.602]  Factor 5 Leiden mutation  heterozygous (H) [D68.51]  Long-term (current) use of anticoagulants [Z79.01] [Z79.01]             Anticoagulation Episode Summary     INR check location:       Preferred lab:       Send INR reminders to:   [facility]    Comments:   INR goal changed per Dr Kitchen 3/21/18 has her own machine         Anticoagulation Care Providers     Provider Role Specialty Phone number    Ling Trammell PA-C Referring Physician Assistant [phone]            See the Encounter Report to view Anticoagulation Flowsheet and Dosing Calendar (Go to Encounters tab in chart review, and find the Anticoagulation Therapy Visit)        Yoly Crowe, RN

## 2020-09-30 ENCOUNTER — ANTICOAGULATION THERAPY VISIT (OUTPATIENT)
Dept: ANTICOAGULATION | Facility: OTHER | Age: 50
End: 2020-09-30
Attending: PHYSICIAN ASSISTANT
Payer: COMMERCIAL

## 2020-09-30 DIAGNOSIS — D68.51 FACTOR 5 LEIDEN MUTATION, HETEROZYGOUS (H): ICD-10-CM

## 2020-09-30 DIAGNOSIS — I82.602 BLOOD CLOT OF VEIN IN SHOULDER AREA, LEFT: ICD-10-CM

## 2020-09-30 DIAGNOSIS — Z79.01 LONG TERM CURRENT USE OF ANTICOAGULANT THERAPY: ICD-10-CM

## 2020-09-30 LAB — INR PPP: 3.7 (ref 0.9–1.1)

## 2020-09-30 NOTE — PROGRESS NOTES
ANTICOAGULATION FOLLOW-UP CLINIC VISIT    Patient Name:  Daniela Ladd  Date:  9/30/2020  Contact Type:  Telephone/ Fax recieved via Remote INR by Gareth    SUBJECTIVE:  Patient Findings         Clinical Outcomes     Negatives:   Major bleeding event, Thromboembolic event, Anticoagulation-related hospital admission, Anticoagulation-related ED visit, Anticoagulation-related fatality           OBJECTIVE    Recent labs: (last 7 days)     09/30/20   INR 3.7*       ASSESSMENT / PLAN  INR assessment SUPRA    Recheck INR In: 1 WEEK    INR Location Home INR      Anticoagulation Summary  As of 9/30/2020    INR goal:   2.5-3.5   TTR:   77.7 % (1 y)   INR used for dosing:   3.7! (9/30/2020)   Warfarin maintenance plan:   10 mg (2 mg x 5) every Sun, Tue, Sat; 8 mg (2 mg x 4) all other days   Full warfarin instructions:   10 mg every Sun, Tue, Sat; 8 mg all other days   Weekly warfarin total:   62 mg   Plan last modified:   Swapna Martin RN (9/30/2020)   Next INR check:   10/7/2020   Priority:   High   Target end date:   Indefinite    Indications    Blood clot of vein in shoulder area  left [I82.602]  Factor 5 Leiden mutation  heterozygous (H) [D68.51]  Long-term (current) use of anticoagulants [Z79.01] [Z79.01]             Anticoagulation Episode Summary     INR check location:       Preferred lab:       Send INR reminders to:   [facility]    Comments:   INR goal changed per Dr Kitchen 3/21/18 has her own machine         Anticoagulation Care Providers     Provider Role Specialty Phone number    Ling Trammell PA-C Referring Physician Assistant [phone]            See the Encounter Report to view Anticoagulation Flowsheet and Dosing Calendar (Go to Encounters tab in chart review, and find the Anticoagulation Therapy Visit)    Patient supra therapeutic. Discussed with patient elevated INR risks including bleeding/injury. Patient denies any recent change in health or medications. Feels well.  Dosing changed discussed to Warfarin 10 mg Sunday and Tuesday, 8 mg all other days. Patient agrees with change and verbalizes understanding. Recheck INR 1 week via Home INR.     Swapna Martin RN

## 2020-10-07 ENCOUNTER — ANTICOAGULATION THERAPY VISIT (OUTPATIENT)
Dept: ANTICOAGULATION | Facility: OTHER | Age: 50
End: 2020-10-07
Attending: PHYSICIAN ASSISTANT
Payer: COMMERCIAL

## 2020-10-07 ENCOUNTER — TRANSFERRED RECORDS (OUTPATIENT)
Dept: HEALTH INFORMATION MANAGEMENT | Facility: OTHER | Age: 50
End: 2020-10-07

## 2020-10-07 DIAGNOSIS — I82.602 BLOOD CLOT OF VEIN IN SHOULDER AREA, LEFT: ICD-10-CM

## 2020-10-07 DIAGNOSIS — Z79.01 LONG TERM CURRENT USE OF ANTICOAGULANT THERAPY: ICD-10-CM

## 2020-10-07 DIAGNOSIS — D68.51 FACTOR 5 LEIDEN MUTATION, HETEROZYGOUS (H): ICD-10-CM

## 2020-10-07 LAB — INR PPP: 3.4 (ref 0.9–1.1)

## 2020-10-07 NOTE — PROGRESS NOTES
ANTICOAGULATION FOLLOW-UP CLINIC VISIT    Patient Name:  Daniela Ladd  Date:  10/7/2020  Contact Type:  no call needed patient to continue same dose    SUBJECTIVE:  Patient Findings         Clinical Outcomes     Negatives:  Major bleeding event, Thromboembolic event, Anticoagulation-related hospital admission, Anticoagulation-related ED visit, Anticoagulation-related fatality           OBJECTIVE    Recent labs: (last 7 days)     10/07/20   INR 3.4*       ASSESSMENT / PLAN  INR assessment THER    Recheck INR In: 1 WEEK    INR Location Home INR      Anticoagulation Summary  As of 10/7/2020    INR goal:  2.5-3.5   TTR:  76.5 % (1 y)   INR used for dosing:  3.4 (10/7/2020)   Warfarin maintenance plan:  10 mg (2 mg x 5) every Sun, Tue, Sat; 8 mg (2 mg x 4) all other days   Full warfarin instructions:  10 mg every Sun, Tue, Sat; 8 mg all other days   Weekly warfarin total:  62 mg   No change documented:  Yoly Crowe RN   Plan last modified:  Swapna Martin RN (9/30/2020)   Next INR check:  10/14/2020   Priority:  High   Target end date:  Indefinite    Indications    Blood clot of vein in shoulder area  left [I82.602]  Factor 5 Leiden mutation  heterozygous (H) [D68.51]  Long-term (current) use of anticoagulants [Z79.01] [Z79.01]             Anticoagulation Episode Summary     INR check location:      Preferred lab:      Send INR reminders to:  [facility]    Comments:  INR goal changed per Dr Kitchen 3/21/18 has her own machine         Anticoagulation Care Providers     Provider Role Specialty Phone number    Ling Trammell PA-C Referring Physician Assistant [phone]            See the Encounter Report to view Anticoagulation Flowsheet and Dosing Calendar (Go to Encounters tab in chart review, and find the Anticoagulation Therapy Visit)        Yoly Crowe, RN

## 2020-10-11 ENCOUNTER — ALLIED HEALTH/NURSE VISIT (OUTPATIENT)
Dept: FAMILY MEDICINE | Facility: OTHER | Age: 50
End: 2020-10-11
Payer: COMMERCIAL

## 2020-10-11 DIAGNOSIS — R05.9 COUGH: Primary | ICD-10-CM

## 2020-10-11 PROCEDURE — U0003 INFECTIOUS AGENT DETECTION BY NUCLEIC ACID (DNA OR RNA); SEVERE ACUTE RESPIRATORY SYNDROME CORONAVIRUS 2 (SARS-COV-2) (CORONAVIRUS DISEASE [COVID-19]), AMPLIFIED PROBE TECHNIQUE, MAKING USE OF HIGH THROUGHPUT TECHNOLOGIES AS DESCRIBED BY CMS-2020-01-R: HCPCS | Mod: ZL

## 2020-10-11 PROCEDURE — 99207 PR NO CHARGE NURSE ONLY: CPT

## 2020-10-11 PROCEDURE — C9803 HOPD COVID-19 SPEC COLLECT: HCPCS

## 2020-10-12 LAB
SARS-COV-2 RNA SPEC QL NAA+PROBE: NOT DETECTED
SPECIMEN SOURCE: NORMAL

## 2020-10-14 ENCOUNTER — ANTICOAGULATION THERAPY VISIT (OUTPATIENT)
Dept: ANTICOAGULATION | Facility: OTHER | Age: 50
End: 2020-10-14
Attending: PHYSICIAN ASSISTANT
Payer: COMMERCIAL

## 2020-10-14 ENCOUNTER — TRANSFERRED RECORDS (OUTPATIENT)
Dept: HEALTH INFORMATION MANAGEMENT | Facility: OTHER | Age: 50
End: 2020-10-14

## 2020-10-14 DIAGNOSIS — D68.51 FACTOR 5 LEIDEN MUTATION, HETEROZYGOUS (H): ICD-10-CM

## 2020-10-14 DIAGNOSIS — I82.602 BLOOD CLOT OF VEIN IN SHOULDER AREA, LEFT: ICD-10-CM

## 2020-10-14 DIAGNOSIS — Z79.01 LONG TERM CURRENT USE OF ANTICOAGULANT THERAPY: ICD-10-CM

## 2020-10-14 LAB — INR PPP: 2.9 (ref 0.9–1.1)

## 2020-10-14 NOTE — PROGRESS NOTES
ANTICOAGULATION FOLLOW-UP CLINIC VISIT    Patient Name:  Daniela Ladd  Date:  10/14/2020  Contact Type:  no call needed patient to continue same dose    SUBJECTIVE:  Patient Findings         Clinical Outcomes     Negatives:  Major bleeding event, Thromboembolic event, Anticoagulation-related hospital admission, Anticoagulation-related ED visit, Anticoagulation-related fatality           OBJECTIVE    Recent labs: (last 7 days)     10/14/20   INR 2.9*       ASSESSMENT / PLAN  INR assessment THER    Recheck INR In: 1 WEEK    INR Location Home INR      Anticoagulation Summary  As of 10/14/2020    INR goal:  2.5-3.5   TTR:  76.5 % (1 y)   INR used for dosin.9 (10/14/2020)   Warfarin maintenance plan:  10 mg (2 mg x 5) every Sun, Tue, Sat; 8 mg (2 mg x 4) all other days   Full warfarin instructions:  10 mg every Sun, Tue, Sat; 8 mg all other days   Weekly warfarin total:  62 mg   Plan last modified:  Swapna Martin RN (2020)   Next INR check:  10/21/2020   Priority:  High   Target end date:  Indefinite    Indications    Blood clot of vein in shoulder area  left [I82.602]  Factor 5 Leiden mutation  heterozygous (H) [D68.51]  Long-term (current) use of anticoagulants [Z79.01] [Z79.01]             Anticoagulation Episode Summary     INR check location:      Preferred lab:      Send INR reminders to:  [facility]    Comments:  INR goal changed per Dr Kitchen 3/21/18 has her own machine         Anticoagulation Care Providers     Provider Role Specialty Phone number    PENELOPEja, Ling Rodriguez PA-C Referring Physician Assistant [phone]            See the Encounter Report to view Anticoagulation Flowsheet and Dosing Calendar (Go to Encounters tab in chart review, and find the Anticoagulation Therapy Visit)        Yoly Crowe, RN

## 2020-10-21 ENCOUNTER — ANTICOAGULATION THERAPY VISIT (OUTPATIENT)
Dept: ANTICOAGULATION | Facility: OTHER | Age: 50
End: 2020-10-21
Attending: PHYSICIAN ASSISTANT
Payer: COMMERCIAL

## 2020-10-21 ENCOUNTER — TRANSFERRED RECORDS (OUTPATIENT)
Dept: HEALTH INFORMATION MANAGEMENT | Facility: OTHER | Age: 50
End: 2020-10-21

## 2020-10-21 DIAGNOSIS — I82.602 BLOOD CLOT OF VEIN IN SHOULDER AREA, LEFT: ICD-10-CM

## 2020-10-21 DIAGNOSIS — Z79.01 LONG TERM CURRENT USE OF ANTICOAGULANT THERAPY: ICD-10-CM

## 2020-10-21 DIAGNOSIS — D68.51 FACTOR 5 LEIDEN MUTATION, HETEROZYGOUS (H): ICD-10-CM

## 2020-10-21 LAB — INR PPP: 3.2 (ref 0.9–1.1)

## 2020-10-21 NOTE — PROGRESS NOTES
ANTICOAGULATION FOLLOW-UP CLINIC VISIT    Patient Name:  Daniela Ladd  Date:  10/21/2020  Contact Type:  fax from [employer] service    SUBJECTIVE:  Patient Findings     Comments:  No phone call due to INR being in range.  Patient has been instructed to call with new medications or changes.          Clinical Outcomes     Negatives:  Major bleeding event, Thromboembolic event, Anticoagulation-related hospital admission, Anticoagulation-related ED visit, Anticoagulation-related fatality    Comments:  No phone call due to INR being in range.  Patient has been instructed to call with new medications or changes.             OBJECTIVE    Recent labs: (last 7 days)     10/21/20   INR 3.2*       ASSESSMENT / PLAN  INR assessment THER    Recheck INR In: 1 WEEK    INR Location Home INR      Anticoagulation Summary  As of 10/21/2020    INR goal:  2.5-3.5   TTR:  76.5 % (1 y)   INR used for dosing:  3.2 (10/21/2020)   Warfarin maintenance plan:  10 mg (2 mg x 5) every Sun, Tue, Sat; 8 mg (2 mg x 4) all other days   Full warfarin instructions:  10 mg every Sun, Tue, Sat; 8 mg all other days   Weekly warfarin total:  62 mg   No change documented:  Renee Kevin RN   Plan last modified:  Swapna Martin RN (9/30/2020)   Next INR check:  10/28/2020   Priority:  High   Target end date:  Indefinite    Indications    Blood clot of vein in shoulder area  left [I82.602]  Factor 5 Leiden mutation  heterozygous (H) [D68.51]  Long-term (current) use of anticoagulants [Z79.01] [Z79.01]             Anticoagulation Episode Summary     INR check location:      Preferred lab:      Send INR reminders to:  [facility]    Comments:  INR goal changed per Dr Kitchen 3/21/18 has her own machine         Anticoagulation Care Providers     Provider Role Specialty Phone number    Ling Trammell PA-C Referring Physician Assistant [phone]            See the Encounter Report to view Anticoagulation Flowsheet and  Dosing Calendar (Go to Encounters tab in chart review, and find the Anticoagulation Therapy Visit)    No encounter, INR received via fax.  INR in range so no telephone call.  Patient is to call INR clinic if any changes affecting INR.       Renee Kevin RN               ANTICOAGULATION FOLLOW-UP CLINIC VISIT    Patient Name:  Daniela Ladd  Date:  10/21/2020  Contact Type:  fax from [employer] INR self testing service    SUBJECTIVE:  Patient Findings     Comments:  No phone call due to INR being in range.  Patient has been instructed to call with new medications or changes.          Clinical Outcomes     Negatives:  Major bleeding event, Thromboembolic event, Anticoagulation-related hospital admission, Anticoagulation-related ED visit, Anticoagulation-related fatality    Comments:  No phone call due to INR being in range.  Patient has been instructed to call with new medications or changes.             OBJECTIVE    Recent labs: (last 7 days)     10/21/20   INR 3.2*       ASSESSMENT / PLAN  INR assessment THER    Recheck INR In: 1 WEEK    INR Location Home INR      Anticoagulation Summary  As of 10/21/2020    INR goal:  2.5-3.5   TTR:  76.5 % (1 y)   INR used for dosing:  3.2 (10/21/2020)   Warfarin maintenance plan:  10 mg (2 mg x 5) every Sun, Tue, Sat; 8 mg (2 mg x 4) all other days   Full warfarin instructions:  10 mg every Sun, Tue, Sat; 8 mg all other days   Weekly warfarin total:  62 mg   No change documented:  Renee Kevin RN   Plan last modified:  Swapna Martin RN (9/30/2020)   Next INR check:  10/28/2020   Priority:  High   Target end date:  Indefinite    Indications    Blood clot of vein in shoulder area  left [I82.602]  Factor 5 Leiden mutation  heterozygous (H) [D68.51]  Long-term (current) use of anticoagulants [Z79.01] [Z79.01]             Anticoagulation Episode Summary     INR check location:      Preferred lab:      Send INR reminders to:  [facility]    Comments:  INR  goal changed per Dr Kitchen 3/21/18 has her own machine         Anticoagulation Care Providers     Provider Role Specialty Phone number    Oja, Ling Rodriguez PA-C Referring Physician Assistant [phone]            See the Encounter Report to view Anticoagulation Flowsheet and Dosing Calendar (Go to Encounters tab in chart review, and find the Anticoagulation Therapy Visit)     No encounter, INR received via fax.  INR in range so no telephone call.  Patient is to call INR clinic if any changes affecting INR.       Renee Kevin RN

## 2020-10-28 ENCOUNTER — TRANSFERRED RECORDS (OUTPATIENT)
Dept: HEALTH INFORMATION MANAGEMENT | Facility: OTHER | Age: 50
End: 2020-10-28

## 2020-10-28 ENCOUNTER — ANTICOAGULATION THERAPY VISIT (OUTPATIENT)
Dept: ANTICOAGULATION | Facility: OTHER | Age: 50
End: 2020-10-28
Attending: PHYSICIAN ASSISTANT
Payer: COMMERCIAL

## 2020-10-28 DIAGNOSIS — D68.51 FACTOR 5 LEIDEN MUTATION, HETEROZYGOUS (H): ICD-10-CM

## 2020-10-28 DIAGNOSIS — I82.602 BLOOD CLOT OF VEIN IN SHOULDER AREA, LEFT: ICD-10-CM

## 2020-10-28 DIAGNOSIS — Z79.01 LONG TERM CURRENT USE OF ANTICOAGULANT THERAPY: ICD-10-CM

## 2020-10-28 LAB — INR PPP: 2.9 (ref 0.9–1.1)

## 2020-10-28 NOTE — PROGRESS NOTES
ANTICOAGULATION FOLLOW-UP CLINIC VISIT    Patient Name:  Daniela Ladd  Date:  10/28/2020  Contact Type:  fax from [facility] INR self testing service    SUBJECTIVE:  Patient Findings     Comments:  No phone call due to INR being in range.  Patient has been instructed to call with new medications or changes.          Clinical Outcomes     Negatives:  Major bleeding event, Thromboembolic event, Anticoagulation-related hospital admission, Anticoagulation-related ED visit, Anticoagulation-related fatality    Comments:  No phone call due to INR being in range.  Patient has been instructed to call with new medications or changes.             OBJECTIVE    Recent labs: (last 7 days)     10/28/20   INR 2.9*       ASSESSMENT / PLAN  INR assessment THER    Recheck INR In: 1 WEEK    INR Location Home INR      Anticoagulation Summary  As of 10/28/2020    INR goal:  2.5-3.5   TTR:  76.5 % (1 y)   INR used for dosin.9 (10/28/2020)   Warfarin maintenance plan:  10 mg (2 mg x 5) every Sun, Tue, Sat; 8 mg (2 mg x 4) all other days   Full warfarin instructions:  10 mg every Sun, Tue, Sat; 8 mg all other days   Weekly warfarin total:  62 mg   No change documented:  Renee Kevin RN   Plan last modified:  Swapna Martin RN (2020)   Next INR check:  2020   Priority:  High   Target end date:  Indefinite    Indications    Blood clot of vein in shoulder area  left [I82.602]  Factor 5 Leiden mutation  heterozygous (H) [D68.51]  Long-term (current) use of anticoagulants [Z79.01] [Z79.01]             Anticoagulation Episode Summary     INR check location:      Preferred lab:      Send INR reminders to:  [facility]    Comments:  INR goal changed per Dr Kitchen 3/21/18 has her own machine         Anticoagulation Care Providers     Provider Role Specialty Phone number    Ling Trammell PA-C Referring Physician Assistant [phone]            See the Encounter Report to view Anticoagulation Flowsheet  and Dosing Calendar (Go to Encounters tab in chart review, and find the Anticoagulation Therapy Visit)     No encounter, INR received via fax.  INR in range so no telephone call.  Patient is to call INR clinic if any changes affecting INR.       Renee Kevin RN

## 2020-11-04 ENCOUNTER — TRANSFERRED RECORDS (OUTPATIENT)
Dept: HEALTH INFORMATION MANAGEMENT | Facility: OTHER | Age: 50
End: 2020-11-04

## 2020-11-04 ENCOUNTER — ANTICOAGULATION THERAPY VISIT (OUTPATIENT)
Dept: ANTICOAGULATION | Facility: OTHER | Age: 50
End: 2020-11-04
Attending: PHYSICIAN ASSISTANT
Payer: COMMERCIAL

## 2020-11-04 DIAGNOSIS — I82.602 BLOOD CLOT OF VEIN IN SHOULDER AREA, LEFT: ICD-10-CM

## 2020-11-04 DIAGNOSIS — Z79.01 LONG TERM CURRENT USE OF ANTICOAGULANT THERAPY: ICD-10-CM

## 2020-11-04 DIAGNOSIS — D68.51 FACTOR 5 LEIDEN MUTATION, HETEROZYGOUS (H): ICD-10-CM

## 2020-11-04 LAB — INR PPP: 2.9 (ref 0.9–1.1)

## 2020-11-04 NOTE — PROGRESS NOTES
ANTICOAGULATION FOLLOW-UP CLINIC VISIT    Patient Name:  Daniela Ladd  Date:  2020  Contact Type:  no call needed patient to conatinue same dose    SUBJECTIVE:  Patient Findings         Clinical Outcomes     Negatives:  Major bleeding event, Thromboembolic event, Anticoagulation-related hospital admission, Anticoagulation-related ED visit, Anticoagulation-related fatality           OBJECTIVE    Recent labs: (last 7 days)     20   INR 2.9*       ASSESSMENT / PLAN  INR assessment THER    Recheck INR In: 1 WEEK    INR Location Home INR      Anticoagulation Summary  As of 2020    INR goal:  2.5-3.5   TTR:  76.5 % (1 y)   INR used for dosin.9 (2020)   Warfarin maintenance plan:  10 mg (2 mg x 5) every Sun, Tue, Sat; 8 mg (2 mg x 4) all other days   Full warfarin instructions:  10 mg every Sun, Tue, Sat; 8 mg all other days   Weekly warfarin total:  62 mg   No change documented:  Yoly Crowe RN   Plan last modified:  Swapna Martin RN (2020)   Next INR check:  2020   Priority:  High   Target end date:  Indefinite    Indications    Blood clot of vein in shoulder area  left [I82.602]  Factor 5 Leiden mutation  heterozygous (H) [D68.51]  Long-term (current) use of anticoagulants [Z79.01] [Z79.01]             Anticoagulation Episode Summary     INR check location:      Preferred lab:      Send INR reminders to:  [facility]    Comments:  INR goal changed per Dr Kitchen 3/21/18 has her own machine         Anticoagulation Care Providers     Provider Role Specialty Phone number    Ling Trammell PA-C Referring Physician Assistant [phone]            See the Encounter Report to view Anticoagulation Flowsheet and Dosing Calendar (Go to Encounters tab in chart review, and find the Anticoagulation Therapy Visit)        Yoly Crowe, RN

## 2020-11-05 ENCOUNTER — VIRTUAL VISIT (OUTPATIENT)
Dept: FAMILY MEDICINE | Facility: OTHER | Age: 50
End: 2020-11-05
Attending: PHYSICIAN ASSISTANT
Payer: COMMERCIAL

## 2020-11-05 VITALS — BODY MASS INDEX: 31.08 KG/M2 | WEIGHT: 198 LBS | HEIGHT: 67 IN

## 2020-11-05 DIAGNOSIS — J34.89 RHINORRHEA: ICD-10-CM

## 2020-11-05 DIAGNOSIS — R53.81 MALAISE: ICD-10-CM

## 2020-11-05 DIAGNOSIS — Z71.89 EDUCATED ABOUT 2019-NCOV INFECTION: Primary | ICD-10-CM

## 2020-11-05 DIAGNOSIS — R59.0 SUBMANDIBULAR LYMPHADENOPATHY: ICD-10-CM

## 2020-11-05 PROCEDURE — 99212 OFFICE O/P EST SF 10 MIN: CPT | Mod: 95 | Performed by: PHYSICIAN ASSISTANT

## 2020-11-05 ASSESSMENT — MIFFLIN-ST. JEOR: SCORE: 1552.81

## 2020-11-05 ASSESSMENT — PAIN SCALES - GENERAL: PAINLEVEL: NO PAIN (0)

## 2020-11-05 NOTE — NURSING NOTE
"Chief Complaint   Patient presents with     Suspected Covid     Patient has cough that she states just started up yesterday. She is unsure if she has been in contact with someone positive in the last month, but she was a little bit longer than a month ago.     Initial Ht 1.689 m (5' 6.5\")   Wt 89.8 kg (198 lb)   Breastfeeding No   BMI 31.48 kg/m   Estimated body mass index is 31.48 kg/m  as calculated from the following:    Height as of this encounter: 1.689 m (5' 6.5\").    Weight as of this encounter: 89.8 kg (198 lb).    Medication Reconciliation: complete      Janelle Amaro  "

## 2020-11-05 NOTE — PROGRESS NOTES
"Daniela Ladd is a 50 year old female who is being evaluated via a billable telephone visit.      The patient has been notified of following:     \"This telephone visit will be conducted via a call between you and your physician/provider. We have found that certain health care needs can be provided without the need for a physical exam.  This service lets us provide the care you need with a short phone conversation.  If a prescription is necessary we can send it directly to your pharmacy.  If lab work is needed we can place an order for that and you can then stop by our lab to have the test done at a later time.    Telephone visits are billed at different rates depending on your insurance coverage. During this emergency period, for some insurers they may be billed the same as an in-person visit.  Please reach out to your insurance provider with any questions.    If during the course of the call the physician/provider feels a telephone visit is not appropriate, you will not be charged for this service.\"    Patient has given verbal consent for Telephone visit?  Yes    What phone number would you like to be contacted at? [phone]    How would you like to obtain your AVS? Brian Marshall     Daniela Ladd is a 50 year old female who presents via phone visit today for the following health issues:    HPI  Patient noted a clear rhinorrhea without pressure or pain that started yesterday with slight general malaise, slight submandibular lymphadenopathy, and also noted occasional cough this morning.  She denies any fevers, chills or night sweats, shortness of breath, new rash, loss of taste or smell, sinus pressure or pain, ear pain, changes to vision or hearing, difficulty breathing or swallowing, chest pain, myalgias, GI upset such as nausea, vomiting, or diarrhea.  She was tested for Covid on 10/11/2020 and it was negative.  She does use occasional Tylenol and is wondering if she can use Mucinex and " "Flonase.  She has a history of factor V Leiden.    Review of Systems   Constitutional, HEENT, cardiovascular, pulmonary, gi and gu systems are negative, except as otherwise noted.    Objective   Vitals - Patient Reported  Pain Score: No Pain (0)    alert, no distress and fatigued  PSYCH: Alert and oriented times 3; coherent speech, normal   rate and volume, able to articulate logical thoughts, able   to abstract reason, no tangential thoughts, no hallucinations   or delusions  Her affect is normal  RESP: No cough, no audible wheezing, able to talk in full sentences  Remainder of exam unable to be completed due to telephone visits    Assessment & Plan     1. Educated about 2019-nCoV infection  2. Rhinorrhea  3. Submandibular lymphadenopathy  4. Malaise    As she has a mild cough that is not persistent, no known exposure, mild symptomatology, and we decided to go forego testing today after shared decision making.      If symptoms persist or worsen would consider COVID-19 testing at that time.  Advised she follow-up with the clinic to discuss her symptoms with a provider if this occurs.    She will follow-up as needed.     Tobacco Cessation:   reports that she has been smoking cigarettes. She has a 22.50 pack-year smoking history. She has never used smokeless tobacco.  Tobacco Cessation Action Plan: Information offered: Patient not interested at this time  Self help information given to patient    BMI:   Estimated body mass index is 31.48 kg/m  as calculated from the following:    Height as of this encounter: 1.689 m (5' 6.5\").    Weight as of this encounter: 89.8 kg (198 lb).       Work on weight loss  Regular exercise  Stop smoking    No follow-ups on file.    DAISHA Light  [facility]    Phone call duration:  8 minutes              "

## 2020-11-11 ENCOUNTER — TRANSFERRED RECORDS (OUTPATIENT)
Dept: HEALTH INFORMATION MANAGEMENT | Facility: OTHER | Age: 50
End: 2020-11-11

## 2020-11-11 ENCOUNTER — ANTICOAGULATION THERAPY VISIT (OUTPATIENT)
Dept: ANTICOAGULATION | Facility: OTHER | Age: 50
End: 2020-11-11
Attending: PHYSICIAN ASSISTANT
Payer: COMMERCIAL

## 2020-11-11 DIAGNOSIS — Z79.01 LONG TERM CURRENT USE OF ANTICOAGULANT THERAPY: ICD-10-CM

## 2020-11-11 DIAGNOSIS — I82.602 BLOOD CLOT OF VEIN IN SHOULDER AREA, LEFT: ICD-10-CM

## 2020-11-11 DIAGNOSIS — D68.51 FACTOR 5 LEIDEN MUTATION, HETEROZYGOUS (H): ICD-10-CM

## 2020-11-11 LAB — INR PPP: 2.9 (ref 0.9–1.1)

## 2020-11-11 NOTE — PROGRESS NOTES
ANTICOAGULATION FOLLOW-UP CLINIC VISIT    Patient Name:  Daniela Ladd  Date:  2020  Contact Type:  no call needed patient to continue same dose    SUBJECTIVE:  Patient Findings         Clinical Outcomes     Negatives:  Major bleeding event, Thromboembolic event, Anticoagulation-related hospital admission, Anticoagulation-related ED visit, Anticoagulation-related fatality           OBJECTIVE    Recent labs: (last 7 days)     20   INR 2.9*       ASSESSMENT / PLAN  INR assessment THER    Recheck INR In: 1 WEEK    INR Location Home INR      Anticoagulation Summary  As of 2020    INR goal:  2.5-3.5   TTR:  76.5 % (1 y)   INR used for dosin.9 (2020)   Warfarin maintenance plan:  10 mg (2 mg x 5) every Sun, Tue, Sat; 8 mg (2 mg x 4) all other days   Full warfarin instructions:  10 mg every Sun, Tue, Sat; 8 mg all other days   Weekly warfarin total:  62 mg   No change documented:  Yoly Crowe RN   Plan last modified:  Swapna Martin RN (2020)   Next INR check:  2020   Priority:  High   Target end date:  Indefinite    Indications    Blood clot of vein in shoulder area  left [I82.602]  Factor 5 Leiden mutation  heterozygous (H) [D68.51]  Long-term (current) use of anticoagulants [Z79.01] [Z79.01]             Anticoagulation Episode Summary     INR check location:      Preferred lab:      Send INR reminders to:  [facility]    Comments:  INR goal changed per Dr Kitchen 3/21/18 has her own machine         Anticoagulation Care Providers     Provider Role Specialty Phone number    Ling Trammell PA-C Referring Physician Assistant [phone]            See the Encounter Report to view Anticoagulation Flowsheet and Dosing Calendar (Go to Encounters tab in chart review, and find the Anticoagulation Therapy Visit)        Yoly Crowe, RN

## 2020-11-18 ENCOUNTER — TRANSFERRED RECORDS (OUTPATIENT)
Dept: HEALTH INFORMATION MANAGEMENT | Facility: OTHER | Age: 50
End: 2020-11-18

## 2020-11-18 ENCOUNTER — ANTICOAGULATION THERAPY VISIT (OUTPATIENT)
Dept: ANTICOAGULATION | Facility: OTHER | Age: 50
End: 2020-11-18
Attending: PHYSICIAN ASSISTANT
Payer: COMMERCIAL

## 2020-11-18 DIAGNOSIS — D68.51 FACTOR 5 LEIDEN MUTATION, HETEROZYGOUS (H): ICD-10-CM

## 2020-11-18 DIAGNOSIS — Z79.01 LONG TERM CURRENT USE OF ANTICOAGULANT THERAPY: ICD-10-CM

## 2020-11-18 DIAGNOSIS — I82.602 BLOOD CLOT OF VEIN IN SHOULDER AREA, LEFT: ICD-10-CM

## 2020-11-18 LAB — INR PPP: 3.1 (ref 0.9–1.1)

## 2020-11-18 NOTE — PROGRESS NOTES
ANTICOAGULATION FOLLOW-UP CLINIC VISIT    Patient Name:  Daniela Ladd  Date:  11/18/2020  Contact Type:  no call needed patient to continue same dose    SUBJECTIVE:  Patient Findings         Clinical Outcomes     Negatives:  Major bleeding event, Thromboembolic event, Anticoagulation-related hospital admission, Anticoagulation-related ED visit, Anticoagulation-related fatality           OBJECTIVE    Recent labs: (last 7 days)     11/18/20   INR 3.1*       ASSESSMENT / PLAN  INR assessment THER    Recheck INR In: 1 WEEK    INR Location Home INR      Anticoagulation Summary  As of 11/18/2020    INR goal:  2.5-3.5   TTR:  76.5 % (1 y)   INR used for dosing:  3.1 (11/18/2020)   Warfarin maintenance plan:  10 mg (2 mg x 5) every Sun, Tue, Sat; 8 mg (2 mg x 4) all other days   Full warfarin instructions:  10 mg every Sun, Tue, Sat; 8 mg all other days   Weekly warfarin total:  62 mg   No change documented:  Yoly Crowe RN   Plan last modified:  Swapna Martin RN (9/30/2020)   Next INR check:  11/25/2020   Priority:  High   Target end date:  Indefinite    Indications    Blood clot of vein in shoulder area  left [I82.602]  Factor 5 Leiden mutation  heterozygous (H) [D68.51]  Long-term (current) use of anticoagulants [Z79.01] [Z79.01]             Anticoagulation Episode Summary     INR check location:      Preferred lab:      Send INR reminders to:  [facility]    Comments:  INR goal changed per Dr Kitchen 3/21/18 has her own machine         Anticoagulation Care Providers     Provider Role Specialty Phone number    Ling Trammell PA-C Referring Physician Assistant [phone]            See the Encounter Report to view Anticoagulation Flowsheet and Dosing Calendar (Go to Encounters tab in chart review, and find the Anticoagulation Therapy Visit)        Yoly Crowe, RN

## 2020-11-20 ENCOUNTER — OFFICE VISIT (OUTPATIENT)
Dept: FAMILY MEDICINE | Facility: OTHER | Age: 50
End: 2020-11-20
Attending: INTERNAL MEDICINE
Payer: COMMERCIAL

## 2020-11-20 VITALS
HEART RATE: 78 BPM | OXYGEN SATURATION: 98 % | SYSTOLIC BLOOD PRESSURE: 110 MMHG | TEMPERATURE: 96.8 F | WEIGHT: 201.3 LBS | DIASTOLIC BLOOD PRESSURE: 80 MMHG | RESPIRATION RATE: 18 BRPM | BODY MASS INDEX: 32 KG/M2

## 2020-11-20 DIAGNOSIS — J01.00 ACUTE NON-RECURRENT MAXILLARY SINUSITIS: Primary | ICD-10-CM

## 2020-11-20 DIAGNOSIS — J01.90 ACUTE SINUSITIS WITH SYMPTOMS > 10 DAYS: ICD-10-CM

## 2020-11-20 PROCEDURE — 99214 OFFICE O/P EST MOD 30 MIN: CPT | Performed by: NURSE PRACTITIONER

## 2020-11-20 RX ORDER — CEFDINIR 300 MG/1
300 CAPSULE ORAL 2 TIMES DAILY
Qty: 14 CAPSULE | Refills: 0 | Status: SHIPPED | OUTPATIENT
Start: 2020-11-20 | End: 2020-11-27

## 2020-11-20 ASSESSMENT — PAIN SCALES - GENERAL: PAINLEVEL: MODERATE PAIN (4)

## 2020-11-20 NOTE — NURSING NOTE
"Chief Complaint   Patient presents with     Nasal Congestion     Cough       Initial There were no vitals taken for this visit. Estimated body mass index is 31.48 kg/m  as calculated from the following:    Height as of 11/5/20: 1.689 m (5' 6.5\").    Weight as of 11/5/20: 89.8 kg (198 lb).  Medication Reconciliation: complete    ELIA COTO LPN  "

## 2020-11-20 NOTE — PROGRESS NOTES
HPI:    Daniela Ladd is a 50 year old female  who presents to [facility] today for sinus.    Started with stuffy nose and full feeling in head 16 days ago that has progressed to sinus pressure and cough.   Ear pain, plugged feeling and increased sinus pressure/pain worsening the past few days.  Eyes are now feeling heavy and tired.  Mild to moderate headaches, taking Tylenol with relief.  Cough persisting but lessening the past few days. Cough has been productive.  Chest feels congested.  No chest tightness or heaviness.  No shortness of breath.  Appetite fair to baseline.  No nausea, vomiting or diarrhea.  Energy decreased.    No fevers.  No loss of taste or smell.  Nasal congestion persisting.  Post nasal drainage intermittently.  Sore throat initially, resolved now.  Using Flonase and Albuterol inhaler as needed.   Using saline nasal spray frequently through out the day.  Uses wood stove so dry heat.  Taking mucinex for the initial 1.5 weeks.  On Warfarin.  Daily smoker        Past Medical History:   Diagnosis Date     Abdominal pain     2010     Contact dermatitis     Atrophic dermatitis     Encounter for insertion of mirena IUD 2018     Hidradenitis suppurativa     2010     Major depressive disorder, single episode     ,Situational related to marital discord     Nicotine dependence, uncomplicated     2010     Other acne     Facial acne     Personal history of other medical treatment (CODE)     10/00, III, para 1-0-2-1, vaginal delivery , two spontaneous first trimester miscarriage     Personal history of other medical treatment (CODE)     S/P cryotherapy Aug 2003.  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/25/2013     Past Surgical History:   Procedure Laterality Date     ANKLE SURGERY Left     Sidney     EXTRACTION(S) DENTAL       LAPAROSCOPIC TUBAL LIGATION  2004      SALPINGO-OOPHORECTOMY BILATERAL Right 04/14/2011    Planned Lap RSO     TONSILLECTOMY      1980s     Social History     Tobacco Use     Smoking status: Current Every Day Smoker     Packs/day: 0.75     Years: 30.00     Pack years: 22.50     Types: Cigarettes     Smokeless tobacco: Never Used   Substance Use Topics     Alcohol use: No     Alcohol/week: 0.0 standard drinks     Current Outpatient Medications   Medication Sig Dispense Refill     acetaminophen (TYLENOL) 500 MG tablet Take 1,000 mg by mouth       warfarin ANTICOAGULANT (COUMADIN) 2 MG tablet TAKE 10 MG X 3 DAYS/WEEK AND 8 MG X 4 DAYS/WEEK OR AS DIRECTED BY [facility] 360 tablet 1     albuterol (PROAIR HFA/PROVENTIL HFA/VENTOLIN HFA) 108 (90 Base) MCG/ACT inhaler Inhale 2 puffs into the lungs every 4 hours as needed for shortness of breath / dyspnea or wheezing (Patient not taking: Reported on 6/19/2020) 6.7 g 0     nicotine (NICORETTE) 4 MG gum Take 1 each every hour while awake as needed for nicotine craving (Patient not taking: Reported on 6/19/2020) 100 each 3     Allergies   Allergen Reactions     Amoxicillin-Pot Clavulanate Nausea     Cyclobenzaprine Rash     No Clinical Screening - See Comments Rash     Nicoderm patch         Past medical history, past surgical history, current medications and allergies reviewed and accurate to the best of my knowledge.        ROS:  Refer to HPI    /80 (BP Location: Right arm, Patient Position: Sitting, Cuff Size: Adult Large)   Pulse 78   Temp 96.8  F (36  C)   Resp 18   Wt 91.3 kg (201 lb 4.8 oz)   SpO2 98%   BMI 32.00 kg/m      EXAM:  General Appearance: Miserable appearing adult, non toxic appearance, appropriate appearance for age. No acute distress  Ears: Left TM intact, translucent with bony landmarks appreciated, no erythema, serour effusion with mild bulging, no purulence.  Right TM intact, translucent with bony landmarks appreciated, no erythema, serous effusion with moderate bulging, no  purulence.  Left auditory canal clear.  Right auditory canal clear.  Normal external ears, non tender.  Eyes: conjunctivae normal without erythema or irritation, corneas clear, no drainage or crusting, no eyelid swelling, pupils equal   Orophayrnx: moist mucous membranes, posterior pharynx without erythema, tonsils surgically absent, no post nasal drip seen, no trismus, voice clear.    Sinuses:  Bilateral sinus tenderness upon palpation of the maxillary sinuses.  Pressure but non tender frontal sinuses.  Nose:  Bilateral nares:  Erythematous, edematous with thick yellow congestion present.     Neck: supple without adenopathy  Respiratory: normal chest wall and respirations.  Normal effort.  Clear to auscultation bilaterally, no wheezing, crackles or rhonchi.  No increased work of breathing.  No cough appreciated.  Cardiac: RRR with no murmurs  Musculoskeletal:  Equal movement of bilateral upper extremities.  Equal movement of bilateral lower extremities.  Normal gait.    Psychological: normal affect, alert, oriented, and pleasant.           ASSESSMENT/PLAN:        ICD-10-CM    1. Acute non-recurrent maxillary sinusitis  J01.00 cefdinir (OMNICEF) 300 MG capsule   2. Acute sinusitis with symptoms > 10 days  J01.90        I have reviewed the nursing notes.  I have reviewed the findings, diagnosis, plan and need for follow up with the patient.    Treatment with Cefdinir.  Patient has taken cefdinir in the past and it does not interfere with her warfarin.    Symptomatic treatment - Encouraged fluids, salt water gargles, honey, elevation, humidifier, saline nasal spray, sinus rinse/netti pot, lozenges, etc     May use over-the-counter Tylenol PRN    Discussed warning signs/symptoms indicative of need to f/u  Follow up if symptoms persist or worsen or concerns      I explained my diagnostic considerations and recommendations to the patient, who voiced understanding and agreement with the treatment plan. All questions were  answered. We discussed potential side effects of any prescribed or recommended therapies, as well as expectations for response to treatments.    Disclaimer:  This note consists of words and symbols derived from keyboarding, dictation, or using voice recognition software. As a result, there may be errors in the script that have gone undetected. Please consider this when interpreting information found in this note.

## 2020-11-25 ENCOUNTER — TRANSFERRED RECORDS (OUTPATIENT)
Dept: HEALTH INFORMATION MANAGEMENT | Facility: OTHER | Age: 50
End: 2020-11-25

## 2020-11-30 ENCOUNTER — ANTICOAGULATION THERAPY VISIT (OUTPATIENT)
Dept: ANTICOAGULATION | Facility: OTHER | Age: 50
End: 2020-11-30
Attending: PHYSICIAN ASSISTANT
Payer: COMMERCIAL

## 2020-11-30 DIAGNOSIS — Z79.01 LONG TERM CURRENT USE OF ANTICOAGULANT THERAPY: ICD-10-CM

## 2020-11-30 DIAGNOSIS — I82.602 BLOOD CLOT OF VEIN IN SHOULDER AREA, LEFT: ICD-10-CM

## 2020-11-30 DIAGNOSIS — D68.51 FACTOR 5 LEIDEN MUTATION, HETEROZYGOUS (H): ICD-10-CM

## 2020-11-30 LAB — INR PPP: 3.3 (ref 0.9–1.1)

## 2020-11-30 NOTE — PROGRESS NOTES
ANTICOAGULATION FOLLOW-UP CLINIC VISIT    Patient Name:  Daniela Ladd  Date:  11/30/2020  Contact Type:  fax from [facility] home monitoring service    SUBJECTIVE:  Patient Findings     Comments:  No phone call due to INR being in range.  Patient has been instructed to call with new medications or changes.          Clinical Outcomes     Negatives:  Major bleeding event, Thromboembolic event, Anticoagulation-related hospital admission, Anticoagulation-related ED visit, Anticoagulation-related fatality    Comments:  No phone call due to INR being in range.  Patient has been instructed to call with new medications or changes.             OBJECTIVE    Recent labs: (last 7 days)     11/25/20   INR 3.3*       ASSESSMENT / PLAN  INR assessment THER    Recheck INR In: 1 WEEK    INR Location Home INR      Anticoagulation Summary  As of 11/30/2020    INR goal:  2.5-3.5   TTR:  76.1 % (1 y)   INR used for dosing:  3.3 (11/25/2020)   Warfarin maintenance plan:  10 mg (2 mg x 5) every Sun, Tue, Sat; 8 mg (2 mg x 4) all other days   Full warfarin instructions:  10 mg every Sun, Tue, Sat; 8 mg all other days   Weekly warfarin total:  62 mg   No change documented:  Renee Kevin RN   Plan last modified:  Swapna Martin RN (9/30/2020)   Next INR check:  12/7/2020   Priority:  High   Target end date:  Indefinite    Indications    Blood clot of vein in shoulder area  left [I82.602]  Factor 5 Leiden mutation  heterozygous (H) [D68.51]  Long-term (current) use of anticoagulants [Z79.01] [Z79.01]             Anticoagulation Episode Summary     INR check location:      Preferred lab:      Send INR reminders to:  [facility]    Comments:  INR goal changed per Dr Kitchen 3/21/18 has her own machine         Anticoagulation Care Providers     Provider Role Specialty Phone number    Ling Trammell PA-C Referring Physician Assistant [phone]            See the Encounter Report to view Anticoagulation Flowsheet  and Dosing Calendar (Go to Encounters tab in chart review, and find the Anticoagulation Therapy Visit)     No encounter, INR received via fax.  INR in range so no telephone call.  Patient is to call INR clinic if any changes affecting INR.       Renee Kevin RN

## 2020-12-09 ENCOUNTER — ANTICOAGULATION THERAPY VISIT (OUTPATIENT)
Dept: ANTICOAGULATION | Facility: OTHER | Age: 50
End: 2020-12-09
Attending: PHYSICIAN ASSISTANT
Payer: COMMERCIAL

## 2020-12-09 ENCOUNTER — TRANSFERRED RECORDS (OUTPATIENT)
Dept: HEALTH INFORMATION MANAGEMENT | Facility: OTHER | Age: 50
End: 2020-12-09

## 2020-12-09 DIAGNOSIS — D68.51 FACTOR 5 LEIDEN MUTATION, HETEROZYGOUS (H): ICD-10-CM

## 2020-12-09 DIAGNOSIS — Z79.01 LONG TERM CURRENT USE OF ANTICOAGULANT THERAPY: ICD-10-CM

## 2020-12-09 DIAGNOSIS — I82.602 BLOOD CLOT OF VEIN IN SHOULDER AREA, LEFT: ICD-10-CM

## 2020-12-09 LAB — INR PPP: 3.1 (ref 0.9–1.1)

## 2020-12-09 NOTE — PROGRESS NOTES
ANTICOAGULATION FOLLOW-UP CLINIC VISIT    Patient Name:  Daniela Ladd  Date:  12/9/2020  Contact Type:  no call needed patient to continue same dose    SUBJECTIVE:  Patient Findings         Clinical Outcomes     Negatives:  Major bleeding event, Thromboembolic event, Anticoagulation-related hospital admission, Anticoagulation-related ED visit, Anticoagulation-related fatality           OBJECTIVE    Recent labs: (last 7 days)     12/09/20   INR 3.1*       ASSESSMENT / PLAN  INR assessment THER    Recheck INR In: 1 WEEK    INR Location Home INR      Anticoagulation Summary  As of 12/9/2020    INR goal:  2.5-3.5   TTR:  77.1 % (1 y)   INR used for dosing:  3.1 (12/9/2020)   Warfarin maintenance plan:  10 mg (2 mg x 5) every Sun, Tue, Sat; 8 mg (2 mg x 4) all other days   Full warfarin instructions:  10 mg every Sun, Tue, Sat; 8 mg all other days   Weekly warfarin total:  62 mg   No change documented:  Yoly Crowe RN   Plan last modified:  Swapna Martin RN (9/30/2020)   Next INR check:  12/16/2020   Priority:  High   Target end date:  Indefinite    Indications    Blood clot of vein in shoulder area  left [I82.602]  Factor 5 Leiden mutation  heterozygous (H) [D68.51]  Long-term (current) use of anticoagulants [Z79.01] [Z79.01]             Anticoagulation Episode Summary     INR check location:      Preferred lab:      Send INR reminders to:  [facility]    Comments:  INR goal changed per Dr Kitchen 3/21/18 has her own machine         Anticoagulation Care Providers     Provider Role Specialty Phone number    Ling Trammell PA-C Referring Physician Assistant [phone]            See the Encounter Report to view Anticoagulation Flowsheet and Dosing Calendar (Go to Encounters tab in chart review, and find the Anticoagulation Therapy Visit)        Yoly Crowe, RN

## 2020-12-16 ENCOUNTER — TRANSFERRED RECORDS (OUTPATIENT)
Dept: HEALTH INFORMATION MANAGEMENT | Facility: OTHER | Age: 50
End: 2020-12-16

## 2020-12-16 LAB — INR PPP: 3.6 (ref 0.9–1.1)

## 2020-12-17 ENCOUNTER — ANTICOAGULATION THERAPY VISIT (OUTPATIENT)
Dept: ANTICOAGULATION | Facility: OTHER | Age: 50
End: 2020-12-17
Attending: PHYSICIAN ASSISTANT
Payer: COMMERCIAL

## 2020-12-17 DIAGNOSIS — Z79.01 LONG TERM CURRENT USE OF ANTICOAGULANT THERAPY: ICD-10-CM

## 2020-12-17 DIAGNOSIS — I82.602 BLOOD CLOT OF VEIN IN SHOULDER AREA, LEFT: ICD-10-CM

## 2020-12-17 DIAGNOSIS — D68.51 FACTOR 5 LEIDEN MUTATION, HETEROZYGOUS (H): ICD-10-CM

## 2020-12-17 NOTE — PROGRESS NOTES
ANTICOAGULATION FOLLOW-UP CLINIC VISIT    Patient Name:  Daniela Ladd  Date:  12/17/2020  Contact Type:  Telephone/ spoke with patient reviewed dosing    SUBJECTIVE:  Patient Findings     Comments:  Patient denies any identifiable changes that caused the supratherapeutic INR.           Clinical Outcomes     Negatives:  Major bleeding event, Thromboembolic event, Anticoagulation-related hospital admission, Anticoagulation-related ED visit, Anticoagulation-related fatality    Comments:  Patient denies any identifiable changes that caused the supratherapeutic INR.              OBJECTIVE    Recent labs: (last 7 days)     12/16/20   INR 3.6*       ASSESSMENT / PLAN  INR assessment SUPRA    Recheck INR In: 1 WEEK    INR Location Home INR      Anticoagulation Summary  As of 12/17/2020    INR goal:  2.5-3.5   TTR:  77.8 % (1 y)   INR used for dosing:  3.6 (12/16/2020)   Warfarin maintenance plan:  10 mg (2 mg x 5) every Sun, Tue, Sat; 8 mg (2 mg x 4) all other days   Full warfarin instructions:  10 mg every Sun, Tue, Sat; 8 mg all other days   Weekly warfarin total:  62 mg   Plan last modified:  Yoly Crowe RN (12/17/2020)   Next INR check:  12/24/2020   Priority:  High   Target end date:  Indefinite    Indications    Blood clot of vein in shoulder area  left [I82.602]  Factor 5 Leiden mutation  heterozygous (H) [D68.51]  Long-term (current) use of anticoagulants [Z79.01] [Z79.01]             Anticoagulation Episode Summary     INR check location:      Preferred lab:      Send INR reminders to:  [facility]    Comments:  INR goal changed per Dr Kitchen 3/21/18 has her own machine         Anticoagulation Care Providers     Provider Role Specialty Phone number    Ling Trammell PA-C Referring Physician Assistant [phone]            See the Encounter Report to view Anticoagulation Flowsheet and Dosing Calendar (Go to Encounters tab in chart review, and find the Anticoagulation Therapy  Visit)        Yoly Crowe, RN

## 2020-12-22 DIAGNOSIS — Z79.01 LONG TERM CURRENT USE OF ANTICOAGULANT THERAPY: ICD-10-CM

## 2020-12-22 DIAGNOSIS — I82.622 ARM DVT (DEEP VENOUS THROMBOEMBOLISM), ACUTE, LEFT (H): ICD-10-CM

## 2020-12-22 RX ORDER — WARFARIN SODIUM 2 MG/1
TABLET ORAL
Qty: 360 TABLET | Refills: 1 | Status: SHIPPED | OUTPATIENT
Start: 2020-12-22 | End: 2021-04-07

## 2020-12-22 NOTE — TELEPHONE ENCOUNTER
"Requested Prescriptions   Pending Prescriptions Disp Refills     warfarin ANTICOAGULANT (COUMADIN) 2 MG tablet [Pharmacy Med Name: WARFARIN SODIUM 2 MG TABLET] 360 tablet 1     Sig: TAKE 10 MG X ON DAYS/WEEK AND 8 MG X SIX DAYS/WEEK OR AS DIRECTED BY [facility]       Vitamin K Antagonists Failed - 12/22/2020 12:16 AM        Failed - INR is within goal in the past 6 weeks     Confirm INR is within goal in the past 6 weeks.     Recent Labs   Lab Test 12/16/20   INR 3.6*                       Passed - Recent (12 mo) or future (30 days) visit within the authorizing provider's specialty     Patient has had an office visit with the authorizing provider or a provider within the authorizing providers department within the previous 12 mos or has a future within next 30 days. See \"Patient Info\" tab in inbasket, or \"Choose Columns\" in Meds & Orders section of the refill encounter.              Passed - Medication is active on med list        Passed - Patient is 18 years of age or older        Passed - Patient is not pregnant        Passed - No positive pregnancy on file in past 12 months           Prescription approved per [facility] Refill Protocol.    "

## 2020-12-23 ENCOUNTER — ANTICOAGULATION THERAPY VISIT (OUTPATIENT)
Dept: ANTICOAGULATION | Facility: OTHER | Age: 50
End: 2020-12-23
Attending: PHYSICIAN ASSISTANT
Payer: COMMERCIAL

## 2020-12-23 ENCOUNTER — TRANSFERRED RECORDS (OUTPATIENT)
Dept: HEALTH INFORMATION MANAGEMENT | Facility: OTHER | Age: 50
End: 2020-12-23

## 2020-12-23 DIAGNOSIS — I82.602 BLOOD CLOT OF VEIN IN SHOULDER AREA, LEFT: ICD-10-CM

## 2020-12-23 DIAGNOSIS — Z79.01 LONG TERM CURRENT USE OF ANTICOAGULANT THERAPY: ICD-10-CM

## 2020-12-23 DIAGNOSIS — D68.51 FACTOR 5 LEIDEN MUTATION, HETEROZYGOUS (H): ICD-10-CM

## 2020-12-23 LAB — INR PPP: 3 (ref 0.9–1.1)

## 2020-12-23 NOTE — PROGRESS NOTES
ANTICOAGULATION FOLLOW-UP CLINIC VISIT    Patient Name:  Daniela Ladd  Date:  12/23/2020  Contact Type:  no call needed patient to continue same dose    SUBJECTIVE:  Patient Findings         Clinical Outcomes     Negatives:  Major bleeding event, Thromboembolic event, Anticoagulation-related hospital admission, Anticoagulation-related ED visit, Anticoagulation-related fatality           OBJECTIVE    Recent labs: (last 7 days)     12/23/20   INR 3.0*       ASSESSMENT / PLAN  INR assessment THER    Recheck INR In: 1 WEEK    INR Location Home INR      Anticoagulation Summary  As of 12/23/2020    INR goal:  2.5-3.5   TTR:  77.5 % (1 y)   INR used for dosing:  3.0 (12/23/2020)   Warfarin maintenance plan:  10 mg (2 mg x 5) every Sun, Tue, Sat; 8 mg (2 mg x 4) all other days   Full warfarin instructions:  10 mg every Sun, Tue, Sat; 8 mg all other days   Weekly warfarin total:  62 mg   No change documented:  Yoly Crowe RN   Plan last modified:  Yloy Crowe RN (12/17/2020)   Next INR check:  12/30/2020   Priority:  High   Target end date:  Indefinite    Indications    Blood clot of vein in shoulder area  left [I82.602]  Factor 5 Leiden mutation  heterozygous (H) [D68.51]  Long-term (current) use of anticoagulants [Z79.01] [Z79.01]             Anticoagulation Episode Summary     INR check location:      Preferred lab:      Send INR reminders to:  [facility]    Comments:  INR goal changed per Dr Kitchen 3/21/18 has her own machine         Anticoagulation Care Providers     Provider Role Specialty Phone number    Ling Trammell PA-C Referring Physician Assistant [phone]            See the Encounter Report to view Anticoagulation Flowsheet and Dosing Calendar (Go to Encounters tab in chart review, and find the Anticoagulation Therapy Visit)        Yoly Crowe RN

## 2020-12-27 ENCOUNTER — HEALTH MAINTENANCE LETTER (OUTPATIENT)
Age: 50
End: 2020-12-27

## 2020-12-30 ENCOUNTER — ANTICOAGULATION THERAPY VISIT (OUTPATIENT)
Dept: ANTICOAGULATION | Facility: OTHER | Age: 50
End: 2020-12-30
Attending: PHYSICIAN ASSISTANT
Payer: COMMERCIAL

## 2020-12-30 ENCOUNTER — TRANSFERRED RECORDS (OUTPATIENT)
Dept: HEALTH INFORMATION MANAGEMENT | Facility: OTHER | Age: 50
End: 2020-12-30

## 2020-12-30 DIAGNOSIS — Z79.01 LONG TERM CURRENT USE OF ANTICOAGULANT THERAPY: ICD-10-CM

## 2020-12-30 DIAGNOSIS — D68.51 FACTOR 5 LEIDEN MUTATION, HETEROZYGOUS (H): ICD-10-CM

## 2020-12-30 DIAGNOSIS — I82.602 BLOOD CLOT OF VEIN IN SHOULDER AREA, LEFT: ICD-10-CM

## 2020-12-30 LAB — INR PPP: 3.3 (ref 0.9–1.1)

## 2020-12-30 NOTE — PROGRESS NOTES
ANTICOAGULATION FOLLOW-UP CLINIC VISIT    Patient Name:  Daniela Ladd  Date:  12/30/2020  Contact Type:  no call needed patient to continue same dose    SUBJECTIVE:  Patient Findings         Clinical Outcomes     Negatives:  Major bleeding event, Thromboembolic event, Anticoagulation-related hospital admission, Anticoagulation-related ED visit, Anticoagulation-related fatality           OBJECTIVE    Recent labs: (last 7 days)     12/30/20   INR 3.3*       ASSESSMENT / PLAN  INR assessment THER    Recheck INR In: 1 WEEK    INR Location Home INR      Anticoagulation Summary  As of 12/30/2020    INR goal:  2.5-3.5   TTR:  77.5 % (1 y)   INR used for dosing:  3.3 (12/30/2020)   Warfarin maintenance plan:  10 mg (2 mg x 5) every Sun, Tue, Sat; 8 mg (2 mg x 4) all other days   Full warfarin instructions:  10 mg every Sun, Tue, Sat; 8 mg all other days   Weekly warfarin total:  62 mg   No change documented:  Yoly Crowe RN   Plan last modified:  Yoly Crowe RN (12/17/2020)   Next INR check:  1/6/2021   Priority:  High   Target end date:  Indefinite    Indications    Blood clot of vein in shoulder area  left [I82.602]  Factor 5 Leiden mutation  heterozygous (H) [D68.51]  Long-term (current) use of anticoagulants [Z79.01] [Z79.01]             Anticoagulation Episode Summary     INR check location:      Preferred lab:      Send INR reminders to:  [facility]    Comments:  INR goal changed per Dr Kitchne 3/21/18 has her own machine         Anticoagulation Care Providers     Provider Role Specialty Phone number    Ling Trammell PA-C Referring Family Medicine [phone]            See the Encounter Report to view Anticoagulation Flowsheet and Dosing Calendar (Go to Encounters tab in chart review, and find the Anticoagulation Therapy Visit)        Yoly Crowe RN

## 2021-01-06 ENCOUNTER — ANTICOAGULATION THERAPY VISIT (OUTPATIENT)
Dept: ANTICOAGULATION | Facility: OTHER | Age: 51
End: 2021-01-06
Attending: PHYSICIAN ASSISTANT
Payer: COMMERCIAL

## 2021-01-06 DIAGNOSIS — D68.51 FACTOR 5 LEIDEN MUTATION, HETEROZYGOUS (H): ICD-10-CM

## 2021-01-06 DIAGNOSIS — I82.602 BLOOD CLOT OF VEIN IN SHOULDER AREA, LEFT: ICD-10-CM

## 2021-01-06 DIAGNOSIS — Z79.01 LONG TERM CURRENT USE OF ANTICOAGULANT THERAPY: ICD-10-CM

## 2021-01-06 LAB — INR PPP: 2.8 (ref 0.9–1.1)

## 2021-01-06 NOTE — PROGRESS NOTES
ANTICOAGULATION FOLLOW-UP CLINIC VISIT    Patient Name:  Daniela Ladd  Date:  2021  Contact Type:  no call needed patient to continue same dose    SUBJECTIVE:  Patient Findings         Clinical Outcomes     Negatives:  Major bleeding event, Thromboembolic event, Anticoagulation-related hospital admission, Anticoagulation-related ED visit, Anticoagulation-related fatality           OBJECTIVE    Recent labs: (last 7 days)     21   INR 2.8*       ASSESSMENT / PLAN  INR assessment THER    Recheck INR In: 1 WEEK    INR Location Home INR      Anticoagulation Summary  As of 2021    INR goal:  2.5-3.5   TTR:  77.9 % (1 y)   INR used for dosin.8 (2021)   Warfarin maintenance plan:  10 mg (2 mg x 5) every Sun, Tue, Sat; 8 mg (2 mg x 4) all other days   Full warfarin instructions:  10 mg every Sun, Tue, Sat; 8 mg all other days   Weekly warfarin total:  62 mg   No change documented:  Yoly Crowe RN   Plan last modified:  Yoly Crowe RN (2020)   Next INR check:  2021   Priority:  High   Target end date:  Indefinite    Indications    Blood clot of vein in shoulder area  left [I82.602]  Factor 5 Leiden mutation  heterozygous (H) [D68.51]  Long-term (current) use of anticoagulants [Z79.01] [Z79.01]             Anticoagulation Episode Summary     INR check location:      Preferred lab:      Send INR reminders to:  [facility]    Comments:  INR goal changed per Dr Kitchen 3/21/18 has her own machine         Anticoagulation Care Providers     Provider Role Specialty Phone number    Ling Trammell PA-C Referring Family Medicine [phone]            See the Encounter Report to view Anticoagulation Flowsheet and Dosing Calendar (Go to Encounters tab in chart review, and find the Anticoagulation Therapy Visit)        Yoly Crowe RN

## 2021-01-13 ENCOUNTER — TRANSFERRED RECORDS (OUTPATIENT)
Dept: HEALTH INFORMATION MANAGEMENT | Facility: OTHER | Age: 51
End: 2021-01-13

## 2021-01-13 ENCOUNTER — ANTICOAGULATION THERAPY VISIT (OUTPATIENT)
Dept: ANTICOAGULATION | Facility: OTHER | Age: 51
End: 2021-01-13
Attending: PHYSICIAN ASSISTANT
Payer: COMMERCIAL

## 2021-01-13 DIAGNOSIS — Z79.01 LONG TERM CURRENT USE OF ANTICOAGULANT THERAPY: ICD-10-CM

## 2021-01-13 DIAGNOSIS — I82.602 BLOOD CLOT OF VEIN IN SHOULDER AREA, LEFT: ICD-10-CM

## 2021-01-13 DIAGNOSIS — D68.51 FACTOR 5 LEIDEN MUTATION, HETEROZYGOUS (H): ICD-10-CM

## 2021-01-13 LAB — INR PPP: 3 (ref 0.9–1.1)

## 2021-01-13 NOTE — PROGRESS NOTES
ANTICOAGULATION FOLLOW-UP CLINIC VISIT    Patient Name:  Daniela Ladd  Date:  1/13/2021  Contact Type:  no call needed patient to continue same dose    SUBJECTIVE:  Patient Findings         Clinical Outcomes     Negatives:  Major bleeding event, Thromboembolic event, Anticoagulation-related hospital admission, Anticoagulation-related ED visit, Anticoagulation-related fatality           OBJECTIVE    Recent labs: (last 7 days)     01/13/21   INR 3.0*       ASSESSMENT / PLAN  INR assessment THER    Recheck INR In: 1 WEEK    INR Location Home INR      Anticoagulation Summary  As of 1/13/2021    INR goal:  2.5-3.5   TTR:  79.8 % (1 y)   INR used for dosing:  3.0 (1/13/2021)   Warfarin maintenance plan:  10 mg (2 mg x 5) every Sun, Tue, Sat; 8 mg (2 mg x 4) all other days   Full warfarin instructions:  10 mg every Sun, Tue, Sat; 8 mg all other days   Weekly warfarin total:  62 mg   No change documented:  Yoly Crowe RN   Plan last modified:  Yoly Crowe RN (12/17/2020)   Next INR check:  1/20/2021   Priority:  High   Target end date:  Indefinite    Indications    Blood clot of vein in shoulder area  left [I82.602]  Factor 5 Leiden mutation  heterozygous (H) [D68.51]  Long-term (current) use of anticoagulants [Z79.01] [Z79.01]             Anticoagulation Episode Summary     INR check location:      Preferred lab:      Send INR reminders to:  [facility]    Comments:  INR goal changed per Dr Kitchen 3/21/18 has her own machine         Anticoagulation Care Providers     Provider Role Specialty Phone number    Ling Trammell PA-C Referring Family Medicine [phone]            See the Encounter Report to view Anticoagulation Flowsheet and Dosing Calendar (Go to Encounters tab in chart review, and find the Anticoagulation Therapy Visit)        Yoly Crowe RN

## 2021-01-14 DIAGNOSIS — I82.622 ARM DVT (DEEP VENOUS THROMBOEMBOLISM), ACUTE, LEFT (H): ICD-10-CM

## 2021-01-14 LAB
ALBUMIN SERPL-MCNC: 4.4 G/DL (ref 3.5–5.7)
ALP SERPL-CCNC: 41 U/L (ref 34–104)
ALT SERPL W P-5'-P-CCNC: 12 U/L (ref 7–52)
ANION GAP SERPL CALCULATED.3IONS-SCNC: 9 MMOL/L (ref 3–14)
AST SERPL W P-5'-P-CCNC: 13 U/L (ref 13–39)
BASOPHILS # BLD AUTO: 0.1 10E9/L (ref 0–0.2)
BASOPHILS NFR BLD AUTO: 0.7 %
BILIRUB SERPL-MCNC: 0.3 MG/DL (ref 0.3–1)
BUN SERPL-MCNC: 9 MG/DL (ref 7–25)
CALCIUM SERPL-MCNC: 9.1 MG/DL (ref 8.6–10.3)
CHLORIDE SERPL-SCNC: 103 MMOL/L (ref 98–107)
CO2 SERPL-SCNC: 23 MMOL/L (ref 21–31)
CREAT SERPL-MCNC: 0.76 MG/DL (ref 0.6–1.2)
D DIMER PPP FEU-MCNC: 0.3 UG/ML FEU (ref 0–0.5)
DIFFERENTIAL METHOD BLD: ABNORMAL
EOSINOPHIL # BLD AUTO: 0.1 10E9/L (ref 0–0.7)
EOSINOPHIL NFR BLD AUTO: 0.8 %
ERYTHROCYTE [DISTWIDTH] IN BLOOD BY AUTOMATED COUNT: 12.2 % (ref 10–15)
GFR SERPL CREATININE-BSD FRML MDRD: 81 ML/MIN/{1.73_M2}
GLUCOSE SERPL-MCNC: 110 MG/DL (ref 70–105)
HCT VFR BLD AUTO: 47.9 % (ref 35–47)
HGB BLD-MCNC: 16.2 G/DL (ref 11.7–15.7)
IMM GRANULOCYTES # BLD: 0 10E9/L (ref 0–0.4)
IMM GRANULOCYTES NFR BLD: 0.2 %
LDH SERPL L TO P-CCNC: 156 U/L (ref 140–271)
LYMPHOCYTES # BLD AUTO: 3.6 10E9/L (ref 0.8–5.3)
LYMPHOCYTES NFR BLD AUTO: 31.2 %
MCH RBC QN AUTO: 30.4 PG (ref 26.5–33)
MCHC RBC AUTO-ENTMCNC: 33.8 G/DL (ref 31.5–36.5)
MCV RBC AUTO: 90 FL (ref 78–100)
MONOCYTES # BLD AUTO: 0.4 10E9/L (ref 0–1.3)
MONOCYTES NFR BLD AUTO: 3.6 %
NEUTROPHILS # BLD AUTO: 7.4 10E9/L (ref 1.6–8.3)
NEUTROPHILS NFR BLD AUTO: 63.5 %
PLATELET # BLD AUTO: 331 10E9/L (ref 150–450)
POTASSIUM SERPL-SCNC: 3.9 MMOL/L (ref 3.5–5.1)
PROT SERPL-MCNC: 7.1 G/DL (ref 6.4–8.9)
RBC # BLD AUTO: 5.33 10E12/L (ref 3.8–5.2)
SODIUM SERPL-SCNC: 135 MMOL/L (ref 134–144)
WBC # BLD AUTO: 11.6 10E9/L (ref 4–11)

## 2021-01-14 PROCEDURE — 83615 LACTATE (LD) (LDH) ENZYME: CPT | Mod: ZL | Performed by: NURSE PRACTITIONER

## 2021-01-14 PROCEDURE — 85379 FIBRIN DEGRADATION QUANT: CPT | Mod: ZL | Performed by: INTERNAL MEDICINE

## 2021-01-14 PROCEDURE — 85025 COMPLETE CBC W/AUTO DIFF WBC: CPT | Mod: ZL | Performed by: NURSE PRACTITIONER

## 2021-01-14 PROCEDURE — 80053 COMPREHEN METABOLIC PANEL: CPT | Mod: ZL | Performed by: NURSE PRACTITIONER

## 2021-01-14 PROCEDURE — 36415 COLL VENOUS BLD VENIPUNCTURE: CPT | Mod: ZL | Performed by: NURSE PRACTITIONER

## 2021-01-20 ENCOUNTER — ANTICOAGULATION THERAPY VISIT (OUTPATIENT)
Dept: ANTICOAGULATION | Facility: OTHER | Age: 51
End: 2021-01-20
Attending: PHYSICIAN ASSISTANT
Payer: COMMERCIAL

## 2021-01-20 ENCOUNTER — TRANSFERRED RECORDS (OUTPATIENT)
Dept: HEALTH INFORMATION MANAGEMENT | Facility: OTHER | Age: 51
End: 2021-01-20

## 2021-01-20 DIAGNOSIS — D68.51 FACTOR 5 LEIDEN MUTATION, HETEROZYGOUS (H): ICD-10-CM

## 2021-01-20 DIAGNOSIS — I82.602 BLOOD CLOT OF VEIN IN SHOULDER AREA, LEFT: ICD-10-CM

## 2021-01-20 DIAGNOSIS — Z79.01 LONG TERM CURRENT USE OF ANTICOAGULANT THERAPY: ICD-10-CM

## 2021-01-20 LAB — INR PPP: 3.2 (ref 0.9–1.1)

## 2021-01-20 NOTE — PROGRESS NOTES
ANTICOAGULATION FOLLOW-UP CLINIC VISIT    Patient Name:  Daniela Ladd  Date:  1/20/2021  Contact Type:  no call needed patient to continue same dose    SUBJECTIVE:  Patient Findings         Clinical Outcomes     Negatives:  Major bleeding event, Thromboembolic event, Anticoagulation-related hospital admission, Anticoagulation-related ED visit, Anticoagulation-related fatality           OBJECTIVE    Recent labs: (last 7 days)     01/20/21   INR 3.2*       ASSESSMENT / PLAN  INR assessment THER    Recheck INR In: 1 WEEK    INR Location Home INR      Anticoagulation Summary  As of 1/20/2021    INR goal:  2.5-3.5   TTR:  81.7 % (1 y)   INR used for dosing:  3.2 (1/20/2021)   Warfarin maintenance plan:  10 mg (2 mg x 5) every Sun, Tue, Sat; 8 mg (2 mg x 4) all other days   Full warfarin instructions:  10 mg every Sun, Tue, Sat; 8 mg all other days   Weekly warfarin total:  62 mg   No change documented:  Yoly Crowe RN   Plan last modified:  Yoly Crowe RN (12/17/2020)   Next INR check:  1/27/2021   Priority:  High   Target end date:  Indefinite    Indications    Blood clot of vein in shoulder area  left [I82.602]  Factor 5 Leiden mutation  heterozygous (H) [D68.51]  Long-term (current) use of anticoagulants [Z79.01] [Z79.01]             Anticoagulation Episode Summary     INR check location:      Preferred lab:      Send INR reminders to:  [facility]    Comments:  INR goal changed per Dr Kitchen 3/21/18 has her own machine         Anticoagulation Care Providers     Provider Role Specialty Phone number    Ling Trammell PA-C Referring Family Medicine [phone]            See the Encounter Report to view Anticoagulation Flowsheet and Dosing Calendar (Go to Encounters tab in chart review, and find the Anticoagulation Therapy Visit)        Yoly Crowe RN

## 2021-01-21 ENCOUNTER — ONCOLOGY VISIT (OUTPATIENT)
Dept: ONCOLOGY | Facility: OTHER | Age: 51
End: 2021-01-21
Attending: INTERNAL MEDICINE
Payer: COMMERCIAL

## 2021-01-21 VITALS
WEIGHT: 202 LBS | HEIGHT: 66 IN | DIASTOLIC BLOOD PRESSURE: 86 MMHG | TEMPERATURE: 98.5 F | HEART RATE: 78 BPM | SYSTOLIC BLOOD PRESSURE: 130 MMHG | RESPIRATION RATE: 16 BRPM | BODY MASS INDEX: 32.47 KG/M2 | OXYGEN SATURATION: 96 %

## 2021-01-21 DIAGNOSIS — I82.622 ARM DVT (DEEP VENOUS THROMBOEMBOLISM), ACUTE, LEFT (H): Primary | ICD-10-CM

## 2021-01-21 PROCEDURE — 99214 OFFICE O/P EST MOD 30 MIN: CPT | Performed by: INTERNAL MEDICINE

## 2021-01-21 ASSESSMENT — PAIN SCALES - GENERAL: PAINLEVEL: NO PAIN (0)

## 2021-01-21 ASSESSMENT — MIFFLIN-ST. JEOR: SCORE: 1570.89

## 2021-01-21 NOTE — PROGRESS NOTES
Visit Date:   01/21/2021      HEMATOLOGY/ONCOLOGY CLINIC NOTE      HISTORY OF PRESENT ILLNESS:  Ms. Ladd returns for followup of left upper extremity DVT.  We had originally seen her in consultation back in 11/2017 for evaluation of left upper extremity DVT.  Apparently, she presented to Ling Trammell PA-C on 11/01/2017 with left arm pain and redness associated with swelling that apparently had happened overnight.  She woke up and noted pain in her left side, probably happening overnight, when she developed acute onset of pain and swelling in the left upper extremity.  Subsequently, she was seen by Ling Trammell PA-C, who ordered left upper extremity venous Doppler, which revealed occlusive thrombus in the subclavian, axillary and basilic veins.  The cephalic antecubital bronchial brachial veins were patent.  The patient was started on Lovenox and transitioned to Coumadin.  She has a strong family history of DVT including her mother and grandmother.  She also stated that her grandfather had a history of CREST syndrome.  She stated her mother had 4 stents in her groin.  She was a heavy smoker, smoked at least 1-1/2 packs per day for at least 30 years, and cut back to less than a pack per day.  She denied any antecedent trauma or previous surgery.  She worked at [employer] store.  When we saw her 11/09/2017, she had been on Lovenox, and swelling had reduced considerably.  She was transitioned to Coumadin.  We elected to rule out hypercoagulable state by obtaining antithrombin III levels that were negative.  Lupus profile was negative including beta-2 glycoprotein antibodies with anticardiolipin antibodies.  We also obtain antithrombin III activity, which was normal.  We obtained homocystine levels, which were normal.  Prothrombin 2 mutation was negative.  Factor V Leiden was positive for heterozygous mutation, consistent with increased risk of developing thrombosis.  We repeated the venous Doppler of the  left upper extremity, which revealed that there was an improving left upper extremity DVT with residual thrombus in the left axillary vein, distal left subclavian vein.  There was still thrombosis in the subclavian vein.  Basilic veins were recanalized.  We also obtained scans to rule out occult malignancy including CT neck, which revealed subcutaneous nodule in the right shoulder, which was most likely a sebaceous cyst.  There was fat stranding in the left axillary region, likely sequelae of acute DVT.  Otherwise, no evidence of mass or lymphadenopathy in neck.  She also had a CT chest, which revealed a few very small subpleural ground-glass opacities, measuring 5 mm.  Six-month followup was recommended.  CT of the abdomen and pelvis was essentially negative.  The patient was a smoker and continued to smoke at least 1-1/2 packs per day.  She was attempting to quit and was using Nicorette gum.  We felt she would need lifelong Coumadin.  She continued to smoke.  Subsequently, she had a repeat venous Doppler, left upper extremity, which revealed there was improvement in the left axillary vein.  Thrombosis was now patent.  The remainder of the left lower extremity venous structures including cephalic basilic, brachial and radial veins was patent.  Internal jugular vein was patent.  There was persistent occlusive thrombus of the  Left subclavian vein nonetheless, which was unchanged.  She had a CT chest, which revealed numerous tiny pulmonary nodules again present.  They were unchanged and primarily in the upper lobes.  Sarcoidosis or other autoimmune disease was in the differential.  We recommended she see Dr. Ellis for history of pulmonary and micronodules.  The patient did not keep that appointment.  Nonetheless, we repeated CT chest, which was essentially negative.  Tiny nodules in the pulmonary apices were chronic and unchanged.  CT of the abdomen and pelvis was consistent with stable to chronic hepatic  steatosis with spreading in the gallbladder fossa.  CT appearance of chest revealed multiple tiny apical predominant pulmonary micronodules.  When we saw the patient on 10/16/2019, the patient had resolution of all her thromboses on repeat venous Doppler of the left upper extremity.  There was no evidence of thrombosis.  Most recently, she had a repeat CT chest done on 06/24, which revealed stable findings with several small ill-defined micro nodules noted in both lungs, unchanged from 10/2019.  Her last left upper extremity venous Doppler was negative for DVT.  The patient otherwise continues on warfarin.  She says she continues to smoke but wants to quit.  She plans to see her primary care provider to start a Nicoderm patch.  She denies any arm swelling, leg swelling, shortness of breath, chest pain, abdominal pain, fevers, night sweats, or weight loss.      PHYSICAL EXAMINATION:   GENERAL:  She is a middle-aged white female in no acute distress.   VITAL SIGNS:  Blood pressure 130/86, pulse 78, respirations 16, temperature 98.5.   HEENT:  Atraumatic, normocephalic.  Oropharynx nonerythematous.   NECK:  Supple.   LUNGS:  Clear to auscultation and percussion.   HEART:  Regular rhythm, S1, S2 normal.   ABDOMEN:  Soft, normoactive bowel sounds.  No mass, nontender.   BACK:  No cervical, supraclavicular or axillary nodes.   EXTREMITIES:  No edema.   NEUROLOGIC:  Nonfocal.      LABORATORY DATA:  Laboratories reveal D-dimer of 0.3, which is normal.  CBC:  White count 11.6, H and H 16.2 and 47.9, platelet count 331.  LDH is 156.  LFTs are normal.      IMPRESSION:     1.  Unprovoked left lower extremity deep venous thrombosis, likely due to hypercoagulable state given the patient was heterozygous with factor V Leiden, as well as being a smoker.  She has not quit smoking.  Her left upper extremity DVT has resolved.  Her D-dimer remains normal.  The plan is to continue warfarin indefinitely.  She will return to clinic on a  p.r.n. basis.  We will refer her back to her primary care who will also prescribe Nicoderm CQ patch.   2.  History of pulmonary micronodules.  Scans have been stable.  No need for further workup.      Thirty minutes was spent on this patient.  We spent time reviewing records, reviewing chart, reviewing lab reports, previous imaging and face-to-face time with the patient, as well as ordering followup care.         CHYNA HENDRICKSON MD             D: 2021   T: 2021   MT:       Name:     MIKO SANTOS   MRN:      [id number]        Account:      [id number]   :      1970           Visit Date:   2021      Document: [id number]       cc: Freda Trammell PA-C

## 2021-01-21 NOTE — NURSING NOTE
"Chief Complaint   Patient presents with     RECHECK     Hx PE       Initial /86   Pulse 78   Temp 98.5  F (36.9  C) (Oral)   Resp 16   Ht 1.689 m (5' 6.5\")   Wt 91.6 kg (202 lb)   SpO2 96%   BMI 32.12 kg/m   Estimated body mass index is 32.12 kg/m  as calculated from the following:    Height as of this encounter: 1.689 m (5' 6.5\").    Weight as of this encounter: 91.6 kg (202 lb).  Medication Reconciliation: complete    Jane Carolina CMA (AAMA)  "

## 2021-01-27 ENCOUNTER — TRANSFERRED RECORDS (OUTPATIENT)
Dept: HEALTH INFORMATION MANAGEMENT | Facility: OTHER | Age: 51
End: 2021-01-27

## 2021-01-27 ENCOUNTER — ANTICOAGULATION THERAPY VISIT (OUTPATIENT)
Dept: ANTICOAGULATION | Facility: OTHER | Age: 51
End: 2021-01-27
Attending: PHYSICIAN ASSISTANT
Payer: COMMERCIAL

## 2021-01-27 DIAGNOSIS — Z79.01 LONG TERM CURRENT USE OF ANTICOAGULANT THERAPY: ICD-10-CM

## 2021-01-27 DIAGNOSIS — D68.51 FACTOR 5 LEIDEN MUTATION, HETEROZYGOUS (H): ICD-10-CM

## 2021-01-27 DIAGNOSIS — I82.602 BLOOD CLOT OF VEIN IN SHOULDER AREA, LEFT: ICD-10-CM

## 2021-01-27 LAB — INR PPP: 3.5 (ref 0.9–1.1)

## 2021-01-27 NOTE — PROGRESS NOTES
ANTICOAGULATION FOLLOW-UP CLINIC VISIT    Patient Name:  Daniela Ladd  Date:  1/27/2021  Contact Type:  no call needed patient to continue same dose    SUBJECTIVE:  Patient Findings         Clinical Outcomes     Negatives:  Major bleeding event, Thromboembolic event, Anticoagulation-related hospital admission, Anticoagulation-related ED visit, Anticoagulation-related fatality           OBJECTIVE    Recent labs: (last 7 days)     01/27/21   INR 3.5*       ASSESSMENT / PLAN  INR assessment THER    Recheck INR In: 1 WEEK    INR Location Home INR      Anticoagulation Summary  As of 1/27/2021    INR goal:  2.5-3.5   TTR:  83.7 % (1 y)   INR used for dosing:  3.5 (1/27/2021)   Warfarin maintenance plan:  10 mg (2 mg x 5) every Sun, Tue, Sat; 8 mg (2 mg x 4) all other days   Full warfarin instructions:  10 mg every Sun, Tue, Sat; 8 mg all other days   Weekly warfarin total:  62 mg   No change documented:  Yoly Crowe RN   Plan last modified:  Yoly Crowe RN (12/17/2020)   Next INR check:  2/3/2021   Priority:  High   Target end date:  Indefinite    Indications    Blood clot of vein in shoulder area  left [I82.602]  Factor 5 Leiden mutation  heterozygous (H) [D68.51]  Long-term (current) use of anticoagulants [Z79.01] [Z79.01]             Anticoagulation Episode Summary     INR check location:      Preferred lab:      Send INR reminders to:  [facility]    Comments:  INR goal changed per Dr Kitchen 3/21/18 has her own machine         Anticoagulation Care Providers     Provider Role Specialty Phone number    Ling Trammell PA-C Referring Family Medicine [phone]            See the Encounter Report to view Anticoagulation Flowsheet and Dosing Calendar (Go to Encounters tab in chart review, and find the Anticoagulation Therapy Visit)        Yoly Crowe RN

## 2021-01-28 ENCOUNTER — MYC MEDICAL ADVICE (OUTPATIENT)
Dept: ONCOLOGY | Facility: OTHER | Age: 51
End: 2021-01-28

## 2021-01-28 NOTE — TELEPHONE ENCOUNTER
Called patient back regarding my chart message.Notified her of the correct reading. Dr. Kitchen will addend note.   Talya Leung RN ....................  1/28/2021   3:31 PM

## 2021-02-03 ENCOUNTER — TRANSFERRED RECORDS (OUTPATIENT)
Dept: HEALTH INFORMATION MANAGEMENT | Facility: OTHER | Age: 51
End: 2021-02-03

## 2021-02-03 ENCOUNTER — ANTICOAGULATION THERAPY VISIT (OUTPATIENT)
Dept: ANTICOAGULATION | Facility: OTHER | Age: 51
End: 2021-02-03
Attending: PHYSICIAN ASSISTANT
Payer: COMMERCIAL

## 2021-02-03 DIAGNOSIS — D68.51 FACTOR 5 LEIDEN MUTATION, HETEROZYGOUS (H): ICD-10-CM

## 2021-02-03 DIAGNOSIS — Z79.01 LONG TERM CURRENT USE OF ANTICOAGULANT THERAPY: ICD-10-CM

## 2021-02-03 DIAGNOSIS — I82.602 BLOOD CLOT OF VEIN IN SHOULDER AREA, LEFT: ICD-10-CM

## 2021-02-03 LAB — INR PPP: 3.3 (ref 0.9–1.1)

## 2021-02-03 NOTE — PROGRESS NOTES
ANTICOAGULATION FOLLOW-UP CLINIC VISIT    Patient Name:  Daniela Ladd  Date:  2/3/2021  Contact Type:  no call needed patient to continue same dose    SUBJECTIVE:  Patient Findings         Clinical Outcomes     Negatives:  Major bleeding event, Thromboembolic event, Anticoagulation-related hospital admission, Anticoagulation-related ED visit, Anticoagulation-related fatality           OBJECTIVE    Recent labs: (last 7 days)     02/03/21   INR 3.3*       ASSESSMENT / PLAN  INR assessment THER    Recheck INR In: 1 WEEK    INR Location Home INR      Anticoagulation Summary  As of 2/3/2021    INR goal:  2.5-3.5   TTR:  85.6 % (1 y)   INR used for dosing:  3.3 (2/3/2021)   Warfarin maintenance plan:  10 mg (2 mg x 5) every Sun, Tue, Sat; 8 mg (2 mg x 4) all other days   Full warfarin instructions:  10 mg every Sun, Tue, Sat; 8 mg all other days   Weekly warfarin total:  62 mg   No change documented:  Yoly Crowe RN   Plan last modified:  Yoly Crowe RN (12/17/2020)   Next INR check:  2/10/2021   Priority:  High   Target end date:  Indefinite    Indications    Blood clot of vein in shoulder area  left [I82.602]  Factor 5 Leiden mutation  heterozygous (H) [D68.51]  Long-term (current) use of anticoagulants [Z79.01] [Z79.01]             Anticoagulation Episode Summary     INR check location:      Preferred lab:      Send INR reminders to:  [facility]    Comments:  INR goal changed per Dr Kitchen 3/21/18 has her own machine         Anticoagulation Care Providers     Provider Role Specialty Phone number    Ling Trammell PA-C Referring Family Medicine [phone]            See the Encounter Report to view Anticoagulation Flowsheet and Dosing Calendar (Go to Encounters tab in chart review, and find the Anticoagulation Therapy Visit)        Yoly Crowe RN

## 2021-02-10 ENCOUNTER — ANTICOAGULATION THERAPY VISIT (OUTPATIENT)
Dept: ANTICOAGULATION | Facility: OTHER | Age: 51
End: 2021-02-10
Attending: PHYSICIAN ASSISTANT
Payer: COMMERCIAL

## 2021-02-10 ENCOUNTER — TRANSFERRED RECORDS (OUTPATIENT)
Dept: HEALTH INFORMATION MANAGEMENT | Facility: OTHER | Age: 51
End: 2021-02-10

## 2021-02-10 DIAGNOSIS — D68.51 FACTOR 5 LEIDEN MUTATION, HETEROZYGOUS (H): Primary | ICD-10-CM

## 2021-02-10 DIAGNOSIS — Z79.01 LONG TERM CURRENT USE OF ANTICOAGULANT THERAPY: ICD-10-CM

## 2021-02-10 DIAGNOSIS — I82.602 BLOOD CLOT OF VEIN IN SHOULDER AREA, LEFT: ICD-10-CM

## 2021-02-10 LAB — INR PPP: 3.4 (ref 0.9–1.1)

## 2021-02-10 NOTE — PROGRESS NOTES
ANTICOAGULATION FOLLOW-UP CLINIC VISIT    Patient Name:  Daniela Ladd  Date:  2/10/2021  Contact Type:  no call needed patient to continue same dose    SUBJECTIVE:  Patient Findings         Clinical Outcomes     Negatives:  Major bleeding event, Thromboembolic event, Anticoagulation-related hospital admission, Anticoagulation-related ED visit, Anticoagulation-related fatality           OBJECTIVE    Recent labs: (last 7 days)     02/10/21   INR 3.4*       ASSESSMENT / PLAN  INR assessment THER    Recheck INR In: 1 WEEK    INR Location Home INR      Anticoagulation Summary  As of 2/10/2021    INR goal:  2.5-3.5   TTR:  87.5 % (1 y)   INR used for dosing:  3.4 (2/10/2021)   Warfarin maintenance plan:  10 mg (2 mg x 5) every Sun, Tue, Sat; 8 mg (2 mg x 4) all other days   Full warfarin instructions:  10 mg every Sun, Tue, Sat; 8 mg all other days   Weekly warfarin total:  62 mg   No change documented:  Yoly Crowe RN   Plan last modified:  Yoly Crowe RN (12/17/2020)   Next INR check:  2/17/2021   Priority:  High   Target end date:  Indefinite    Indications    Blood clot of vein in shoulder area  left [I82.602]  Factor 5 Leiden mutation  heterozygous (H) [D68.51]  Long-term (current) use of anticoagulants [Z79.01] [Z79.01]             Anticoagulation Episode Summary     INR check location:      Preferred lab:      Send INR reminders to:  [facility]    Comments:  INR goal changed per Dr Kitchen 3/21/18 has her own machine         Anticoagulation Care Providers     Provider Role Specialty Phone number    Ling Trammell PA-C Referring Family Medicine [phone]            See the Encounter Report to view Anticoagulation Flowsheet and Dosing Calendar (Go to Encounters tab in chart review, and find the Anticoagulation Therapy Visit)        Yoly Crowe RN

## 2021-02-17 ENCOUNTER — TRANSFERRED RECORDS (OUTPATIENT)
Dept: HEALTH INFORMATION MANAGEMENT | Facility: OTHER | Age: 51
End: 2021-02-17

## 2021-02-17 ENCOUNTER — ANTICOAGULATION THERAPY VISIT (OUTPATIENT)
Dept: ANTICOAGULATION | Facility: OTHER | Age: 51
End: 2021-02-17
Attending: PHYSICIAN ASSISTANT
Payer: COMMERCIAL

## 2021-02-17 DIAGNOSIS — I82.602 BLOOD CLOT OF VEIN IN SHOULDER AREA, LEFT: ICD-10-CM

## 2021-02-17 DIAGNOSIS — D68.51 FACTOR 5 LEIDEN MUTATION, HETEROZYGOUS (H): ICD-10-CM

## 2021-02-17 DIAGNOSIS — Z79.01 LONG TERM CURRENT USE OF ANTICOAGULANT THERAPY: ICD-10-CM

## 2021-02-17 LAB — INR POINT OF CARE: 2.9 (ref 0.86–1.14)

## 2021-02-17 PROCEDURE — 36416 COLLJ CAPILLARY BLOOD SPEC: CPT

## 2021-02-17 PROCEDURE — 85610 PROTHROMBIN TIME: CPT | Mod: QW

## 2021-02-17 NOTE — PROGRESS NOTES
ANTICOAGULATION FOLLOW-UP CLINIC VISIT    Patient Name:  Daniela Ladd  Date:  2021  Contact Type:  phone call needed patient to continue same dose    SUBJECTIVE:  Patient Findings         Clinical Outcomes     Negatives:  Major bleeding event, Thromboembolic event, Anticoagulation-related hospital admission, Anticoagulation-related ED visit, Anticoagulation-related fatality           OBJECTIVE    Recent labs: (last 7 days)     21   INR 2.9*       ASSESSMENT / PLAN  INR assessment THER    Recheck INR In: 1 WEEK    INR Location Home INR      Anticoagulation Summary  As of 2021    INR goal:  2.5-3.5   TTR:  88.2 % (1 y)   INR used for dosin.9 (2021)   Warfarin maintenance plan:  10 mg (2 mg x 5) every Sun, Tue, Sat; 8 mg (2 mg x 4) all other days   Full warfarin instructions:  10 mg every Sun, Tue, Sat; 8 mg all other days   Weekly warfarin total:  62 mg   No change documented:  Yoly Crowe RN   Plan last modified:  Yoly Crowe RN (2020)   Next INR check:  2021   Priority:  High   Target end date:  Indefinite    Indications    Blood clot of vein in shoulder area  left [I82.602]  Factor 5 Leiden mutation  heterozygous (H) [D68.51]  Long-term (current) use of anticoagulants [Z79.01] [Z79.01]             Anticoagulation Episode Summary     INR check location:      Preferred lab:      Send INR reminders to:  [facility]    Comments:  Has home INR machine weekly INR needed         Anticoagulation Care Providers     Provider Role Specialty Phone number    Ling Trammell PA-C Referring Family Medicine [phone]            See the Encounter Report to view Anticoagulation Flowsheet and Dosing Calendar (Go to Encounters tab in chart review, and find the Anticoagulation Therapy Visit)        Yoly Crowe RN

## 2021-02-24 ENCOUNTER — ANTICOAGULATION THERAPY VISIT (OUTPATIENT)
Dept: ANTICOAGULATION | Facility: OTHER | Age: 51
End: 2021-02-24
Attending: PHYSICIAN ASSISTANT
Payer: COMMERCIAL

## 2021-02-24 ENCOUNTER — TRANSFERRED RECORDS (OUTPATIENT)
Dept: HEALTH INFORMATION MANAGEMENT | Facility: OTHER | Age: 51
End: 2021-02-24

## 2021-02-24 DIAGNOSIS — I82.602 BLOOD CLOT OF VEIN IN SHOULDER AREA, LEFT: ICD-10-CM

## 2021-02-24 DIAGNOSIS — Z79.01 LONG TERM CURRENT USE OF ANTICOAGULANT THERAPY: ICD-10-CM

## 2021-02-24 DIAGNOSIS — D68.51 FACTOR 5 LEIDEN MUTATION, HETEROZYGOUS (H): ICD-10-CM

## 2021-02-24 LAB — INR PPP: 2.2 (ref 0.9–1.1)

## 2021-02-24 NOTE — PROGRESS NOTES
ANTICOAGULATION FOLLOW-UP CLINIC VISIT    Patient Name:  Daniela Ladd  Date:  2021  Contact Type:  Telephone/ left message for patient regarding dosing    SUBJECTIVE:  Patient Findings     Positives:  Missed doses        Clinical Outcomes     Negatives:  Major bleeding event, Thromboembolic event, Anticoagulation-related hospital admission, Anticoagulation-related ED visit, Anticoagulation-related fatality           OBJECTIVE    Recent labs: (last 7 days)     21   INR 2.2*       ASSESSMENT / PLAN  INR assessment SUB    Recheck INR In: 1 WEEK    INR Location Home INR      Anticoagulation Summary  As of 2021    INR goal:  2.5-3.5   TTR:  87.4 % (1 y)   INR used for dosin.2 (2021)   Warfarin maintenance plan:  10 mg (2 mg x 5) every Mon, Wed, Fri; 8 mg (2 mg x 4) all other days   Full warfarin instructions:  10 mg every Mon, Wed, Fri; 8 mg all other days   Weekly warfarin total:  62 mg   Plan last modified:  Yoly Crowe RN (2021)   Next INR check:  3/3/2021   Priority:  High   Target end date:  Indefinite    Indications    Blood clot of vein in shoulder area  left [I82.602]  Factor 5 Leiden mutation  heterozygous (H) [D68.51]  Long-term (current) use of anticoagulants [Z79.01] [Z79.01]             Anticoagulation Episode Summary     INR check location:      Preferred lab:      Send INR reminders to:  [facility]    Comments:  Has home INR machine weekly INR needed         Anticoagulation Care Providers     Provider Role Specialty Phone number    Ling Trammell PA-C Referring Family Medicine [phone]            See the Encounter Report to view Anticoagulation Flowsheet and Dosing Calendar (Go to Encounters tab in chart review, and find the Anticoagulation Therapy Visit)        Yoly Crowe, RN

## 2021-03-03 ENCOUNTER — ANTICOAGULATION THERAPY VISIT (OUTPATIENT)
Dept: ANTICOAGULATION | Facility: OTHER | Age: 51
End: 2021-03-03
Payer: COMMERCIAL

## 2021-03-03 ENCOUNTER — TRANSFERRED RECORDS (OUTPATIENT)
Dept: HEALTH INFORMATION MANAGEMENT | Facility: OTHER | Age: 51
End: 2021-03-03

## 2021-03-03 DIAGNOSIS — Z79.01 LONG TERM CURRENT USE OF ANTICOAGULANT THERAPY: ICD-10-CM

## 2021-03-03 DIAGNOSIS — I82.602 BLOOD CLOT OF VEIN IN SHOULDER AREA, LEFT: ICD-10-CM

## 2021-03-03 DIAGNOSIS — D68.51 FACTOR 5 LEIDEN MUTATION, HETEROZYGOUS (H): ICD-10-CM

## 2021-03-03 LAB — INR PPP: 2.6 (ref 0.9–1.1)

## 2021-03-06 ENCOUNTER — HEALTH MAINTENANCE LETTER (OUTPATIENT)
Age: 51
End: 2021-03-06

## 2021-03-10 ENCOUNTER — MYC MEDICAL ADVICE (OUTPATIENT)
Dept: ONCOLOGY | Facility: OTHER | Age: 51
End: 2021-03-10

## 2021-03-10 ENCOUNTER — ANTICOAGULATION THERAPY VISIT (OUTPATIENT)
Dept: ANTICOAGULATION | Facility: OTHER | Age: 51
End: 2021-03-10
Attending: PHYSICIAN ASSISTANT
Payer: COMMERCIAL

## 2021-03-10 ENCOUNTER — TRANSFERRED RECORDS (OUTPATIENT)
Dept: HEALTH INFORMATION MANAGEMENT | Facility: OTHER | Age: 51
End: 2021-03-10

## 2021-03-10 DIAGNOSIS — I82.602 BLOOD CLOT OF VEIN IN SHOULDER AREA, LEFT: ICD-10-CM

## 2021-03-10 DIAGNOSIS — Z79.01 LONG TERM CURRENT USE OF ANTICOAGULANT THERAPY: ICD-10-CM

## 2021-03-10 DIAGNOSIS — D68.51 FACTOR 5 LEIDEN MUTATION, HETEROZYGOUS (H): ICD-10-CM

## 2021-03-10 LAB — INR PPP: 2.7 (ref 0.9–1.1)

## 2021-03-10 NOTE — PROGRESS NOTES
ANTICOAGULATION FOLLOW-UP CLINIC VISIT    Patient Name:  Daniela Ladd  Date:  3/10/2021  Contact Type:  no call needed patient to continue same dose    SUBJECTIVE:  Patient Findings         Clinical Outcomes     Negatives:  Major bleeding event, Thromboembolic event, Anticoagulation-related hospital admission, Anticoagulation-related ED visit, Anticoagulation-related fatality           OBJECTIVE    Recent labs: (last 7 days)     03/10/21   INR 2.7*       ASSESSMENT / PLAN  INR assessment THER    Recheck INR In: 1 WEEK    INR Location Home INR      Anticoagulation Summary  As of 3/10/2021    INR goal:  2.5-3.5   TTR:  86.0 % (1 y)   INR used for dosin.7 (3/10/2021)   Warfarin maintenance plan:  10 mg (2 mg x 5) every Mon, Wed, Fri; 8 mg (2 mg x 4) all other days   Full warfarin instructions:  10 mg every Mon, Wed, Fri; 8 mg all other days   Weekly warfarin total:  62 mg   No change documented:  Yoly Crowe RN   Plan last modified:  Yoly Crowe RN (2021)   Next INR check:     Priority:  High   Target end date:  Indefinite    Indications    Blood clot of vein in shoulder area  left [I82.602]  Factor 5 Leiden mutation  heterozygous (H) [D68.51]  Long-term (current) use of anticoagulants [Z79.01] [Z79.01]             Anticoagulation Episode Summary     INR check location:      Preferred lab:      Send INR reminders to:  [facility]    Comments:  Has home INR machine weekly INR needed         Anticoagulation Care Providers     Provider Role Specialty Phone number    Ling Trammell PA-C Referring Family Medicine [phone]            See the Encounter Report to view Anticoagulation Flowsheet and Dosing Calendar (Go to Encounters tab in chart review, and find the Anticoagulation Therapy Visit)        Yoly Crowe, RN

## 2021-03-17 ENCOUNTER — ANTICOAGULATION THERAPY VISIT (OUTPATIENT)
Dept: ANTICOAGULATION | Facility: OTHER | Age: 51
End: 2021-03-17
Attending: PHYSICIAN ASSISTANT
Payer: COMMERCIAL

## 2021-03-17 ENCOUNTER — TRANSFERRED RECORDS (OUTPATIENT)
Dept: HEALTH INFORMATION MANAGEMENT | Facility: OTHER | Age: 51
End: 2021-03-17

## 2021-03-17 DIAGNOSIS — I82.602 BLOOD CLOT OF VEIN IN SHOULDER AREA, LEFT: ICD-10-CM

## 2021-03-17 DIAGNOSIS — D68.51 FACTOR 5 LEIDEN MUTATION, HETEROZYGOUS (H): ICD-10-CM

## 2021-03-17 DIAGNOSIS — Z79.01 LONG TERM CURRENT USE OF ANTICOAGULANT THERAPY: ICD-10-CM

## 2021-03-17 LAB — INR PPP: 2.9 (ref 0.9–1.1)

## 2021-03-17 NOTE — PROGRESS NOTES
ANTICOAGULATION FOLLOW-UP CLINIC VISIT    Patient Name:  Daniela Ladd  Date:  3/17/2021  Contact Type:  Results obtained via home INR machine.    SUBJECTIVE:  Patient Findings         Clinical Outcomes     Negatives:  Major bleeding event, Thromboembolic event, Anticoagulation-related hospital admission, Anticoagulation-related ED visit, Anticoagulation-related fatality           OBJECTIVE    Recent labs: (last 7 days)     21   INR 2.9*       ASSESSMENT / PLAN  INR assessment THER    Recheck INR In: 1 WEEK    INR Location Clinic      Anticoagulation Summary  As of 3/17/2021    INR goal:  2.5-3.5   TTR:  86.0 % (1 y)   INR used for dosin.9 (3/17/2021)   Warfarin maintenance plan:  10 mg (2 mg x 5) every Mon, Wed, Fri; 8 mg (2 mg x 4) all other days   Full warfarin instructions:  10 mg every Mon, Wed, Fri; 8 mg all other days   Weekly warfarin total:  62 mg   Plan last modified:  Yoly Crowe RN (2021)   Next INR check:  3/24/2021   Priority:  High   Target end date:  Indefinite    Indications    Blood clot of vein in shoulder area  left [I82.602]  Factor 5 Leiden mutation  heterozygous (H) [D68.51]  Long-term (current) use of anticoagulants [Z79.01] [Z79.01]             Anticoagulation Episode Summary     INR check location:      Preferred lab:      Send INR reminders to:  [facility]    Comments:  Has home INR machine weekly INR needed         Anticoagulation Care Providers     Provider Role Specialty Phone number    Ling Trammell PA-C Referring Family Medicine [phone]            See the Encounter Report to view Anticoagulation Flowsheet and Dosing Calendar (Go to Encounters tab in chart review, and find the Anticoagulation Therapy Visit)    INR resulted on home INR machine. Dose remains the same. Recheck next week.     Elyssa Correa RN

## 2021-03-24 ENCOUNTER — ANTICOAGULATION THERAPY VISIT (OUTPATIENT)
Dept: ANTICOAGULATION | Facility: OTHER | Age: 51
End: 2021-03-24
Attending: PHYSICIAN ASSISTANT
Payer: COMMERCIAL

## 2021-03-24 ENCOUNTER — TRANSFERRED RECORDS (OUTPATIENT)
Dept: HEALTH INFORMATION MANAGEMENT | Facility: OTHER | Age: 51
End: 2021-03-24

## 2021-03-24 DIAGNOSIS — Z79.01 LONG TERM CURRENT USE OF ANTICOAGULANT THERAPY: ICD-10-CM

## 2021-03-24 DIAGNOSIS — D68.51 FACTOR 5 LEIDEN MUTATION, HETEROZYGOUS (H): ICD-10-CM

## 2021-03-24 DIAGNOSIS — I82.602 BLOOD CLOT OF VEIN IN SHOULDER AREA, LEFT: ICD-10-CM

## 2021-03-24 LAB — INR PPP: 3 (ref 0.9–1.1)

## 2021-03-24 NOTE — PROGRESS NOTES
ANTICOAGULATION FOLLOW-UP CLINIC VISIT    Patient Name:  Daniela Ladd  Date:  3/24/2021  Contact Type:  no call needed patient to continue with same dose    SUBJECTIVE:  Patient Findings         Clinical Outcomes     Negatives:  Major bleeding event, Thromboembolic event, Anticoagulation-related hospital admission, Anticoagulation-related ED visit, Anticoagulation-related fatality           OBJECTIVE    Recent labs: (last 7 days)     03/24/21   INR 3.0*       ASSESSMENT / PLAN  INR assessment THER    Recheck INR In: 1 WEEK    INR Location Home INR      Anticoagulation Summary  As of 3/24/2021    INR goal:  2.5-3.5   TTR:  86.0 % (1 y)   INR used for dosing:  3.0 (3/24/2021)   Warfarin maintenance plan:  10 mg (2 mg x 5) every Mon, Wed, Fri; 8 mg (2 mg x 4) all other days   Full warfarin instructions:  10 mg every Mon, Wed, Fri; 8 mg all other days   Weekly warfarin total:  62 mg   No change documented:  Yoly Crowe RN   Plan last modified:  Yoly Crowe RN (2/24/2021)   Next INR check:  3/31/2021   Priority:  High   Target end date:  Indefinite    Indications    Blood clot of vein in shoulder area  left [I82.602]  Factor 5 Leiden mutation  heterozygous (H) [D68.51]  Long-term (current) use of anticoagulants [Z79.01] [Z79.01]             Anticoagulation Episode Summary     INR check location:      Preferred lab:      Send INR reminders to:  [facility]    Comments:  Has home INR machine weekly INR needed         Anticoagulation Care Providers     Provider Role Specialty Phone number    Ling Trammell PA-C Referring Family Medicine [phone]            See the Encounter Report to view Anticoagulation Flowsheet and Dosing Calendar (Go to Encounters tab in chart review, and find the Anticoagulation Therapy Visit)        Yoly Crowe RN

## 2021-03-31 ENCOUNTER — TRANSFERRED RECORDS (OUTPATIENT)
Dept: HEALTH INFORMATION MANAGEMENT | Facility: OTHER | Age: 51
End: 2021-03-31

## 2021-03-31 ENCOUNTER — ANTICOAGULATION THERAPY VISIT (OUTPATIENT)
Dept: ANTICOAGULATION | Facility: OTHER | Age: 51
End: 2021-03-31
Attending: PHYSICIAN ASSISTANT
Payer: COMMERCIAL

## 2021-03-31 DIAGNOSIS — D68.51 FACTOR 5 LEIDEN MUTATION, HETEROZYGOUS (H): ICD-10-CM

## 2021-03-31 DIAGNOSIS — Z79.01 LONG TERM CURRENT USE OF ANTICOAGULANT THERAPY: ICD-10-CM

## 2021-03-31 DIAGNOSIS — I82.602 BLOOD CLOT OF VEIN IN SHOULDER AREA, LEFT: ICD-10-CM

## 2021-03-31 LAB — INR PPP: 3.3 (ref 0.9–1.1)

## 2021-03-31 NOTE — PROGRESS NOTES
ANTICOAGULATION FOLLOW-UP CLINIC VISIT    Patient Name:  Daniela Ladd  Date:  3/31/2021  Contact Type:  no call needed patient to continue same dose    SUBJECTIVE:  Patient Findings         Clinical Outcomes     Negatives:  Major bleeding event, Thromboembolic event, Anticoagulation-related hospital admission, Anticoagulation-related ED visit, Anticoagulation-related fatality           OBJECTIVE    Recent labs: (last 7 days)     03/31/21   INR 3.3*       ASSESSMENT / PLAN  INR assessment THER    Recheck INR In: 1 WEEK    INR Location Home INR      Anticoagulation Summary  As of 3/31/2021    INR goal:  2.5-3.5   TTR:  86.0 % (1 y)   INR used for dosing:  3.3 (3/31/2021)   Warfarin maintenance plan:  10 mg (2 mg x 5) every Mon, Wed, Fri; 8 mg (2 mg x 4) all other days   Full warfarin instructions:  10 mg every Mon, Wed, Fri; 8 mg all other days   Weekly warfarin total:  62 mg   No change documented:  Yoly Crowe RN   Plan last modified:  Yoly Crowe RN (2/24/2021)   Next INR check:  4/7/2021   Priority:  High   Target end date:  Indefinite    Indications    Blood clot of vein in shoulder area  left [I82.602]  Factor 5 Leiden mutation  heterozygous (H) [D68.51]  Long-term (current) use of anticoagulants [Z79.01] [Z79.01]             Anticoagulation Episode Summary     INR check location:      Preferred lab:      Send INR reminders to:  [facility]    Comments:  Has home INR machine weekly INR needed         Anticoagulation Care Providers     Provider Role Specialty Phone number    Ling Trammell PA-C Referring Family Medicine [phone]            See the Encounter Report to view Anticoagulation Flowsheet and Dosing Calendar (Go to Encounters tab in chart review, and find the Anticoagulation Therapy Visit)        Yoly Crowe RN

## 2021-04-07 ENCOUNTER — TRANSFERRED RECORDS (OUTPATIENT)
Dept: HEALTH INFORMATION MANAGEMENT | Facility: OTHER | Age: 51
End: 2021-04-07

## 2021-04-07 ENCOUNTER — ANTICOAGULATION THERAPY VISIT (OUTPATIENT)
Dept: ANTICOAGULATION | Facility: OTHER | Age: 51
End: 2021-04-07
Attending: PHYSICIAN ASSISTANT
Payer: COMMERCIAL

## 2021-04-07 DIAGNOSIS — Z79.01 LONG TERM CURRENT USE OF ANTICOAGULANT THERAPY: ICD-10-CM

## 2021-04-07 DIAGNOSIS — I82.602 BLOOD CLOT OF VEIN IN SHOULDER AREA, LEFT: ICD-10-CM

## 2021-04-07 DIAGNOSIS — D68.51 FACTOR 5 LEIDEN MUTATION, HETEROZYGOUS (H): ICD-10-CM

## 2021-04-07 DIAGNOSIS — I82.622 ARM DVT (DEEP VENOUS THROMBOEMBOLISM), ACUTE, LEFT (H): ICD-10-CM

## 2021-04-07 LAB — INR PPP: 3.8 (ref 0.9–1.1)

## 2021-04-07 RX ORDER — WARFARIN SODIUM 2 MG/1
TABLET ORAL
Qty: 360 TABLET | Refills: 1
Start: 2021-04-07 | End: 2021-04-28

## 2021-04-07 NOTE — PROGRESS NOTES
ANTICOAGULATION FOLLOW-UP CLINIC VISIT    Patient Name:  Daniela Ladd  Date:  4/7/2021  Contact Type:  Telephone/ spoke with patient reviewed dosing    SUBJECTIVE:  Patient Findings     Comments:  Patient denies any identifiable changes that caused the supratherapeutic INR.           Clinical Outcomes     Negatives:  Major bleeding event, Thromboembolic event, Anticoagulation-related hospital admission, Anticoagulation-related ED visit, Anticoagulation-related fatality    Comments:  Patient denies any identifiable changes that caused the supratherapeutic INR.              OBJECTIVE    Recent labs: (last 7 days)     04/07/21   INR 3.8*       ASSESSMENT / PLAN  INR assessment SUPRA    Recheck INR In: 1 WEEK    INR Location Home INR      Anticoagulation Summary  As of 4/7/2021    INR goal:  2.5-3.5   TTR:  84.8 % (1 y)   INR used for dosing:  3.8 (4/7/2021)   Warfarin maintenance plan:  10 mg (2 mg x 5) every Mon, Fri; 8 mg (2 mg x 4) all other days   Full warfarin instructions:  10 mg every Mon, Fri; 8 mg all other days   Weekly warfarin total:  60 mg   Plan last modified:  Yoly Crowe, RN (4/7/2021)   Next INR check:  4/14/2021   Priority:  High   Target end date:  Indefinite    Indications    Blood clot of vein in shoulder area  left [I82.602]  Factor 5 Leiden mutation  heterozygous (H) [D68.51]  Long-term (current) use of anticoagulants [Z79.01] [Z79.01]             Anticoagulation Episode Summary     INR check location:      Preferred lab:      Send INR reminders to:  [facility]    Comments:  Has home INR machine weekly INR needed         Anticoagulation Care Providers     Provider Role Specialty Phone number    Ling Trammell PA-C Referring Family Medicine [phone]            See the Encounter Report to view Anticoagulation Flowsheet and Dosing Calendar (Go to Encounters tab in chart review, and find the Anticoagulation Therapy Visit)        Yoly Crowe, RN

## 2021-04-14 ENCOUNTER — ANTICOAGULATION THERAPY VISIT (OUTPATIENT)
Dept: ANTICOAGULATION | Facility: OTHER | Age: 51
End: 2021-04-14
Attending: PHYSICIAN ASSISTANT
Payer: COMMERCIAL

## 2021-04-14 ENCOUNTER — TRANSFERRED RECORDS (OUTPATIENT)
Dept: HEALTH INFORMATION MANAGEMENT | Facility: OTHER | Age: 51
End: 2021-04-14

## 2021-04-14 DIAGNOSIS — I82.602 BLOOD CLOT OF VEIN IN SHOULDER AREA, LEFT: ICD-10-CM

## 2021-04-14 DIAGNOSIS — Z79.01 LONG TERM CURRENT USE OF ANTICOAGULANT THERAPY: ICD-10-CM

## 2021-04-14 DIAGNOSIS — D68.51 FACTOR 5 LEIDEN MUTATION, HETEROZYGOUS (H): ICD-10-CM

## 2021-04-14 LAB — INR PPP: 3 (ref 0.9–1.1)

## 2021-04-14 NOTE — PROGRESS NOTES
ANTICOAGULATION FOLLOW-UP CLINIC VISIT    Patient Name:  Daniela Ladd  Date:  4/14/2021  Contact Type:  No call needed, patient to continue same dose    SUBJECTIVE:  Patient Findings         Clinical Outcomes     Negatives:  Major bleeding event, Thromboembolic event, Anticoagulation-related hospital admission, Anticoagulation-related ED visit, Anticoagulation-related fatality           OBJECTIVE    Recent labs: (last 7 days)     04/14/21   INR 3.0*       ASSESSMENT / PLAN  INR assessment THER    Recheck INR In: 1 WEEK    INR Location Home INR      Anticoagulation Summary  As of 4/14/2021    INR goal:  2.5-3.5   TTR:  84.1 % (1 y)   INR used for dosing:  3.0 (4/14/2021)   Warfarin maintenance plan:  10 mg (2 mg x 5) every Mon, Fri; 8 mg (2 mg x 4) all other days   Full warfarin instructions:  10 mg every Mon, Fri; 8 mg all other days   Weekly warfarin total:  60 mg   No change documented:  Scar Erickson RN   Plan last modified:  Yoly Crowe RN (4/7/2021)   Next INR check:  4/21/2021   Priority:  Maintenance   Target end date:  Indefinite    Indications    Blood clot of vein in shoulder area  left [I82.602]  Factor 5 Leiden mutation  heterozygous (H) [D68.51]  Long-term (current) use of anticoagulants [Z79.01] [Z79.01]             Anticoagulation Episode Summary     INR check location:      Preferred lab:      Send INR reminders to:  [facility]    Comments:  Has home INR machine weekly INR needed         Anticoagulation Care Providers     Provider Role Specialty Phone number    Ling Trammell PA-C Referring Family Medicine [phone]            See the Encounter Report to view Anticoagulation Flowsheet and Dosing Calendar (Go to Encounters tab in chart review, and find the Anticoagulation Therapy Visit)    Scar Erickson, RN

## 2021-04-21 ENCOUNTER — TRANSFERRED RECORDS (OUTPATIENT)
Dept: HEALTH INFORMATION MANAGEMENT | Facility: OTHER | Age: 51
End: 2021-04-21

## 2021-04-21 ENCOUNTER — ANTICOAGULATION THERAPY VISIT (OUTPATIENT)
Dept: ANTICOAGULATION | Facility: OTHER | Age: 51
End: 2021-04-21
Attending: PHYSICIAN ASSISTANT
Payer: COMMERCIAL

## 2021-04-21 DIAGNOSIS — D68.51 FACTOR 5 LEIDEN MUTATION, HETEROZYGOUS (H): ICD-10-CM

## 2021-04-21 DIAGNOSIS — I82.602 BLOOD CLOT OF VEIN IN SHOULDER AREA, LEFT: ICD-10-CM

## 2021-04-21 DIAGNOSIS — Z79.01 LONG TERM CURRENT USE OF ANTICOAGULANT THERAPY: ICD-10-CM

## 2021-04-21 LAB — INR PPP: 2.7 (ref 0.9–1.1)

## 2021-04-21 NOTE — PROGRESS NOTES
ANTICOAGULATION FOLLOW-UP CLINIC VISIT    Patient Name:  Daniela Ladd  Date:  2021  Contact Type:  no call needed patient to continue same dose    SUBJECTIVE:  Patient Findings         Clinical Outcomes     Negatives:  Major bleeding event, Thromboembolic event, Anticoagulation-related hospital admission, Anticoagulation-related ED visit, Anticoagulation-related fatality           OBJECTIVE    Recent labs: (last 7 days)     21   INR 2.7*       ASSESSMENT / PLAN  INR assessment THER    Recheck INR In: 1 WEEK    INR Location Home INR      Anticoagulation Summary  As of 2021    INR goal:  2.5-3.5   TTR:  84.1 % (1 y)   INR used for dosin.7 (2021)   Warfarin maintenance plan:  10 mg (2 mg x 5) every Mon, Fri; 8 mg (2 mg x 4) all other days   Full warfarin instructions:  10 mg every Mon, Fri; 8 mg all other days   Weekly warfarin total:  60 mg   No change documented:  Yoly Crowe RN   Plan last modified:  Yoly Crowe RN (2021)   Next INR check:  2021   Priority:  High   Target end date:  Indefinite    Indications    Blood clot of vein in shoulder area  left [I82.602]  Factor 5 Leiden mutation  heterozygous (H) [D68.51]  Long-term (current) use of anticoagulants [Z79.01] [Z79.01]             Anticoagulation Episode Summary     INR check location:      Preferred lab:      Send INR reminders to:  [facility]    Comments:  Has home INR machine weekly INR needed         Anticoagulation Care Providers     Provider Role Specialty Phone number    Ling Trammell PA-C Referring Family Medicine [phone]            See the Encounter Report to view Anticoagulation Flowsheet and Dosing Calendar (Go to Encounters tab in chart review, and find the Anticoagulation Therapy Visit)        Yoly Crowe RN

## 2021-04-28 ENCOUNTER — ANTICOAGULATION THERAPY VISIT (OUTPATIENT)
Dept: ANTICOAGULATION | Facility: OTHER | Age: 51
End: 2021-04-28
Attending: PHYSICIAN ASSISTANT
Payer: COMMERCIAL

## 2021-04-28 ENCOUNTER — TRANSFERRED RECORDS (OUTPATIENT)
Dept: HEALTH INFORMATION MANAGEMENT | Facility: OTHER | Age: 51
End: 2021-04-28

## 2021-04-28 DIAGNOSIS — I82.622 ARM DVT (DEEP VENOUS THROMBOEMBOLISM), ACUTE, LEFT (H): ICD-10-CM

## 2021-04-28 DIAGNOSIS — I82.602 BLOOD CLOT OF VEIN IN SHOULDER AREA, LEFT: ICD-10-CM

## 2021-04-28 DIAGNOSIS — Z79.01 LONG TERM CURRENT USE OF ANTICOAGULANT THERAPY: ICD-10-CM

## 2021-04-28 DIAGNOSIS — D68.51 FACTOR 5 LEIDEN MUTATION, HETEROZYGOUS (H): ICD-10-CM

## 2021-04-28 LAB — INR PPP: 2.4 (ref 0.9–1.1)

## 2021-04-28 RX ORDER — WARFARIN SODIUM 2 MG/1
TABLET ORAL
Qty: 360 TABLET | Refills: 1
Start: 2021-04-28 | End: 2021-05-18

## 2021-04-28 NOTE — PROGRESS NOTES
ANTICOAGULATION FOLLOW-UP CLINIC VISIT    Patient Name:  Daniela Ladd  Date:  2021  Contact Type:  Telephone/ spoke with patient reviewed dosing    SUBJECTIVE:  Patient Findings         Clinical Outcomes     Negatives:  Major bleeding event, Thromboembolic event, Anticoagulation-related hospital admission, Anticoagulation-related ED visit, Anticoagulation-related fatality           OBJECTIVE    Recent labs: (last 7 days)     21   INR 2.4*       ASSESSMENT / PLAN  INR assessment SUB    Recheck INR In: 1 WEEK    INR Location Home INR      Anticoagulation Summary  As of 2021    INR goal:  2.5-3.5   TTR:  83.5 % (1 y)   INR used for dosin.4 (2021)   Warfarin maintenance plan:  10 mg (2 mg x 5) every Mon, Wed, Fri; 8 mg (2 mg x 4) all other days   Full warfarin instructions:  10 mg every Mon, Wed, Fri; 8 mg all other days   Weekly warfarin total:  62 mg   Plan last modified:  Yoly Crowe RN (2021)   Next INR check:  2021   Priority:  High   Target end date:  Indefinite    Indications    Blood clot of vein in shoulder area  left [I82.602]  Factor 5 Leiden mutation  heterozygous (H) [D68.51]  Long-term (current) use of anticoagulants [Z79.01] [Z79.01]             Anticoagulation Episode Summary     INR check location:      Preferred lab:      Send INR reminders to:  [facility]    Comments:  Has home INR machine weekly INR needed         Anticoagulation Care Providers     Provider Role Specialty Phone number    Ling Trammell PA-C Referring Family Medicine [phone]            See the Encounter Report to view Anticoagulation Flowsheet and Dosing Calendar (Go to Encounters tab in chart review, and find the Anticoagulation Therapy Visit)        Yoly Crowe, RN

## 2021-05-05 ENCOUNTER — TRANSFERRED RECORDS (OUTPATIENT)
Dept: INTERNAL MEDICINE | Facility: OTHER | Age: 51
End: 2021-05-05

## 2021-05-05 ENCOUNTER — ANTICOAGULATION THERAPY VISIT (OUTPATIENT)
Dept: ANTICOAGULATION | Facility: OTHER | Age: 51
End: 2021-05-05
Attending: PHYSICIAN ASSISTANT
Payer: COMMERCIAL

## 2021-05-05 DIAGNOSIS — Z79.01 LONG TERM CURRENT USE OF ANTICOAGULANT THERAPY: ICD-10-CM

## 2021-05-05 DIAGNOSIS — D68.51 FACTOR 5 LEIDEN MUTATION, HETEROZYGOUS (H): ICD-10-CM

## 2021-05-05 DIAGNOSIS — I82.602 BLOOD CLOT OF VEIN IN SHOULDER AREA, LEFT: ICD-10-CM

## 2021-05-05 LAB — INR PPP: 2.4 (ref 0.9–1.1)

## 2021-05-05 NOTE — PROGRESS NOTES
ANTICOAGULATION FOLLOW-UP CLINIC VISIT    Patient Name:  Daniela Ladd  Date:  2021  Contact Type:  Telephone/ spoke with patient reviewed dosing    SUBJECTIVE:  Patient Findings         Clinical Outcomes     Negatives:  Major bleeding event, Thromboembolic event, Anticoagulation-related hospital admission, Anticoagulation-related ED visit, Anticoagulation-related fatality           OBJECTIVE    Recent labs: (last 7 days)     21   INR 2.4*       ASSESSMENT / PLAN  INR assessment SUB    Recheck INR In: 1 WEEK    INR Location Home INR      Anticoagulation Summary  As of 2021    INR goal:  2.5-3.5   TTR:  81.6 % (1 y)   INR used for dosin.4 (2021)   Warfarin maintenance plan:  10 mg (2 mg x 5) every Mon, Wed, Fri; 8 mg (2 mg x 4) all other days   Full warfarin instructions:  : 12 mg; Otherwise 10 mg every Mon, Wed, Fri; 8 mg all other days   Weekly warfarin total:  62 mg   Plan last modified:  Yoly Crowe, RN (2021)   Next INR check:  2021   Priority:  High   Target end date:  Indefinite    Indications    Blood clot of vein in shoulder area  left [I82.602]  Factor 5 Leiden mutation  heterozygous (H) [D68.51]  Long-term (current) use of anticoagulants [Z79.01] [Z79.01]             Anticoagulation Episode Summary     INR check location:      Preferred lab:      Send INR reminders to:  [facility]    Comments:  Has home INR machine weekly INR needed         Anticoagulation Care Providers     Provider Role Specialty Phone number    Ling Trammell PA-C Referring Family Medicine [phone]            See the Encounter Report to view Anticoagulation Flowsheet and Dosing Calendar (Go to Encounters tab in chart review, and find the Anticoagulation Therapy Visit)        Yoly Crowe, RN

## 2021-05-12 ENCOUNTER — TRANSFERRED RECORDS (OUTPATIENT)
Dept: HEALTH INFORMATION MANAGEMENT | Facility: OTHER | Age: 51
End: 2021-05-12

## 2021-05-12 ENCOUNTER — ANTICOAGULATION THERAPY VISIT (OUTPATIENT)
Dept: ANTICOAGULATION | Facility: OTHER | Age: 51
End: 2021-05-12
Attending: PHYSICIAN ASSISTANT
Payer: COMMERCIAL

## 2021-05-12 DIAGNOSIS — I82.602 BLOOD CLOT OF VEIN IN SHOULDER AREA, LEFT: ICD-10-CM

## 2021-05-12 DIAGNOSIS — D68.51 FACTOR 5 LEIDEN MUTATION, HETEROZYGOUS (H): ICD-10-CM

## 2021-05-12 DIAGNOSIS — Z79.01 LONG TERM CURRENT USE OF ANTICOAGULANT THERAPY: ICD-10-CM

## 2021-05-12 LAB — INR PPP: 2.6 (ref 0.9–1.1)

## 2021-05-12 NOTE — PROGRESS NOTES
ANTICOAGULATION FOLLOW-UP CLINIC VISIT    Patient Name:  Daniela Ladd  Date:  2021  Contact Type:  no call needed patient to continue same dose    SUBJECTIVE:  Patient Findings         Clinical Outcomes     Negatives:  Major bleeding event, Thromboembolic event, Anticoagulation-related hospital admission, Anticoagulation-related ED visit, Anticoagulation-related fatality           OBJECTIVE    Recent labs: (last 7 days)     21   INR 2.6*       ASSESSMENT / PLAN  INR assessment THER    Recheck INR In: 1 WEEK    INR Location Home INR      Anticoagulation Summary  As of 2021    INR goal:  2.5-3.5   TTR:  80.6 % (1 y)   INR used for dosin.6 (2021)   Warfarin maintenance plan:  10 mg (2 mg x 5) every Mon, Wed, Fri; 8 mg (2 mg x 4) all other days   Full warfarin instructions:  10 mg every Mon, Wed, Fri; 8 mg all other days   Weekly warfarin total:  62 mg   No change documented:  Yoly Crowe RN   Plan last modified:  Yoly Crowe RN (2021)   Next INR check:  2021   Priority:  High   Target end date:  Indefinite    Indications    Blood clot of vein in shoulder area  left [I82.602]  Factor 5 Leiden mutation  heterozygous (H) [D68.51]  Long-term (current) use of anticoagulants [Z79.01] [Z79.01]             Anticoagulation Episode Summary     INR check location:      Preferred lab:      Send INR reminders to:  [facility]    Comments:  Has home INR machine weekly INR needed         Anticoagulation Care Providers     Provider Role Specialty Phone number    Ling Trammell PA-C Referring Family Medicine [phone]            See the Encounter Report to view Anticoagulation Flowsheet and Dosing Calendar (Go to Encounters tab in chart review, and find the Anticoagulation Therapy Visit)        Yoly Crowe RN

## 2021-05-18 ENCOUNTER — ANTICOAGULATION THERAPY VISIT (OUTPATIENT)
Dept: ANTICOAGULATION | Facility: OTHER | Age: 51
End: 2021-05-18
Attending: PHYSICIAN ASSISTANT
Payer: COMMERCIAL

## 2021-05-18 ENCOUNTER — TRANSFERRED RECORDS (OUTPATIENT)
Dept: HEALTH INFORMATION MANAGEMENT | Facility: OTHER | Age: 51
End: 2021-05-18

## 2021-05-18 DIAGNOSIS — Z79.01 LONG TERM CURRENT USE OF ANTICOAGULANT THERAPY: ICD-10-CM

## 2021-05-18 DIAGNOSIS — I82.602 BLOOD CLOT OF VEIN IN SHOULDER AREA, LEFT: ICD-10-CM

## 2021-05-18 DIAGNOSIS — I82.622 ARM DVT (DEEP VENOUS THROMBOEMBOLISM), ACUTE, LEFT (H): ICD-10-CM

## 2021-05-18 DIAGNOSIS — D68.51 FACTOR 5 LEIDEN MUTATION, HETEROZYGOUS (H): ICD-10-CM

## 2021-05-18 LAB — INR PPP: 2.4 (ref 0.9–1.1)

## 2021-05-18 RX ORDER — WARFARIN SODIUM 2 MG/1
TABLET ORAL
Qty: 360 TABLET | Refills: 1
Start: 2021-05-18 | End: 2021-06-17

## 2021-05-18 NOTE — PROGRESS NOTES
ANTICOAGULATION FOLLOW-UP CLINIC VISIT    Patient Name:  Daniela Ladd  Date:  2021  Contact Type:  Face to Face    SUBJECTIVE:  Patient Findings     Comments:  Patient came in to verify her home machine INR in clinic 2.7        Clinical Outcomes     Negatives:  Major bleeding event, Thromboembolic event, Anticoagulation-related hospital admission, Anticoagulation-related ED visit, Anticoagulation-related fatality    Comments:  Patient came in to verify her home machine INR in clinic 2.7           OBJECTIVE    Recent labs: (last 7 days)     21   INR 2.4*       ASSESSMENT / PLAN  INR assessment SUB    Recheck INR In: 1 WEEK    INR Location Home INR      Anticoagulation Summary  As of 2021    INR goal:  2.5-3.5   TTR:  79.8 % (1 y)   INR used for dosin.4 (2021)   Warfarin maintenance plan:  8 mg (2 mg x 4) every Mon, Wed, Fri; 10 mg (2 mg x 5) all other days   Full warfarin instructions:  8 mg every Mon, Wed, Fri; 10 mg all other days   Weekly warfarin total:  64 mg   Plan last modified:  Yoly Crowe RN (2021)   Next INR check:  2021   Priority:  High   Target end date:  Indefinite    Indications    Blood clot of vein in shoulder area  left [I82.602]  Factor 5 Leiden mutation  heterozygous (H) [D68.51]  Long-term (current) use of anticoagulants [Z79.01] [Z79.01]             Anticoagulation Episode Summary     INR check location:      Preferred lab:      Send INR reminders to:  [facility]    Comments:  Has home INR machine weekly INR needed         Anticoagulation Care Providers     Provider Role Specialty Phone number    Ling Trammell PA-C Referring Family Medicine [phone]            See the Encounter Report to view Anticoagulation Flowsheet and Dosing Calendar (Go to Encounters tab in chart review, and find the Anticoagulation Therapy Visit)    Saw patient in clinic but also received home INR    Yoly Crowe, MARIPOSA

## 2021-05-20 ENCOUNTER — ALLIED HEALTH/NURSE VISIT (OUTPATIENT)
Dept: FAMILY MEDICINE | Facility: OTHER | Age: 51
End: 2021-05-20
Attending: FAMILY MEDICINE
Payer: COMMERCIAL

## 2021-05-20 DIAGNOSIS — Z20.822 EXPOSURE TO 2019 NOVEL CORONAVIRUS: Primary | ICD-10-CM

## 2021-05-20 LAB
SARS-COV-2 RNA RESP QL NAA+PROBE: NORMAL
SPECIMEN SOURCE: NORMAL

## 2021-05-20 PROCEDURE — C9803 HOPD COVID-19 SPEC COLLECT: HCPCS

## 2021-05-20 PROCEDURE — U0005 INFEC AGEN DETEC AMPLI PROBE: HCPCS | Mod: ZL | Performed by: FAMILY MEDICINE

## 2021-05-20 PROCEDURE — U0003 INFECTIOUS AGENT DETECTION BY NUCLEIC ACID (DNA OR RNA); SEVERE ACUTE RESPIRATORY SYNDROME CORONAVIRUS 2 (SARS-COV-2) (CORONAVIRUS DISEASE [COVID-19]), AMPLIFIED PROBE TECHNIQUE, MAKING USE OF HIGH THROUGHPUT TECHNOLOGIES AS DESCRIBED BY CMS-2020-01-R: HCPCS | Mod: ZL | Performed by: FAMILY MEDICINE

## 2021-05-20 NOTE — NURSING NOTE
Patient swabbed for COVID-19 testing for possible exposure.  Phyllis Rivera, CANDY on 5/20/2021 at 8:24 AM

## 2021-05-21 LAB
LABORATORY COMMENT REPORT: NORMAL
SARS-COV-2 RNA RESP QL NAA+PROBE: NEGATIVE
SPECIMEN SOURCE: NORMAL

## 2021-05-26 ENCOUNTER — ANTICOAGULATION THERAPY VISIT (OUTPATIENT)
Dept: ANTICOAGULATION | Facility: OTHER | Age: 51
End: 2021-05-26
Attending: PHYSICIAN ASSISTANT
Payer: COMMERCIAL

## 2021-05-26 ENCOUNTER — TRANSFERRED RECORDS (OUTPATIENT)
Dept: HEALTH INFORMATION MANAGEMENT | Facility: OTHER | Age: 51
End: 2021-05-26

## 2021-05-26 DIAGNOSIS — D68.51 FACTOR 5 LEIDEN MUTATION, HETEROZYGOUS (H): ICD-10-CM

## 2021-05-26 DIAGNOSIS — I82.602 BLOOD CLOT OF VEIN IN SHOULDER AREA, LEFT: ICD-10-CM

## 2021-05-26 DIAGNOSIS — Z79.01 LONG TERM CURRENT USE OF ANTICOAGULANT THERAPY: ICD-10-CM

## 2021-05-26 LAB — INR PPP: 3.1 (ref 0.9–1.1)

## 2021-05-26 NOTE — PROGRESS NOTES
ANTICOAGULATION FOLLOW-UP CLINIC VISIT    Patient Name:  Daniela Ladd  Date:  5/26/2021  Contact Type:  Face to Face    SUBJECTIVE:  Patient Findings         Clinical Outcomes     Negatives:  Major bleeding event, Thromboembolic event, Anticoagulation-related hospital admission, Anticoagulation-related ED visit, Anticoagulation-related fatality           OBJECTIVE    Recent labs: (last 7 days)     05/26/21   INR 3.1*       ASSESSMENT / PLAN  INR assessment THER    Recheck INR In: 1 WEEK    INR Location Home INR      Anticoagulation Summary  As of 5/26/2021    INR goal:  2.5-3.5   TTR:  79.5 % (1 y)   INR used for dosing:  3.1 (5/26/2021)   Warfarin maintenance plan:  8 mg (2 mg x 4) every Mon, Wed, Fri; 10 mg (2 mg x 5) all other days   Full warfarin instructions:  8 mg every Mon, Wed, Fri; 10 mg all other days   Weekly warfarin total:  64 mg   No change documented:  Yoly Crowe RN   Plan last modified:  Yoly Crowe RN (5/18/2021)   Next INR check:  6/2/2021   Priority:  High   Target end date:  Indefinite    Indications    Blood clot of vein in shoulder area  left [I82.602]  Factor 5 Leiden mutation  heterozygous (H) [D68.51]  Long-term (current) use of anticoagulants [Z79.01] [Z79.01]             Anticoagulation Episode Summary     INR check location:      Preferred lab:      Send INR reminders to:  [facility]    Comments:  Has home INR machine weekly INR needed         Anticoagulation Care Providers     Provider Role Specialty Phone number    Ling Trammell PA-C Referring Family Medicine [phone]            See the Encounter Report to view Anticoagulation Flowsheet and Dosing Calendar (Go to Encounters tab in chart review, and find the Anticoagulation Therapy Visit)        Yoly Crowe RN

## 2021-06-02 ENCOUNTER — TRANSFERRED RECORDS (OUTPATIENT)
Dept: HEALTH INFORMATION MANAGEMENT | Facility: OTHER | Age: 51
End: 2021-06-02

## 2021-06-02 ENCOUNTER — ANTICOAGULATION THERAPY VISIT (OUTPATIENT)
Dept: ANTICOAGULATION | Facility: OTHER | Age: 51
End: 2021-06-02
Attending: PHYSICIAN ASSISTANT
Payer: COMMERCIAL

## 2021-06-02 DIAGNOSIS — D68.51 FACTOR 5 LEIDEN MUTATION, HETEROZYGOUS (H): ICD-10-CM

## 2021-06-02 DIAGNOSIS — I82.602 BLOOD CLOT OF VEIN IN SHOULDER AREA, LEFT: ICD-10-CM

## 2021-06-02 DIAGNOSIS — Z79.01 LONG TERM CURRENT USE OF ANTICOAGULANT THERAPY: ICD-10-CM

## 2021-06-02 LAB — INR PPP: 2.8 (ref 0.9–1.1)

## 2021-06-02 NOTE — PROGRESS NOTES
ANTICOAGULATION FOLLOW-UP CLINIC VISIT    Patient Name:  Daniela Ladd  Date:  2021  Contact Type:  no call needed patient to continue same dose    SUBJECTIVE:  Patient Findings         Clinical Outcomes     Negatives:  Major bleeding event, Thromboembolic event, Anticoagulation-related hospital admission, Anticoagulation-related ED visit, Anticoagulation-related fatality           OBJECTIVE    Recent labs: (last 7 days)     21   INR 2.8*       ASSESSMENT / PLAN  INR assessment THER    Recheck INR In: 1 WEEK    INR Location Home INR      Anticoagulation Summary  As of 2021    INR goal:  2.5-3.5   TTR:  79.5 % (1 y)   INR used for dosin.8 (2021)   Warfarin maintenance plan:  8 mg (2 mg x 4) every Mon, Wed, Fri; 10 mg (2 mg x 5) all other days   Full warfarin instructions:  8 mg every Mon, Wed, Fri; 10 mg all other days   Weekly warfarin total:  64 mg   No change documented:  Yoly Crowe RN   Plan last modified:  Yoly Crowe RN (2021)   Next INR check:  2021   Priority:  High   Target end date:  Indefinite    Indications    Blood clot of vein in shoulder area  left [I82.602]  Factor 5 Leiden mutation  heterozygous (H) [D68.51]  Long-term (current) use of anticoagulants [Z79.01] [Z79.01]             Anticoagulation Episode Summary     INR check location:      Preferred lab:      Send INR reminders to:  [facility]    Comments:  Has home INR machine weekly INR needed         Anticoagulation Care Providers     Provider Role Specialty Phone number    Ling Trammell PA-C Referring Family Medicine [phone]            See the Encounter Report to view Anticoagulation Flowsheet and Dosing Calendar (Go to Encounters tab in chart review, and find the Anticoagulation Therapy Visit)        Yoly Crowe RN

## 2021-06-09 ENCOUNTER — ANTICOAGULATION THERAPY VISIT (OUTPATIENT)
Dept: ANTICOAGULATION | Facility: OTHER | Age: 51
End: 2021-06-09
Attending: PHYSICIAN ASSISTANT
Payer: COMMERCIAL

## 2021-06-09 ENCOUNTER — TRANSFERRED RECORDS (OUTPATIENT)
Dept: HEALTH INFORMATION MANAGEMENT | Facility: OTHER | Age: 51
End: 2021-06-09

## 2021-06-09 DIAGNOSIS — D68.51 FACTOR 5 LEIDEN MUTATION, HETEROZYGOUS (H): ICD-10-CM

## 2021-06-09 DIAGNOSIS — I82.602 BLOOD CLOT OF VEIN IN SHOULDER AREA, LEFT: ICD-10-CM

## 2021-06-09 DIAGNOSIS — Z79.01 LONG TERM CURRENT USE OF ANTICOAGULANT THERAPY: ICD-10-CM

## 2021-06-09 LAB — INR PPP: 3.6 (ref 0.9–1.1)

## 2021-06-09 NOTE — PROGRESS NOTES
ANTICOAGULATION FOLLOW-UP CLINIC VISIT    Patient Name:  Daniela Ladd  Date:  6/9/2021  Contact Type:  Telephone/ left message for patient regarding dosing    SUBJECTIVE:  Patient Findings         Clinical Outcomes     Negatives:  Major bleeding event, Thromboembolic event, Anticoagulation-related hospital admission, Anticoagulation-related ED visit, Anticoagulation-related fatality           OBJECTIVE    Recent labs: (last 7 days)     06/09/21   INR 3.6*       ASSESSMENT / PLAN  INR assessment SUPRA    Recheck INR In: 1 WEEK    INR Location Home INR      Anticoagulation Summary  As of 6/9/2021    INR goal:  2.5-3.5   TTR:  79.2 % (1 y)   INR used for dosing:  3.6 (6/9/2021)   Warfarin maintenance plan:  8 mg (2 mg x 4) every Mon, Wed, Fri; 10 mg (2 mg x 5) all other days   Full warfarin instructions:  8 mg every Mon, Wed, Fri; 10 mg all other days   Weekly warfarin total:  64 mg   No change documented:  Yoly Crowe RN   Plan last modified:  Yoly Crowe RN (5/18/2021)   Next INR check:  6/16/2021   Priority:  High   Target end date:  Indefinite    Indications    Blood clot of vein in shoulder area  left [I82.602]  Factor 5 Leiden mutation  heterozygous (H) [D68.51]  Long-term (current) use of anticoagulants [Z79.01] [Z79.01]             Anticoagulation Episode Summary     INR check location:      Preferred lab:      Send INR reminders to:  [facility]    Comments:  Has home INR machine weekly INR needed         Anticoagulation Care Providers     Provider Role Specialty Phone number    Ling Trammlel PA-C Referring Family Medicine [phone]            See the Encounter Report to view Anticoagulation Flowsheet and Dosing Calendar (Go to Encounters tab in chart review, and find the Anticoagulation Therapy Visit)        Yoly Crowe RN

## 2021-06-16 ENCOUNTER — ANTICOAGULATION THERAPY VISIT (OUTPATIENT)
Dept: ANTICOAGULATION | Facility: OTHER | Age: 51
End: 2021-06-16
Attending: PHYSICIAN ASSISTANT
Payer: COMMERCIAL

## 2021-06-16 ENCOUNTER — TRANSFERRED RECORDS (OUTPATIENT)
Dept: HEALTH INFORMATION MANAGEMENT | Facility: OTHER | Age: 51
End: 2021-06-16

## 2021-06-16 DIAGNOSIS — Z79.01 LONG TERM CURRENT USE OF ANTICOAGULANT THERAPY: ICD-10-CM

## 2021-06-16 DIAGNOSIS — I82.602 BLOOD CLOT OF VEIN IN SHOULDER AREA, LEFT: ICD-10-CM

## 2021-06-16 DIAGNOSIS — D68.51 FACTOR 5 LEIDEN MUTATION, HETEROZYGOUS (H): ICD-10-CM

## 2021-06-16 LAB — INR PPP: 3.3 (ref 0.9–1.1)

## 2021-06-16 NOTE — PROGRESS NOTES
ANTICOAGULATION FOLLOW-UP CLINIC VISIT    Patient Name:  Daniela Ladd  Date:  6/16/2021  Contact Type:  no call needed patient to continue same dose    SUBJECTIVE:  Patient Findings         Clinical Outcomes     Negatives:  Major bleeding event, Thromboembolic event, Anticoagulation-related hospital admission, Anticoagulation-related ED visit, Anticoagulation-related fatality           OBJECTIVE    Recent labs: (last 7 days)     06/16/21   INR 3.3*       ASSESSMENT / PLAN  INR assessment THER    Recheck INR In: 1 WEEK    INR Location Home INR      Anticoagulation Summary  As of 6/16/2021    INR goal:  2.5-3.5   TTR:  78.6 % (1 y)   INR used for dosing:  3.3 (6/16/2021)   Warfarin maintenance plan:  8 mg (2 mg x 4) every Mon, Wed, Fri; 10 mg (2 mg x 5) all other days   Full warfarin instructions:  8 mg every Mon, Wed, Fri; 10 mg all other days   Weekly warfarin total:  64 mg   No change documented:  Yoly Crowe RN   Plan last modified:  Yoly Crowe RN (5/18/2021)   Next INR check:  6/23/2021   Priority:  High   Target end date:  Indefinite    Indications    Blood clot of vein in shoulder area  left [I82.602]  Factor 5 Leiden mutation  heterozygous (H) [D68.51]  Long-term (current) use of anticoagulants [Z79.01] [Z79.01]             Anticoagulation Episode Summary     INR check location:      Preferred lab:      Send INR reminders to:  [facility]    Comments:  Has home INR machine weekly INR needed         Anticoagulation Care Providers     Provider Role Specialty Phone number    Ling Trammell PA-C Referring Family Medicine [phone]            See the Encounter Report to view Anticoagulation Flowsheet and Dosing Calendar (Go to Encounters tab in chart review, and find the Anticoagulation Therapy Visit)        Yoly Crowe RN

## 2021-06-17 DIAGNOSIS — I82.622 ARM DVT (DEEP VENOUS THROMBOEMBOLISM), ACUTE, LEFT (H): ICD-10-CM

## 2021-06-17 DIAGNOSIS — Z79.01 LONG TERM CURRENT USE OF ANTICOAGULANT THERAPY: ICD-10-CM

## 2021-06-17 RX ORDER — WARFARIN SODIUM 2 MG/1
TABLET ORAL
Qty: 360 TABLET | Refills: 1 | Status: SHIPPED | OUTPATIENT
Start: 2021-06-17 | End: 2021-11-03

## 2021-06-17 NOTE — TELEPHONE ENCOUNTER
"Requested Prescriptions   Pending Prescriptions Disp Refills     warfarin ANTICOAGULANT (COUMADIN) 2 MG tablet 360 tablet 1     Sig: TAKE 10 MG X 4 DAYS/WEEK AND 8 MG X 3 DAYS/WEEK OR AS DIRECTED BY [facility]       Vitamin K Antagonists Failed - 6/17/2021 11:20 AM        Failed - INR is within goal in the past 6 weeks     Confirm INR is within goal in the past 6 weeks.     Recent Labs   Lab Test 06/16/21   INR 3.3*                       Passed - Recent (12 mo) or future (30 days) visit within the authorizing provider's specialty     Patient has had an office visit with the authorizing provider or a provider within the authorizing providers department within the previous 12 mos or has a future within next 30 days. See \"Patient Info\" tab in inbasket, or \"Choose Columns\" in Meds & Orders section of the refill encounter.              Passed - Medication is active on med list        Passed - Patient is 18 years of age or older        Passed - Patient is not pregnant        Passed - No positive pregnancy on file in past 12 months           Prescription refilled per RN MedicationRefill Policy.................... Yoly Crowe RN ....................  6/17/2021   11:20 AM      "

## 2021-06-18 ENCOUNTER — OFFICE VISIT (OUTPATIENT)
Dept: FAMILY MEDICINE | Facility: OTHER | Age: 51
End: 2021-06-18
Attending: PHYSICIAN ASSISTANT
Payer: COMMERCIAL

## 2021-06-18 VITALS
TEMPERATURE: 97.9 F | RESPIRATION RATE: 18 BRPM | DIASTOLIC BLOOD PRESSURE: 80 MMHG | BODY MASS INDEX: 31.9 KG/M2 | HEART RATE: 80 BPM | WEIGHT: 200.6 LBS | SYSTOLIC BLOOD PRESSURE: 130 MMHG

## 2021-06-18 DIAGNOSIS — R09.82 PND (POST-NASAL DRIP): ICD-10-CM

## 2021-06-18 DIAGNOSIS — J01.90 ACUTE NON-RECURRENT SINUSITIS, UNSPECIFIED LOCATION: Primary | ICD-10-CM

## 2021-06-18 PROCEDURE — U0003 INFECTIOUS AGENT DETECTION BY NUCLEIC ACID (DNA OR RNA); SEVERE ACUTE RESPIRATORY SYNDROME CORONAVIRUS 2 (SARS-COV-2) (CORONAVIRUS DISEASE [COVID-19]), AMPLIFIED PROBE TECHNIQUE, MAKING USE OF HIGH THROUGHPUT TECHNOLOGIES AS DESCRIBED BY CMS-2020-01-R: HCPCS | Mod: ZL | Performed by: PHYSICIAN ASSISTANT

## 2021-06-18 PROCEDURE — C9803 HOPD COVID-19 SPEC COLLECT: HCPCS

## 2021-06-18 PROCEDURE — 99213 OFFICE O/P EST LOW 20 MIN: CPT | Performed by: PHYSICIAN ASSISTANT

## 2021-06-18 PROCEDURE — U0005 INFEC AGEN DETEC AMPLI PROBE: HCPCS | Mod: ZL | Performed by: PHYSICIAN ASSISTANT

## 2021-06-18 RX ORDER — FLUTICASONE PROPIONATE 50 MCG
2 SPRAY, SUSPENSION (ML) NASAL DAILY
Qty: 16 G | Refills: 3 | Status: SHIPPED | OUTPATIENT
Start: 2021-06-18 | End: 2022-04-26

## 2021-06-18 NOTE — NURSING NOTE
Chief Complaint   Patient presents with     Sinus Problem         Medication Reconciliation: complete    Lauren Dennis, LPN

## 2021-06-18 NOTE — PROGRESS NOTES
Nursing Notes:   Lauren Dennis LPN  2021  2:03 PM  Signed  Chief Complaint   Patient presents with     Sinus Problem         Medication Reconciliation: complete    Lauren Dennis LPN        HPI:    Daniela Ladd is a 50 year old female who presents for sinus concerns.  Patient states that her sinus symptoms started on .  Has constant postnasal drip.  Eyes feel heavy.  Nothing is coming out of the nostril.  Draining on the back of her throat.  Try to use saline nasal spray as needed for treatment.  This morning had a little right-sided ear pain.  Having headaches.  States that her neck went out 2 weeks ago.  Unsure if this is related.  Has a bad taste in her mouth.  No fevers, chills, vomiting.  Upset stomach with the drainage.  No cough, congestion in the lungs, breathing concerns.  Completed an over-the-counter Covid-19 test on 6/15 which was negative.  History of tolerating cefdinir in the past.  No Covid-19 exposures.      Past Medical History:   Diagnosis Date     Abdominal pain     2010     Contact dermatitis     Atrophic dermatitis     Encounter for insertion of mirena IUD 2018     Hidradenitis suppurativa     2010     Major depressive disorder, single episode     ,Situational related to marital discord     Nicotine dependence, uncomplicated     2010     Other acne     Facial acne     Personal history of other medical treatment (CODE)     10/00, III, para 1-0-2-1, vaginal delivery , two spontaneous first trimester miscarriage     Personal history of other medical treatment (CODE)     S/P cryotherapy Aug 2003.  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/25/2013       Past Surgical History:   Procedure Laterality Date     ANKLE SURGERY Left     Colo     EXTRACTION(S) DENTAL       LAPAROSCOPIC TUBAL LIGATION  2004     SALPINGO-OOPHORECTOMY  BILATERAL Right 2011    Planned Lap RSO     TONSILLECTOMY      1980s       Family History   Problem Relation Age of Onset     Allergy (Severe) Mother         Allergies     Heart Disease Mother         Heart Disease     Diabetes Mother         Diabetes     Peripheral Vascular Disease Mother      Unknown/Adopted Father         Suspected cardiac.      Heart Disease Paternal Grandfather         Heart Disease,CAD     Cancer Maternal Grandfather         Cancer,lung cancer, dm     Breast Cancer Maternal Grandmother         Cancer-breast,CREST syndrome, Raynaud's, scleroderma,  of CHF, small veins and arteries     Diabetes Maternal Aunt         Diabetes,also has had a borderline ovarian mass (not benign or cancerous)     Thyroid Disease Other         Thyroid Disease,several members of family with thyroid disease.     Clotting Disorder Paternal Uncle         Factor V       Social History     Tobacco Use     Smoking status: Current Every Day Smoker     Packs/day: 0.85     Types: Cigarettes     Start date:      Smokeless tobacco: Never Used     Tobacco comment: Will contact PCP for patches   Substance Use Topics     Alcohol use: No     Alcohol/week: 0.0 standard drinks       Current Outpatient Medications   Medication Sig Dispense Refill     acetaminophen (TYLENOL) 500 MG tablet Take 1,000 mg by mouth every 6 hours as needed for pain        fluticasone (FLONASE) 50 MCG/ACT nasal spray Spray 2 sprays into both nostrils daily 16 g 3     levonorgestrel (MIRENA) 20 MCG/24HR IUD 1 each by Intrauterine route once       warfarin ANTICOAGULANT (COUMADIN) 2 MG tablet TAKE 10 MG X 4 DAYS/WEEK AND 8 MG X 3 DAYS/WEEK [facility] tablet 1       Allergies   Allergen Reactions     Amoxicillin-Pot Clavulanate Nausea     Cyclobenzaprine Rash     No Clinical Screening - See Comments Rash     Nicoderm patch       REVIEW OF SYSTEMS:  Refer to HPI.    EXAM:   Vitals:    BP (!) 142/104 (BP Location: Right arm,  Patient Position: Sitting, Cuff Size: Adult Regular)   Pulse 80   Temp 97.9  F (36.6  C)   Resp 18   Wt 91 kg (200 lb 9.6 oz)   BMI 31.90 kg/m      General Appearance: Pleasant, alert, appropriate appearance for age. No acute distress  Ear Exam: Normal TM's bilaterally, grey, translucent, bony landmarks appreciated.   Left/Right TM: Effusion is not present. TM is not bulging. There is no pus appreciated.   Normal auditory canals and external ears. Non-tender.  OroPharynx Exam:  Non-erythematous posterior pharynx with no exudates. No sinus pain upon palpation of frontal, ethmoid, and maxillary sinuses.  Chest/Respiratory Exam: Normal chest wall and respirations. Clear to auscultation. No retractions appreciated.  Cardiovascular Exam: Regular rate and rhythm. S1, S2, no murmur, click, gallop, or rubs.  Lymphatic Exam: Neg.  Skin: no rash or abnormalities  Psychiatric Exam: Alert and oriented - appropriate affect.    PHQ Depression Screen  PHQ-9 SCORE 8/24/2018   PHQ-9 Total Score 1       LABS:    No results found for any visits on 06/18/21.      ASSESSMENT AND PLAN:      ICD-10-CM    1. Acute non-recurrent sinusitis, unspecified location  J01.90 Symptomatic COVID-19 Virus (Coronavirus) by PCR   2. PND (post-nasal drip)  R09.82 fluticasone (FLONASE) 50 MCG/ACT nasal spray         Completed Covid-19 test to rule out infection concerns.  Result is pending.    Patient was started on Flonase nasal spray for treatment of the postnasal drip and viral sinus infection.  Encourage close monitoring.  Symptoms likely viral.    Call on Monday or Tuesday for an antibiotic if needed.     May use symptomatic care with tylenol. May use cough drops. Using a humidifier works well to break up the congestion. You can also sleep propped up on a couple pillows to decrease symptoms at night.     Use a Neti Pot/sinusflush (Darin Med Sinus Rinse) 3 times daily to irrigate sinuses/mucosal tissue.     Please take tylenol as needed up to 4  times daily.  Treat symptomatically with warm salt water gargles.  Frequent swallows of cool liquid.  Oatmeal coats the throat and some patients find it soothes the pain.     Monitor for any fevers or chills.  Please call clinic with any questions or concerns. Please take in a lot of fluids and get rest.     You will need to be evaluated if you start to experience:  Fever higher than 102.5 F (39.2 C)   Sudden and severe pain in the face and head   Trouble seeing or seeing double   Trouble thinking clearly   Swelling or redness around 1 or both eyes   Trouble breathing or a stiff neck    * If you are a smoker, try to quit *    Call 9-1-1 or go to the emergency room if you:  Have trouble breathing   Are drooling because you cannot swallow your saliva   Have swelling of the neck or tongue   Cannot move your neck or have trouble opening your mouth        Patient Instructions   Call on Monday or Tuesday for an antibiotic if needed.     May use symptomatic care with tylenol. May use cough drops. Using a humidifier works well to break up the congestion. You can also sleep propped up on a couple pillows to decrease symptoms at night.     Use a Neti Pot/sinusflush (Darin Med Sinus Rinse) 3 times daily to irrigate sinuses/mucosal tissue.     Please take tylenol as needed up to 4 times daily.  Treat symptomatically with warm salt water gargles.  Frequent swallows of cool liquid.  Oatmeal coats the throat and some patients find it soothes the pain.     Monitor for any fevers or chills.  Please call clinic with any questions or concerns. Please take in a lot of fluids and get rest.     You will need to be evaluated if you start to experience:  Fever higher than 102.5 F (39.2 C)   Sudden and severe pain in the face and head   Trouble seeing or seeing double   Trouble thinking clearly   Swelling or redness around 1 or both eyes   Trouble breathing or a stiff neck    * If you are a smoker, try to quit *    Call 9-1-1 or go to the  emergency room if you:  Have trouble breathing   Are drooling because you cannot swallow your saliva   Have swelling of the neck or tongue   Cannot move your neck or have trouble opening your mouth         Ling Trammell PA-C PA-C..................6/18/2021 2:02 PM

## 2021-06-18 NOTE — PATIENT INSTRUCTIONS
Call on Monday or Tuesday for an antibiotic if needed.     May use symptomatic care with tylenol. May use cough drops. Using a humidifier works well to break up the congestion. You can also sleep propped up on a couple pillows to decrease symptoms at night.     Use a Neti Pot/sinusflush (Darin Med Sinus Rinse) 3 times daily to irrigate sinuses/mucosal tissue.     Please take tylenol as needed up to 4 times daily.  Treat symptomatically with warm salt water gargles.  Frequent swallows of cool liquid.  Oatmeal coats the throat and some patients find it soothes the pain.     Monitor for any fevers or chills.  Please call clinic with any questions or concerns. Please take in a lot of fluids and get rest.     You will need to be evaluated if you start to experience:  Fever higher than 102.5 F (39.2 C)   Sudden and severe pain in the face and head   Trouble seeing or seeing double   Trouble thinking clearly   Swelling or redness around 1 or both eyes   Trouble breathing or a stiff neck    * If you are a smoker, try to quit *    Call 9-1-1 or go to the emergency room if you:  Have trouble breathing   Are drooling because you cannot swallow your saliva   Have swelling of the neck or tongue   Cannot move your neck or have trouble opening your mouth

## 2021-06-19 LAB
LABORATORY COMMENT REPORT: NORMAL
SARS-COV-2 RNA RESP QL NAA+PROBE: NEGATIVE
SARS-COV-2 RNA RESP QL NAA+PROBE: NORMAL
SPECIMEN SOURCE: NORMAL
SPECIMEN SOURCE: NORMAL

## 2021-06-23 ENCOUNTER — ANTICOAGULATION THERAPY VISIT (OUTPATIENT)
Dept: ANTICOAGULATION | Facility: OTHER | Age: 51
End: 2021-06-23
Attending: PHYSICIAN ASSISTANT
Payer: COMMERCIAL

## 2021-06-23 ENCOUNTER — TRANSFERRED RECORDS (OUTPATIENT)
Dept: HEALTH INFORMATION MANAGEMENT | Facility: OTHER | Age: 51
End: 2021-06-23

## 2021-06-23 DIAGNOSIS — Z79.01 LONG TERM CURRENT USE OF ANTICOAGULANT THERAPY: ICD-10-CM

## 2021-06-23 DIAGNOSIS — D68.51 FACTOR 5 LEIDEN MUTATION, HETEROZYGOUS (H): ICD-10-CM

## 2021-06-23 DIAGNOSIS — I82.602 BLOOD CLOT OF VEIN IN SHOULDER AREA, LEFT: ICD-10-CM

## 2021-06-23 LAB — INR PPP: 3.2 (ref 0.9–1.1)

## 2021-06-23 NOTE — PROGRESS NOTES
ANTICOAGULATION FOLLOW-UP CLINIC VISIT    Patient Name:  Daniela Ladd  Date:  6/23/2021  Contact Type:  no call needed patient to continue same dose    SUBJECTIVE:  Patient Findings         Clinical Outcomes     Negatives:  Major bleeding event, Thromboembolic event, Anticoagulation-related hospital admission, Anticoagulation-related ED visit, Anticoagulation-related fatality           OBJECTIVE    Recent labs: (last 7 days)     06/23/21   INR 3.2*       ASSESSMENT / PLAN  INR assessment THER    Recheck INR In: 1 WEEK    INR Location Home INR      Anticoagulation Summary  As of 6/23/2021    INR goal:  2.5-3.5   TTR:  78.6 % (1 y)   INR used for dosing:  3.2 (6/23/2021)   Warfarin maintenance plan:  8 mg (2 mg x 4) every Mon, Wed, Fri; 10 mg (2 mg x 5) all other days   Full warfarin instructions:  8 mg every Mon, Wed, Fri; 10 mg all other days   Weekly warfarin total:  64 mg   No change documented:  Yoly Crowe RN   Plan last modified:  Yoly Crowe RN (5/18/2021)   Next INR check:  6/30/2021   Priority:  High   Target end date:  Indefinite    Indications    Blood clot of vein in shoulder area  left [I82.602]  Factor 5 Leiden mutation  heterozygous (H) [D68.51]  Long-term (current) use of anticoagulants [Z79.01] [Z79.01]             Anticoagulation Episode Summary     INR check location:      Preferred lab:      Send INR reminders to:  [facility]    Comments:  Has home INR machine weekly INR needed         Anticoagulation Care Providers     Provider Role Specialty Phone number    Ling Trammell PA-C Referring Family Medicine [phone]            See the Encounter Report to view Anticoagulation Flowsheet and Dosing Calendar (Go to Encounters tab in chart review, and find the Anticoagulation Therapy Visit)        Yoly Crowe RN

## 2021-06-30 ENCOUNTER — ANTICOAGULATION THERAPY VISIT (OUTPATIENT)
Dept: ANTICOAGULATION | Facility: OTHER | Age: 51
End: 2021-06-30
Attending: PHYSICIAN ASSISTANT
Payer: COMMERCIAL

## 2021-06-30 ENCOUNTER — TRANSFERRED RECORDS (OUTPATIENT)
Dept: HEALTH INFORMATION MANAGEMENT | Facility: OTHER | Age: 51
End: 2021-06-30

## 2021-06-30 DIAGNOSIS — I82.602 BLOOD CLOT OF VEIN IN SHOULDER AREA, LEFT: ICD-10-CM

## 2021-06-30 DIAGNOSIS — D68.51 FACTOR 5 LEIDEN MUTATION, HETEROZYGOUS (H): ICD-10-CM

## 2021-06-30 DIAGNOSIS — Z79.01 LONG TERM CURRENT USE OF ANTICOAGULANT THERAPY: ICD-10-CM

## 2021-06-30 LAB — INR PPP: 3 (ref 0.9–1.1)

## 2021-06-30 NOTE — PROGRESS NOTES
ANTICOAGULATION FOLLOW-UP CLINIC VISIT    Patient Name:  Daniela Ladd  Date:  6/30/2021  Contact Type:  no call needed patient to continue same dose    SUBJECTIVE:  Patient Findings         Clinical Outcomes     Negatives:  Major bleeding event, Thromboembolic event, Anticoagulation-related hospital admission, Anticoagulation-related ED visit, Anticoagulation-related fatality           OBJECTIVE    Recent labs: (last 7 days)     06/30/21   INR 3.0*       ASSESSMENT / PLAN  INR assessment THER    Recheck INR In: 1 WEEK    INR Location Home INR      Anticoagulation Summary  As of 6/30/2021    INR goal:  2.5-3.5   TTR:  78.6 % (1 y)   INR used for dosing:  3.0 (6/30/2021)   Warfarin maintenance plan:  8 mg (2 mg x 4) every Mon, Wed, Fri; 10 mg (2 mg x 5) all other days   Full warfarin instructions:  8 mg every Mon, Wed, Fri; 10 mg all other days   Weekly warfarin total:  64 mg   No change documented:  Yoly Crowe RN   Plan last modified:  Yoly Crowe RN (5/18/2021)   Next INR check:  7/7/2021   Priority:  High   Target end date:  Indefinite    Indications    Blood clot of vein in shoulder area  left [I82.602]  Factor 5 Leiden mutation  heterozygous (H) [D68.51]  Long-term (current) use of anticoagulants [Z79.01] [Z79.01]             Anticoagulation Episode Summary     INR check location:      Preferred lab:      Send INR reminders to:  [facility]    Comments:  Has home INR machine weekly INR needed         Anticoagulation Care Providers     Provider Role Specialty Phone number    Ling Trammell PA-C Referring Family Medicine [phone]            See the Encounter Report to view Anticoagulation Flowsheet and Dosing Calendar (Go to Encounters tab in chart review, and find the Anticoagulation Therapy Visit)        Yoly Crowe RN

## 2021-07-07 ENCOUNTER — TRANSFERRED RECORDS (OUTPATIENT)
Dept: HEALTH INFORMATION MANAGEMENT | Facility: OTHER | Age: 51
End: 2021-07-07

## 2021-07-07 ENCOUNTER — ANTICOAGULATION THERAPY VISIT (OUTPATIENT)
Dept: ANTICOAGULATION | Facility: OTHER | Age: 51
End: 2021-07-07
Payer: COMMERCIAL

## 2021-07-07 DIAGNOSIS — D68.51 FACTOR 5 LEIDEN MUTATION, HETEROZYGOUS (H): ICD-10-CM

## 2021-07-07 DIAGNOSIS — I82.602 BLOOD CLOT OF VEIN IN SHOULDER AREA, LEFT: ICD-10-CM

## 2021-07-07 DIAGNOSIS — Z79.01 LONG TERM CURRENT USE OF ANTICOAGULANT THERAPY: ICD-10-CM

## 2021-07-07 LAB — INR PPP: 2.9 (ref 0.9–1.1)

## 2021-07-07 NOTE — PROGRESS NOTES
ANTICOAGULATION FOLLOW-UP CLINIC VISIT    Patient Name:  Daniela Ladd  Date:  2021  Contact Type:  no call needed patient to continue same dose    SUBJECTIVE:  Patient Findings         Clinical Outcomes     Negatives:  Major bleeding event, Thromboembolic event, Anticoagulation-related hospital admission, Anticoagulation-related ED visit, Anticoagulation-related fatality           OBJECTIVE    Recent labs: (last 7 days)     21   INR 2.9*       ASSESSMENT / PLAN  INR assessment THER    Recheck INR In: 1 WEEK    INR Location Home INR      Anticoagulation Summary  As of 2021    INR goal:  2.5-3.5   TTR:  78.6 % (1 y)   INR used for dosin.9 (2021)   Warfarin maintenance plan:  8 mg (2 mg x 4) every Mon, Wed, Fri; 10 mg (2 mg x 5) all other days   Full warfarin instructions:  8 mg every Mon, Wed, Fri; 10 mg all other days   Weekly warfarin total:  64 mg   No change documented:  Yoly Crowe RN   Plan last modified:  Yoly Crowe RN (2021)   Next INR check:  2021   Priority:  High   Target end date:  Indefinite    Indications    Blood clot of vein in shoulder area  left [I82.602]  Factor 5 Leiden mutation  heterozygous (H) [D68.51]  Long-term (current) use of anticoagulants [Z79.01] [Z79.01]             Anticoagulation Episode Summary     INR check location:      Preferred lab:      Send INR reminders to:  [facility]    Comments:  Has home INR machine weekly INR needed         Anticoagulation Care Providers     Provider Role Specialty Phone number    Ling Trammell PA-C Referring Family Medicine [phone]            See the Encounter Report to view Anticoagulation Flowsheet and Dosing Calendar (Go to Encounters tab in chart review, and find the Anticoagulation Therapy Visit)        Yoly Crowe RN

## 2021-07-14 ENCOUNTER — TRANSFERRED RECORDS (OUTPATIENT)
Dept: HEALTH INFORMATION MANAGEMENT | Facility: OTHER | Age: 51
End: 2021-07-14

## 2021-07-14 ENCOUNTER — ANTICOAGULATION THERAPY VISIT (OUTPATIENT)
Dept: ANTICOAGULATION | Facility: OTHER | Age: 51
End: 2021-07-14
Attending: PHYSICIAN ASSISTANT
Payer: COMMERCIAL

## 2021-07-14 DIAGNOSIS — D68.51 FACTOR 5 LEIDEN MUTATION, HETEROZYGOUS (H): ICD-10-CM

## 2021-07-14 DIAGNOSIS — Z79.01 LONG TERM CURRENT USE OF ANTICOAGULANT THERAPY: ICD-10-CM

## 2021-07-14 DIAGNOSIS — I82.602 BLOOD CLOT OF VEIN IN SHOULDER AREA, LEFT: Primary | ICD-10-CM

## 2021-07-14 LAB — INR POINT OF CARE: 2.8 (ref 0.9–1.1)

## 2021-07-14 NOTE — PROGRESS NOTES
ANTICOAGULATION FOLLOW-UP CLINIC VISIT    Patient Name:  Daniela Ladd  Date:  2021  Contact Type:  Fax recieved from Remote INR home meter monitoring service.     SUBJECTIVE:  Patient Findings         Clinical Outcomes     Negatives:  Major bleeding event, Thromboembolic event, Anticoagulation-related hospital admission, Anticoagulation-related ED visit, Anticoagulation-related fatality           OBJECTIVE    Recent labs: (last 7 days)     21  0000   INR 2.8*       ASSESSMENT / PLAN  INR assessment THER    Recheck INR In: 1 WEEK    INR Location Home INR      Anticoagulation Summary  As of 2021    INR goal:  2.5-3.5   TTR:  78.6 % (1 y)   INR used for dosin.8 (2021)   Warfarin maintenance plan:  8 mg (2 mg x 4) every Mon, Wed, Fri; 10 mg (2 mg x 5) all other days   Full warfarin instructions:  8 mg every Mon, Wed, Fri; 10 mg all other days   Weekly warfarin total:  64 mg   Plan last modified:  Yoly Crowe RN (2021)   Next INR check:  2021   Priority:  High   Target end date:  Indefinite    Indications    Blood clot of vein in shoulder area  left [I82.602]  Factor 5 Leiden mutation  heterozygous (H) [D68.51]  Long-term (current) use of anticoagulants [Z79.01] [Z79.01]             Anticoagulation Episode Summary     INR check location:      Preferred lab:      Send INR reminders to:  [facility]    Comments:  Has home INR machine weekly INR needed         Anticoagulation Care Providers     Provider Role Specialty Phone number    Ling Trammell PA-C Referring Family Medicine [phone]            See the Encounter Report to view Anticoagulation Flowsheet and Dosing Calendar (Go to Encounters tab in chart review, and find the Anticoagulation Therapy Visit)        Elyssa Correa, RN

## 2021-07-21 ENCOUNTER — TRANSFERRED RECORDS (OUTPATIENT)
Dept: HEALTH INFORMATION MANAGEMENT | Facility: OTHER | Age: 51
End: 2021-07-21

## 2021-07-21 ENCOUNTER — ANTICOAGULATION THERAPY VISIT (OUTPATIENT)
Dept: ANTICOAGULATION | Facility: OTHER | Age: 51
End: 2021-07-21
Attending: PHYSICIAN ASSISTANT
Payer: COMMERCIAL

## 2021-07-21 DIAGNOSIS — I82.602 BLOOD CLOT OF VEIN IN SHOULDER AREA, LEFT: Primary | ICD-10-CM

## 2021-07-21 DIAGNOSIS — D68.51 FACTOR 5 LEIDEN MUTATION, HETEROZYGOUS (H): ICD-10-CM

## 2021-07-21 DIAGNOSIS — Z79.01 LONG TERM CURRENT USE OF ANTICOAGULANT THERAPY: ICD-10-CM

## 2021-07-21 LAB — INR PPP: 3.1 (ref 0.9–1.1)

## 2021-07-21 NOTE — PROGRESS NOTES
ANTICOAGULATION FOLLOW-UP CLINIC VISIT    Patient Name:  Daniela Ladd  Date:  7/21/2021  Contact Type:  no call needed patient to continue same dose    SUBJECTIVE:  Patient Findings         Clinical Outcomes     Negatives:  Major bleeding event, Thromboembolic event, Anticoagulation-related hospital admission, Anticoagulation-related ED visit, Anticoagulation-related fatality           OBJECTIVE    Recent labs: (last 7 days)     07/21/21  0000   INR 3.1       ASSESSMENT / PLAN  INR assessment THER    Recheck INR In: 1 WEEK    INR Location Home INR      Anticoagulation Summary  As of 7/21/2021    INR goal:  2.5-3.5   TTR:  78.6 % (1 y)   INR used for dosing:  3.1 (7/21/2021)   Warfarin maintenance plan:  8 mg (2 mg x 4) every Mon, Wed, Fri; 10 mg (2 mg x 5) all other days   Full warfarin instructions:  8 mg every Mon, Wed, Fri; 10 mg all other days   Weekly warfarin total:  64 mg   Plan last modified:  Yoly Crowe RN (5/18/2021)   Next INR check:  7/28/2021   Priority:  High   Target end date:  Indefinite    Indications    Blood clot of vein in shoulder area  left [I82.602]  Factor 5 Leiden mutation  heterozygous (H) [D68.51]  Long-term (current) use of anticoagulants [Z79.01] [Z79.01]             Anticoagulation Episode Summary     INR check location:      Preferred lab:      Send INR reminders to:  [facility]    Comments:  Has home INR machine weekly INR needed         Anticoagulation Care Providers     Provider Role Specialty Phone number    Ling Trammell PA-C Referring Family Medicine [phone]            See the Encounter Report to view Anticoagulation Flowsheet and Dosing Calendar (Go to Encounters tab in chart review, and find the Anticoagulation Therapy Visit)        Yoly Crowe, RN

## 2021-07-28 ENCOUNTER — TRANSFERRED RECORDS (OUTPATIENT)
Dept: HEALTH INFORMATION MANAGEMENT | Facility: OTHER | Age: 51
End: 2021-07-28

## 2021-07-28 ENCOUNTER — ANTICOAGULATION THERAPY VISIT (OUTPATIENT)
Dept: ANTICOAGULATION | Facility: OTHER | Age: 51
End: 2021-07-28
Attending: PHYSICIAN ASSISTANT
Payer: COMMERCIAL

## 2021-07-28 DIAGNOSIS — I82.602 BLOOD CLOT OF VEIN IN SHOULDER AREA, LEFT: Primary | ICD-10-CM

## 2021-07-28 DIAGNOSIS — Z79.01 LONG TERM CURRENT USE OF ANTICOAGULANT THERAPY: ICD-10-CM

## 2021-07-28 DIAGNOSIS — D68.51 FACTOR 5 LEIDEN MUTATION, HETEROZYGOUS (H): ICD-10-CM

## 2021-07-28 LAB — INR (EXTERNAL): 3.3 (ref 0.9–1.1)

## 2021-07-28 NOTE — PROGRESS NOTES
ANTICOAGULATION FOLLOW-UP CLINIC VISIT    Patient Name:  Daniela Ladd  Date:  7/28/2021  Contact Type:  no call needed patient to continue same dose    SUBJECTIVE:  Patient Findings         Clinical Outcomes     Negatives:  Major bleeding event, Thromboembolic event, Anticoagulation-related hospital admission, Anticoagulation-related ED visit, Anticoagulation-related fatality           OBJECTIVE    Recent labs: (last 7 days)     07/28/21  0952   INR 3.3*       ASSESSMENT / PLAN  INR assessment THER    Recheck INR In: 1 WEEK    INR Location Home INR      Anticoagulation Summary  As of 7/28/2021    INR goal:  2.5-3.5   TTR:  78.6 % (1 y)   INR used for dosing:  3.3 (7/28/2021)   Warfarin maintenance plan:  8 mg (2 mg x 4) every Mon, Wed, Fri; 10 mg (2 mg x 5) all other days   Full warfarin instructions:  8 mg every Mon, Wed, Fri; 10 mg all other days   Weekly warfarin total:  64 mg   No change documented:  Yoly Crowe RN   Plan last modified:  Yoly Crowe RN (5/18/2021)   Next INR check:  8/4/2021   Priority:  High   Target end date:  Indefinite    Indications    Blood clot of vein in shoulder area  left [I82.602]  Factor 5 Leiden mutation  heterozygous (H) [D68.51]  Long-term (current) use of anticoagulants [Z79.01] [Z79.01]             Anticoagulation Episode Summary     INR check location:      Preferred lab:      Send INR reminders to:  [facility]    Comments:  Has home INR machine weekly INR needed         Anticoagulation Care Providers     Provider Role Specialty Phone number    Ling Trammell PA-C Referring Family Medicine [phone]            See the Encounter Report to view Anticoagulation Flowsheet and Dosing Calendar (Go to Encounters tab in chart review, and find the Anticoagulation Therapy Visit)        Yoly Crowe RN

## 2021-08-04 ENCOUNTER — TRANSFERRED RECORDS (OUTPATIENT)
Dept: HEALTH INFORMATION MANAGEMENT | Facility: OTHER | Age: 51
End: 2021-08-04

## 2021-08-04 LAB — INR (EXTERNAL): 3.2 (ref 0.9–1.1)

## 2021-08-05 ENCOUNTER — ANTICOAGULATION THERAPY VISIT (OUTPATIENT)
Dept: ANTICOAGULATION | Facility: OTHER | Age: 51
End: 2021-08-05
Attending: PHYSICIAN ASSISTANT
Payer: COMMERCIAL

## 2021-08-05 DIAGNOSIS — D68.51 FACTOR 5 LEIDEN MUTATION, HETEROZYGOUS (H): ICD-10-CM

## 2021-08-05 DIAGNOSIS — Z79.01 LONG TERM CURRENT USE OF ANTICOAGULANT THERAPY: ICD-10-CM

## 2021-08-05 DIAGNOSIS — I82.602 BLOOD CLOT OF VEIN IN SHOULDER AREA, LEFT: Primary | ICD-10-CM

## 2021-08-05 NOTE — PROGRESS NOTES
ANTICOAGULATION FOLLOW-UP CLINIC VISIT    Patient Name:  Daniela Ladd  Date:  8/5/2021  Contact Type:  no call needed patient to continue same dose    SUBJECTIVE:  Patient Findings         Clinical Outcomes     Negatives:  Major bleeding event, Thromboembolic event, Anticoagulation-related hospital admission, Anticoagulation-related ED visit, Anticoagulation-related fatality           OBJECTIVE    Recent labs: (last 7 days)     08/04/21  1630   INR 3.2*       ASSESSMENT / PLAN  INR assessment THER    Recheck INR In: 1 WEEK    INR Location Home INR      Anticoagulation Summary  As of 8/5/2021    INR goal:  2.5-3.5   TTR:  79.7 % (1 y)   INR used for dosing:  3.2 (8/4/2021)   Warfarin maintenance plan:  8 mg (2 mg x 4) every Mon, Wed, Fri; 10 mg (2 mg x 5) all other days   Full warfarin instructions:  8 mg every Mon, Wed, Fri; 10 mg all other days   Weekly warfarin total:  64 mg   Plan last modified:  Yoly Crowe RN (5/18/2021)   Next INR check:  8/12/2021   Priority:  High   Target end date:  Indefinite    Indications    Blood clot of vein in shoulder area  left [I82.602]  Factor 5 Leiden mutation  heterozygous (H) [D68.51]  Long-term (current) use of anticoagulants [Z79.01] [Z79.01]             Anticoagulation Episode Summary     INR check location:      Preferred lab:      Send INR reminders to:  [facility]    Comments:  Has home INR machine weekly INR needed         Anticoagulation Care Providers     Provider Role Specialty Phone number    Ling Trammell PA-C Referring Family Medicine [phone]            See the Encounter Report to view Anticoagulation Flowsheet and Dosing Calendar (Go to Encounters tab in chart review, and find the Anticoagulation Therapy Visit)        Yoly Crowe, RN

## 2021-08-11 ENCOUNTER — ANTICOAGULATION THERAPY VISIT (OUTPATIENT)
Dept: ANTICOAGULATION | Facility: OTHER | Age: 51
End: 2021-08-11
Attending: PHYSICIAN ASSISTANT
Payer: COMMERCIAL

## 2021-08-11 ENCOUNTER — TRANSFERRED RECORDS (OUTPATIENT)
Dept: HEALTH INFORMATION MANAGEMENT | Facility: OTHER | Age: 51
End: 2021-08-11

## 2021-08-11 DIAGNOSIS — Z79.01 LONG TERM CURRENT USE OF ANTICOAGULANT THERAPY: ICD-10-CM

## 2021-08-11 DIAGNOSIS — D68.51 FACTOR 5 LEIDEN MUTATION, HETEROZYGOUS (H): ICD-10-CM

## 2021-08-11 DIAGNOSIS — I82.602 BLOOD CLOT OF VEIN IN SHOULDER AREA, LEFT: Primary | ICD-10-CM

## 2021-08-11 LAB — INR (EXTERNAL): 3.3 (ref 0.9–1.1)

## 2021-08-11 NOTE — PROGRESS NOTES
ANTICOAGULATION FOLLOW-UP CLINIC VISIT    Patient Name:  Daniela Ladd  Date:  8/11/2021  Contact Type:  no call needed patient to continue same dose    SUBJECTIVE:  Patient Findings         Clinical Outcomes     Negatives:  Major bleeding event, Thromboembolic event, Anticoagulation-related hospital admission, Anticoagulation-related ED visit, Anticoagulation-related fatality           OBJECTIVE    Recent labs: (last 7 days)     08/11/21  0600   INR 3.3*       ASSESSMENT / PLAN  INR assessment THER    Recheck INR In: 1 WEEK    INR Location Home INR      Anticoagulation Summary  As of 8/11/2021    INR goal:  2.5-3.5   TTR:  80.5 % (1 y)   INR used for dosing:  3.3 (8/11/2021)   Warfarin maintenance plan:  8 mg (2 mg x 4) every Mon, Wed, Fri; 10 mg (2 mg x 5) all other days   Full warfarin instructions:  8 mg every Mon, Wed, Fri; 10 mg all other days   Weekly warfarin total:  64 mg   Plan last modified:  Yoly Crowe RN (5/18/2021)   Next INR check:  8/18/2021   Priority:  High   Target end date:  Indefinite    Indications    Blood clot of vein in shoulder area  left [I82.602]  Factor 5 Leiden mutation  heterozygous (H) [D68.51]  Long-term (current) use of anticoagulants [Z79.01] [Z79.01]             Anticoagulation Episode Summary     INR check location:      Preferred lab:      Send INR reminders to:  [facility]    Comments:  Has home INR machine weekly INR needed         Anticoagulation Care Providers     Provider Role Specialty Phone number    Ling Trammell PA-C Referring Family Medicine [phone]            See the Encounter Report to view Anticoagulation Flowsheet and Dosing Calendar (Go to Encounters tab in chart review, and find the Anticoagulation Therapy Visit)        Yoly Crowe, RN

## 2021-08-18 ENCOUNTER — ANTICOAGULATION THERAPY VISIT (OUTPATIENT)
Dept: ANTICOAGULATION | Facility: OTHER | Age: 51
End: 2021-08-18
Attending: PHYSICIAN ASSISTANT
Payer: COMMERCIAL

## 2021-08-18 ENCOUNTER — TRANSFERRED RECORDS (OUTPATIENT)
Dept: HEALTH INFORMATION MANAGEMENT | Facility: OTHER | Age: 51
End: 2021-08-18

## 2021-08-18 DIAGNOSIS — D68.51 FACTOR 5 LEIDEN MUTATION, HETEROZYGOUS (H): ICD-10-CM

## 2021-08-18 DIAGNOSIS — I82.602 BLOOD CLOT OF VEIN IN SHOULDER AREA, LEFT: Primary | ICD-10-CM

## 2021-08-18 DIAGNOSIS — Z79.01 LONG TERM CURRENT USE OF ANTICOAGULANT THERAPY: ICD-10-CM

## 2021-08-18 LAB — INR (EXTERNAL): 3.2 (ref 0.9–1.1)

## 2021-08-18 NOTE — PROGRESS NOTES
ANTICOAGULATION FOLLOW-UP CLINIC VISIT    Patient Name:  Daniela Ladd  Date:  8/18/2021  Contact Type:  no call needed patient to continue same dose    SUBJECTIVE:  Patient Findings         Clinical Outcomes     Negatives:  Major bleeding event, Thromboembolic event, Anticoagulation-related hospital admission, Anticoagulation-related ED visit, Anticoagulation-related fatality           OBJECTIVE    Recent labs: (last 7 days)     08/18/21  1318   INR 3.2*       ASSESSMENT / PLAN  INR assessment THER    Recheck INR In: 1 WEEK    INR Location Home INR      Anticoagulation Summary  As of 8/18/2021    INR goal:  2.5-3.5   TTR:  80.5 % (1 y)   INR used for dosing:  3.2 (8/18/2021)   Warfarin maintenance plan:  8 mg (2 mg x 4) every Mon, Wed, Fri; 10 mg (2 mg x 5) all other days   Full warfarin instructions:  8 mg every Mon, Wed, Fri; 10 mg all other days   Weekly warfarin total:  64 mg   Plan last modified:  Yoly Crowe RN (5/18/2021)   Next INR check:  8/25/2021   Priority:  High   Target end date:  Indefinite    Indications    Blood clot of vein in shoulder area  left [I82.602]  Factor 5 Leiden mutation  heterozygous (H) [D68.51]  Long-term (current) use of anticoagulants [Z79.01] [Z79.01]             Anticoagulation Episode Summary     INR check location:      Preferred lab:      Send INR reminders to:  [facility]    Comments:  Has home INR machine weekly INR needed         Anticoagulation Care Providers     Provider Role Specialty Phone number    Ling Trammell PA-C Referring Family Medicine [phone]            See the Encounter Report to view Anticoagulation Flowsheet and Dosing Calendar (Go to Encounters tab in chart review, and find the Anticoagulation Therapy Visit)        Yoly rCowe, RN

## 2021-08-25 ENCOUNTER — ANTICOAGULATION THERAPY VISIT (OUTPATIENT)
Dept: ANTICOAGULATION | Facility: OTHER | Age: 51
End: 2021-08-25
Attending: PHYSICIAN ASSISTANT
Payer: COMMERCIAL

## 2021-08-25 ENCOUNTER — TRANSFERRED RECORDS (OUTPATIENT)
Dept: HEALTH INFORMATION MANAGEMENT | Facility: OTHER | Age: 51
End: 2021-08-25

## 2021-08-25 DIAGNOSIS — I82.602 BLOOD CLOT OF VEIN IN SHOULDER AREA, LEFT: Primary | ICD-10-CM

## 2021-08-25 DIAGNOSIS — Z79.01 LONG TERM CURRENT USE OF ANTICOAGULANT THERAPY: ICD-10-CM

## 2021-08-25 DIAGNOSIS — D68.51 FACTOR 5 LEIDEN MUTATION, HETEROZYGOUS (H): ICD-10-CM

## 2021-08-25 LAB — INR (EXTERNAL): 3.5 (ref 0.9–1.1)

## 2021-08-25 NOTE — PROGRESS NOTES
ANTICOAGULATION FOLLOW-UP CLINIC VISIT    Patient Name:  Daniela Ladd  Date:  8/25/2021  Contact Type:  No call needed pt to continue same dose.     SUBJECTIVE:  Patient Findings         Clinical Outcomes     Negatives:  Major bleeding event, Thromboembolic event, Anticoagulation-related hospital admission, Anticoagulation-related ED visit, Anticoagulation-related fatality           OBJECTIVE    Recent labs: (last 7 days)     08/25/21  0602   INR 3.5*       ASSESSMENT / PLAN  INR assessment THER    Recheck INR In: 1 WEEK    INR Location Home INR      Anticoagulation Summary  As of 8/25/2021    INR goal:  2.5-3.5   TTR:  80.9 % (1 y)   INR used for dosing:  3.5 (8/25/2021)   Warfarin maintenance plan:  8 mg (2 mg x 4) every Mon, Wed, Fri; 10 mg (2 mg x 5) all other days   Full warfarin instructions:  8 mg every Mon, Wed, Fri; 10 mg all other days   Weekly warfarin total:  64 mg   No change documented:  Shala Murray RN   Plan last modified:  Yoly Crowe RN (5/18/2021)   Next INR check:     Priority:  High   Target end date:  Indefinite    Indications    Blood clot of vein in shoulder area  left [I82.602]  Factor 5 Leiden mutation  heterozygous (H) [D68.51]  Long-term (current) use of anticoagulants [Z79.01] [Z79.01]             Anticoagulation Episode Summary     INR check location:      Preferred lab:      Send INR reminders to:  [facility]    Comments:  Has home INR machine weekly INR needed         Anticoagulation Care Providers     Provider Role Specialty Phone number    Ling Trammell PA-C Referring Family Medicine [phone]            See the Encounter Report to view Anticoagulation Flowsheet and Dosing Calendar (Go to Encounters tab in chart review, and find the Anticoagulation Therapy Visit)        Shala Murray, RN

## 2021-09-01 ENCOUNTER — ANTICOAGULATION THERAPY VISIT (OUTPATIENT)
Dept: ANTICOAGULATION | Facility: OTHER | Age: 51
End: 2021-09-01
Attending: PHYSICIAN ASSISTANT
Payer: COMMERCIAL

## 2021-09-01 DIAGNOSIS — I82.602 BLOOD CLOT OF VEIN IN SHOULDER AREA, LEFT: Primary | ICD-10-CM

## 2021-09-01 DIAGNOSIS — D68.51 FACTOR 5 LEIDEN MUTATION, HETEROZYGOUS (H): ICD-10-CM

## 2021-09-01 DIAGNOSIS — Z79.01 LONG TERM CURRENT USE OF ANTICOAGULANT THERAPY: ICD-10-CM

## 2021-09-01 LAB — INR (EXTERNAL): 2.8 (ref 0.9–1.1)

## 2021-09-01 NOTE — PROGRESS NOTES
ANTICOAGULATION FOLLOW-UP CLINIC VISIT    Patient Name:  Daniela Ladd  Date:  2021  Contact Type:  no call needed patient to continue same dose    SUBJECTIVE:  Patient Findings         Clinical Outcomes     Negatives:  Major bleeding event, Thromboembolic event, Anticoagulation-related hospital admission, Anticoagulation-related ED visit, Anticoagulation-related fatality           OBJECTIVE    Recent labs: (last 7 days)     21  0949   INR 2.8*       ASSESSMENT / PLAN  INR assessment THER    Recheck INR In: 1 WEEK    INR Location Home INR      Anticoagulation Summary  As of 2021    INR goal:  2.5-3.5   TTR:  82.8 % (1 y)   INR used for dosin.8 (2021)   Warfarin maintenance plan:  8 mg (2 mg x 4) every Mon, Wed, Fri; 10 mg (2 mg x 5) all other days   Full warfarin instructions:  8 mg every Mon, Wed, Fri; 10 mg all other days   Weekly warfarin total:  64 mg   No change documented:  Yoly Crowe RN   Plan last modified:  Yoly Crowe RN (2021)   Next INR check:  2021   Priority:  High   Target end date:  Indefinite    Indications    Blood clot of vein in shoulder area  left [I82.602]  Factor 5 Leiden mutation  heterozygous (H) [D68.51]  Long-term (current) use of anticoagulants [Z79.01] [Z79.01]             Anticoagulation Episode Summary     INR check location:      Preferred lab:      Send INR reminders to:  [facility]    Comments:  Has home INR machine weekly INR needed         Anticoagulation Care Providers     Provider Role Specialty Phone number    Ling Trammell PA-C Referring Family Medicine [phone]            See the Encounter Report to view Anticoagulation Flowsheet and Dosing Calendar (Go to Encounters tab in chart review, and find the Anticoagulation Therapy Visit)        Yoly Crowe RN

## 2021-09-08 ENCOUNTER — ANTICOAGULATION THERAPY VISIT (OUTPATIENT)
Dept: ANTICOAGULATION | Facility: OTHER | Age: 51
End: 2021-09-08
Attending: PHYSICIAN ASSISTANT
Payer: COMMERCIAL

## 2021-09-08 ENCOUNTER — TRANSFERRED RECORDS (OUTPATIENT)
Dept: HEALTH INFORMATION MANAGEMENT | Facility: OTHER | Age: 51
End: 2021-09-08

## 2021-09-08 DIAGNOSIS — D68.51 FACTOR 5 LEIDEN MUTATION, HETEROZYGOUS (H): ICD-10-CM

## 2021-09-08 DIAGNOSIS — I82.602 BLOOD CLOT OF VEIN IN SHOULDER AREA, LEFT: Primary | ICD-10-CM

## 2021-09-08 DIAGNOSIS — Z79.01 LONG TERM CURRENT USE OF ANTICOAGULANT THERAPY: ICD-10-CM

## 2021-09-08 LAB — INR (EXTERNAL): 2.8 (ref 0.9–1.1)

## 2021-09-08 NOTE — PROGRESS NOTES
ANTICOAGULATION FOLLOW-UP CLINIC VISIT    Patient Name:  Daniela Ladd  Date:  2021  Contact Type: no call needed patient to continue same dose    SUBJECTIVE:  Patient Findings         Clinical Outcomes     Negatives:  Major bleeding event, Thromboembolic event, Anticoagulation-related hospital admission, Anticoagulation-related ED visit, Anticoagulation-related fatality           OBJECTIVE    Recent labs: (last 7 days)     21  1034   INR 2.8*       ASSESSMENT / PLAN  INR assessment THER    Recheck INR In: 1 WEEK    INR Location Home INR      Anticoagulation Summary  As of 2021    INR goal:  2.5-3.5   TTR:  84.7 % (1 y)   INR used for dosin.8 (2021)   Warfarin maintenance plan:  8 mg (2 mg x 4) every Mon, Wed, Fri; 10 mg (2 mg x 5) all other days   Full warfarin instructions:  8 mg every Mon, Wed, Fri; 10 mg all other days   Weekly warfarin total:  64 mg   No change documented:  Yoly Crowe RN   Plan last modified:  Yoly Crowe RN (2021)   Next INR check:  9/15/2021   Priority:  High   Target end date:  Indefinite    Indications    Blood clot of vein in shoulder area  left [I82.602]  Factor 5 Leiden mutation  heterozygous (H) [D68.51]  Long-term (current) use of anticoagulants [Z79.01] [Z79.01]             Anticoagulation Episode Summary     INR check location:      Preferred lab:      Send INR reminders to:  [facility]    Comments:  Has home INR machine weekly INR needed         Anticoagulation Care Providers     Provider Role Specialty Phone number    Ling Trammell PA-C Referring Family Medicine [phone]            See the Encounter Report to view Anticoagulation Flowsheet and Dosing Calendar (Go to Encounters tab in chart review, and find the Anticoagulation Therapy Visit)        Yoly Crowe RN

## 2021-09-15 ENCOUNTER — TRANSFERRED RECORDS (OUTPATIENT)
Dept: HEALTH INFORMATION MANAGEMENT | Facility: OTHER | Age: 51
End: 2021-09-15

## 2021-09-15 ENCOUNTER — ANTICOAGULATION THERAPY VISIT (OUTPATIENT)
Dept: ANTICOAGULATION | Facility: OTHER | Age: 51
End: 2021-09-15
Attending: PHYSICIAN ASSISTANT
Payer: COMMERCIAL

## 2021-09-15 DIAGNOSIS — D68.51 FACTOR 5 LEIDEN MUTATION, HETEROZYGOUS (H): ICD-10-CM

## 2021-09-15 DIAGNOSIS — I82.602 BLOOD CLOT OF VEIN IN SHOULDER AREA, LEFT: Primary | ICD-10-CM

## 2021-09-15 DIAGNOSIS — Z79.01 LONG TERM CURRENT USE OF ANTICOAGULANT THERAPY: ICD-10-CM

## 2021-09-15 LAB — INR (EXTERNAL): 3.3 (ref 0.9–1.1)

## 2021-09-15 NOTE — PROGRESS NOTES
ANTICOAGULATION FOLLOW-UP CLINIC VISIT    Patient Name:  Daniela Ladd  Date:  9/15/2021  Contact Type:  no call needed patient to continue same dose    SUBJECTIVE:  Patient Findings         Clinical Outcomes     Negatives:  Major bleeding event, Thromboembolic event, Anticoagulation-related hospital admission, Anticoagulation-related ED visit, Anticoagulation-related fatality           OBJECTIVE    Recent labs: (last 7 days)     09/15/21  0600   INR 3.3*       ASSESSMENT / PLAN  INR assessment THER    Recheck INR In: 1 WEEK    INR Location Home INR      Anticoagulation Summary  As of 9/15/2021    INR goal:  2.5-3.5   TTR:  85.2 % (1 y)   INR used for dosing:  3.3 (9/15/2021)   Warfarin maintenance plan:  8 mg (2 mg x 4) every Mon, Wed, Fri; 10 mg (2 mg x 5) all other days   Full warfarin instructions:  8 mg every Mon, Wed, Fri; 10 mg all other days   Weekly warfarin total:  64 mg   No change documented:  Yoly Crowe RN   Plan last modified:  Yoly Crowe RN (5/18/2021)   Next INR check:  9/22/2021   Priority:  High   Target end date:  Indefinite    Indications    Blood clot of vein in shoulder area  left [I82.602]  Factor 5 Leiden mutation  heterozygous (H) [D68.51]  Long-term (current) use of anticoagulants [Z79.01] [Z79.01]             Anticoagulation Episode Summary     INR check location:      Preferred lab:      Send INR reminders to:  [facility]    Comments:  Has home INR machine weekly INR needed         Anticoagulation Care Providers     Provider Role Specialty Phone number    Ling Trammell PA-C Referring Family Medicine [phone]            See the Encounter Report to view Anticoagulation Flowsheet and Dosing Calendar (Go to Encounters tab in chart review, and find the Anticoagulation Therapy Visit)        Yoly Crowe RN

## 2021-09-21 ENCOUNTER — TRANSFERRED RECORDS (OUTPATIENT)
Dept: HEALTH INFORMATION MANAGEMENT | Facility: OTHER | Age: 51
End: 2021-09-21

## 2021-09-22 ENCOUNTER — TRANSFERRED RECORDS (OUTPATIENT)
Dept: HEALTH INFORMATION MANAGEMENT | Facility: OTHER | Age: 51
End: 2021-09-22

## 2021-09-22 ENCOUNTER — ANTICOAGULATION THERAPY VISIT (OUTPATIENT)
Dept: ANTICOAGULATION | Facility: OTHER | Age: 51
End: 2021-09-22
Attending: PHYSICIAN ASSISTANT
Payer: COMMERCIAL

## 2021-09-22 DIAGNOSIS — D68.51 FACTOR 5 LEIDEN MUTATION, HETEROZYGOUS (H): ICD-10-CM

## 2021-09-22 DIAGNOSIS — Z79.01 LONG TERM CURRENT USE OF ANTICOAGULANT THERAPY: ICD-10-CM

## 2021-09-22 DIAGNOSIS — I82.602 BLOOD CLOT OF VEIN IN SHOULDER AREA, LEFT: Primary | ICD-10-CM

## 2021-09-22 LAB — INR (EXTERNAL): 3.5 (ref 0.9–1.1)

## 2021-09-22 NOTE — PROGRESS NOTES
ANTICOAGULATION FOLLOW-UP CLINIC VISIT    Patient Name:  Daniela Ladd  Date:  9/22/2021  Contact Type:  no call needed patient to continue same dose    SUBJECTIVE:  Patient Findings         Clinical Outcomes     Negatives:  Major bleeding event, Thromboembolic event, Anticoagulation-related hospital admission, Anticoagulation-related ED visit, Anticoagulation-related fatality           OBJECTIVE    Recent labs: (last 7 days)     09/22/21  0917   INR 3.5*       ASSESSMENT / PLAN  INR assessment THER    Recheck INR In: 1 WEEK    INR Location Home INR      Anticoagulation Summary  As of 9/22/2021    INR goal:  2.5-3.5   TTR:  86.2 % (1 y)   INR used for dosing:  3.5 (9/22/2021)   Warfarin maintenance plan:  8 mg (2 mg x 4) every Mon, Wed, Fri; 10 mg (2 mg x 5) all other days   Full warfarin instructions:  8 mg every Mon, Wed, Fri; 10 mg all other days   Weekly warfarin total:  64 mg   No change documented:  Yoly Crowe RN   Plan last modified:  Yoly Crowe RN (5/18/2021)   Next INR check:  9/29/2021   Priority:  High   Target end date:  Indefinite    Indications    Blood clot of vein in shoulder area  left [I82.602]  Factor 5 Leiden mutation  heterozygous (H) [D68.51]  Long-term (current) use of anticoagulants [Z79.01] [Z79.01]             Anticoagulation Episode Summary     INR check location:      Preferred lab:      Send INR reminders to:  [facility]    Comments:  Has home INR machine weekly INR needed         Anticoagulation Care Providers     Provider Role Specialty Phone number    Ling Trammell PA-C Referring Family Medicine [phone]            See the Encounter Report to view Anticoagulation Flowsheet and Dosing Calendar (Go to Encounters tab in chart review, and find the Anticoagulation Therapy Visit)        Yoly Crowe RN

## 2021-09-29 ENCOUNTER — TRANSFERRED RECORDS (OUTPATIENT)
Dept: HEALTH INFORMATION MANAGEMENT | Facility: OTHER | Age: 51
End: 2021-09-29

## 2021-09-29 ENCOUNTER — ANTICOAGULATION THERAPY VISIT (OUTPATIENT)
Dept: ANTICOAGULATION | Facility: OTHER | Age: 51
End: 2021-09-29
Attending: PHYSICIAN ASSISTANT
Payer: COMMERCIAL

## 2021-09-29 DIAGNOSIS — Z79.01 LONG TERM CURRENT USE OF ANTICOAGULANT THERAPY: ICD-10-CM

## 2021-09-29 DIAGNOSIS — I82.602 BLOOD CLOT OF VEIN IN SHOULDER AREA, LEFT: Primary | ICD-10-CM

## 2021-09-29 DIAGNOSIS — D68.51 FACTOR 5 LEIDEN MUTATION, HETEROZYGOUS (H): ICD-10-CM

## 2021-09-29 LAB — INR (EXTERNAL): 3.2 (ref 2.5–3.5)

## 2021-09-29 NOTE — PROGRESS NOTES
ANTICOAGULATION FOLLOW-UP CLINIC VISIT    Patient Name:  Daniela Ladd  Date:  9/29/2021  Contact Type:  Fax from Home INR Remote INR. no call needed patient to continue same dose  SUBJECTIVE:  Patient Findings         Clinical Outcomes     Negatives:  Major bleeding event, Thromboembolic event, Anticoagulation-related hospital admission, Anticoagulation-related ED visit, Anticoagulation-related fatality           OBJECTIVE    Recent labs: (last 7 days)     09/29/21  0551   INR 3.2       ASSESSMENT / PLAN  INR assessment THER    Recheck INR In: 1 WEEK    INR Location Home INR      Anticoagulation Summary  As of 9/29/2021    INR goal:  2.5-3.5   TTR:  88.1 % (1 y)   INR used for dosing:  3.2 (9/29/2021)   Warfarin maintenance plan:  8 mg (2 mg x 4) every Mon, Wed, Fri; 10 mg (2 mg x 5) all other days   Full warfarin instructions:  8 mg every Mon, Wed, Fri; 10 mg all other days   Weekly warfarin total:  64 mg   No change documented:  Elyssa Correa RN   Plan last modified:  Yoly Crowe RN (5/18/2021)   Next INR check:  10/6/2021   Priority:  High   Target end date:  Indefinite    Indications    Blood clot of vein in shoulder area  left [I82.602]  Factor 5 Leiden mutation  heterozygous (H) [D68.51]  Long-term (current) use of anticoagulants [Z79.01] [Z79.01]             Anticoagulation Episode Summary     INR check location:      Preferred lab:      Send INR reminders to:  [facility]    Comments:  Has home INR machine weekly INR needed         Anticoagulation Care Providers     Provider Role Specialty Phone number    Ling Trammell PA-C Referring Family Medicine [phone]            See the Encounter Report to view Anticoagulation Flowsheet and Dosing Calendar (Go to Encounters tab in chart review, and find the Anticoagulation Therapy Visit)    Dosage adjustment made based on physician directed care plan.    Elyssa Correa, RN

## 2021-10-06 ENCOUNTER — TRANSFERRED RECORDS (OUTPATIENT)
Dept: HEALTH INFORMATION MANAGEMENT | Facility: OTHER | Age: 51
End: 2021-10-06

## 2021-10-06 ENCOUNTER — ANTICOAGULATION THERAPY VISIT (OUTPATIENT)
Dept: ANTICOAGULATION | Facility: OTHER | Age: 51
End: 2021-10-06
Attending: PHYSICIAN ASSISTANT
Payer: COMMERCIAL

## 2021-10-06 DIAGNOSIS — Z79.01 LONG TERM CURRENT USE OF ANTICOAGULANT THERAPY: ICD-10-CM

## 2021-10-06 DIAGNOSIS — D68.51 FACTOR 5 LEIDEN MUTATION, HETEROZYGOUS (H): ICD-10-CM

## 2021-10-06 DIAGNOSIS — I82.602 BLOOD CLOT OF VEIN IN SHOULDER AREA, LEFT: Primary | ICD-10-CM

## 2021-10-06 LAB — INR (EXTERNAL): 2.9 (ref 0.9–1.1)

## 2021-10-06 NOTE — PROGRESS NOTES
ANTICOAGULATION FOLLOW-UP CLINIC VISIT    Patient Name:  Daniela Ladd  Date:  10/6/2021  Contact Type:  no call needed patient to continue same dose    SUBJECTIVE:  Patient Findings         Clinical Outcomes     Negatives:  Major bleeding event, Thromboembolic event, Anticoagulation-related hospital admission, Anticoagulation-related ED visit, Anticoagulation-related fatality           OBJECTIVE    Recent labs: (last 7 days)     10/06/21  0914   INR 2.9*       ASSESSMENT / PLAN  INR assessment THER    Recheck INR In: 1 WEEK    INR Location Home INR      Anticoagulation Summary  As of 10/6/2021    INR goal:  2.5-3.5   TTR:  89.6 % (1 y)   INR used for dosin.9 (10/6/2021)   Warfarin maintenance plan:  8 mg (2 mg x 4) every Mon, Wed, Fri; 10 mg (2 mg x 5) all other days   Full warfarin instructions:  8 mg every Mon, Wed, Fri; 10 mg all other days   Weekly warfarin total:  64 mg   No change documented:  Yoly Crowe RN   Plan last modified:  Yoly Crowe RN (2021)   Next INR check:  10/13/2021   Priority:  High   Target end date:  Indefinite    Indications    Blood clot of vein in shoulder area  left [I82.602]  Factor 5 Leiden mutation  heterozygous (H) [D68.51]  Long-term (current) use of anticoagulants [Z79.01] [Z79.01]             Anticoagulation Episode Summary     INR check location:      Preferred lab:      Send INR reminders to:  [facility]    Comments:  Has home INR machine weekly INR needed         Anticoagulation Care Providers     Provider Role Specialty Phone number    Ling Trammell PA-C Referring Family Medicine [phone]            See the Encounter Report to view Anticoagulation Flowsheet and Dosing Calendar (Go to Encounters tab in chart review, and find the Anticoagulation Therapy Visit)        Yoly Crowe RN

## 2021-10-09 ENCOUNTER — HEALTH MAINTENANCE LETTER (OUTPATIENT)
Age: 51
End: 2021-10-09

## 2021-10-13 ENCOUNTER — TRANSFERRED RECORDS (OUTPATIENT)
Dept: HEALTH INFORMATION MANAGEMENT | Facility: OTHER | Age: 51
End: 2021-10-13

## 2021-10-13 ENCOUNTER — ANTICOAGULATION THERAPY VISIT (OUTPATIENT)
Dept: ANTICOAGULATION | Facility: OTHER | Age: 51
End: 2021-10-13
Attending: PHYSICIAN ASSISTANT
Payer: COMMERCIAL

## 2021-10-13 DIAGNOSIS — D68.51 FACTOR 5 LEIDEN MUTATION, HETEROZYGOUS (H): ICD-10-CM

## 2021-10-13 DIAGNOSIS — Z79.01 LONG TERM CURRENT USE OF ANTICOAGULANT THERAPY: ICD-10-CM

## 2021-10-13 DIAGNOSIS — I82.602 BLOOD CLOT OF VEIN IN SHOULDER AREA, LEFT: Primary | ICD-10-CM

## 2021-10-13 LAB — INR (EXTERNAL): 3.5 (ref 0.9–1.1)

## 2021-10-13 NOTE — PROGRESS NOTES
ANTICOAGULATION FOLLOW-UP CLINIC VISIT    Patient Name:  Daniela Ladd  Date:  10/13/2021  Contact Type:  no call needed patient to continue same dose    SUBJECTIVE:  Patient Findings         Clinical Outcomes     Negatives:  Major bleeding event, Thromboembolic event, Anticoagulation-related hospital admission, Anticoagulation-related ED visit, Anticoagulation-related fatality           OBJECTIVE    Recent labs: (last 7 days)     10/13/21  1247   INR 3.5*       ASSESSMENT / PLAN  INR assessment THER    Recheck INR In: 1 WEEK    INR Location Home INR      Anticoagulation Summary  As of 10/13/2021    INR goal:  2.5-3.5   TTR:  89.7 % (1 y)   INR used for dosing:  3.5 (10/13/2021)   Warfarin maintenance plan:  8 mg (2 mg x 4) every Mon, Wed, Fri; 10 mg (2 mg x 5) all other days   Full warfarin instructions:  8 mg every Mon, Wed, Fri; 10 mg all other days   Weekly warfarin total:  64 mg   No change documented:  Yoly Crowe RN   Plan last modified:  Yoly Crowe RN (5/18/2021)   Next INR check:  10/20/2021   Priority:  High   Target end date:  Indefinite    Indications    Blood clot of vein in shoulder area  left [I82.602]  Factor 5 Leiden mutation  heterozygous (H) [D68.51]  Long-term (current) use of anticoagulants [Z79.01] [Z79.01]             Anticoagulation Episode Summary     INR check location:      Preferred lab:      Send INR reminders to:  [facility]    Comments:  Has home INR machine weekly INR needed         Anticoagulation Care Providers     Provider Role Specialty Phone number    Ling Trammell PA-C Referring Family Medicine [phone]            See the Encounter Report to view Anticoagulation Flowsheet and Dosing Calendar (Go to Encounters tab in chart review, and find the Anticoagulation Therapy Visit)        Yoly Crowe RN

## 2021-10-20 ENCOUNTER — ANTICOAGULATION THERAPY VISIT (OUTPATIENT)
Dept: ANTICOAGULATION | Facility: OTHER | Age: 51
End: 2021-10-20
Attending: PHYSICIAN ASSISTANT
Payer: COMMERCIAL

## 2021-10-20 ENCOUNTER — TRANSFERRED RECORDS (OUTPATIENT)
Dept: HEALTH INFORMATION MANAGEMENT | Facility: OTHER | Age: 51
End: 2021-10-20

## 2021-10-20 DIAGNOSIS — Z79.01 LONG TERM CURRENT USE OF ANTICOAGULANT THERAPY: ICD-10-CM

## 2021-10-20 DIAGNOSIS — D68.51 FACTOR 5 LEIDEN MUTATION, HETEROZYGOUS (H): ICD-10-CM

## 2021-10-20 DIAGNOSIS — I82.602 BLOOD CLOT OF VEIN IN SHOULDER AREA, LEFT: Primary | ICD-10-CM

## 2021-10-20 LAB — INR (EXTERNAL): 3.9 (ref 2.5–3.5)

## 2021-10-20 NOTE — PROGRESS NOTES
ANTICOAGULATION FOLLOW-UP CLINIC VISIT    Patient Name:  Daniela Ladd  Date:  10/20/2021  Contact Type:  Face to Face    SUBJECTIVE:  Patient Findings     Positives:  Change in diet/appetite (states she has been eating dfferent since  out of town)        Clinical Outcomes     Negatives:  Major bleeding event, Thromboembolic event, Anticoagulation-related hospital admission, Anticoagulation-related ED visit, Anticoagulation-related fatality           OBJECTIVE    Recent labs: (last 7 days)     10/20/21  0500   INR 3.9*       ASSESSMENT / PLAN  INR assessment SUPRA    Recheck INR In: 1 WEEK    INR Location Home INR      Anticoagulation Summary  As of 10/20/2021    INR goal:  2.5-3.5   TTR:  87.8 % (1 y)   INR used for dosing:  3.9 (10/20/2021)   Warfarin maintenance plan:  8 mg (2 mg x 4) every Mon, Wed, Fri; 10 mg (2 mg x 5) all other days   Full warfarin instructions:  10/20: 6 mg; Otherwise 8 mg every Mon, Wed, Fri; 10 mg all other days   Weekly warfarin total:  64 mg   Plan last modified:  Yoly Crowe, RN (5/18/2021)   Next INR check:  10/27/2021   Priority:  High   Target end date:  Indefinite    Indications    Blood clot of vein in shoulder area  left [I82.602]  Factor 5 Leiden mutation  heterozygous (H) [D68.51]  Long-term (current) use of anticoagulants [Z79.01] [Z79.01]             Anticoagulation Episode Summary     INR check location:      Preferred lab:      Send INR reminders to:  [facility]    Comments:  Has home INR machine weekly INR needed         Anticoagulation Care Providers     Provider Role Specialty Phone number    Ling Trammell PA-C Referring Family Medicine [phone]            See the Encounter Report to view Anticoagulation Flowsheet and Dosing Calendar (Go to Encounters tab in chart review, and find the Anticoagulation Therapy Visit)        Yoly Crowe, RN

## 2021-10-27 ENCOUNTER — TRANSFERRED RECORDS (OUTPATIENT)
Dept: HEALTH INFORMATION MANAGEMENT | Facility: OTHER | Age: 51
End: 2021-10-27

## 2021-10-27 ENCOUNTER — ANTICOAGULATION THERAPY VISIT (OUTPATIENT)
Dept: ANTICOAGULATION | Facility: OTHER | Age: 51
End: 2021-10-27
Attending: PHYSICIAN ASSISTANT
Payer: COMMERCIAL

## 2021-10-27 DIAGNOSIS — Z79.01 LONG TERM CURRENT USE OF ANTICOAGULANT THERAPY: ICD-10-CM

## 2021-10-27 DIAGNOSIS — I82.602 BLOOD CLOT OF VEIN IN SHOULDER AREA, LEFT: Primary | ICD-10-CM

## 2021-10-27 DIAGNOSIS — D68.51 FACTOR 5 LEIDEN MUTATION, HETEROZYGOUS (H): ICD-10-CM

## 2021-10-27 LAB — INR (EXTERNAL): 3.6 (ref 2.5–3.5)

## 2021-10-27 NOTE — PROGRESS NOTES
ANTICOAGULATION FOLLOW-UP CLINIC VISIT    Patient Name:  Daniela Ladd  Date:  10/27/2021  Contact Type:  Telephone/ spoke with patient reviewed dosing    SUBJECTIVE:  Patient Findings         Clinical Outcomes     Negatives:  Major bleeding event, Thromboembolic event, Anticoagulation-related hospital admission, Anticoagulation-related ED visit, Anticoagulation-related fatality           OBJECTIVE    Recent labs: (last 7 days)     10/27/21  0916   INR 3.6*       ASSESSMENT / PLAN  INR assessment SUPRA    Recheck INR In: 1 WEEK    INR Location Home INR      Anticoagulation Summary  As of 10/27/2021    INR goal:  2.5-3.5   TTR:  85.9 % (1 y)   INR used for dosing:  3.6 (10/27/2021)   Warfarin maintenance plan:  8 mg (2 mg x 4) every Mon, Wed, Fri; 10 mg (2 mg x 5) all other days   Full warfarin instructions:  8 mg every Mon, Wed, Fri; 10 mg all other days   Weekly warfarin total:  64 mg   No change documented:  Yoly Crowe RN   Plan last modified:  Yoly Crowe RN (5/18/2021)   Next INR check:  11/3/2021   Priority:  High   Target end date:  Indefinite    Indications    Blood clot of vein in shoulder area  left [I82.602]  Factor 5 Leiden mutation  heterozygous (H) [D68.51]  Long-term (current) use of anticoagulants [Z79.01] [Z79.01]             Anticoagulation Episode Summary     INR check location:      Preferred lab:      Send INR reminders to:  [facility]    Comments:  Has home INR machine weekly INR needed         Anticoagulation Care Providers     Provider Role Specialty Phone number    Ling Trammell PA-C Referring Family Medicine [phone]            See the Encounter Report to view Anticoagulation Flowsheet and Dosing Calendar (Go to Encounters tab in chart review, and find the Anticoagulation Therapy Visit)        Yoly Crowe RN

## 2021-11-03 ENCOUNTER — TRANSFERRED RECORDS (OUTPATIENT)
Dept: HEALTH INFORMATION MANAGEMENT | Facility: OTHER | Age: 51
End: 2021-11-03

## 2021-11-03 ENCOUNTER — ANTICOAGULATION THERAPY VISIT (OUTPATIENT)
Dept: ANTICOAGULATION | Facility: OTHER | Age: 51
End: 2021-11-03
Attending: PHYSICIAN ASSISTANT
Payer: COMMERCIAL

## 2021-11-03 DIAGNOSIS — Z79.01 LONG TERM CURRENT USE OF ANTICOAGULANT THERAPY: ICD-10-CM

## 2021-11-03 DIAGNOSIS — I82.622 ARM DVT (DEEP VENOUS THROMBOEMBOLISM), ACUTE, LEFT (H): ICD-10-CM

## 2021-11-03 DIAGNOSIS — D68.51 FACTOR 5 LEIDEN MUTATION, HETEROZYGOUS (H): ICD-10-CM

## 2021-11-03 DIAGNOSIS — I82.602 BLOOD CLOT OF VEIN IN SHOULDER AREA, LEFT: Primary | ICD-10-CM

## 2021-11-03 LAB — INR (EXTERNAL): 4.3 (ref 0.9–1.1)

## 2021-11-03 RX ORDER — WARFARIN SODIUM 2 MG/1
TABLET ORAL
Qty: 360 TABLET | Refills: 1 | COMMUNITY
Start: 2021-11-03 | End: 2021-11-29

## 2021-11-03 NOTE — PROGRESS NOTES
ANTICOAGULATION FOLLOW-UP CLINIC VISIT    Patient Name:  Daniela Ladd  Date:  11/3/2021  Contact Type:  Telephone/ spoke with patient reviewed dosing    SUBJECTIVE:  Patient Findings     Comments:  Patient denies any identifiable changes that caused the supratherapeutic INR.           Clinical Outcomes     Negatives:  Major bleeding event, Thromboembolic event, Anticoagulation-related hospital admission, Anticoagulation-related ED visit, Anticoagulation-related fatality    Comments:  Patient denies any identifiable changes that caused the supratherapeutic INR.              OBJECTIVE    Recent labs: (last 7 days)     21  1057   INR 4.3*       ASSESSMENT / PLAN  INR assessment SUPRA    Recheck INR In: 1 WEEK    INR Location Home INR      Anticoagulation Summary  As of 11/3/2021    INR goal:  2.5-3.5   TTR:  83.9 % (1 y)   INR used for dosin.3 (11/3/2021)   Warfarin maintenance plan:  10 mg (2 mg x 5) every Mon, Wed, Fri; 8 mg (2 mg x 4) all other days   Full warfarin instructions:  11/3: 8 mg; Otherwise 10 mg every Mon, Wed, Fri; 8 mg all other days   Weekly warfarin total:  62 mg   Plan last modified:  Yoly Crowe RN (11/3/2021)   Next INR check:  11/10/2021   Priority:  High   Target end date:  Indefinite    Indications    Blood clot of vein in shoulder area  left [I82.602]  Factor 5 Leiden mutation  heterozygous (H) [D68.51]  Long-term (current) use of anticoagulants [Z79.01] [Z79.01]             Anticoagulation Episode Summary     INR check location:      Preferred lab:      Send INR reminders to:  [facility]    Comments:  Has home INR machine weekly INR needed         Anticoagulation Care Providers     Provider Role Specialty Phone number    Ling Trammell PA-C Referring Family Medicine [phone]            See the Encounter Report to view Anticoagulation Flowsheet and Dosing Calendar (Go to Encounters tab in chart review, and find the Anticoagulation Therapy  Visit)        Yoly Crowe, RN

## 2021-11-10 ENCOUNTER — TRANSFERRED RECORDS (OUTPATIENT)
Dept: HEALTH INFORMATION MANAGEMENT | Facility: OTHER | Age: 51
End: 2021-11-10

## 2021-11-10 ENCOUNTER — ANTICOAGULATION THERAPY VISIT (OUTPATIENT)
Dept: ANTICOAGULATION | Facility: OTHER | Age: 51
End: 2021-11-10
Attending: PHYSICIAN ASSISTANT
Payer: COMMERCIAL

## 2021-11-10 DIAGNOSIS — I82.602 BLOOD CLOT OF VEIN IN SHOULDER AREA, LEFT: Primary | ICD-10-CM

## 2021-11-10 DIAGNOSIS — D68.51 FACTOR 5 LEIDEN MUTATION, HETEROZYGOUS (H): ICD-10-CM

## 2021-11-10 DIAGNOSIS — Z79.01 LONG TERM CURRENT USE OF ANTICOAGULANT THERAPY: ICD-10-CM

## 2021-11-10 LAB — INR (EXTERNAL): 4 (ref 0.9–1.1)

## 2021-11-10 NOTE — PROGRESS NOTES
ANTICOAGULATION FOLLOW-UP CLINIC VISIT    Patient Name:  Daniela Ladd  Date:  11/10/2021  Contact Type:  Telephone/ spoke with patient reviewed the dosing    SUBJECTIVE:  Patient Findings     Positives:  Missed doses        Clinical Outcomes     Negatives:  Major bleeding event, Thromboembolic event, Anticoagulation-related hospital admission, Anticoagulation-related ED visit, Anticoagulation-related fatality           OBJECTIVE    Recent labs: (last 7 days)     11/10/21  0935   INR 4.0*       ASSESSMENT / PLAN  INR assessment SUPRA    Recheck INR In: 1 WEEK    INR Location Home INR      Anticoagulation Summary  As of 11/10/2021    INR goal:  2.5-3.5   TTR:  82.0 % (1 y)   INR used for dosin.0 (11/10/2021)   Warfarin maintenance plan:  8 mg (2 mg x 4) every day   Full warfarin instructions:  8 mg every day   Weekly warfarin total:  56 mg   Plan last modified:  Yoly Crowe RN (11/10/2021)   Next INR check:  2021   Priority:  High   Target end date:  Indefinite    Indications    Blood clot of vein in shoulder area  left [I82.602]  Factor 5 Leiden mutation  heterozygous (H) [D68.51]  Long-term (current) use of anticoagulants [Z79.01] [Z79.01]             Anticoagulation Episode Summary     INR check location:      Preferred lab:      Send INR reminders to:  [facility]    Comments:  Has home INR machine weekly INR needed         Anticoagulation Care Providers     Provider Role Specialty Phone number    PENELOPEglendaLing PA-C Referring Family Medicine [phone]            See the Encounter Report to view Anticoagulation Flowsheet and Dosing Calendar (Go to Encounters tab in chart review, and find the Anticoagulation Therapy Visit)        Yoly Crowe, RN

## 2021-11-17 ENCOUNTER — TRANSFERRED RECORDS (OUTPATIENT)
Dept: HEALTH INFORMATION MANAGEMENT | Facility: OTHER | Age: 51
End: 2021-11-17

## 2021-11-17 ENCOUNTER — ANTICOAGULATION THERAPY VISIT (OUTPATIENT)
Dept: ANTICOAGULATION | Facility: OTHER | Age: 51
End: 2021-11-17
Attending: PHYSICIAN ASSISTANT
Payer: COMMERCIAL

## 2021-11-17 DIAGNOSIS — I82.602 BLOOD CLOT OF VEIN IN SHOULDER AREA, LEFT: Primary | ICD-10-CM

## 2021-11-17 DIAGNOSIS — Z79.01 LONG TERM CURRENT USE OF ANTICOAGULANT THERAPY: ICD-10-CM

## 2021-11-17 DIAGNOSIS — D68.51 FACTOR 5 LEIDEN MUTATION, HETEROZYGOUS (H): ICD-10-CM

## 2021-11-17 LAB — INR (EXTERNAL): 2.9 (ref 0.9–1.1)

## 2021-11-17 NOTE — PROGRESS NOTES
ANTICOAGULATION FOLLOW-UP CLINIC VISIT    Patient Name:  Daniela Ladd  Date:  2021  Contact Type:  no call needed patient to continue same dose    SUBJECTIVE:  Patient Findings         Clinical Outcomes     Negatives:  Major bleeding event, Thromboembolic event, Anticoagulation-related hospital admission, Anticoagulation-related ED visit, Anticoagulation-related fatality           OBJECTIVE    Recent labs: (last 7 days)     21  1023   INR 2.9*       ASSESSMENT / PLAN  INR assessment THER    Recheck INR In: 1 WEEK    INR Location Home INR      Anticoagulation Summary  As of 2021    INR goal:  2.5-3.5   TTR:  81.1 % (1 y)   INR used for dosin.9 (2021)   Warfarin maintenance plan:  8 mg (2 mg x 4) every day   Full warfarin instructions:  8 mg every day   Weekly warfarin total:  56 mg   No change documented:  Yoly Crowe RN   Plan last modified:  Yoly Crowe RN (11/10/2021)   Next INR check:  2021   Priority:  High   Target end date:  Indefinite    Indications    Blood clot of vein in shoulder area  left [I82.602]  Factor 5 Leiden mutation  heterozygous (H) [D68.51]  Long-term (current) use of anticoagulants [Z79.01] [Z79.01]             Anticoagulation Episode Summary     INR check location:      Preferred lab:      Send INR reminders to:  [facility]    Comments:  Has home INR machine weekly INR needed         Anticoagulation Care Providers     Provider Role Specialty Phone number    Ling Trammell PA-C Referring Family Medicine [phone]            See the Encounter Report to view Anticoagulation Flowsheet and Dosing Calendar (Go to Encounters tab in chart review, and find the Anticoagulation Therapy Visit)        Yoly Crowe RN

## 2021-11-24 ENCOUNTER — TRANSFERRED RECORDS (OUTPATIENT)
Dept: HEALTH INFORMATION MANAGEMENT | Facility: OTHER | Age: 51
End: 2021-11-24

## 2021-11-24 ENCOUNTER — ANTICOAGULATION THERAPY VISIT (OUTPATIENT)
Dept: ANTICOAGULATION | Facility: OTHER | Age: 51
End: 2021-11-24
Attending: PHYSICIAN ASSISTANT
Payer: COMMERCIAL

## 2021-11-24 DIAGNOSIS — I82.602 BLOOD CLOT OF VEIN IN SHOULDER AREA, LEFT: Primary | ICD-10-CM

## 2021-11-24 DIAGNOSIS — Z79.01 LONG TERM CURRENT USE OF ANTICOAGULANT THERAPY: ICD-10-CM

## 2021-11-24 DIAGNOSIS — D68.51 FACTOR 5 LEIDEN MUTATION, HETEROZYGOUS (H): ICD-10-CM

## 2021-11-24 LAB — INR (EXTERNAL): 3 (ref 0.9–1.1)

## 2021-11-24 NOTE — PROGRESS NOTES
ANTICOAGULATION FOLLOW-UP CLINIC VISIT    Patient Name:  Daniela Ladd  Date:  11/24/2021  Contact Type:  no call needed patient to continue same dose    SUBJECTIVE:  Patient Findings         Clinical Outcomes     Negatives:  Major bleeding event, Thromboembolic event, Anticoagulation-related hospital admission, Anticoagulation-related ED visit, Anticoagulation-related fatality           OBJECTIVE    Recent labs: (last 7 days)     11/24/21  1310   INR 3.0*       ASSESSMENT / PLAN  INR assessment THER    Recheck INR In: 1 WEEK    INR Location Home INR      Anticoagulation Summary  As of 11/24/2021    INR goal:  2.5-3.5   TTR:  81.2 % (1 y)   INR used for dosing:  3.0 (11/24/2021)   Warfarin maintenance plan:  8 mg (2 mg x 4) every day   Full warfarin instructions:  8 mg every day   Weekly warfarin total:  56 mg   No change documented:  Yoly Crowe RN   Plan last modified:  Yoly Crowe RN (11/10/2021)   Next INR check:  12/1/2021   Priority:  High   Target end date:  Indefinite    Indications    Blood clot of vein in shoulder area  left [I82.602]  Factor 5 Leiden mutation  heterozygous (H) [D68.51]  Long-term (current) use of anticoagulants [Z79.01] [Z79.01]             Anticoagulation Episode Summary     INR check location:      Preferred lab:      Send INR reminders to:  [facility]    Comments:  Has home INR machine weekly INR needed         Anticoagulation Care Providers     Provider Role Specialty Phone number    Ling Trammell PA-C Referring Family Medicine [phone]            See the Encounter Report to view Anticoagulation Flowsheet and Dosing Calendar (Go to Encounters tab in chart review, and find the Anticoagulation Therapy Visit)        Yoly Crowe RN

## 2021-11-29 DIAGNOSIS — I82.622 ARM DVT (DEEP VENOUS THROMBOEMBOLISM), ACUTE, LEFT (H): ICD-10-CM

## 2021-11-29 DIAGNOSIS — Z79.01 LONG TERM CURRENT USE OF ANTICOAGULANT THERAPY: ICD-10-CM

## 2021-11-29 RX ORDER — WARFARIN SODIUM 2 MG/1
TABLET ORAL
Qty: 360 TABLET | Refills: 1 | Status: SHIPPED | OUTPATIENT
Start: 2021-11-29 | End: 2022-04-26

## 2021-11-29 NOTE — TELEPHONE ENCOUNTER
Patient is out of warfarin. Refill sent. Prescription refilled per RN MedicationRefill Policy.................... Yoly Crowe RN ....................  11/29/2021   8:54 AM

## 2021-12-01 ENCOUNTER — ANTICOAGULATION THERAPY VISIT (OUTPATIENT)
Dept: ANTICOAGULATION | Facility: OTHER | Age: 51
End: 2021-12-01
Attending: PHYSICIAN ASSISTANT
Payer: COMMERCIAL

## 2021-12-01 ENCOUNTER — TRANSFERRED RECORDS (OUTPATIENT)
Dept: HEALTH INFORMATION MANAGEMENT | Facility: OTHER | Age: 51
End: 2021-12-01

## 2021-12-01 DIAGNOSIS — D68.51 FACTOR 5 LEIDEN MUTATION, HETEROZYGOUS (H): ICD-10-CM

## 2021-12-01 DIAGNOSIS — Z79.01 LONG TERM CURRENT USE OF ANTICOAGULANT THERAPY: ICD-10-CM

## 2021-12-01 DIAGNOSIS — I82.602 BLOOD CLOT OF VEIN IN SHOULDER AREA, LEFT: Primary | ICD-10-CM

## 2021-12-01 LAB — INR (EXTERNAL): 3.1 (ref 0.9–1.1)

## 2021-12-01 NOTE — PROGRESS NOTES
ANTICOAGULATION FOLLOW-UP CLINIC VISIT    Patient Name:  Daniela Ladd  Date:  12/1/2021  Contact Type:  no call needed patient to continue same dose    SUBJECTIVE:  Patient Findings         Clinical Outcomes     Negatives:  Major bleeding event, Thromboembolic event, Anticoagulation-related hospital admission, Anticoagulation-related ED visit, Anticoagulation-related fatality           OBJECTIVE    Recent labs: (last 7 days)     12/01/21  1040   INR 3.1*       ASSESSMENT / PLAN  INR assessment THER    Recheck INR In: 1 WEEK    INR Location Home INR      Anticoagulation Summary  As of 12/1/2021    INR goal:  2.5-3.5   TTR:  81.1 % (1 y)   INR used for dosing:  3.1 (12/1/2021)   Warfarin maintenance plan:  8 mg (2 mg x 4) every day   Full warfarin instructions:  8 mg every day   Weekly warfarin total:  56 mg   No change documented:  Yoly Crowe RN   Plan last modified:  Yoly Crowe RN (11/10/2021)   Next INR check:  12/8/2021   Priority:  High   Target end date:  Indefinite    Indications    Blood clot of vein in shoulder area  left [I82.602]  Factor 5 Leiden mutation  heterozygous (H) [D68.51]  Long-term (current) use of anticoagulants [Z79.01] [Z79.01]             Anticoagulation Episode Summary     INR check location:      Preferred lab:      Send INR reminders to:  [facility]    Comments:  Has home INR machine weekly INR needed         Anticoagulation Care Providers     Provider Role Specialty Phone number    Ling Trammell PA-C Referring Family Medicine [phone]            See the Encounter Report to view Anticoagulation Flowsheet and Dosing Calendar (Go to Encounters tab in chart review, and find the Anticoagulation Therapy Visit)        Yoly Crowe RN

## 2021-12-08 ENCOUNTER — TRANSFERRED RECORDS (OUTPATIENT)
Dept: HEALTH INFORMATION MANAGEMENT | Facility: OTHER | Age: 51
End: 2021-12-08

## 2021-12-08 ENCOUNTER — ANTICOAGULATION THERAPY VISIT (OUTPATIENT)
Dept: ANTICOAGULATION | Facility: OTHER | Age: 51
End: 2021-12-08
Attending: PHYSICIAN ASSISTANT
Payer: COMMERCIAL

## 2021-12-08 DIAGNOSIS — I82.602 BLOOD CLOT OF VEIN IN SHOULDER AREA, LEFT: Primary | ICD-10-CM

## 2021-12-08 DIAGNOSIS — Z79.01 LONG TERM CURRENT USE OF ANTICOAGULANT THERAPY: ICD-10-CM

## 2021-12-08 DIAGNOSIS — D68.51 FACTOR 5 LEIDEN MUTATION, HETEROZYGOUS (H): ICD-10-CM

## 2021-12-08 LAB — INR (EXTERNAL): 3.3 (ref 0.9–1.1)

## 2021-12-08 NOTE — PROGRESS NOTES
ANTICOAGULATION FOLLOW-UP CLINIC VISIT    Patient Name:  Daniela Ladd  Date:  12/8/2021  Contact Type:  no call needed patient to continue same dose    SUBJECTIVE:  Patient Findings         Clinical Outcomes     Negatives:  Major bleeding event, Thromboembolic event, Anticoagulation-related hospital admission, Anticoagulation-related ED visit, Anticoagulation-related fatality           OBJECTIVE    Recent labs: (last 7 days)     12/08/21  1258   INR 3.3*       ASSESSMENT / PLAN  INR assessment THER    Recheck INR In: 1 WEEK    INR Location Home INR      Anticoagulation Summary  As of 12/8/2021    INR goal:  2.5-3.5   TTR:  81.2 % (1 y)   INR used for dosing:  3.3 (12/8/2021)   Warfarin maintenance plan:  8 mg (2 mg x 4) every day   Full warfarin instructions:  8 mg every day   Weekly warfarin total:  56 mg   No change documented:  Yoly Crowe RN   Plan last modified:  Yoly Crowe RN (11/10/2021)   Next INR check:  12/15/2021   Priority:  High   Target end date:  Indefinite    Indications    Blood clot of vein in shoulder area  left [I82.602]  Factor 5 Leiden mutation  heterozygous (H) [D68.51]  Long-term (current) use of anticoagulants [Z79.01] [Z79.01]             Anticoagulation Episode Summary     INR check location:      Preferred lab:      Send INR reminders to:  [facility]    Comments:  Has home INR machine weekly INR needed         Anticoagulation Care Providers     Provider Role Specialty Phone number    Ling Trammell PA-C Referring Family Medicine [phone]            See the Encounter Report to view Anticoagulation Flowsheet and Dosing Calendar (Go to Encounters tab in chart review, and find the Anticoagulation Therapy Visit)        Yoly Crowe RN

## 2021-12-09 ENCOUNTER — TELEPHONE (OUTPATIENT)
Dept: FAMILY MEDICINE | Facility: OTHER | Age: 51
End: 2021-12-09
Payer: COMMERCIAL

## 2021-12-09 NOTE — TELEPHONE ENCOUNTER
My chart also sent with the link per patient request.    Bear Camacho LPN on 12/9/2021 at 9:46 AM     23-Jun-2021 12:08

## 2021-12-09 NOTE — TELEPHONE ENCOUNTER
I called the patient today and the patient reports having a headache, fever of 99.5 currently with the highest fever of 100.1 at 3 this morning.     The patient is on Warfarin and has clotting concerns. What can or should the patient do?     Bear Camacho LPN on 12/9/2021 at 8:21 AM

## 2021-12-09 NOTE — TELEPHONE ENCOUNTER
This is an [facility] patient and she has concerns after testing positive for covid this morning. The patient also has a blood clotting disorder and is on warfarin. Patient would like to know if there is something she should be doing while in quarantine.     Julianne Jasso on 12/9/2021 at 7:27 AM

## 2021-12-09 NOTE — TELEPHONE ENCOUNTER
She should avoid using ibuprofen or naproxen for her fever given that she is on warfarin.  Tylenol is ok to use.  Would recommend that she use the link below to register for monoclonal antibody infusion to see if she would qualify.  This would hopefully make her illness less severe.  If she develops any significant shortness of breath, dehydration symptoms or anything else that worries her, she should be seen.    [email or web address]      Freda Thao MD

## 2021-12-10 ENCOUNTER — ALLIED HEALTH/NURSE VISIT (OUTPATIENT)
Dept: FAMILY MEDICINE | Facility: OTHER | Age: 51
End: 2021-12-10
Attending: FAMILY MEDICINE
Payer: COMMERCIAL

## 2021-12-10 ENCOUNTER — TELEPHONE (OUTPATIENT)
Dept: FAMILY MEDICINE | Facility: OTHER | Age: 51
End: 2021-12-10
Payer: COMMERCIAL

## 2021-12-10 DIAGNOSIS — Z20.822 COVID-19 RULED OUT: Primary | ICD-10-CM

## 2021-12-10 PROCEDURE — U0003 INFECTIOUS AGENT DETECTION BY NUCLEIC ACID (DNA OR RNA); SEVERE ACUTE RESPIRATORY SYNDROME CORONAVIRUS 2 (SARS-COV-2) (CORONAVIRUS DISEASE [COVID-19]), AMPLIFIED PROBE TECHNIQUE, MAKING USE OF HIGH THROUGHPUT TECHNOLOGIES AS DESCRIBED BY CMS-2020-01-R: HCPCS | Mod: ZL

## 2021-12-10 PROCEDURE — C9803 HOPD COVID-19 SPEC COLLECT: HCPCS

## 2021-12-10 NOTE — PROGRESS NOTES
Patient swabbed for COVID-19 testing.  Tested at home positive twice.  Kim Bee MA on 12/10/2021 at 2:06 PM

## 2021-12-10 NOTE — TELEPHONE ENCOUNTER
Patient was instructed to come in and get a rapid covid test in order to get her antibody treatment. The facility that is setting up the test stated they wouldn't take her at home results.

## 2021-12-10 NOTE — TELEPHONE ENCOUNTER
Patient informed we do not have rapid tests. She asked if anywhere in [place] does. Writer informed patient she is not sure. She could call pharmacies and check with them. Patient asked to schedule a covid test. She was informed results take 48-72 hours. She was transferred to scheduling but hung up before being transferred.     Daysi Gross CMA on 12/10/2021 at 10:34 AM

## 2021-12-11 ENCOUNTER — TRANSFERRED RECORDS (OUTPATIENT)
Dept: HEALTH INFORMATION MANAGEMENT | Facility: OTHER | Age: 51
End: 2021-12-11
Payer: COMMERCIAL

## 2021-12-11 LAB — INR (EXTERNAL): 2.5 (ref 2.5–3.5)

## 2021-12-12 LAB — SARS-COV-2 RNA RESP QL NAA+PROBE: POSITIVE

## 2021-12-13 ENCOUNTER — ANTICOAGULATION THERAPY VISIT (OUTPATIENT)
Dept: ANTICOAGULATION | Facility: OTHER | Age: 51
End: 2021-12-13
Attending: PHYSICIAN ASSISTANT
Payer: COMMERCIAL

## 2021-12-13 ENCOUNTER — TRANSFERRED RECORDS (OUTPATIENT)
Dept: HEALTH INFORMATION MANAGEMENT | Facility: OTHER | Age: 51
End: 2021-12-13

## 2021-12-13 ENCOUNTER — TELEPHONE (OUTPATIENT)
Dept: FAMILY MEDICINE | Facility: OTHER | Age: 51
End: 2021-12-13
Payer: COMMERCIAL

## 2021-12-13 DIAGNOSIS — I82.602 BLOOD CLOT OF VEIN IN SHOULDER AREA, LEFT: Primary | ICD-10-CM

## 2021-12-13 DIAGNOSIS — D68.51 FACTOR 5 LEIDEN MUTATION, HETEROZYGOUS (H): ICD-10-CM

## 2021-12-13 DIAGNOSIS — Z79.01 LONG TERM CURRENT USE OF ANTICOAGULANT THERAPY: ICD-10-CM

## 2021-12-13 DIAGNOSIS — J01.90 ACUTE NON-RECURRENT SINUSITIS, UNSPECIFIED LOCATION: Primary | ICD-10-CM

## 2021-12-13 LAB — INR (EXTERNAL): 3.7 (ref 0.9–1.1)

## 2021-12-13 RX ORDER — CEFDINIR 300 MG/1
300 CAPSULE ORAL 2 TIMES DAILY
Qty: 20 CAPSULE | Refills: 0 | Status: SHIPPED | OUTPATIENT
Start: 2021-12-13 | End: 2021-12-23

## 2021-12-13 NOTE — TELEPHONE ENCOUNTER
I called the patient and I relayed Ling Trammell's PA message. The patient stated they understood.    The patient states she has a testing device to test her INR. She stated she will call her INR nurse with the results.    Bear Camacho LPN on 12/13/2021 at 11:14 AM

## 2021-12-13 NOTE — PROGRESS NOTES
ANTICOAGULATION FOLLOW-UP CLINIC VISIT    Patient Name:  Daniela Ladd  Date:  12/13/2021  Contact Type:  Telephone/ spoke with patient reviewed dosing    SUBJECTIVE:  Patient Findings     Positives:  Change in diet/appetite (not eating well), Other complaints (dx COVID pos)        Clinical Outcomes     Negatives:  Major bleeding event, Thromboembolic event, Anticoagulation-related hospital admission, Anticoagulation-related ED visit, Anticoagulation-related fatality           OBJECTIVE    Recent labs: (last 7 days)     12/13/21  1319   INR 3.7*       ASSESSMENT / PLAN  INR assessment SUPRA    Recheck INR In: 4 DAYS    INR Location Home INR      Anticoagulation Summary  As of 12/13/2021    INR goal:  2.5-3.5   TTR:  80.5 % (1 y)   INR used for dosing:  3.7 (12/13/2021)   Warfarin maintenance plan:  8 mg (2 mg x 4) every day   Full warfarin instructions:  12/13: 4 mg; Otherwise 8 mg every day   Weekly warfarin total:  56 mg   Plan last modified:  Yoly Crowe RN (11/10/2021)   Next INR check:  12/16/2021   Priority:  High   Target end date:  Indefinite    Indications    Blood clot of vein in shoulder area  left [I82.602]  Factor 5 Leiden mutation  heterozygous (H) [D68.51]  Long-term (current) use of anticoagulants [Z79.01] [Z79.01]             Anticoagulation Episode Summary     INR check location:      Preferred lab:      Send INR reminders to:  [facility]    Comments:  Has home INR machine weekly INR needed         Anticoagulation Care Providers     Provider Role Specialty Phone number    Ling Trammell PA-C Referring Family Medicine [phone]            See the Encounter Report to view Anticoagulation Flowsheet and Dosing Calendar (Go to Encounters tab in chart review, and find the Anticoagulation Therapy Visit)        Yoly Crowe, RN

## 2021-12-13 NOTE — TELEPHONE ENCOUNTER
Cefdinir antibiotic was sent to the pharmacy for treatment of an acute sinus infection.  Cefdinir can affect your warfarin level.  I would recommend coming in for an INR recheck appointment next week to have your level checked with the anticoagulation clinic.  Ling Trammell PA-C.......... 12/13/2021 11:03 AM

## 2021-12-13 NOTE — TELEPHONE ENCOUNTER
Please call the patient back.  She would like an RX  For a sinus condition she has.      Ling Liu on 12/13/2021 at 9:38 AM

## 2021-12-13 NOTE — TELEPHONE ENCOUNTER
Patient is wondering about a prescription for sinus infection problem. States she has covid, on Friday she got antibody treatment. Her covid symptoms have improved but now having horrible sinus pain and pressure. States has been using OTC neti pot, sinus rinse, Flonase, tylenol.     States she is on blood thinners and usually get prescribed Cefdinir that it doesn't effect her INR.   With having covid, didn't know if she should or could come in to be seen.  Sima Ventura LPN .............12/13/2021     10:35 AM

## 2021-12-16 ENCOUNTER — ANTICOAGULATION THERAPY VISIT (OUTPATIENT)
Dept: ANTICOAGULATION | Facility: OTHER | Age: 51
End: 2021-12-16
Attending: PHYSICIAN ASSISTANT
Payer: COMMERCIAL

## 2021-12-16 ENCOUNTER — TRANSFERRED RECORDS (OUTPATIENT)
Dept: HEALTH INFORMATION MANAGEMENT | Facility: OTHER | Age: 51
End: 2021-12-16

## 2021-12-16 DIAGNOSIS — Z79.01 LONG TERM CURRENT USE OF ANTICOAGULANT THERAPY: ICD-10-CM

## 2021-12-16 DIAGNOSIS — D68.51 FACTOR 5 LEIDEN MUTATION, HETEROZYGOUS (H): ICD-10-CM

## 2021-12-16 DIAGNOSIS — I82.602 BLOOD CLOT OF VEIN IN SHOULDER AREA, LEFT: Primary | ICD-10-CM

## 2021-12-16 LAB — INR (EXTERNAL): 3 (ref 0.9–1.1)

## 2021-12-16 NOTE — PROGRESS NOTES
ANTICOAGULATION FOLLOW-UP CLINIC VISIT    Patient Name:  Daniela Ladd  Date:  12/16/2021  Contact Type:  jolene    SUBJECTIVE:  Patient Findings     Positives:  Change in medications (on Cefdinir )        Clinical Outcomes     Negatives:  Major bleeding event, Thromboembolic event, Anticoagulation-related hospital admission, Anticoagulation-related ED visit, Anticoagulation-related fatality           OBJECTIVE    Recent labs: (last 7 days)     12/16/21  1247   INR 3.0*       ASSESSMENT / PLAN  INR assessment THER    Recheck INR In: 1 WEEK    INR Location Home INR      Anticoagulation Summary  As of 12/16/2021    INR goal:  2.5-3.5   TTR:  81.2 % (1 y)   INR used for dosing:  3.0 (12/16/2021)   Warfarin maintenance plan:  8 mg (2 mg x 4) every day   Full warfarin instructions:  8 mg every day   Weekly warfarin total:  56 mg   No change documented:  Jo-Ann Monteiro RN   Plan last modified:  Yoly Crowe RN (11/10/2021)   Next INR check:  12/23/2021   Priority:  High   Target end date:  Indefinite    Indications    Blood clot of vein in shoulder area  left [I82.602]  Factor 5 Leiden mutation  heterozygous (H) [D68.51]  Long-term (current) use of anticoagulants [Z79.01] [Z79.01]             Anticoagulation Episode Summary     INR check location:      Preferred lab:      Send INR reminders to:  [facility]    Comments:  Has home INR machine weekly INR needed         Anticoagulation Care Providers     Provider Role Specialty Phone number    Ling Trammell PA-C Referring Family Medicine [phone]            See the Encounter Report to view Anticoagulation Flowsheet and Dosing Calendar (Go to Encounters tab in chart review, and find the Anticoagulation Therapy Visit)    No call needed, patient to continue same dose.     Jo-Ann Monteiro, RN

## 2021-12-22 ENCOUNTER — TRANSFERRED RECORDS (OUTPATIENT)
Dept: HEALTH INFORMATION MANAGEMENT | Facility: OTHER | Age: 51
End: 2021-12-22
Payer: COMMERCIAL

## 2021-12-22 LAB — INR (EXTERNAL): 3.3 (ref 0.9–1.1)

## 2021-12-23 ENCOUNTER — ANTICOAGULATION THERAPY VISIT (OUTPATIENT)
Dept: ANTICOAGULATION | Facility: OTHER | Age: 51
End: 2021-12-23
Attending: PHYSICIAN ASSISTANT
Payer: COMMERCIAL

## 2021-12-23 DIAGNOSIS — D68.51 FACTOR 5 LEIDEN MUTATION, HETEROZYGOUS (H): ICD-10-CM

## 2021-12-23 DIAGNOSIS — I82.602 BLOOD CLOT OF VEIN IN SHOULDER AREA, LEFT: Primary | ICD-10-CM

## 2021-12-23 DIAGNOSIS — Z79.01 LONG TERM CURRENT USE OF ANTICOAGULANT THERAPY: ICD-10-CM

## 2021-12-23 NOTE — PROGRESS NOTES
ANTICOAGULATION FOLLOW-UP CLINIC VISIT    Patient Name:  Daniela Ladd  Date:  12/23/2021  Contact Type:  no call needed patient to continue same dose    SUBJECTIVE:  Patient Findings         Clinical Outcomes     Negatives:  Major bleeding event, Thromboembolic event, Anticoagulation-related hospital admission, Anticoagulation-related ED visit, Anticoagulation-related fatality           OBJECTIVE    Recent labs: (last 7 days)     12/22/21  1600   INR 3.3*       ASSESSMENT / PLAN  INR assessment THER    Recheck INR In: 1 WEEK    INR Location Home INR      Anticoagulation Summary  As of 12/23/2021    INR goal:  2.5-3.5   TTR:  81.5 % (1 y)   INR used for dosing:  3.3 (12/22/2021)   Warfarin maintenance plan:  8 mg (2 mg x 4) every day   Full warfarin instructions:  8 mg every day   Weekly warfarin total:  56 mg   No change documented:  Yoly Crowe RN   Plan last modified:  Yoly Crowe RN (11/10/2021)   Next INR check:  12/30/2021   Priority:  High   Target end date:  Indefinite    Indications    Blood clot of vein in shoulder area  left [I82.602]  Factor 5 Leiden mutation  heterozygous (H) [D68.51]  Long-term (current) use of anticoagulants [Z79.01] [Z79.01]             Anticoagulation Episode Summary     INR check location:      Preferred lab:      Send INR reminders to:  [facility]    Comments:  Has home INR machine weekly INR needed         Anticoagulation Care Providers     Provider Role Specialty Phone number    Ling Trammell PA-C Referring Family Medicine [phone]            See the Encounter Report to view Anticoagulation Flowsheet and Dosing Calendar (Go to Encounters tab in chart review, and find the Anticoagulation Therapy Visit)        Yoly Crowe RN

## 2021-12-29 ENCOUNTER — TRANSFERRED RECORDS (OUTPATIENT)
Dept: HEALTH INFORMATION MANAGEMENT | Facility: OTHER | Age: 51
End: 2021-12-29
Payer: COMMERCIAL

## 2021-12-29 LAB — INR (EXTERNAL): 3.2 (ref 0.9–1.1)

## 2021-12-30 ENCOUNTER — ANTICOAGULATION THERAPY VISIT (OUTPATIENT)
Dept: ANTICOAGULATION | Facility: OTHER | Age: 51
End: 2021-12-30
Attending: PHYSICIAN ASSISTANT
Payer: COMMERCIAL

## 2021-12-30 DIAGNOSIS — D68.51 FACTOR 5 LEIDEN MUTATION, HETEROZYGOUS (H): ICD-10-CM

## 2021-12-30 DIAGNOSIS — Z79.01 LONG TERM CURRENT USE OF ANTICOAGULANT THERAPY: ICD-10-CM

## 2021-12-30 DIAGNOSIS — I82.602 BLOOD CLOT OF VEIN IN SHOULDER AREA, LEFT: Primary | ICD-10-CM

## 2021-12-30 NOTE — PROGRESS NOTES
ANTICOAGULATION FOLLOW-UP CLINIC VISIT    Patient Name:  Daniela Ladd  Date:  12/30/2021  Contact Type:  no call needed patient to continue same dose    SUBJECTIVE:  Patient Findings         Clinical Outcomes     Negatives:  Major bleeding event, Thromboembolic event, Anticoagulation-related hospital admission, Anticoagulation-related ED visit, Anticoagulation-related fatality           OBJECTIVE    Recent labs: (last 7 days)     12/29/21  1630   INR 3.2*       ASSESSMENT / PLAN  INR assessment THER    Recheck INR In: 1 WEEK    INR Location Home INR      Anticoagulation Summary  As of 12/30/2021    INR goal:  2.5-3.5   TTR:  81.5 % (1 y)   INR used for dosing:  3.2 (12/29/2021)   Warfarin maintenance plan:  8 mg (2 mg x 4) every day   Full warfarin instructions:  8 mg every day   Weekly warfarin total:  56 mg   No change documented:  Yoly Crowe RN   Plan last modified:  Yoly Crowe RN (11/10/2021)   Next INR check:  1/6/2022   Priority:  High   Target end date:  Indefinite    Indications    Blood clot of vein in shoulder area  left [I82.602]  Factor 5 Leiden mutation  heterozygous (H) [D68.51]  Long-term (current) use of anticoagulants [Z79.01] [Z79.01]             Anticoagulation Episode Summary     INR check location:      Preferred lab:      Send INR reminders to:  [facility]    Comments:  Has home INR machine weekly INR needed         Anticoagulation Care Providers     Provider Role Specialty Phone number    Ling Trammell PA-C Referring Family Medicine [phone]            See the Encounter Report to view Anticoagulation Flowsheet and Dosing Calendar (Go to Encounters tab in chart review, and find the Anticoagulation Therapy Visit)        Yoly Crowe RN

## 2022-01-05 ENCOUNTER — ANTICOAGULATION THERAPY VISIT (OUTPATIENT)
Dept: ANTICOAGULATION | Facility: OTHER | Age: 52
End: 2022-01-05
Attending: PHYSICIAN ASSISTANT
Payer: COMMERCIAL

## 2022-01-05 ENCOUNTER — TRANSFERRED RECORDS (OUTPATIENT)
Dept: HEALTH INFORMATION MANAGEMENT | Facility: OTHER | Age: 52
End: 2022-01-05

## 2022-01-05 DIAGNOSIS — I82.602 BLOOD CLOT OF VEIN IN SHOULDER AREA, LEFT: Primary | ICD-10-CM

## 2022-01-05 DIAGNOSIS — D68.51 FACTOR 5 LEIDEN MUTATION, HETEROZYGOUS (H): ICD-10-CM

## 2022-01-05 DIAGNOSIS — Z79.01 LONG TERM CURRENT USE OF ANTICOAGULANT THERAPY: ICD-10-CM

## 2022-01-05 LAB — INR (EXTERNAL): 3.2 (ref 0.9–1.1)

## 2022-01-05 NOTE — PROGRESS NOTES
ANTICOAGULATION FOLLOW-UP CLINIC VISIT    Patient Name:  Daniela Ladd  Date:  1/5/2022  Contact Type:  no call needed patient to continue same dose    SUBJECTIVE:  Patient Findings         Clinical Outcomes     Negatives:  Major bleeding event, Thromboembolic event, Anticoagulation-related hospital admission, Anticoagulation-related ED visit, Anticoagulation-related fatality           OBJECTIVE    Recent labs: (last 7 days)     01/05/22  1304   INR 3.2*       ASSESSMENT / PLAN  INR assessment THER    Recheck INR In: 1 WEEK    INR Location Home INR      Anticoagulation Summary  As of 1/5/2022    INR goal:  2.5-3.5   TTR:  81.5 % (1 y)   INR used for dosing:  3.2 (1/5/2022)   Warfarin maintenance plan:  8 mg (2 mg x 4) every day   Full warfarin instructions:  8 mg every day   Weekly warfarin total:  56 mg   No change documented:  Yoly Crowe RN   Plan last modified:  Yoly Crowe RN (11/10/2021)   Next INR check:  1/12/2022   Priority:  High   Target end date:  Indefinite    Indications    Blood clot of vein in shoulder area  left [I82.602]  Factor 5 Leiden mutation  heterozygous (H) [D68.51]  Long-term (current) use of anticoagulants [Z79.01] [Z79.01]             Anticoagulation Episode Summary     INR check location:      Preferred lab:      Send INR reminders to:  [facility]    Comments:  Has home INR machine weekly INR needed         Anticoagulation Care Providers     Provider Role Specialty Phone number    Ling Trammell PA-C Referring Family Medicine [phone]            See the Encounter Report to view Anticoagulation Flowsheet and Dosing Calendar (Go to Encounters tab in chart review, and find the Anticoagulation Therapy Visit)        Yoly Crowe RN

## 2022-01-12 ENCOUNTER — TRANSFERRED RECORDS (OUTPATIENT)
Dept: HEALTH INFORMATION MANAGEMENT | Facility: OTHER | Age: 52
End: 2022-01-12

## 2022-01-12 ENCOUNTER — ANTICOAGULATION THERAPY VISIT (OUTPATIENT)
Dept: ANTICOAGULATION | Facility: OTHER | Age: 52
End: 2022-01-12
Attending: PHYSICIAN ASSISTANT
Payer: COMMERCIAL

## 2022-01-12 DIAGNOSIS — Z79.01 LONG TERM CURRENT USE OF ANTICOAGULANT THERAPY: ICD-10-CM

## 2022-01-12 DIAGNOSIS — D68.51 FACTOR 5 LEIDEN MUTATION, HETEROZYGOUS (H): ICD-10-CM

## 2022-01-12 DIAGNOSIS — I82.602 BLOOD CLOT OF VEIN IN SHOULDER AREA, LEFT: Primary | ICD-10-CM

## 2022-01-12 LAB — INR (EXTERNAL): 3.3 (ref 0.9–1.1)

## 2022-01-12 NOTE — PROGRESS NOTES
ANTICOAGULATION FOLLOW-UP CLINIC VISIT    Patient Name:  Daniela Ladd  Date:  1/12/2022  Contact Type:  no call needed patient to continue same dose    SUBJECTIVE:  Patient Findings         Clinical Outcomes     Negatives:  Major bleeding event, Thromboembolic event, Anticoagulation-related hospital admission, Anticoagulation-related ED visit, Anticoagulation-related fatality           OBJECTIVE    Recent labs: (last 7 days)     01/12/22  1033   INR 3.3*       ASSESSMENT / PLAN  INR assessment THER    Recheck INR In: 1 WEEK    INR Location Home INR      Anticoagulation Summary  As of 1/12/2022    INR goal:  2.5-3.5   TTR:  81.5 % (1 y)   INR used for dosing:  3.3 (1/12/2022)   Warfarin maintenance plan:  8 mg (2 mg x 4) every day   Full warfarin instructions:  8 mg every day   Weekly warfarin total:  56 mg   No change documented:  Yoly Crowe RN   Plan last modified:  Yoly Crowe RN (11/10/2021)   Next INR check:  1/19/2022   Priority:  High   Target end date:  Indefinite    Indications    Blood clot of vein in shoulder area  left [I82.602]  Factor 5 Leiden mutation  heterozygous (H) [D68.51]  Long-term (current) use of anticoagulants [Z79.01] [Z79.01]             Anticoagulation Episode Summary     INR check location:      Preferred lab:      Send INR reminders to:  [facility]    Comments:  Has home INR machine weekly INR needed         Anticoagulation Care Providers     Provider Role Specialty Phone number    Ling Trammell PA-C Referring Family Medicine [phone]            See the Encounter Report to view Anticoagulation Flowsheet and Dosing Calendar (Go to Encounters tab in chart review, and find the Anticoagulation Therapy Visit)        Yoly Crowe RN

## 2022-01-20 ENCOUNTER — TRANSFERRED RECORDS (OUTPATIENT)
Dept: HEALTH INFORMATION MANAGEMENT | Facility: OTHER | Age: 52
End: 2022-01-20

## 2022-01-20 ENCOUNTER — ANTICOAGULATION THERAPY VISIT (OUTPATIENT)
Dept: ANTICOAGULATION | Facility: OTHER | Age: 52
End: 2022-01-20
Attending: PHYSICIAN ASSISTANT
Payer: COMMERCIAL

## 2022-01-20 DIAGNOSIS — I82.602 BLOOD CLOT OF VEIN IN SHOULDER AREA, LEFT: Primary | ICD-10-CM

## 2022-01-20 DIAGNOSIS — Z79.01 LONG TERM CURRENT USE OF ANTICOAGULANT THERAPY: ICD-10-CM

## 2022-01-20 DIAGNOSIS — D68.51 FACTOR 5 LEIDEN MUTATION, HETEROZYGOUS (H): ICD-10-CM

## 2022-01-20 LAB — INR (EXTERNAL): 2.9 (ref 0.9–1.1)

## 2022-01-20 NOTE — PROGRESS NOTES
ANTICOAGULATION FOLLOW-UP CLINIC VISIT    Patient Name:  Daniela Ladd  Date:  2022  Contact Type:  no call needed patient to continue same dose    SUBJECTIVE:  Patient Findings         Clinical Outcomes     Negatives:  Major bleeding event, Thromboembolic event, Anticoagulation-related hospital admission, Anticoagulation-related ED visit, Anticoagulation-related fatality           OBJECTIVE    Recent labs: (last 7 days)     22  0932   INR 2.9*       ASSESSMENT / PLAN  INR assessment THER    Recheck INR In: 1 WEEK    INR Location Home INR      Anticoagulation Summary  As of 2022    INR goal:  2.5-3.5   TTR:  81.5 % (1 y)   INR used for dosin.9 (2022)   Warfarin maintenance plan:  8 mg (2 mg x 4) every day   Full warfarin instructions:  8 mg every day   Weekly warfarin total:  56 mg   No change documented:  Yoly Crowe RN   Plan last modified:  Yoly Crowe RN (11/10/2021)   Next INR check:  2022   Priority:  High   Target end date:  Indefinite    Indications    Blood clot of vein in shoulder area  left [I82.602]  Factor 5 Leiden mutation  heterozygous (H) [D68.51]  Long-term (current) use of anticoagulants [Z79.01] [Z79.01]             Anticoagulation Episode Summary     INR check location:      Preferred lab:      Send INR reminders to:  [facility]    Comments:  Has home INR machine weekly INR needed         Anticoagulation Care Providers     Provider Role Specialty Phone number    Ling Trammell PA-C Referring Family Medicine [phone]            See the Encounter Report to view Anticoagulation Flowsheet and Dosing Calendar (Go to Encounters tab in chart review, and find the Anticoagulation Therapy Visit)        Yoly Crowe RN

## 2022-01-26 ENCOUNTER — ANTICOAGULATION THERAPY VISIT (OUTPATIENT)
Dept: ANTICOAGULATION | Facility: OTHER | Age: 52
End: 2022-01-26
Attending: PHYSICIAN ASSISTANT
Payer: COMMERCIAL

## 2022-01-26 ENCOUNTER — TRANSFERRED RECORDS (OUTPATIENT)
Dept: HEALTH INFORMATION MANAGEMENT | Facility: OTHER | Age: 52
End: 2022-01-26

## 2022-01-26 DIAGNOSIS — Z79.01 LONG TERM CURRENT USE OF ANTICOAGULANT THERAPY: ICD-10-CM

## 2022-01-26 DIAGNOSIS — D68.51 FACTOR 5 LEIDEN MUTATION, HETEROZYGOUS (H): ICD-10-CM

## 2022-01-26 DIAGNOSIS — I82.602 BLOOD CLOT OF VEIN IN SHOULDER AREA, LEFT: Primary | ICD-10-CM

## 2022-01-26 LAB — INR (EXTERNAL): 2.7 (ref 0.9–1.1)

## 2022-01-26 NOTE — PROGRESS NOTES
ANTICOAGULATION FOLLOW-UP CLINIC VISIT    Patient Name:  Daniela Ladd  Date:  2022  Contact Type:  home monitor, no call needed, patient to continue same dose    SUBJECTIVE:  Patient Findings         Clinical Outcomes     Negatives:  Major bleeding event, Thromboembolic event, Anticoagulation-related hospital admission, Anticoagulation-related ED visit, Anticoagulation-related fatality           OBJECTIVE    Recent labs: (last 7 days)     22  0938   INR 2.7*       ASSESSMENT / PLAN  INR assessment THER    Recheck INR In: 1 WEEK    INR Location Home INR      Anticoagulation Summary  As of 2022    INR goal:  2.5-3.5   TTR:  81.5 % (1 y)   INR used for dosin.7 (2022)   Warfarin maintenance plan:  8 mg (2 mg x 4) every day   Full warfarin instructions:  8 mg every day   Weekly warfarin total:  56 mg   Plan last modified:  Yoly Crowe RN (11/10/2021)   Next INR check:  2022   Priority:  High   Target end date:  Indefinite    Indications    Blood clot of vein in shoulder area  left [I82.602]  Factor 5 Leiden mutation  heterozygous (H) [D68.51]  Long-term (current) use of anticoagulants [Z79.01] [Z79.01]             Anticoagulation Episode Summary     INR check location:      Preferred lab:      Send INR reminders to:  [facility]    Comments:  Has home INR machine weekly INR needed         Anticoagulation Care Providers     Provider Role Specialty Phone number    Valeri Ling Rodriguez PA-C Referring Family Medicine [phone]            See the Encounter Report to view Anticoagulation Flowsheet and Dosing Calendar (Go to Encounters tab in chart review, and find the Anticoagulation Therapy Visit)        Glenda Duran RN

## 2022-01-29 ENCOUNTER — HEALTH MAINTENANCE LETTER (OUTPATIENT)
Age: 52
End: 2022-01-29

## 2022-02-02 ENCOUNTER — ANTICOAGULATION THERAPY VISIT (OUTPATIENT)
Dept: ANTICOAGULATION | Facility: OTHER | Age: 52
End: 2022-02-02
Attending: PHYSICIAN ASSISTANT
Payer: COMMERCIAL

## 2022-02-02 ENCOUNTER — TRANSFERRED RECORDS (OUTPATIENT)
Dept: HEALTH INFORMATION MANAGEMENT | Facility: OTHER | Age: 52
End: 2022-02-02

## 2022-02-02 DIAGNOSIS — I82.602 BLOOD CLOT OF VEIN IN SHOULDER AREA, LEFT: Primary | ICD-10-CM

## 2022-02-02 DIAGNOSIS — D68.51 FACTOR 5 LEIDEN MUTATION, HETEROZYGOUS (H): ICD-10-CM

## 2022-02-02 DIAGNOSIS — Z79.01 LONG TERM CURRENT USE OF ANTICOAGULANT THERAPY: ICD-10-CM

## 2022-02-02 LAB — INR (EXTERNAL): 2.6 (ref 0.9–1.1)

## 2022-02-02 NOTE — PROGRESS NOTES
ANTICOAGULATION FOLLOW-UP CLINIC VISIT    Patient Name:  Daniela Ladd  Date:  2022  Contact Type:  no call needed patient to continue same dose    SUBJECTIVE:  Patient Findings         Clinical Outcomes     Negatives:  Major bleeding event, Thromboembolic event, Anticoagulation-related hospital admission, Anticoagulation-related ED visit, Anticoagulation-related fatality           OBJECTIVE    Recent labs: (last 7 days)     22  0912   INR 2.6*       ASSESSMENT / PLAN  INR assessment THER    Recheck INR In: 1 WEEK    INR Location Home INR      Anticoagulation Summary  As of 2022    INR goal:  2.5-3.5   TTR:  81.5 % (1 y)   INR used for dosin.6 (2022)   Warfarin maintenance plan:  8 mg (2 mg x 4) every day   Full warfarin instructions:  8 mg every day   Weekly warfarin total:  56 mg   No change documented:  Yoly Crowe RN   Plan last modified:  Yoly Crowe RN (11/10/2021)   Next INR check:  2022   Priority:  High   Target end date:  Indefinite    Indications    Blood clot of vein in shoulder area  left [I82.602]  Factor 5 Leiden mutation  heterozygous (H) [D68.51]  Long-term (current) use of anticoagulants [Z79.01] [Z79.01]             Anticoagulation Episode Summary     INR check location:      Preferred lab:      Send INR reminders to:  [facility]    Comments:  Has home INR machine weekly INR needed         Anticoagulation Care Providers     Provider Role Specialty Phone number    Ling Trammell PA-C Referring Family Medicine [phone]            See the Encounter Report to view Anticoagulation Flowsheet and Dosing Calendar (Go to Encounters tab in chart review, and find the Anticoagulation Therapy Visit)        Yoly Crowe RN

## 2022-02-09 ENCOUNTER — TRANSFERRED RECORDS (OUTPATIENT)
Dept: HEALTH INFORMATION MANAGEMENT | Facility: OTHER | Age: 52
End: 2022-02-09

## 2022-02-09 ENCOUNTER — ANTICOAGULATION THERAPY VISIT (OUTPATIENT)
Dept: ANTICOAGULATION | Facility: OTHER | Age: 52
End: 2022-02-09
Attending: PHYSICIAN ASSISTANT
Payer: COMMERCIAL

## 2022-02-09 DIAGNOSIS — I82.602 BLOOD CLOT OF VEIN IN SHOULDER AREA, LEFT: Primary | ICD-10-CM

## 2022-02-09 DIAGNOSIS — D68.51 FACTOR 5 LEIDEN MUTATION, HETEROZYGOUS (H): ICD-10-CM

## 2022-02-09 DIAGNOSIS — Z79.01 LONG TERM CURRENT USE OF ANTICOAGULANT THERAPY: ICD-10-CM

## 2022-02-09 LAB — INR (EXTERNAL): 2.6 (ref 0.9–1.1)

## 2022-02-09 NOTE — PROGRESS NOTES
ANTICOAGULATION FOLLOW-UP CLINIC VISIT    Patient Name:  Daniela Ladd  Date:  2022  Contact Type:  no call needed patient to continue same dose    SUBJECTIVE:  Patient Findings         Clinical Outcomes     Negatives:  Major bleeding event, Thromboembolic event, Anticoagulation-related hospital admission, Anticoagulation-related ED visit, Anticoagulation-related fatality           OBJECTIVE    Recent labs: (last 7 days)     22  0930   INR 2.6*       ASSESSMENT / PLAN  INR assessment THER    Recheck INR In: 1 WEEK    INR Location Home INR      Anticoagulation Summary  As of 2022    INR goal:  2.5-3.5   TTR:  81.5 % (1 y)   INR used for dosin.6 (2022)   Warfarin maintenance plan:  8 mg (2 mg x 4) every day   Full warfarin instructions:  8 mg every day   Weekly warfarin total:  56 mg   No change documented:  Yoly Crowe RN   Plan last modified:  Yoly Crowe RN (11/10/2021)   Next INR check:  2022   Priority:  High   Target end date:  Indefinite    Indications    Blood clot of vein in shoulder area  left [I82.602]  Factor 5 Leiden mutation  heterozygous (H) [D68.51]  Long-term (current) use of anticoagulants [Z79.01] [Z79.01]             Anticoagulation Episode Summary     INR check location:      Preferred lab:      Send INR reminders to:  [facility]    Comments:  Has home INR machine weekly INR needed         Anticoagulation Care Providers     Provider Role Specialty Phone number    Ling Trammell PA-C Referring Family Medicine [phone]            See the Encounter Report to view Anticoagulation Flowsheet and Dosing Calendar (Go to Encounters tab in chart review, and find the Anticoagulation Therapy Visit)        Yoly Crowe RN

## 2022-02-16 ENCOUNTER — TRANSFERRED RECORDS (OUTPATIENT)
Dept: HEALTH INFORMATION MANAGEMENT | Facility: OTHER | Age: 52
End: 2022-02-16

## 2022-02-16 ENCOUNTER — ANTICOAGULATION THERAPY VISIT (OUTPATIENT)
Dept: ANTICOAGULATION | Facility: OTHER | Age: 52
End: 2022-02-16
Attending: PHYSICIAN ASSISTANT
Payer: COMMERCIAL

## 2022-02-16 DIAGNOSIS — Z79.01 LONG TERM CURRENT USE OF ANTICOAGULANT THERAPY: ICD-10-CM

## 2022-02-16 DIAGNOSIS — I82.602 BLOOD CLOT OF VEIN IN SHOULDER AREA, LEFT: Primary | ICD-10-CM

## 2022-02-16 DIAGNOSIS — D68.51 FACTOR 5 LEIDEN MUTATION, HETEROZYGOUS (H): ICD-10-CM

## 2022-02-16 LAB — INR (EXTERNAL): 2.7 (ref 0.9–1.1)

## 2022-02-16 NOTE — PROGRESS NOTES
ANTICOAGULATION FOLLOW-UP CLINIC VISIT    Patient Name:  Daniela Ladd  Date:  2022  Contact Type:  no call needed patient to continue previous dose    SUBJECTIVE:  Patient Findings         Clinical Outcomes     Negatives:  Major bleeding event, Thromboembolic event, Anticoagulation-related hospital admission, Anticoagulation-related ED visit, Anticoagulation-related fatality           OBJECTIVE    Recent labs: (last 7 days)     22  0915   INR 2.7*       ASSESSMENT / PLAN  INR assessment THER    Recheck INR In: 1 WEEK    INR Location Home INR      Anticoagulation Summary  As of 2022    INR goal:  2.5-3.5   TTR:  81.5 % (1 y)   INR used for dosin.7 (2022)   Warfarin maintenance plan:  8 mg (2 mg x 4) every day   Full warfarin instructions:  8 mg every day   Weekly warfarin total:  56 mg   Plan last modified:  Yoly Crowe RN (11/10/2021)   Next INR check:  2022   Priority:  High   Target end date:  Indefinite    Indications    Blood clot of vein in shoulder area  left [I82.602]  Factor 5 Leiden mutation  heterozygous (H) [D68.51]  Long-term (current) use of anticoagulants [Z79.01] [Z79.01]             Anticoagulation Episode Summary     INR check location:      Preferred lab:      Send INR reminders to:  [facility]    Comments:  Has home INR machine weekly INR needed         Anticoagulation Care Providers     Provider Role Specialty Phone number    PENELOPEglenda, Ling Rodriguez PA-C Referring Family Medicine [phone]            See the Encounter Report to view Anticoagulation Flowsheet and Dosing Calendar (Go to Encounters tab in chart review, and find the Anticoagulation Therapy Visit)        Kailee Parikh RN

## 2022-02-23 ENCOUNTER — TRANSFERRED RECORDS (OUTPATIENT)
Dept: HEALTH INFORMATION MANAGEMENT | Facility: OTHER | Age: 52
End: 2022-02-23

## 2022-02-23 ENCOUNTER — ANTICOAGULATION THERAPY VISIT (OUTPATIENT)
Dept: ANTICOAGULATION | Facility: OTHER | Age: 52
End: 2022-02-23
Attending: PHYSICIAN ASSISTANT
Payer: COMMERCIAL

## 2022-02-23 DIAGNOSIS — Z79.01 LONG TERM CURRENT USE OF ANTICOAGULANT THERAPY: ICD-10-CM

## 2022-02-23 DIAGNOSIS — I82.602 BLOOD CLOT OF VEIN IN SHOULDER AREA, LEFT: Primary | ICD-10-CM

## 2022-02-23 DIAGNOSIS — D68.51 FACTOR 5 LEIDEN MUTATION, HETEROZYGOUS (H): ICD-10-CM

## 2022-02-23 LAB — INR (EXTERNAL): 3.1 (ref 0.9–1.1)

## 2022-02-23 NOTE — PROGRESS NOTES
ANTICOAGULATION FOLLOW-UP CLINIC VISIT    Patient Name:  Daniela Ladd  Date:  2/23/2022  Contact Type:  no call needed patient to continue same dose    SUBJECTIVE:  Patient Findings         Clinical Outcomes     Negatives:  Major bleeding event, Thromboembolic event, Anticoagulation-related hospital admission, Anticoagulation-related ED visit, Anticoagulation-related fatality           OBJECTIVE    Recent labs: (last 7 days)     02/23/22  1105   INR 3.1*       ASSESSMENT / PLAN  INR assessment THER    Recheck INR In: 1 WEEK    INR Location Home INR      Anticoagulation Summary  As of 2/23/2022    INR goal:  2.5-3.5   TTR:  82.1 % (1 y)   INR used for dosing:  3.1 (2/23/2022)   Warfarin maintenance plan:  8 mg (2 mg x 4) every day   Full warfarin instructions:  8 mg every day   Weekly warfarin total:  56 mg   No change documented:  Yoly Crowe RN   Plan last modified:  Yoly Crowe RN (11/10/2021)   Next INR check:  3/2/2022   Priority:  High   Target end date:  Indefinite    Indications    Blood clot of vein in shoulder area  left [I82.602]  Factor 5 Leiden mutation  heterozygous (H) [D68.51]  Long-term (current) use of anticoagulants [Z79.01] [Z79.01]             Anticoagulation Episode Summary     INR check location:      Preferred lab:      Send INR reminders to:  [facility]    Comments:  Has home INR machine weekly INR needed         Anticoagulation Care Providers     Provider Role Specialty Phone number    Ling Trammell PA-C Referring Family Medicine [phone]            See the Encounter Report to view Anticoagulation Flowsheet and Dosing Calendar (Go to Encounters tab in chart review, and find the Anticoagulation Therapy Visit)        Yoly Crowe RN

## 2022-03-02 ENCOUNTER — TRANSFERRED RECORDS (OUTPATIENT)
Dept: HEALTH INFORMATION MANAGEMENT | Facility: OTHER | Age: 52
End: 2022-03-02

## 2022-03-02 ENCOUNTER — ANTICOAGULATION THERAPY VISIT (OUTPATIENT)
Dept: ANTICOAGULATION | Facility: OTHER | Age: 52
End: 2022-03-02
Attending: PHYSICIAN ASSISTANT
Payer: COMMERCIAL

## 2022-03-02 DIAGNOSIS — Z79.01 LONG TERM CURRENT USE OF ANTICOAGULANT THERAPY: ICD-10-CM

## 2022-03-02 DIAGNOSIS — D68.51 FACTOR 5 LEIDEN MUTATION, HETEROZYGOUS (H): ICD-10-CM

## 2022-03-02 DIAGNOSIS — I82.602 BLOOD CLOT OF VEIN IN SHOULDER AREA, LEFT: Primary | ICD-10-CM

## 2022-03-02 LAB — INR (EXTERNAL): 2.9 (ref 0.9–1.1)

## 2022-03-02 NOTE — PROGRESS NOTES
ANTICOAGULATION FOLLOW-UP CLINIC VISIT    Patient Name:  Daniela Ladd  Date:  3/2/2022  Contact Type:  no call needed patient to continue same dose    SUBJECTIVE:  Patient Findings         Clinical Outcomes     Negatives:  Major bleeding event, Thromboembolic event, Anticoagulation-related hospital admission, Anticoagulation-related ED visit, Anticoagulation-related fatality           OBJECTIVE    Recent labs: (last 7 days)     22  0807   INR 2.9*       ASSESSMENT / PLAN  INR assessment THER    Recheck INR In: 1 WEEK    INR Location Home INR      Anticoagulation Summary  As of 3/2/2022    INR goal:  2.5-3.5   TTR:  83.8 % (1 y)   INR used for dosin.9 (3/2/2022)   Warfarin maintenance plan:  8 mg (2 mg x 4) every day   Full warfarin instructions:  8 mg every day   Weekly warfarin total:  56 mg   No change documented:  Yoly Crowe RN   Plan last modified:  Yoly Crowe RN (11/10/2021)   Next INR check:  3/9/2022   Priority:  High   Target end date:  Indefinite    Indications    Blood clot of vein in shoulder area  left [I82.602]  Factor 5 Leiden mutation  heterozygous (H) [D68.51]  Long-term (current) use of anticoagulants [Z79.01] [Z79.01]             Anticoagulation Episode Summary     INR check location:      Preferred lab:      Send INR reminders to:  [facility]    Comments:  Has home INR machine weekly INR needed         Anticoagulation Care Providers     Provider Role Specialty Phone number    Ling Trammell PA-C Referring Family Medicine [phone]            See the Encounter Report to view Anticoagulation Flowsheet and Dosing Calendar (Go to Encounters tab in chart review, and find the Anticoagulation Therapy Visit)        Yoly Crowe RN

## 2022-03-09 ENCOUNTER — ANTICOAGULATION THERAPY VISIT (OUTPATIENT)
Dept: ANTICOAGULATION | Facility: OTHER | Age: 52
End: 2022-03-09
Attending: PHYSICIAN ASSISTANT
Payer: COMMERCIAL

## 2022-03-09 DIAGNOSIS — Z79.01 LONG TERM CURRENT USE OF ANTICOAGULANT THERAPY: ICD-10-CM

## 2022-03-09 DIAGNOSIS — D68.51 FACTOR 5 LEIDEN MUTATION, HETEROZYGOUS (H): ICD-10-CM

## 2022-03-09 DIAGNOSIS — I82.602 BLOOD CLOT OF VEIN IN SHOULDER AREA, LEFT: Primary | ICD-10-CM

## 2022-03-09 LAB — INR (EXTERNAL): 2.4 (ref 0.9–1.1)

## 2022-03-09 NOTE — PROGRESS NOTES
ANTICOAGULATION FOLLOW-UP CLINIC VISIT    Patient Name:  Daniela Ladd  Date:  3/9/2022  Contact Type:  Telephone/ spoke with patient reviewed dosing     SUBJECTIVE:  Patient Findings         Clinical Outcomes     Negatives:  Major bleeding event, Thromboembolic event, Anticoagulation-related hospital admission, Anticoagulation-related ED visit, Anticoagulation-related fatality           OBJECTIVE    Recent labs: (last 7 days)     22  1115   INR 2.4*       ASSESSMENT / PLAN  INR assessment SUB    Recheck INR In: 1 WEEK    INR Location Home INR      Anticoagulation Summary  As of 3/9/2022    INR goal:  2.5-3.5   TTR:  83.4 % (1 y)   INR used for dosin.4 (3/9/2022)   Warfarin maintenance plan:  10 mg (2 mg x 5) every Wed; 8 mg (2 mg x 4) all other days   Full warfarin instructions:  10 mg every Wed; 8 mg all other days   Weekly warfarin total:  58 mg   Plan last modified:  Yoly Crowe, RN (3/9/2022)   Next INR check:  3/16/2022   Priority:  High   Target end date:  Indefinite    Indications    Blood clot of vein in shoulder area  left [I82.602]  Factor 5 Leiden mutation  heterozygous (H) [D68.51]  Long-term (current) use of anticoagulants [Z79.01] [Z79.01]             Anticoagulation Episode Summary     INR check location:      Preferred lab:      Send INR reminders to:  [facility]    Comments:  Has home INR machine weekly INR needed         Anticoagulation Care Providers     Provider Role Specialty Phone number    PENELOPEglendaLing PA-C Referring Family Medicine [phone]            See the Encounter Report to view Anticoagulation Flowsheet and Dosing Calendar (Go to Encounters tab in chart review, and find the Anticoagulation Therapy Visit)        Yoly Crowe, RN

## 2022-03-16 ENCOUNTER — ANTICOAGULATION THERAPY VISIT (OUTPATIENT)
Dept: ANTICOAGULATION | Facility: OTHER | Age: 52
End: 2022-03-16
Attending: PHYSICIAN ASSISTANT
Payer: COMMERCIAL

## 2022-03-16 DIAGNOSIS — Z79.01 LONG TERM CURRENT USE OF ANTICOAGULANT THERAPY: ICD-10-CM

## 2022-03-16 DIAGNOSIS — D68.51 FACTOR 5 LEIDEN MUTATION, HETEROZYGOUS (H): ICD-10-CM

## 2022-03-16 DIAGNOSIS — I82.602 BLOOD CLOT OF VEIN IN SHOULDER AREA, LEFT: Primary | ICD-10-CM

## 2022-03-16 LAB — INR (EXTERNAL): 2.7 (ref 0.9–1.1)

## 2022-03-16 NOTE — PROGRESS NOTES
ANTICOAGULATION FOLLOW-UP CLINIC VISIT    Patient Name:  Daniela Ladd  Date:  3/16/2022  Contact Type:  Telephone    SUBJECTIVE:  Patient Findings         Clinical Outcomes     Negatives:  Major bleeding event, Thromboembolic event, Anticoagulation-related hospital admission, Anticoagulation-related ED visit, Anticoagulation-related fatality           OBJECTIVE    Recent labs: (last 7 days)     22  0900   INR 2.7*       ASSESSMENT / PLAN  No question data found.  Anticoagulation Summary  As of 3/16/2022    INR goal:  2.5-3.5   TTR:  82.8 % (1 y)   INR used for dosin.7 (3/16/2022)   Warfarin maintenance plan:  10 mg (2 mg x 5) every Wed; 8 mg (2 mg x 4) all other days   Full warfarin instructions:  10 mg every Wed; 8 mg all other days   Weekly warfarin total:  58 mg   Plan last modified:  Yoly Crowe RN (3/9/2022)   Next INR check:  3/23/2022   Priority:  High   Target end date:  Indefinite    Indications    Blood clot of vein in shoulder area  left [I82.602]  Factor 5 Leiden mutation  heterozygous (H) [D68.51]  Long-term (current) use of anticoagulants [Z79.01] [Z79.01]             Anticoagulation Episode Summary     INR check location:      Preferred lab:      Send INR reminders to:  [facility]    Comments:  Has home INR machine weekly INR needed         Anticoagulation Care Providers     Provider Role Specialty Phone number    Ling Trammell PA-C Referring Family Medicine [phone]            See the Encounter Report to view Anticoagulation Flowsheet and Dosing Calendar (Go to Encounters tab in chart review, and find the Anticoagulation Therapy Visit)    No changes.     Jo-Ann Monteiro, RN

## 2022-03-23 ENCOUNTER — ANTICOAGULATION THERAPY VISIT (OUTPATIENT)
Dept: ANTICOAGULATION | Facility: OTHER | Age: 52
End: 2022-03-23
Attending: PHYSICIAN ASSISTANT
Payer: COMMERCIAL

## 2022-03-23 DIAGNOSIS — Z79.01 LONG TERM CURRENT USE OF ANTICOAGULANT THERAPY: ICD-10-CM

## 2022-03-23 DIAGNOSIS — I82.602 BLOOD CLOT OF VEIN IN SHOULDER AREA, LEFT: Primary | ICD-10-CM

## 2022-03-23 DIAGNOSIS — D68.51 FACTOR 5 LEIDEN MUTATION, HETEROZYGOUS (H): ICD-10-CM

## 2022-03-23 LAB — INR (EXTERNAL): 2.8 (ref 0.9–1.1)

## 2022-03-23 NOTE — PROGRESS NOTES
ANTICOAGULATION FOLLOW-UP CLINIC VISIT    Patient Name:  Daniela Ladd  Date:  3/23/2022  Contact Type:  no contact needed    SUBJECTIVE:         OBJECTIVE    Recent labs: (last 7 days)     22  0520   INR 2.8*       ASSESSMENT / PLAN  No question data found.  Anticoagulation Summary  As of 3/23/2022    INR goal:  2.5-3.5   TTR:  82.8 % (1 y)   INR used for dosin.8 (3/23/2022)   Warfarin maintenance plan:  10 mg (2 mg x 5) every Wed; 8 mg (2 mg x 4) all other days   Full warfarin instructions:  10 mg every Wed; 8 mg all other days   Weekly warfarin total:  58 mg   Plan last modified:  Yoly Crowe RN (3/9/2022)   Next INR check:  3/30/2022   Priority:  High   Target end date:  Indefinite    Indications    Blood clot of vein in shoulder area  left [I82.602]  Factor 5 Leiden mutation  heterozygous (H) [D68.51]  Long-term (current) use of anticoagulants [Z79.01] [Z79.01]             Anticoagulation Episode Summary     INR check location:      Preferred lab:      Send INR reminders to:  [facility]    Comments:  Has home INR machine weekly INR needed         Anticoagulation Care Providers     Provider Role Specialty Phone number    Ling Trammell PA-C Referring Family Medicine [phone]            See the Encounter Report to view Anticoagulation Flowsheet and Dosing Calendar (Go to Encounters tab in chart review, and find the Anticoagulation Therapy Visit)    No changes.    Jo-Ann Monteiro, RN

## 2022-03-26 ENCOUNTER — HEALTH MAINTENANCE LETTER (OUTPATIENT)
Age: 52
End: 2022-03-26

## 2022-03-30 ENCOUNTER — ANTICOAGULATION THERAPY VISIT (OUTPATIENT)
Dept: ANTICOAGULATION | Facility: OTHER | Age: 52
End: 2022-03-30
Attending: PHYSICIAN ASSISTANT
Payer: COMMERCIAL

## 2022-03-30 DIAGNOSIS — I82.602 BLOOD CLOT OF VEIN IN SHOULDER AREA, LEFT: Primary | ICD-10-CM

## 2022-03-30 DIAGNOSIS — Z79.01 LONG TERM CURRENT USE OF ANTICOAGULANT THERAPY: ICD-10-CM

## 2022-03-30 DIAGNOSIS — D68.51 FACTOR 5 LEIDEN MUTATION, HETEROZYGOUS (H): ICD-10-CM

## 2022-03-30 LAB — INR (EXTERNAL): 2.8 (ref 0.9–1.1)

## 2022-03-30 NOTE — PROGRESS NOTES
ANTICOAGULATION FOLLOW-UP CLINIC VISIT    Patient Name:  Daniela Ladd  Date:  3/30/2022  Contact Type:  Fax- No need to call, INR with in goal, continue same dose.     SUBJECTIVE:  Patient Findings         Clinical Outcomes     Negatives:  Major bleeding event, Thromboembolic event, Anticoagulation-related hospital admission, Anticoagulation-related ED visit, Anticoagulation-related fatality           OBJECTIVE    Recent labs: (last 7 days)     22  0527   INR 2.8*       ASSESSMENT / PLAN  INR assessment THER    Recheck INR In: 1 WEEK    INR Location Home INR      Anticoagulation Summary  As of 3/30/2022    INR goal:  2.5-3.5   TTR:  82.8 % (1 y)   INR used for dosin.8 (3/30/2022)   Warfarin maintenance plan:  10 mg (2 mg x 5) every Wed; 8 mg (2 mg x 4) all other days   Full warfarin instructions:  10 mg every Wed; 8 mg all other days   Weekly warfarin total:  58 mg   No change documented:  Elyssa Correa RN   Plan last modified:  Yoly Crowe RN (3/9/2022)   Next INR check:  2022   Priority:  High   Target end date:  Indefinite    Indications    Blood clot of vein in shoulder area  left [I82.602]  Factor 5 Leiden mutation  heterozygous (H) [D68.51]  Long-term (current) use of anticoagulants [Z79.01] [Z79.01]             Anticoagulation Episode Summary     INR check location:      Preferred lab:      Send INR reminders to:  [facility]    Comments:  Has home INR machine weekly INR needed         Anticoagulation Care Providers     Provider Role Specialty Phone number    Ling Trammell PA-C Referring Family Medicine [phone]            See the Encounter Report to view Anticoagulation Flowsheet and Dosing Calendar (Go to Encounters tab in chart review, and find the Anticoagulation Therapy Visit)        Elyssa Correa, RN

## 2022-03-30 NOTE — PROGRESS NOTES
ANTICOAGULATION FOLLOW-UP CLINIC VISIT    Patient Name:  Daniela Ladd  Date:  3/30/2022  Contact Type:  Telephone/ patient called to review dosing    SUBJECTIVE:  Patient Findings         Clinical Outcomes     Negatives:  Major bleeding event, Thromboembolic event, Anticoagulation-related hospital admission, Anticoagulation-related ED visit, Anticoagulation-related fatality           OBJECTIVE    Recent labs: (last 7 days)     22  0527   INR 2.8*       ASSESSMENT / PLAN  INR assessment THER    Recheck INR In: 1 WEEK    INR Location Home INR      Anticoagulation Summary  As of 3/30/2022    INR goal:  2.5-3.5   TTR:  82.8 % (1 y)   INR used for dosin.8 (3/30/2022)   Warfarin maintenance plan:  10 mg (2 mg x 5) every Wed, Fri; 8 mg (2 mg x 4) all other days   Full warfarin instructions:  10 mg every Wed, Fri; 8 mg all other days   Weekly warfarin total:  60 mg   Plan last modified:  Yoly Crowe RN (3/30/2022)   Next INR check:  2022   Priority:  High   Target end date:  Indefinite    Indications    Blood clot of vein in shoulder area  left [I82.602]  Factor 5 Leiden mutation  heterozygous (H) [D68.51]  Long-term (current) use of anticoagulants [Z79.01] [Z79.01]             Anticoagulation Episode Summary     INR check location:      Preferred lab:      Send INR reminders to:  [facility]    Comments:  Has home INR machine weekly INR needed         Anticoagulation Care Providers     Provider Role Specialty Phone number    PENELOPEglendaLing PA-C Referring Family Medicine [phone]            See the Encounter Report to view Anticoagulation Flowsheet and Dosing Calendar (Go to Encounters tab in chart review, and find the Anticoagulation Therapy Visit)    States she like INR higher than the 2.8 she has been the last couple weeks. We will adjust dose and recheck next week    Yoly Crowe, RN

## 2022-04-06 ENCOUNTER — ANTICOAGULATION THERAPY VISIT (OUTPATIENT)
Dept: ANTICOAGULATION | Facility: OTHER | Age: 52
End: 2022-04-06
Attending: PHYSICIAN ASSISTANT
Payer: COMMERCIAL

## 2022-04-06 DIAGNOSIS — D68.51 FACTOR 5 LEIDEN MUTATION, HETEROZYGOUS (H): ICD-10-CM

## 2022-04-06 DIAGNOSIS — I82.602 BLOOD CLOT OF VEIN IN SHOULDER AREA, LEFT: Primary | ICD-10-CM

## 2022-04-06 DIAGNOSIS — Z79.01 LONG TERM CURRENT USE OF ANTICOAGULANT THERAPY: ICD-10-CM

## 2022-04-06 LAB — INR (EXTERNAL): 2.8 (ref 0.9–1.1)

## 2022-04-06 NOTE — PROGRESS NOTES
ANTICOAGULATION FOLLOW-UP CLINIC VISIT    Patient Name:  Daniela Ladd  Date:  2022  Contact Type:  fax    SUBJECTIVE:  Patient Findings         Clinical Outcomes     Negatives:  Major bleeding event, Thromboembolic event, Anticoagulation-related hospital admission, Anticoagulation-related ED visit, Anticoagulation-related fatality           OBJECTIVE    Recent labs: (last 7 days)     22  0603   INR 2.8*       ASSESSMENT / PLAN  INR assessment THER    Recheck INR In: 1 WEEK    INR Location Home INR      Anticoagulation Summary  As of 2022    INR goal:  2.5-3.5   TTR:  83.6 % (1 y)   INR used for dosin.8 (2022)   Warfarin maintenance plan:  10 mg (2 mg x 5) every Wed, Fri; 8 mg (2 mg x 4) all other days   Full warfarin instructions:  10 mg every Wed, Fri; 8 mg all other days   Weekly warfarin total:  60 mg   No change documented:  Elyssa Correa RN   Plan last modified:  Yoly Crowe RN (3/30/2022)   Next INR check:  2022   Priority:  High   Target end date:  Indefinite    Indications    Blood clot of vein in shoulder area  left [I82.602]  Factor 5 Leiden mutation  heterozygous (H) [D68.51]  Long-term (current) use of anticoagulants [Z79.01] [Z79.01]             Anticoagulation Episode Summary     INR check location:      Preferred lab:      Send INR reminders to:  [facility]    Comments:  Has home INR machine weekly INR needed         Anticoagulation Care Providers     Provider Role Specialty Phone number    Ling Trammell PA-C Referring Family Medicine [phone]            See the Encounter Report to view Anticoagulation Flowsheet and Dosing Calendar (Go to Encounters tab in chart review, and find the Anticoagulation Therapy Visit)          Elyssa Correa, RN

## 2022-04-13 ENCOUNTER — ANTICOAGULATION THERAPY VISIT (OUTPATIENT)
Dept: ANTICOAGULATION | Facility: OTHER | Age: 52
End: 2022-04-13
Attending: PHYSICIAN ASSISTANT
Payer: COMMERCIAL

## 2022-04-13 DIAGNOSIS — Z79.01 LONG TERM CURRENT USE OF ANTICOAGULANT THERAPY: ICD-10-CM

## 2022-04-13 DIAGNOSIS — D68.51 FACTOR 5 LEIDEN MUTATION, HETEROZYGOUS (H): ICD-10-CM

## 2022-04-13 DIAGNOSIS — I82.602 BLOOD CLOT OF VEIN IN SHOULDER AREA, LEFT: Primary | ICD-10-CM

## 2022-04-13 LAB — INR (EXTERNAL): 3 (ref 0.9–1.1)

## 2022-04-13 NOTE — PROGRESS NOTES
ANTICOAGULATION FOLLOW-UP CLINIC VISIT    Patient Name:  Daniela Ladd  Date:  4/13/2022  Contact Type:  no call needed patient to continue same dose    SUBJECTIVE:  Patient Findings         Clinical Outcomes     Negatives:  Major bleeding event, Thromboembolic event, Anticoagulation-related hospital admission, Anticoagulation-related ED visit, Anticoagulation-related fatality           OBJECTIVE    Recent labs: (last 7 days)     04/13/22  1018   INR 3.0*       ASSESSMENT / PLAN  INR assessment THER    Recheck INR In: 1 WEEK    INR Location Home INR      Anticoagulation Summary  As of 4/13/2022    INR goal:  2.5-3.5   TTR:  84.4 % (1 y)   INR used for dosing:  3.0 (4/13/2022)   Warfarin maintenance plan:  10 mg (2 mg x 5) every Wed, Fri; 8 mg (2 mg x 4) all other days   Full warfarin instructions:  10 mg every Wed, Fri; 8 mg all other days   Weekly warfarin total:  60 mg   No change documented:  Yoly Crowe RN   Plan last modified:  Yoly Crowe RN (3/30/2022)   Next INR check:  4/20/2022   Priority:  High   Target end date:  Indefinite    Indications    Blood clot of vein in shoulder area  left [I82.602]  Factor 5 Leiden mutation  heterozygous (H) [D68.51]  Long-term (current) use of anticoagulants [Z79.01] [Z79.01]             Anticoagulation Episode Summary     INR check location:      Preferred lab:      Send INR reminders to:  [facility]    Comments:  Has home INR machine weekly INR needed         Anticoagulation Care Providers     Provider Role Specialty Phone number    Ling Trammell PA-C Referring Family Medicine [phone]            See the Encounter Report to view Anticoagulation Flowsheet and Dosing Calendar (Go to Encounters tab in chart review, and find the Anticoagulation Therapy Visit)        Yoly Crowe RN

## 2022-04-20 ENCOUNTER — TRANSFERRED RECORDS (OUTPATIENT)
Dept: HEALTH INFORMATION MANAGEMENT | Facility: OTHER | Age: 52
End: 2022-04-20

## 2022-04-20 ENCOUNTER — ANTICOAGULATION THERAPY VISIT (OUTPATIENT)
Dept: ANTICOAGULATION | Facility: OTHER | Age: 52
End: 2022-04-20
Attending: PHYSICIAN ASSISTANT
Payer: COMMERCIAL

## 2022-04-20 DIAGNOSIS — Z79.01 LONG TERM CURRENT USE OF ANTICOAGULANT THERAPY: ICD-10-CM

## 2022-04-20 DIAGNOSIS — D68.51 FACTOR 5 LEIDEN MUTATION, HETEROZYGOUS (H): ICD-10-CM

## 2022-04-20 DIAGNOSIS — I82.602 BLOOD CLOT OF VEIN IN SHOULDER AREA, LEFT: Primary | ICD-10-CM

## 2022-04-20 LAB — INR (EXTERNAL): 2.9 (ref 0.9–1.1)

## 2022-04-20 NOTE — PROGRESS NOTES
ANTICOAGULATION FOLLOW-UP CLINIC VISIT    Patient Name:  Daniela Ladd  Date:  2022  Contact Type:  fax from home INR, no call needed, patient to continue with same dosing    SUBJECTIVE:  Patient Findings         Clinical Outcomes     Negatives:  Major bleeding event, Thromboembolic event, Anticoagulation-related hospital admission, Anticoagulation-related ED visit, Anticoagulation-related fatality           OBJECTIVE    Recent labs: (last 7 days)     22  0530   INR 2.9*       ASSESSMENT / PLAN  INR assessment THER    Recheck INR In: 1 WEEK    INR Location Home INR      Anticoagulation Summary  As of 2022    INR goal:  2.5-3.5   TTR:  84.4 % (1 y)   INR used for dosin.9 (2022)   Warfarin maintenance plan:  10 mg (2 mg x 5) every Wed, Fri; 8 mg (2 mg x 4) all other days   Full warfarin instructions:  10 mg every Wed, Fri; 8 mg all other days   Weekly warfarin total:  60 mg   No change documented:  Glenda Duran RN   Plan last modified:  Yoly Crowe RN (3/30/2022)   Next INR check:  2022   Priority:  High   Target end date:  Indefinite    Indications    Blood clot of vein in shoulder area  left [I82.602]  Factor 5 Leiden mutation  heterozygous (H) [D68.51]  Long-term (current) use of anticoagulants [Z79.01] [Z79.01]             Anticoagulation Episode Summary     INR check location:      Preferred lab:      Send INR reminders to:  [facility]    Comments:  Has home INR machine weekly INR needed         Anticoagulation Care Providers     Provider Role Specialty Phone number    Ling Trammell PA-C Referring Family Medicine [phone]            See the Encounter Report to view Anticoagulation Flowsheet and Dosing Calendar (Go to Encounters tab in chart review, and find the Anticoagulation Therapy Visit)        Glenda Duran RN

## 2022-04-26 ENCOUNTER — HOSPITAL ENCOUNTER (OUTPATIENT)
Dept: MAMMOGRAPHY | Facility: OTHER | Age: 52
Discharge: HOME | End: 2022-04-26
Attending: PHYSICIAN ASSISTANT
Payer: COMMERCIAL

## 2022-04-26 ENCOUNTER — OFFICE VISIT (OUTPATIENT)
Dept: FAMILY MEDICINE | Facility: OTHER | Age: 52
End: 2022-04-26
Attending: PHYSICIAN ASSISTANT
Payer: COMMERCIAL

## 2022-04-26 ENCOUNTER — MYC MEDICAL ADVICE (OUTPATIENT)
Dept: FAMILY MEDICINE | Facility: OTHER | Age: 52
End: 2022-04-26

## 2022-04-26 VITALS
DIASTOLIC BLOOD PRESSURE: 88 MMHG | SYSTOLIC BLOOD PRESSURE: 122 MMHG | HEIGHT: 67 IN | WEIGHT: 205.6 LBS | BODY MASS INDEX: 32.27 KG/M2 | TEMPERATURE: 97.5 F | RESPIRATION RATE: 18 BRPM | HEART RATE: 93 BPM | OXYGEN SATURATION: 96 %

## 2022-04-26 DIAGNOSIS — L91.8 SKIN TAG: ICD-10-CM

## 2022-04-26 DIAGNOSIS — Z11.3 SCREEN FOR STD (SEXUALLY TRANSMITTED DISEASE): ICD-10-CM

## 2022-04-26 DIAGNOSIS — Z00.00 ROUTINE HISTORY AND PHYSICAL EXAMINATION OF ADULT: Primary | ICD-10-CM

## 2022-04-26 DIAGNOSIS — R09.82 PND (POST-NASAL DRIP): ICD-10-CM

## 2022-04-26 DIAGNOSIS — Z12.31 VISIT FOR SCREENING MAMMOGRAM: ICD-10-CM

## 2022-04-26 DIAGNOSIS — Z79.01 LONG TERM CURRENT USE OF ANTICOAGULANT THERAPY: ICD-10-CM

## 2022-04-26 DIAGNOSIS — Z23 NEED FOR DIPHTHERIA-TETANUS-PERTUSSIS (TDAP) VACCINE: ICD-10-CM

## 2022-04-26 DIAGNOSIS — L72.3 SEBACEOUS CYST: ICD-10-CM

## 2022-04-26 DIAGNOSIS — I82.622 ARM DVT (DEEP VENOUS THROMBOEMBOLISM), ACUTE, LEFT (H): ICD-10-CM

## 2022-04-26 DIAGNOSIS — Z12.11 SCREEN FOR COLON CANCER: ICD-10-CM

## 2022-04-26 DIAGNOSIS — Z11.59 ENCOUNTER FOR HEPATITIS C SCREENING TEST FOR LOW RISK PATIENT: ICD-10-CM

## 2022-04-26 DIAGNOSIS — Z01.419 PAP TEST, AS PART OF ROUTINE GYNECOLOGICAL EXAMINATION: ICD-10-CM

## 2022-04-26 DIAGNOSIS — Z13.220 SCREENING CHOLESTEROL LEVEL: ICD-10-CM

## 2022-04-26 DIAGNOSIS — Z13.1 SCREENING FOR DIABETES MELLITUS: ICD-10-CM

## 2022-04-26 LAB
ANION GAP SERPL CALCULATED.3IONS-SCNC: 7 MMOL/L (ref 3–14)
BUN SERPL-MCNC: 9 MG/DL (ref 7–25)
CALCIUM SERPL-MCNC: 9.5 MG/DL (ref 8.6–10.3)
CHLORIDE BLD-SCNC: 104 MMOL/L (ref 98–107)
CHOLEST SERPL-MCNC: 201 MG/DL
CO2 SERPL-SCNC: 27 MMOL/L (ref 21–31)
CREAT SERPL-MCNC: 0.74 MG/DL (ref 0.6–1.2)
FASTING STATUS PATIENT QL REPORTED: YES
GFR SERPL CREATININE-BSD FRML MDRD: >90 ML/MIN/1.73M2
GLUCOSE BLD-MCNC: 89 MG/DL (ref 70–105)
HDLC SERPL-MCNC: 32 MG/DL (ref 23–92)
LDLC SERPL CALC-MCNC: 105 MG/DL
NONHDLC SERPL-MCNC: 169 MG/DL
POTASSIUM BLD-SCNC: 4 MMOL/L (ref 3.5–5.1)
SODIUM SERPL-SCNC: 138 MMOL/L (ref 134–144)
TRIGL SERPL-MCNC: 320 MG/DL

## 2022-04-26 PROCEDURE — 99396 PREV VISIT EST AGE 40-64: CPT | Mod: 25 | Performed by: PHYSICIAN ASSISTANT

## 2022-04-26 PROCEDURE — 87389 HIV-1 AG W/HIV-1&-2 AB AG IA: CPT | Mod: ZL | Performed by: PHYSICIAN ASSISTANT

## 2022-04-26 PROCEDURE — 36415 COLL VENOUS BLD VENIPUNCTURE: CPT | Mod: ZL | Performed by: PHYSICIAN ASSISTANT

## 2022-04-26 PROCEDURE — 80048 BASIC METABOLIC PNL TOTAL CA: CPT | Mod: ZL | Performed by: PHYSICIAN ASSISTANT

## 2022-04-26 PROCEDURE — 86803 HEPATITIS C AB TEST: CPT | Mod: ZL | Performed by: PHYSICIAN ASSISTANT

## 2022-04-26 PROCEDURE — G0123 SCREEN CERV/VAG THIN LAYER: HCPCS | Performed by: PHYSICIAN ASSISTANT

## 2022-04-26 PROCEDURE — 90471 IMMUNIZATION ADMIN: CPT | Performed by: PHYSICIAN ASSISTANT

## 2022-04-26 PROCEDURE — 87624 HPV HI-RISK TYP POOLED RSLT: CPT | Mod: ZL | Performed by: PHYSICIAN ASSISTANT

## 2022-04-26 PROCEDURE — 90715 TDAP VACCINE 7 YRS/> IM: CPT | Performed by: PHYSICIAN ASSISTANT

## 2022-04-26 PROCEDURE — 80061 LIPID PANEL: CPT | Mod: ZL | Performed by: PHYSICIAN ASSISTANT

## 2022-04-26 PROCEDURE — 77067 SCR MAMMO BI INCL CAD: CPT

## 2022-04-26 RX ORDER — WARFARIN SODIUM 2 MG/1
TABLET ORAL
Qty: 360 TABLET | Refills: 4 | Status: SHIPPED | OUTPATIENT
Start: 2022-04-26 | End: 2023-07-03

## 2022-04-26 RX ORDER — FLUTICASONE PROPIONATE 50 MCG
2 SPRAY, SUSPENSION (ML) NASAL DAILY
Qty: 16 G | Refills: 3 | Status: SHIPPED | OUTPATIENT
Start: 2022-04-26

## 2022-04-26 ASSESSMENT — PAIN SCALES - GENERAL: PAINLEVEL: NO PAIN (0)

## 2022-04-26 NOTE — PATIENT INSTRUCTIONS
"Healthy Strategies  Eat at least 3 meals a day and never skip breakfast.  Eat more slowly.  Decrease portion size.  Provide structure by using meal replacement bars or shakes, and/or low calorie frozen meals.  For good nutrition incorporate fruit, vegetables, whole grains, lean protein, and low-fat dairy.  Remove trigger foods from yourenvironment to avoid impulse eating.  Increase physical activity: get a pedometer and aim for 10,000 steps a day or 30-35 minutes of activity 5 days per week.  Weigh yourself daily or at least weekly.  Keep a record of what you eat and your activity.  Establish a support system such as afriend, group or program.    11. Read Joey Medrano's \"Eat to Live\". Remember it is important to have a minimum of 1200 calories a day, okay to use olive oil, 40 grams of fiber daily. No more than two servings (the size of your palm) of red meat a week.     Please consider the following general health recommendations:    Eat a quality diet (generally, low in simple sugars, starches, cholesterol and saturated fat.)    Please get 1200 mg of calcium in divided doses with 800 units vitamin D in your diet daily. Take supplements as needed to obtain full recommended amounts.     Stay physically active. Regular walking or other exercise is one of the best ways to minimize pain of arthritis; maintain independence and mobility; maintain bone strength; maintain conditioning of your heart. Find something you enjoy and a friend to do it with you.    Maintain ideal weight. Your Body mass index is Body mass index is 32.69 kg/m .. Generally a BMI of 20-25 is considered ideal. Overweight is defined as 25-30, obese is 30-35 and markedly obese is greater than 35.    Apply sun block (SPF 25 or greater) on exposed skin anytime you are out in the sun to prevent skin cancer.     Wear a seatbelt whenever you are in a car.    Obtain a flu shot every fall.    You should have a tetanus booster at least once every 10 " years.    Schedule a mammogram annually starting at the age of 40 years old unless recommended earlier by your primary care provider. Come for a general exam once yearly.    Colonoscopy (an exam of the colon) is recommended every 10 years after the age of 45 to screen for colon cancer. (More often if you are at increased risk.)    A vaccine to reduce your chances of getting shingles is available if you are over 50. Information about the vaccine is available through the clinic or at:  (https://www.cdc.gov/vaccines/vpd/shingles/public/shingrix/index.html)    Consider a bone density study every 2-5 years to see if you are at increased risk for fracture. If you have osteoporosis, medicine to strengthen your bones can significantly reduce your risk of fracture, back pain, and loss of height.    Check blood sugar annually. Cholesterol annually unless you have had a normal level when last checked within 5 years.     I recommend that you have a general physical exam every year. You should have a pap test every 3 years between the ages of 21 and 30 and every 3-5 years between the ages of 30 and 65 depending on your test unless you have had previous abnormal pap smears, (in these cases the exams and PAP's should be done on a schedule as recommended by your primary care provider). If you have had hysterectomy in the past, your future Pap plan may be different.

## 2022-04-26 NOTE — PROGRESS NOTES
SUBJECTIVE:   CC: Daniela Ladd is an 51 year old woman who presents for preventive health visit.       Patient has been advised of split billing requirements and indicates understanding: Yes  Healthy Habits:     Getting at least 3 servings of Calcium per day:  Yes    Bi-annual eye exam:  Yes    Dental care twice a year:  Yes    Sleep apnea or symptoms of sleep apnea:  Daytime drowsiness    Diet:  Other    Frequency of exercise:  None    Taking medications regularly:  Yes    Medication side effects:  None    PHQ-2 Total Score: 0    Additional concerns today:  Yes      No LMP recorded (lmp unknown). (Menstrual status: IUD).   Contraception: IUD, placed 2018  Risk for STI?: none  Last pap: 2012 - normal  3/18/2019 - negative HPV, pap test unable to be completed  Nees to be repeated  Any hx of abnormal paps:  yes  FH of early CA?: no  Cholesterol/DM concerns/screenin - lipid, needs to be checked  Tobacco?: yes, working on decreasing amount of cigarettes.   Declines lung cancer chest CT screening  Calcium intake: yes  DEXA: na  Last mammo: 2022  Colonoscopy: none  Hepatitis C screen: none, ordered  HIV screen: none, ordered  Immunizations: need Tdap, declines COVID-19     Patient has noticed a lump on her right superior medial shoulder for several years.  Getting larger.  It is not bothering her.  Started small and then has been getting larger.  Unsure if it is a lipoma or sebaceous cyst.  No overlying erythema, open wound, pus, drainage, warmth.    Patient has several neck skin tags that she would like removed.  They catch on her necklaces.    Today's PHQ-2 Score:   PHQ-2 (  [employer]) 2022   Q1: Little interest or pleasure in doing things 0   Q2: Feeling down, depressed or hopeless 0   PHQ-2 Score 0   PHQ-2 Total Score (12-17 Years)- Positive if 3 or more points; Administer PHQ-A if positive -   Q1: Little interest or pleasure in doing things Not at all   Q2: Feeling down, depressed  or hopeless Not at all   PHQ-2 Score 0       Abuse: Current or Past (Physical, Sexual or Emotional) - No  Do you feel safe in your environment? Yes    Have you ever done Advance Care Planning? (For example, a Health Directive, POLST, or a discussion with a medical provider or your loved ones about your wishes): declines    Social History     Tobacco Use     Smoking status: Current Every Day Smoker     Packs/day: 1.00     Years: 37.00     Pack years: 37.00     Types: Cigarettes     Start date: 1985     Smokeless tobacco: Never Used     Tobacco comment: Will contact PCP for patches   Substance Use Topics     Alcohol use: No     Alcohol/week: 0.0 standard drinks         Alcohol Use 4/25/2022   Prescreen: >3 drinks/day or >7 drinks/week? Not Applicable       Reviewed orders with patient.  Reviewed health maintenance and updated orders accordingly - Yes  Labs reviewed in EPIC  BP Readings from Last 3 Encounters:   04/26/22 122/88   06/18/21 130/80   01/21/21 130/86    Wt Readings from Last 3 Encounters:   04/26/22 93.3 kg (205 lb 9.6 oz)   06/18/21 91 kg (200 lb 9.6 oz)   01/21/21 91.6 kg (202 lb)                  Patient Active Problem List   Diagnosis     Blood clot of vein in shoulder area, left     Dyshidrotic hand dermatitis     Endometriosis     Migraine with aura and without status migrainosus, not intractable     Other acne     Raynaud's syndrome     Tobacco abuse     Factor 5 Leiden mutation, heterozygous (H)     Long-term (current) use of anticoagulants [Z79.01]     Pulmonary nodules     IUD (intrauterine device) in place     Past Surgical History:   Procedure Laterality Date     ANKLE SURGERY Left 2010    [place]     EXTRACTION(S) DENTAL       LAPAROSCOPIC TUBAL LIGATION  08/2004     SALPINGO-OOPHORECTOMY BILATERAL Right 04/14/2011    Planned Lap RSO     TONSILLECTOMY      1980s       Social History     Tobacco Use     Smoking status: Current Every Day Smoker     Packs/day: 1.00     Years: 37.00     Pack years:  37.00     Types: Cigarettes     Start date:      Smokeless tobacco: Never Used     Tobacco comment: Will contact PCP for patches   Substance Use Topics     Alcohol use: No     Alcohol/week: 0.0 standard drinks     Family History   Problem Relation Age of Onset     Allergy (Severe) Mother         Allergies     Heart Disease Mother         Heart Disease     Diabetes Mother         Diabetes     Peripheral Vascular Disease Mother      Unknown/Adopted Father         Suspected cardiac.      Other - See Comments Sister         transgender, born male, identifies as female     Breast Cancer Maternal Grandmother         Cancer-breast,CREST syndrome, Raynaud's, scleroderma,  of CHF, small veins and arteries     Cancer Maternal Grandfather         Cancer,lung cancer, dm     Heart Disease Paternal Grandfather         Heart Disease,CAD     Diabetes Maternal Aunt         Diabetes,also has had a borderline ovarian mass (not benign or cancerous)     Clotting Disorder Paternal Uncle         Factor V     Thyroid Disease Other         Thyroid Disease,several members of family with thyroid disease.     Other - See Comments Brother         lymphedema         Current Outpatient Medications   Medication Sig Dispense Refill     fluticasone (FLONASE) 50 MCG/ACT nasal spray Spray 2 sprays into both nostrils daily 16 g 3     warfarin ANTICOAGULANT (COUMADIN) 2 MG tablet TAKE  8 MG DAILY OR AS DIRECTED BY PROTIME CLINIC 360 tablet 4     acetaminophen (TYLENOL) 500 MG tablet Take 1,000 mg by mouth every 6 hours as needed for pain        levonorgestrel (MIRENA) 20 MCG/24HR IUD 1 each by Intrauterine route once       Allergies   Allergen Reactions     Amoxicillin-Pot Clavulanate Nausea     Cyclobenzaprine Rash     No Clinical Screening - See Comments Rash     Nicoderm patch     Recent Labs   Lab Test 21  0929 20  0818 10/16/19  1303 17  1051 16  1006   LDL  --   --   --   --  106*   HDL  --   --   --   --  30   TRIG   --   --   --   --  189*   ALT 12 17 20   < >  --    CR 0.76 0.73 0.80   < > 0.80   GFRESTIMATED 81 85 77   < >  --    GFRESTBLACK >90 >90 >90   < > >60   POTASSIUM 3.9 4.1 4.3   < > 4.1    < > = values in this interval not displayed.        Breast Cancer Screening:    FHS-7:   Breast CA Risk Assessment (FHS-7) 4/26/2022   Did any of your first-degree relatives have breast or ovarian cancer? No   Did any of your relatives have bilateral breast cancer? No   Did any man in your family have breast cancer? No   Did any woman in your family have breast and ovarian cancer? No   Did any woman in your family have breast cancer before age 50 y? No   Do you have 2 or more relatives with breast and/or ovarian cancer? No   Do you have 2 or more relatives with breast and/or bowel cancer? No       Mammogram Screening: Recommended annual mammography  Pertinent mammograms are reviewed under the imaging tab.    History of abnormal Pap smear:   Last 3 Pap and HPV Results:   PAP / HPV Latest Ref Rng & Units 3/18/2019   PAP (Historical) - UNSAT   HPV16 NEG:Negative Negative   HPV18 NEG:Negative Negative   HRHPV NEG:Negative Negative     PAP / HPV Latest Ref Rng & Units 3/18/2019   PAP (Historical) - UNSAT   HPV16 NEG:Negative Negative   HPV18 NEG:Negative Negative   HRHPV NEG:Negative Negative     Reviewed and updated as needed this visit by clinical staff   Tobacco  Allergies  Meds  Problems  Med Hx  Surg Hx  Fam Hx            Reviewed and updated as needed this visit by Provider   Tobacco  Allergies  Meds  Problems  Med Hx  Surg Hx  Fam Hx           Past Medical History:   Diagnosis Date     Abdominal pain     9/27/2010     Contact dermatitis     Atrophic dermatitis     Encounter for insertion of mirena IUD 08/09/2018     Hidradenitis suppurativa     9/27/2010     Major depressive disorder, single episode     2/01,Situational related to marital discord     Nicotine dependence, uncomplicated     9/27/2010     Other acne   "   Facial acne     Personal history of other medical treatment (CODE)     10/00, III, para 1-0-2-1, vaginal delivery , two spontaneous first trimester miscarriage     Personal history of other medical treatment (CODE)     S/P cryotherapy Aug 2003.  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/25/2013      Past Surgical History:   Procedure Laterality Date     ANKLE SURGERY Left     [place]     EXTRACTION(S) DENTAL       LAPAROSCOPIC TUBAL LIGATION  2004     SALPINGO-OOPHORECTOMY BILATERAL Right 2011    Planned Lap RSO     TONSILLECTOMY           OB History    Para Term  AB Living   3 1 1 0 1 1   SAB IAB Ectopic Multiple Live Births   1 0 0 0 1      # Outcome Date GA Lbr Steven/2nd Weight Sex Delivery Anes PTL Lv   3 Term 1899   3.374 kg (7 lb 7 oz) M Vag-Spont None N RYNE      Name: Orlando Gallardo             1 SAB  8w0d              Review of Systems  CONSTITUTIONAL: NEGATIVE for fever, chills, change in weight  INTEGUMENTARU/SKIN: NEGATIVE for worrisome rashes, moles or lesions  EYES: NEGATIVE for vision changes or irritation  ENT: NEGATIVE for ear, mouth and throat problems  RESP: NEGATIVE for significant cough or SOB  BREAST: NEGATIVE for masses, tenderness or discharge  CV: NEGATIVE for chest pain, palpitations or peripheral edema  GI: NEGATIVE for nausea, abdominal pain, heartburn, or change in bowel habits  : NEGATIVE for unusual urinary or vaginal symptoms. Periods are regular.  MUSCULOSKELETAL: NEGATIVE for significant arthralgias or myalgia  NEURO: NEGATIVE for weakness, dizziness or paresthesias  PSYCHIATRIC: NEGATIVE for changes in mood or affect     OBJECTIVE:   /88 (BP Location: Right arm, Patient Position: Sitting, Cuff Size: Adult Regular)   Pulse 93   Temp 97.5  F (36.4  C) (Tympanic)   Resp 18   Ht 1.689 m (5' 6.5\")   Wt 93.3 kg (205 lb 9.6 oz)   LMP  (LMP " Unknown)   SpO2 96%   Breastfeeding No   BMI 32.69 kg/m    Physical Exam  GENERAL: healthy, alert and no distress  EYES: Eyes grossly normal to inspection, PERRL and conjunctivae and sclerae normal  HENT: ear canals and TM's normal, nose and mouth without ulcers or lesions  NECK: no adenopathy, no asymmetry, masses, or scars and thyroid normal to palpation  RESP: lungs clear to auscultation - no rales, rhonchi or wheezes  CV: regular rate and rhythm, normal S1 S2, no S3 or S4, no murmur, click or rub, no peripheral edema and peripheral pulses strong  ABDOMEN: soft, nontender, no hepatosplenomegaly, no masses and bowel sounds normal   (female): normal female external genitalia, normal urethral meatus, vaginal mucosa pink, moist, well rugated, and normal cervix/adnexa/uterus without masses or discharge, IUD strings appreciated  Pap completed: Yes  MS: no gross musculoskeletal defects noted, no edema  SKIN: no suspicious lesions or rashes  NEURO: Normal strength and tone, mentation intact and speech normal  PSYCH: mentation appears normal, affect normal/bright    Diagnostic Test Results:  Labs reviewed in Epic    ASSESSMENT/PLAN:       ICD-10-CM    1. Routine history and physical examination of adult  Z00.00    2. PND (post-nasal drip)  R09.82 fluticasone (FLONASE) 50 MCG/ACT nasal spray   3. Long term current use of anticoagulant therapy  Z79.01 warfarin ANTICOAGULANT (COUMADIN) 2 MG tablet   4. Arm DVT (deep venous thromboembolism), acute, left (H)  I82.622 warfarin ANTICOAGULANT (COUMADIN) 2 MG tablet   5. Need for diphtheria-tetanus-pertussis (Tdap) vaccine  Z23 GH IMM - TDAP (ADACEL, BOOSTRIX)   6. Encounter for hepatitis C screening test for low risk patient  Z11.59 Hepatitis C Screen Reflex to HCV RNA Quant and Genotype     Hepatitis C Screen Reflex to HCV RNA Quant and Genotype   7. Screen for STD (sexually transmitted disease)  Z11.3 HIV Antigen Antibody Combo     HIV Antigen Antibody Combo   8. Screening  "cholesterol level  Z13.220 Lipid Panel     Lipid Panel   9. Screening for diabetes mellitus  Z13.1 Basic Metabolic Panel     Basic Metabolic Panel   10. Screen for colon cancer  Z12.11 COLOGUARD(EXACT SCIENCES)   11. Pap test, as part of routine gynecological examination  Z01.419 Pap Screen with HPV - recommended age 30 - 65 years   12. Skin tag  L91.8 Adult General Surg Referral   13. Sebaceous cyst  L72.3 Adult General Surg Referral     Skin tag/sebaceous cyst: Refer to general surgery for consult.    Completed Pap and HPV for cervical cancer screening.  Labs pending.    Ordered Cologuard for colon cancer screening.    Complete lipid panel and BMP for cholesterol diabetes mellitus screening.  Lab is pending.    Ordered HIV and hepatitis C for low risk screening.    Patient was given a Tdap vaccine.  Declines COVID-19 vaccine at this time.    Refilled Flonase for history of postnasal drip.  Refilled warfarin with history of long-term use of anticoagulants.  No acute concerns at this time.  Continue monitoring as previously recommended.    Highly stressed the need to quit smoking.    Encouraged good diet and exercise.  Repeat physical in 1 year.    See patient education.    Patient has been advised of split billing requirements and indicates understanding: Yes    COUNSELING:  Reviewed preventive health counseling, as reflected in patient instructions       Regular exercise       Healthy diet/nutrition       Vision screening       Hearing screening       Osteoporosis prevention/bone health       Colorectal Cancer Screening       Consider Hep C screening for all patients one time for ages 18-79 years       HIV screeninx in teen years, 1x in adult years, and at intervals if high risk       Advance Care Planning    Estimated body mass index is 32.69 kg/m  as calculated from the following:    Height as of this encounter: 1.689 m (5' 6.5\").    Weight as of this encounter: 93.3 kg (205 lb 9.6 oz).    Weight management " plan: Discussed healthy diet and exercise guidelines    She reports that she has been smoking cigarettes. She started smoking about 37 years ago. She has a 37.00 pack-year smoking history. She has never used smokeless tobacco.  Tobacco Cessation Action Plan:   Information offered: Patient not interested at this time      Counseling Resources:  ATP IV Guidelines  Pooled Cohorts Equation Calculator  Breast Cancer Risk Calculator  BRCA-Related Cancer Risk Assessment: FHS-7 Tool  FRAX Risk Assessment  ICSI Preventive Guidelines  Dietary Guidelines for Americans, 2010  USDA's MyPlate  ASA Prophylaxis  Lung CA Screening    Ling Trammell PA-C  [facility] AND [facility]

## 2022-04-26 NOTE — NURSING NOTE
Pt presents to clinic today for a physical, patient states she has a lump on her right shoulder.       FOOD SECURITY SCREENING QUESTIONS:    The next two questions are to help us understand your food security.  If you are feeling you need any assistance in this area, we have resources available to support you today.    Hunger Vital Signs:  Within the past 12 months we worried whether our food would run out before we got money to buy more. Never  Within the past 12 months the food we bought just didn't last and we didn't have money to get more. Never        Medication Reconciliation: complete  Zackary Elliott, RON,LPN on 4/26/2022 at 9:01 AM

## 2022-04-27 ENCOUNTER — ANTICOAGULATION THERAPY VISIT (OUTPATIENT)
Dept: ANTICOAGULATION | Facility: OTHER | Age: 52
End: 2022-04-27
Attending: PHYSICIAN ASSISTANT
Payer: COMMERCIAL

## 2022-04-27 DIAGNOSIS — I82.602 BLOOD CLOT OF VEIN IN SHOULDER AREA, LEFT: Primary | ICD-10-CM

## 2022-04-27 DIAGNOSIS — Z79.01 LONG TERM CURRENT USE OF ANTICOAGULANT THERAPY: ICD-10-CM

## 2022-04-27 DIAGNOSIS — D68.51 FACTOR 5 LEIDEN MUTATION, HETEROZYGOUS (H): ICD-10-CM

## 2022-04-27 LAB
HCV AB SERPL QL IA: NONREACTIVE
HIV 1+2 AB+HIV1 P24 AG SERPL QL IA: NONREACTIVE
INR (EXTERNAL): 2.6 (ref 0.9–1.1)

## 2022-04-27 NOTE — PROGRESS NOTES
ANTICOAGULATION FOLLOW-UP CLINIC VISIT    Patient Name:  Daniela Ladd  Date:  2022  Contact Type:  No need to contact continue same dose.     SUBJECTIVE:  Patient Findings         Clinical Outcomes     Negatives:  Major bleeding event, Thromboembolic event, Anticoagulation-related hospital admission, Anticoagulation-related ED visit, Anticoagulation-related fatality           OBJECTIVE    Recent labs: (last 7 days)     22  0519   INR 2.6*       ASSESSMENT / PLAN  INR assessment THER    Recheck INR In: 1 WEEK    INR Location Home INR      Anticoagulation Summary  As of 2022    INR goal:  2.5-3.5   TTR:  84.9 % (1 y)   INR used for dosin.6 (2022)   Warfarin maintenance plan:  10 mg (2 mg x 5) every Wed, Fri; 8 mg (2 mg x 4) all other days   Full warfarin instructions:  10 mg every Wed, Fri; 8 mg all other days   Weekly warfarin total:  60 mg   No change documented:  Elyssa Correa RN   Plan last modified:  Yoly Crowe RN (3/30/2022)   Next INR check:  2022   Priority:  High   Target end date:  Indefinite    Indications    Blood clot of vein in shoulder area  left [I82.602]  Factor 5 Leiden mutation  heterozygous (H) [D68.51]  Long-term (current) use of anticoagulants [Z79.01] [Z79.01]             Anticoagulation Episode Summary     INR check location:      Preferred lab:      Send INR reminders to:  [facility]    Comments:  Has home INR machine weekly INR needed         Anticoagulation Care Providers     Provider Role Specialty Phone number    Ling Trammell PA-C Referring Family Medicine [phone]            See the Encounter Report to view Anticoagulation Flowsheet and Dosing Calendar (Go to Encounters tab in chart review, and find the Anticoagulation Therapy Visit)        Elyssa Correa, RN

## 2022-05-04 ENCOUNTER — ANTICOAGULATION THERAPY VISIT (OUTPATIENT)
Dept: ANTICOAGULATION | Facility: OTHER | Age: 52
End: 2022-05-04
Attending: PHYSICIAN ASSISTANT
Payer: COMMERCIAL

## 2022-05-04 DIAGNOSIS — D68.51 FACTOR 5 LEIDEN MUTATION, HETEROZYGOUS (H): ICD-10-CM

## 2022-05-04 DIAGNOSIS — I82.602 BLOOD CLOT OF VEIN IN SHOULDER AREA, LEFT: Primary | ICD-10-CM

## 2022-05-04 DIAGNOSIS — Z79.01 LONG TERM CURRENT USE OF ANTICOAGULANT THERAPY: ICD-10-CM

## 2022-05-04 LAB
HUMAN PAPILLOMA VIRUS 16 DNA: NEGATIVE
HUMAN PAPILLOMA VIRUS 18 DNA: NEGATIVE
HUMAN PAPILLOMA VIRUS FINAL DIAGNOSIS: NORMAL
HUMAN PAPILLOMA VIRUS OTHER HR: NEGATIVE
INR (EXTERNAL): 2.7 (ref 0.9–1.1)

## 2022-05-04 NOTE — PROGRESS NOTES
ANTICOAGULATION FOLLOW-UP CLINIC VISIT    Patient Name:  Daniela Ladd  Date:  2022  Contact Type:  no call needed patient to continue same dose    SUBJECTIVE:  Patient Findings         Clinical Outcomes     Negatives:  Major bleeding event, Thromboembolic event, Anticoagulation-related hospital admission, Anticoagulation-related ED visit, Anticoagulation-related fatality           OBJECTIVE    Recent labs: (last 7 days)     22  0859   INR 2.7*       ASSESSMENT / PLAN  INR assessment THER    Recheck INR In: 1 WEEK    INR Location Home INR      Anticoagulation Summary  As of 2022    INR goal:  2.5-3.5   TTR:  86.8 % (1 y)   INR used for dosin.7 (2022)   Warfarin maintenance plan:  10 mg (2 mg x 5) every Wed, Fri; 8 mg (2 mg x 4) all other days   Full warfarin instructions:  10 mg every Wed, Fri; 8 mg all other days   Weekly warfarin total:  60 mg   No change documented:  Yoly Crowe RN   Plan last modified:  Yoly Crowe RN (3/30/2022)   Next INR check:  2022   Priority:  High   Target end date:  Indefinite    Indications    Blood clot of vein in shoulder area  left [I82.602]  Factor 5 Leiden mutation  heterozygous (H) [D68.51]  Long-term (current) use of anticoagulants [Z79.01] [Z79.01]             Anticoagulation Episode Summary     INR check location:      Preferred lab:      Send INR reminders to:  [facility]    Comments:  Has home INR machine weekly INR needed         Anticoagulation Care Providers     Provider Role Specialty Phone number    Ling Trammell PA-C Referring Family Medicine [phone]            See the Encounter Report to view Anticoagulation Flowsheet and Dosing Calendar (Go to Encounters tab in chart review, and find the Anticoagulation Therapy Visit)        Yoly Crowe RN

## 2022-05-05 LAB
BKR LAB AP GYN ADEQUACY: NORMAL
BKR LAB AP GYN INTERPRETATION: NORMAL
BKR LAB AP GYN OTHER FINDINGS: NORMAL
BKR LAB AP HPV REFLEX: NORMAL
BKR LAB AP PREVIOUS ABNORMAL: NORMAL
PATH REPORT.COMMENTS IMP SPEC: NORMAL
PATH REPORT.COMMENTS IMP SPEC: NORMAL
PATH REPORT.RELEVANT HX SPEC: NORMAL

## 2022-05-11 ENCOUNTER — ANTICOAGULATION THERAPY VISIT (OUTPATIENT)
Dept: ANTICOAGULATION | Facility: OTHER | Age: 52
End: 2022-05-11
Attending: PHYSICIAN ASSISTANT
Payer: COMMERCIAL

## 2022-05-11 DIAGNOSIS — Z79.01 LONG TERM CURRENT USE OF ANTICOAGULANT THERAPY: ICD-10-CM

## 2022-05-11 DIAGNOSIS — D68.51 FACTOR 5 LEIDEN MUTATION, HETEROZYGOUS (H): ICD-10-CM

## 2022-05-11 DIAGNOSIS — I82.602 BLOOD CLOT OF VEIN IN SHOULDER AREA, LEFT: Primary | ICD-10-CM

## 2022-05-11 LAB — INR (EXTERNAL): 3.3 (ref 0.9–1.1)

## 2022-05-11 NOTE — PROGRESS NOTES
ANTICOAGULATION FOLLOW-UP CLINIC VISIT    Patient Name:  Daniela Ladd  Date:  5/11/2022  Contact Type:  No contact needed.    SUBJECTIVE:         OBJECTIVE    Recent labs: (last 7 days)     05/11/22  0924   INR 3.3*       ASSESSMENT / PLAN  INR assessment THER    Recheck INR In: 1 WEEK    INR Location Home INR      Anticoagulation Summary  As of 5/11/2022    INR goal:  2.5-3.5   TTR:  88.0 % (1 y)   INR used for dosing:  3.3 (5/11/2022)   Warfarin maintenance plan:  10 mg (2 mg x 5) every Wed, Fri; 8 mg (2 mg x 4) all other days   Full warfarin instructions:  10 mg every Wed, Fri; 8 mg all other days   Weekly warfarin total:  60 mg   No change documented:  Jo-Ann Monteiro RN   Plan last modified:  Yoly Crowe RN (3/30/2022)   Next INR check:  5/18/2022   Priority:  High   Target end date:  Indefinite    Indications    Blood clot of vein in shoulder area  left [I82.602]  Factor 5 Leiden mutation  heterozygous (H) [D68.51]  Long-term (current) use of anticoagulants [Z79.01] [Z79.01]             Anticoagulation Episode Summary     INR check location:      Preferred lab:      Send INR reminders to:  [facility]    Comments:  Has home INR machine weekly INR needed         Anticoagulation Care Providers     Provider Role Specialty Phone number    PENELOPEglendaLing PA-C Referring Family Medicine [phone]            See the Encounter Report to view Anticoagulation Flowsheet and Dosing Calendar (Go to Encounters tab in chart review, and find the Anticoagulation Therapy Visit)    No changes.  Jo-Ann Monteiro, MAR[facility]A

## 2022-05-18 ENCOUNTER — ANTICOAGULATION THERAPY VISIT (OUTPATIENT)
Dept: ANTICOAGULATION | Facility: OTHER | Age: 52
End: 2022-05-18
Attending: PHYSICIAN ASSISTANT
Payer: COMMERCIAL

## 2022-05-18 DIAGNOSIS — D68.51 FACTOR 5 LEIDEN MUTATION, HETEROZYGOUS (H): ICD-10-CM

## 2022-05-18 DIAGNOSIS — I82.602 BLOOD CLOT OF VEIN IN SHOULDER AREA, LEFT: Primary | ICD-10-CM

## 2022-05-18 DIAGNOSIS — Z79.01 LONG TERM CURRENT USE OF ANTICOAGULANT THERAPY: ICD-10-CM

## 2022-05-18 LAB — INR (EXTERNAL): 2.4 (ref 0.9–1.1)

## 2022-05-18 NOTE — PROGRESS NOTES
ANTICOAGULATION MANAGEMENT     Daniela Ladd 51 year old female is on warfarin with sub therapeutic INR result. (Goal INR 2.5-3.5)    Recent labs: (last 7 days)     05/18/22  0617   INR 2.4*       ASSESSMENT       Source(s): Chart Review       Warfarin doses taken: Warfarin taken as instructed    Diet: No new diet changes identified    New illness, injury, or hospitalization: No    Medication/supplement changes: None noted    Signs or symptoms of bleeding or clotting: No    Previous INR: Therapeutic last 2(+) visits    Additional findings: None       PLAN     Recommended plan for no diet, medication or health factor changes affecting INR     Dosing Instructions: continue your current warfarin dose with next INR in 1 week       Summary  As of 5/18/2022    Full warfarin instructions:  10 mg every Wed, Fri; 8 mg all other days   Next INR check:  5/25/2022             Sent [employer] message with dosing and follow up instructions    Patient to recheck with home meter    Education provided: None required    Plan made per [facility] anticoagulation protocol    Elyssa Correa RN  Anticoagulation Clinic  5/18/2022    _______________________________________________________________________     Anticoagulation Episode Summary     Current INR goal:  2.5-3.5   TTR:  88.6 % (1 y)   Target end date:  Indefinite   Send INR reminders to:  [facility]    Indications    Blood clot of vein in shoulder area  left [I82.602]  Factor 5 Leiden mutation  heterozygous (H) [D68.51]  Long-term (current) use of anticoagulants [Z79.01] [Z79.01]           Comments:  Has home INR machine weekly INR needed            Anticoagulation Care Providers     Provider Role Specialty Phone number    Ling Trammell PA-C Referring Family Medicine [phone]

## 2022-05-25 ENCOUNTER — ANTICOAGULATION THERAPY VISIT (OUTPATIENT)
Dept: ANTICOAGULATION | Facility: OTHER | Age: 52
End: 2022-05-25
Attending: PHYSICIAN ASSISTANT
Payer: COMMERCIAL

## 2022-05-25 DIAGNOSIS — Z79.01 LONG TERM CURRENT USE OF ANTICOAGULANT THERAPY: ICD-10-CM

## 2022-05-25 DIAGNOSIS — I82.602 BLOOD CLOT OF VEIN IN SHOULDER AREA, LEFT: Primary | ICD-10-CM

## 2022-05-25 DIAGNOSIS — D68.51 FACTOR 5 LEIDEN MUTATION, HETEROZYGOUS (H): ICD-10-CM

## 2022-05-25 LAB — INR (EXTERNAL): 3.2 (ref 0.9–1.1)

## 2022-05-25 NOTE — PROGRESS NOTES
ANTICOAGULATION MANAGEMENT     Daniela Ladd 51 year old female is on warfarin with therapeutic INR result. (Goal INR 2.5-3.5)    Recent labs: (last 7 days)     05/25/22  1057   INR 3.2*       ASSESSMENT       Source(s): Chart Review    Previous INR was Therapeutic last 2(+) visits    Medication, diet, health changes since last INR chart reviewed; none identified           PLAN     Recommended plan for no diet, medication or health factor changes affecting INR     Dosing Instructions: continue your current warfarin dose with next INR in 1 week       Summary  As of 5/25/2022    Full warfarin instructions:  10 mg every Wed, Fri; 8 mg all other days   Next INR check:  6/1/2022             no call needed patient to continue same dose    Patient to recheck with home meter    Education provided: None required    Plan made per [facility] anticoagulation protocol    Yoly Crowe RN  Anticoagulation Clinic  5/25/2022    _______________________________________________________________________     Anticoagulation Episode Summary     Current INR goal:  2.5-3.5   TTR:  88.6 % (1 y)   Target end date:  Indefinite   Send INR reminders to:  [facility]    Indications    Blood clot of vein in shoulder area  left [I82.602]  Factor 5 Leiden mutation  heterozygous (H) [D68.51]  Long-term (current) use of anticoagulants [Z79.01] [Z79.01]           Comments:  Has home INR machine weekly INR needed            Anticoagulation Care Providers     Provider Role Specialty Phone number    Ling Trammell PA-C Referring Family Medicine [phone]

## 2022-06-01 ENCOUNTER — ANTICOAGULATION THERAPY VISIT (OUTPATIENT)
Dept: ANTICOAGULATION | Facility: OTHER | Age: 52
End: 2022-06-01
Attending: PHYSICIAN ASSISTANT
Payer: COMMERCIAL

## 2022-06-01 DIAGNOSIS — I82.602 BLOOD CLOT OF VEIN IN SHOULDER AREA, LEFT: Primary | ICD-10-CM

## 2022-06-01 DIAGNOSIS — D68.51 FACTOR 5 LEIDEN MUTATION, HETEROZYGOUS (H): ICD-10-CM

## 2022-06-01 DIAGNOSIS — Z79.01 LONG TERM CURRENT USE OF ANTICOAGULANT THERAPY: ICD-10-CM

## 2022-06-01 LAB — INR (EXTERNAL): 2.9 (ref 0.9–1.1)

## 2022-06-01 NOTE — PROGRESS NOTES
ANTICOAGULATION MANAGEMENT     Daniela Ladd 51 year old female is on warfarin with therapeutic INR result. (Goal INR 2.5-3.5)    Recent labs: (last 7 days)     06/01/22  0534   INR 2.9*       ASSESSMENT       Source(s): Chart Review       Warfarin doses taken: Warfarin taken as instructed    Diet: No new diet changes identified    New illness, injury, or hospitalization: No    Medication/supplement changes: None noted    Signs or symptoms of bleeding or clotting: No    Previous INR: Therapeutic last 2(+) visits    Additional findings: None       PLAN     Recommended plan for no diet, medication or health factor changes affecting INR     Dosing Instructions: continue your current warfarin dose with next INR in 1 week       Summary  As of 6/1/2022    Full warfarin instructions:  10 mg every Wed, Fri; 8 mg all other days   Next INR check:  6/8/2022             No need to contact, continue same dose    Patient to recheck with home meter    Education provided: None required    Plan made per [facility] anticoagulation protocol    Elyssa Correa RN  Anticoagulation Clinic  6/1/2022    _______________________________________________________________________     Anticoagulation Episode Summary     Current INR goal:  2.5-3.5   TTR:  88.6 % (1 y)   Target end date:  Indefinite   Send INR reminders to:  [facility]    Indications    Blood clot of vein in shoulder area  left [I82.602]  Factor 5 Leiden mutation  heterozygous (H) [D68.51]  Long-term (current) use of anticoagulants [Z79.01] [Z79.01]           Comments:  Has home INR machine weekly INR needed            Anticoagulation Care Providers     Provider Role Specialty Phone number    Ling Trammell PA-C Referring Family Medicine [phone]

## 2022-06-08 ENCOUNTER — ANTICOAGULATION THERAPY VISIT (OUTPATIENT)
Dept: ANTICOAGULATION | Facility: OTHER | Age: 52
End: 2022-06-08
Attending: PHYSICIAN ASSISTANT
Payer: COMMERCIAL

## 2022-06-08 DIAGNOSIS — I82.602 BLOOD CLOT OF VEIN IN SHOULDER AREA, LEFT: Primary | ICD-10-CM

## 2022-06-08 DIAGNOSIS — D68.51 FACTOR 5 LEIDEN MUTATION, HETEROZYGOUS (H): ICD-10-CM

## 2022-06-08 DIAGNOSIS — Z79.01 LONG TERM CURRENT USE OF ANTICOAGULANT THERAPY: ICD-10-CM

## 2022-06-08 LAB — INR (EXTERNAL): 3.5 (ref 0.9–1.1)

## 2022-06-08 NOTE — PROGRESS NOTES
ANTICOAGULATION  MANAGEMENT-Home Monitor Managed by Exception    Daniela Crumpartney 51 year old female is on warfarin with therapeutic INR result. (Goal INR 2.5-3.5)    Recent labs: (last 7 days)     06/08/22  0538   INR 3.5*         Previous INR was Therapeutic    Medication, diet, health changes since last INR:chart reviewed; none identified  Contacted within the last 12 weeks by phone on 3/30/22 Due 6/22/22 patient called states she has been taking 10 mg x 3 days and 8 mg all other days.     ANÍBAL Suarez was NOT contacted regarding therapeutic result today per home monitoring policy manage by exception agreement.   Current warfarin dose is to be continued:     Summary  As of 6/8/2022    Full warfarin instructions:  10 mg every Wed, Fri; 8 mg all other days   Next INR check:  6/15/2022           ?   Elyssa Correa RN  Anticoagulation Clinic  6/8/2022    _______________________________________________________________________     Anticoagulation Episode Summary     Current INR goal:  2.5-3.5   TTR:  88.8 % (1 y)   Target end date:  Indefinite   Send INR reminders to:  [facility]    Indications    Blood clot of vein in shoulder area  left [I82.602]  Factor 5 Leiden mutation  heterozygous (H) [D68.51]  Long-term (current) use of anticoagulants [Z79.01] [Z79.01]           Comments:  Has home INR machine weekly INR needed            Anticoagulation Care Providers     Provider Role Specialty Phone number    Ling Trammell PA-C Referring Family Medicine [phone]

## 2022-06-15 ENCOUNTER — ANTICOAGULATION THERAPY VISIT (OUTPATIENT)
Dept: ANTICOAGULATION | Facility: OTHER | Age: 52
End: 2022-06-15
Attending: PHYSICIAN ASSISTANT
Payer: COMMERCIAL

## 2022-06-15 DIAGNOSIS — Z79.01 LONG TERM CURRENT USE OF ANTICOAGULANT THERAPY: ICD-10-CM

## 2022-06-15 DIAGNOSIS — I82.602 BLOOD CLOT OF VEIN IN SHOULDER AREA, LEFT: Primary | ICD-10-CM

## 2022-06-15 DIAGNOSIS — D68.51 FACTOR 5 LEIDEN MUTATION, HETEROZYGOUS (H): ICD-10-CM

## 2022-06-15 LAB — INR (EXTERNAL): 3.3 (ref 0.9–1.1)

## 2022-06-15 NOTE — PROGRESS NOTES
ANTICOAGULATION  MANAGEMENT-Home Monitor Managed by Exception    Daniela Ladd 51 year old female is on warfarin with therapeutic INR result. (Goal INR 2.5-3.5)    Recent labs: (last 7 days)     06/15/22  0829   INR 3.3*         Previous INR was Therapeutic    Medication, diet, health changes since last INR:chart reviewed; none identified    Contacted within the last 12 weeks by phone on n/a      PLAN     Daniela was NOT contacted regarding therapeutic result today per home monitoring policy manage by exception agreement.   Current warfarin dose is to be continued:     Summary  As of 6/15/2022    Full warfarin instructions:  10 mg every Mon, Wed, Fri; 8 mg all other days   Next INR check:  6/22/2022           ?   Jo-Ann Monteiro, RN  Anticoagulation Clinic  6/15/2022    _______________________________________________________________________     Anticoagulation Episode Summary     Current INR goal:  2.5-3.5   TTR:  89.6 % (1 y)   Target end date:  Indefinite   Send INR reminders to:  [facility]    Indications    Blood clot of vein in shoulder area  left [I82.602]  Factor 5 Leiden mutation  heterozygous (H) [D68.51]  Long-term (current) use of anticoagulants [Z79.01] [Z79.01]           Comments:  Has home INR machine weekly INR needed            Anticoagulation Care Providers     Provider Role Specialty Phone number    Ling Trammell PA-C Referring Family Medicine [phone]

## 2022-06-17 ENCOUNTER — VIRTUAL VISIT (OUTPATIENT)
Dept: FAMILY MEDICINE | Facility: OTHER | Age: 52
End: 2022-06-17
Attending: FAMILY MEDICINE
Payer: COMMERCIAL

## 2022-06-17 DIAGNOSIS — U07.1 INFECTION DUE TO 2019 NOVEL CORONAVIRUS: Primary | ICD-10-CM

## 2022-06-17 PROCEDURE — 99212 OFFICE O/P EST SF 10 MIN: CPT | Mod: TEL | Performed by: FAMILY MEDICINE

## 2022-06-17 RX ORDER — BENZONATATE 100 MG/1
100 CAPSULE ORAL 3 TIMES DAILY PRN
Qty: 15 CAPSULE | Refills: 0 | Status: SHIPPED | OUTPATIENT
Start: 2022-06-17 | End: 2022-06-22

## 2022-06-17 NOTE — PROGRESS NOTES
Daniela is a 51 year old who is being evaluated via a billable telephone visit.      What phone number would you like to be contacted at? [phone]  How would you like to obtain your AVS? MyChart    Assessment & Plan       ICD-10-CM    1. Infection due to 2019 novel coronavirus  U07.1 benzonatate (TESSALON) 100 MG capsule       She does not meet criteria for Paxlovid and it interacts with warfarin. Patient preferred to avoid use unless it was clearly going to help. Risk outweighs benefit.  Reevaluate risk-benefit on Paxlovid if symptoms worsen.  Had delta in December, received monoclonal antibodies.  Current monoclonal antibody treatments are not particularly effective against omicron.  Has OTC medications to help symptoms.  Interested in use of Tessalon Perles if needed for cough suppression, prescription sent       Lázaro Vaughan MD  [facility]        Subjective   Daniela is a 51 year old, presenting for the following health issues:  Covid Concern      HPI       COVID-19 Symptom Review  How many days ago did these symptoms start? 24 hours ago    Are any of the following symptoms significant for you?    New or worsening difficulty breathing? No    Worsening cough? Yes, wet    Fever or chills? Yes, the highest temperature was 99.9    Headache: YES    Sore throat: YES    Chest pain: no, heartburn    Diarrhea: no    Body aches? YES    What treatments has patient tried? Acetaminophen   Does patient live in a nursing home, group home, or shelter? no  Does patient have a way to get food/medications during quarantined? Yes, I have a friend or family member who can help me.    COVID in early December, received monoclonal antibodies  Current symptoms are worse than that time, but more cold symptoms and less respiratory    Review of Systems   As above      Objective           Vitals:  No vitals were obtained today due to virtual visit.    Physical Exam   healthy, alert and no distress  PSYCH: Alert  and oriented times 3; coherent speech, normal   rate and volume, able to articulate logical thoughts, able   to abstract reason, no tangential thoughts, no hallucinations   or delusions  Her affect is normal  RESP: No cough, no audible wheezing, able to talk in full sentences  Remainder of exam unable to be completed due to telephone visits          Phone call duration: 13 minutes    .  ..

## 2022-06-17 NOTE — PATIENT INSTRUCTIONS

## 2022-06-22 ENCOUNTER — ANTICOAGULATION THERAPY VISIT (OUTPATIENT)
Dept: ANTICOAGULATION | Facility: OTHER | Age: 52
End: 2022-06-22
Attending: PHYSICIAN ASSISTANT
Payer: COMMERCIAL

## 2022-06-22 DIAGNOSIS — D68.51 FACTOR 5 LEIDEN MUTATION, HETEROZYGOUS (H): ICD-10-CM

## 2022-06-22 DIAGNOSIS — I82.602 BLOOD CLOT OF VEIN IN SHOULDER AREA, LEFT: Primary | ICD-10-CM

## 2022-06-22 DIAGNOSIS — Z79.01 LONG TERM CURRENT USE OF ANTICOAGULANT THERAPY: ICD-10-CM

## 2022-06-22 LAB — INR (EXTERNAL): 4.6 (ref 0.9–1.1)

## 2022-06-22 NOTE — PROGRESS NOTES
ANTICOAGULATION MANAGEMENT     Daniela CRUZ Julee 51 year old female is on warfarin with supratherapeutic INR result. (Goal INR 2.5-3.5)    Recent labs: (last 7 days)     06/22/22  0800   INR 4.6*       ASSESSMENT       Source(s): Chart Review and Patient/Caregiver Call       Warfarin doses taken: Warfarin taken as instructed    Diet: Decreased greens/vitamin K in diet; plans to resume previous intake    New illness, injury, or hospitalization: Yes: COVID positive on Friday, fever Thursday, back at work today    Medication/supplement changes: pebbles was taking a lot of tylenol during her illness with COVID but is starting to take less now that she is back at work.    Signs or symptoms of bleeding or clotting: No    Previous INR: Therapeutic last 2(+) visits    Additional findings: None       PLAN     Recommended plan for temporary change(s) affecting INR     Dosing Instructions: hold 1 doses then continue your current warfarin dose with next INR in 2 days       Summary  As of 6/22/2022    Full warfarin instructions:  6/22: Hold; Otherwise 10 mg every Mon, Wed, Fri; 8 mg all other days   Next INR check:  6/24/2022             Telephone call with Daniela who verbalizes understanding and agrees to plan    Patient to recheck with home meter    Education provided: None required    Plan made per [facility]agulation protocol and patient's preference.     Jo-Ann Monteiro RN  Anticoagulation Clinic  6/22/2022    _______________________________________________________________________     Anticoagulation Episode Summary     Current INR goal:  2.5-3.5   TTR:  88.0 % (1 y)   Target end date:  Indefinite   Send INR reminders to:  [facility]    Indications    Blood clot of vein in shoulder area  left [I82.602]  Factor 5 Leiden mutation  heterozygous (H) [D68.51]  Long-term (current) use of anticoagulants [Z79.01] [Z79.01]           Comments:  Has home INR machine weekly INR needed            Anticoagulation Care  Providers     Provider Role Specialty Phone number    Ling Trammell PA-C Referring Family Medicine [phone]

## 2022-06-24 ENCOUNTER — ANTICOAGULATION THERAPY VISIT (OUTPATIENT)
Dept: ANTICOAGULATION | Facility: OTHER | Age: 52
End: 2022-06-24
Attending: PHYSICIAN ASSISTANT
Payer: COMMERCIAL

## 2022-06-24 DIAGNOSIS — I82.602 BLOOD CLOT OF VEIN IN SHOULDER AREA, LEFT: Primary | ICD-10-CM

## 2022-06-24 DIAGNOSIS — Z79.01 LONG TERM CURRENT USE OF ANTICOAGULANT THERAPY: ICD-10-CM

## 2022-06-24 DIAGNOSIS — D68.51 FACTOR 5 LEIDEN MUTATION, HETEROZYGOUS (H): ICD-10-CM

## 2022-06-24 LAB — INR (EXTERNAL): 2.7 (ref 0.9–1.1)

## 2022-06-24 NOTE — PROGRESS NOTES
ANTICOAGULATION MANAGEMENT     Daniela NANCY Ladd 51 year old female is on warfarin with therapeutic INR result. (Goal INR 2.5-3.5)    Recent labs: (last 7 days)     06/24/22  0537   INR 2.7*       ASSESSMENT       Source(s): Chart Review and Patient/Caregiver Call       Warfarin doses taken: Warfarin taken as instructed    Diet: Decreased greens/vitamin K in diet; plans to resume previous intake    New illness, injury, or hospitalization: Yes: Covid    Medication/supplement changes: None noted    Signs or symptoms of bleeding or clotting: No    Previous INR: Supratherapeutic    Additional findings: None       PLAN     Recommended plan for temporary change(s) affecting INR     Dosing Instructions: continue your current warfarin dose with next INR in 5 days       Summary  As of 6/24/2022    Full warfarin instructions:  10 mg every Mon, Wed, Fri; 8 mg all other days   Next INR check:  6/29/2022             Telephone call with Daniela who verbalizes understanding and agrees to plan    Patient to recheck with home meter    Education provided: Please call back if any changes to your diet, medications or how you've been taking warfarin    Plan made per [facility] protocol    Elyssa Correa, RN  Anticoagulation Clinic  6/24/2022    _______________________________________________________________________     Anticoagulation Episode Summary     Current INR goal:  2.5-3.5   TTR:  87.8 % (1 y)   Target end date:  Indefinite   Send INR reminders to:  [facility]    Indications    Blood clot of vein in shoulder area  left [I82.602]  Factor 5 Leiden mutation  heterozygous (H) [D68.51]  Long-term (current) use of anticoagulants [Z79.01] [Z79.01]           Comments:  Has home INR machine weekly INR needed            Anticoagulation Care Providers     Provider Role Specialty Phone number    Ling Trammell PA-C Referring Family Medicine [phone]          \

## 2022-06-29 ENCOUNTER — ANTICOAGULATION THERAPY VISIT (OUTPATIENT)
Dept: ANTICOAGULATION | Facility: OTHER | Age: 52
End: 2022-06-29
Attending: PHYSICIAN ASSISTANT
Payer: COMMERCIAL

## 2022-06-29 DIAGNOSIS — I82.602 BLOOD CLOT OF VEIN IN SHOULDER AREA, LEFT: Primary | ICD-10-CM

## 2022-06-29 DIAGNOSIS — D68.51 FACTOR 5 LEIDEN MUTATION, HETEROZYGOUS (H): ICD-10-CM

## 2022-06-29 DIAGNOSIS — Z79.01 LONG TERM CURRENT USE OF ANTICOAGULANT THERAPY: ICD-10-CM

## 2022-06-29 LAB — INR (EXTERNAL): 3.2 (ref 0.9–1.1)

## 2022-06-29 NOTE — PROGRESS NOTES
ANTICOAGULATION MANAGEMENT     Daniela Ladd 51 year old female is on warfarin with therapeutic INR result. (Goal INR 2.5-3.5)    Recent labs: (last 7 days)     06/29/22  0831   INR 3.2*       ASSESSMENT       Source(s): Chart Review and Patient/Caregiver Call       Warfarin doses taken: Warfarin taken as instructed    Diet: No new diet changes identified    New illness, injury, or hospitalization: No    Medication/supplement changes: None noted    Signs or symptoms of bleeding or clotting: No    Previous INR: Therapeutic last visit; previously outside of goal range    Additional findings: None       PLAN     Recommended plan for no diet, medication or health factor changes affecting INR     Dosing Instructions: continue your current warfarin dose with next INR in 1 week       Summary  As of 6/29/2022    Full warfarin instructions:  10 mg every Mon, Wed, Fri; 8 mg all other days   Next INR check:  7/6/2022             Telephone call with Daniela who verbalizes understanding and agrees to plan    Patient to recheck with home meter    Education provided: None required    Plan made per [facility] protocol    Yoly Crowe, RN  Anticoagulation Clinic  6/29/2022    _______________________________________________________________________     Anticoagulation Episode Summary     Current INR goal:  2.5-3.5   TTR:  87.8 % (1 y)   Target end date:  Indefinite   Send INR reminders to:  [facility]    Indications    Blood clot of vein in shoulder area  left [I82.602]  Factor 5 Leiden mutation  heterozygous (H) [D68.51]  Long-term (current) use of anticoagulants [Z79.01] [Z79.01]           Comments:  Has home INR machine weekly INR needed            Anticoagulation Care Providers     Provider Role Specialty Phone number    Valeri, Ling Rodriguez PA-C Referring Family Medicine [phone]

## 2022-07-04 LAB — INR (EXTERNAL): 4.2 (ref 0.9–1.1)

## 2022-07-05 ENCOUNTER — ANTICOAGULATION THERAPY VISIT (OUTPATIENT)
Dept: ANTICOAGULATION | Facility: OTHER | Age: 52
End: 2022-07-05
Attending: PHYSICIAN ASSISTANT
Payer: COMMERCIAL

## 2022-07-05 DIAGNOSIS — I82.602 BLOOD CLOT OF VEIN IN SHOULDER AREA, LEFT: Primary | ICD-10-CM

## 2022-07-05 DIAGNOSIS — Z79.01 LONG TERM CURRENT USE OF ANTICOAGULANT THERAPY: ICD-10-CM

## 2022-07-05 DIAGNOSIS — D68.51 FACTOR 5 LEIDEN MUTATION, HETEROZYGOUS (H): ICD-10-CM

## 2022-07-05 NOTE — PROGRESS NOTES
ANTICOAGULATION MANAGEMENT     Daniela Ladd 51 year old female is on warfarin with supratherapeutic INR result. (Goal INR 2.5-3.5)    Recent labs: (last 7 days)     07/04/22  1016   INR 4.2*       ASSESSMENT       Source(s): Chart Review and Patient/Caregiver Call       Warfarin doses taken: Warfarin taken as instructed    Diet: No new diet changes identified    New illness, injury, or hospitalization: Yes: had covid  Increased tylenol and flonase use    Medication/supplement changes: None noted    Signs or symptoms of bleeding or clotting: No    Previous INR: Therapeutic last visit; previously outside of goal range    Additional findings: None       PLAN     Recommended plan for temporary change(s) affecting INR     Dosing Instructions: partial hold then decrease your warfarin dose (9.7% change) with next INR in 1 week       Summary  As of 7/5/2022    Full warfarin instructions:  8 mg every day   Next INR check:  7/13/2022             Telephone call with Daniela who verbalizes understanding and agrees to plan    Patient to recheck with home meter    Education provided: None required    Plan made per [facility]agulation protocol    Yoly Crowe, RN  Anticoagulation Clinic  7/5/2022    _______________________________________________________________________     Anticoagulation Episode Summary     Current INR goal:  2.5-3.5   TTR:  86.8 % (1 y)   Target end date:  Indefinite   Send INR reminders to:  [facility]    Indications    Blood clot of vein in shoulder area  left [I82.602]  Factor 5 Leiden mutation  heterozygous (H) [D68.51]  Long-term (current) use of anticoagulants [Z79.01] [Z79.01]           Comments:  Has home INR machine weekly INR needed            Anticoagulation Care Providers     Provider Role Specialty Phone number    Ling Trammell PA-C Referring Family Medicine [phone]

## 2022-07-13 ENCOUNTER — ANTICOAGULATION THERAPY VISIT (OUTPATIENT)
Dept: ANTICOAGULATION | Facility: OTHER | Age: 52
End: 2022-07-13
Attending: PHYSICIAN ASSISTANT
Payer: COMMERCIAL

## 2022-07-13 DIAGNOSIS — Z79.01 LONG TERM CURRENT USE OF ANTICOAGULANT THERAPY: ICD-10-CM

## 2022-07-13 DIAGNOSIS — I82.602 BLOOD CLOT OF VEIN IN SHOULDER AREA, LEFT: Primary | ICD-10-CM

## 2022-07-13 DIAGNOSIS — D68.51 FACTOR 5 LEIDEN MUTATION, HETEROZYGOUS (H): ICD-10-CM

## 2022-07-13 LAB — INR (EXTERNAL): 3.1 (ref 0.9–1.1)

## 2022-07-13 NOTE — PROGRESS NOTES
ANTICOAGULATION  MANAGEMENT-Home Monitor Managed by Exception    Daniela CRUZ Julee 51 year old female is on warfarin with therapeutic INR result. (Goal INR 2.5-3.5)    Recent labs: (last 7 days)     07/13/22  0539   INR 3.1*         Previous INR was Therapeutic    Medication, diet, health changes since last INR:chart reviewed; none identified    Contacted within the last 12 weeks by phone on 7/4/22      PLAN     Daniela was NOT contacted regarding therapeutic result today per home monitoring policy manage by exception agreement.   Current warfarin dose is to be continued:     Summary  As of 7/13/2022    Full warfarin instructions:  8 mg every day   Next INR check:  7/20/2022           ?   Elyssa Correa RN  Anticoagulation Clinic  7/13/2022    _______________________________________________________________________     Anticoagulation Episode Summary     Current INR goal:  2.5-3.5   TTR:  85.3 % (1 y)   Target end date:  Indefinite   Send INR reminders to:  [facility]    Indications    Blood clot of vein in shoulder area  left [I82.602]  Factor 5 Leiden mutation  heterozygous (H) [D68.51]  Long-term (current) use of anticoagulants [Z79.01] [Z79.01]           Comments:  Has home INR machine weekly INR needed            Anticoagulation Care Providers     Provider Role Specialty Phone number    Ling Trammell PA-C Referring Family Medicine [phone]

## 2022-07-19 ENCOUNTER — TRANSFERRED RECORDS (OUTPATIENT)
Dept: HEALTH INFORMATION MANAGEMENT | Facility: OTHER | Age: 52
End: 2022-07-19

## 2022-07-20 ENCOUNTER — ANTICOAGULATION THERAPY VISIT (OUTPATIENT)
Dept: ANTICOAGULATION | Facility: OTHER | Age: 52
End: 2022-07-20
Payer: COMMERCIAL

## 2022-07-20 DIAGNOSIS — I82.602 BLOOD CLOT OF VEIN IN SHOULDER AREA, LEFT: Primary | ICD-10-CM

## 2022-07-20 DIAGNOSIS — Z79.01 LONG TERM CURRENT USE OF ANTICOAGULANT THERAPY: ICD-10-CM

## 2022-07-20 DIAGNOSIS — D68.51 FACTOR 5 LEIDEN MUTATION, HETEROZYGOUS (H): ICD-10-CM

## 2022-07-20 LAB — INR (EXTERNAL): 2.7 (ref 0.9–1.1)

## 2022-07-20 NOTE — PROGRESS NOTES
ANTICOAGULATION  MANAGEMENT-Home Monitor Managed by Exception    Daniela NANCY Ladd 51 year old female is on warfarin with therapeutic INR result. (Goal INR 2.5-3.5)    Recent labs: (last 7 days)     07/20/22  0822   INR 2.7*         Previous INR was Therapeutic    Medication, diet, health changes since last INR:chart reviewed; none identified    Contacted within the last 12 weeks by phone on 7/6/22      PLAN     Daniela was NOT contacted regarding therapeutic result today per home monitoring policy manage by exception agreement.   Current warfarin dose is to be continued:     Summary  As of 7/20/2022    Full warfarin instructions:  8 mg every day   Next INR check:  7/27/2022           ?   Yoly Crowe RN  Anticoagulation Clinic  7/20/2022    _______________________________________________________________________     Anticoagulation Episode Summary     Current INR goal:  2.5-3.5   TTR:  85.3 % (1 y)   Target end date:  Indefinite   Send INR reminders to:  [facility]    Indications    Blood clot of vein in shoulder area  left [I82.602]  Factor 5 Leiden mutation  heterozygous (H) [D68.51]  Long-term (current) use of anticoagulants [Z79.01] [Z79.01]           Comments:  Has home INR machine weekly INR needed            Anticoagulation Care Providers     Provider Role Specialty Phone number    Ling Trammell PA-C Referring Family Medicine [phone]

## 2022-07-27 ENCOUNTER — ANTICOAGULATION THERAPY VISIT (OUTPATIENT)
Dept: ANTICOAGULATION | Facility: OTHER | Age: 52
End: 2022-07-27
Attending: PHYSICIAN ASSISTANT
Payer: COMMERCIAL

## 2022-07-27 DIAGNOSIS — I82.602 BLOOD CLOT OF VEIN IN SHOULDER AREA, LEFT: Primary | ICD-10-CM

## 2022-07-27 DIAGNOSIS — D68.51 FACTOR 5 LEIDEN MUTATION, HETEROZYGOUS (H): ICD-10-CM

## 2022-07-27 DIAGNOSIS — Z79.01 LONG TERM CURRENT USE OF ANTICOAGULANT THERAPY: ICD-10-CM

## 2022-07-27 LAB — INR (EXTERNAL): 2.6 (ref 0.9–1.1)

## 2022-07-27 NOTE — PROGRESS NOTES
ANTICOAGULATION  MANAGEMENT-Home Monitor Managed by Exception    Daniela NANCY Ladd 51 year old female is on warfarin with therapeutic INR result. (Goal INR 2.5-3.5)    Recent labs: (last 7 days)     07/27/22  0842   INR 2.6*         Previous INR was Therapeutic    Medication, diet, health changes since last INR:chart reviewed; none identified    Contacted within the last 12 weeks by phone on 7/5/22      PLAN     Daniela was NOT contacted regarding therapeutic result today per home monitoring policy manage by exception agreement.   Current warfarin dose is to be continued: spoke with patient reviewed dosing increased dose    Summary  As of 7/27/2022    Full warfarin instructions:  7/27: 10 mg; Otherwise 10 mg every Wed, Fri; 8 mg all other days   Next INR check:  8/3/2022           ?   Yoly Crowe RN  Anticoagulation Clinic  7/27/2022    _______________________________________________________________________     Anticoagulation Episode Summary     Current INR goal:  2.5-3.5   TTR:  85.3 % (1 y)   Target end date:  Indefinite   Send INR reminders to:  [facility]    Indications    Blood clot of vein in shoulder area  left [I82.602]  Factor 5 Leiden mutation  heterozygous (H) [D68.51]  Long-term (current) use of anticoagulants [Z79.01] [Z79.01]           Comments:  Has home INR machine weekly INR needed            Anticoagulation Care Providers     Provider Role Specialty Phone number    Valeri, Ling Rodriguez PA-C Referring Family Medicine [phone]

## 2022-08-03 ENCOUNTER — ANTICOAGULATION THERAPY VISIT (OUTPATIENT)
Dept: ANTICOAGULATION | Facility: OTHER | Age: 52
End: 2022-08-03
Payer: COMMERCIAL

## 2022-08-03 DIAGNOSIS — I82.602 BLOOD CLOT OF VEIN IN SHOULDER AREA, LEFT: Primary | ICD-10-CM

## 2022-08-03 DIAGNOSIS — D68.51 FACTOR 5 LEIDEN MUTATION, HETEROZYGOUS (H): ICD-10-CM

## 2022-08-03 DIAGNOSIS — Z79.01 LONG TERM CURRENT USE OF ANTICOAGULANT THERAPY: ICD-10-CM

## 2022-08-03 LAB — INR (EXTERNAL): 3.1 (ref 0.9–1.1)

## 2022-08-03 NOTE — PROGRESS NOTES
ANTICOAGULATION  MANAGEMENT-Home Monitor Managed by Exception    Danielamariusz Ladd 52 year old female is on warfarin with therapeutic INR result. (Goal INR 2.5-3.5)    Recent labs: (last 7 days)     08/03/22  0915   INR 3.1*         Previous INR was Therapeutic    Medication, diet, health changes since last INR:chart reviewed; none identified    Contacted within the last 12 weeks by phone on 7/27/22      PLAN     Daniela was NOT contacted regarding therapeutic result today per home monitoring policy manage by exception agreement.   Current warfarin dose is to be continued:     Summary  As of 8/3/2022    Full warfarin instructions:  10 mg every Wed, Fri; 8 mg all other days   Next INR check:  8/10/2022           ?   Yoly Crowe, RN  Anticoagulation Clinic  8/3/2022    _______________________________________________________________________     Anticoagulation Episode Summary     Current INR goal:  2.5-3.5   TTR:  85.3 % (1 y)   Target end date:  Indefinite   Send INR reminders to:  [facility]    Indications    Blood clot of vein in shoulder area  left [I82.602]  Factor 5 Leiden mutation  heterozygous (H) [D68.51]  Long-term (current) use of anticoagulants [Z79.01] [Z79.01]           Comments:  Has home INR machine weekly INR needed            Anticoagulation Care Providers     Provider Role Specialty Phone number    Ling Trammell PA-C Referring Family Medicine [phone]

## 2022-08-10 ENCOUNTER — ANTICOAGULATION THERAPY VISIT (OUTPATIENT)
Dept: ANTICOAGULATION | Facility: OTHER | Age: 52
End: 2022-08-10
Attending: PHYSICIAN ASSISTANT
Payer: COMMERCIAL

## 2022-08-10 DIAGNOSIS — I82.602 BLOOD CLOT OF VEIN IN SHOULDER AREA, LEFT: Primary | ICD-10-CM

## 2022-08-10 DIAGNOSIS — D68.51 FACTOR 5 LEIDEN MUTATION, HETEROZYGOUS (H): ICD-10-CM

## 2022-08-10 DIAGNOSIS — Z79.01 LONG TERM CURRENT USE OF ANTICOAGULANT THERAPY: ICD-10-CM

## 2022-08-10 LAB — INR (EXTERNAL): 3.4 (ref 0.9–1.1)

## 2022-08-10 NOTE — PROGRESS NOTES
ANTICOAGULATION  MANAGEMENT-Home Monitor Managed by Exception    Danielamariusz Ladd 52 year old female is on warfarin with therapeutic INR result. (Goal INR 2.5-3.5)    Recent labs: (last 7 days)     08/10/22  0902   INR 3.4*         Previous INR was Therapeutic    Medication, diet, health changes since last INR:chart reviewed; none identified    Contacted within the last 12 weeks by phone on 7/27/22      PLAN     Daniela was NOT contacted regarding therapeutic result today per home monitoring policy manage by exception agreement.   Current warfarin dose is to be continued:     Summary  As of 8/10/2022    Full warfarin instructions:  10 mg every Wed, Fri; 8 mg all other days   Next INR check:  8/17/2022           ?   Yoly Crowe, RN  Anticoagulation Clinic  8/10/2022    _______________________________________________________________________     Anticoagulation Episode Summary     Current INR goal:  2.5-3.5   TTR:  85.3 % (1 y)   Target end date:  Indefinite   Send INR reminders to:  [facility]    Indications    Blood clot of vein in shoulder area  left [I82.602]  Factor 5 Leiden mutation  heterozygous (H) [D68.51]  Long-term (current) use of anticoagulants [Z79.01] [Z79.01]           Comments:  Has home INR machine weekly INR needed            Anticoagulation Care Providers     Provider Role Specialty Phone number    Ling Trammell PA-C Referring Family Medicine [phone]

## 2022-08-17 ENCOUNTER — ANTICOAGULATION THERAPY VISIT (OUTPATIENT)
Dept: ANTICOAGULATION | Facility: OTHER | Age: 52
End: 2022-08-17
Attending: PHYSICIAN ASSISTANT
Payer: COMMERCIAL

## 2022-08-17 DIAGNOSIS — D68.51 FACTOR 5 LEIDEN MUTATION, HETEROZYGOUS (H): ICD-10-CM

## 2022-08-17 DIAGNOSIS — I82.602 BLOOD CLOT OF VEIN IN SHOULDER AREA, LEFT: Primary | ICD-10-CM

## 2022-08-17 DIAGNOSIS — Z79.01 LONG TERM CURRENT USE OF ANTICOAGULANT THERAPY: ICD-10-CM

## 2022-08-17 LAB — INR (EXTERNAL): 3.3 (ref 0.9–1.1)

## 2022-08-17 NOTE — PROGRESS NOTES
ANTICOAGULATION  MANAGEMENT-Home Monitor Managed by Exception    Danielamariusz Ladd 52 year old female is on warfarin with therapeutic INR result. (Goal INR 2.5-3.5)    Recent labs: (last 7 days)     08/17/22  0814   INR 3.3*         Previous INR was Therapeutic    Medication, diet, health changes since last INR:chart reviewed; none identified    Contacted within the last 12 weeks by phone on 7/27/22      PLAN     Daniela was NOT contacted regarding therapeutic result today per home monitoring policy manage by exception agreement.   Current warfarin dose is to be continued:     Summary  As of 8/17/2022    Full warfarin instructions:  10 mg every Wed, Fri; 8 mg all other days   Next INR check:  9/7/2022           ?   Yoly Crowe, RN  Anticoagulation Clinic  8/17/2022    _______________________________________________________________________     Anticoagulation Episode Summary     Current INR goal:  2.5-3.5   TTR:  85.3 % (1 y)   Target end date:  Indefinite   Send INR reminders to:  [facility]    Indications    Blood clot of vein in shoulder area  left [I82.602]  Factor 5 Leiden mutation  heterozygous (H) [D68.51]  Long-term (current) use of anticoagulants [Z79.01] [Z79.01]           Comments:  Has home INR machine weekly INR needed            Anticoagulation Care Providers     Provider Role Specialty Phone number    Ling Trammell PA-C Referring Family Medicine [phone]

## 2022-08-24 ENCOUNTER — ANTICOAGULATION THERAPY VISIT (OUTPATIENT)
Dept: ANTICOAGULATION | Facility: OTHER | Age: 52
End: 2022-08-24
Attending: PHYSICIAN ASSISTANT
Payer: COMMERCIAL

## 2022-08-24 DIAGNOSIS — Z79.01 LONG TERM CURRENT USE OF ANTICOAGULANT THERAPY: ICD-10-CM

## 2022-08-24 DIAGNOSIS — D68.51 FACTOR 5 LEIDEN MUTATION, HETEROZYGOUS (H): ICD-10-CM

## 2022-08-24 DIAGNOSIS — I82.602 BLOOD CLOT OF VEIN IN SHOULDER AREA, LEFT: Primary | ICD-10-CM

## 2022-08-24 LAB — INR (EXTERNAL): 2.8 (ref 0.9–1.1)

## 2022-08-24 NOTE — PROGRESS NOTES
ANTICOAGULATION  MANAGEMENT-Home Monitor Managed by Exception    Danielamariusz Ladd 52 year old female is on warfarin with therapeutic INR result. (Goal INR 2.5-3.5)    Recent labs: (last 7 days)     08/24/22  0906   INR 2.8*         Previous INR was Therapeutic    Medication, diet, health changes since last INR:chart reviewed; none identified    Contacted within the last 12 weeks by phone on 7/27/22      PLAN     Daniela was NOT contacted regarding therapeutic result today per home monitoring policy manage by exception agreement.   Current warfarin dose is to be continued:     Summary  As of 8/24/2022    Full warfarin instructions:  10 mg every Wed, Fri; 8 mg all other days   Next INR check:  8/31/2022           ?   Yoly Crowe, RN  [facility]  8/24/2022    _______________________________________________________________________     Anticoagulation Episode Summary     Current INR goal:  2.5-3.5   TTR:  85.3 % (1 y)   Target end date:  Indefinite   Send INR reminders to:  [facility]    Indications    Blood clot of vein in shoulder area  left [I82.602]  Factor 5 Leiden mutation  heterozygous (H) [D68.51]  Long-term (current) use of anticoagulants [Z79.01] [Z79.01]           Comments:  Has home INR machine weekly INR needed            Anticoagulation Care Providers     Provider Role Specialty Phone number    Ling Trammell PA-C Referring Family Medicine [phone]

## 2022-08-31 ENCOUNTER — ANTICOAGULATION THERAPY VISIT (OUTPATIENT)
Dept: ANTICOAGULATION | Facility: OTHER | Age: 52
End: 2022-08-31
Payer: COMMERCIAL

## 2022-08-31 DIAGNOSIS — Z79.01 LONG TERM CURRENT USE OF ANTICOAGULANT THERAPY: ICD-10-CM

## 2022-08-31 DIAGNOSIS — I82.602 BLOOD CLOT OF VEIN IN SHOULDER AREA, LEFT: Primary | ICD-10-CM

## 2022-08-31 DIAGNOSIS — D68.51 FACTOR 5 LEIDEN MUTATION, HETEROZYGOUS (H): ICD-10-CM

## 2022-08-31 LAB — INR (EXTERNAL): 2.7 (ref 0.9–1.1)

## 2022-08-31 NOTE — PROGRESS NOTES
ANTICOAGULATION  MANAGEMENT-Home Monitor Managed by Exception    Danielamariusz Ladd 52 year old female is on warfarin with therapeutic INR result. (Goal INR 2.5-3.5)    Recent labs: (last 7 days)     08/31/22  0848   INR 2.7*         Previous INR was Therapeutic    Medication, diet, health changes since last INR:chart reviewed; none identified    Contacted within the last 12 weeks by phone on 7/27/22      PLAN     Daniela was NOT contacted regarding therapeutic result today per home monitoring policy manage by exception agreement.   Current warfarin dose is to be continued:     Summary  As of 8/31/2022    Full warfarin instructions:  10 mg every Wed, Fri; 8 mg all other days   Next INR check:  9/7/2022           ?   Yoly Crowe, RN  [facility]  8/31/2022    _______________________________________________________________________     Anticoagulation Episode Summary     Current INR goal:  2.5-3.5   TTR:  85.3 % (1 y)   Target end date:  Indefinite   Send INR reminders to:  [facility]    Indications    Blood clot of vein in shoulder area  left [I82.602]  Factor 5 Leiden mutation  heterozygous (H) [D68.51]  Long-term (current) use of anticoagulants [Z79.01] [Z79.01]           Comments:  Has home INR machine weekly INR needed            Anticoagulation Care Providers     Provider Role Specialty Phone number    Ling Trammell PA-C Referring Family Medicine [phone]

## 2022-09-07 ENCOUNTER — ANTICOAGULATION THERAPY VISIT (OUTPATIENT)
Dept: ANTICOAGULATION | Facility: OTHER | Age: 52
End: 2022-09-07
Attending: PHYSICIAN ASSISTANT
Payer: COMMERCIAL

## 2022-09-07 DIAGNOSIS — Z79.01 LONG TERM CURRENT USE OF ANTICOAGULANT THERAPY: ICD-10-CM

## 2022-09-07 DIAGNOSIS — D68.51 FACTOR 5 LEIDEN MUTATION, HETEROZYGOUS (H): ICD-10-CM

## 2022-09-07 DIAGNOSIS — I82.602 BLOOD CLOT OF VEIN IN SHOULDER AREA, LEFT: Primary | ICD-10-CM

## 2022-09-07 LAB — INR (EXTERNAL): 2.7 (ref 0.9–1.1)

## 2022-09-07 NOTE — PROGRESS NOTES
ANTICOAGULATION  MANAGEMENT-Home Monitor Managed by Exception    Danielamariusz Ladd 52 year old female is on warfarin with therapeutic INR result. (Goal INR 2.5-3.5)    Recent labs: (last 7 days)     09/07/22  0834   INR 2.7*         Previous INR was Therapeutic    Medication, diet, health changes since last INR:chart reviewed; none identified    Contacted within the last 12 weeks by phone on 7/27/22      PLAN     Daniela was NOT contacted regarding therapeutic result today per home monitoring policy manage by exception agreement.   Current warfarin dose is to be continued:     Summary  As of 9/7/2022    Full warfarin instructions:  10 mg every Wed, Fri; 8 mg all other days   Next INR check:  9/14/2022           ?   Yoly Crowe, RN  [facility]  9/7/2022    _______________________________________________________________________     Anticoagulation Episode Summary     Current INR goal:  2.5-3.5   TTR:  85.3 % (1 y)   Target end date:  Indefinite   Send INR reminders to:  [facility]    Indications    Blood clot of vein in shoulder area  left [I82.602]  Factor 5 Leiden mutation  heterozygous (H) [D68.51]  Long-term (current) use of anticoagulants [Z79.01] [Z79.01]           Comments:  Has home INR machine weekly INR needed            Anticoagulation Care Providers     Provider Role Specialty Phone number    Ling Trammell PA-C Referring Family Medicine [phone]

## 2022-09-14 ENCOUNTER — ANTICOAGULATION THERAPY VISIT (OUTPATIENT)
Dept: ANTICOAGULATION | Facility: OTHER | Age: 52
End: 2022-09-14
Attending: PHYSICIAN ASSISTANT
Payer: COMMERCIAL

## 2022-09-14 DIAGNOSIS — I82.602 BLOOD CLOT OF VEIN IN SHOULDER AREA, LEFT: Primary | ICD-10-CM

## 2022-09-14 DIAGNOSIS — D68.51 FACTOR 5 LEIDEN MUTATION, HETEROZYGOUS (H): ICD-10-CM

## 2022-09-14 DIAGNOSIS — Z79.01 LONG TERM CURRENT USE OF ANTICOAGULANT THERAPY: ICD-10-CM

## 2022-09-14 LAB — INR (EXTERNAL): 2.7 (ref 0.9–1.1)

## 2022-09-14 NOTE — PROGRESS NOTES
ANTICOAGULATION  MANAGEMENT-Home Monitor Managed by Exception    Danielamariusz Ladd 52 year old female is on warfarin with therapeutic INR result. (Goal INR 2.5-3.5)    Recent labs: (last 7 days)     09/14/22  0832   INR 2.7*         Previous INR was Therapeutic    Medication, diet, health changes since last INR:chart reviewed; none identified    Contacted within the last 12 weeks by phone on 7/27/22      PLAN     Daniela was NOT contacted regarding therapeutic result today per home monitoring policy manage by exception agreement.   Current warfarin dose is to be continued:     Summary  As of 9/14/2022    Full warfarin instructions:  10 mg every Wed, Fri; 8 mg all other days   Next INR check:  9/21/2022           ?   Yoly Crowe, RN  [facility]  9/14/2022    _______________________________________________________________________     Anticoagulation Episode Summary     Current INR goal:  2.5-3.5   TTR:  85.3 % (1 y)   Target end date:  Indefinite   Send INR reminders to:  [facility]    Indications    Blood clot of vein in shoulder area  left [I82.602]  Factor 5 Leiden mutation  heterozygous (H) [D68.51]  Long-term (current) use of anticoagulants [Z79.01] [Z79.01]           Comments:  Has home INR machine weekly INR needed            Anticoagulation Care Providers     Provider Role Specialty Phone number    Ling Trammell PA-C Referring Family Medicine [phone]

## 2022-09-17 ENCOUNTER — HEALTH MAINTENANCE LETTER (OUTPATIENT)
Age: 52
End: 2022-09-17

## 2022-09-21 ENCOUNTER — ANTICOAGULATION THERAPY VISIT (OUTPATIENT)
Dept: ANTICOAGULATION | Facility: OTHER | Age: 52
End: 2022-09-21
Attending: PHYSICIAN ASSISTANT
Payer: COMMERCIAL

## 2022-09-21 DIAGNOSIS — I82.602 BLOOD CLOT OF VEIN IN SHOULDER AREA, LEFT: Primary | ICD-10-CM

## 2022-09-21 DIAGNOSIS — Z79.01 LONG TERM CURRENT USE OF ANTICOAGULANT THERAPY: ICD-10-CM

## 2022-09-21 DIAGNOSIS — D68.51 FACTOR 5 LEIDEN MUTATION, HETEROZYGOUS (H): ICD-10-CM

## 2022-09-21 LAB — INR (EXTERNAL): 2.7 (ref 0.9–1.1)

## 2022-09-21 NOTE — PROGRESS NOTES
ANTICOAGULATION  MANAGEMENT-Home Monitor Managed by Exception    Daniela NANCY Ladd 52 year old female is on warfarin with therapeutic INR result. (Goal INR 2.5-3.5)    Recent labs: (last 7 days)     09/21/22  0600   INR 2.7*         Previous INR was Therapeutic    Medication, diet, health changes since last INR:chart reviewed; none identified    Contacted within the last 12 weeks by phone on 07/27/22.      ANÍBAL Suarez was NOT contacted regarding therapeutic result today per home monitoring policy manage by exception agreement.   Current warfarin dose is to be continued:     Summary  As of 9/21/2022    Full warfarin instructions:  10 mg every Wed, Fri; 8 mg all other days   Next INR check:  9/28/2022           ?   Shala Murray, RN  [facility]  9/21/2022    _______________________________________________________________________     Anticoagulation Episode Summary     Current INR goal:  2.5-3.5   TTR:  85.3 % (1 y)   Target end date:  Indefinite   Send INR reminders to:  [facility]    Indications    Blood clot of vein in shoulder area  left [I82.602]  Factor 5 Leiden mutation  heterozygous (H) [D68.51]  Long-term (current) use of anticoagulants [Z79.01] [Z79.01]           Comments:  Has home INR machine weekly INR needed            Anticoagulation Care Providers     Provider Role Specialty Phone number    Ling Trammell PA-C Referring Family Medicine [phone]

## 2022-09-28 ENCOUNTER — ANTICOAGULATION THERAPY VISIT (OUTPATIENT)
Dept: ANTICOAGULATION | Facility: OTHER | Age: 52
End: 2022-09-28
Attending: PHYSICIAN ASSISTANT
Payer: COMMERCIAL

## 2022-09-28 DIAGNOSIS — D68.51 FACTOR 5 LEIDEN MUTATION, HETEROZYGOUS (H): ICD-10-CM

## 2022-09-28 DIAGNOSIS — I82.602 BLOOD CLOT OF VEIN IN SHOULDER AREA, LEFT: Primary | ICD-10-CM

## 2022-09-28 DIAGNOSIS — Z79.01 LONG TERM CURRENT USE OF ANTICOAGULANT THERAPY: ICD-10-CM

## 2022-09-28 LAB — INR (EXTERNAL): 3.3 (ref 0.9–1.1)

## 2022-09-28 NOTE — PROGRESS NOTES
ANTICOAGULATION  MANAGEMENT-Home Monitor Managed by Exception    Danielamariusz Ladd 52 year old female is on warfarin with therapeutic INR result. (Goal INR 2.5-3.5)    Recent labs: (last 7 days)     09/28/22  0850   INR 3.3*         Previous INR was Therapeutic    Medication, diet, health changes since last INR:chart reviewed; none identified    Contacted within the last 12 weeks by phone on 7/27/22      PLAN     Daniela was NOT contacted regarding therapeutic result today per home monitoring policy manage by exception agreement.   Current warfarin dose is to be continued:     Summary  As of 9/28/2022    Full warfarin instructions:  10 mg every Wed, Fri; 8 mg all other days   Next INR check:  10/26/2022           ?   Yoly Crowe, RN  [facility]  9/28/2022    _______________________________________________________________________     Anticoagulation Episode Summary     Current INR goal:  2.5-3.5   TTR:  85.3 % (1 y)   Target end date:  Indefinite   Send INR reminders to:  [facility]    Indications    Blood clot of vein in shoulder area  left [I82.602]  Factor 5 Leiden mutation  heterozygous (H) [D68.51]  Long-term (current) use of anticoagulants [Z79.01] [Z79.01]           Comments:  Has home INR machine weekly INR needed            Anticoagulation Care Providers     Provider Role Specialty Phone number    Ling Trammell PA-C Referring Family Medicine [phone]

## 2022-10-05 ENCOUNTER — ANTICOAGULATION THERAPY VISIT (OUTPATIENT)
Dept: ANTICOAGULATION | Facility: OTHER | Age: 52
End: 2022-10-05
Attending: PHYSICIAN ASSISTANT
Payer: COMMERCIAL

## 2022-10-05 DIAGNOSIS — D68.51 FACTOR 5 LEIDEN MUTATION, HETEROZYGOUS (H): ICD-10-CM

## 2022-10-05 DIAGNOSIS — I82.602 BLOOD CLOT OF VEIN IN SHOULDER AREA, LEFT: Primary | ICD-10-CM

## 2022-10-05 DIAGNOSIS — Z79.01 LONG TERM CURRENT USE OF ANTICOAGULANT THERAPY: ICD-10-CM

## 2022-10-05 LAB — INR (EXTERNAL): 2.7 (ref 0.9–1.1)

## 2022-10-05 NOTE — PROGRESS NOTES
ANTICOAGULATION  MANAGEMENT-Home Monitor Managed by Exception    Danielamariusz Ladd 52 year old female is on warfarin with therapeutic INR result. (Goal INR 2.5-3.5)    Recent labs: (last 7 days)     10/05/22  0823   INR 2.7*         Previous INR was Therapeutic    Medication, diet, health changes since last INR:chart reviewed; none identified    Contacted within the last 12 weeks by phone on 7/27/22      ANÍBAL Suarez was NOT contacted regarding therapeutic result today per home monitoring policy manage by exception agreement.   Current warfarin dose is to be continued:     Summary  As of 10/5/2022    Full warfarin instructions:  10 mg every Mon, Wed, Fri; 8 mg all other days   Next INR check:  10/12/2022           ?   Yoly Crowe RN  [facility]  10/5/2022    _______________________________________________________________________     Anticoagulation Episode Summary     Current INR goal:  2.5-3.5   TTR:  85.3 % (1 y)   Target end date:  Indefinite   Send INR reminders to:  [facility]    Indications    Blood clot of vein in shoulder area  left [I82.602]  Factor 5 Leiden mutation  heterozygous (H) [D68.51]  Long-term (current) use of anticoagulants [Z79.01] [Z79.01]           Comments:  Has home INR machine weekly INR needed            Anticoagulation Care Providers     Provider Role Specialty Phone number    Ling Trammell PA-C Referring Family Medicine [phone]

## 2022-10-12 ENCOUNTER — ANTICOAGULATION THERAPY VISIT (OUTPATIENT)
Dept: ANTICOAGULATION | Facility: OTHER | Age: 52
End: 2022-10-12
Attending: PHYSICIAN ASSISTANT
Payer: COMMERCIAL

## 2022-10-12 DIAGNOSIS — D68.51 FACTOR 5 LEIDEN MUTATION, HETEROZYGOUS (H): ICD-10-CM

## 2022-10-12 DIAGNOSIS — Z79.01 LONG TERM CURRENT USE OF ANTICOAGULANT THERAPY: ICD-10-CM

## 2022-10-12 DIAGNOSIS — I82.602 BLOOD CLOT OF VEIN IN SHOULDER AREA, LEFT: Primary | ICD-10-CM

## 2022-10-12 LAB — INR (EXTERNAL): 2.6 (ref 0.9–1.1)

## 2022-10-12 NOTE — PROGRESS NOTES
ANTICOAGULATION  MANAGEMENT-Home Monitor Managed by Exception    Daniela NANCY Ladd 52 year old female is on warfarin with therapeutic INR result. (Goal INR 2.5-3.5)    Recent labs: (last 7 days)     10/12/22  0533   INR 2.6*         Previous INR was Therapeutic    Medication, diet, health changes since last INR:chart reviewed; none identified    Contacted within the last 12 weeks by phone on 7/27/22      PLAN     Daniela was NOT contacted regarding therapeutic result today per home monitoring policy manage by exception agreement.   Current warfarin dose is to be continued:     Summary  As of 10/12/2022    Full warfarin instructions:  10 mg every Mon, Wed, Fri; 8 mg all other days   Next INR check:  10/19/2022           ?   Elyssa Correa RN  [facility]agulation Clinic  10/12/2022    _______________________________________________________________________     Anticoagulation Episode Summary     Current INR goal:  2.5-3.5   TTR:  85.3 % (1 y)   Target end date:  Indefinite   Send INR reminders to:  [facility]    Indications    Blood clot of vein in shoulder area  left [I82.602]  Factor 5 Leiden mutation  heterozygous (H) [D68.51]  Long-term (current) use of anticoagulants [Z79.01] [Z79.01]           Comments:  Has home INR machine weekly INR needed            Anticoagulation Care Providers     Provider Role Specialty Phone number    Ling Trammell PA-C Referring Family Medicine [phone]

## 2022-10-19 ENCOUNTER — ANTICOAGULATION THERAPY VISIT (OUTPATIENT)
Dept: ANTICOAGULATION | Facility: OTHER | Age: 52
End: 2022-10-19
Attending: PHYSICIAN ASSISTANT
Payer: COMMERCIAL

## 2022-10-19 DIAGNOSIS — Z79.01 LONG TERM CURRENT USE OF ANTICOAGULANT THERAPY: ICD-10-CM

## 2022-10-19 DIAGNOSIS — D68.51 FACTOR 5 LEIDEN MUTATION, HETEROZYGOUS (H): ICD-10-CM

## 2022-10-19 DIAGNOSIS — I82.602 BLOOD CLOT OF VEIN IN SHOULDER AREA, LEFT: Primary | ICD-10-CM

## 2022-10-19 LAB — INR (EXTERNAL): 2.3 (ref 0.9–1.1)

## 2022-10-19 NOTE — PROGRESS NOTES
ANTICOAGULATION MANAGEMENT     Daniela Ladd 52 year old female is on warfarin with subtherapeutic INR result. (Goal INR 2.5-3.5)    Recent labs: (last 7 days)     10/19/22  0918   INR 2.3*       ASSESSMENT       Source(s): Chart Review and Patient/Caregiver Call       Warfarin doses taken: Missed dose(s) may be affecting INR    Diet: No new diet changes identified    New illness, injury, or hospitalization: No    Medication/supplement changes: None noted    Signs or symptoms of bleeding or clotting: No    Previous INR: Therapeutic last 2(+) visits    Additional findings: None       PLAN     Recommended plan for temporary change(s) affecting INR     Dosing Instructions: patient did try to make up missed dose on Sunday by adding additional pill mon and tues with next INR in 1 week       Summary  As of 10/19/2022    Full warfarin instructions:  10 mg every Mon, Wed, Fri; 8 mg all other days   Next INR check:  10/26/2022             Telephone call with Daniela who agrees to plan and repeated back plan correctly    Patient to recheck with home meter    Education provided: None required    Plan made per [facility] anticoagulation protocol    Yoly Crowe, RN  Anticoagulation Clinic  10/19/2022    _______________________________________________________________________     Anticoagulation Episode Summary     Current INR goal:  2.5-3.5   TTR:  85.5 % (1 y)   Target end date:  Indefinite   Send INR reminders to:  [facility]    Indications    Blood clot of vein in shoulder area  left [I82.602]  Factor 5 Leiden mutation  heterozygous (H) [D68.51]  Long-term (current) use of anticoagulants [Z79.01] [Z79.01]           Comments:  Has home INR machine weekly INR needed            Anticoagulation Care Providers     Provider Role Specialty Phone number    Ling Trammell PA-C Referring Family Medicine [phone]

## 2022-10-26 ENCOUNTER — ANTICOAGULATION THERAPY VISIT (OUTPATIENT)
Dept: ANTICOAGULATION | Facility: OTHER | Age: 52
End: 2022-10-26
Attending: PHYSICIAN ASSISTANT
Payer: COMMERCIAL

## 2022-10-26 DIAGNOSIS — I82.602 BLOOD CLOT OF VEIN IN SHOULDER AREA, LEFT: Primary | ICD-10-CM

## 2022-10-26 DIAGNOSIS — D68.51 FACTOR 5 LEIDEN MUTATION, HETEROZYGOUS (H): ICD-10-CM

## 2022-10-26 DIAGNOSIS — Z79.01 LONG TERM CURRENT USE OF ANTICOAGULANT THERAPY: ICD-10-CM

## 2022-10-26 LAB — INR (EXTERNAL): 2.9 (ref 0.9–1.1)

## 2022-10-26 NOTE — PROGRESS NOTES
ANTICOAGULATION  MANAGEMENT-Home Monitor Managed by Exception    Danielamariusz Ladd 52 year old female is on warfarin with therapeutic INR result. (Goal INR 2.5-3.5)    Recent labs: (last 7 days)     10/26/22  0845   INR 2.9*         Previous INR was Therapeutic    Medication, diet, health changes since last INR:chart reviewed; none identified    Contacted within the last 12 weeks by phone on 10/19/22      ANÍBAL Suarez was NOT contacted regarding therapeutic result today per home monitoring policy manage by exception agreement.   Current warfarin dose is to be continued:     Summary  As of 10/26/2022    Full warfarin instructions:  10 mg every Mon, Wed, Fri; 8 mg all other days; Starting 10/26/2022   Next INR check:  11/2/2022           ?   Yoly Crowe RN  [facility] Clinic  10/26/2022    _______________________________________________________________________     Anticoagulation Episode Summary     Current INR goal:  2.5-3.5   TTR:  86.8 % (1 y)   Target end date:  Indefinite   Send INR reminders to:  [facility]    Indications    Blood clot of vein in shoulder area  left [I82.602]  Factor 5 Leiden mutation  heterozygous (H) [D68.51]  Long-term (current) use of anticoagulants [Z79.01] [Z79.01]           Comments:  Has home INR machine weekly INR needed            Anticoagulation Care Providers     Provider Role Specialty Phone number    Ling Trammell PA-C Referring Family Medicine [phone]

## 2022-11-03 ENCOUNTER — ANTICOAGULATION THERAPY VISIT (OUTPATIENT)
Dept: ANTICOAGULATION | Facility: OTHER | Age: 52
End: 2022-11-03
Attending: PHYSICIAN ASSISTANT
Payer: COMMERCIAL

## 2022-11-03 DIAGNOSIS — I82.602 BLOOD CLOT OF VEIN IN SHOULDER AREA, LEFT: Primary | ICD-10-CM

## 2022-11-03 DIAGNOSIS — D68.51 FACTOR 5 LEIDEN MUTATION, HETEROZYGOUS (H): ICD-10-CM

## 2022-11-03 DIAGNOSIS — Z79.01 LONG TERM CURRENT USE OF ANTICOAGULANT THERAPY: ICD-10-CM

## 2022-11-03 LAB — INR (EXTERNAL): 3 (ref 0.9–1.1)

## 2022-11-03 NOTE — PROGRESS NOTES
ANTICOAGULATION  MANAGEMENT-Home Monitor Managed by Exception    Danielamariusz Ladd 52 year old female is on warfarin with therapeutic INR result. (Goal INR 2.5-3.5)    Recent labs: (last 7 days)     11/03/22  0858   INR 3.0*         Previous INR was Therapeutic    Medication, diet, health changes since last INR:chart reviewed; none identified    Contacted within the last 12 weeks by phone on 10/19/22      ANÍBAL Suarez was NOT contacted regarding therapeutic result today per home monitoring policy manage by exception agreement.   Current warfarin dose is to be continued:     Summary  As of 11/3/2022    Full warfarin instructions:  10 mg every Mon, Wed, Fri; 8 mg all other days; Starting 11/3/2022   Next INR check:  11/10/2022           ?   Yoly Crowe RN  [facility]  11/3/2022    _______________________________________________________________________     Anticoagulation Episode Summary     Current INR goal:  2.5-3.5   TTR:  89.0 % (1 y)   Target end date:  Indefinite   Send INR reminders to:  [facility]    Indications    Blood clot of vein in shoulder area  left [I82.602]  Factor 5 Leiden mutation  heterozygous (H) [D68.51]  Long-term (current) use of anticoagulants [Z79.01] [Z79.01]           Comments:  Has home INR machine weekly INR needed            Anticoagulation Care Providers     Provider Role Specialty Phone number    Ling Trammell PA-C Referring Family Medicine [phone]

## 2022-11-09 ENCOUNTER — ANTICOAGULATION THERAPY VISIT (OUTPATIENT)
Dept: ANTICOAGULATION | Facility: OTHER | Age: 52
End: 2022-11-09
Attending: PHYSICIAN ASSISTANT
Payer: COMMERCIAL

## 2022-11-09 DIAGNOSIS — Z79.01 LONG TERM CURRENT USE OF ANTICOAGULANT THERAPY: ICD-10-CM

## 2022-11-09 DIAGNOSIS — D68.51 FACTOR 5 LEIDEN MUTATION, HETEROZYGOUS (H): ICD-10-CM

## 2022-11-09 DIAGNOSIS — I82.602 BLOOD CLOT OF VEIN IN SHOULDER AREA, LEFT: Primary | ICD-10-CM

## 2022-11-09 LAB — INR (EXTERNAL): 3 (ref 0.9–1.1)

## 2022-11-09 NOTE — PROGRESS NOTES
ANTICOAGULATION  MANAGEMENT-Home Monitor Managed by Exception    Danielamariusz Ladd 52 year old female is on warfarin with therapeutic INR result. (Goal INR 2.5-3.5)    Recent labs: (last 7 days)     11/09/22  0859   INR 3.0*         Previous INR was Therapeutic    Medication, diet, health changes since last INR:chart reviewed; none identified patient called also states no changes    Contacted within the last 12 weeks by phone on 11/9 patient did call we reviewed any changes and current dose      PLAN     Daniela was NOT contacted regarding therapeutic result today per home monitoring policy manage by exception agreement.   Current warfarin dose is to be continued:     Summary  As of 11/9/2022    Full warfarin instructions:  10 mg every Mon, Wed, Fri; 8 mg all other days; Starting 11/9/2022   Next INR check:  11/16/2022           ?   Yoly Crowe RN  [facility] Clinic  11/9/2022    _______________________________________________________________________     Anticoagulation Episode Summary     Current INR goal:  2.5-3.5   TTR:  90.6 % (1 y)   Target end date:  Indefinite   Send INR reminders to:  [facility]    Indications    Blood clot of vein in shoulder area  left [I82.602]  Factor 5 Leiden mutation  heterozygous (H) [D68.51]  Long-term (current) use of anticoagulants [Z79.01] [Z79.01]           Comments:  Has home INR machine weekly INR needed            Anticoagulation Care Providers     Provider Role Specialty Phone number    Ling Trammell PA-C Referring Family Medicine [phone]

## 2022-11-16 ENCOUNTER — ANTICOAGULATION THERAPY VISIT (OUTPATIENT)
Dept: ANTICOAGULATION | Facility: OTHER | Age: 52
End: 2022-11-16
Attending: PHYSICIAN ASSISTANT
Payer: COMMERCIAL

## 2022-11-16 DIAGNOSIS — I82.602 BLOOD CLOT OF VEIN IN SHOULDER AREA, LEFT: Primary | ICD-10-CM

## 2022-11-16 DIAGNOSIS — D68.51 FACTOR 5 LEIDEN MUTATION, HETEROZYGOUS (H): Primary | ICD-10-CM

## 2022-11-16 DIAGNOSIS — Z79.01 LONG TERM CURRENT USE OF ANTICOAGULANT THERAPY: ICD-10-CM

## 2022-11-16 DIAGNOSIS — D68.51 FACTOR 5 LEIDEN MUTATION, HETEROZYGOUS (H): ICD-10-CM

## 2022-11-16 LAB — INR (EXTERNAL): 2.9 (ref 0.9–1.1)

## 2022-11-16 NOTE — PROGRESS NOTES
ANTICOAGULATION  MANAGEMENT-Home Monitor Managed by Exception    Daniela NANCY Ladd 52 year old female is on warfarin with therapeutic INR result. (Goal INR 2.5-3.5)    Recent labs: (last 7 days)     11/16/22  0518   INR 2.9*         Previous INR was Therapeutic    Medication, diet, health changes since last INR:chart reviewed; none identified    Contacted within the last 12 weeks by phone on 11/9/22      PLAN     Daniela was NOT contacted regarding therapeutic result today per home monitoring policy manage by exception agreement.   Current warfarin dose is to be continued:     Summary  As of 11/16/2022    Full warfarin instructions:  10 mg every Mon, Wed, Fri; 8 mg all other days; Starting 11/16/2022   Next INR check:  11/23/2022           ?   Elyssa Correa RN  Anticoagulation Clinic  11/16/2022    _______________________________________________________________________     Anticoagulation Episode Summary     Current INR goal:  2.5-3.5   TTR:  91.8 % (1 y)   Target end date:  Indefinite   Send INR reminders to:  [facility]    Indications    Blood clot of vein in shoulder area  left [I82.602]  Factor 5 Leiden mutation  heterozygous (H) [D68.51]  Long-term (current) use of anticoagulants [Z79.01] [Z79.01]           Comments:  Has home INR machine weekly INR needed            Anticoagulation Care Providers     Provider Role Specialty Phone number    Ling Trammell PA-C Referring Family Medicine [phone]

## 2022-11-23 ENCOUNTER — ANTICOAGULATION THERAPY VISIT (OUTPATIENT)
Dept: ANTICOAGULATION | Facility: OTHER | Age: 52
End: 2022-11-23
Attending: PHYSICIAN ASSISTANT
Payer: COMMERCIAL

## 2022-11-23 DIAGNOSIS — I82.602 BLOOD CLOT OF VEIN IN SHOULDER AREA, LEFT: Primary | ICD-10-CM

## 2022-11-23 DIAGNOSIS — D68.51 FACTOR 5 LEIDEN MUTATION, HETEROZYGOUS (H): ICD-10-CM

## 2022-11-23 DIAGNOSIS — Z79.01 LONG TERM CURRENT USE OF ANTICOAGULANT THERAPY: ICD-10-CM

## 2022-11-23 LAB — INR (EXTERNAL): 3.7 (ref 0.9–1.1)

## 2022-11-23 NOTE — PROGRESS NOTES
ANTICOAGULATION MANAGEMENT     Daniela Ladd 52 year old female is on warfarin with supratherapeutic INR result. (Goal INR 2.5-3.5)    Recent labs: (last 7 days)     11/23/22  1315   INR 3.7*       ASSESSMENT       Source(s): Chart Review and Patient/Caregiver Call       Warfarin doses taken: Warfarin taken as instructed    Diet: No new diet changes identified    New illness, injury, or hospitalization: No    Medication/supplement changes: increased tylenol for acute neck pain    Signs or symptoms of bleeding or clotting: No    Previous INR: Therapeutic last 2(+) visits    Additional findings: None       PLAN     Recommended plan for temporary change(s) affecting INR     Dosing Instructions: partial hold then continue your current warfarin dose with next INR in 1 week       Summary  As of 11/23/2022    Full warfarin instructions:  11/23: 8 mg; Otherwise 10 mg every Mon, Wed, Fri; 8 mg all other days; Starting 11/23/2022   Next INR check:  11/30/2022             Telephone call with Daniela who verbalizes understanding and agrees to plan    Patient to recheck with home meter    Education provided:     None required    Plan made per [facility] anticoagulation protocol    Yoly Crowe, RN  Anticoagulation Clinic  11/23/2022    _______________________________________________________________________     Anticoagulation Episode Summary     Current INR goal:  2.5-3.5   TTR:  91.3 % (1 y)   Target end date:  Indefinite   Send INR reminders to:  [facility]    Indications    Blood clot of vein in shoulder area  left [I82.602]  Factor 5 Leiden mutation  heterozygous (H) [D68.51]  Long-term (current) use of anticoagulants [Z79.01] [Z79.01]           Comments:  Has home INR machine weekly INR needed            Anticoagulation Care Providers     Provider Role Specialty Phone number    Ling Trammell PA-C Referring Family Medicine [phone]

## 2022-11-30 ENCOUNTER — ANTICOAGULATION THERAPY VISIT (OUTPATIENT)
Dept: ANTICOAGULATION | Facility: OTHER | Age: 52
End: 2022-11-30
Attending: PHYSICIAN ASSISTANT
Payer: COMMERCIAL

## 2022-11-30 DIAGNOSIS — D68.51 FACTOR 5 LEIDEN MUTATION, HETEROZYGOUS (H): ICD-10-CM

## 2022-11-30 DIAGNOSIS — Z79.01 LONG TERM CURRENT USE OF ANTICOAGULANT THERAPY: ICD-10-CM

## 2022-11-30 DIAGNOSIS — I82.602 BLOOD CLOT OF VEIN IN SHOULDER AREA, LEFT: Primary | ICD-10-CM

## 2022-11-30 LAB — INR (EXTERNAL): 3.2 (ref 0.9–1.1)

## 2022-11-30 NOTE — PROGRESS NOTES
ANTICOAGULATION MANAGEMENT     Daniela Ladd 52 year old female is on warfarin with therapeutic INR result. (Goal INR 2.5-3.5)    Recent labs: (last 7 days)     11/30/22  0818   INR 3.2*       ASSESSMENT       Source(s): Chart Review and Patient/Caregiver Call       Warfarin doses taken: Warfarin taken as instructed    Diet: No new diet changes identified    New illness, injury, or hospitalization: No    Medication/supplement changes: None noted    Signs or symptoms of bleeding or clotting: No    Previous INR: Supratherapeutic    Additional findings: states she has beacked off the Tylenol this week       PLAN     Recommended plan for no diet, medication or health factor changes affecting INR     Dosing Instructions: Continue your current warfarin dose with next INR in 1 week       Summary  As of 11/30/2022    Full warfarin instructions:  10 mg every Mon, Wed, Fri; 8 mg all other days; Starting 11/30/2022   Next INR check:  12/7/2022             Telephone call with Daniela who verbalizes understanding and agrees to plan    Patient to recheck with home meter    Education provided:     None required    Plan made per [facility] anticoagulation protocol    Yoly Crowe, RN  Anticoagulation Clinic  11/30/2022    _______________________________________________________________________     Anticoagulation Episode Summary     Current INR goal:  2.5-3.5   TTR:  90.6 % (1 y)   Target end date:  Indefinite   Send INR reminders to:  [facility]    Indications    Blood clot of vein in shoulder area  left [I82.602]  Factor 5 Leiden mutation  heterozygous (H) [D68.51]  Long-term (current) use of anticoagulants [Z79.01] [Z79.01]           Comments:  Has home INR machine weekly INR needed            Anticoagulation Care Providers     Provider Role Specialty Phone number    Ling Trammell PA-C Referring Family Medicine [phone]

## 2022-12-07 ENCOUNTER — ANTICOAGULATION THERAPY VISIT (OUTPATIENT)
Dept: ANTICOAGULATION | Facility: OTHER | Age: 52
End: 2022-12-07
Attending: PHYSICIAN ASSISTANT
Payer: COMMERCIAL

## 2022-12-07 DIAGNOSIS — Z79.01 LONG TERM CURRENT USE OF ANTICOAGULANT THERAPY: ICD-10-CM

## 2022-12-07 DIAGNOSIS — D68.51 FACTOR 5 LEIDEN MUTATION, HETEROZYGOUS (H): ICD-10-CM

## 2022-12-07 DIAGNOSIS — I82.602 BLOOD CLOT OF VEIN IN SHOULDER AREA, LEFT: Primary | ICD-10-CM

## 2022-12-07 LAB — INR (EXTERNAL): 3 (ref 0.9–1.1)

## 2022-12-07 NOTE — PROGRESS NOTES
ANTICOAGULATION  MANAGEMENT-Home Monitor Managed by Exception    Daniela NANCY Ladd 52 year old female is on warfarin with therapeutic INR result. (Goal INR 2.5-3.5)    Recent labs: (last 7 days)     12/07/22  0500   INR 3.0*         Previous INR was Therapeutic    Medication, diet, health changes since last INR:chart reviewed; none identified    Contacted within the last 12 weeks by phone on 11/30/22      PLAN     Daniela was NOT contacted regarding therapeutic result today per home monitoring policy manage by exception agreement.   Current warfarin dose is to be continued:     Summary  As of 12/7/2022    Full warfarin instructions:  10 mg every Mon, Wed, Fri; 8 mg all other days; Starting 12/7/2022   Next INR check:  12/14/2022           ?   Elyssa Correa RN  [facility]ticoagulation Clinic  12/7/2022    _______________________________________________________________________     Anticoagulation Episode Summary     Current INR goal:  2.5-3.5   TTR:  90.6 % (1 y)   Target end date:  Indefinite   Send INR reminders to:  [facility]    Indications    Blood clot of vein in shoulder area  left [I82.602]  Factor 5 Leiden mutation  heterozygous (H) [D68.51]  Long-term (current) use of anticoagulants [Z79.01] [Z79.01]           Comments:  Has home INR machine weekly INR needed            Anticoagulation Care Providers     Provider Role Specialty Phone number    Ling Trammell PA-C Referring Family Medicine [phone]

## 2022-12-14 ENCOUNTER — ANTICOAGULATION THERAPY VISIT (OUTPATIENT)
Dept: ANTICOAGULATION | Facility: OTHER | Age: 52
End: 2022-12-14
Attending: PHYSICIAN ASSISTANT
Payer: COMMERCIAL

## 2022-12-14 DIAGNOSIS — D68.51 FACTOR 5 LEIDEN MUTATION, HETEROZYGOUS (H): ICD-10-CM

## 2022-12-14 DIAGNOSIS — Z79.01 LONG TERM CURRENT USE OF ANTICOAGULANT THERAPY: ICD-10-CM

## 2022-12-14 DIAGNOSIS — I82.602 BLOOD CLOT OF VEIN IN SHOULDER AREA, LEFT: Primary | ICD-10-CM

## 2022-12-14 LAB — INR (EXTERNAL): 3 (ref 0.9–1.1)

## 2022-12-14 NOTE — PROGRESS NOTES
ANTICOAGULATION  MANAGEMENT-Home Monitor Managed by Exception    Daniela NANCY Ladd 52 year old female is on warfarin with therapeutic INR result. (Goal INR 2.5-3.5)    Recent labs: (last 7 days)     12/14/22  0622   INR 3.0*         Previous INR was Therapeutic    Medication, diet, health changes since last INR:chart reviewed; none identified    Contacted within the last 12 weeks by phone on 11/30/2022      ANÍBAL Suarez was NOT contacted regarding therapeutic result today per home monitoring policy manage by exception agreement.   Current warfarin dose is to be continued:     Summary  As of 12/14/2022    Full warfarin instructions:  10 mg every Mon, Wed, Fri; 8 mg all other days; Starting 12/14/2022   Next INR check:  12/21/2022           ?   Amara Jordan RN  ....................  12/14/2022   8:01 AM  Anticoagulation Clinic  12/14/2022    _______________________________________________________________________     Anticoagulation Episode Summary     Current INR goal:  2.5-3.5   TTR:  90.9 % (1 y)   Target end date:  Indefinite   Send INR reminders to:  [facility]    Indications    Blood clot of vein in shoulder area  left [I82.602]  Factor 5 Leiden mutation  heterozygous (H) [D68.51]  Long-term (current) use of anticoagulants [Z79.01] [Z79.01]           Comments:  Has home INR machine weekly INR needed            Anticoagulation Care Providers     Provider Role Specialty Phone number    Ling Trammell PA-C Referring Family Medicine [phone]

## 2022-12-21 ENCOUNTER — ANTICOAGULATION THERAPY VISIT (OUTPATIENT)
Dept: ANTICOAGULATION | Facility: OTHER | Age: 52
End: 2022-12-21
Attending: PHYSICIAN ASSISTANT
Payer: COMMERCIAL

## 2022-12-21 DIAGNOSIS — Z79.01 LONG TERM CURRENT USE OF ANTICOAGULANT THERAPY: ICD-10-CM

## 2022-12-21 DIAGNOSIS — D68.51 FACTOR 5 LEIDEN MUTATION, HETEROZYGOUS (H): ICD-10-CM

## 2022-12-21 DIAGNOSIS — I82.602 BLOOD CLOT OF VEIN IN SHOULDER AREA, LEFT: Primary | ICD-10-CM

## 2022-12-21 LAB — INR (EXTERNAL): 3 (ref 0.9–1.1)

## 2022-12-21 NOTE — PROGRESS NOTES
ANTICOAGULATION  MANAGEMENT-Home Monitor Managed by Exception    Danieladhaval Ladd 52 year old female is on warfarin with therapeutic INR result. (Goal INR 2.5-3.5)    Recent labs: (last 7 days)     12/21/22  0833   INR 3.0*         Previous INR was Therapeutic    Medication, diet, health changes since last INR:chart reviewed; none identified    Contacted within the last 12 weeks by phone on 11/30/22      ANÍBAL Suarez was NOT contacted regarding therapeutic result today per home monitoring policy manage by exception agreement.   Current warfarin dose is to be continued:     Summary  As of 12/21/2022    Full warfarin instructions:  10 mg every Mon, Wed, Fri; 8 mg all other days   Next INR check:  12/28/2022           ?   Yoly Crowe RN  Anticoagulation Clinic  12/21/2022    _______________________________________________________________________     Anticoagulation Episode Summary     Current INR goal:  2.5-3.5   TTR:  90.9 % (1 y)   Target end date:  Indefinite   Send INR reminders to:  [facility]    Indications    Blood clot of vein in shoulder area  left [I82.602]  Factor 5 Leiden mutation  heterozygous (H) [D68.51]  Long-term (current) use of anticoagulants [Z79.01] [Z79.01]           Comments:  Has home INR machine weekly INR needed            Anticoagulation Care Providers     Provider Role Specialty Phone number    Ling Trammell PA-C Referring Family Medicine [phone]

## 2022-12-28 ENCOUNTER — ANTICOAGULATION THERAPY VISIT (OUTPATIENT)
Dept: ANTICOAGULATION | Facility: OTHER | Age: 52
End: 2022-12-28
Attending: PHYSICIAN ASSISTANT
Payer: COMMERCIAL

## 2022-12-28 DIAGNOSIS — Z79.01 LONG TERM CURRENT USE OF ANTICOAGULANT THERAPY: ICD-10-CM

## 2022-12-28 DIAGNOSIS — I82.602 BLOOD CLOT OF VEIN IN SHOULDER AREA, LEFT: Primary | ICD-10-CM

## 2022-12-28 DIAGNOSIS — D68.51 FACTOR 5 LEIDEN MUTATION, HETEROZYGOUS (H): ICD-10-CM

## 2022-12-28 LAB — INR (EXTERNAL): 3.4 (ref 0.9–1.1)

## 2022-12-28 NOTE — PROGRESS NOTES
ANTICOAGULATION  MANAGEMENT-Home Monitor Managed by Exception    Danieladhaval Ladd 52 year old female is on warfarin with therapeutic INR result. (Goal INR 2.5-3.5)    Recent labs: (last 7 days)     12/28/22  0856   INR 3.4*         Previous INR was Therapeutic    Medication, diet, health changes since last INR:chart reviewed; none identified    Contacted within the last 12 weeks by phone on 11/30/22      ANÍBAL Suarez was NOT contacted regarding therapeutic result today per home monitoring policy manage by exception agreement.   Current warfarin dose is to be continued:     Summary  As of 12/28/2022    Full warfarin instructions:  10 mg every Mon, Wed, Fri; 8 mg all other days   Next INR check:  1/4/2023           ?   Yoly Crowe RN  [facility]ticoagulation Clinic  12/28/2022    _______________________________________________________________________     Anticoagulation Episode Summary     Current INR goal:  2.5-3.5   TTR:  90.9 % (1 y)   Target end date:  Indefinite   Send INR reminders to:  [facility]    Indications    Blood clot of vein in shoulder area  left [I82.602]  Factor 5 Leiden mutation  heterozygous (H) [D68.51]  Long-term (current) use of anticoagulants [Z79.01] [Z79.01]           Comments:  Has home INR machine weekly INR needed            Anticoagulation Care Providers     Provider Role Specialty Phone number    Ling Trammell PA-C Referring Family Medicine [phone]

## 2023-01-04 ENCOUNTER — ANTICOAGULATION THERAPY VISIT (OUTPATIENT)
Dept: ANTICOAGULATION | Facility: OTHER | Age: 53
End: 2023-01-04
Attending: PHYSICIAN ASSISTANT
Payer: COMMERCIAL

## 2023-01-04 DIAGNOSIS — I82.602 BLOOD CLOT OF VEIN IN SHOULDER AREA, LEFT: Primary | ICD-10-CM

## 2023-01-04 DIAGNOSIS — D68.51 FACTOR 5 LEIDEN MUTATION, HETEROZYGOUS (H): ICD-10-CM

## 2023-01-04 DIAGNOSIS — Z79.01 LONG TERM CURRENT USE OF ANTICOAGULANT THERAPY: ICD-10-CM

## 2023-01-04 LAB — INR (EXTERNAL): 3.2 (ref 0.9–1.1)

## 2023-01-04 NOTE — PROGRESS NOTES
ANTICOAGULATION  MANAGEMENT-Home Monitor Managed by Exception    Daniealmariusz Ladd 52 year old female is on warfarin with therapeutic INR result. (Goal INR 2.5-3.5)    Recent labs: (last 7 days)     01/04/23  0819   INR 3.2*         Previous INR was Therapeutic    Medication, diet, health changes since last INR:chart reviewed; none identified    Contacted within the last 12 weeks by phone on 11/30/22      PLAN     Daniela was NOT contacted regarding therapeutic result today per home monitoring policy manage by exception agreement.   Current warfarin dose is to be continued:     Summary  As of 1/4/2023    Full warfarin instructions:  10 mg every Mon, Wed, Fri; 8 mg all other days   Next INR check:  1/11/2023           ?   Yoly Crowe, RN  [facility]ticoagulation Clinic  1/4/2023    _______________________________________________________________________     Anticoagulation Episode Summary     Current INR goal:  2.5-3.5   TTR:  90.9 % (1 y)   Target end date:  Indefinite   Send INR reminders to:  [facility]    Indications    Blood clot of vein in shoulder area  left [I82.602]  Factor 5 Leiden mutation  heterozygous (H) [D68.51]  Long-term (current) use of anticoagulants [Z79.01] [Z79.01]           Comments:  Has home INR machine weekly INR needed            Anticoagulation Care Providers     Provider Role Specialty Phone number    Ling Trammell PA-C Referring Family Medicine [phone]

## 2023-01-11 ENCOUNTER — ANTICOAGULATION THERAPY VISIT (OUTPATIENT)
Dept: ANTICOAGULATION | Facility: OTHER | Age: 53
End: 2023-01-11
Attending: PHYSICIAN ASSISTANT
Payer: COMMERCIAL

## 2023-01-11 DIAGNOSIS — I82.602 BLOOD CLOT OF VEIN IN SHOULDER AREA, LEFT: Primary | ICD-10-CM

## 2023-01-11 DIAGNOSIS — D68.51 FACTOR 5 LEIDEN MUTATION, HETEROZYGOUS (H): ICD-10-CM

## 2023-01-11 DIAGNOSIS — Z79.01 LONG TERM CURRENT USE OF ANTICOAGULANT THERAPY: ICD-10-CM

## 2023-01-11 LAB — INR (EXTERNAL): 3.2 (ref 0.9–1.1)

## 2023-01-11 NOTE — PROGRESS NOTES
ANTICOAGULATION  MANAGEMENT-Home Monitor Managed by Exception    Danieladhaval Ladd 52 year old female is on warfarin with therapeutic INR result. (Goal INR 2.5-3.5)    Recent labs: (last 7 days)     01/11/23  0804   INR 3.2*         Previous INR was Therapeutic    Medication, diet, health changes since last INR:chart reviewed; none identified    Contacted within the last 12 weeks by phone on 11/30/22      PLAN     Daniela was NOT contacted regarding therapeutic result today per home monitoring policy manage by exception agreement.   Current warfarin dose is to be continued:     Summary  As of 1/11/2023    Full warfarin instructions:  10 mg every Mon, Wed, Fri; 8 mg all other days   Next INR check:  1/18/2023           ?   Yoly Crowe RN  [facility]ticoagulation Clinic  1/11/2023    _______________________________________________________________________     Anticoagulation Episode Summary     Current INR goal:  2.5-3.5   TTR:  90.9 % (1 y)   Target end date:  Indefinite   Send INR reminders to:  [facility]    Indications    Blood clot of vein in shoulder area  left [I82.602]  Factor 5 Leiden mutation  heterozygous (H) [D68.51]  Long-term (current) use of anticoagulants [Z79.01] [Z79.01]           Comments:  Has home INR machine weekly INR needed            Anticoagulation Care Providers     Provider Role Specialty Phone number    Ling Trammell PA-C Referring Family Medicine [phone]

## 2023-01-18 ENCOUNTER — ANTICOAGULATION THERAPY VISIT (OUTPATIENT)
Dept: ANTICOAGULATION | Facility: OTHER | Age: 53
End: 2023-01-18
Attending: PHYSICIAN ASSISTANT
Payer: COMMERCIAL

## 2023-01-18 DIAGNOSIS — I82.602 BLOOD CLOT OF VEIN IN SHOULDER AREA, LEFT: Primary | ICD-10-CM

## 2023-01-18 DIAGNOSIS — D68.51 FACTOR 5 LEIDEN MUTATION, HETEROZYGOUS (H): ICD-10-CM

## 2023-01-18 DIAGNOSIS — Z79.01 LONG TERM CURRENT USE OF ANTICOAGULANT THERAPY: ICD-10-CM

## 2023-01-18 LAB — INR (EXTERNAL): 2.9 (ref 0.9–1.1)

## 2023-01-18 NOTE — PROGRESS NOTES
ANTICOAGULATION  MANAGEMENT-Home Monitor Managed by Exception    Danielamariusz Ladd 52 year old female is on warfarin with therapeutic INR result. (Goal INR 2.5-3.5)    Recent labs: (last 7 days)     01/18/23  0806   INR 2.9*         Previous INR was Therapeutic    Medication, diet, health changes since last INR:chart reviewed; none identified    Contacted within the last 12 weeks by phone on 11/30/22      PLAN     Daniela was NOT contacted regarding therapeutic result today per home monitoring policy manage by exception agreement.   Current warfarin dose is to be continued:     Summary  As of 1/18/2023    Full warfarin instructions:  10 mg every Mon, Wed, Fri; 8 mg all other days   Next INR check:  1/25/2023           ?   Yoly Crowe, RN  [facility]ticoagulation Clinic  1/18/2023    _______________________________________________________________________     Anticoagulation Episode Summary     Current INR goal:  2.5-3.5   TTR:  90.9 % (1 y)   Target end date:  Indefinite   Send INR reminders to:  [facility]    Indications    Blood clot of vein in shoulder area  left [I82.602]  Factor 5 Leiden mutation  heterozygous (H) [D68.51]  Long-term (current) use of anticoagulants [Z79.01] [Z79.01]           Comments:  Has home INR machine weekly INR needed            Anticoagulation Care Providers     Provider Role Specialty Phone number    Ling Trammell PA-C Referring Family Medicine [phone]

## 2023-01-25 ENCOUNTER — ANTICOAGULATION THERAPY VISIT (OUTPATIENT)
Dept: ANTICOAGULATION | Facility: OTHER | Age: 53
End: 2023-01-25
Attending: PHYSICIAN ASSISTANT
Payer: COMMERCIAL

## 2023-01-25 DIAGNOSIS — I82.602 BLOOD CLOT OF VEIN IN SHOULDER AREA, LEFT: Primary | ICD-10-CM

## 2023-01-25 DIAGNOSIS — Z79.01 LONG TERM CURRENT USE OF ANTICOAGULANT THERAPY: ICD-10-CM

## 2023-01-25 DIAGNOSIS — D68.51 FACTOR 5 LEIDEN MUTATION, HETEROZYGOUS (H): ICD-10-CM

## 2023-01-25 LAB — INR (EXTERNAL): 3 (ref 0.9–1.1)

## 2023-01-25 NOTE — PROGRESS NOTES
ANTICOAGULATION  MANAGEMENT-Home Monitor Managed by Exception    Danieladhaval Ladd 52 year old female is on warfarin with therapeutic INR result. (Goal INR 2.5-3.5)    Recent labs: (last 7 days)     01/25/23  0813   INR 3.0*         Previous INR was Therapeutic    Medication, diet, health changes since last INR:chart reviewed; none identified    Contacted within the last 12 weeks by phone on 11/30/22      PLAN     Daniela was NOT contacted regarding therapeutic result today per home monitoring policy manage by exception agreement.   Current warfarin dose is to be continued:     Summary  As of 1/25/2023    Full warfarin instructions:  10 mg every Mon, Wed, Fri; 8 mg all other days   Next INR check:  2/1/2023           ?   Yoly Crowe, RN  [facility]ticoagulation Clinic  1/25/2023    _______________________________________________________________________     Anticoagulation Episode Summary     Current INR goal:  2.5-3.5   TTR:  90.9 % (1 y)   Target end date:  Indefinite   Send INR reminders to:  [facility]    Indications    Blood clot of vein in shoulder area  left [I82.602]  Factor 5 Leiden mutation  heterozygous (H) [D68.51]  Long-term (current) use of anticoagulants [Z79.01] [Z79.01]           Comments:  Has home INR machine weekly INR needed            Anticoagulation Care Providers     Provider Role Specialty Phone number    Ling Trammell PA-C Referring Family Medicine [phone]

## 2023-02-01 ENCOUNTER — ANTICOAGULATION THERAPY VISIT (OUTPATIENT)
Dept: ANTICOAGULATION | Facility: OTHER | Age: 53
End: 2023-02-01
Attending: PHYSICIAN ASSISTANT
Payer: COMMERCIAL

## 2023-02-01 DIAGNOSIS — I82.602 BLOOD CLOT OF VEIN IN SHOULDER AREA, LEFT: Primary | ICD-10-CM

## 2023-02-01 DIAGNOSIS — D68.51 FACTOR 5 LEIDEN MUTATION, HETEROZYGOUS (H): ICD-10-CM

## 2023-02-01 DIAGNOSIS — Z79.01 LONG TERM CURRENT USE OF ANTICOAGULANT THERAPY: ICD-10-CM

## 2023-02-01 LAB — INR (EXTERNAL): 2.6 (ref 0.09–1.1)

## 2023-02-01 NOTE — PROGRESS NOTES
ANTICOAGULATION  MANAGEMENT-Home Monitor Managed by Exception    Danielamariusz Ladd 52 year old female is on warfarin with therapeutic INR result. (Goal INR 2.5-3.5)    Recent labs: (last 7 days)     02/01/23  0806   INR 2.6*         Previous INR was Therapeutic    Medication, diet, health changes since last INR:chart reviewed; none identified    Contacted within the last 12 weeks by phone on 11/30/22      PLAN     Daniela was NOT contacted regarding therapeutic result today per home monitoring policy manage by exception agreement.   Current warfarin dose is to be continued:     Summary  As of 2/1/2023    Full warfarin instructions:  10 mg every Mon, Wed, Fri; 8 mg all other days   Next INR check:  2/8/2023           ?   Yoly Crowe, RN  [facility]  2/1/2023    _______________________________________________________________________     Anticoagulation Episode Summary     Current INR goal:  2.5-3.5   TTR:  90.9 % (1 y)   Target end date:  Indefinite   Send INR reminders to:  [facility]    Indications    Blood clot of vein in shoulder area  left [I82.602]  Factor 5 Leiden mutation  heterozygous (H) [D68.51]  Long-term (current) use of anticoagulants [Z79.01] [Z79.01]           Comments:  Has home INR machine weekly INR needed            Anticoagulation Care Providers     Provider Role Specialty Phone number    Ling Trmamell PA-C Referring Family Medicine [phone]

## 2023-02-08 LAB — INR (EXTERNAL): 3 (ref 0.9–1.1)

## 2023-02-09 ENCOUNTER — ANTICOAGULATION THERAPY VISIT (OUTPATIENT)
Dept: ANTICOAGULATION | Facility: OTHER | Age: 53
End: 2023-02-09
Attending: PHYSICIAN ASSISTANT
Payer: COMMERCIAL

## 2023-02-09 DIAGNOSIS — I82.602 BLOOD CLOT OF VEIN IN SHOULDER AREA, LEFT: Primary | ICD-10-CM

## 2023-02-09 DIAGNOSIS — Z79.01 LONG TERM CURRENT USE OF ANTICOAGULANT THERAPY: ICD-10-CM

## 2023-02-09 DIAGNOSIS — D68.51 FACTOR 5 LEIDEN MUTATION, HETEROZYGOUS (H): ICD-10-CM

## 2023-02-09 NOTE — PROGRESS NOTES
ANTICOAGULATION  MANAGEMENT-Home Monitor Managed by Exception    Daniela NANCY Ladd 52 year old female is on warfarin with therapeutic INR result. (Goal INR 2.5-3.5)    Recent labs: (last 7 days)     02/08/23  1740   INR 3.0*         Previous INR was Therapeutic    Medication, diet, health changes since last INR:chart reviewed; none identified    Contacted within the last 12 weeks by phone on 11/30/22      PLAN     Daniela was NOT contacted regarding therapeutic result today per home monitoring policy manage by exception agreement.   Current warfarin dose is to be continued:     Summary  As of 2/9/2023    Full warfarin instructions:  10 mg every Mon, Wed, Fri; 8 mg all other days   Next INR check:  2/15/2023           ?   Ambreen Khan RN  [facility]ticoagulation Clinic  2/9/2023    _______________________________________________________________________     Anticoagulation Episode Summary     Current INR goal:  2.5-3.5   TTR:  90.9 % (1 y)   Target end date:  Indefinite   Send INR reminders to:  [facility]    Indications    Blood clot of vein in shoulder area  left [I82.602]  Factor 5 Leiden mutation  heterozygous (H) [D68.51]  Long-term (current) use of anticoagulants [Z79.01] [Z79.01]           Comments:  Has home INR machine weekly INR needed            Anticoagulation Care Providers     Provider Role Specialty Phone number    Ling Trammell PA-C Referring Family Medicine [phone]

## 2023-02-15 ENCOUNTER — ANTICOAGULATION THERAPY VISIT (OUTPATIENT)
Dept: ANTICOAGULATION | Facility: OTHER | Age: 53
End: 2023-02-15
Attending: PHYSICIAN ASSISTANT
Payer: COMMERCIAL

## 2023-02-15 DIAGNOSIS — D68.51 FACTOR 5 LEIDEN MUTATION, HETEROZYGOUS (H): ICD-10-CM

## 2023-02-15 DIAGNOSIS — I82.602 BLOOD CLOT OF VEIN IN SHOULDER AREA, LEFT: Primary | ICD-10-CM

## 2023-02-15 DIAGNOSIS — Z79.01 LONG TERM CURRENT USE OF ANTICOAGULANT THERAPY: ICD-10-CM

## 2023-02-15 LAB — INR (EXTERNAL): 3.1 (ref 0.9–1.1)

## 2023-02-15 NOTE — PROGRESS NOTES
ANTICOAGULATION  MANAGEMENT-Home Monitor Managed by Exception    Daniela NANCY Ladd 52 year old female is on warfarin with therapeutic INR result. (Goal INR 2.5-3.5)    Recent labs: (last 7 days)     02/15/23  0757   INR 3.1*         Previous INR was Therapeutic    Medication, diet, health changes since last INR:chart reviewed; none identified    Contacted within the last 12 weeks by phone on 11/30/22      PLAN     Daniela was NOT contacted regarding therapeutic result today per home monitoring policy manage by exception agreement.   Current warfarin dose is to be continued:     Summary  As of 2/15/2023    Full warfarin instructions:  10 mg every Mon, Wed, Fri; 8 mg all other days   Next INR check:  2/22/2023           ?   Ambreen Khan RN  [facility]tico[facility] Clinic  2/15/2023    _______________________________________________________________________     Anticoagulation Episode Summary     Current INR goal:  2.5-3.5   TTR:  91.0 % (1 y)   Target end date:  Indefinite   Send INR reminders to:  [facility]    Indications    Blood clot of vein in shoulder area  left [I82.602]  Factor 5 Leiden mutation  heterozygous (H) [D68.51]  Long-term (current) use of anticoagulants [Z79.01] [Z79.01]           Comments:  Has home INR machine weekly INR needed            Anticoagulation Care Providers     Provider Role Specialty Phone number    Ling Trammell PA-C Referring Family Medicine [phone]

## 2023-02-22 ENCOUNTER — ANTICOAGULATION THERAPY VISIT (OUTPATIENT)
Dept: ANTICOAGULATION | Facility: OTHER | Age: 53
End: 2023-02-22
Attending: PHYSICIAN ASSISTANT
Payer: COMMERCIAL

## 2023-02-22 DIAGNOSIS — I82.602 BLOOD CLOT OF VEIN IN SHOULDER AREA, LEFT: Primary | ICD-10-CM

## 2023-02-22 DIAGNOSIS — Z79.01 LONG TERM CURRENT USE OF ANTICOAGULANT THERAPY: ICD-10-CM

## 2023-02-22 DIAGNOSIS — D68.51 FACTOR 5 LEIDEN MUTATION, HETEROZYGOUS (H): ICD-10-CM

## 2023-02-22 LAB — INR (EXTERNAL): 3 (ref 0.9–1.1)

## 2023-02-22 NOTE — PROGRESS NOTES
ANTICOAGULATION  MANAGEMENT-Home Monitor Managed by Exception    Danielamariusz Ladd 52 year old female is on warfarin with therapeutic INR result. (Goal INR 2.5-3.5)    Recent labs: (last 7 days)     02/22/23  0816   INR 3.0*         Previous INR was Therapeutic    Medication, diet, health changes since last INR:chart reviewed; none identified    Contacted within the last 12 weeks by phone on 11/30/23      ANÍBAL Suarez was NOT contacted regarding therapeutic result today per home monitoring policy manage by exception agreement.   Current warfarin dose is to be continued:     Summary  As of 2/22/2023    Full warfarin instructions:  10 mg every Mon, Wed, Fri; 8 mg all other days   Next INR check:  3/1/2023           ?   Yoly Crowe, RN  [facility] Clinic  2/22/2023    _______________________________________________________________________     Anticoagulation Episode Summary     Current INR goal:  2.5-3.5   TTR:  91.0 % (1 y)   Target end date:  Indefinite   Send INR reminders to:  [facility]    Indications    Blood clot of vein in shoulder area  left [I82.602]  Factor 5 Leiden mutation  heterozygous (H) [D68.51]  Long-term (current) use of anticoagulants [Z79.01] [Z79.01]           Comments:  Has home INR machine weekly INR needed            Anticoagulation Care Providers     Provider Role Specialty Phone number    Ling Trammell PA-C Referring Family Medicine [phone]

## 2023-03-01 ENCOUNTER — ANTICOAGULATION THERAPY VISIT (OUTPATIENT)
Dept: ANTICOAGULATION | Facility: OTHER | Age: 53
End: 2023-03-01
Attending: PHYSICIAN ASSISTANT
Payer: COMMERCIAL

## 2023-03-01 DIAGNOSIS — Z79.01 LONG TERM CURRENT USE OF ANTICOAGULANT THERAPY: ICD-10-CM

## 2023-03-01 DIAGNOSIS — I82.602 BLOOD CLOT OF VEIN IN SHOULDER AREA, LEFT: Primary | ICD-10-CM

## 2023-03-01 DIAGNOSIS — D68.51 FACTOR 5 LEIDEN MUTATION, HETEROZYGOUS (H): ICD-10-CM

## 2023-03-01 LAB — INR (EXTERNAL): 3.1 (ref 0.9–1.1)

## 2023-03-01 NOTE — PROGRESS NOTES
ANTICOAGULATION  MANAGEMENT-Home Monitor Managed by Exception    Daniela Ladd 52 year old female is on warfarin with therapeutic INR result. (Goal INR 2.5-3.5)    Recent labs: (last 7 days)     03/01/23  0759   INR 3.1*         Previous INR was Therapeutic    Medication, diet, health changes since last INR:chart reviewed; none identified        PLAN     Daniela was NOT contacted regarding therapeutic result today per home monitoring policy manage by exception agreement.   Current warfarin dose is to be continued:     Summary  As of 3/1/2023    Full warfarin instructions:  10 mg every Mon, Wed, Fri; 8 mg all other days   Next INR check:  3/8/2023           ?   Yoly Crowe RN  [facility] Clinic  3/1/2023    _______________________________________________________________________     Anticoagulation Episode Summary     Current INR goal:  2.5-3.5   TTR:  91.0 % (1 y)   Target end date:  Indefinite   Send INR reminders to:  [facility]    Indications    Blood clot of vein in shoulder area  left [I82.602]  Factor 5 Leiden mutation  heterozygous (H) [D68.51]  Long-term (current) use of anticoagulants [Z79.01] [Z79.01]           Comments:  Has home INR machine weekly INR needed            Anticoagulation Care Providers     Provider Role Specialty Phone number    Ling Trammell PA-C Referring Family Medicine [phone]

## 2023-03-02 ENCOUNTER — MYC MEDICAL ADVICE (OUTPATIENT)
Dept: FAMILY MEDICINE | Facility: OTHER | Age: 53
End: 2023-03-02
Payer: COMMERCIAL

## 2023-03-02 DIAGNOSIS — Z12.11 SPECIAL SCREENING FOR MALIGNANT NEOPLASMS, COLON: Primary | ICD-10-CM

## 2023-03-08 ENCOUNTER — ANTICOAGULATION THERAPY VISIT (OUTPATIENT)
Dept: ANTICOAGULATION | Facility: OTHER | Age: 53
End: 2023-03-08
Attending: PHYSICIAN ASSISTANT
Payer: COMMERCIAL

## 2023-03-08 DIAGNOSIS — Z79.01 LONG TERM CURRENT USE OF ANTICOAGULANT THERAPY: ICD-10-CM

## 2023-03-08 DIAGNOSIS — D68.51 FACTOR 5 LEIDEN MUTATION, HETEROZYGOUS (H): ICD-10-CM

## 2023-03-08 DIAGNOSIS — I82.602 BLOOD CLOT OF VEIN IN SHOULDER AREA, LEFT: Primary | ICD-10-CM

## 2023-03-08 LAB — INR (EXTERNAL): 2.8 (ref 0.9–1.1)

## 2023-03-08 NOTE — PROGRESS NOTES
ANTICOAGULATION  MANAGEMENT-Home Monitor Managed by Exception    Daniela Ladd 52 year old female is on warfarin with therapeutic INR result. (Goal INR 2.5-3.5)    Recent labs: (last 7 days)     03/08/23  0812   INR 2.8*         Previous INR was Therapeutic    Medication, diet, health changes since last INR:chart reviewed; none identified        PLAN     Daniela was NOT contacted regarding therapeutic result today per home monitoring policy manage by exception agreement.   Current warfarin dose is to be continued:     Summary  As of 3/8/2023    Full warfarin instructions:  10 mg every Mon, Wed, Fri; 8 mg all other days   Next INR check:  3/15/2023           ?   Yoly Crowe RN  [facility]  3/8/2023    _______________________________________________________________________     Anticoagulation Episode Summary     Current INR goal:  2.5-3.5   TTR:  91.1 % (1 y)   Target end date:  Indefinite   Send INR reminders to:  [facility]    Indications    Blood clot of vein in shoulder area  left [I82.602]  Factor 5 Leiden mutation  heterozygous (H) [D68.51]  Long-term (current) use of anticoagulants [Z79.01] [Z79.01]           Comments:  Has home INR machine weekly INR needed            Anticoagulation Care Providers     Provider Role Specialty Phone number    Ling Trammell PA-C Referring Family Medicine [phone]

## 2023-03-15 ENCOUNTER — ANTICOAGULATION THERAPY VISIT (OUTPATIENT)
Dept: ANTICOAGULATION | Facility: OTHER | Age: 53
End: 2023-03-15
Attending: PHYSICIAN ASSISTANT
Payer: COMMERCIAL

## 2023-03-15 DIAGNOSIS — D68.51 FACTOR 5 LEIDEN MUTATION, HETEROZYGOUS (H): ICD-10-CM

## 2023-03-15 DIAGNOSIS — I82.602 BLOOD CLOT OF VEIN IN SHOULDER AREA, LEFT: Primary | ICD-10-CM

## 2023-03-15 DIAGNOSIS — Z79.01 LONG TERM CURRENT USE OF ANTICOAGULANT THERAPY: ICD-10-CM

## 2023-03-15 LAB — INR (EXTERNAL): 3.4 (ref 0.9–1.1)

## 2023-03-15 NOTE — PROGRESS NOTES
ANTICOAGULATION  MANAGEMENT-Home Monitor Managed by Exception    Danielamariusz Ladd 52 year old female is on warfarin with therapeutic INR result. (Goal INR 2.5-3.5)    Recent labs: (last 7 days)     03/15/23  0805   INR 3.4*         Previous INR was Therapeutic    Medication, diet, health changes since last INR:chart reviewed; none identified        PLAN     Daniela was NOT contacted regarding therapeutic result today per home monitoring policy manage by exception agreement.   Current warfarin dose is to be continued:     Summary  As of 3/15/2023    Full warfarin instructions:  10 mg every Mon, Wed, Fri; 8 mg all other days   Next INR check:  3/22/2023           ?   Ambreen Khan RN  [facility]  3/15/2023    _______________________________________________________________________     Anticoagulation Episode Summary     Current INR goal:  2.5-3.5   TTR:  92.0 % (1 y)   Target end date:  Indefinite   Send INR reminders to:  [facility]    Indications    Blood clot of vein in shoulder area  left [I82.602]  Factor 5 Leiden mutation  heterozygous (H) [D68.51]  Long-term (current) use of anticoagulants [Z79.01] [Z79.01]           Comments:  Has home INR machine weekly INR needed            Anticoagulation Care Providers     Provider Role Specialty Phone number    Ling Trammell PA-C Referring Family Medicine [phone]

## 2023-03-22 ENCOUNTER — ANTICOAGULATION THERAPY VISIT (OUTPATIENT)
Dept: ANTICOAGULATION | Facility: OTHER | Age: 53
End: 2023-03-22
Attending: PHYSICIAN ASSISTANT
Payer: COMMERCIAL

## 2023-03-22 DIAGNOSIS — Z79.01 LONG TERM CURRENT USE OF ANTICOAGULANT THERAPY: ICD-10-CM

## 2023-03-22 DIAGNOSIS — D68.51 FACTOR 5 LEIDEN MUTATION, HETEROZYGOUS (H): ICD-10-CM

## 2023-03-22 DIAGNOSIS — I82.602 BLOOD CLOT OF VEIN IN SHOULDER AREA, LEFT: Primary | ICD-10-CM

## 2023-03-22 LAB — INR (EXTERNAL): 2.9 (ref 0.9–1.1)

## 2023-03-22 NOTE — PROGRESS NOTES
ANTICOAGULATION  MANAGEMENT-Home Monitor Managed by Exception    Danielamariusz Ladd 52 year old female is on warfarin with therapeutic INR result. (Goal INR 2.5-3.5)    Recent labs: (last 7 days)     03/22/23  0738   INR 2.9*         Previous INR was Therapeutic    Medication, diet, health changes since last INR:chart reviewed; none identified        PLAN     Daniela was NOT contacted regarding therapeutic result today per home monitoring policy manage by exception agreement.   Current warfarin dose is to be continued:     Summary  As of 3/22/2023    Full warfarin instructions:  10 mg every Mon, Wed, Fri; 8 mg all other days   Next INR check:  3/29/2023           ?   Ambreen Khan RN  [facility]  3/22/2023    _______________________________________________________________________     Anticoagulation Episode Summary     Current INR goal:  2.5-3.5   TTR:  92.0 % (1 y)   Target end date:  Indefinite   Send INR reminders to:  [facility]    Indications    Blood clot of vein in shoulder area  left [I82.602]  Factor 5 Leiden mutation  heterozygous (H) [D68.51]  Long-term (current) use of anticoagulants [Z79.01] [Z79.01]           Comments:  Has home INR machine weekly INR needed            Anticoagulation Care Providers     Provider Role Specialty Phone number    Ling Trammell PA-C Referring Family Medicine [phone]

## 2023-03-23 LAB — NONINV COLON CA DNA+OCC BLD SCRN STL QL: NEGATIVE

## 2023-03-24 ENCOUNTER — TELEPHONE (OUTPATIENT)
Dept: FAMILY MEDICINE | Facility: OTHER | Age: 53
End: 2023-03-24
Payer: COMMERCIAL

## 2023-03-24 NOTE — TELEPHONE ENCOUNTER
Radha TRAN RN from Dermatology at [facility] in [place] called. Patient is having a Mohs procedure on Friday April 28th,. Her goal is is 2.5-3.5. Need to be on lower end of 2.0-2.5 and with out needing to bridge. There is Number [phone]. Elyssa Correa RN on 3/24/2023 at 4:20 PM

## 2023-03-27 ENCOUNTER — OFFICE VISIT (OUTPATIENT)
Dept: FAMILY MEDICINE | Facility: OTHER | Age: 53
End: 2023-03-27
Attending: NURSE PRACTITIONER
Payer: COMMERCIAL

## 2023-03-27 ENCOUNTER — NURSE TRIAGE (OUTPATIENT)
Dept: FAMILY MEDICINE | Facility: OTHER | Age: 53
End: 2023-03-27
Payer: COMMERCIAL

## 2023-03-27 VITALS
WEIGHT: 203.2 LBS | SYSTOLIC BLOOD PRESSURE: 150 MMHG | BODY MASS INDEX: 31.89 KG/M2 | OXYGEN SATURATION: 98 % | DIASTOLIC BLOOD PRESSURE: 92 MMHG | HEART RATE: 100 BPM | RESPIRATION RATE: 18 BRPM | TEMPERATURE: 98.6 F | HEIGHT: 67 IN

## 2023-03-27 DIAGNOSIS — R03.0 ELEVATED BLOOD PRESSURE READING WITHOUT DIAGNOSIS OF HYPERTENSION: ICD-10-CM

## 2023-03-27 DIAGNOSIS — Z13.220 LIPID SCREENING: ICD-10-CM

## 2023-03-27 DIAGNOSIS — R73.03 PREDIABETES: ICD-10-CM

## 2023-03-27 DIAGNOSIS — E78.5 ELEVATED LIPIDS: ICD-10-CM

## 2023-03-27 DIAGNOSIS — Z79.01 LONG TERM CURRENT USE OF ANTICOAGULANT THERAPY: Primary | ICD-10-CM

## 2023-03-27 DIAGNOSIS — R00.2 HEART PALPITATIONS: ICD-10-CM

## 2023-03-27 DIAGNOSIS — Z13.1 SCREENING FOR DIABETES MELLITUS: ICD-10-CM

## 2023-03-27 LAB
ALBUMIN SERPL BCG-MCNC: 4.1 G/DL (ref 3.5–5.2)
ALP SERPL-CCNC: 74 U/L (ref 35–104)
ALT SERPL W P-5'-P-CCNC: 21 U/L (ref 10–35)
ANION GAP SERPL CALCULATED.3IONS-SCNC: 8 MMOL/L (ref 7–15)
AST SERPL W P-5'-P-CCNC: 14 U/L (ref 10–35)
ATRIAL RATE - MUSE: 88 BPM
BASOPHILS # BLD AUTO: 0 10E3/UL (ref 0–0.2)
BASOPHILS NFR BLD AUTO: 0 %
BILIRUB SERPL-MCNC: 0.2 MG/DL
BUN SERPL-MCNC: 5.8 MG/DL (ref 6–20)
CALCIUM SERPL-MCNC: 9.2 MG/DL (ref 8.6–10)
CHLORIDE SERPL-SCNC: 106 MMOL/L (ref 98–107)
CHOLEST SERPL-MCNC: 206 MG/DL
CREAT SERPL-MCNC: 0.69 MG/DL (ref 0.51–0.95)
DEPRECATED HCO3 PLAS-SCNC: 25 MMOL/L (ref 22–29)
DIASTOLIC BLOOD PRESSURE - MUSE: NORMAL MMHG
EOSINOPHIL # BLD AUTO: 0.1 10E3/UL (ref 0–0.7)
EOSINOPHIL NFR BLD AUTO: 1 %
ERYTHROCYTE [DISTWIDTH] IN BLOOD BY AUTOMATED COUNT: 12.9 % (ref 10–15)
GFR SERPL CREATININE-BSD FRML MDRD: >90 ML/MIN/1.73M2
GLUCOSE SERPL-MCNC: 93 MG/DL (ref 70–99)
HBA1C MFR BLD: 6 % (ref 4–6.2)
HCT VFR BLD AUTO: 45.9 % (ref 35–47)
HDLC SERPL-MCNC: 31 MG/DL
HGB BLD-MCNC: 16.2 G/DL (ref 11.7–15.7)
HOLD SPECIMEN: NORMAL
IMM GRANULOCYTES # BLD: 0 10E3/UL
IMM GRANULOCYTES NFR BLD: 0 %
INTERPRETATION ECG - MUSE: NORMAL
LDLC SERPL CALC-MCNC: ABNORMAL MG/DL
LYMPHOCYTES # BLD AUTO: 2.8 10E3/UL (ref 0.8–5.3)
LYMPHOCYTES NFR BLD AUTO: 28 %
MCH RBC QN AUTO: 31.5 PG (ref 26.5–33)
MCHC RBC AUTO-ENTMCNC: 35.3 G/DL (ref 31.5–36.5)
MCV RBC AUTO: 89 FL (ref 78–100)
MONOCYTES # BLD AUTO: 0.5 10E3/UL (ref 0–1.3)
MONOCYTES NFR BLD AUTO: 5 %
NEUTROPHILS # BLD AUTO: 6.5 10E3/UL (ref 1.6–8.3)
NEUTROPHILS NFR BLD AUTO: 66 %
NONHDLC SERPL-MCNC: 175 MG/DL
NRBC # BLD AUTO: 0 10E3/UL
NRBC BLD AUTO-RTO: 0 /100
P AXIS - MUSE: 30 DEGREES
PLATELET # BLD AUTO: 306 10E3/UL (ref 150–450)
POTASSIUM SERPL-SCNC: 4.5 MMOL/L (ref 3.4–5.3)
PR INTERVAL - MUSE: 128 MS
PROT SERPL-MCNC: 7.3 G/DL (ref 6.4–8.3)
QRS DURATION - MUSE: 72 MS
QT - MUSE: 330 MS
QTC - MUSE: 399 MS
R AXIS - MUSE: 31 DEGREES
RBC # BLD AUTO: 5.14 10E6/UL (ref 3.8–5.2)
SODIUM SERPL-SCNC: 139 MMOL/L (ref 136–145)
SYSTOLIC BLOOD PRESSURE - MUSE: NORMAL MMHG
T AXIS - MUSE: 32 DEGREES
TRIGL SERPL-MCNC: 476 MG/DL
TSH SERPL DL<=0.005 MIU/L-ACNC: 2.28 UIU/ML (ref 0.3–4.2)
VENTRICULAR RATE- MUSE: 88 BPM
WBC # BLD AUTO: 9.9 10E3/UL (ref 4–11)

## 2023-03-27 PROCEDURE — 80053 COMPREHEN METABOLIC PANEL: CPT | Mod: ZL | Performed by: NURSE PRACTITIONER

## 2023-03-27 PROCEDURE — 85025 COMPLETE CBC W/AUTO DIFF WBC: CPT | Mod: ZL | Performed by: NURSE PRACTITIONER

## 2023-03-27 PROCEDURE — 99213 OFFICE O/P EST LOW 20 MIN: CPT | Performed by: NURSE PRACTITIONER

## 2023-03-27 PROCEDURE — 83036 HEMOGLOBIN GLYCOSYLATED A1C: CPT | Mod: ZL | Performed by: NURSE PRACTITIONER

## 2023-03-27 PROCEDURE — 84443 ASSAY THYROID STIM HORMONE: CPT | Mod: ZL | Performed by: NURSE PRACTITIONER

## 2023-03-27 PROCEDURE — 93000 ELECTROCARDIOGRAM COMPLETE: CPT | Performed by: INTERNAL MEDICINE

## 2023-03-27 PROCEDURE — 36415 COLL VENOUS BLD VENIPUNCTURE: CPT | Mod: ZL | Performed by: NURSE PRACTITIONER

## 2023-03-27 PROCEDURE — 80061 LIPID PANEL: CPT | Mod: ZL | Performed by: NURSE PRACTITIONER

## 2023-03-27 ASSESSMENT — PAIN SCALES - GENERAL: PAINLEVEL: NO PAIN (0)

## 2023-03-27 NOTE — NURSING NOTE
Patient presents today for vertigo and rapid heart rate at times that started yesterday,    Medication Reconciliation Complete    Talya Iniguez LPN  3/27/2023 9:45 AM

## 2023-03-27 NOTE — TELEPHONE ENCOUNTER
"S-(situation): rapid heart rate/ palpitations    B-(background): Patient has diagnosis of Factor 5 Leiden mutation, heterozygous and history of blood clots. Patient states she also SVT in past but this is not on diagnosis list. Patient states she is aware from past symptoms of blood clots and states she \"knows this is not a blood clot\". Patient is on warfarin and last INR check was this morning and at 2.5    A-(assessment): Patient states periods of rapid heart beat/ palpitations. States when this does occur, she often gets a little lightheaded. Patient states this does come and go and is not constant. Possibly believes this could be caused by some anxiety as well so was hoping to get a \"peace of mind\". Denies shortness of breath or any other symptoms    R-(recommendations): Per protocol, patient should be seen in clinic today. Patient does have an 940 appointment with a provider. Instructed on symptoms to report back to triage or ER prior to appointment- verbalized understanding    Corinne R Thayer, RN on 3/27/2023 at 8:41 AM    Reason for Disposition    Heart beating very rapidly (e.g., > 140 / minute) and not present now  (Exception: During exercise.)    Additional Information    Negative: Passed out (i.e., lost consciousness, collapsed and was not responding)    Negative: Shock suspected (e.g., cold/pale/clammy skin, too weak to stand, low BP, rapid pulse)    Negative: Difficult to awaken or acting confused (e.g., disoriented, slurred speech)    Negative: Visible sweat on face or sweat dripping down face    Negative: Unable to walk, or can only walk with assistance (e.g., requires support)    Negative: Received SHOCK from implantable cardiac defibrillator and has persisting symptoms (i.e., palpitations, lightheadedness)    Negative: Dizziness, lightheadedness, or weakness and heart beating very rapidly (e.g., > 140 / minute)    Negative: Dizziness, lightheadedness, or weakness and heart beating very slowly " "(e.g., < 50 / minute)    Negative: Sounds like a life-threatening emergency to the triager    Negative: Chest pain    Negative: Difficulty breathing    Negative: Dizziness, lightheadedness, or weakness    Negative: Heart beating very rapidly (e.g., > 140 / minute) and present now  (Exception: During exercise.)    Negative: Heart beating very slowly (e.g., < 50 / minute)  (Exception: Athlete and heart rate normal for caller.)    Negative: New or worsened shortness of breath with activity (dyspnea on exertion)    Negative: Patient sounds very sick or weak to the triager    Negative: Wearing a 'Holter monitor' or 'cardiac event monitor'    Negative: Received SHOCK from implantable cardiac defibrillator (and now feels well)    Answer Assessment - Initial Assessment Questions  1. DESCRIPTION: \"Please describe your heart rate or heartbeat that you are having\" (e.g., fast/slow, regular/irregular, skipped or extra beats, \"palpitations\")      Fast heart beat with occasional palpitations  2. ONSET: \"When did it start?\" (Minutes, hours or days)       Over a week ago  3. DURATION: \"How long does it last\" (e.g., seconds, minutes, hours)      Several minutes at times  4. PATTERN \"Does it come and go, or has it been constant since it started?\"  \"Does it get worse with exertion?\"   \"Are you feeling it now?\"      Comes and go  5. TAP: \"Using your hand, can you tap out what you are feeling on a chair or table in front of you, so that I can hear?\" (Note: not all patients can do this)        NA  6. HEART RATE: \"Can you tell me your heart rate?\" \"How many beats in 15 seconds?\"  (Note: not all patients can do this)        \"normal at this time\"  7. RECURRENT SYMPTOM: \"Have you ever had this before?\" If Yes, ask: \"When was the last time?\" and \"What happened that time?\"       yes  8. CAUSE: \"What do you think is causing the palpitations?\"      unsure  9. CARDIAC HISTORY: \"Do you have any history of heart disease?\" (e.g., heart attack, " "angina, bypass surgery, angioplasty, arrhythmia)       Blood clots, Factor 5 Leiden mutation, heterozygous   10. OTHER SYMPTOMS: \"Do you have any other symptoms?\" (e.g., dizziness, chest pain, sweating, difficulty breathing)        Occasional lightheadedness  11. PREGNANCY: \"Is there any chance you are pregnant?\" \"When was your last menstrual period?\"        No    Protocols used: HEART RATE AND HEARTBEAT [id number]      "

## 2023-03-27 NOTE — PROGRESS NOTES
Assessment & Plan   Problem List Items Addressed This Visit        Other    Long-term (current) use of anticoagulants [Z79.01] - Primary   Other Visit Diagnoses     Heart palpitations        Relevant Orders    EKG 12-lead, tracing only    TSH Reflex GH    CBC and Differential    Comprehensive Metabolic Panel    Hemoglobin A1c    Screening for diabetes mellitus        Relevant Orders    Hemoglobin A1c    Lipid screening        Relevant Orders    Lipid Panel    Elevated blood pressure reading without diagnosis of hypertension          EKG updated, this shows normal sinus rhythm.  Labs are showing elevated triglycerides and lipid panel, she did have a granola bar this morning.  Recommend repeat of this again in about 6 weeks when she is truly fasting.  TSH, CBC and CMP are stable.  Hemoglobin A1c at 6.0, indicates prediabetes.  Recommend lifestyle modification.  Blood pressure is elevated in clinic, recommend monitoring at home and following up in a couple weeks to make sure that this stabilizes.    Review of the result(s) of each unique test - see above  Ordering of each unique test  26 minutes spent by me on the date of the encounter doing chart review, history and exam, documentation and further activities per the note       No follow-ups on file.    RAUL Gramajo CNP  [facility]   Daniela is a 52 year old, presenting for the following health issues:  Dizziness  No flowsheet data found.  History of Present Illness       Reason for visit:  Double checkibg pvcs and run blood work    She eats 0-1 servings of fruits and vegetables daily.She consumes 0 sweetened beverage(s) daily.She exercises with enough effort to increase her heart rate 10 to 19 minutes per day.  She exercises with enough effort to increase her heart rate 3 or less days per week.   She is taking medications regularly.     She comes in today with concerns of a rapid heart rate and some dizziness.  She has  "remote history of PVCs, has not had anything recently.  Over the past couple days she has had intermittent and random fluttering sensations in her chest.  She feels like her heart rate has been elevated and will take a deep breath when this occurs to resolve her symptoms.  She has not had any chest pains or shortness of breath.  She has had a couple episodes of vertigo, yesterday when she turned her head she felt like she was unsteady.  She feels dizzy, room spinning and almost feels like she will pass out but does not.   is diabetic so when this has occurred she is checked her blood sugars.  Twice she has her blood sugars in the 60s.  She has noted increased anxiety related to her symptoms as well as a diagnosis of skin cancer and her 's upcoming neck surgery.  She does not check blood pressures at home.  She does have history of blood clots in her left arm, factor V Leiden and is on warfarin.  INR today was 2.5 which is her low end of her range.      Review of Systems   See above      Objective    BP (!) 150/92   Pulse 100   Temp 98.6  F (37  C)   Resp 18   Ht 1.689 m (5' 6.5\")   Wt 92.2 kg (203 lb 3.2 oz)   SpO2 98%   BMI 32.31 kg/m    Body mass index is 32.31 kg/m .  Physical Exam   GENERAL: healthy, alert and no distress  EYES: Eyes grossly normal to inspection, PERRL and conjunctivae and sclerae normal  HENT: ear canals and TM's normal, nose and mouth without ulcers or lesions  NECK: no adenopathy, no asymmetry, masses, or scars and thyroid normal to palpation  RESP: lungs clear to auscultation - no rales, rhonchi or wheezes  CV: regular rate and rhythm, normal S1 S2, no S3 or S4, no murmur, click or rub, no peripheral edema and peripheral pulses strong  NEURO: Normal strength and tone, mentation intact and speech normal  PSYCH: mentation appears normal, affect normal/bright    Results for orders placed or performed in visit on 03/27/23 (from the past 24 hour(s))   EKG 12-lead, tracing " only   Result Value Ref Range    Systolic Blood Pressure  mmHg    Diastolic Blood Pressure  mmHg    Ventricular Rate 88 BPM    Atrial Rate 88 BPM    MI Interval 128 ms    QRS Duration 72 ms     ms    QTc 399 ms    P Axis 30 degrees    R AXIS 31 degrees    T Axis 32 degrees    Interpretation ECG       Sinus rhythm  Normal ECG  No previous ECGs available     Lipid Panel   Result Value Ref Range    Cholesterol 206 (H) <200 mg/dL    Triglycerides 476 (H) <150 mg/dL    Direct Measure HDL 31 (L) >=50 mg/dL    LDL Cholesterol Calculated      Non HDL Cholesterol 175 (H) <130 mg/dL    Narrative    Cholesterol  Desirable:  <200 mg/dL    Triglycerides  Normal:  Less than 150 mg/dL  Borderline High:  150-199 mg/dL  High:  200-499 mg/dL  Very High:  Greater than or equal to 500 mg/dL    Direct Measure HDL  Female:  Greater than or equal to 50 mg/dL   Male:  Greater than or equal to 40 mg/dL    LDL Cholesterol  Desirable:  <100mg/dL  Above Desirable:  100-129 mg/dL   Borderline High:  130-159 mg/dL   High:  160-189 mg/dL   Very High:  >= 190 mg/dL    Non HDL Cholesterol  Desirable:  130 mg/dL  Above Desirable:  130-159 mg/dL  Borderline High:  160-189 mg/dL  High:  190-219 mg/dL  Very High:  Greater than or equal to 220 mg/dL   Hemoglobin A1c   Result Value Ref Range    Hemoglobin A1C 6.0 4.0 - 6.2 %   Comprehensive Metabolic Panel   Result Value Ref Range    Sodium 139 136 - 145 mmol/L    Potassium 4.5 3.4 - 5.3 mmol/L    Chloride 106 98 - 107 mmol/L    Carbon Dioxide (CO2) 25 22 - 29 mmol/L    Anion Gap 8 7 - 15 mmol/L    Urea Nitrogen 5.8 (L) 6.0 - 20.0 mg/dL    Creatinine 0.69 0.51 - 0.95 mg/dL    Calcium 9.2 8.6 - 10.0 mg/dL    Glucose 93 70 - 99 mg/dL    Alkaline Phosphatase 74 35 - 104 U/L    AST 14 10 - 35 U/L    ALT 21 10 - 35 U/L    Protein Total 7.3 6.4 - 8.3 g/dL    Albumin 4.1 3.5 - 5.2 g/dL    Bilirubin Total 0.2 <=1.2 mg/dL    GFR Estimate >90 >60 mL/min/1.73m2   CBC and Differential    Narrative    The  following orders were created for panel order CBC and Differential.  Procedure                               Abnormality         Status                     ---------                               -----------         ------                     CBC with platelets and d...[[id number]]  Abnormal            Final result                 Please view results for these tests on the individual orders.   TSH Reflex GH   Result Value Ref Range    TSH 2.28 0.30 - 4.20 uIU/mL   CBC with platelets and differential   Result Value Ref Range    WBC Count 9.9 4.0 - 11.0 10e3/uL    RBC Count 5.14 3.80 - 5.20 10e6/uL    Hemoglobin 16.2 (H) 11.7 - 15.7 g/dL    Hematocrit 45.9 35.0 - 47.0 %    MCV 89 78 - 100 fL    MCH 31.5 26.5 - 33.0 pg    MCHC 35.3 31.5 - 36.5 g/dL    RDW 12.9 10.0 - 15.0 %    Platelet Count 306 150 - 450 10e3/uL    % Neutrophils 66 %    % Lymphocytes 28 %    % Monocytes 5 %    % Eosinophils 1 %    % Basophils 0 %    % Immature Granulocytes 0 %    NRBCs per 100 WBC 0 <1 /100    Absolute Neutrophils 6.5 1.6 - 8.3 10e3/uL    Absolute Lymphocytes 2.8 0.8 - 5.3 10e3/uL    Absolute Monocytes 0.5 0.0 - 1.3 10e3/uL    Absolute Eosinophils 0.1 0.0 - 0.7 10e3/uL    Absolute Basophils 0.0 0.0 - 0.2 10e3/uL    Absolute Immature Granulocytes 0.0 <=0.4 10e3/uL    Absolute NRBCs 0.0 10e3/uL   Extra Tube    Narrative    The following orders were created for panel order Extra Tube.  Procedure                               Abnormality         Status                     ---------                               -----------         ------                     Extra Serum Separator Tu...[[id number]]                      In process                   Please view results for these tests on the individual orders.

## 2023-03-29 ENCOUNTER — ANTICOAGULATION THERAPY VISIT (OUTPATIENT)
Dept: ANTICOAGULATION | Facility: OTHER | Age: 53
End: 2023-03-29
Attending: PHYSICIAN ASSISTANT
Payer: COMMERCIAL

## 2023-03-29 DIAGNOSIS — D68.51 FACTOR 5 LEIDEN MUTATION, HETEROZYGOUS (H): ICD-10-CM

## 2023-03-29 DIAGNOSIS — Z79.01 LONG TERM CURRENT USE OF ANTICOAGULANT THERAPY: ICD-10-CM

## 2023-03-29 DIAGNOSIS — I82.602 BLOOD CLOT OF VEIN IN SHOULDER AREA, LEFT: Primary | ICD-10-CM

## 2023-03-29 LAB — INR (EXTERNAL): 2.4 (ref 0.9–1.1)

## 2023-03-29 NOTE — PROGRESS NOTES
ANTICOAGULATION MANAGEMENT     Daniela Ladd 52 year old female is on warfarin with subtherapeutic INR result. (Goal INR 2.5-3.5)    Recent labs: (last 7 days)     03/29/23  0823   INR 2.4*       ASSESSMENT       Source(s): Chart Review and Patient/Caregiver Call       Warfarin doses taken: Warfarin taken as instructed    Diet: Increased greens/vitamin K in diet; ongoing change    New illness, injury, or hospitalization: No    Medication/supplement changes: not using flonase anymore    Signs or symptoms of bleeding or clotting: No    Previous INR: Therapeutic last 2(+) visits    Additional findings: None       PLAN     Recommended plan for ongoing change(s) affecting INR     Dosing Instructions: Increase your warfarin dose (3.2% change) with next INR in 1 week       Summary  As of 3/29/2023    Full warfarin instructions:  8 mg every Mon, Wed, Fri; 10 mg all other days   Next INR check:  4/5/2023             Telephone call with Daniela who verbalizes understanding and agrees to plan    Patient to recheck with home meter    Education provided: Please call back if any changes to your diet, medications or how you've been taking warfarin    Plan made per [facility] anticoagulation protocol    Ambreen Khan, RN  Anticoagulation Clinic  3/29/2023    _______________________________________________________________________     Anticoagulation Episode Summary     Current INR goal:  2.5-3.5   TTR:  91.5 % (1 y)   Target end date:  Indefinite   Send INR reminders to:  [facility]    Indications    Blood clot of vein in shoulder area  left [I82.602]  Factor 5 Leiden mutation  heterozygous (H) [D68.51]  Long-term (current) use of anticoagulants [Z79.01] [Z79.01]           Comments:  Has home INR machine weekly INR needed            Anticoagulation Care Providers     Provider Role Specialty Phone number    Ling Trammell PA-C Referring Family Medicine [phone]

## 2023-04-05 ENCOUNTER — ANTICOAGULATION THERAPY VISIT (OUTPATIENT)
Dept: ANTICOAGULATION | Facility: OTHER | Age: 53
End: 2023-04-05
Attending: PHYSICIAN ASSISTANT
Payer: COMMERCIAL

## 2023-04-05 DIAGNOSIS — D68.51 FACTOR 5 LEIDEN MUTATION, HETEROZYGOUS (H): ICD-10-CM

## 2023-04-05 DIAGNOSIS — Z79.01 LONG TERM CURRENT USE OF ANTICOAGULANT THERAPY: ICD-10-CM

## 2023-04-05 DIAGNOSIS — I82.602 BLOOD CLOT OF VEIN IN SHOULDER AREA, LEFT: Primary | ICD-10-CM

## 2023-04-05 LAB — INR (EXTERNAL): 2.7 (ref 0.9–1.1)

## 2023-04-05 NOTE — PROGRESS NOTES
ANTICOAGULATION MANAGEMENT     Daniela Ladd 52 year old female is on warfarin with therapeutic INR result. (Goal INR 2.5-3.5)    Recent labs: (last 7 days)     04/05/23  0813   INR 2.7*       ASSESSMENT       Source(s): Chart Review and Patient/Caregiver Call       Warfarin doses taken: Warfarin taken as instructed    Diet: No new diet changes identified    New illness, injury, or hospitalization: No    Medication/supplement changes: None noted    Signs or symptoms of bleeding or clotting: No    Previous INR: Subtherapeutic    Additional findings: None       PLAN     Recommended plan for no diet, medication or health factor changes affecting INR     Dosing Instructions: Continue your current warfarin dose with next INR in 1 week       Summary  As of 4/5/2023    Full warfarin instructions:  8 mg every Mon, Wed, Fri; 10 mg all other days   Next INR check:  4/12/2023             Telephone call with Daniela who verbalizes understanding and agrees to plan    Patient to recheck with home meter    Education provided: Please call back if any changes to your diet, medications or how you've been taking warfarin and Resume manage by exception with home monitor. Continue to submit INR results to home monitor company.You will only be called when your result is out of range. Please call and notify [facility] if new medication started, dose missed, signs or symptoms of bleeding or clotting, or a surgery/procedure is scheduled.    Plan made per [facility] anticoagulation protocol    Ambreen Khan, RN  Anticoagulation Clinic  4/5/2023    _______________________________________________________________________     Anticoagulation Episode Summary     Current INR goal:  2.5-3.5   TTR:  90.9 % (1 y)   Target end date:  Indefinite   Send INR reminders to:  [facility]    Indications    Blood clot of vein in shoulder area  left [I82.602]  Factor 5 Leiden mutation  heterozygous (H) [D68.51]  Long-term (current) use of  anticoagulants [Z79.01] [Z79.01]           Comments:  Has home INR machine weekly INR needed            Anticoagulation Care Providers     Provider Role Specialty Phone number    Ling Trammell PA-C Referring Family Medicine [phone]

## 2023-04-12 ENCOUNTER — ANTICOAGULATION THERAPY VISIT (OUTPATIENT)
Dept: ANTICOAGULATION | Facility: OTHER | Age: 53
End: 2023-04-12
Attending: PHYSICIAN ASSISTANT
Payer: COMMERCIAL

## 2023-04-12 DIAGNOSIS — D68.51 FACTOR 5 LEIDEN MUTATION, HETEROZYGOUS (H): ICD-10-CM

## 2023-04-12 DIAGNOSIS — Z79.01 LONG TERM CURRENT USE OF ANTICOAGULANT THERAPY: ICD-10-CM

## 2023-04-12 DIAGNOSIS — I82.602 BLOOD CLOT OF VEIN IN SHOULDER AREA, LEFT: Primary | ICD-10-CM

## 2023-04-12 LAB — INR (EXTERNAL): 2.5 (ref 0.9–1.1)

## 2023-04-12 NOTE — PROGRESS NOTES
ANTICOAGULATION  MANAGEMENT-Home Monitor Managed by Exception    Danielamariusz Ladd 52 year old female is on warfarin with therapeutic INR result. (Goal INR 2.5-3.5)    Recent labs: (last 7 days)     04/12/23  0751   INR 2.5*         Previous INR was Therapeutic    Medication, diet, health changes since last INR:chart reviewed; none identified    Contacted within the last 12 weeks by phone on 4/5/23      ANÍBAL     Daniela was NOT contacted regarding therapeutic result today per home monitoring policy manage by exception agreement.   Current warfarin dose is to be continued:     Summary  As of 4/12/2023    Full warfarin instructions:  8 mg every Mon, Wed, Fri; 10 mg all other days   Next INR check:  4/19/2023           ?   Ambreen Khan RN  Anticoagulation Clinic  4/12/2023    _______________________________________________________________________     Anticoagulation Episode Summary     Current INR goal:  2.5-3.5   TTR:  90.9 % (1 y)   Target end date:  Indefinite   Send INR reminders to:  [facility]    Indications    Blood clot of vein in shoulder area  left [I82.602]  Factor 5 Leiden mutation  heterozygous (H) [D68.51]  Long-term (current) use of anticoagulants [Z79.01] [Z79.01]           Comments:  Has home INR machine weekly INR needed            Anticoagulation Care Providers     Provider Role Specialty Phone number    Ling Trammell PA-C Referring Family Medicine [phone]

## 2023-04-19 ENCOUNTER — ANTICOAGULATION THERAPY VISIT (OUTPATIENT)
Dept: ANTICOAGULATION | Facility: OTHER | Age: 53
End: 2023-04-19
Attending: PHYSICIAN ASSISTANT
Payer: COMMERCIAL

## 2023-04-19 DIAGNOSIS — I82.602 BLOOD CLOT OF VEIN IN SHOULDER AREA, LEFT: Primary | ICD-10-CM

## 2023-04-19 DIAGNOSIS — Z79.01 LONG TERM CURRENT USE OF ANTICOAGULANT THERAPY: ICD-10-CM

## 2023-04-19 DIAGNOSIS — D68.51 FACTOR 5 LEIDEN MUTATION, HETEROZYGOUS (H): ICD-10-CM

## 2023-04-19 LAB — INR (EXTERNAL): 2.7 (ref 0.9–1.1)

## 2023-04-19 NOTE — PROGRESS NOTES
ANTICOAGULATION  MANAGEMENT-Home Monitor Managed by Exception    Daniela Ladd 52 year old female is on warfarin with therapeutic INR result. (Goal INR 2.5-3.5)    Recent labs: (last 7 days)     04/19/23  0814   INR 2.7*         Previous INR was Therapeutic    Medication, diet, health changes since last INR:chart reviewed; none identified    Contacted within the last 12 weeks by phone on 4/5/23      ANÍBAL Suarez was NOT contacted regarding therapeutic result today per home monitoring policy manage by exception agreement.   Current warfarin dose is to be continued:     Summary  As of 4/19/2023    Full warfarin instructions:  8 mg every Mon, Wed, Fri; 10 mg all other days   Next INR check:  4/26/2023           ?   Yoly Crowe, RN  Anticoagulation Clinic  4/19/2023    _______________________________________________________________________     Anticoagulation Episode Summary     Current INR goal:  2.5-3.5   TTR:  90.9 % (1 y)   Target end date:  Indefinite   Send INR reminders to:  [facility]    Indications    Blood clot of vein in shoulder area  left [I82.602]  Factor 5 Leiden mutation  heterozygous (H) [D68.51]  Long-term (current) use of anticoagulants [Z79.01] [Z79.01]           Comments:  Has home INR machine weekly INR needed            Anticoagulation Care Providers     Provider Role Specialty Phone number    Ling Trammell PA-C Referring Family Medicine [phone]

## 2023-04-26 ENCOUNTER — ANTICOAGULATION THERAPY VISIT (OUTPATIENT)
Dept: ANTICOAGULATION | Facility: OTHER | Age: 53
End: 2023-04-26
Attending: PHYSICIAN ASSISTANT
Payer: COMMERCIAL

## 2023-04-26 DIAGNOSIS — Z79.01 LONG TERM CURRENT USE OF ANTICOAGULANT THERAPY: ICD-10-CM

## 2023-04-26 DIAGNOSIS — D68.51 FACTOR 5 LEIDEN MUTATION, HETEROZYGOUS (H): ICD-10-CM

## 2023-04-26 DIAGNOSIS — I82.602 BLOOD CLOT OF VEIN IN SHOULDER AREA, LEFT: Primary | ICD-10-CM

## 2023-04-26 LAB — INR (EXTERNAL): 3 (ref 0.9–1.1)

## 2023-04-26 NOTE — PROGRESS NOTES
ANTICOAGULATION  MANAGEMENT-Home Monitor Managed by Exception    Daniela NANCY aLdd 52 year old female is on warfarin with therapeutic INR result. (Goal INR 2.5-3.5)    Recent labs: (last 7 days)     04/26/23  0750   INR 3.0*         Previous INR was Therapeutic    Medication, diet, health changes since last INR:chart reviewed; none identified    Contacted within the last 12 weeks by phone on 4/5/23      ANÍBAL     Daniela was NOT contacted regarding therapeutic result today per home monitoring policy manage by exception agreement.   Current warfarin dose is to be continued:     Summary  As of 4/26/2023    Full warfarin instructions:  8 mg every Mon, Wed, Fri; 10 mg all other days   Next INR check:  5/3/2023           ?   Ambreen Khan RN  Anticoagulation Clinic  4/26/2023    _______________________________________________________________________     Anticoagulation Episode Summary     Current INR goal:  2.5-3.5   TTR:  90.9 % (1 y)   Target end date:  Indefinite   Send INR reminders to:  [facility]    Indications    Blood clot of vein in shoulder area  left [I82.602]  Factor 5 Leiden mutation  heterozygous (H) [D68.51]  Long-term (current) use of anticoagulants [Z79.01] [Z79.01]           Comments:  Has home INR machine weekly INR needed            Anticoagulation Care Providers     Provider Role Specialty Phone number    Ling Trammell PA-C Referring Family Medicine [phone]

## 2023-04-28 ENCOUNTER — MEDICAL CORRESPONDENCE (OUTPATIENT)
Dept: HEALTH INFORMATION MANAGEMENT | Facility: OTHER | Age: 53
End: 2023-04-28
Payer: COMMERCIAL

## 2023-05-03 ENCOUNTER — ANTICOAGULATION THERAPY VISIT (OUTPATIENT)
Dept: ANTICOAGULATION | Facility: OTHER | Age: 53
End: 2023-05-03
Attending: PHYSICIAN ASSISTANT
Payer: COMMERCIAL

## 2023-05-03 DIAGNOSIS — I82.602 BLOOD CLOT OF VEIN IN SHOULDER AREA, LEFT: Primary | ICD-10-CM

## 2023-05-03 DIAGNOSIS — D68.51 FACTOR 5 LEIDEN MUTATION, HETEROZYGOUS (H): ICD-10-CM

## 2023-05-03 DIAGNOSIS — Z79.01 LONG TERM CURRENT USE OF ANTICOAGULANT THERAPY: ICD-10-CM

## 2023-05-03 LAB — INR (EXTERNAL): 3.2 (ref 0.9–1.1)

## 2023-05-03 NOTE — PROGRESS NOTES
ANTICOAGULATION  MANAGEMENT-Home Monitor Managed by Exception    Daniela Ladd 52 year old female is on warfarin with therapeutic INR result. (Goal INR 2.5-3.5)    Recent labs: (last 7 days)     05/03/23  1419   INR 3.2*         Previous INR was Therapeutic    Medication, diet, health changes since last INR:chart reviewed; none identified    Contacted within the last 12 weeks by phone on 4/5/23      ANÍBAL Suarez was NOT contacted regarding therapeutic result today per home monitoring policy manage by exception agreement.   Current warfarin dose is to be continued:     Summary  As of 5/3/2023    Full warfarin instructions:  8 mg every Mon, Wed, Fri; 10 mg all other days   Next INR check:  5/10/2023           ?   Yoly Crowe, RN  Anticoagulation Clinic  5/3/2023    _______________________________________________________________________     Anticoagulation Episode Summary     Current INR goal:  2.5-3.5   TTR:  90.9 % (1 y)   Target end date:  Indefinite   Send INR reminders to:  [facility]    Indications    Blood clot of vein in shoulder area  left [I82.602]  Factor 5 Leiden mutation  heterozygous (H) [D68.51]  Long-term (current) use of anticoagulants [Z79.01] [Z79.01]           Comments:  Has home INR machine weekly INR needed            Anticoagulation Care Providers     Provider Role Specialty Phone number    Ling Trammell PA-C Referring Family Medicine [phone]

## 2023-05-10 ENCOUNTER — ANTICOAGULATION THERAPY VISIT (OUTPATIENT)
Dept: ANTICOAGULATION | Facility: OTHER | Age: 53
End: 2023-05-10
Attending: PHYSICIAN ASSISTANT
Payer: COMMERCIAL

## 2023-05-10 DIAGNOSIS — Z79.01 LONG TERM CURRENT USE OF ANTICOAGULANT THERAPY: ICD-10-CM

## 2023-05-10 DIAGNOSIS — I82.602 BLOOD CLOT OF VEIN IN SHOULDER AREA, LEFT: Primary | ICD-10-CM

## 2023-05-10 DIAGNOSIS — D68.51 FACTOR 5 LEIDEN MUTATION, HETEROZYGOUS (H): ICD-10-CM

## 2023-05-10 LAB — INR (EXTERNAL): 3.4 (ref 0.9–1.1)

## 2023-05-10 NOTE — PROGRESS NOTES
ANTICOAGULATION  MANAGEMENT-Home Monitor Managed by Exception    Daniela Ladd 52 year old female is on warfarin with therapeutic INR result. (Goal INR 2.5-3.5)    Recent labs: (last 7 days)     05/10/23  0809   INR 3.4*         Previous INR was Therapeutic    Medication, diet, health changes since last INR:chart reviewed; none identified    Contacted within the last 12 weeks by phone on 4/5/23      ANÍBAL Suarez was NOT contacted regarding therapeutic result today per home monitoring policy manage by exception agreement.   Current warfarin dose is to be continued:     Summary  As of 5/10/2023    Full warfarin instructions:  8 mg every Mon, Wed, Fri; 10 mg all other days   Next INR check:  5/17/2023           ?   Yoly Crowe, RN  Anticoagulation Clinic  5/10/2023    _______________________________________________________________________     Anticoagulation Episode Summary     Current INR goal:  2.5-3.5   TTR:  90.9 % (1 y)   Target end date:  Indefinite   Send INR reminders to:  [facility]    Indications    Blood clot of vein in shoulder area  left [I82.602]  Factor 5 Leiden mutation  heterozygous (H) [D68.51]  Long-term (current) use of anticoagulants [Z79.01] [Z79.01]           Comments:  Has home INR machine weekly INR needed            Anticoagulation Care Providers     Provider Role Specialty Phone number    Ling Trammell PA-C Referring Family Medicine [phone]

## 2023-05-17 ENCOUNTER — ANTICOAGULATION THERAPY VISIT (OUTPATIENT)
Dept: ANTICOAGULATION | Facility: OTHER | Age: 53
End: 2023-05-17
Attending: PHYSICIAN ASSISTANT
Payer: COMMERCIAL

## 2023-05-17 DIAGNOSIS — I82.602 BLOOD CLOT OF VEIN IN SHOULDER AREA, LEFT: Primary | ICD-10-CM

## 2023-05-17 DIAGNOSIS — D68.51 FACTOR 5 LEIDEN MUTATION, HETEROZYGOUS (H): ICD-10-CM

## 2023-05-17 DIAGNOSIS — Z79.01 LONG TERM CURRENT USE OF ANTICOAGULANT THERAPY: ICD-10-CM

## 2023-05-17 LAB — INR (EXTERNAL): 2.7 (ref 0.9–1.1)

## 2023-05-17 NOTE — PROGRESS NOTES
ANTICOAGULATION  MANAGEMENT-Home Monitor Managed by Exception    Daniela NANCY Ladd 52 year old female is on warfarin with therapeutic INR result. (Goal INR 2.5-3.5)    Recent labs: (last 7 days)     05/17/23  0731   INR 2.7*         Previous INR was Therapeutic    Medication, diet, health changes since last INR:chart reviewed; none identified    Contacted within the last 12 weeks by phone on 4/5/23      ANÍBAL     Daniela was NOT contacted regarding therapeutic result today per home monitoring policy manage by exception agreement.   Current warfarin dose is to be continued:     Summary  As of 5/17/2023    Full warfarin instructions:  8 mg every Mon, Wed, Fri; 10 mg all other days   Next INR check:  5/24/2023           ?   Ambreen Khan RN  Anticoagulation Clinic  5/17/2023    _______________________________________________________________________     Anticoagulation Episode Summary     Current INR goal:  2.5-3.5   TTR:  90.9 % (1 y)   Target end date:  Indefinite   Send INR reminders to:  [facility]    Indications    Blood clot of vein in shoulder area  left [I82.602]  Factor 5 Leiden mutation  heterozygous (H) [D68.51]  Long-term (current) use of anticoagulants [Z79.01] [Z79.01]           Comments:  Has home INR machine weekly INR needed            Anticoagulation Care Providers     Provider Role Specialty Phone number    Ling Trammell PA-C Referring Family Medicine [phone]

## 2023-05-24 ENCOUNTER — ANTICOAGULATION THERAPY VISIT (OUTPATIENT)
Dept: ANTICOAGULATION | Facility: OTHER | Age: 53
End: 2023-05-24
Attending: PHYSICIAN ASSISTANT
Payer: COMMERCIAL

## 2023-05-24 DIAGNOSIS — Z79.01 LONG TERM CURRENT USE OF ANTICOAGULANT THERAPY: ICD-10-CM

## 2023-05-24 DIAGNOSIS — D68.51 FACTOR 5 LEIDEN MUTATION, HETEROZYGOUS (H): ICD-10-CM

## 2023-05-24 DIAGNOSIS — I82.602 BLOOD CLOT OF VEIN IN SHOULDER AREA, LEFT: Primary | ICD-10-CM

## 2023-05-24 LAB — INR (EXTERNAL): 3 (ref 0.9–1.1)

## 2023-05-24 NOTE — PROGRESS NOTES
ANTICOAGULATION  MANAGEMENT-Home Monitor Managed by Exception    Daniela NANCY Ladd 52 year old female is on warfarin with therapeutic INR result. (Goal INR 2.5-3.5)    Recent labs: (last 7 days)     05/24/23  0734   INR 3.0*         Previous INR was Therapeutic    Medication, diet, health changes since last INR:chart reviewed; none identified    Contacted within the last 12 weeks by phone on 4/5/23      ANÍBAL     Daniela was NOT contacted regarding therapeutic result today per home monitoring policy manage by exception agreement.   Current warfarin dose is to be continued:     Summary  As of 5/24/2023    Full warfarin instructions:  8 mg every Mon, Wed, Fri; 10 mg all other days   Next INR check:  5/31/2023           ?   Ambreen Khan RN  Anticoagulation Clinic  5/24/2023    _______________________________________________________________________     Anticoagulation Episode Summary     Current INR goal:  2.5-3.5   TTR:  91.3 % (1 y)   Target end date:  Indefinite   Send INR reminders to:  [facility]    Indications    Blood clot of vein in shoulder area  left [I82.602]  Factor 5 Leiden mutation  heterozygous (H) [D68.51]  Long-term (current) use of anticoagulants [Z79.01] [Z79.01]           Comments:  Has home INR machine weekly INR needed            Anticoagulation Care Providers     Provider Role Specialty Phone number    Ling Trammell PA-C Referring Family Medicine [phone]

## 2023-05-31 ENCOUNTER — ANTICOAGULATION THERAPY VISIT (OUTPATIENT)
Dept: ANTICOAGULATION | Facility: OTHER | Age: 53
End: 2023-05-31
Attending: PHYSICIAN ASSISTANT
Payer: COMMERCIAL

## 2023-05-31 DIAGNOSIS — I82.602 BLOOD CLOT OF VEIN IN SHOULDER AREA, LEFT: Primary | ICD-10-CM

## 2023-05-31 DIAGNOSIS — Z79.01 LONG TERM CURRENT USE OF ANTICOAGULANT THERAPY: ICD-10-CM

## 2023-05-31 DIAGNOSIS — D68.51 FACTOR 5 LEIDEN MUTATION, HETEROZYGOUS (H): ICD-10-CM

## 2023-05-31 LAB — INR (EXTERNAL): 3.1 (ref 0.9–1.1)

## 2023-05-31 NOTE — PROGRESS NOTES
ANTICOAGULATION  MANAGEMENT-Home Monitor Managed by Exception    Daniela NANCY Ladd 52 year old female is on warfarin with therapeutic INR result. (Goal INR 2.5-3.5)    Recent labs: (last 7 days)     05/31/23  0730   INR 3.1*         Previous INR was Therapeutic    Medication, diet, health changes since last INR:chart reviewed; none identified    Contacted within the last 12 weeks by phone on 4/5/23      ANÍBAL     Daniela was NOT contacted regarding therapeutic result today per home monitoring policy manage by exception agreement.   Current warfarin dose is to be continued:     Summary  As of 5/31/2023    Full warfarin instructions:  8 mg every Mon, Wed, Fri; 10 mg all other days   Next INR check:  6/7/2023           ?   Ambreen Khan RN  Anticoagulation Clinic  5/31/2023    _______________________________________________________________________     Anticoagulation Episode Summary     Current INR goal:  2.5-3.5   TTR:  91.3 % (1 y)   Target end date:  Indefinite   Send INR reminders to:  [facility]    Indications    Blood clot of vein in shoulder area  left [I82.602]  Factor 5 Leiden mutation  heterozygous (H) [D68.51]  Long-term (current) use of anticoagulants [Z79.01] [Z79.01]           Comments:  Has home INR machine weekly INR needed            Anticoagulation Care Providers     Provider Role Specialty Phone number    Ling Trammell PA-C Referring Family Medicine [phone]

## 2023-06-02 ENCOUNTER — HOSPITAL ENCOUNTER (OUTPATIENT)
Dept: MAMMOGRAPHY | Facility: OTHER | Age: 53
Discharge: HOME | End: 2023-06-02
Attending: PHYSICIAN ASSISTANT | Admitting: PHYSICIAN ASSISTANT
Payer: COMMERCIAL

## 2023-06-02 DIAGNOSIS — Z12.31 VISIT FOR SCREENING MAMMOGRAM: ICD-10-CM

## 2023-06-02 PROCEDURE — 77067 SCR MAMMO BI INCL CAD: CPT

## 2023-06-07 ENCOUNTER — ANTICOAGULATION THERAPY VISIT (OUTPATIENT)
Dept: ANTICOAGULATION | Facility: OTHER | Age: 53
End: 2023-06-07
Attending: PHYSICIAN ASSISTANT
Payer: COMMERCIAL

## 2023-06-07 DIAGNOSIS — I82.602 BLOOD CLOT OF VEIN IN SHOULDER AREA, LEFT: Primary | ICD-10-CM

## 2023-06-07 DIAGNOSIS — D68.51 FACTOR 5 LEIDEN MUTATION, HETEROZYGOUS (H): ICD-10-CM

## 2023-06-07 DIAGNOSIS — Z79.01 LONG TERM CURRENT USE OF ANTICOAGULANT THERAPY: ICD-10-CM

## 2023-06-07 LAB — INR (EXTERNAL): 3.4 (ref 0.9–1.1)

## 2023-06-07 NOTE — PROGRESS NOTES
ANTICOAGULATION  MANAGEMENT-Home Monitor Managed by Exception    Daniela NANCY Ladd 52 year old female is on warfarin with therapeutic INR result. (Goal INR 2.5-3.5)    Recent labs: (last 7 days)     06/07/23  0749   INR 3.4*         Previous INR was Therapeutic    Medication, diet, health changes since last INR:chart reviewed; none identified    Contacted within the last 12 weeks by phone on 4/5/23      ANÍBAL     Daniela was NOT contacted regarding therapeutic result today per home monitoring policy manage by exception agreement.   Current warfarin dose is to be continued:     Summary  As of 6/7/2023    Full warfarin instructions:  8 mg every Mon, Wed, Fri; 10 mg all other days   Next INR check:  6/14/2023           ?   Ambreen Khan RN  Anticoagulation Clinic  6/7/2023    _______________________________________________________________________     Anticoagulation Episode Summary     Current INR goal:  2.5-3.5   TTR:  91.3 % (1 y)   Target end date:  Indefinite   Send INR reminders to:  [facility]    Indications    Blood clot of vein in shoulder area  left [I82.602]  Factor 5 Leiden mutation  heterozygous (H) [D68.51]  Long-term (current) use of anticoagulants [Z79.01] [Z79.01]           Comments:  Has home INR machine weekly INR needed            Anticoagulation Care Providers     Provider Role Specialty Phone number    Ling Trammell PA-C Referring Family Medicine [phone]

## 2023-06-14 ENCOUNTER — ANTICOAGULATION THERAPY VISIT (OUTPATIENT)
Dept: ANTICOAGULATION | Facility: OTHER | Age: 53
End: 2023-06-14
Attending: PHYSICIAN ASSISTANT
Payer: COMMERCIAL

## 2023-06-14 DIAGNOSIS — I82.602 BLOOD CLOT OF VEIN IN SHOULDER AREA, LEFT: Primary | ICD-10-CM

## 2023-06-14 DIAGNOSIS — Z79.01 LONG TERM CURRENT USE OF ANTICOAGULANT THERAPY: ICD-10-CM

## 2023-06-14 DIAGNOSIS — D68.51 FACTOR 5 LEIDEN MUTATION, HETEROZYGOUS (H): ICD-10-CM

## 2023-06-14 LAB — INR (EXTERNAL): 3.3 (ref 0.9–1.1)

## 2023-06-14 NOTE — PROGRESS NOTES
ANTICOAGULATION  MANAGEMENT-Home Monitor Managed by Exception    Daniela NANCY Ladd 52 year old female is on warfarin with therapeutic INR result. (Goal INR 2.5-3.5)    Recent labs: (last 7 days)     06/14/23  0805   INR 3.3*         Previous INR was Therapeutic    Medication, diet, health changes since last INR:chart reviewed; none identified    Contacted within the last 12 weeks by phone on 4/5/23      ANÍBAL     Daniela was NOT contacted regarding therapeutic result today per home monitoring policy manage by exception agreement.   Current warfarin dose is to be continued:     Summary  As of 6/14/2023    Full warfarin instructions:  8 mg every Mon, Wed, Fri; 10 mg all other days   Next INR check:  6/21/2023           ?   Ambreen Khan RN  Anticoagulation Clinic  6/14/2023    _______________________________________________________________________     Anticoagulation Episode Summary     Current INR goal:  2.5-3.5   TTR:  91.3 % (1 y)   Target end date:  Indefinite   Send INR reminders to:  [facility]    Indications    Blood clot of vein in shoulder area  left [I82.602]  Factor 5 Leiden mutation  heterozygous (H) [D68.51]  Long-term (current) use of anticoagulants [Z79.01] [Z79.01]           Comments:  Has home INR machine weekly INR needed            Anticoagulation Care Providers     Provider Role Specialty Phone number    Ling Trammell PA-C Referring Family Medicine [phone]

## 2023-06-20 ENCOUNTER — OFFICE VISIT (OUTPATIENT)
Dept: FAMILY MEDICINE | Facility: OTHER | Age: 53
End: 2023-06-20
Attending: PHYSICIAN ASSISTANT
Payer: COMMERCIAL

## 2023-06-20 VITALS
OXYGEN SATURATION: 98 % | BODY MASS INDEX: 30.22 KG/M2 | HEIGHT: 66 IN | SYSTOLIC BLOOD PRESSURE: 112 MMHG | TEMPERATURE: 98 F | HEART RATE: 78 BPM | DIASTOLIC BLOOD PRESSURE: 80 MMHG | WEIGHT: 188 LBS | RESPIRATION RATE: 18 BRPM

## 2023-06-20 DIAGNOSIS — R73.03 PREDIABETES: ICD-10-CM

## 2023-06-20 DIAGNOSIS — L98.9 SKIN LESION: Primary | ICD-10-CM

## 2023-06-20 DIAGNOSIS — L91.8 SKIN TAG: ICD-10-CM

## 2023-06-20 DIAGNOSIS — E78.2 MIXED HYPERLIPIDEMIA: ICD-10-CM

## 2023-06-20 DIAGNOSIS — D17.30 LIPOMA OF SKIN AND SUBCUTANEOUS TISSUE: ICD-10-CM

## 2023-06-20 PROCEDURE — 99214 OFFICE O/P EST MOD 30 MIN: CPT | Performed by: PHYSICIAN ASSISTANT

## 2023-06-20 ASSESSMENT — PAIN SCALES - GENERAL: PAINLEVEL: NO PAIN (0)

## 2023-06-20 NOTE — NURSING NOTE
Pt presents to clinic today for a mole check on her back. Has been there a long time, has been catching on her bras and shirts lately.   Would like labs redone     FOOD SECURITY SCREENING QUESTIONS:    The next two questions are to help us understand your food security.  If you are feeling you need any assistance in this area, we have resources available to support you today.    Hunger Vital Signs:  Within the past 12 months we worried whether our food would run out before we got money to buy more. Never  Within the past 12 months the food we bought just didn't last and we didn't have money to get more. Never          Medication Reconciliation: complete  Zackary Elliott, RON,LPN on 6/20/2023 at 10:20 AM

## 2023-06-20 NOTE — PROGRESS NOTES
"  Assessment & Plan   Problem List Items Addressed This Visit        Endocrine    Prediabetes    Relevant Orders    Hemoglobin A1c    Basic Metabolic Panel   Other Visit Diagnoses     Skin lesion    -  Primary    Relevant Orders    Adult General Surg Referral    Lipoma of skin and subcutaneous tissue        Relevant Orders    Adult General Surg Referral    Skin tag        Relevant Orders    Adult General Surg Referral    Mixed hyperlipidemia        Relevant Orders    Lipid Panel         Mixed hyperlipidemia: Completed the panel for monitoring.  Encouraged continued good diet, weight loss and exercise.      Prediabetes: Completed hemoglobin A1c and BMP with history of prediabetes.  Encouraged continued good diet, weight loss and exercise.    Lipoma, skin tag, and skin lesion: Refer to general surgery for a consult.  Continue to follow with dermatology for routine skin checks.  Patient previously had skin lesion biopsy completed where she was instructed to be on the lower end of her INR range.  Encouraged to be on the lower end of the INR range for her upcoming general surgery appointment.    Ordering of each unique test  30 minutes spent by me on the date of the encounter doing chart review, history and exam, documentation and further activities per the note       BMI:   Estimated body mass index is 30.34 kg/m  as calculated from the following:    Height as of this encounter: 1.676 m (5' 6\").    Weight as of this encounter: 85.3 kg (188 lb).   Weight management plan: Discussed healthy diet and exercise guidelines    See Patient Instructions    No follow-ups on file.    Ling Trammell PA-C  [facility]    Rolando Suarez is a 52 year old, presenting for the following health issues:  Mole (On back)        6/20/2023    10:18 AM   Additional Questions   Roomed by sheryl   Accompanied by self     History of Present Illness       Reason for visit:  Mole on back and follow up bloodwork    She eats " 2-3 servings of fruits and vegetables daily.She consumes 0 sweetened beverage(s) daily.She exercises with enough effort to increase her heart rate 10 to 19 minutes per day.  She exercises with enough effort to increase her heart rate 3 or less days per week.   She is taking medications regularly.         Skin Lesion  Onset/Duration: years   Description  Location: back  Color: skin colored and pink  Border description: raised, red  Character: round, raised, painful, red  Itching: mild  Bleeding:  No  Intensity:  mild  Progression of Symptoms:  worsening  Accompanying signs and symptoms:   Bleeding: No  Scaling: YES  Excessive sun exposure/tanning: No  Sunscreen used: No  History:           Any previous history of skin cancer: YES  Any family history of melanoma: No  Previous episodes of similar lesion: YES  Precipitating or alleviating factors: none   Therapies tried and outcome: none    Patient is coming today for few concerns.  Has history of squamous cell carcinoma on her anterior neck.  Ended up having a Mohs procedure.  Recently has noticed a few moles on her back.  One of them catches on her bra and shirt.  Pulled it off and it bled a little.  Returned a month ago.  Has been there for a while.  Unsure the exact length.    Patient has a lipoma on her right upper shoulder next to her neckline.  It is very large.  Interested in having it removed.    Patient is history of prediabetes and hyperlipidemia.  Interested in having labs completed for monitoring.  At the end of the March patient was having PVCs that were recurrent.  Triglycerides were found to be elevated and hemoglobin A1c was in the prediabetic range.  Has been working on improving her diet and exercise.  Loss 15 pounds.  Eating better and feeling better.  Working on quitting smoking.  Cutting back on cigarettes.  Allergic to lidocaine patch and the gum burns her throat.  Wondering about having labs repeated today for monitoring.      Review of Systems  "  Constitutional, HEENT, cardiovascular, pulmonary, gi and gu systems are negative, except as otherwise noted.      Objective    /80 (BP Location: Right arm, Patient Position: Sitting, Cuff Size: Adult Large)   Pulse 78   Temp 98  F (36.7  C) (Tympanic)   Resp 18   Ht 1.676 m (5' 6\")   Wt 85.3 kg (188 lb)   LMP 05/08/2023 (Approximate)   SpO2 98%   BMI 30.34 kg/m    Body mass index is 30.34 kg/m .  Physical Exam  Vitals and nursing note reviewed.   Constitutional:       Appearance: Normal appearance.   HENT:      Head: Normocephalic and atraumatic.   Musculoskeletal:         General: Normal range of motion.      Cervical back: Normal range of motion.      Comments: Right upper shoulder next to the neckline has a large mobile, soft, nontender lipoma appreciated approximately 3 x 3 cm in diameter.   Skin:     General: Skin is warm and dry.      Findings: No rash.      Comments: Right mid back as 2 medium brown papules that are lightly erythematous approximately 3 x 3 mm in diameter.  No surrounding erythema.    Several skin colored skin tags appreciated scattered throughout the neck.   Neurological:      General: No focal deficit present.      Mental Status: She is alert and oriented to person, place, and time.   Psychiatric:         Mood and Affect: Mood normal.         Behavior: Behavior normal.                  "

## 2023-06-21 ENCOUNTER — ANTICOAGULATION THERAPY VISIT (OUTPATIENT)
Dept: ANTICOAGULATION | Facility: OTHER | Age: 53
End: 2023-06-21
Attending: PHYSICIAN ASSISTANT
Payer: COMMERCIAL

## 2023-06-21 DIAGNOSIS — D68.51 FACTOR 5 LEIDEN MUTATION, HETEROZYGOUS (H): ICD-10-CM

## 2023-06-21 DIAGNOSIS — I82.602 BLOOD CLOT OF VEIN IN SHOULDER AREA, LEFT: Primary | ICD-10-CM

## 2023-06-21 DIAGNOSIS — Z79.01 LONG TERM CURRENT USE OF ANTICOAGULANT THERAPY: ICD-10-CM

## 2023-06-21 PROBLEM — C44.42 SQUAMOUS CELL CARCINOMA OF NECK: Status: ACTIVE | Noted: 2023-06-21

## 2023-06-21 LAB — INR (EXTERNAL): 3.2 (ref 0.9–1.1)

## 2023-06-28 ENCOUNTER — LAB (OUTPATIENT)
Dept: LAB | Facility: OTHER | Age: 53
End: 2023-06-28
Attending: PHYSICIAN ASSISTANT
Payer: COMMERCIAL

## 2023-06-28 DIAGNOSIS — E78.2 MIXED HYPERLIPIDEMIA: ICD-10-CM

## 2023-06-28 DIAGNOSIS — R73.03 PREDIABETES: ICD-10-CM

## 2023-06-28 LAB
ANION GAP SERPL CALCULATED.3IONS-SCNC: 9 MMOL/L (ref 7–15)
BUN SERPL-MCNC: 11.7 MG/DL (ref 6–20)
CALCIUM SERPL-MCNC: 8.8 MG/DL (ref 8.6–10)
CHLORIDE SERPL-SCNC: 105 MMOL/L (ref 98–107)
CHOLEST SERPL-MCNC: 208 MG/DL
CREAT SERPL-MCNC: 0.7 MG/DL (ref 0.51–0.95)
DEPRECATED HCO3 PLAS-SCNC: 25 MMOL/L (ref 22–29)
GFR SERPL CREATININE-BSD FRML MDRD: >90 ML/MIN/1.73M2
GLUCOSE SERPL-MCNC: 128 MG/DL (ref 70–99)
HBA1C MFR BLD: 6 % (ref 4–6.2)
HDLC SERPL-MCNC: 35 MG/DL
HOLD SPECIMEN: NORMAL
LDLC SERPL CALC-MCNC: 122 MG/DL
NONHDLC SERPL-MCNC: 173 MG/DL
POTASSIUM SERPL-SCNC: 4.3 MMOL/L (ref 3.4–5.3)
SODIUM SERPL-SCNC: 139 MMOL/L (ref 136–145)
TRIGL SERPL-MCNC: 253 MG/DL

## 2023-06-28 PROCEDURE — 83036 HEMOGLOBIN GLYCOSYLATED A1C: CPT | Mod: ZL

## 2023-06-28 PROCEDURE — 80048 BASIC METABOLIC PNL TOTAL CA: CPT | Mod: ZL

## 2023-06-28 PROCEDURE — 36415 COLL VENOUS BLD VENIPUNCTURE: CPT | Mod: ZL

## 2023-06-28 PROCEDURE — 80061 LIPID PANEL: CPT | Mod: ZL

## 2023-06-29 ENCOUNTER — ANTICOAGULATION THERAPY VISIT (OUTPATIENT)
Dept: ANTICOAGULATION | Facility: OTHER | Age: 53
End: 2023-06-29
Attending: PHYSICIAN ASSISTANT
Payer: COMMERCIAL

## 2023-06-29 DIAGNOSIS — Z79.01 LONG TERM CURRENT USE OF ANTICOAGULANT THERAPY: ICD-10-CM

## 2023-06-29 DIAGNOSIS — I82.602 BLOOD CLOT OF VEIN IN SHOULDER AREA, LEFT: Primary | ICD-10-CM

## 2023-06-29 DIAGNOSIS — D68.51 FACTOR 5 LEIDEN MUTATION, HETEROZYGOUS (H): ICD-10-CM

## 2023-06-29 LAB — INR (EXTERNAL): 3.3 (ref 0.9–1.1)

## 2023-06-29 NOTE — PROGRESS NOTES
ANTICOAGULATION  MANAGEMENT-Home Monitor Managed by Exception    Daniela NANCY Ladd 52 year old female is on warfarin with therapeutic INR result. (Goal INR 2.5-3.5)    Recent labs: (last 7 days)     06/28/23  2120   INR 3.3*         Previous INR was Therapeutic    Medication, diet, health changes since last INR:chart reviewed; none identified    Contacted within the last 12 weeks by phone on 4/5/23      ANÍBAL     Daniela was NOT contacted regarding therapeutic result today per home monitoring policy manage by exception agreement.   Current warfarin dose is to be continued:     Summary  As of 6/29/2023    Full warfarin instructions:  8 mg every Mon, Wed, Fri; 10 mg all other days   Next INR check:  7/5/2023           ?   Ambreen Khan RN  Anticoagulation Clinic  6/29/2023    _______________________________________________________________________     Anticoagulation Episode Summary     Current INR goal:  2.5-3.5   TTR:  93.2 % (1 y)   Target end date:  Indefinite   Send INR reminders to:  [facility]    Indications    Blood clot of vein in shoulder area  left [I82.602]  Factor 5 Leiden mutation  heterozygous (H) [D68.51]  Long-term (current) use of anticoagulants [Z79.01] [Z79.01]           Comments:  Has home INR machine weekly INR needed            Anticoagulation Care Providers     Provider Role Specialty Phone number    Ling Trammell PA-C Referring Family Medicine [phone]

## 2023-07-02 DIAGNOSIS — Z79.01 LONG TERM CURRENT USE OF ANTICOAGULANT THERAPY: ICD-10-CM

## 2023-07-02 DIAGNOSIS — I82.622 ARM DVT (DEEP VENOUS THROMBOEMBOLISM), ACUTE, LEFT (H): ICD-10-CM

## 2023-07-03 RX ORDER — WARFARIN SODIUM 2 MG/1
TABLET ORAL
Qty: 390 TABLET | Refills: 1 | Status: SHIPPED | OUTPATIENT
Start: 2023-07-03 | End: 2024-01-02

## 2023-07-03 NOTE — TELEPHONE ENCOUNTER
ANTICOAGULATION MANAGEMENT:  Medication Refill    Anticoagulation Summary  As of 6/29/2023    Warfarin maintenance plan:  8 mg (2 mg x 4) every Mon, Wed, Fri; 10 mg (2 mg x 5) all other days   Next INR check:  7/5/2023   Target end date:  Indefinite    Indications    Blood clot of vein in shoulder area  left [I82.602]  Factor 5 Leiden mutation  heterozygous (H) [D68.51]  Long-term (current) use of anticoagulants [Z79.01] [Z79.01]             Anticoagulation Care Providers     Provider Role Specialty Phone number    PENELOPEglendaLing PA-C Referring Family Medicine [phone]          Visit with referring provider/group within last year: Yes    [facility] referral signed last signed: 12/30/2021; within last year: Yes    Daniela meets all criteria for refill (current [facility] referral, office visit with referring provider/group in last year, lab monitoring up to date or not exceeding 2 weeks overdue). Rx instructions and quantity supplied updated to match patient's current dosing plan. Warfarin 90 day supply with 1 refill granted per [facility] protocol     Yoly Crowe, RN  Anticoagulation Clinic

## 2023-07-05 ENCOUNTER — ANTICOAGULATION THERAPY VISIT (OUTPATIENT)
Dept: ANTICOAGULATION | Facility: OTHER | Age: 53
End: 2023-07-05
Attending: PHYSICIAN ASSISTANT
Payer: COMMERCIAL

## 2023-07-05 ENCOUNTER — OFFICE VISIT (OUTPATIENT)
Dept: SURGERY | Facility: OTHER | Age: 53
End: 2023-07-05
Attending: SURGERY
Payer: COMMERCIAL

## 2023-07-05 VITALS
RESPIRATION RATE: 16 BRPM | TEMPERATURE: 98.6 F | HEART RATE: 65 BPM | HEIGHT: 66 IN | SYSTOLIC BLOOD PRESSURE: 119 MMHG | OXYGEN SATURATION: 97 % | DIASTOLIC BLOOD PRESSURE: 79 MMHG | BODY MASS INDEX: 30.37 KG/M2 | WEIGHT: 189 LBS

## 2023-07-05 DIAGNOSIS — I82.602 BLOOD CLOT OF VEIN IN SHOULDER AREA, LEFT: Primary | ICD-10-CM

## 2023-07-05 DIAGNOSIS — D17.30 LIPOMA OF SKIN AND SUBCUTANEOUS TISSUE: ICD-10-CM

## 2023-07-05 DIAGNOSIS — Z79.01 LONG TERM CURRENT USE OF ANTICOAGULANT THERAPY: ICD-10-CM

## 2023-07-05 DIAGNOSIS — D68.51 FACTOR 5 LEIDEN MUTATION, HETEROZYGOUS (H): ICD-10-CM

## 2023-07-05 DIAGNOSIS — L98.9 SKIN LESION: ICD-10-CM

## 2023-07-05 DIAGNOSIS — L91.8 SKIN TAG: ICD-10-CM

## 2023-07-05 LAB — INR (EXTERNAL): 4 (ref 0.9–1.1)

## 2023-07-05 PROCEDURE — 11402 EXC TR-EXT B9+MARG 1.1-2 CM: CPT | Mod: XU | Performed by: SURGERY

## 2023-07-05 PROCEDURE — 12031 INTMD RPR S/A/T/EXT 2.5 CM/<: CPT | Mod: XU | Performed by: SURGERY

## 2023-07-05 PROCEDURE — 11400 EXC TR-EXT B9+MARG 0.5 CM<: CPT | Mod: XU | Performed by: SURGERY

## 2023-07-05 PROCEDURE — 88305 TISSUE EXAM BY PATHOLOGIST: CPT

## 2023-07-05 PROCEDURE — 11200 RMVL SKIN TAGS UP TO&INC 15: CPT | Performed by: SURGERY

## 2023-07-05 ASSESSMENT — PAIN SCALES - GENERAL: PAINLEVEL: NO PAIN (0)

## 2023-07-05 NOTE — NURSING NOTE
"Chief Complaint   Patient presents with     Derm Problem       Initial /79 (BP Location: Right arm, Patient Position: Sitting, Cuff Size: Adult Large)   Pulse 65   Temp 98.6  F (37  C) (Tympanic)   Resp 16   Ht 1.676 m (5' 6\")   Wt 85.7 kg (189 lb)   LMP 05/08/2023 (Approximate)   SpO2 97%   BMI 30.51 kg/m   Estimated body mass index is 30.51 kg/m  as calculated from the following:    Height as of this encounter: 1.676 m (5' 6\").    Weight as of this encounter: 85.7 kg (189 lb).  Meds Reconciled: complete      Ludmila Howard RN    "

## 2023-07-05 NOTE — PROGRESS NOTES
Procedure Note      Pre/Post Operative Diagnosis: Right shoulder lipoma versus sebaceous cyst, right upper back compound nevus, multiple neck skin tags    Procedure:   Removal of Right shoulder lipoma versus sebaceous cyst, right upper back compound nevus, multiple neck skin tags     Surgeon: Curly Hdez MD    Local Anesthesia: 1% lidocaine with 0.25% Marcaine with epinephrine    Indication for the procedure:  This is a 52 year old female  patient referred by Ling Trammell with a Right shoulder lipoma versus sebaceous cyst, right upper back compound nevus, multiple neck skin tags.       After explaining the risks to include bleeding, infection, recurrence or need for reexcision, and scarring the patient wished to proceed.    Procedure:   The right upper back area was prepped and draped in usual sterile fashion with ChloraPrep.   After, adequate local anesthesia, an 1.1 cm X 0.4 cm elliptical skin incision was made to encompass the 0.4 cm lesion.  The skin was closed with 4-0 Monocryl and Dermabond.      The right shoulder mass was anesthetized.  We then proceeded to make a linear incision.  Upon getting through the dermis we encountered a capsule and sebaceous material.  We then undermined the skin overlying the sebaceous cyst.  We then delivered the sebaceous cyst and evacuated all the sebaceous material.  The cyst measured 2 cm x 2 cm.  This was done via a 2.2 cm long linear incision.  Once we had removed the entirety of the sac we closed the deep tissues with 3-0 Vicryl.  We then ran a 4-0 Monocryl and covered with Dermabond.    Approximately 10 skin tags were removed from the neckline.  These were removed sharply and then treated with Drysol and then covered with liquid bandage.    Plan:  The patient will be called to review the pathology. Patient will follow up if there any problems with the wound including redness or drainage.      Curly Hdez MD....................  7/5/2023   10:35  AM

## 2023-07-05 NOTE — PROGRESS NOTES
ANTICOAGULATION MANAGEMENT     Daniela Ladd 52 year old female is on warfarin with supratherapeutic INR result. (Goal INR 2.5-3.5)    Recent labs: (last 7 days)     07/05/23  0803   INR 4.0*       ASSESSMENT       Source(s): Chart Review and Patient/Caregiver Call       Warfarin doses taken: Warfarin taken as instructed    Diet: No new diet changes identified    Medication/supplement changes: None noted    New illness, injury, or hospitalization: No    Signs or symptoms of bleeding or clotting: No    Previous result: Therapeutic last 2(+) visits    Additional findings: None         PLAN     Recommended plan for no diet, medication or health factor changes affecting INR     Dosing Instructions: partial hold then continue your current warfarin dose with next INR in 1 week       Summary  As of 7/5/2023    Full warfarin instructions:  7/5: 4 mg; Otherwise 8 mg every Mon, Wed, Fri; 10 mg all other days   Next INR check:  7/12/2023             Telephone call with Daniela who verbalizes understanding and agrees to plan    Patient to recheck with home meter    Education provided:     None required    Plan made per [facility] anticoagulation protocol    Yoly Crowe, RN  Anticoagulation Clinic  7/5/2023    _______________________________________________________________________     Anticoagulation Episode Summary     Current INR goal:  2.5-3.5   TTR:  93.2 % (1 y)   Target end date:  Indefinite   Send INR reminders to:  [facility]    Indications    Blood clot of vein in shoulder area  left [I82.602]  Factor 5 Leiden mutation  heterozygous (H) [D68.51]  Long-term (current) use of anticoagulants [Z79.01] [Z79.01]           Comments:  Has home INR machine weekly INR needed            Anticoagulation Care Providers     Provider Role Specialty Phone number    Ling Trammell PA-C Referring Family Medicine [phone]

## 2023-07-05 NOTE — PATIENT INSTRUCTIONS
Your incision was closed with stitches that will dissolve. A surgical glue was used to help keep the incision closed.    It is ok to get the incision wet in the shower on the day after your procedure. Pat dry the area. Do not rub.    Once pathology is reviewed by the provider we will give you a call with the results.    Don't soak in a tub, pool or lake for 7 days. If you have concerns, please call [phone] and ask for nurse in Unit [address] ERVIN Keys RN  ....................  7/5/2023   9:39 AM

## 2023-07-05 NOTE — NURSING NOTE
TIMEOUT    Universal Protocol    A. Pre-procedure verification complete   1-relevant information / documentation available, reviewed and properly matched to the patient; 2-consent accurate and complete, 3-equipment and supplies available    B. Site marking complete   Site marked if not in continuous attendance with patient    C. TIME OUT completed   Time Out was conducted just prior to starting procedure to verify the eight required elements: 1-patient identity, 2-consent accurate and complete, 3-position, 4-correct side/site marked (if applicable), 5-procedure, 6-relevant images / results properly labeled and displayed (if applicable), 7-antibiotics / irrigation fluids (if applicable), 8-safety precautions.    Surgical Nurse  ....................  7/5/2023   9:36 AM

## 2023-07-07 LAB
PATH REPORT.COMMENTS IMP SPEC: NORMAL
PATH REPORT.FINAL DX SPEC: NORMAL
PHOTO IMAGE: NORMAL

## 2023-07-12 ENCOUNTER — ANTICOAGULATION THERAPY VISIT (OUTPATIENT)
Dept: ANTICOAGULATION | Facility: OTHER | Age: 53
End: 2023-07-12
Attending: PHYSICIAN ASSISTANT
Payer: COMMERCIAL

## 2023-07-12 DIAGNOSIS — Z79.01 LONG TERM CURRENT USE OF ANTICOAGULANT THERAPY: ICD-10-CM

## 2023-07-12 DIAGNOSIS — I82.602 BLOOD CLOT OF VEIN IN SHOULDER AREA, LEFT: Primary | ICD-10-CM

## 2023-07-12 DIAGNOSIS — D68.51 FACTOR 5 LEIDEN MUTATION, HETEROZYGOUS (H): ICD-10-CM

## 2023-07-12 LAB — INR (EXTERNAL): 3.6 (ref 0.9–1.1)

## 2023-07-12 NOTE — PROGRESS NOTES
ANTICOAGULATION MANAGEMENT     Daniela Ladd 52 year old female is on warfarin with supratherapeutic INR result. (Goal INR 2.5-3.5)    Recent labs: (last 7 days)     07/12/23  0735   INR 3.6*       ASSESSMENT       Source(s): Chart Review and Patient/Caregiver Call       Warfarin doses taken: Warfarin taken as instructed    Diet: No new diet changes identified    Medication/supplement changes: None noted    New illness, injury, or hospitalization: No    Signs or symptoms of bleeding or clotting: No    Previous result: Supratherapeutic    Additional findings: None         PLAN     Recommended plan for temporary change(s) affecting INR     Dosing Instructions: Continue your current warfarin dose with next INR in 1 week       Summary  As of 7/12/2023    Full warfarin instructions:  8 mg every Mon, Wed, Fri; 10 mg all other days   Next INR check:  7/19/2023             Telephone call with Daniela who verbalizes understanding and agrees to plan    Patient to recheck with home meter    Education provided:     None required    Plan made per [facility] anticoagulation protocol    Yoly Crowe, RN  Anticoagulation Clinic  7/12/2023    _______________________________________________________________________     Anticoagulation Episode Summary     Current INR goal:  2.5-3.5   TTR:  92.6 % (1 y)   Target end date:  Indefinite   Send INR reminders to:  [facility]    Indications    Blood clot of vein in shoulder area  left [I82.602]  Factor 5 Leiden mutation  heterozygous (H) [D68.51]  Long-term (current) use of anticoagulants [Z79.01] [Z79.01]           Comments:  Has home INR machine weekly INR needed            Anticoagulation Care Providers     Provider Role Specialty Phone number    Valeri, Ling Rodriguez PA-C Referring Family Medicine [phone]

## 2023-07-19 ENCOUNTER — ANTICOAGULATION THERAPY VISIT (OUTPATIENT)
Dept: ANTICOAGULATION | Facility: OTHER | Age: 53
End: 2023-07-19
Attending: PHYSICIAN ASSISTANT
Payer: COMMERCIAL

## 2023-07-19 DIAGNOSIS — I82.602 BLOOD CLOT OF VEIN IN SHOULDER AREA, LEFT: Primary | ICD-10-CM

## 2023-07-19 DIAGNOSIS — D68.51 FACTOR 5 LEIDEN MUTATION, HETEROZYGOUS (H): ICD-10-CM

## 2023-07-19 DIAGNOSIS — Z79.01 LONG TERM CURRENT USE OF ANTICOAGULANT THERAPY: ICD-10-CM

## 2023-07-19 LAB — INR (EXTERNAL): 3 (ref 0.9–1.1)

## 2023-07-19 NOTE — PROGRESS NOTES
ANTICOAGULATION MANAGEMENT     Daniela Ladd 52 year old female is on warfarin with therapeutic INR result. (Goal INR 2.5-3.5)    Recent labs: (last 7 days)     07/19/23  0735   INR 3.0*       ASSESSMENT       Source(s): Chart Review and Patient/Caregiver Call       Warfarin doses taken: Warfarin taken as instructed    Diet: No new diet changes identified    New illness, injury, or hospitalization: No    Medication/supplement changes: None noted    Signs or symptoms of bleeding or clotting: No    Previous INR: Supratherapeutic    Additional findings: None       PLAN     Recommended plan for no diet, medication or health factor changes affecting INR     Dosing Instructions: Continue your current warfarin dose with next INR in 1 week       Summary  As of 7/19/2023    Full warfarin instructions:  8 mg every Mon, Wed, Fri; 10 mg all other days   Next INR check:  7/26/2023             Telephone call with Daniela who verbalizes understanding and agrees to plan    Patient to recheck with home meter    Education provided: Please call back if any changes to your diet, medications or how you've been taking warfarin and Resume manage by exception with home monitor. Continue to submit INR results to home monitor company.You will only be called when your result is out of range. Please call and notify [facility] if new medication started, dose missed, signs or symptoms of bleeding or clotting, or a surgery/procedure is scheduled.    Plan made per [facility] anticoagulation protocol    Ambreen Khan, RN  Anticoagulation Clinic  7/19/2023    _______________________________________________________________________     Anticoagulation Episode Summary     Current INR goal:  2.5-3.5   TTR:  92.3 % (1 y)   Target end date:  Indefinite   Send INR reminders to:  [facility]    Indications    Blood clot of vein in shoulder area  left [I82.602]  Factor 5 Leiden mutation  heterozygous (H) [D68.51]  Long-term (current) use of  anticoagulants [Z79.01] [Z79.01]           Comments:  Has home INR machine weekly INR needed            Anticoagulation Care Providers     Provider Role Specialty Phone number    Ling Trammell PA-C Referring Family Medicine [phone]

## 2023-07-26 ENCOUNTER — ANTICOAGULATION THERAPY VISIT (OUTPATIENT)
Dept: ANTICOAGULATION | Facility: OTHER | Age: 53
End: 2023-07-26
Attending: PHYSICIAN ASSISTANT
Payer: COMMERCIAL

## 2023-07-26 DIAGNOSIS — Z79.01 LONG TERM CURRENT USE OF ANTICOAGULANT THERAPY: ICD-10-CM

## 2023-07-26 DIAGNOSIS — I82.602 BLOOD CLOT OF VEIN IN SHOULDER AREA, LEFT: Primary | ICD-10-CM

## 2023-07-26 DIAGNOSIS — D68.51 FACTOR 5 LEIDEN MUTATION, HETEROZYGOUS (H): ICD-10-CM

## 2023-07-26 LAB — INR (EXTERNAL): 3.6 (ref 0.9–1.1)

## 2023-07-26 NOTE — PROGRESS NOTES
ANTICOAGULATION  MANAGEMENT-Home Monitor Managed by Exception    Daniela NANCY Ladd 52 year old female is on warfarin with therapeutic INR result. (Goal INR 2.5-3.5)    Recent labs: (last 7 days)     07/26/23  0748   INR 3.6*       Previous INR was Therapeutic  Medication, diet, health changes since last INR:chart reviewed; none identified  Contacted within the last 12 weeks by phone on 7/19/23      ANÍBAL     Daniela was NOT contacted regarding therapeutic result today per home monitoring policy manage by exception agreement.   Current warfarin dose is to be continued:     Summary  As of 7/26/2023      Full warfarin instructions:  8 mg every Mon, Wed, Fri; 10 mg all other days   Next INR check:  8/2/2023             ?   Ambreen Khan RN  Anticoagulation Clinic  7/26/2023    _______________________________________________________________________     Anticoagulation Episode Summary       Current INR goal:  2.5-3.5   TTR:  92.0 % (1 y)   Target end date:  Indefinite   Send INR reminders to:  [facility]    Indications    Blood clot of vein in shoulder area  left [I82.602]  Factor 5 Leiden mutation  heterozygous (H) [D68.51]  Long-term (current) use of anticoagulants [Z79.01] [Z79.01]             Comments:  Has home INR machine weekly INR needed                Anticoagulation Care Providers       Provider Role Specialty Phone number    Ling Trammell PA-C Referring Family Medicine [phone]

## 2023-07-27 NOTE — PROGRESS NOTES
ANTICOAGULATION  MANAGEMENT-Home Monitor Managed by Exception    Daniela NANCY Ladd 52 year old female is on warfarin with therapeutic INR result. (Goal INR 2.5-3.5)    Recent labs: (last 7 days)     06/21/23  0802   INR 3.2*         Previous INR was Therapeutic    Medication, diet, health changes since last INR:chart reviewed; none identified    Contacted within the last 12 weeks by phone on 4/5/23      ANÍBAL     Daniela was NOT contacted regarding therapeutic result today per home monitoring policy manage by exception agreement.   Current warfarin dose is to be continued:     Summary  As of 6/21/2023    Full warfarin instructions:  8 mg every Mon, Wed, Fri; 10 mg all other days   Next INR check:  6/28/2023           ?   Elyssa Correa RN  Anticoagulation Clinic  6/21/2023    _______________________________________________________________________     Anticoagulation Episode Summary     Current INR goal:  2.5-3.5   TTR:  92.6 % (1 y)   Target end date:  Indefinite   Send INR reminders to:  [facility]    Indications    Blood clot of vein in shoulder area  left [I82.602]  Factor 5 Leiden mutation  heterozygous (H) [D68.51]  Long-term (current) use of anticoagulants [Z79.01] [Z79.01]           Comments:  Has home INR machine weekly INR needed            Anticoagulation Care Providers     Provider Role Specialty Phone number    Ling Trammell PA-C Referring Family Medicine [phone]             Awake

## 2023-08-02 ENCOUNTER — ANTICOAGULATION THERAPY VISIT (OUTPATIENT)
Dept: ANTICOAGULATION | Facility: OTHER | Age: 53
End: 2023-08-02
Attending: PHYSICIAN ASSISTANT
Payer: COMMERCIAL

## 2023-08-02 DIAGNOSIS — I82.602 BLOOD CLOT OF VEIN IN SHOULDER AREA, LEFT: Primary | ICD-10-CM

## 2023-08-02 DIAGNOSIS — D68.51 FACTOR 5 LEIDEN MUTATION, HETEROZYGOUS (H): ICD-10-CM

## 2023-08-02 DIAGNOSIS — Z79.01 LONG TERM CURRENT USE OF ANTICOAGULANT THERAPY: ICD-10-CM

## 2023-08-02 LAB — INR (EXTERNAL): 3.5 (ref 0.9–1.1)

## 2023-08-02 NOTE — PROGRESS NOTES
ANTICOAGULATION MANAGEMENT     Daniela Ladd 53 year old female is on warfarin with therapeutic INR result. (Goal INR 2.5-3.5)    Recent labs: (last 7 days)     08/02/23  0734   INR 3.5*       ASSESSMENT     Source(s): Chart Review and Patient/Caregiver Call     Warfarin doses taken: Warfarin taken as instructed  Diet: No new diet changes identified  Medication/supplement changes: None noted  New illness, injury, or hospitalization: No  Signs or symptoms of bleeding or clotting: No  Previous result: Supratherapeutic  Additional findings: None       PLAN     Recommended plan for no diet, medication or health factor changes affecting INR     Dosing Instructions: decrease your warfarin dose (3.1% change) with next INR in 1 week       Summary  As of 8/2/2023      Full warfarin instructions:  10 mg every Mon, Wed, Fri; 8 mg all other days   Next INR check:  8/9/2023               Telephone call with Daniela who verbalizes understanding and agrees to plan    Patient to recheck with home meter    Education provided:   None required    Plan made per [facility] anticoagulation protocol    Yoly Crowe, RN  Anticoagulation Clinic  8/2/2023    _______________________________________________________________________     Anticoagulation Episode Summary       Current INR goal:  2.5-3.5   TTR:  90.1 % (1 y)   Target end date:  Indefinite   Send INR reminders to:  [facility]    Indications    Blood clot of vein in shoulder area  left [I82.602]  Factor 5 Leiden mutation  heterozygous (H) [D68.51]  Long-term (current) use of anticoagulants [Z79.01] [Z79.01]             Comments:  Has home INR machine weekly INR needed                Anticoagulation Care Providers       Provider Role Specialty Phone number    Ling Trammell PA-C Referring Family Medicine [phone]

## 2023-08-09 ENCOUNTER — ANTICOAGULATION THERAPY VISIT (OUTPATIENT)
Dept: ANTICOAGULATION | Facility: OTHER | Age: 53
End: 2023-08-09
Attending: PHYSICIAN ASSISTANT
Payer: COMMERCIAL

## 2023-08-09 DIAGNOSIS — D68.51 FACTOR 5 LEIDEN MUTATION, HETEROZYGOUS (H): ICD-10-CM

## 2023-08-09 DIAGNOSIS — Z79.01 LONG TERM CURRENT USE OF ANTICOAGULANT THERAPY: ICD-10-CM

## 2023-08-09 DIAGNOSIS — I82.602 BLOOD CLOT OF VEIN IN SHOULDER AREA, LEFT: Primary | ICD-10-CM

## 2023-08-09 LAB — INR (EXTERNAL): 2.7 (ref 0.9–1.1)

## 2023-08-09 NOTE — PROGRESS NOTES
ANTICOAGULATION MANAGEMENT     Daniela Ladd 53 year old female is on warfarin with therapeutic INR result. (Goal INR 2.5-3.5)    Recent labs: (last 7 days)     08/09/23  0734   INR 2.7*       ASSESSMENT     Source(s): Chart Review and Patient/Caregiver Call     Warfarin doses taken: Warfarin taken as instructed  Diet: No new diet changes identified  New illness, injury, or hospitalization: No  Medication/supplement changes: None noted  Signs or symptoms of bleeding or clotting: No  Previous INR: Therapeutic last visit; previously outside of goal range  Additional findings: None     PLAN     Recommended plan for no diet, medication or health factor changes affecting INR     Dosing Instructions: Continue your current warfarin dose with next INR in 1 week       Summary  As of 8/9/2023      Full warfarin instructions:  10 mg every Mon, Wed, Fri; 8 mg all other days   Next INR check:  8/16/2023               Telephone call with Daniela who verbalizes understanding and agrees to plan    Patient to recheck with home meter    Education provided: Please call back if any changes to your diet, medications or how you've been taking warfarin and Resume manage by exception with home monitor. Continue to submit INR results to home monitor company.You will only be called when your result is out of range. Please call and notify [facility] if new medication started, dose missed, signs or symptoms of bleeding or clotting, or a surgery/procedure is scheduled.    Plan made per [facility] anticoagulation protocol    Ambreen Khan, RN  Anticoagulation Clinic  8/9/2023    _______________________________________________________________________     Anticoagulation Episode Summary       Current INR goal:  2.5-3.5   TTR:  90.1 % (1 y)   Target end date:  Indefinite   Send INR reminders to:  [facility]    Indications    Blood clot of vein in shoulder area  left [I82.602]  Factor 5 Leiden mutation  heterozygous (H)  [D68.51]  Long-term (current) use of anticoagulants [Z79.01] [Z79.01]             Comments:  Has home INR machine weekly INR needed                Anticoagulation Care Providers       Provider Role Specialty Phone number    Ling Trammell PA-C Referring Family Medicine [phone]

## 2023-08-16 ENCOUNTER — ANTICOAGULATION THERAPY VISIT (OUTPATIENT)
Dept: ANTICOAGULATION | Facility: OTHER | Age: 53
End: 2023-08-16
Attending: PHYSICIAN ASSISTANT
Payer: COMMERCIAL

## 2023-08-16 DIAGNOSIS — D68.51 FACTOR 5 LEIDEN MUTATION, HETEROZYGOUS (H): ICD-10-CM

## 2023-08-16 DIAGNOSIS — Z79.01 LONG TERM CURRENT USE OF ANTICOAGULANT THERAPY: ICD-10-CM

## 2023-08-16 DIAGNOSIS — I82.602 BLOOD CLOT OF VEIN IN SHOULDER AREA, LEFT: Primary | ICD-10-CM

## 2023-08-16 LAB — INR (EXTERNAL): 2.9 (ref 0.9–1.1)

## 2023-08-16 NOTE — PROGRESS NOTES
ANTICOAGULATION  MANAGEMENT-Home Monitor Managed by Exception    Daniela Ladd 53 year old female is on warfarin with therapeutic INR result. (Goal INR 2.5-3.5)    Recent labs: (last 7 days)     08/16/23  0739   INR 2.9*       Previous INR was Therapeutic  Medication, diet, health changes since last INR:chart reviewed; none identified  Contacted within the last 12 weeks by phone on 8/9/23      ANÍBAL Suarez was NOT contacted regarding therapeutic result today per home monitoring policy manage by exception agreement.   Current warfarin dose is to be continued:     Summary  As of 8/16/2023      Full warfarin instructions:  10 mg every Mon, Wed, Fri; 8 mg all other days   Next INR check:  8/23/2023             ?   Yoly Crowe RN  Anticoagulation Clinic  8/16/2023    _______________________________________________________________________     Anticoagulation Episode Summary       Current INR goal:  2.5-3.5   TTR:  90.1 % (1 y)   Target end date:  Indefinite   Send INR reminders to:  [facility]    Indications    Blood clot of vein in shoulder area  left [I82.602]  Factor 5 Leiden mutation  heterozygous (H) [D68.51]  Long-term (current) use of anticoagulants [Z79.01] [Z79.01]             Comments:  Has home INR machine weekly INR needed                Anticoagulation Care Providers       Provider Role Specialty Phone number    Ling Trammell PA-C Referring Family Medicine [phone]

## 2023-08-23 ENCOUNTER — ANTICOAGULATION THERAPY VISIT (OUTPATIENT)
Dept: ANTICOAGULATION | Facility: OTHER | Age: 53
End: 2023-08-23
Attending: PHYSICIAN ASSISTANT
Payer: COMMERCIAL

## 2023-08-23 DIAGNOSIS — D68.51 FACTOR 5 LEIDEN MUTATION, HETEROZYGOUS (H): ICD-10-CM

## 2023-08-23 DIAGNOSIS — I82.602 BLOOD CLOT OF VEIN IN SHOULDER AREA, LEFT: Primary | ICD-10-CM

## 2023-08-23 DIAGNOSIS — Z79.01 LONG TERM CURRENT USE OF ANTICOAGULANT THERAPY: ICD-10-CM

## 2023-08-23 LAB — INR (EXTERNAL): 3.5 (ref 0.9–1.1)

## 2023-08-23 NOTE — PROGRESS NOTES
ANTICOAGULATION  MANAGEMENT-Home Monitor Managed by Exception    Daniela NANCY Ladd 53 year old female is on warfarin with therapeutic INR result. (Goal INR 2.5-3.5)    Recent labs: (last 7 days)     08/23/23  0752   INR 3.5*       Previous INR was Therapeutic  Medication, diet, health changes since last INR:chart reviewed; none identified  Contacted within the last 12 weeks by phone on 8/9/23      ANÍBAL     Daniela was NOT contacted regarding therapeutic result today per home monitoring policy manage by exception agreement.   Current warfarin dose is to be continued:     Summary  As of 8/23/2023      Full warfarin instructions:  10 mg every Mon, Wed, Fri; 8 mg all other days   Next INR check:  8/30/2023             ?   Ambreen Khan RN  Anticoagulation Clinic  8/23/2023    _______________________________________________________________________     Anticoagulation Episode Summary       Current INR goal:  2.5-3.5   TTR:  90.1 % (1 y)   Target end date:  Indefinite   Send INR reminders to:  [facility]    Indications    Blood clot of vein in shoulder area  left [I82.602]  Factor 5 Leiden mutation  heterozygous (H) [D68.51]  Long-term (current) use of anticoagulants [Z79.01] [Z79.01]             Comments:  Has home INR machine weekly INR needed                Anticoagulation Care Providers       Provider Role Specialty Phone number    Ling Trammell PA-C Referring Family Medicine [phone]

## 2023-08-30 ENCOUNTER — ANTICOAGULATION THERAPY VISIT (OUTPATIENT)
Dept: ANTICOAGULATION | Facility: OTHER | Age: 53
End: 2023-08-30
Attending: PHYSICIAN ASSISTANT
Payer: COMMERCIAL

## 2023-08-30 DIAGNOSIS — I82.602 BLOOD CLOT OF VEIN IN SHOULDER AREA, LEFT: Primary | ICD-10-CM

## 2023-08-30 DIAGNOSIS — D68.51 FACTOR 5 LEIDEN MUTATION, HETEROZYGOUS (H): ICD-10-CM

## 2023-08-30 DIAGNOSIS — Z79.01 LONG TERM CURRENT USE OF ANTICOAGULANT THERAPY: ICD-10-CM

## 2023-08-30 LAB — INR (EXTERNAL): 3.9 (ref 0.9–1.1)

## 2023-08-30 NOTE — PROGRESS NOTES
ANTICOAGULATION MANAGEMENT     Daniela Ladd 53 year old female is on warfarin with supratherapeutic INR result. (Goal INR 2.5-3.5)    Recent labs: (last 7 days)     08/30/23  0737   INR 3.9*       ASSESSMENT     Source(s): Chart Review and Patient/Caregiver Call     Warfarin doses taken: Warfarin taken as instructed  Diet: No new diet changes identified  Medication/supplement changes: None noted  New illness, injury, or hospitalization: No  Signs or symptoms of bleeding or clotting: No  Previous result: Therapeutic last 2(+) visits  Additional findings: None and maybe stress and activity, son is getting  next week she has been busy       PLAN     Recommended plan for temporary change(s) affecting INR     Dosing Instructions: decrease your warfarin dose (3.2% change) with next INR in 1 week       Summary  As of 8/30/2023      Full warfarin instructions:  10 mg every Mon, Fri; 8 mg all other days   Next INR check:  9/6/2023               Telephone call with Daniela who verbalizes understanding and agrees to plan    Patient to recheck with home meter    Education provided:   None required    Plan made per [facility] anticoagulation protocol    Yoly Crowe, RN  Anticoagulation Clinic  8/30/2023    _______________________________________________________________________     Anticoagulation Episode Summary       Current INR goal:  2.5-3.5   TTR:  88.2 % (1 y)   Target end date:  Indefinite   Send INR reminders to:  [facility]    Indications    Blood clot of vein in shoulder area  left [I82.602]  Factor 5 Leiden mutation  heterozygous (H) [D68.51]  Long-term (current) use of anticoagulants [Z79.01] [Z79.01]             Comments:  Has home INR machine weekly INR needed                Anticoagulation Care Providers       Provider Role Specialty Phone number    Ling Trammell PA-C Referring Family Medicine [phone]

## 2023-09-05 ENCOUNTER — ANTICOAGULATION THERAPY VISIT (OUTPATIENT)
Dept: ANTICOAGULATION | Facility: OTHER | Age: 53
End: 2023-09-05
Attending: PHYSICIAN ASSISTANT
Payer: COMMERCIAL

## 2023-09-05 DIAGNOSIS — Z79.01 LONG TERM CURRENT USE OF ANTICOAGULANT THERAPY: ICD-10-CM

## 2023-09-05 DIAGNOSIS — D68.51 FACTOR 5 LEIDEN MUTATION, HETEROZYGOUS (H): ICD-10-CM

## 2023-09-05 DIAGNOSIS — I82.602 BLOOD CLOT OF VEIN IN SHOULDER AREA, LEFT: Primary | ICD-10-CM

## 2023-09-05 LAB — INR (EXTERNAL): 4 (ref 0.9–1.1)

## 2023-09-05 NOTE — PROGRESS NOTES
ANTICOAGULATION MANAGEMENT     Daniela Ladd 53 year old female is on warfarin with supratherapeutic INR result. (Goal INR 2.5-3.5)    Recent labs: (last 7 days)     09/05/23  0948   INR 4.0*       ASSESSMENT     Source(s): Chart Review and Patient/Caregiver Call     Warfarin doses taken: Warfarin taken as instructed  Diet: No new diet changes identified  Medication/supplement changes: None noted  New illness, injury, or hospitalization: No  Signs or symptoms of bleeding or clotting: No  Previous result: Supratherapeutic  Additional findings: None       PLAN     Recommended plan for no diet, medication or health factor changes affecting INR     Dosing Instructions: partial hold then continue your current warfarin dose with next INR in 1 week       Summary  As of 9/5/2023      Full warfarin instructions:  9/5: 2 mg; Otherwise 10 mg every Mon, Fri; 8 mg all other days   Next INR check:  9/13/2023               Telephone call with Daniela who verbalizes understanding and agrees to plan    Patient to recheck with home meter    Education provided:   None required    Plan made per [facility] anticoagulation protocol    Yoly Crowe, RN  Anticoagulation Clinic  9/5/2023    _______________________________________________________________________     Anticoagulation Episode Summary       Current INR goal:  2.5-3.5   TTR:  86.5 % (1 y)   Target end date:  Indefinite   Send INR reminders to:  [facility]    Indications    Blood clot of vein in shoulder area  left [I82.602]  Factor 5 Leiden mutation  heterozygous (H) [D68.51]  Long-term (current) use of anticoagulants [Z79.01] [Z79.01]             Comments:  Has home INR machine weekly INR needed                Anticoagulation Care Providers       Provider Role Specialty Phone number    Ling Trammell PA-C Referring Family Medicine [phone]

## 2023-09-13 ENCOUNTER — ANTICOAGULATION THERAPY VISIT (OUTPATIENT)
Dept: ANTICOAGULATION | Facility: OTHER | Age: 53
End: 2023-09-13
Attending: PHYSICIAN ASSISTANT
Payer: COMMERCIAL

## 2023-09-13 DIAGNOSIS — I82.602 BLOOD CLOT OF VEIN IN SHOULDER AREA, LEFT: Primary | ICD-10-CM

## 2023-09-13 DIAGNOSIS — D68.51 FACTOR 5 LEIDEN MUTATION, HETEROZYGOUS (H): ICD-10-CM

## 2023-09-13 DIAGNOSIS — Z79.01 LONG TERM CURRENT USE OF ANTICOAGULANT THERAPY: ICD-10-CM

## 2023-09-13 LAB — INR (EXTERNAL): 3.8 (ref 0.9–1.1)

## 2023-09-13 NOTE — PROGRESS NOTES
ANTICOAGULATION MANAGEMENT     Daniela Ladd 53 year old female is on warfarin with supratherapeutic INR result. (Goal INR 2.5-3.5)    Recent labs: (last 7 days)     09/13/23  0733   INR 3.8*       ASSESSMENT     Source(s): Chart Review and Patient/Caregiver Call     Warfarin doses taken: Warfarin taken as instructed  Diet:  She thinks it maybe the increased peanuts and peanut butter in her diet. She would like to continue with eating them.  Medication/supplement changes: None noted  New illness, injury, or hospitalization: No  Signs or symptoms of bleeding or clotting: No  Previous result: Supratherapeutic  Additional findings: None       PLAN     Recommended plan for no diet, medication or health factor changes affecting INR     Dosing Instructions: partial hold then decrease your warfarin dose (3.3% change) with next INR in 1 week       Summary  As of 9/13/2023      Full warfarin instructions:  9/13: 6 mg; Otherwise 10 mg every Mon; 8 mg all other days   Next INR check:  9/20/2023               Telephone call with Daniela who verbalizes understanding and agrees to plan    Patient to recheck with home meter    Education provided:   None required    Plan made per [facility] anticoagulation protocol    Yoly Crowe, RN  Anticoagulation Clinic  9/13/2023    _______________________________________________________________________     Anticoagulation Episode Summary       Current INR goal:  2.5-3.5   TTR:  84.3 % (1 y)   Target end date:  Indefinite   Send INR reminders to:  [facility]    Indications    Blood clot of vein in shoulder area  left [I82.602]  Factor 5 Leiden mutation  heterozygous (H) [D68.51]  Long-term (current) use of anticoagulants [Z79.01] [Z79.01]             Comments:  Has home INR machine weekly INR needed                Anticoagulation Care Providers       Provider Role Specialty Phone number    Ling Trammell PA-C Referring Family Medicine [phone]

## 2023-09-20 ENCOUNTER — ANTICOAGULATION THERAPY VISIT (OUTPATIENT)
Dept: ANTICOAGULATION | Facility: OTHER | Age: 53
End: 2023-09-20
Attending: PHYSICIAN ASSISTANT
Payer: COMMERCIAL

## 2023-09-20 DIAGNOSIS — Z79.01 LONG TERM CURRENT USE OF ANTICOAGULANT THERAPY: ICD-10-CM

## 2023-09-20 DIAGNOSIS — I82.602 BLOOD CLOT OF VEIN IN SHOULDER AREA, LEFT: Primary | ICD-10-CM

## 2023-09-20 DIAGNOSIS — D68.51 FACTOR 5 LEIDEN MUTATION, HETEROZYGOUS (H): ICD-10-CM

## 2023-09-20 LAB — INR (EXTERNAL): 3 (ref 0.9–1.1)

## 2023-09-20 NOTE — PROGRESS NOTES
ANTICOAGULATION MANAGEMENT     Daniela Ladd 53 year old female is on warfarin with therapeutic INR result. (Goal INR 2.5-3.5)    Recent labs: (last 7 days)     09/20/23  0800   INR 3.0*       ASSESSMENT     Source(s): Chart Review and Patient/Caregiver Call     Warfarin doses taken: Warfarin taken as instructed  Diet: No new diet changes identified  New illness, injury, or hospitalization: No  Medication/supplement changes: None noted  Signs or symptoms of bleeding or clotting: No  Previous INR: Supratherapeutic  Additional findings: None     PLAN     Recommended plan for no diet, medication or health factor changes affecting INR     Dosing Instructions: Continue your current warfarin dose with next INR in 1 week       Summary  As of 9/20/2023      Full warfarin instructions:  10 mg every Fri; 8 mg all other days   Next INR check:  9/27/2023               Telephone call with Daniela who verbalizes understanding and agrees to plan    Patient to recheck with home meter    Education provided: Please call back if any changes to your diet, medications or how you've been taking warfarin    Plan made per [facility] anticoagulation protocol    Elyssa Correa RN  Anticoagulation Clinic  9/20/2023    _______________________________________________________________________     Anticoagulation Episode Summary       Current INR goal:  2.5-3.5   TTR:  83.6 % (1 y)   Target end date:  Indefinite   Send INR reminders to:  [facility]    Indications    Blood clot of vein in shoulder area  left [I82.602]  Factor 5 Leiden mutation  heterozygous (H) [D68.51]  Long-term (current) use of anticoagulants [Z79.01] [Z79.01]             Comments:  Has home INR machine weekly INR needed                Anticoagulation Care Providers       Provider Role Specialty Phone number    Ling Trammell PA-C Referring Family Medicine [phone]

## 2023-09-24 DIAGNOSIS — I82.622 ARM DVT (DEEP VENOUS THROMBOEMBOLISM), ACUTE, LEFT (H): ICD-10-CM

## 2023-09-24 DIAGNOSIS — Z79.01 LONG TERM CURRENT USE OF ANTICOAGULANT THERAPY: ICD-10-CM

## 2023-09-25 RX ORDER — WARFARIN SODIUM 2 MG/1
TABLET ORAL
Qty: 350 TABLET | Refills: 1 | OUTPATIENT
Start: 2023-09-25

## 2023-09-25 NOTE — TELEPHONE ENCOUNTER
ANTICOAGULATION MANAGEMENT:  Medication Refill    Anticoagulation Summary  As of 9/20/2023      Warfarin maintenance plan:  10 mg (2 mg x 5) every Fri; 8 mg (2 mg x 4) all other days   Next INR check:  9/27/2023   Target end date:  Indefinite    Indications    Blood clot of vein in shoulder area  left [I82.602]  Factor 5 Leiden mutation  heterozygous (H) [D68.51]  Long-term (current) use of anticoagulants [Z79.01] [Z79.01]                 Anticoagulation Care Providers       Provider Role Specialty Phone number    Ling Trammell PA-C Referring Family Medicine [phone]            Visit with referring provider/group within last year: Yes 6/20/23  Refusing medication today due to Patient should have enough till about January-last filled on 7/3/23. Will update script when she is out of refills  [facility] referral signed within last year: Yes    Daniela meets all criteria for refill (current [facility] referral, office visit with referring provider/group in last 1 year unless directed to return in 2 years in last referring provider visit note, lab monitoring up to date or not exceeding 2 weeks overdue). Rx instructions and quantity supplied updated to match patient's current dosing plan. Warfarin 90 day supply with 1 refill granted per [facility] protocol     Ambreen Khan RN  Anticoagulation Clinic

## 2023-09-27 ENCOUNTER — ANTICOAGULATION THERAPY VISIT (OUTPATIENT)
Dept: ANTICOAGULATION | Facility: OTHER | Age: 53
End: 2023-09-27
Attending: PHYSICIAN ASSISTANT
Payer: COMMERCIAL

## 2023-09-27 DIAGNOSIS — Z79.01 LONG TERM CURRENT USE OF ANTICOAGULANT THERAPY: ICD-10-CM

## 2023-09-27 DIAGNOSIS — I82.602 BLOOD CLOT OF VEIN IN SHOULDER AREA, LEFT: Primary | ICD-10-CM

## 2023-09-27 DIAGNOSIS — D68.51 FACTOR 5 LEIDEN MUTATION, HETEROZYGOUS (H): ICD-10-CM

## 2023-09-27 LAB — INR (EXTERNAL): 3.1 (ref 0.9–1.1)

## 2023-09-27 NOTE — PROGRESS NOTES
ANTICOAGULATION MANAGEMENT     Daniela Ladd 53 year old female is on warfarin with therapeutic INR result. (Goal INR 2.5-3.5)    Recent labs: (last 7 days)     09/27/23  0734   INR 3.1*       ASSESSMENT     Source(s): Chart Review and Patient/Caregiver Call     Warfarin doses taken: Warfarin taken as instructed  Diet: No new diet changes identified  New illness, injury, or hospitalization: No  Medication/supplement changes: None noted  Signs or symptoms of bleeding or clotting: No  Previous INR: Therapeutic last visit; previously outside of goal range  Additional findings: None     PLAN     Recommended plan for no diet, medication or health factor changes affecting INR     Dosing Instructions: Continue your current warfarin dose with next INR in 1 week       Summary  As of 9/27/2023      Full warfarin instructions:  10 mg every Fri; 8 mg all other days   Next INR check:  10/4/2023               Telephone call with Daniela who verbalizes understanding and agrees to plan    Patient to recheck with home meter    Education provided: Please call back if any changes to your diet, medications or how you've been taking warfarin and Resume manage by exception with home monitor. Continue to submit INR results to home monitor company.You will only be called when your result is out of range. Please call and notify [facility] if new medication started, dose missed, signs or symptoms of bleeding or clotting, or a surgery/procedure is scheduled.    Plan made per [facility] anticoagulation protocol    Ambreen Khan RN  Anticoagulation Clinic  9/27/2023    _______________________________________________________________________     Anticoagulation Episode Summary       Current INR goal:  2.5-3.5   TTR:  83.6 % (1 y)   Target end date:  Indefinite   Send INR reminders to:  [facility]    Indications    Blood clot of vein in shoulder area  left [I82.602]  Factor 5 Leiden mutation  heterozygous (H) [D68.51]  Long-term  (current) use of anticoagulants [Z79.01] [Z79.01]             Comments:  Has home INR machine weekly INR needed                Anticoagulation Care Providers       Provider Role Specialty Phone number    Ling Trammell PA-C Referring Family Medicine [phone]

## 2023-10-04 ENCOUNTER — ANTICOAGULATION THERAPY VISIT (OUTPATIENT)
Dept: ANTICOAGULATION | Facility: OTHER | Age: 53
End: 2023-10-04
Attending: PHYSICIAN ASSISTANT
Payer: COMMERCIAL

## 2023-10-04 DIAGNOSIS — I82.602 BLOOD CLOT OF VEIN IN SHOULDER AREA, LEFT: Primary | ICD-10-CM

## 2023-10-04 DIAGNOSIS — D68.51 FACTOR 5 LEIDEN MUTATION, HETEROZYGOUS (H): ICD-10-CM

## 2023-10-04 DIAGNOSIS — Z79.01 LONG TERM CURRENT USE OF ANTICOAGULANT THERAPY: ICD-10-CM

## 2023-10-04 LAB — INR (EXTERNAL): 3.3 (ref 0.9–1.1)

## 2023-10-04 NOTE — PROGRESS NOTES
ANTICOAGULATION  MANAGEMENT-Home Monitor Managed by Exception    Daniela NANCY Ladd 53 year old female is on warfarin with therapeutic INR result. (Goal INR 2.5-3.5)    Recent labs: (last 7 days)     10/04/23  0746   INR 3.3*       Previous INR was Therapeutic  Medication, diet, health changes since last INR:chart reviewed; none identified  Contacted within the last 12 weeks by phone on 9/27/23      ANÍBAL     Daniela was NOT contacted regarding therapeutic result today per home monitoring policy manage by exception agreement.   Current warfarin dose is to be continued:     Summary  As of 10/4/2023      Full warfarin instructions:  10 mg every Fri; 8 mg all other days   Next INR check:  10/11/2023             ?   Ambreen Khan RN  Anticoagulation Clinic  10/4/2023    _______________________________________________________________________     Anticoagulation Episode Summary       Current INR goal:  2.5-3.5   TTR:  83.6 % (1 y)   Target end date:  Indefinite   Send INR reminders to:  [facility]    Indications    Blood clot of vein in shoulder area  left [I82.602]  Factor 5 Leiden mutation  heterozygous (H24) [D68.51]  Long-term (current) use of anticoagulants [Z79.01] [Z79.01]             Comments:  Has home INR machine weekly INR needed                Anticoagulation Care Providers       Provider Role Specialty Phone number    Ling Trammell PA-C Referring Family Medicine [phone]

## 2023-10-11 ENCOUNTER — ANTICOAGULATION THERAPY VISIT (OUTPATIENT)
Dept: ANTICOAGULATION | Facility: OTHER | Age: 53
End: 2023-10-11
Payer: COMMERCIAL

## 2023-10-11 DIAGNOSIS — Z79.01 LONG TERM CURRENT USE OF ANTICOAGULANT THERAPY: ICD-10-CM

## 2023-10-11 DIAGNOSIS — I82.602 BLOOD CLOT OF VEIN IN SHOULDER AREA, LEFT: Primary | ICD-10-CM

## 2023-10-11 DIAGNOSIS — D68.51 FACTOR 5 LEIDEN MUTATION, HETEROZYGOUS (H): ICD-10-CM

## 2023-10-11 LAB — INR (EXTERNAL): 2.8 (ref 0.9–1.1)

## 2023-10-11 NOTE — PROGRESS NOTES
ANTICOAGULATION  MANAGEMENT-Home Monitor Managed by Exception    Danielamariusz Ladd 53 year old female is on warfarin with therapeutic INR result. (Goal INR 2.5-3.5)    Recent labs: (last 7 days)     10/11/23  0822   INR 2.8*       Previous INR was Therapeutic  Medication, diet, health changes since last INR:chart reviewed; none identified  Contacted within the last 12 weeks by phone on 9/27/23      ANÍBAL     Daniela was NOT contacted regarding therapeutic result today per home monitoring policy manage by exception agreement.   Current warfarin dose is to be continued:     Summary  As of 10/11/2023      Full warfarin instructions:  10 mg every Fri; 8 mg all other days   Next INR check:  10/18/2023             ?   Yoly Crowe, RN  Anticoagulation Clinic  10/11/2023    _______________________________________________________________________     Anticoagulation Episode Summary       Current INR goal:  2.5-3.5   TTR:  83.6% (1 y)   Target end date:  Indefinite   Send INR reminders to:  [facility]    Indications    Blood clot of vein in shoulder area  left [I82.602]  Factor 5 Leiden mutation  heterozygous (H24) [D68.51]  Long-term (current) use of anticoagulants [Z79.01] [Z79.01]             Comments:  Has home INR machine weekly INR needed                Anticoagulation Care Providers       Provider Role Specialty Phone number    Ling Trammell PA-C Referring Family Medicine [phone]

## 2023-10-18 ENCOUNTER — ANTICOAGULATION THERAPY VISIT (OUTPATIENT)
Dept: ANTICOAGULATION | Facility: OTHER | Age: 53
End: 2023-10-18
Attending: PHYSICIAN ASSISTANT
Payer: COMMERCIAL

## 2023-10-18 DIAGNOSIS — I82.602 BLOOD CLOT OF VEIN IN SHOULDER AREA, LEFT: Primary | ICD-10-CM

## 2023-10-18 DIAGNOSIS — D68.51 FACTOR 5 LEIDEN MUTATION, HETEROZYGOUS (H): ICD-10-CM

## 2023-10-18 DIAGNOSIS — Z79.01 LONG TERM CURRENT USE OF ANTICOAGULANT THERAPY: ICD-10-CM

## 2023-10-18 LAB — INR (EXTERNAL): 3.8 (ref 0.9–1.1)

## 2023-10-18 NOTE — PROGRESS NOTES
ANTICOAGULATION MANAGEMENT     Danielamariusz Ladd 53 year old female is on warfarin with supratherapeutic INR result. (Goal INR 2.5-3.5)    Recent labs: (last 7 days)     10/18/23  0930   INR 3.8*       ASSESSMENT     Source(s): Chart Review and Patient/Caregiver Call     Warfarin doses taken: Warfarin taken as instructed  Diet:  Patient verbalized experimenting with peanut butter see how it impacts with their INR level, will adjust intake of peanut butter   Medication/supplement changes: None noted  New illness, injury, or hospitalization: No  Signs or symptoms of bleeding or clotting: No  Previous result: Therapeutic last 2(+) visits  Additional findings: None     PLAN     Recommended plan for temporary change(s) affecting INR     Dosing Instructions: Continue your current warfarin dose with next INR in 1 week       Summary  As of 10/18/2023      Full warfarin instructions:  10 mg every Mon; 8 mg all other days   Next INR check:  10/25/2023               Telephone call with Daniela who verbalizes understanding and agrees to plan    Patient to recheck with home meter    Education provided:   Please call back if any changes to your diet, medications or how you've been taking warfarin    Plan made per [facility] anticoagulation protocol    Janelle Cameron, RN  Anticoagulation Clinic  10/18/2023    _______________________________________________________________________     Anticoagulation Episode Summary       Current INR goal:  2.5-3.5   TTR:  84.0% (1 y)   Target end date:  Indefinite   Send INR reminders to:  [facility]    Indications    Blood clot of vein in shoulder area  left [I82.602]  Factor 5 Leiden mutation  heterozygous (H24) [D68.51]  Long-term (current) use of anticoagulants [Z79.01] [Z79.01]             Comments:  Has home INR machine weekly INR needed                Anticoagulation Care Providers       Provider Role Specialty Phone number    Valeri, Ling Rodriguez PA-C Referring Family Medicine  [phone]

## 2023-10-19 ENCOUNTER — OFFICE VISIT (OUTPATIENT)
Dept: FAMILY MEDICINE | Facility: OTHER | Age: 53
End: 2023-10-19
Attending: PHYSICIAN ASSISTANT
Payer: COMMERCIAL

## 2023-10-19 ENCOUNTER — HOSPITAL ENCOUNTER (OUTPATIENT)
Dept: GENERAL RADIOLOGY | Facility: OTHER | Age: 53
Discharge: HOME | End: 2023-10-19
Attending: PHYSICIAN ASSISTANT
Payer: COMMERCIAL

## 2023-10-19 VITALS
SYSTOLIC BLOOD PRESSURE: 126 MMHG | RESPIRATION RATE: 16 BRPM | TEMPERATURE: 96.9 F | HEART RATE: 98 BPM | DIASTOLIC BLOOD PRESSURE: 77 MMHG | HEIGHT: 67 IN | WEIGHT: 188.6 LBS | BODY MASS INDEX: 29.6 KG/M2 | OXYGEN SATURATION: 97 %

## 2023-10-19 DIAGNOSIS — G56.03 BILATERAL CARPAL TUNNEL SYNDROME: ICD-10-CM

## 2023-10-19 DIAGNOSIS — M79.672 LEFT FOOT PAIN: ICD-10-CM

## 2023-10-19 DIAGNOSIS — M54.2 CERVICALGIA: Primary | ICD-10-CM

## 2023-10-19 DIAGNOSIS — G44.209 TENSION HEADACHE: ICD-10-CM

## 2023-10-19 PROCEDURE — 73630 X-RAY EXAM OF FOOT: CPT | Mod: LT

## 2023-10-19 PROCEDURE — 99214 OFFICE O/P EST MOD 30 MIN: CPT | Performed by: PHYSICIAN ASSISTANT

## 2023-10-19 ASSESSMENT — ENCOUNTER SYMPTOMS: BACK PAIN: 1

## 2023-10-19 ASSESSMENT — PAIN SCALES - GENERAL: PAINLEVEL: MODERATE PAIN (5)

## 2023-10-19 NOTE — PROGRESS NOTES
Assessment & Plan   Problem List Items Addressed This Visit    None  Visit Diagnoses       Cervicalgia    -  Primary    Relevant Orders    MR Cervical Spine w/o Contrast    Tension headache        Relevant Orders    MR Cervical Spine w/o Contrast    Left foot pain        Relevant Orders    XR Foot Left G/E 3 Views (Completed)    Bilateral carpal tunnel syndrome               Completed left foot xray.  I personally reviewed the xray. I found no fracture appreciated upon initial read of xray.  Final read pending by radiology.    Bilateral carpal tunnel syndrome: Encouraged to use ice, heat, stretching.  Offered wrist braces however patient declined at this time.  Would like to look at over-the-counter wrist braces.    Cervicalgia, tension headache, and left foot pain:  Encouraged to take tylenol for relief up to 4 times per day.  Encouraged rest and elevation.  Encouraged to use ice or heat 15 minutes at a time several times per day to decrease pain. Return to clinic in 1-2 weeks as necessary for persistent pain. Return to clinic with any change or worsening of symptoms.   Use TENS unit.   See your chiropractor for a recheck.   Ordered an MRI of the cervical spine to rule out concerns.    Ordering of each unique test  30 minutes spent by me on the date of the encounter doing chart review, history and exam, documentation and further activities per the note       See, Tylenol, and ibuprofen as needed. Patient Instructions    Return if symptoms worsen or fail to improve.    Ling Trammell PA-C  [facility]    Rolando Suarez is a 53 year old, presenting for the following health issues:  Foot Pain, Back Pain, and neck pain         10/19/2023     1:42 PM   Additional Questions   Roomed by adela   Accompanied by self       History of Present Illness       Reason for visit:  Left foot pain on top left/side  Middle back and neck pain.    She eats 2-3 servings of fruits and vegetables daily.She  consumes 0 sweetened beverage(s) daily.She exercises with enough effort to increase her heart rate 10 to 19 minutes per day.  She exercises with enough effort to increase her heart rate 3 or less days per week.   She is taking medications regularly.       Patient states that she dropped a bolt on her left foot a year ago.  Had her shoes on.  It was sore at the time.  Now she gets a burning sensation on her foot.  Unsure if it is related.  Pain is present on the left outer distal foot.  Present on the side.  No pain on the bottom of her foot.  It is on the edge and side.  History of arthritis in the past.  It was a little red after the injury and had a little bruising.  No history of having an x-ray.  Has a little pain with a burning sensation.  Limps a little especially with increased activity.  Tight socks worsen symptoms.  Not currently swelling.  History of having left ankle surgery and history of bone spurs.    Patient is history of pain on the middle back and neck.  History of using ice and heat.  Has had blood clots on her left upper chest.  Since that time point she has had neck and back issues.  Neck pain bilaterally on the lateral sides of her neck and the back of her neck.  If she puts pressure on the region it eases.  Has been present for many years.  No history of injury or trauma.  Uses Tylenol as needed along with Salonpas patches and roll-on's.  Pain with cervical range of motion.  Her neck feels tight.  Has been to the chiropractor in the past however not recently.  It did not help much.  Middle and upper back hurts as well.  Last went a year ago to the chiropractor-[place] [facility].    Review of Systems   Constitutional:  Negative for chills and fever.   Gastrointestinal: Negative.    Genitourinary: Negative.    Musculoskeletal:  Positive for back pain and neck pain. Negative for joint swelling.   Skin:  Negative for rash.   Neurological:  Negative for dizziness, weakness and light-headedness.  "  Psychiatric/Behavioral: Negative.              Objective    /77 (BP Location: Right arm, Patient Position: Sitting, Cuff Size: Adult Regular)   Pulse 98   Temp 96.9  F (36.1  C) (Tympanic)   Resp 16   Ht 1.689 m (5' 6.5\")   Wt 85.5 kg (188 lb 9.6 oz)   SpO2 97%   BMI 29.98 kg/m    Body mass index is 29.98 kg/m .  Physical Exam  Vitals and nursing note reviewed.   Constitutional:       Appearance: Normal appearance.   HENT:      Head: Normocephalic and atraumatic.   Musculoskeletal:         General: No swelling or signs of injury. Normal range of motion.      Cervical back: Normal range of motion.      Comments: No pinpoint spinal pain to palpation.  Mild cervical and lateral neck pain bilaterally with palpation.  Cervicalgia with cervical range of motion.    Left lateral mid to distal foot pain with palpation.  No bruising or swelling appreciated.  No ankle pain to palpation.  Normal flexion and extension of the left ankle with no discomfort.   Skin:     General: Skin is warm and dry.   Neurological:      General: No focal deficit present.      Mental Status: She is alert and oriented to person, place, and time.      Motor: No weakness.      Coordination: Coordination normal.      Gait: Gait normal.      Deep Tendon Reflexes: Reflexes normal.   Psychiatric:         Mood and Affect: Mood normal.         Behavior: Behavior normal.                              "

## 2023-10-19 NOTE — PATIENT INSTRUCTIONS
Encouraged to take tylenol for relief up to 4 times per day.  Encouraged rest and elevation.  Encouraged to use ice or heat 15 minutes at a time several times per day to decrease pain. Return to clinic in 1-2 weeks as necessary for persistent pain. Return to clinic with any change or worsening of symptoms.   Use TENS unit.   See your chiropractor for a recheck.

## 2023-10-19 NOTE — NURSING NOTE
"Chief Complaint   Patient presents with    Foot Pain    Back Pain    neck pain            Initial /77 (BP Location: Right arm, Patient Position: Sitting, Cuff Size: Adult Regular)   Pulse 98   Temp 96.9  F (36.1  C) (Tympanic)   Resp 16   Ht 1.689 m (5' 6.5\")   Wt 85.5 kg (188 lb 9.6 oz)   SpO2 97%   BMI 29.98 kg/m   Estimated body mass index is 29.98 kg/m  as calculated from the following:    Height as of this encounter: 1.689 m (5' 6.5\").    Weight as of this encounter: 85.5 kg (188 lb 9.6 oz).     FOOD SECURITY SCREENING QUESTIONS:    The next two questions are to help us understand your food security.  If you are feeling you need any assistance in this area, we have resources available to support you today.    Hunger Vital Signs:  Within the past 12 months we worried whether our food would run out before we got money to buy more. Never  Within the past 12 months the food we bought just didn't last and we didn't have money to get more. Never      Caroline Abbasi     "

## 2023-10-22 ASSESSMENT — ENCOUNTER SYMPTOMS
WEAKNESS: 0
CHILLS: 0
GASTROINTESTINAL NEGATIVE: 1
DIZZINESS: 0
PSYCHIATRIC NEGATIVE: 1
JOINT SWELLING: 0
FEVER: 0
LIGHT-HEADEDNESS: 0
NECK PAIN: 1

## 2023-10-25 ENCOUNTER — ANTICOAGULATION THERAPY VISIT (OUTPATIENT)
Dept: ANTICOAGULATION | Facility: OTHER | Age: 53
End: 2023-10-25
Attending: PHYSICIAN ASSISTANT
Payer: COMMERCIAL

## 2023-10-25 DIAGNOSIS — I82.602 BLOOD CLOT OF VEIN IN SHOULDER AREA, LEFT: Primary | ICD-10-CM

## 2023-10-25 DIAGNOSIS — D68.51 FACTOR 5 LEIDEN MUTATION, HETEROZYGOUS (H): ICD-10-CM

## 2023-10-25 DIAGNOSIS — Z79.01 LONG TERM CURRENT USE OF ANTICOAGULANT THERAPY: ICD-10-CM

## 2023-10-25 LAB — INR (EXTERNAL): 3.4 (ref 0.9–1.1)

## 2023-10-25 NOTE — PROGRESS NOTES
ANTICOAGULATION MANAGEMENT     Daniela Ladd 53 year old female is on warfarin with therapeutic INR result. (Goal INR 2.5-3.5)    Recent labs: (last 7 days)     10/25/23  0737   INR 3.4*       ASSESSMENT     Source(s): Chart Review and Patient/Caregiver Call     Warfarin doses taken: Warfarin taken as instructed  Diet: No new diet changes identified  New illness, injury, or hospitalization: No  Medication/supplement changes: None noted  Signs or symptoms of bleeding or clotting: No  Previous INR: Supratherapeutic  Additional findings: None     PLAN     Recommended plan for no diet, medication or health factor changes affecting INR     Dosing Instructions: Continue your current warfarin dose with next INR in 1 week       Summary  As of 10/25/2023      Full warfarin instructions:  10 mg every Mon; 8 mg all other days   Next INR check:  11/1/2023               Telephone call with Daniela who verbalizes understanding and agrees to plan    Patient to recheck with home meter    Education provided: Please call back if any changes to your diet, medications or how you've been taking warfarin and Resume manage by exception with home monitor. Continue to submit INR results to home monitor company.You will only be called when your result is out of range. Please call and notify [facility] if new medication started, dose missed, signs or symptoms of bleeding or clotting, or a surgery/procedure is scheduled.    Plan made per [facility] anticoagulation protocol    Ambreen Khan RN  Anticoagulation Clinic  10/25/2023    _______________________________________________________________________     Anticoagulation Episode Summary       Current INR goal:  2.5-3.5   TTR:  83.5% (1 y)   Target end date:  Indefinite   Send INR reminders to:  [facility]    Indications    Blood clot of vein in shoulder area  left [I82.602]  Factor 5 Leiden mutation  heterozygous (H24) [D68.51]  Long-term (current) use of anticoagulants  [Z79.01] [Z79.01]             Comments:  Has home INR machine weekly INR needed                Anticoagulation Care Providers       Provider Role Specialty Phone number    Ling Trammell PA-C Referring Family Medicine [phone]

## 2023-11-01 ENCOUNTER — ANTICOAGULATION THERAPY VISIT (OUTPATIENT)
Dept: ANTICOAGULATION | Facility: OTHER | Age: 53
End: 2023-11-01
Attending: PHYSICIAN ASSISTANT
Payer: COMMERCIAL

## 2023-11-01 DIAGNOSIS — D68.51 FACTOR 5 LEIDEN MUTATION, HETEROZYGOUS (H): ICD-10-CM

## 2023-11-01 DIAGNOSIS — I82.602 BLOOD CLOT OF VEIN IN SHOULDER AREA, LEFT: Primary | ICD-10-CM

## 2023-11-01 DIAGNOSIS — Z79.01 LONG TERM CURRENT USE OF ANTICOAGULANT THERAPY: ICD-10-CM

## 2023-11-01 LAB — INR (EXTERNAL): 3.2 (ref 0.9–1.1)

## 2023-11-01 NOTE — PROGRESS NOTES
ANTICOAGULATION  MANAGEMENT-Home Monitor Managed by Exception    Daniela NANCY Ladd 53 year old female is on warfarin with therapeutic INR result. (Goal INR 2.5-3.5)    Recent labs: (last 7 days)     11/01/23  0733   INR 3.2*       Previous INR was Therapeutic  Medication, diet, health changes since last INR:chart reviewed; none identified  Contacted within the last 12 weeks by phone on 10/25/23      ANÍBAL     Daniela was NOT contacted regarding therapeutic result today per home monitoring policy manage by exception agreement.   Current warfarin dose is to be continued:     Summary  As of 11/1/2023      Full warfarin instructions:  10 mg every Mon; 8 mg all other days   Next INR check:  11/8/2023             ?   Ambreen Khan RN  Anticoagulation Clinic  11/1/2023    _______________________________________________________________________     Anticoagulation Episode Summary       Current INR goal:  2.5-3.5   TTR:  83.5% (1 y)   Target end date:  Indefinite   Send INR reminders to:  [facility]    Indications    Blood clot of vein in shoulder area  left [I82.602]  Factor 5 Leiden mutation  heterozygous (H24) [D68.51]  Long-term (current) use of anticoagulants [Z79.01] [Z79.01]             Comments:  Has home INR machine weekly INR needed                Anticoagulation Care Providers       Provider Role Specialty Phone number    Ling Trammell PA-C Referring Family Medicine [phone]

## 2023-11-02 ENCOUNTER — HOSPITAL ENCOUNTER (OUTPATIENT)
Dept: MRI IMAGING | Facility: OTHER | Age: 53
Discharge: HOME | End: 2023-11-02
Attending: PHYSICIAN ASSISTANT | Admitting: PHYSICIAN ASSISTANT
Payer: COMMERCIAL

## 2023-11-02 DIAGNOSIS — M54.2 CERVICALGIA: ICD-10-CM

## 2023-11-02 DIAGNOSIS — G44.209 TENSION HEADACHE: ICD-10-CM

## 2023-11-02 PROCEDURE — 72141 MRI NECK SPINE W/O DYE: CPT

## 2023-11-08 ENCOUNTER — ANTICOAGULATION THERAPY VISIT (OUTPATIENT)
Dept: ANTICOAGULATION | Facility: OTHER | Age: 53
End: 2023-11-08
Attending: PHYSICIAN ASSISTANT
Payer: COMMERCIAL

## 2023-11-08 DIAGNOSIS — I82.602 BLOOD CLOT OF VEIN IN SHOULDER AREA, LEFT: Primary | ICD-10-CM

## 2023-11-08 DIAGNOSIS — Z79.01 LONG TERM CURRENT USE OF ANTICOAGULANT THERAPY: ICD-10-CM

## 2023-11-08 DIAGNOSIS — D68.51 FACTOR 5 LEIDEN MUTATION, HETEROZYGOUS (H): ICD-10-CM

## 2023-11-08 LAB — INR (EXTERNAL): 3.1 (ref 0.9–1.1)

## 2023-11-08 NOTE — PROGRESS NOTES
ANTICOAGULATION  MANAGEMENT-Home Monitor Managed by Exception    Daniela NANCY Ladd 53 year old female is on warfarin with therapeutic INR result. (Goal INR 2.5-3.5)    Recent labs: (last 7 days)     11/08/23  0736   INR 3.1*       Previous INR was Therapeutic  Medication, diet, health changes since last INR:chart reviewed; none identified  Contacted within the last 12 weeks by phone on 10/25/23      ANÍBAL     Daniela was NOT contacted regarding therapeutic result today per home monitoring policy manage by exception agreement.   Current warfarin dose is to be continued:     Summary  As of 11/8/2023      Full warfarin instructions:  10 mg every Mon; 8 mg all other days   Next INR check:  11/15/2023             ?   Ambreen Khan RN  Anticoagulation Clinic  11/8/2023    _______________________________________________________________________     Anticoagulation Episode Summary       Current INR goal:  2.5-3.5   TTR:  83.5% (1 y)   Target end date:  Indefinite   Send INR reminders to:  [facility]    Indications    Blood clot of vein in shoulder area  left [I82.602]  Factor 5 Leiden mutation  heterozygous (H24) [D68.51]  Long-term (current) use of anticoagulants [Z79.01] [Z79.01]             Comments:  Has home INR machine weekly INR needed                Anticoagulation Care Providers       Provider Role Specialty Phone number    Ling Trammell PA-C Referring Family Medicine [phone]

## 2023-11-15 ENCOUNTER — ANTICOAGULATION THERAPY VISIT (OUTPATIENT)
Dept: ANTICOAGULATION | Facility: OTHER | Age: 53
End: 2023-11-15
Attending: PHYSICIAN ASSISTANT
Payer: COMMERCIAL

## 2023-11-15 DIAGNOSIS — D68.51 FACTOR 5 LEIDEN MUTATION, HETEROZYGOUS (H): ICD-10-CM

## 2023-11-15 DIAGNOSIS — I82.602 BLOOD CLOT OF VEIN IN SHOULDER AREA, LEFT: Primary | ICD-10-CM

## 2023-11-15 DIAGNOSIS — Z79.01 LONG TERM CURRENT USE OF ANTICOAGULANT THERAPY: ICD-10-CM

## 2023-11-15 LAB — INR (EXTERNAL): 3.4 (ref 0.9–1.1)

## 2023-11-15 NOTE — PROGRESS NOTES
ANTICOAGULATION  MANAGEMENT-Home Monitor Managed by Exception    Daniela NANCY Ladd 53 year old female is on warfarin with therapeutic INR result. (Goal INR 2.5-3.5)    Recent labs: (last 7 days)     11/15/23  0734   INR 3.4*       Previous INR was Therapeutic  Medication, diet, health changes since last INR:chart reviewed; none identified  Contacted within the last 12 weeks by phone on 10/25/23      ANÍBAL     Daniela was NOT contacted regarding therapeutic result today per home monitoring policy manage by exception agreement.   Current warfarin dose is to be continued:     Summary  As of 11/15/2023      Full warfarin instructions:  10 mg every Mon; 8 mg all other days   Next INR check:  11/22/2023             ?   Ambreen Khan RN  Anticoagulation Clinic  11/15/2023    _______________________________________________________________________     Anticoagulation Episode Summary       Current INR goal:  2.5-3.5   TTR:  83.5% (1 y)   Target end date:  Indefinite   Send INR reminders to:  [facility]    Indications    Blood clot of vein in shoulder area  left [I82.602]  Factor 5 Leiden mutation  heterozygous (H24) [D68.51]  Long-term (current) use of anticoagulants [Z79.01] [Z79.01]             Comments:  Has home INR machine weekly INR needed                Anticoagulation Care Providers       Provider Role Specialty Phone number    Ling Trammell PA-C Referring Family Medicine [phone]

## 2023-11-22 ENCOUNTER — ANTICOAGULATION THERAPY VISIT (OUTPATIENT)
Dept: ANTICOAGULATION | Facility: OTHER | Age: 53
End: 2023-11-22
Attending: PHYSICIAN ASSISTANT
Payer: COMMERCIAL

## 2023-11-22 DIAGNOSIS — I82.602 BLOOD CLOT OF VEIN IN SHOULDER AREA, LEFT: Primary | ICD-10-CM

## 2023-11-22 DIAGNOSIS — Z79.01 LONG TERM CURRENT USE OF ANTICOAGULANT THERAPY: ICD-10-CM

## 2023-11-22 DIAGNOSIS — D68.51 FACTOR 5 LEIDEN MUTATION, HETEROZYGOUS (H): ICD-10-CM

## 2023-11-22 LAB — INR (EXTERNAL): 2.9 (ref 0.9–1.1)

## 2023-11-22 NOTE — PROGRESS NOTES
ANTICOAGULATION  MANAGEMENT-Home Monitor Managed by Exception    Daniela NANCY Ladd 53 year old female is on warfarin with therapeutic INR result. (Goal INR 2.5-3.5)    Recent labs: (last 7 days)     11/22/23  0734   INR 2.9*       Previous INR was Therapeutic  Medication, diet, health changes since last INR:chart reviewed; none identified  Contacted within the last 12 weeks by phone on 10/25/23      ANÍBAL     Daniela was NOT contacted regarding therapeutic result today per home monitoring policy manage by exception agreement.   Current warfarin dose is to be continued:     Summary  As of 11/22/2023      Full warfarin instructions:  10 mg every Mon; 8 mg all other days   Next INR check:  11/29/2023             ?   Ambreen Khan RN  Anticoagulation Clinic  11/22/2023    _______________________________________________________________________     Anticoagulation Episode Summary       Current INR goal:  2.5-3.5   TTR:  83.6% (1 y)   Target end date:  Indefinite   Send INR reminders to:  [facility]    Indications    Blood clot of vein in shoulder area  left [I82.602]  Factor 5 Leiden mutation  heterozygous (H24) [D68.51]  Long-term (current) use of anticoagulants [Z79.01] [Z79.01]             Comments:  Has home INR machine weekly INR needed                Anticoagulation Care Providers       Provider Role Specialty Phone number    Ling Trammell PA-C Referring Family Medicine [phone]

## 2023-11-29 ENCOUNTER — ANTICOAGULATION THERAPY VISIT (OUTPATIENT)
Dept: ANTICOAGULATION | Facility: OTHER | Age: 53
End: 2023-11-29
Payer: COMMERCIAL

## 2023-11-29 DIAGNOSIS — I82.602 BLOOD CLOT OF VEIN IN SHOULDER AREA, LEFT: Primary | ICD-10-CM

## 2023-11-29 DIAGNOSIS — D68.51 FACTOR 5 LEIDEN MUTATION, HETEROZYGOUS (H): ICD-10-CM

## 2023-11-29 DIAGNOSIS — Z79.01 LONG TERM CURRENT USE OF ANTICOAGULANT THERAPY: ICD-10-CM

## 2023-11-29 LAB — INR (EXTERNAL): 3.6 (ref 0.9–1.1)

## 2023-11-29 NOTE — PROGRESS NOTES
ANTICOAGULATION MANAGEMENT     Daniela Ladd 53 year old female is on warfarin with supratherapeutic INR result. (Goal INR 2.5-3.5)    Recent labs: (last 7 days)     11/29/23  0844   INR 3.6*       ASSESSMENT     Source(s): Chart Review and Patient/Caregiver Call     Warfarin doses taken: Warfarin taken as instructed  Diet: No new diet changes identified  Medication/supplement changes: None noted  New illness, injury, or hospitalization: No  Signs or symptoms of bleeding or clotting: No  Previous result: Therapeutic last 2(+) visits  Additional findings: None       PLAN     Recommended plan for no diet, medication or health factor changes affecting INR     Dosing Instructions: Continue your current warfarin dose with next INR in 1 week       Summary  As of 11/29/2023      Full warfarin instructions:  10 mg every Mon; 8 mg all other days   Next INR check:  12/6/2023               Telephone call with Daniela who verbalizes understanding and agrees to plan    Patient to recheck with home meter    Education provided:   None required    Plan made per [facility] anticoagulation protocol    Yoly Crowe, RN  Anticoagulation Clinic  11/29/2023    _______________________________________________________________________     Anticoagulation Episode Summary       Current INR goal:  2.5-3.5   TTR:  84.5% (1 y)   Target end date:  Indefinite   Send INR reminders to:  [facility]    Indications    Blood clot of vein in shoulder area  left [I82.602]  Factor 5 Leiden mutation  heterozygous (H24) [D68.51]  Long-term (current) use of anticoagulants [Z79.01] [Z79.01]             Comments:  Has home INR machine weekly INR needed                Anticoagulation Care Providers       Provider Role Specialty Phone number    Valeri, Ling Rodriguez PA-C Referring Family Medicine [phone]

## 2023-12-06 ENCOUNTER — ANTICOAGULATION THERAPY VISIT (OUTPATIENT)
Dept: ANTICOAGULATION | Facility: OTHER | Age: 53
End: 2023-12-06
Attending: PHYSICIAN ASSISTANT
Payer: COMMERCIAL

## 2023-12-06 DIAGNOSIS — I82.602 BLOOD CLOT OF VEIN IN SHOULDER AREA, LEFT: Primary | ICD-10-CM

## 2023-12-06 DIAGNOSIS — D68.51 FACTOR 5 LEIDEN MUTATION, HETEROZYGOUS (H): ICD-10-CM

## 2023-12-06 DIAGNOSIS — Z79.01 LONG TERM CURRENT USE OF ANTICOAGULANT THERAPY: ICD-10-CM

## 2023-12-06 LAB — INR (EXTERNAL): 3.2 (ref 0.9–1.1)

## 2023-12-06 NOTE — PROGRESS NOTES
ANTICOAGULATION MANAGEMENT     Daniela Ladd 53 year old female is on warfarin with therapeutic INR result. (Goal INR 2.5-3.5)    Recent labs: (last 7 days)     12/06/23  0802   INR 3.2*       ASSESSMENT     Source(s): Patient/ Care giver call     Warfarin doses taken: Warfarin taken as instructed  Diet: No new diet changes identified  New illness, injury, or hospitalization: No  Medication/supplement changes: None noted  Signs or symptoms of bleeding or clotting: No  Previous INR: Supratherapeutic  Additional findings: None     PLAN     Recommended plan for no diet, medication or health factor changes affecting INR     Dosing Instructions: Continue your current warfarin dose with next INR in 1 week       Summary  As of 12/6/2023      Full warfarin instructions:  10 mg every Mon; 8 mg all other days   Next INR check:  12/13/2023               Telephone call with Daniela who verbalizes understanding and agrees to plan    Patient to recheck with home meter    Education provided: Please call back if any changes to your diet, medications or how you've been taking warfarin    Plan made per [facility] anticoagulation protocol    Hawa Lechuga RN  Anticoagulation Clinic  12/6/2023    _______________________________________________________________________     Anticoagulation Episode Summary       Current INR goal:  2.5-3.5   TTR:  84.0% (1 y)   Target end date:  Indefinite   Send INR reminders to:  [facility]    Indications    Blood clot of vein in shoulder area  left [I82.602]  Factor 5 Leiden mutation  heterozygous (H24) [D68.51]  Long-term (current) use of anticoagulants [Z79.01] [Z79.01]             Comments:  Has home INR machine weekly INR needed                Anticoagulation Care Providers       Provider Role Specialty Phone number    Ling Trammell PA-C Referring Family Medicine [phone]

## 2023-12-08 ENCOUNTER — OFFICE VISIT (OUTPATIENT)
Dept: FAMILY MEDICINE | Facility: OTHER | Age: 53
End: 2023-12-08
Attending: PHYSICIAN ASSISTANT
Payer: COMMERCIAL

## 2023-12-08 VITALS
RESPIRATION RATE: 16 BRPM | TEMPERATURE: 97.1 F | HEIGHT: 67 IN | OXYGEN SATURATION: 98 % | SYSTOLIC BLOOD PRESSURE: 126 MMHG | BODY MASS INDEX: 29.98 KG/M2 | WEIGHT: 191 LBS | HEART RATE: 75 BPM | DIASTOLIC BLOOD PRESSURE: 82 MMHG

## 2023-12-08 DIAGNOSIS — R43.8 METALLIC TASTE: Primary | ICD-10-CM

## 2023-12-08 DIAGNOSIS — Z13.1 SCREENING FOR DIABETES MELLITUS: ICD-10-CM

## 2023-12-08 DIAGNOSIS — Z13.29 SCREENING FOR THYROID DISORDER: ICD-10-CM

## 2023-12-08 DIAGNOSIS — M79.672 LEFT FOOT PAIN: ICD-10-CM

## 2023-12-08 DIAGNOSIS — E78.5 ELEVATED LIPIDS: ICD-10-CM

## 2023-12-08 DIAGNOSIS — R73.03 PREDIABETES: ICD-10-CM

## 2023-12-08 LAB
ALBUMIN SERPL BCG-MCNC: 4.5 G/DL (ref 3.5–5.2)
ALP SERPL-CCNC: 65 U/L (ref 40–150)
ALT SERPL W P-5'-P-CCNC: 20 U/L (ref 0–50)
ANION GAP SERPL CALCULATED.3IONS-SCNC: 9 MMOL/L (ref 7–15)
AST SERPL W P-5'-P-CCNC: 18 U/L (ref 0–45)
BASOPHILS # BLD AUTO: 0.1 10E3/UL (ref 0–0.2)
BASOPHILS NFR BLD AUTO: 1 %
BILIRUB SERPL-MCNC: 0.2 MG/DL
BUN SERPL-MCNC: 9.3 MG/DL (ref 6–20)
CALCIUM SERPL-MCNC: 9.7 MG/DL (ref 8.6–10)
CHLORIDE SERPL-SCNC: 104 MMOL/L (ref 98–107)
CHOLEST SERPL-MCNC: 248 MG/DL
CREAT SERPL-MCNC: 0.64 MG/DL (ref 0.51–0.95)
DEPRECATED HCO3 PLAS-SCNC: 27 MMOL/L (ref 22–29)
EGFRCR SERPLBLD CKD-EPI 2021: >90 ML/MIN/1.73M2
EOSINOPHIL # BLD AUTO: 0.1 10E3/UL (ref 0–0.7)
EOSINOPHIL NFR BLD AUTO: 1 %
ERYTHROCYTE [DISTWIDTH] IN BLOOD BY AUTOMATED COUNT: 12.7 % (ref 10–15)
FASTING STATUS PATIENT QL REPORTED: YES
FOLATE SERPL-MCNC: 8.8 NG/ML (ref 4.6–34.8)
GLUCOSE SERPL-MCNC: 102 MG/DL (ref 70–99)
HBA1C MFR BLD: 5.9 % (ref 4–6.2)
HCT VFR BLD AUTO: 48 % (ref 35–47)
HDLC SERPL-MCNC: 42 MG/DL
HGB BLD-MCNC: 16.7 G/DL (ref 11.7–15.7)
IMM GRANULOCYTES # BLD: 0 10E3/UL
IMM GRANULOCYTES NFR BLD: 0 %
LDLC SERPL CALC-MCNC: 161 MG/DL
LYMPHOCYTES # BLD AUTO: 3 10E3/UL (ref 0.8–5.3)
LYMPHOCYTES NFR BLD AUTO: 36 %
MAGNESIUM SERPL-MCNC: 2 MG/DL (ref 1.7–2.3)
MCH RBC QN AUTO: 31.2 PG (ref 26.5–33)
MCHC RBC AUTO-ENTMCNC: 34.8 G/DL (ref 31.5–36.5)
MCV RBC AUTO: 90 FL (ref 78–100)
MONOCYTES # BLD AUTO: 0.4 10E3/UL (ref 0–1.3)
MONOCYTES NFR BLD AUTO: 5 %
NEUTROPHILS # BLD AUTO: 4.9 10E3/UL (ref 1.6–8.3)
NEUTROPHILS NFR BLD AUTO: 57 %
NONHDLC SERPL-MCNC: 206 MG/DL
NRBC # BLD AUTO: 0 10E3/UL
NRBC BLD AUTO-RTO: 0 /100
PLATELET # BLD AUTO: 326 10E3/UL (ref 150–450)
POTASSIUM SERPL-SCNC: 4.4 MMOL/L (ref 3.4–5.3)
PROT SERPL-MCNC: 7.5 G/DL (ref 6.4–8.3)
RBC # BLD AUTO: 5.35 10E6/UL (ref 3.8–5.2)
SODIUM SERPL-SCNC: 140 MMOL/L (ref 135–145)
TRIGL SERPL-MCNC: 224 MG/DL
TSH SERPL DL<=0.005 MIU/L-ACNC: 2.18 UIU/ML (ref 0.3–4.2)
URATE SERPL-MCNC: 3.9 MG/DL (ref 2.4–5.7)
VIT B12 SERPL-MCNC: 521 PG/ML (ref 232–1245)
VIT D+METAB SERPL-MCNC: 14 NG/ML (ref 20–50)
WBC # BLD AUTO: 8.5 10E3/UL (ref 4–11)

## 2023-12-08 PROCEDURE — 82306 VITAMIN D 25 HYDROXY: CPT | Mod: ZL | Performed by: PHYSICIAN ASSISTANT

## 2023-12-08 PROCEDURE — 83036 HEMOGLOBIN GLYCOSYLATED A1C: CPT | Mod: ZL | Performed by: PHYSICIAN ASSISTANT

## 2023-12-08 PROCEDURE — 82607 VITAMIN B-12: CPT | Mod: ZL | Performed by: PHYSICIAN ASSISTANT

## 2023-12-08 PROCEDURE — 99213 OFFICE O/P EST LOW 20 MIN: CPT | Performed by: PHYSICIAN ASSISTANT

## 2023-12-08 PROCEDURE — 36415 COLL VENOUS BLD VENIPUNCTURE: CPT | Mod: ZL | Performed by: PHYSICIAN ASSISTANT

## 2023-12-08 PROCEDURE — 84550 ASSAY OF BLOOD/URIC ACID: CPT | Mod: ZL | Performed by: PHYSICIAN ASSISTANT

## 2023-12-08 PROCEDURE — 84443 ASSAY THYROID STIM HORMONE: CPT | Mod: ZL | Performed by: PHYSICIAN ASSISTANT

## 2023-12-08 PROCEDURE — 80061 LIPID PANEL: CPT | Mod: ZL | Performed by: PHYSICIAN ASSISTANT

## 2023-12-08 PROCEDURE — 80053 COMPREHEN METABOLIC PANEL: CPT | Mod: ZL | Performed by: PHYSICIAN ASSISTANT

## 2023-12-08 PROCEDURE — 82746 ASSAY OF FOLIC ACID SERUM: CPT | Mod: ZL | Performed by: PHYSICIAN ASSISTANT

## 2023-12-08 PROCEDURE — 83735 ASSAY OF MAGNESIUM: CPT | Mod: ZL | Performed by: PHYSICIAN ASSISTANT

## 2023-12-08 PROCEDURE — 85004 AUTOMATED DIFF WBC COUNT: CPT | Mod: ZL | Performed by: PHYSICIAN ASSISTANT

## 2023-12-08 ASSESSMENT — PAIN SCALES - GENERAL: PAINLEVEL: NO PAIN (0)

## 2023-12-08 NOTE — PROGRESS NOTES
"  Assessment & Plan   Problem List Items Addressed This Visit          Endocrine    Prediabetes    Relevant Orders    Hemoglobin A1c (Completed)     Other Visit Diagnoses       Metallic taste    -  Primary    Relevant Orders    CBC and Differential (Completed)    Comprehensive Metabolic Panel (Completed)    Uric acid (Completed)    Magnesium (Completed)    Vitamin B12 (Completed)    Folate (Completed)    Vitamin D Total    Hemoglobin A1c (Completed)    TSH Reflex GH (Completed)    Screening for diabetes mellitus        Relevant Orders    Comprehensive Metabolic Panel (Completed)    Hemoglobin A1c (Completed)    Screening for thyroid disorder        Relevant Orders    TSH Reflex GH (Completed)    Left foot pain        Relevant Orders    Uric acid (Completed)    Elevated lipids               Completed a thorough lab workup to rule out concerns with a metallic taste in her mouth.  Also completed diabetes and thyroid disorder screening.  Completed uric acid with recent history of left foot pain.  Labs are pending.      Symptoms likely viral at this time with her metallic taste in her mouth.  Encouraged to closely monitor symptoms.  Encouraged to try Nasacort to see if this is better tolerated to help clear her sinus drainage.  Continue to use saline nasal spray as needed.  Can also use Afrin for up to 3 days if needed.  Increase fluids with electrolytes and rest.  Encourage close follow-up if symptoms or not improving or worsening.    Ordering of each unique test         BMI:   Estimated body mass index is 30.37 kg/m  as calculated from the following:    Height as of this encounter: 1.689 m (5' 6.5\").    Weight as of this encounter: 86.6 kg (191 lb).   Weight management plan: Discussed healthy diet and exercise guidelines    See Patient Instructions    No follow-ups on file.    Ling Trammell PA-C  [facility]    Rolando Suarez is a 53 year old, presenting for the following health " issues:  metalic taste in mouth         12/8/2023     9:19 AM   Additional Questions   Roomed by adela   Accompanied by self       History of Present Illness       Reason for visit:  Metallic taste in mouth for almost 2 weeks  Symptom onset:  1-2 weeks ago  Symptoms include:  Metallic taste in mouth. Some food tastes awful. Occassionally i can smell wood smoke.  Symptom intensity:  Moderate  Symptom progression:  Staying the same  Had these symptoms before:  No  What makes it worse:  No  What makes it better:  Not really    She eats 2-3 servings of fruits and vegetables daily.She consumes 0 sweetened beverage(s) daily.She exercises with enough effort to increase her heart rate 10 to 19 minutes per day.  She exercises with enough effort to increase her heart rate 3 or less days per week.   She is taking medications regularly.     Patient is coming today with a persistent metallic taste in her mouth over the last 2 weeks.  It was bad a few days ago and has slowly started to improve since then.  No cough or cold symptoms however she does have a little sinus drainage.  Some postnasal drip.  No sore throat.  Felt like something was caught in her throat on the left side a few days ago which lasted for 1 day.  This is better now.  No dry mouth symptoms.  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  Brushes her teeth, flosses, and sees her dentist regularly.  No bleeding in the mouth.  Currently on warfarin.  Uses Flonase as needed however this tends to flare her PVCs so she only uses it rarely.  Has used saline nasal spray.  2 weeks ago she smelled would smoke when it first started however there was no wood smoke present.  No ear pain, fevers, chills, cough, stomach aches, heartburn symptoms.  Has some mild phlegm in her throat.      Review of Systems   Constitutional, HEENT, cardiovascular, pulmonary, gi and gu systems are negative, except as otherwise noted.      Objective    /82 (BP Location:  "Right arm, Patient Position: Sitting, Cuff Size: Adult Regular)   Pulse 75   Temp 97.1  F (36.2  C) (Tympanic)   Resp 16   Ht 1.689 m (5' 6.5\")   Wt 86.6 kg (191 lb)   SpO2 98%   BMI 30.37 kg/m    Body mass index is 30.37 kg/m .  Physical Exam  Vitals and nursing note reviewed.   Constitutional:       Appearance: Normal appearance.   HENT:      Head: Normocephalic and atraumatic.      Right Ear: Tympanic membrane, ear canal and external ear normal.      Left Ear: Tympanic membrane, ear canal and external ear normal.      Mouth/Throat:      Mouth: Mucous membranes are moist.      Pharynx: Oropharynx is clear. No oropharyngeal exudate or posterior oropharyngeal erythema.   Cardiovascular:      Rate and Rhythm: Normal rate and regular rhythm.      Heart sounds: Normal heart sounds.   Pulmonary:      Effort: Pulmonary effort is normal.      Breath sounds: Normal breath sounds. No wheezing or rales.   Musculoskeletal:         General: Normal range of motion.      Cervical back: Normal range of motion and neck supple.   Lymphadenopathy:      Cervical: No cervical adenopathy.   Skin:     General: Skin is warm and dry.   Neurological:      General: No focal deficit present.      Mental Status: She is alert.   Psychiatric:         Mood and Affect: Mood normal.         Behavior: Behavior normal.                      "

## 2023-12-08 NOTE — NURSING NOTE
"Chief Complaint   Patient presents with    metalic taste in mouth        Initial /82 (BP Location: Right arm, Patient Position: Sitting, Cuff Size: Adult Regular)   Pulse 75   Temp 97.1  F (36.2  C) (Tympanic)   Resp 16   Ht 1.689 m (5' 6.5\")   Wt 86.6 kg (191 lb)   SpO2 98%   BMI 30.37 kg/m   Estimated body mass index is 30.37 kg/m  as calculated from the following:    Height as of this encounter: 1.689 m (5' 6.5\").    Weight as of this encounter: 86.6 kg (191 lb).  Medication Review: complete    The next two questions are to help us understand your food security.  If you are feeling you need any assistance in this area, we have resources available to support you today.          12/6/2023   SDOH- Food Insecurity   Within the past 12 months, did you worry that your food would run out before you got money to buy more? N   Within the past 12 months, did the food you bought just not last and you didn t have money to get more? N         Health Care Directive:  Patient does not have a Health Care Directive or Living Will: Discussed advance care planning with patient; information given to patient to review.    Caroline Abbasi      "

## 2023-12-08 NOTE — PATIENT INSTRUCTIONS
May use symptomatic care with tylenol. Use flonase or nasacort as needed.   May use cough syrup or cough drops.  Using a humidifier works well to break up the congestion. You can also sleep propped up on a couple pillows to decrease symptoms at night.    Please take tylenol as needed up to 4 times daily.  Treat symptomatically with warm salt water gargles.   Frequent swallows of cool liquid.  Oatmeal coats the throat and some patients find it soothes the pain. Encouraged warm teas or fluids with honey.     If you have sinus congestion -  Use a Neti Pot/sinus flush (Darin Med Sinus Rinse) 3 times daily to irrigate sinuses/mucosal tissue.     Monitor for any fevers or chills.  Please call clinic with any questions or concerns. Return to clinic with change/worsening of symptoms.   Encouraged fluids and rest.    Call 9-1-1 or go to the emergency room if you:  Have trouble breathing   Are drooling because you cannot swallow your saliva   Have swelling of the neck or tongue   Cannot move your neck or have trouble opening your mouth

## 2023-12-13 ENCOUNTER — ANTICOAGULATION THERAPY VISIT (OUTPATIENT)
Dept: ANTICOAGULATION | Facility: OTHER | Age: 53
End: 2023-12-13
Attending: PHYSICIAN ASSISTANT
Payer: COMMERCIAL

## 2023-12-13 DIAGNOSIS — Z79.01 LONG TERM CURRENT USE OF ANTICOAGULANT THERAPY: ICD-10-CM

## 2023-12-13 DIAGNOSIS — D68.51 FACTOR 5 LEIDEN MUTATION, HETEROZYGOUS (H): ICD-10-CM

## 2023-12-13 DIAGNOSIS — I82.602 BLOOD CLOT OF VEIN IN SHOULDER AREA, LEFT: Primary | ICD-10-CM

## 2023-12-13 DIAGNOSIS — E55.9 HYPOVITAMINOSIS D: Primary | ICD-10-CM

## 2023-12-13 LAB — INR (EXTERNAL): 3.3 (ref 0.9–1.1)

## 2023-12-13 RX ORDER — ERGOCALCIFEROL 1.25 MG/1
50000 CAPSULE, LIQUID FILLED ORAL WEEKLY
Qty: 8 CAPSULE | Refills: 0 | Status: SHIPPED | OUTPATIENT
Start: 2023-12-13 | End: 2024-02-01

## 2023-12-13 NOTE — PROGRESS NOTES
ANTICOAGULATION MANAGEMENT     Daniela Ladd 53 year old female is on warfarin with therapeutic INR result. (Goal INR 2.5-3.5)    Recent labs: (last 7 days)     12/13/23  0738   INR 3.3*       ASSESSMENT     Source(s): Patient/ Care giver call     Warfarin doses taken: Warfarin taken as instructed  Diet: No new diet changes identified  New illness, injury, or hospitalization: No  Medication/supplement changes: None noted  Signs or symptoms of bleeding or clotting: No  Previous INR: Therapeutic last visit; previously outside of goal range  Additional findings: None     PLAN     Recommended plan for no diet, medication or health factor changes affecting INR     Dosing Instructions: Continue your current warfarin dose with next INR in 1 week       Summary  As of 12/13/2023      Full warfarin instructions:  10 mg every Mon; 8 mg all other days   Next INR check:  12/20/2023               Telephone call with Daniela who verbalizes understanding and agrees to plan    Patient to recheck with home meter    Education provided: Please call back if any changes to your diet, medications or how you've been taking warfarin and Resume manage by exception with home monitor. Continue to submit INR results to home monitor company.You will only be called when your result is out of range. Please call and notify [facility] if new medication started, dose missed, signs or symptoms of bleeding or clotting, or a surgery/procedure is scheduled.    Plan made per [facility] anticoagulation protocol    Ambreen Romano RN  Anticoagulation Clinic  12/13/2023    _______________________________________________________________________     Anticoagulation Episode Summary       Current INR goal:  2.5-3.5   TTR:  84.0% (1 y)   Target end date:  Indefinite   Send INR reminders to:  [facility]    Indications    Blood clot of vein in shoulder area  left [I82.602]  Factor 5 Leiden mutation  heterozygous (H24) [D68.51]  Long-term (current) use of  anticoagulants [Z79.01] [Z79.01]             Comments:  Has home INR machine weekly INR needed                Anticoagulation Care Providers       Provider Role Specialty Phone number    Ling Trammell PA-C Referring Family Medicine [phone]

## 2023-12-20 ENCOUNTER — ANTICOAGULATION THERAPY VISIT (OUTPATIENT)
Dept: ANTICOAGULATION | Facility: OTHER | Age: 53
End: 2023-12-20
Attending: PHYSICIAN ASSISTANT
Payer: COMMERCIAL

## 2023-12-20 DIAGNOSIS — D68.51 FACTOR 5 LEIDEN MUTATION, HETEROZYGOUS (H): ICD-10-CM

## 2023-12-20 DIAGNOSIS — Z79.01 LONG TERM CURRENT USE OF ANTICOAGULANT THERAPY: ICD-10-CM

## 2023-12-20 DIAGNOSIS — I82.602 BLOOD CLOT OF VEIN IN SHOULDER AREA, LEFT: Primary | ICD-10-CM

## 2023-12-20 LAB — INR (EXTERNAL): 3.4 (ref 0.9–1.1)

## 2023-12-20 NOTE — PROGRESS NOTES
ANTICOAGULATION  MANAGEMENT-Home Monitor Managed by Exception    Daniela CRUZ Julee 53 year old female is on warfarin with therapeutic INR result. (Goal INR 2.5-3.5)    Recent labs: (last 7 days)     12/20/23  0753   INR 3.4*       Previous INR was Therapeutic  Medication, diet, health changes since last INR:chart reviewed; none identified  Contacted within the last 12 weeks by phone on 12/13/23  Last [facility] referral date: 10/04/2023      ANÍBAL     Daniela was NOT contacted regarding therapeutic result today per home monitoring policy manage by exception agreement.   Current warfarin dose is to be continued:     Summary  As of 12/20/2023      Full warfarin instructions:  10 mg every Mon; 8 mg all other days   Next INR check:  12/27/2023             ?   Ambreen Romano RN  Anticoagulation Clinic  12/20/2023    _______________________________________________________________________     Anticoagulation Episode Summary       Current INR goal:  2.5-3.5   TTR:  84.0% (1 y)   Target end date:  Indefinite   Send INR reminders to:  [facility]    Indications    Blood clot of vein in shoulder area  left [I82.602]  Factor 5 Leiden mutation  heterozygous (H24) [D68.51]  Long-term (current) use of anticoagulants [Z79.01] [Z79.01]             Comments:  Has home INR machine weekly INR needed                Anticoagulation Care Providers       Provider Role Specialty Phone number    Ling Trammell PA-C Referring Family Medicine [phone]

## 2023-12-27 ENCOUNTER — ANTICOAGULATION THERAPY VISIT (OUTPATIENT)
Dept: ANTICOAGULATION | Facility: OTHER | Age: 53
End: 2023-12-27
Attending: PHYSICIAN ASSISTANT
Payer: COMMERCIAL

## 2023-12-27 DIAGNOSIS — I82.602 BLOOD CLOT OF VEIN IN SHOULDER AREA, LEFT: Primary | ICD-10-CM

## 2023-12-27 DIAGNOSIS — D68.51 FACTOR 5 LEIDEN MUTATION, HETEROZYGOUS (H): ICD-10-CM

## 2023-12-27 DIAGNOSIS — Z79.01 LONG TERM CURRENT USE OF ANTICOAGULANT THERAPY: ICD-10-CM

## 2023-12-27 LAB — INR (EXTERNAL): 3.9 (ref 0.9–1.1)

## 2023-12-27 NOTE — PROGRESS NOTES
ANTICOAGULATION MANAGEMENT     Daniela Ladd 53 year old female is on warfarin with supratherapeutic INR result. (Goal INR 2.5-3.5)    Recent labs: (last 7 days)     12/27/23  0817   INR 3.9*       ASSESSMENT     Source(s): Chart Review and Patient/Caregiver Call     Warfarin doses taken: Warfarin taken as instructed  Diet: No new diet changes identified  Medication/supplement changes: lots of tylenol and vitamin D  New illness, injury, or hospitalization: Yes: Covid pos  Signs or symptoms of bleeding or clotting: No  Previous result: Therapeutic last 2(+) visits  Additional findings: None       PLAN     Recommended plan for temporary change(s) affecting INR     Dosing Instructions: partial hold then continue your current warfarin dose with next INR in 1 week       Summary  As of 12/27/2023      Full warfarin instructions:  12/27: 6 mg; Otherwise 10 mg every Mon; 8 mg all other days   Next INR check:  1/3/2024               Telephone call with Daniela who verbalizes understanding and agrees to plan    Patient to recheck with home meter    Education provided:   None required    Plan made per [facility] anticoagulation protocol    Yoly Crowe, RN  Anticoagulation Clinic  12/27/2023    _______________________________________________________________________     Anticoagulation Episode Summary       Current INR goal:  2.5-3.5   TTR:  82.5% (1 y)   Target end date:  Indefinite   Send INR reminders to:  [facility]    Indications    Blood clot of vein in shoulder area  left [I82.602]  Factor 5 Leiden mutation  heterozygous (H24) [D68.51]  Long-term (current) use of anticoagulants [Z79.01] [Z79.01]             Comments:  Has home INR machine weekly INR needed                Anticoagulation Care Providers       Provider Role Specialty Phone number    Ling Trammell PA-C Referring Family Medicine [phone]

## 2023-12-31 DIAGNOSIS — Z79.01 LONG TERM CURRENT USE OF ANTICOAGULANT THERAPY: ICD-10-CM

## 2023-12-31 DIAGNOSIS — I82.622 ARM DVT (DEEP VENOUS THROMBOEMBOLISM), ACUTE, LEFT (H): ICD-10-CM

## 2024-01-02 RX ORDER — WARFARIN SODIUM 2 MG/1
TABLET ORAL
Qty: 416 TABLET | Refills: 1 | Status: SHIPPED | OUTPATIENT
Start: 2024-01-02 | End: 2024-06-27

## 2024-01-02 NOTE — TELEPHONE ENCOUNTER
ANTICOAGULATION MANAGEMENT:  Medication Refill    Anticoagulation Summary  As of 12/27/2023      Warfarin maintenance plan:  10 mg (2 mg x 5) every Mon; 8 mg (2 mg x 4) all other days   Next INR check:  1/3/2024   Target end date:  Indefinite    Indications    Blood clot of vein in shoulder area  left [I82.602]  Factor 5 Leiden mutation  heterozygous (H24) [D68.51]  Long-term (current) use of anticoagulants [Z79.01] [Z79.01]                 Anticoagulation Care Providers       Provider Role Specialty Phone number    Ling Trammell PA-C Referring Family Medicine [phone]            Refill Criteria    Visit with referring provider/group: Meets criteria: office visit within referring provider group in the last 1 year on 12/8/23    [facility] referral signed last signed: 10/04/2023; within last year: Yes    Lab monitoring not exceeding 2 weeks overdue: Yes    Daniela meets all criteria for refill. Rx instructions and quantity supplied updated to match patient's current dosing plan. Warfarin 90 day supply with 1 refill granted per [facility] protocol     Yoly Crowe, RN  Anticoagulation Clinic

## 2024-01-03 ENCOUNTER — ANTICOAGULATION THERAPY VISIT (OUTPATIENT)
Dept: ANTICOAGULATION | Facility: OTHER | Age: 54
End: 2024-01-03
Attending: PHYSICIAN ASSISTANT
Payer: COMMERCIAL

## 2024-01-03 DIAGNOSIS — I82.602 BLOOD CLOT OF VEIN IN SHOULDER AREA, LEFT: Primary | ICD-10-CM

## 2024-01-03 DIAGNOSIS — Z79.01 LONG TERM CURRENT USE OF ANTICOAGULANT THERAPY: ICD-10-CM

## 2024-01-03 DIAGNOSIS — D68.51 FACTOR 5 LEIDEN MUTATION, HETEROZYGOUS (H): ICD-10-CM

## 2024-01-03 LAB — INR (EXTERNAL): 3.5 (ref 0.9–1.1)

## 2024-01-03 NOTE — PROGRESS NOTES
ANTICOAGULATION MANAGEMENT     Daniela Ladd 53 year old female is on warfarin with therapeutic INR result. (Goal INR 2.5-3.5)    Recent labs: (last 7 days)     01/03/24  0754   INR 3.5*       ASSESSMENT     Source(s): Chart Review and Patient/Caregiver Call     Warfarin doses taken: Warfarin taken as instructed  Diet: No new diet changes identified  Medication/supplement changes: None noted  New illness, injury, or hospitalization: No  Signs or symptoms of bleeding or clotting: No  Previous result: Supratherapeutic  Additional findings: None       PLAN     Recommended plan for no diet, medication or health factor changes affecting INR     Dosing Instructions: Continue your current warfarin dose with next INR in 1 week       Summary  As of 1/3/2024      Full warfarin instructions:  10 mg every Mon; 8 mg all other days   Next INR check:  1/10/2024               Telephone call with Daniela who verbalizes understanding and agrees to plan    Patient to recheck with home meter    Education provided:   Resume manage by exception with home monitor. Continue to submit INR results to home monitor company.You will only be called when your result is out of range. Please call and notify [facility] if new medication started, dose missed, signs or symptoms of bleeding or clotting, or a surgery/procedure is scheduled.    Plan made per [facility] anticoagulation protocol    Yoly Crowe, RN  Anticoagulation Clinic  1/3/2024    _______________________________________________________________________     Anticoagulation Episode Summary       Current INR goal:  2.5-3.5   TTR:  80.6% (1 y)   Target end date:  Indefinite   Send INR reminders to:  [facility]    Indications    Blood clot of vein in shoulder area  left [I82.602]  Factor 5 Leiden mutation  heterozygous (H24) [D68.51]  Long-term (current) use of anticoagulants [Z79.01] [Z79.01]             Comments:  Has home INR machine weekly INR needed                 Anticoagulation Care Providers       Provider Role Specialty Phone number    Ling Trammell PA-C Referring Family Medicine [phone]

## 2024-01-10 ENCOUNTER — ANTICOAGULATION THERAPY VISIT (OUTPATIENT)
Dept: ANTICOAGULATION | Facility: OTHER | Age: 54
End: 2024-01-10
Attending: PHYSICIAN ASSISTANT
Payer: COMMERCIAL

## 2024-01-10 DIAGNOSIS — Z79.01 LONG TERM CURRENT USE OF ANTICOAGULANT THERAPY: ICD-10-CM

## 2024-01-10 DIAGNOSIS — I82.602 BLOOD CLOT OF VEIN IN SHOULDER AREA, LEFT: Primary | ICD-10-CM

## 2024-01-10 DIAGNOSIS — D68.51 FACTOR 5 LEIDEN MUTATION, HETEROZYGOUS (H): ICD-10-CM

## 2024-01-10 LAB — INR (EXTERNAL): 4.6 (ref 0.9–1.1)

## 2024-01-10 NOTE — PROGRESS NOTES
ANTICOAGULATION MANAGEMENT     Daniela Ladd 53 year old female is on warfarin with supratherapeutic INR result. (Goal INR 2.5-3.5)    Recent labs: (last 7 days)     01/10/24  0732   INR 4.6*       ASSESSMENT     Source(s): Chart Review and Patient/Caregiver Call     Warfarin doses taken: Warfarin taken as instructed  Diet: No new diet changes identified  Medication/supplement changes: taking high dose vitamin d 26758 unit(s) for 8 weekly doses than will decrease to 2000units  New illness, injury, or hospitalization: No  Signs or symptoms of bleeding or clotting: No  Previous result: Therapeutic last visit; previously outside of goal range  Additional findings: None       PLAN     Recommended plan for ongoing change(s) affecting INR     Dosing Instructions: hold dose then decrease your warfarin dose (3.4% change) with next INR in 1 week       Summary  As of 1/10/2024      Full warfarin instructions:  1/10: Hold; Otherwise 8 mg every day   Next INR check:  1/17/2024               Telephone call with Daniela who verbalizes understanding and agrees to plan    Patient to recheck with home meter    Education provided:   None required    Plan made per [facility] anticoagulation protocol    Yoly Crowe, RN  Anticoagulation Clinic  1/10/2024    _______________________________________________________________________     Anticoagulation Episode Summary       Current INR goal:  2.5-3.5   TTR:  78.7% (1 y)   Target end date:  Indefinite   Send INR reminders to:  [facility]    Indications    Blood clot of vein in shoulder area  left [I82.602]  Factor 5 Leiden mutation  heterozygous (H24) [D68.51]  Long-term (current) use of anticoagulants [Z79.01] [Z79.01]             Comments:  Has home INR machine weekly INR needed                Anticoagulation Care Providers       Provider Role Specialty Phone number    Ling Trammell PA-C Referring Family Medicine [phone]

## 2024-01-17 ENCOUNTER — ANTICOAGULATION THERAPY VISIT (OUTPATIENT)
Dept: ANTICOAGULATION | Facility: OTHER | Age: 54
End: 2024-01-17
Attending: PHYSICIAN ASSISTANT
Payer: COMMERCIAL

## 2024-01-17 DIAGNOSIS — I82.602 BLOOD CLOT OF VEIN IN SHOULDER AREA, LEFT: Primary | ICD-10-CM

## 2024-01-17 DIAGNOSIS — Z79.01 LONG TERM CURRENT USE OF ANTICOAGULANT THERAPY: ICD-10-CM

## 2024-01-17 DIAGNOSIS — D68.51 FACTOR 5 LEIDEN MUTATION, HETEROZYGOUS (H): ICD-10-CM

## 2024-01-17 LAB — INR (EXTERNAL): 2.8 (ref 0.9–1.1)

## 2024-01-17 NOTE — PROGRESS NOTES
ANTICOAGULATION MANAGEMENT     Daniela Ladd 53 year old female is on warfarin with therapeutic INR result. (Goal INR 2.5-3.5)    Recent labs: (last 7 days)     01/17/24  0736   INR 2.8*       ASSESSMENT     Source(s): Patient/ Care giver call     Warfarin doses taken: Warfarin taken as instructed  Diet: No new diet changes identified  New illness, injury, or hospitalization: No  Medication/supplement changes: None noted  Signs or symptoms of bleeding or clotting: No  Previous INR: Supratherapeutic  Additional findings: None     PLAN     Recommended plan for no diet, medication or health factor changes affecting INR     Dosing Instructions: Continue your current warfarin dose with next INR in 1 week       Summary  As of 1/17/2024      Full warfarin instructions:  8 mg every day   Next INR check:  1/24/2024               Telephone call with Daniela who verbalizes understanding and agrees to plan    Patient to recheck with home meter    Education provided: Please call back if any changes to your diet, medications or how you've been taking warfarin    Plan made per [facility] anticoagulation protocol    Ambreen Romano RN  Anticoagulation Clinic  1/17/2024    _______________________________________________________________________     Anticoagulation Episode Summary       Current INR goal:  2.5-3.5   TTR:  77.5% (1 y)   Target end date:  Indefinite   Send INR reminders to:  [facility]    Indications    Blood clot of vein in shoulder area  left [I82.602]  Factor 5 Leiden mutation  heterozygous (H24) [D68.51]  Long-term (current) use of anticoagulants [Z79.01] [Z79.01]             Comments:  Has home INR machine weekly INR needed                Anticoagulation Care Providers       Provider Role Specialty Phone number    Ling Trammell PA-C Referring Family Medicine [phone]

## 2024-01-24 ENCOUNTER — ANTICOAGULATION THERAPY VISIT (OUTPATIENT)
Dept: ANTICOAGULATION | Facility: OTHER | Age: 54
End: 2024-01-24
Attending: PHYSICIAN ASSISTANT
Payer: COMMERCIAL

## 2024-01-24 DIAGNOSIS — D68.51 FACTOR 5 LEIDEN MUTATION, HETEROZYGOUS (H): ICD-10-CM

## 2024-01-24 DIAGNOSIS — Z79.01 LONG TERM CURRENT USE OF ANTICOAGULANT THERAPY: ICD-10-CM

## 2024-01-24 DIAGNOSIS — I82.602 BLOOD CLOT OF VEIN IN SHOULDER AREA, LEFT: Primary | ICD-10-CM

## 2024-01-24 LAB — INR (EXTERNAL): 3 (ref 0.9–1.1)

## 2024-01-24 NOTE — PROGRESS NOTES
ANTICOAGULATION MANAGEMENT     Daniela Ladd 53 year old female is on warfarin with therapeutic INR result. (Goal INR 2.5-3.5)    Recent labs: (last 7 days)     01/24/24  0740   INR 3.0*       ASSESSMENT     Source(s): Patient/ Care giver call     Warfarin doses taken: Warfarin taken as instructed  Diet: No new diet changes identified  New illness, injury, or hospitalization: No  Medication/supplement changes: None noted  Signs or symptoms of bleeding or clotting: No  Previous INR: Therapeutic last visit; previously outside of goal range  Additional findings: None     PLAN     Recommended plan for no diet, medication or health factor changes affecting INR     Dosing Instructions: Continue your current warfarin dose with next INR in 1 week       Summary  As of 1/24/2024      Full warfarin instructions:  8 mg every day   Next INR check:  1/31/2024               Telephone call with Daniela who verbalizes understanding and agrees to plan    Patient to recheck with home meter    Education provided: Please call back if any changes to your diet, medications or how you've been taking warfarin and Resume manage by exception with home monitor. Continue to submit INR results to home monitor company.You will only be called when your result is out of range. Please call and notify [facility] if new medication started, dose missed, signs or symptoms of bleeding or clotting, or a surgery/procedure is scheduled.    Plan made per [facility] anticoagulation protocol    Ambreen Romano RN  Anticoagulation Clinic  1/24/2024    _______________________________________________________________________     Anticoagulation Episode Summary       Current INR goal:  2.5-3.5   TTR:  77.5% (1 y)   Target end date:  Indefinite   Send INR reminders to:  [facility]    Indications    Blood clot of vein in shoulder area  left [I82.602]  Factor 5 Leiden mutation  heterozygous (H24) [D68.51]  Long-term (current) use of anticoagulants [Z79.01]  [Z79.01]             Comments:  Has home INR machine weekly INR needed                Anticoagulation Care Providers       Provider Role Specialty Phone number    Ling Trammell PA-C Referring Family Medicine [phone]

## 2024-01-31 ENCOUNTER — OFFICE VISIT (OUTPATIENT)
Dept: OBGYN | Facility: OTHER | Age: 54
End: 2024-01-31
Attending: PHYSICIAN ASSISTANT
Payer: COMMERCIAL

## 2024-01-31 ENCOUNTER — ANTICOAGULATION THERAPY VISIT (OUTPATIENT)
Dept: ANTICOAGULATION | Facility: OTHER | Age: 54
End: 2024-01-31
Attending: PHYSICIAN ASSISTANT
Payer: COMMERCIAL

## 2024-01-31 VITALS
SYSTOLIC BLOOD PRESSURE: 124 MMHG | BODY MASS INDEX: 16.22 KG/M2 | DIASTOLIC BLOOD PRESSURE: 72 MMHG | HEART RATE: 102 BPM | WEIGHT: 102 LBS

## 2024-01-31 DIAGNOSIS — Z30.432 ENCOUNTER FOR IUD REMOVAL: Primary | ICD-10-CM

## 2024-01-31 DIAGNOSIS — Z79.01 LONG TERM CURRENT USE OF ANTICOAGULANT THERAPY: ICD-10-CM

## 2024-01-31 DIAGNOSIS — M54.50 ACUTE BILATERAL LOW BACK PAIN WITHOUT SCIATICA: ICD-10-CM

## 2024-01-31 DIAGNOSIS — R23.2 HOT FLASHES: ICD-10-CM

## 2024-01-31 DIAGNOSIS — I82.602 BLOOD CLOT OF VEIN IN SHOULDER AREA, LEFT: Primary | ICD-10-CM

## 2024-01-31 DIAGNOSIS — D68.51 FACTOR 5 LEIDEN MUTATION, HETEROZYGOUS (H): ICD-10-CM

## 2024-01-31 LAB — INR (EXTERNAL): 3.2 (ref 0.9–1.1)

## 2024-01-31 PROCEDURE — 58301 REMOVE INTRAUTERINE DEVICE: CPT | Performed by: NURSE PRACTITIONER

## 2024-01-31 PROCEDURE — 99213 OFFICE O/P EST LOW 20 MIN: CPT | Mod: 25 | Performed by: NURSE PRACTITIONER

## 2024-01-31 ASSESSMENT — PAIN SCALES - GENERAL: PAINLEVEL: NO PAIN (0)

## 2024-01-31 NOTE — PROGRESS NOTES
ANTICOAGULATION  MANAGEMENT-Home Monitor Managed by Exception    Daniela CRUZ Julee 53 year old female is on warfarin with therapeutic INR result. (Goal INR 2.5-3.5)    Recent labs: (last 7 days)     01/31/24  0748   INR 3.2*       Previous INR was Therapeutic  Medication, diet, health changes since last INR:chart reviewed; none identified  Contacted within the last 12 weeks by phone on 1/24/24  Last [facility] referral date: 10/04/2023      ANÍBAL Limel was NOT contacted regarding therapeutic result today per home monitoring policy manage by exception agreement.   Current warfarin dose is to be continued:     Summary  As of 1/31/2024      Full warfarin instructions:  8 mg every day   Next INR check:  2/7/2024             ?   Ambreen Romano RN  Anticoagulation Clinic  1/31/2024    _______________________________________________________________________     Anticoagulation Episode Summary       Current INR goal:  2.5-3.5   TTR:  77.5% (1 y)   Target end date:  Indefinite   Send INR reminders to:  [facility]    Indications    Blood clot of vein in shoulder area  left [I82.602]  Factor 5 Leiden mutation  heterozygous (H24) [D68.51]  Long-term (current) use of anticoagulants [Z79.01] [Z79.01]             Comments:  Has home INR machine weekly INR needed                Anticoagulation Care Providers       Provider Role Specialty Phone number    Ling Trammell PA-C Referring Family Medicine [phone]

## 2024-01-31 NOTE — NURSING NOTE
"Chief Complaint   Patient presents with    Consult   Patient is here for discuss IUD removal. Patient states that x 1 month ago, she moved in bed and felt a pain in back, and feels like maybe it has moved, Patient would like to have it removed. Patient states that she has had sufficient hot flashes since this happened. Patient has IUD due to bleeding with blood thinners.     Initial /72   Pulse 102   Wt 46.3 kg (102 lb)   BMI 16.22 kg/m   Estimated body mass index is 16.22 kg/m  as calculated from the following:    Height as of 12/8/23: 1.689 m (5' 6.5\").    Weight as of this encounter: 46.3 kg (102 lb).  Medication Reconciliation: complete        Adrianne Joseph LPN    "

## 2024-01-31 NOTE — PROGRESS NOTES
OBGYN OFFICE VISIT    Chief Complaint: Contraception      Mirena 2018   HPI:  Ms. Cardoza a 53year old G -0-1-1 who presents to clinic today to discuss Mirena IUD removal and further contraception options.    Patient had Mirena IUD inserted on 2018.  Patient with history of tubal ligation, IUD was placed for heavy periods that typically last 7 to 10 days with heavy bleeding, passing clots and cramps.  The Mirena IUD her periods improved, she did have a light to spotting.  Each month.  She states for a while now she has not had any vaginal bleeding, she thinks her last period was about 6 months ago but she is unsure exactly.    1 month ago she sat up in bed and twisted her body to grab a glass of water when she felt sharp pain in her low back.  This pain has persisted in her central low back.  She describes this as constant.  She questions if this may be a position change of her IUD.    The past month she is also had increase in her acne as well as hot flashes.  She is wondering about an over-the-counter medication called Veozah to help manage hot flashes.    Patient is history of factor V and is on warfarin therapy, INR today was 3.2.  He does have history of migraine headaches with aura      Past Medical History:  Past Medical History:   Diagnosis Date    Abdominal pain     2010    Contact dermatitis     Atrophic dermatitis    Encounter for insertion of mirena IUD 2018    Hidradenitis suppurativa     2010    Major depressive disorder, single episode     ,Situational related to marital discord    Nicotine dependence, uncomplicated     2010    Other acne     Facial acne    Personal history of other medical treatment (CODE)     10/00, III, para 1-0-2-1, vaginal delivery , two spontaneous first trimester miscarriage    Personal history of other medical treatment (CODE)     S/P cryotherapy Aug 2003.  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)    Raynaud's syndrome without gangrene      2010    Ventricular premature depolarization     10/25/2013       Past Surgical History:  Past Surgical History:   Procedure Laterality Date    ANKLE SURGERY Left     [place]    EXTRACTION(S) DENTAL      LAPAROSCOPIC TUBAL LIGATION  2004    SALPINGO-OOPHORECTOMY BILATERAL Right 2011    Planned Lap RSO    TONSILLECTOMY      1980s       Medications:  Current Outpatient Medications   Medication    acetaminophen (TYLENOL) 500 MG tablet    fluticasone (FLONASE) 50 MCG/ACT nasal spray    vitamin D2 (ERGOCALCIFEROL) 48775 units (1250 mcg) capsule    warfarin ANTICOAGULANT (COUMADIN) 2 MG tablet     No current facility-administered medications for this visit.       Allergies:     Allergies   Allergen Reactions    Amoxicillin-Pot Clavulanate Nausea    Adhesive Tape Itching and Rash    Cyclobenzaprine Rash    No Clinical Screening - See Comments Rash     Nicoderm patch       OB history:  OB History    Para Term  AB Living   3 1 1 0 1 1   SAB IAB Ectopic Multiple Live Births   1 0 0 0 1      # Outcome Date GA Lbr Steven/2nd Weight Sex Delivery Anes PTL Lv   3 Term 1899   3.374 kg (7 lb 7 oz) M Vag-Spont None N RYNE      Name: Orlando   2             1 SAB  8w0d              Gyn history:  LMP: unknown, no vaginal bleeding for about 6 months  Last pap smear 2022, NILM, HPV negative.    ROS:   Skin: negative for rash, bruising  Respiratory: No shortness of breath, dyspnea on exertion, cough, or hemoptysis  Cardiovascular: negative for palpitations, chest pain, lower extremity edema and syncope or near-syncope  Gastrointestinal: negative for, nausea, vomiting and hematemesis  Genitourinary: negative for, dysuria, frequency and urgency or incontinence  Musculoskeletal: Positive for back pain   Neurologic: negative for, headaches, syncope, seizures and local weakness    Exam  /72   Pulse 102   Wt 46.3 kg (102 lb)   BMI 16.22 kg/m    Gen: Well-appearing, no acute distressed,  well-groomed, alert  Ext: No LE edema, extremities warm and well perfused  Pelvic:  Normal appearing external female genitalia. Normal hair distribution. Vagina is without lesions with moist, pink ruggae. There is no vaginal discharge.  No lesions, no cervical motion tenderness.  Cervix intact, IUD strings visualized.  Uterus is mobile, non-tender. No adnexal tenderness or masses      Assessment & Plan:  Ms. Ladd is a 53 year old year old  here for Contraception counseling.    1. Encounter for IUD removal  2. Contraception education  - REMOVE INTRAUTERINE DEVICE  Patient is interested in IUD removal.  Review process of IUD removal, risks and benefits of removing Mirena IUD today.  Patient is agreeable to plan and consent is signed today.  Patient was assisted onto exam table, laying in supine lithotomy position.  Speculum inserted without difficulty, cervix visualized, IUD strings visualized.  No genitalia abnormality or red flags.  With ringed forceps IUD was removed without difficulty.  IUD was removed fully intact.  Patient is aware to monitor for any spotting.  She is aware that expected timeline for menses to return is about 1 to 2 months however may take longer.  She does have a tubal ligation contraception.  She knows to track any vaginal bleeding, and knows that to definition menopause is 12 years without any vaginal bleeding.  Patient does have history of factor V and is on long-term warfarin therapy, migraines with aura and is current smoker.  She is not a candidate for estrogen-containing contraception for period management.  Should patient have any return of heavy vaginal bleeding and unmanageable menses she would like to proceed with progesterone only minipill. We have reviewed this medication and how to properly take this today in clinic.  She will call or reach out via [employer] if she would like me to send in this prescription.    3. Hot flashes  Recent increase of hot flashes secondary to  perimenopause.  Patient is aware to wear layers to help reduce discomfort during hot flashes.  Reviewed conservative treat options to help manage hot flashes.  She is interested in over-the-counter Veozah to help with hot flashes.  There are no drug drug interactions with her current daily medications.  Advised her to monitor symptoms over the next 30 days after IUD removed, and if she does feel like she would like to start this medication she may do so.  She knows to monitor for any adverse side effects.    4. Acute bilateral low back pain without sciatica  Patient has been experiencing 1 month of persistent low back pain, no specific fall, trauma or injury.  After IUD removed, she states pain was improved.  She will monitor low back pain.  We discussed using ice, heat, Tylenol and ibuprofen as needed for discomfort.  If her pain continues to persist or worsen she knows to seek further evaluation.  Should she develop any lower extremity paresthesias, weakness, loss of bowel or bladder function she knows to go to the emergency department.    5. Long-term (current) use of anticoagulants [Z79.01]  History of factor V, on warfarin therapy.  She monitors her INR closely, INR today was 3.2.    -Return to clinic as needed with concerns or for Well-woman care.    ANÍBAL Trotter.P., R.N., APRN CNP on 1/31/2024 at 10:34 AM    Answers submitted by the patient for this visit:  General Questionnaire (Submitted on 1/30/2024)  Chief Complaint: Chronic problems general questions HPI Form  What is the reason for your visit today? : IUD removal. Possibily start the mini-pill?  How many servings of fruits and vegetables do you eat daily?: 2-3  On average, how many sweetened beverages do you drink each day (Examples: soda, juice, sweet tea, etc.  Do NOT count diet or artificially sweetened beverages)?: 0  How many minutes a day do you exercise enough to make your heart beat faster?: 10 to 19  How many days a week do you exercise  enough to make your heart beat faster?: 3 or less  How many days per week do you miss taking your medication?: 0

## 2024-02-07 ENCOUNTER — ANTICOAGULATION THERAPY VISIT (OUTPATIENT)
Dept: ANTICOAGULATION | Facility: OTHER | Age: 54
End: 2024-02-07
Attending: PHYSICIAN ASSISTANT
Payer: COMMERCIAL

## 2024-02-07 DIAGNOSIS — I82.602 BLOOD CLOT OF VEIN IN SHOULDER AREA, LEFT: Primary | ICD-10-CM

## 2024-02-07 DIAGNOSIS — D68.51 FACTOR 5 LEIDEN MUTATION, HETEROZYGOUS (H): ICD-10-CM

## 2024-02-07 DIAGNOSIS — Z79.01 LONG TERM CURRENT USE OF ANTICOAGULANT THERAPY: ICD-10-CM

## 2024-02-07 LAB — INR (EXTERNAL): 3.2 (ref 0.9–1.1)

## 2024-02-07 NOTE — PROGRESS NOTES
ANTICOAGULATION  MANAGEMENT-Home Monitor Managed by Exception    Daniela CRUZ Julee 53 year old female is on warfarin with therapeutic INR result. (Goal INR 2.5-3.5)    Recent labs: (last 7 days)     02/07/24  0520   INR 3.2*       Previous INR was Therapeutic  Medication, diet, health changes since last INR:chart reviewed; none identified  Contacted within the last 12 weeks by phone on 1/24/24  Last [facility] referral date: 10/04/2023      ANÍBAL Suarez was NOT contacted regarding therapeutic result today per home monitoring policy manage by exception agreement.   Current warfarin dose is to be continued:     Summary  As of 2/7/2024      Full warfarin instructions:  8 mg every day   Next INR check:  2/14/2024             ?   Ambreen Romano RN  Anticoagulation Clinic  2/7/2024    _______________________________________________________________________     Anticoagulation Episode Summary       Current INR goal:  2.5-3.5   TTR:  77.5% (1 y)   Target end date:  Indefinite   Send INR reminders to:  [facility]    Indications    Blood clot of vein in shoulder area  left [I82.602]  Factor 5 Leiden mutation  heterozygous (H24) [D68.51]  Long-term (current) use of anticoagulants [Z79.01] [Z79.01]             Comments:  Has home INR machine weekly INR needed                Anticoagulation Care Providers       Provider Role Specialty Phone number    Ling Trammell PA-C Referring Family Medicine [phone]

## 2024-02-14 ENCOUNTER — ANTICOAGULATION THERAPY VISIT (OUTPATIENT)
Dept: ANTICOAGULATION | Facility: OTHER | Age: 54
End: 2024-02-14
Attending: PHYSICIAN ASSISTANT
Payer: COMMERCIAL

## 2024-02-14 DIAGNOSIS — Z79.01 LONG TERM CURRENT USE OF ANTICOAGULANT THERAPY: ICD-10-CM

## 2024-02-14 DIAGNOSIS — D68.51 FACTOR 5 LEIDEN MUTATION, HETEROZYGOUS (H): ICD-10-CM

## 2024-02-14 DIAGNOSIS — I82.602 BLOOD CLOT OF VEIN IN SHOULDER AREA, LEFT: Primary | ICD-10-CM

## 2024-02-14 LAB — INR (EXTERNAL): 3 (ref 0.9–1.1)

## 2024-02-14 NOTE — PROGRESS NOTES
ANTICOAGULATION  MANAGEMENT-Home Monitor Managed by Exception    Daniela CRUZ Julee 53 year old female is on warfarin with therapeutic INR result. (Goal INR 2.5-3.5)    Recent labs: (last 7 days)     02/14/24  0459   INR 3.0*       Previous INR was Therapeutic  Medication, diet, health changes since last INR:chart reviewed; none identified  Contacted within the last 12 weeks by phone on 1/24/24  Last [facility] referral date: 10/04/2023      ANÍBAL Suarez was NOT contacted regarding therapeutic result today per home monitoring policy manage by exception agreement.   Current warfarin dose is to be continued:     Summary  As of 2/14/2024      Full warfarin instructions:  8 mg every day   Next INR check:  2/21/2024             ?   Ambreen Romano RN  Anticoagulation Clinic  2/14/2024    _______________________________________________________________________     Anticoagulation Episode Summary       Current INR goal:  2.5-3.5   TTR:  77.5% (1 y)   Target end date:  Indefinite   Send INR reminders to:  [facility]    Indications    Blood clot of vein in shoulder area  left [I82.602]  Factor 5 Leiden mutation  heterozygous (H24) [D68.51]  Long-term (current) use of anticoagulants [Z79.01] [Z79.01]             Comments:  Has home INR machine weekly INR needed                Anticoagulation Care Providers       Provider Role Specialty Phone number    Ling Trammell PA-C Referring Family Medicine [phone]

## 2024-02-21 ENCOUNTER — ANTICOAGULATION THERAPY VISIT (OUTPATIENT)
Dept: ANTICOAGULATION | Facility: OTHER | Age: 54
End: 2024-02-21
Attending: PHYSICIAN ASSISTANT
Payer: COMMERCIAL

## 2024-02-21 DIAGNOSIS — Z79.01 LONG TERM CURRENT USE OF ANTICOAGULANT THERAPY: ICD-10-CM

## 2024-02-21 DIAGNOSIS — I82.602 BLOOD CLOT OF VEIN IN SHOULDER AREA, LEFT: Primary | ICD-10-CM

## 2024-02-21 DIAGNOSIS — D68.51 FACTOR 5 LEIDEN MUTATION, HETEROZYGOUS (H): ICD-10-CM

## 2024-02-21 LAB — INR POINT OF CARE: 3.3 (ref 0.9–1.1)

## 2024-02-21 PROCEDURE — 85610 PROTHROMBIN TIME: CPT | Mod: QW

## 2024-02-21 PROCEDURE — 36416 COLLJ CAPILLARY BLOOD SPEC: CPT

## 2024-02-21 NOTE — PROGRESS NOTES
ANTICOAGULATION  MANAGEMENT-Home Monitor Managed by Exception    Daniela Crumpartney 53 year old female is on warfarin with therapeutic INR result. (Goal INR 2.5-3.5)    Recent labs: (last 7 days)     02/21/24  0417   INR 3.3*       Previous INR was Therapeutic  Medication, diet, health changes since last INR:chart reviewed; none identified  Contacted within the last 12 weeks by phone on 1/24/2024  Last [facility] referral date: 10/04/2023      ANÍBAL Suarez was NOT contacted regarding therapeutic result today per home monitoring policy manage by exception agreement.   Current warfarin dose is to be continued:     Summary  As of 2/21/2024      Full warfarin instructions:  8 mg every day   Next INR check:               ?   Janelle Cameron RN  Anticoagulation Clinic  2/21/2024    _______________________________________________________________________     Anticoagulation Episode Summary       Current INR goal:  2.5-3.5   TTR:  77.5% (1 y)   Target end date:  Indefinite   Send INR reminders to:  [facility]    Indications    Blood clot of vein in shoulder area  left [I82.602]  Factor 5 Leiden mutation  heterozygous (H24) [D68.51]  Long-term (current) use of anticoagulants [Z79.01] [Z79.01]             Comments:  Has home INR machine weekly INR needed                Anticoagulation Care Providers       Provider Role Specialty Phone number    Ling Trammell PA-C Referring Family Medicine [phone]

## 2024-02-26 ENCOUNTER — OFFICE VISIT (OUTPATIENT)
Dept: FAMILY MEDICINE | Facility: OTHER | Age: 54
End: 2024-02-26
Attending: PHYSICIAN ASSISTANT
Payer: COMMERCIAL

## 2024-02-26 VITALS
DIASTOLIC BLOOD PRESSURE: 82 MMHG | TEMPERATURE: 96.9 F | SYSTOLIC BLOOD PRESSURE: 124 MMHG | HEART RATE: 85 BPM | BODY MASS INDEX: 30.64 KG/M2 | RESPIRATION RATE: 16 BRPM | OXYGEN SATURATION: 98 % | HEIGHT: 67 IN | WEIGHT: 195.2 LBS

## 2024-02-26 DIAGNOSIS — J01.00 ACUTE NON-RECURRENT MAXILLARY SINUSITIS: Primary | ICD-10-CM

## 2024-02-26 PROCEDURE — 99213 OFFICE O/P EST LOW 20 MIN: CPT | Performed by: PHYSICIAN ASSISTANT

## 2024-02-26 RX ORDER — CEFDINIR 300 MG/1
300 CAPSULE ORAL 2 TIMES DAILY
Qty: 14 CAPSULE | Refills: 0 | Status: SHIPPED | OUTPATIENT
Start: 2024-02-26 | End: 2024-03-04

## 2024-02-26 RX ORDER — CHOLECALCIFEROL (VITAMIN D3) 50 MCG
2 TABLET ORAL DAILY
COMMUNITY
Start: 2024-01-01

## 2024-02-26 ASSESSMENT — PAIN SCALES - GENERAL: PAINLEVEL: MILD PAIN (2)

## 2024-02-26 NOTE — PATIENT INSTRUCTIONS
Sinus infection - Antibiotic has been sent to pharmacy. Please take full course of antibiotic even if symptoms have completely resolved. This helps prevent against antibiotic resistance.     May use symptomatic care with tylenol. May use cough syrup or cough drops. Using a humidifier works well to break up the congestion. You can also sleep propped up on a couple pillows to decrease symptoms at night.     Use a Neti Pot/sinusflush (Darin Med Sinus Rinse) 3 times daily to irrigate sinuses/mucosal tissue.     Please take tylenol as needed up to 4 times daily.  Treat symptomatically with warm salt water gargles.   Frequent swallows of cool liquid.  Oatmeal coats the throat and some patients find it soothes the pain.     Monitor for any fevers or chills. Return in 7-10 days if not feeling better. Please call clinic with any questions or concerns. Please take in a lot of fluids and get rest.     You will need to be evaluated if you start to experience:  Fever higher than 102.5 F (39.2 C)   Sudden and severe pain in the face and head   Trouble seeing or seeing double   Trouble thinking clearly   Swelling or redness around 1 or both eyes   Trouble breathing or a stiff neck    * If you are a smoker, try to quit *    Call 9-1-1 or go to the emergency room if you:  Have trouble breathing   Are drooling because you cannot swallow your saliva   Have swelling of the neck or tongue   Cannot move your neck or have trouble opening your mouth

## 2024-02-26 NOTE — PROGRESS NOTES
Assessment & Plan   Problem List Items Addressed This Visit    None  Visit Diagnoses       Acute non-recurrent maxillary sinusitis    -  Primary    Relevant Medications    cefdinir (OMNICEF) 300 MG capsule           Acute sinusitis: Patient was given cefdinir for treatment of her sinus infection.  Can continue to use over-the-counter cough and cold remedies as needed for symptomatic relief.  Increase fluids with electrolytes and rest.  Encouraged close follow-up if symptoms or not improving or worsening.    Prescription drug management       Nicotine/Tobacco Cessation  She reports that she has been smoking cigarettes. She started smoking about 39 years ago. She has a 37 pack-year smoking history. She has never used smokeless tobacco.  Nicotine/Tobacco Cessation Plan  Information offered: Patient not interested at this time      See Patient Instructions    Return if symptoms worsen or fail to improve.      Rolando Suarez is a 53 year old, presenting for the following health issues:  Possible Sinus Infection         2/26/2024    10:01 AM   Additional Questions   Roomed by Caroline   Accompanied by self     History of Present Illness       Reason for visit:  Sinus/Ear infection. Chest congestion  Symptom onset:  1-2 weeks ago  Symptoms include:  Sinus and ear pressure. Cough that doesnt produce much.  Symptom intensity:  Moderate  Symptom progression:  Worsening  Had these symptoms before:  Yes  Has tried/received treatment for these symptoms:  Yes  Previous treatment was successful:  Yes  Prior treatment description:  Antibiotics  What makes it worse:  Not taking mucinex dm twice a day  What makes it better:  Taling mucinex dm and tylenol. But symptoms started worsening yesteday.    She eats 2-3 servings of fruits and vegetables daily.She consumes 0 sweetened beverage(s) daily.She exercises with enough effort to increase her heart rate 10 to 19 minutes per day.  She exercises with enough effort to increase her  "heart rate 4 days per week.   She is taking medications regularly.     Patient has been having cold symptoms since 2/9.  Has a runny nose and cough.  Symptoms worsened yesterday.  Cheeks have been hurting with the sinuses.  Ear pain.  Having sinus congestion and drainage.  Not able to cough up phlegm.  Using Mucinex as needed.  Using Tylenol, Maynard pot, saline rinse, and Vicks.  No fevers, chills.  Little wheezing and rattling in the chest along with her cough.  Keeping food and fluids down.  No dehydration concerns.      Review of Systems  Constitutional, HEENT, cardiovascular, pulmonary, gi and gu systems are negative, except as otherwise noted.      Objective    /82   Pulse 85   Temp 96.9  F (36.1  C) (Tympanic)   Resp 16   Ht 1.689 m (5' 6.5\")   Wt 88.5 kg (195 lb 3.2 oz)   LMP  (LMP Unknown)   SpO2 98%   BMI 31.03 kg/m    Body mass index is 31.03 kg/m .  Physical Exam  Vitals and nursing note reviewed.   Constitutional:       Appearance: Normal appearance.   HENT:      Head: Normocephalic and atraumatic.      Right Ear: Tympanic membrane, ear canal and external ear normal.      Left Ear: Tympanic membrane, ear canal and external ear normal.      Mouth/Throat:      Mouth: Mucous membranes are moist.      Pharynx: Oropharynx is clear. No oropharyngeal exudate or posterior oropharyngeal erythema.      Comments: Sinus pain with palpation of the maxillary sinuses.  Cardiovascular:      Rate and Rhythm: Normal rate and regular rhythm.      Heart sounds: Normal heart sounds.   Pulmonary:      Effort: Pulmonary effort is normal.      Breath sounds: Normal breath sounds. No wheezing or rales.   Musculoskeletal:         General: Normal range of motion.      Cervical back: Normal range of motion and neck supple.   Lymphadenopathy:      Cervical: No cervical adenopathy.   Skin:     General: Skin is warm and dry.   Neurological:      General: No focal deficit present.      Mental Status: She is alert. "   Psychiatric:         Mood and Affect: Mood normal.         Behavior: Behavior normal.              Signed Electronically by: Ling Trammell PA-C

## 2024-02-26 NOTE — NURSING NOTE
"Chief Complaint   Patient presents with    Possible Sinus Infection      Patient presents to the clinic for a possible sinus infection with symptoms (cough, runny nose, pressure on ears) that have been going on since the 9th of February.   Initial /82   Pulse 85   Temp 96.9  F (36.1  C) (Tympanic)   Resp 16   Ht 1.689 m (5' 6.5\")   Wt 88.5 kg (195 lb 3.2 oz)   LMP  (LMP Unknown)   SpO2 98%   BMI 31.03 kg/m   Estimated body mass index is 31.03 kg/m  as calculated from the following:    Height as of this encounter: 1.689 m (5' 6.5\").    Weight as of this encounter: 88.5 kg (195 lb 3.2 oz).  Medication Review: complete    The next two questions are to help us understand your food security.  If you are feeling you need any assistance in this area, we have resources available to support you today.          2/26/2024   SDOH- Food Insecurity   Within the past 12 months, did you worry that your food would run out before you got money to buy more? N   Within the past 12 months, did the food you bought just not last and you didn t have money to get more? N         Health Care Directive:  Patient does not have a Health Care Directive or Living Will: Discussed advance care planning with patient; information given to patient to review.    Caroline Abbasi      "
no fever/no chills

## 2024-02-28 ENCOUNTER — ANTICOAGULATION THERAPY VISIT (OUTPATIENT)
Dept: ANTICOAGULATION | Facility: OTHER | Age: 54
End: 2024-02-28
Attending: PHYSICIAN ASSISTANT
Payer: COMMERCIAL

## 2024-02-28 DIAGNOSIS — D68.51 FACTOR 5 LEIDEN MUTATION, HETEROZYGOUS (H): ICD-10-CM

## 2024-02-28 DIAGNOSIS — I82.602 BLOOD CLOT OF VEIN IN SHOULDER AREA, LEFT: Primary | ICD-10-CM

## 2024-02-28 DIAGNOSIS — Z79.01 LONG TERM CURRENT USE OF ANTICOAGULANT THERAPY: ICD-10-CM

## 2024-02-28 LAB — INR (EXTERNAL): 3.4 (ref 0.9–1.1)

## 2024-03-06 ENCOUNTER — ANTICOAGULATION THERAPY VISIT (OUTPATIENT)
Dept: ANTICOAGULATION | Facility: OTHER | Age: 54
End: 2024-03-06
Attending: PHYSICIAN ASSISTANT
Payer: COMMERCIAL

## 2024-03-06 DIAGNOSIS — Z79.01 LONG TERM CURRENT USE OF ANTICOAGULANT THERAPY: ICD-10-CM

## 2024-03-06 DIAGNOSIS — I82.602 BLOOD CLOT OF VEIN IN SHOULDER AREA, LEFT: Primary | ICD-10-CM

## 2024-03-06 DIAGNOSIS — D68.51 FACTOR 5 LEIDEN MUTATION, HETEROZYGOUS (H): ICD-10-CM

## 2024-03-06 LAB — INR (EXTERNAL): 3.1 (ref 0.9–1.1)

## 2024-03-06 NOTE — PROGRESS NOTES
ANTICOAGULATION  MANAGEMENT-Home Monitor Managed by Exception    Daniela NANCY Ladd 53 year old female is on warfarin with therapeutic INR result. (Goal INR 2.5-3.5)    Recent labs: (last 7 days)     03/06/24  0821   INR 3.1*       Previous INR was Therapeutic  Medication, diet, health changes since last INR:chart reviewed; none identified  Contacted within the last 12 weeks by phone on 1/24/24  Last [facility] referral date: 10/04/2023      ANÍBAL     Daniela was NOT contacted regarding therapeutic result today per home monitoring policy manage by exception agreement.   Current warfarin dose is to be continued:     Summary  As of 3/6/2024      Full warfarin instructions:  8 mg every day   Next INR check:  3/13/2024             ?   Yoly Crowe, RN  Anticoagulation Clinic  3/6/2024    _______________________________________________________________________     Anticoagulation Episode Summary       Current INR goal:  2.5-3.5   TTR:  77.5% (1 y)   Target end date:  Indefinite   Send INR reminders to:  [facility]    Indications    Blood clot of vein in shoulder area  left [I82.602]  Factor 5 Leiden mutation  heterozygous (H24) [D68.51]  Long-term (current) use of anticoagulants [Z79.01] [Z79.01]             Comments:  Has home INR machine weekly INR needed                Anticoagulation Care Providers       Provider Role Specialty Phone number    Ling Trammell PA-C Referring Family Medicine [phone]

## 2024-03-13 ENCOUNTER — ANTICOAGULATION THERAPY VISIT (OUTPATIENT)
Dept: ANTICOAGULATION | Facility: OTHER | Age: 54
End: 2024-03-13
Attending: PHYSICIAN ASSISTANT
Payer: COMMERCIAL

## 2024-03-13 ENCOUNTER — MYC MEDICAL ADVICE (OUTPATIENT)
Dept: FAMILY MEDICINE | Facility: OTHER | Age: 54
End: 2024-03-13

## 2024-03-13 DIAGNOSIS — I82.602 BLOOD CLOT OF VEIN IN SHOULDER AREA, LEFT: Primary | ICD-10-CM

## 2024-03-13 DIAGNOSIS — Z79.01 LONG TERM CURRENT USE OF ANTICOAGULANT THERAPY: ICD-10-CM

## 2024-03-13 DIAGNOSIS — D68.51 FACTOR 5 LEIDEN MUTATION, HETEROZYGOUS (H): ICD-10-CM

## 2024-03-13 DIAGNOSIS — M79.672 LEFT FOOT PAIN: Primary | ICD-10-CM

## 2024-03-13 LAB — INR (EXTERNAL): 3.4 (ref 0.9–1.1)

## 2024-03-13 NOTE — PROGRESS NOTES
ANTICOAGULATION  MANAGEMENT-Home Monitor Managed by Exception    Daniela NANCY Ladd 53 year old female is on warfarin with therapeutic INR result. (Goal INR 2.5-3.5)    Recent labs: (last 7 days)     03/13/24  0759   INR 3.4*       Previous INR was Therapeutic  Medication, diet, health changes since last INR:chart reviewed; none identified  Contacted within the last 12 weeks by phone on 1/24/24  Last [facility] referral date: 10/04/2023      ANÍBAL     Daniela was NOT contacted regarding therapeutic result today per home monitoring policy manage by exception agreement.   Current warfarin dose is to be continued:     Summary  As of 3/13/2024      Full warfarin instructions:  8 mg every day   Next INR check:  3/20/2024             ?   Yoly Crowe, RN  Anticoagulation Clinic  3/13/2024    _______________________________________________________________________     Anticoagulation Episode Summary       Current INR goal:  2.5-3.5   TTR:  77.5% (1 y)   Target end date:  Indefinite   Send INR reminders to:  [facility]    Indications    Blood clot of vein in shoulder area  left [I82.602]  Factor 5 Leiden mutation  heterozygous (H24) [D68.51]  Long-term (current) use of anticoagulants [Z79.01] [Z79.01]             Comments:  Has home INR machine weekly INR needed                Anticoagulation Care Providers       Provider Role Specialty Phone number    Ling Trammell PA-C Referring Family Medicine [phone]

## 2024-03-15 NOTE — TELEPHONE ENCOUNTER
Left foot pain noted in 12/8/2023 OV with Valencia Trammell.     There isn't much info in note from 12/8/23 about foot.  Do you know what type of referral this would be?  Ortho?  Or should she follow up in clinic with you for imaging and a referral if needed?    Siobhan Matamoros RN on 3/15/2024 at 3:59 PM

## 2024-03-19 ENCOUNTER — TELEPHONE (OUTPATIENT)
Dept: FAMILY MEDICINE | Facility: OTHER | Age: 54
End: 2024-03-19
Payer: COMMERCIAL

## 2024-03-19 NOTE — TELEPHONE ENCOUNTER
Patient called and stated she was waiting to hear back about a referral that was supposed to be placed for orthopedics. There is currently no referral in the system for patient.  Nury Jordan on 3/19/2024 at 11:08 AM

## 2024-03-19 NOTE — TELEPHONE ENCOUNTER
I placed the ortho referral.  It was accidentally put in as a foot xray instead of a foot ortho referral.   Ling Trammell PA-C ..................3/19/2024 11:15 AM

## 2024-03-20 ENCOUNTER — ANTICOAGULATION THERAPY VISIT (OUTPATIENT)
Dept: ANTICOAGULATION | Facility: OTHER | Age: 54
End: 2024-03-20
Attending: PHYSICIAN ASSISTANT
Payer: COMMERCIAL

## 2024-03-20 DIAGNOSIS — Z79.01 LONG TERM CURRENT USE OF ANTICOAGULANT THERAPY: ICD-10-CM

## 2024-03-20 DIAGNOSIS — I82.602 BLOOD CLOT OF VEIN IN SHOULDER AREA, LEFT: Primary | ICD-10-CM

## 2024-03-20 DIAGNOSIS — D68.51 FACTOR 5 LEIDEN MUTATION, HETEROZYGOUS (H): ICD-10-CM

## 2024-03-20 LAB — INR (EXTERNAL): 2.6 (ref 0.9–1.1)

## 2024-03-20 NOTE — PROGRESS NOTES
ANTICOAGULATION  MANAGEMENT-Home Monitor Managed by Exception    Daniela NANCY Ladd 53 year old female is on warfarin with therapeutic INR result. (Goal INR 2.5-3.5)    Recent labs: (last 7 days)     03/20/24  0520   INR 2.6*       Previous INR was Therapeutic  Medication, diet, health changes since last INR:chart reviewed; none identified  Contacted within the last 12 weeks by phone on 1/24/24  Last [facility] referral date: 10/04/2023      ANÍBAL     Daniela was NOT contacted regarding therapeutic result today per home monitoring policy manage by exception agreement.   Current warfarin dose is to be continued:     Summary  As of 3/20/2024      Full warfarin instructions:  8 mg every day   Next INR check:  3/27/2024             ?   Yoly Crowe, RN  Anticoagulation Clinic  3/20/2024    _______________________________________________________________________     Anticoagulation Episode Summary       Current INR goal:  2.5-3.5   TTR:  77.5% (1 y)   Target end date:  Indefinite   Send INR reminders to:  [facility]    Indications    Blood clot of vein in shoulder area  left [I82.602]  Factor 5 Leiden mutation  heterozygous (H24) [D68.51]  Long-term (current) use of anticoagulants [Z79.01] [Z79.01]             Comments:  Has home INR machine weekly INR needed                Anticoagulation Care Providers       Provider Role Specialty Phone number    Ling Trammell PA-C Referring Family Medicine [phone]

## 2024-03-27 ENCOUNTER — ANTICOAGULATION THERAPY VISIT (OUTPATIENT)
Dept: ANTICOAGULATION | Facility: OTHER | Age: 54
End: 2024-03-27
Attending: PHYSICIAN ASSISTANT
Payer: COMMERCIAL

## 2024-03-27 DIAGNOSIS — Z79.01 LONG TERM CURRENT USE OF ANTICOAGULANT THERAPY: ICD-10-CM

## 2024-03-27 DIAGNOSIS — I82.602 BLOOD CLOT OF VEIN IN SHOULDER AREA, LEFT: Primary | ICD-10-CM

## 2024-03-27 DIAGNOSIS — D68.51 FACTOR 5 LEIDEN MUTATION, HETEROZYGOUS (H): ICD-10-CM

## 2024-03-27 LAB — INR (EXTERNAL): 2.4 (ref 0.9–1.1)

## 2024-03-27 NOTE — PROGRESS NOTES
ANTICOAGULATION MANAGEMENT     Daniela Ladd 53 year old female is on warfarin with subtherapeutic INR result. (Goal INR 2.5-3.5)    Recent labs: (last 7 days)     03/27/24  0530   INR 2.4*       ASSESSMENT     Source(s): Chart Review and Patient/Caregiver Call     Warfarin doses taken: Warfarin taken as instructed  Diet: No new diet changes identified  Medication/supplement changes: None noted  New illness, injury, or hospitalization: No  Signs or symptoms of bleeding or clotting: No  Previous result: Therapeutic last 2(+) visits  Additional findings: None       PLAN     Recommended plan for no diet, medication or health factor changes affecting INR     Dosing Instructions: booster dose then continue your current warfarin dose with next INR in 1 week       Summary  As of 3/27/2024      Full warfarin instructions:  8 mg every day   Next INR check:  4/3/2024               Telephone call with Daniela who verbalizes understanding and agrees to plan    Patient to recheck with home meter    Education provided:   None required    Plan made per [facility] anticoagulation protocol    Yoly Crowe, RN  Anticoagulation Clinic  3/27/2024    _______________________________________________________________________     Anticoagulation Episode Summary       Current INR goal:  2.5-3.5   TTR:  76.6% (1 y)   Target end date:  Indefinite   Send INR reminders to:  [facility]    Indications    Blood clot of vein in shoulder area  left [I82.602]  Factor 5 Leiden mutation  heterozygous (H24) [D68.51]  Long-term (current) use of anticoagulants [Z79.01] [Z79.01]             Comments:  Has home INR machine weekly INR needed                Anticoagulation Care Providers       Provider Role Specialty Phone number    Valeri, Ling Rodriguez PA-C Referring Family Medicine [phone]

## 2024-04-03 ENCOUNTER — ANTICOAGULATION THERAPY VISIT (OUTPATIENT)
Dept: ANTICOAGULATION | Facility: OTHER | Age: 54
End: 2024-04-03
Attending: PHYSICIAN ASSISTANT
Payer: COMMERCIAL

## 2024-04-03 DIAGNOSIS — I82.602 BLOOD CLOT OF VEIN IN SHOULDER AREA, LEFT: Primary | ICD-10-CM

## 2024-04-03 DIAGNOSIS — Z79.01 LONG TERM CURRENT USE OF ANTICOAGULANT THERAPY: ICD-10-CM

## 2024-04-03 DIAGNOSIS — D68.51 FACTOR 5 LEIDEN MUTATION, HETEROZYGOUS (H): ICD-10-CM

## 2024-04-03 LAB — INR (EXTERNAL): 2.4 (ref 0.9–1.1)

## 2024-04-03 NOTE — PROGRESS NOTES
ANTICOAGULATION MANAGEMENT     Daniela Ladd 53 year old female is on warfarin with subtherapeutic INR result. (Goal INR 2.5-3.5)    Recent labs: (last 7 days)     04/03/24  0804   INR 2.4*       ASSESSMENT     Source(s): Chart Review and Patient/Caregiver Call     Warfarin doses taken: Warfarin taken as instructed  Diet: No new diet changes identified  Medication/supplement changes: None noted  New illness, injury, or hospitalization: No  Signs or symptoms of bleeding or clotting: No  Previous result: Subtherapeutic  Additional findings: None       PLAN     Recommended plan for ongoing change(s) affecting INR     Dosing Instructions: booster dose then Increase your warfarin dose (3.6% change) with next INR in 1 week       Summary  As of 4/3/2024      Full warfarin instructions:  4/3: 12 mg; Otherwise 10 mg every Wed; 8 mg all other days   Next INR check:  4/10/2024               Telephone call with Daniela who verbalizes understanding and agrees to plan    Patient to recheck with home meter    Education provided:   None required    Plan made per [facility] anticoagulation protocol    Yoly Crowe, RN  Anticoagulation Clinic  4/3/2024    _______________________________________________________________________     Anticoagulation Episode Summary       Current INR goal:  2.5-3.5   TTR:  75.6% (1 y)   Target end date:  Indefinite   Send INR reminders to:  [facility]    Indications    Blood clot of vein in shoulder area  left [I82.602]  Factor 5 Leiden mutation  heterozygous (H24) [D68.51]  Long-term (current) use of anticoagulants [Z79.01] [Z79.01]             Comments:  Has home INR machine weekly INR needed                Anticoagulation Care Providers       Provider Role Specialty Phone number    Ling Trammell PA-C Referring Family Medicine [phone]

## 2024-04-10 ENCOUNTER — ANTICOAGULATION THERAPY VISIT (OUTPATIENT)
Dept: ANTICOAGULATION | Facility: OTHER | Age: 54
End: 2024-04-10
Attending: PHYSICIAN ASSISTANT
Payer: COMMERCIAL

## 2024-04-10 DIAGNOSIS — Z79.01 LONG TERM CURRENT USE OF ANTICOAGULANT THERAPY: ICD-10-CM

## 2024-04-10 DIAGNOSIS — D68.51 FACTOR 5 LEIDEN MUTATION, HETEROZYGOUS (H): ICD-10-CM

## 2024-04-10 DIAGNOSIS — I82.602 BLOOD CLOT OF VEIN IN SHOULDER AREA, LEFT: Primary | ICD-10-CM

## 2024-04-10 LAB — INR (EXTERNAL): 2.3 (ref 0.9–1.1)

## 2024-04-10 NOTE — PROGRESS NOTES
ANTICOAGULATION MANAGEMENT     Daniela Ladd 53 year old female is on warfarin with therapeutic INR result. (Goal INR 2.5-3.5)    Recent labs: (last 7 days)     04/10/24  0520   INR 2.3*       ASSESSMENT     Source(s): Patient/ Care giver call     Warfarin doses taken: Warfarin taken as instructed  Diet: No new diet changes identified  New illness, injury, or hospitalization: No  Medication/supplement changes: None noted  Signs or symptoms of bleeding or clotting: No  Previous INR: Therapeutic last 2(+) visits  Additional findings: None     PLAN     Recommended plan for no diet, medication or health factor changes affecting INR     Dosing Instructions: booster dose then Increase your warfarin dose (6.9% change) with next INR in 1 week       Summary  As of 4/10/2024      Full warfarin instructions:  4/10: 12 mg; Otherwise 10 mg every Mon, Wed, Fri; 8 mg all other days   Next INR check:  4/17/2024               Telephone call with Daniela who verbalizes understanding and agrees to plan    Patient to recheck with home meter    Education provided: Please call back if any changes to your diet, medications or how you've been taking warfarin    Plan made per [facility] anticoagulation protocol    Hawa Lechuga, RN  Anticoagulation Clinic  4/10/2024    _______________________________________________________________________     Anticoagulation Episode Summary       Current INR goal:  2.5-3.5   TTR:  73.7% (1 y)   Target end date:  Indefinite   Send INR reminders to:  [facility]    Indications    Blood clot of vein in shoulder area  left [I82.602]  Factor 5 Leiden mutation  heterozygous (H24) [D68.51]  Long-term (current) use of anticoagulants [Z79.01] [Z79.01]             Comments:  Has home INR machine weekly INR needed                Anticoagulation Care Providers       Provider Role Specialty Phone number    Valeri, Ling Rodriguez PA-C Referring Family Medicine [phone]

## 2024-04-17 ENCOUNTER — ANTICOAGULATION THERAPY VISIT (OUTPATIENT)
Dept: ANTICOAGULATION | Facility: OTHER | Age: 54
End: 2024-04-17
Attending: PHYSICIAN ASSISTANT
Payer: COMMERCIAL

## 2024-04-17 DIAGNOSIS — D68.51 FACTOR 5 LEIDEN MUTATION, HETEROZYGOUS (H): ICD-10-CM

## 2024-04-17 DIAGNOSIS — I82.602 BLOOD CLOT OF VEIN IN SHOULDER AREA, LEFT: Primary | ICD-10-CM

## 2024-04-17 DIAGNOSIS — Z79.01 LONG TERM CURRENT USE OF ANTICOAGULANT THERAPY: ICD-10-CM

## 2024-04-17 LAB — INR (EXTERNAL): 2.5 (ref 0.9–1.1)

## 2024-04-17 NOTE — PROGRESS NOTES
ANTICOAGULATION MANAGEMENT     Danielamariusz Ladd 53 year old female is on warfarin with therapeutic INR result. (Goal INR 2.5-3.5)    Recent labs: (last 7 days)     04/17/24  0515   INR 2.5*       ASSESSMENT     Source(s): Patient/ Care giver call     Warfarin doses taken: Warfarin taken as instructed  Diet: No new diet changes identified  New illness, injury, or hospitalization: No  Medication/supplement changes: None noted  Signs or symptoms of bleeding or clotting: No  Previous INR: Subtherapeutic  Additional findings: None     PLAN     Recommended plan for no diet, medication or health factor changes affecting INR     Dosing Instructions: booster dose then continue  your warfarin dose (0% change) with next INR in 1 week       Summary  As of 4/17/2024      Full warfarin instructions:  4/17: 12 mg; Otherwise 10 mg every Mon, Wed, Fri; 8 mg all other days   Next INR check:  4/24/2024               Telephone call with Daniela who verbalizes understanding and agrees to plan and who agrees to plan and repeated back plan correctly    Patient to recheck with home meter    Education provided: Please call back if any changes to your diet, medications or how you've been taking warfarin    Plan made per [facility] anticoagulation protocol    Argelia Laird RN  Anticoagulation Clinic  4/17/2024    _______________________________________________________________________     Anticoagulation Episode Summary       Current INR goal:  2.5-3.5   TTR:  71.8% (1 y)   Target end date:  Indefinite   Send INR reminders to:  [facility]    Indications    Blood clot of vein in shoulder area  left [I82.602]  Factor 5 Leiden mutation  heterozygous (H24) [D68.51]  Long-term (current) use of anticoagulants [Z79.01] [Z79.01]             Comments:  Has home INR machine weekly INR needed                Anticoagulation Care Providers       Provider Role Specialty Phone number    Ling Trammell PA-C Referring Family Medicine [phone]

## 2024-04-24 ENCOUNTER — ANTICOAGULATION THERAPY VISIT (OUTPATIENT)
Dept: ANTICOAGULATION | Facility: OTHER | Age: 54
End: 2024-04-24
Attending: PHYSICIAN ASSISTANT
Payer: COMMERCIAL

## 2024-04-24 DIAGNOSIS — D68.51 FACTOR 5 LEIDEN MUTATION, HETEROZYGOUS (H): ICD-10-CM

## 2024-04-24 DIAGNOSIS — I82.602 BLOOD CLOT OF VEIN IN SHOULDER AREA, LEFT: Primary | ICD-10-CM

## 2024-04-24 DIAGNOSIS — Z79.01 LONG TERM CURRENT USE OF ANTICOAGULANT THERAPY: ICD-10-CM

## 2024-04-24 LAB — INR (EXTERNAL): 2.6 (ref 0.9–1.1)

## 2024-04-24 NOTE — PROGRESS NOTES
ANTICOAGULATION MANAGEMENT     Daniela Ladd 53 year old female is on warfarin with therapeutic INR result. (Goal INR 2.5-3.5)    Recent labs: (last 7 days)     04/24/24  0759   INR 2.6*       ASSESSMENT     Source(s): Chart Review and Patient/Caregiver Call     Warfarin doses taken: Warfarin taken as instructed  Diet: Protein supplement/shake stopped which maybe affecting INR  Medication/supplement changes: None noted  New illness, injury, or hospitalization: No  Signs or symptoms of bleeding or clotting: No  Previous result: Therapeutic last visit; previously outside of goal range  Additional findings: None       PLAN     Recommended plan for ongoing change(s) affecting INR     Dosing Instructions: Continue your current warfarin dose with next INR in 1 week       Summary  As of 4/24/2024      Full warfarin instructions:  10 mg every Mon, Wed, Fri; 8 mg all other days   Next INR check:  5/1/2024               Telephone call with Daniela who verbalizes understanding and agrees to plan    Patient to recheck with home meter    Education provided:   None required    Plan made per [facility] anticoagulation protocol    Yoly Crowe, RN  Anticoagulation Clinic  4/24/2024    _______________________________________________________________________     Anticoagulation Episode Summary       Current INR goal:  2.5-3.5   TTR:  71.8% (1 y)   Target end date:  Indefinite   Send INR reminders to:  [facility]    Indications    Blood clot of vein in shoulder area  left [I82.602]  Factor 5 Leiden mutation  heterozygous (H24) [D68.51]  Long-term (current) use of anticoagulants [Z79.01] [Z79.01]             Comments:  Has home INR machine weekly INR needed                Anticoagulation Care Providers       Provider Role Specialty Phone number    Ling Trammell PA-C Referring Family Medicine [phone]

## 2024-04-29 ENCOUNTER — TELEPHONE (OUTPATIENT)
Dept: FAMILY MEDICINE | Facility: OTHER | Age: 54
End: 2024-04-29
Payer: COMMERCIAL

## 2024-04-29 DIAGNOSIS — Z79.01 LONG TERM CURRENT USE OF ANTICOAGULANT THERAPY: ICD-10-CM

## 2024-04-29 DIAGNOSIS — I82.602 BLOOD CLOT OF VEIN IN SHOULDER AREA, LEFT: Primary | ICD-10-CM

## 2024-04-29 DIAGNOSIS — D68.51 FACTOR 5 LEIDEN MUTATION, HETEROZYGOUS (H): ICD-10-CM

## 2024-04-29 LAB — INR (EXTERNAL): 2.2 (ref 0.9–1.1)

## 2024-04-29 NOTE — TELEPHONE ENCOUNTER
Patient called she checked her INR this morning instead of waiting for Wednesday check day. Patient nervous about having INR on the low side. We will give her a booster today but otherwise increase dose to 10 mg daily. Patient to recheck her INR normal day on Wednesday 5/1/24

## 2024-05-01 ENCOUNTER — ANTICOAGULATION THERAPY VISIT (OUTPATIENT)
Dept: ANTICOAGULATION | Facility: OTHER | Age: 54
End: 2024-05-01
Attending: PHYSICIAN ASSISTANT
Payer: COMMERCIAL

## 2024-05-01 DIAGNOSIS — I82.602 BLOOD CLOT OF VEIN IN SHOULDER AREA, LEFT: Primary | ICD-10-CM

## 2024-05-01 DIAGNOSIS — Z79.01 LONG TERM CURRENT USE OF ANTICOAGULANT THERAPY: ICD-10-CM

## 2024-05-01 DIAGNOSIS — D68.51 FACTOR 5 LEIDEN MUTATION, HETEROZYGOUS (H): ICD-10-CM

## 2024-05-01 LAB — INR (EXTERNAL): 2.9 (ref 0.9–1.1)

## 2024-05-01 NOTE — PROGRESS NOTES
ANTICOAGULATION MANAGEMENT     Daniela Ladd 53 year old female is on warfarin with therapeutic INR result. (Goal INR 2.5-3.5)    Recent labs: (last 7 days)     05/01/24  0756   INR 2.9*       ASSESSMENT     Source(s): Chart Review and Patient/Caregiver Call     Warfarin doses taken: Warfarin taken as instructed  Diet: No new diet changes identified  Medication/supplement changes: None noted  New illness, injury, or hospitalization: No  Signs or symptoms of bleeding or clotting: No  Previous result: Subtherapeutic  Additional findings: None       PLAN     Recommended plan for no diet, medication or health factor changes affecting INR     Dosing Instructions: Continue your current warfarin dose with next INR in 1 week       Summary  As of 5/1/2024      Full warfarin instructions:  8 mg every Mon, Fri; 10 mg all other days   Next INR check:  5/8/2024               Telephone call with Daniela who verbalizes understanding and agrees to plan    Patient to recheck with home meter    Education provided:   None required    Plan made per [facility] anticoagulation protocol    Yoly Crowe, RN  Anticoagulation Clinic  5/1/2024    _______________________________________________________________________     Anticoagulation Episode Summary       Current INR goal:  2.5-3.5   TTR:  70.6% (1 y)   Target end date:  Indefinite   Send INR reminders to:  [facility]    Indications    Blood clot of vein in shoulder area  left [I82.602]  Factor 5 Leiden mutation  heterozygous (H24) [D68.51]  Long-term (current) use of anticoagulants [Z79.01] [Z79.01]             Comments:  Has home INR machine weekly INR needed                Anticoagulation Care Providers       Provider Role Specialty Phone number    Valeri, Ling Rodriguez PA-C Referring Family Medicine [phone]

## 2024-05-08 ENCOUNTER — ANTICOAGULATION THERAPY VISIT (OUTPATIENT)
Dept: ANTICOAGULATION | Facility: OTHER | Age: 54
End: 2024-05-08
Attending: PHYSICIAN ASSISTANT
Payer: COMMERCIAL

## 2024-05-08 DIAGNOSIS — D68.51 FACTOR 5 LEIDEN MUTATION, HETEROZYGOUS (H): ICD-10-CM

## 2024-05-08 DIAGNOSIS — I82.602 BLOOD CLOT OF VEIN IN SHOULDER AREA, LEFT: Primary | ICD-10-CM

## 2024-05-08 DIAGNOSIS — Z79.01 LONG TERM CURRENT USE OF ANTICOAGULANT THERAPY: ICD-10-CM

## 2024-05-08 LAB — INR (EXTERNAL): 3 (ref 0.9–1.1)

## 2024-05-08 NOTE — PROGRESS NOTES
ANTICOAGULATION MANAGEMENT     Daniela CRUZ Julee 53 year old female is on warfarin with therapeutic INR result. (Goal INR 2.5-3.5)    Recent labs: (last 7 days)     05/08/24  0918   INR 3.0*       ASSESSMENT     Source(s): Patient/ Care giver call     Warfarin doses taken: Less warfarin taken than planned which may be affecting INR  Diet: No new diet changes identified  New illness, injury, or hospitalization: No  Medication/supplement changes: None noted  Signs or symptoms of bleeding or clotting: No  Previous INR: Therapeutic last visit; previously outside of goal range  Additional findings: None     PLAN     Recommended plan for no diet, medication or health factor changes affecting INR     Dosing Instructions: Continue your current warfarin dose with next INR in 1 week       Summary  As of 5/8/2024      Full warfarin instructions:  8 mg every Mon, Fri; 10 mg all other days   Next INR check:  5/15/2024               Telephone call with Daniela who verbalizes understanding and agrees to plan    Patient to recheck with home meter    Education provided: Please call back if any changes to your diet, medications or how you've been taking warfarin    Plan made per [facility] anticoagulation protocol    Shala Murray, RN  Anticoagulation Clinic  5/8/2024    _______________________________________________________________________     Anticoagulation Episode Summary       Current INR goal:  2.5-3.5   TTR:  70.6% (1 y)   Target end date:  Indefinite   Send INR reminders to:  [facility]    Indications    Blood clot of vein in shoulder area  left [I82.602]  Factor 5 Leiden mutation  heterozygous (H24) [D68.51]  Long-term (current) use of anticoagulants [Z79.01] [Z79.01]             Comments:  Has home INR machine weekly INR needed                Anticoagulation Care Providers       Provider Role Specialty Phone number    Ling Trammell PA-C Referring Family Medicine [phone]

## 2024-05-15 ENCOUNTER — ANTICOAGULATION THERAPY VISIT (OUTPATIENT)
Dept: ANTICOAGULATION | Facility: OTHER | Age: 54
End: 2024-05-15
Attending: PHYSICIAN ASSISTANT
Payer: COMMERCIAL

## 2024-05-15 DIAGNOSIS — I82.602 BLOOD CLOT OF VEIN IN SHOULDER AREA, LEFT: Primary | ICD-10-CM

## 2024-05-15 DIAGNOSIS — D68.51 FACTOR 5 LEIDEN MUTATION, HETEROZYGOUS (H): ICD-10-CM

## 2024-05-15 DIAGNOSIS — Z79.01 LONG TERM CURRENT USE OF ANTICOAGULANT THERAPY: ICD-10-CM

## 2024-05-15 LAB — INR (EXTERNAL): 3

## 2024-05-15 NOTE — PROGRESS NOTES
ANTICOAGULATION MANAGEMENT     Danielamariusz Ladd 53 year old female is on warfarin with therapeutic INR result. (Goal INR 2.5-3.5)    Recent labs: (last 7 days)     05/15/24  0741   INR 3.0       ASSESSMENT     Source(s): Patient/ Care giver call     Warfarin doses taken: Warfarin taken as instructed  Diet: No new diet changes identified  New illness, injury, or hospitalization: No  Medication/supplement changes: None noted  Signs or symptoms of bleeding or clotting: No  Previous INR: Therapeutic last 2(+) visits  Additional findings: None     PLAN     Recommended plan for no diet, medication or health factor changes affecting INR     Dosing Instructions: Continue your current warfarin dose with next INR in 1 week       Summary  As of 5/15/2024      Full warfarin instructions:  8 mg every Mon, Fri; 10 mg all other days   Next INR check:  5/22/2024               Telephone call with Daniela who verbalizes understanding and agrees to plan    Patient to recheck with home meter    Education provided: Please call back if any changes to your diet, medications or how you've been taking warfarin    Plan made per [facility] anticoagulation protocol    Hawa Lechuga, RN  Anticoagulation Clinic  5/15/2024    _______________________________________________________________________     Anticoagulation Episode Summary       Current INR goal:  2.5-3.5   TTR:  70.6% (1 y)   Target end date:  Indefinite   Send INR reminders to:  [facility]    Indications    Blood clot of vein in shoulder area  left [I82.602]  Factor 5 Leiden mutation  heterozygous (H24) [D68.51]  Long-term (current) use of anticoagulants [Z79.01] [Z79.01]             Comments:  Has home INR machine weekly INR needed                Anticoagulation Care Providers       Provider Role Specialty Phone number    Ling Trammell PA-C Referring Family Medicine [phone]

## 2024-05-21 DIAGNOSIS — N92.0 MENORRHAGIA: ICD-10-CM

## 2024-05-21 DIAGNOSIS — D68.51 FACTOR 5 LEIDEN MUTATION, HETEROZYGOUS (H): Primary | ICD-10-CM

## 2024-05-21 RX ORDER — ACETAMINOPHEN AND CODEINE PHOSPHATE 120; 12 MG/5ML; MG/5ML
0.35 SOLUTION ORAL DAILY
Qty: 84 TABLET | Refills: 3 | Status: SHIPPED | OUTPATIENT
Start: 2024-05-21

## 2024-05-21 NOTE — TELEPHONE ENCOUNTER
Patient had OV on 1/31/24 where she had her IUD removed.  Per LOV note: Patient does have history of factor V and is on long-term warfarin therapy, migraines with aura and is current smoker. She is not a candidate for estrogen-containing contraception for period management. Should patient have any return of heavy vaginal bleeding and unmanageable menses she would like to proceed with progesterone only minipill. We have reviewed this medication and how to properly take this today in clinic. She will call or reach out via ABS if she would like me to send in this prescription.     Patient sent Inaura message that she started her cycle and would like to try the minipill. She would also like like to know when she can start this.    Order teed up for review and consideration.    Evi Merida RN on 5/21/2024 at 9:26 AM

## 2024-05-21 NOTE — TELEPHONE ENCOUNTER
Pt states she saw Talya Albarran in January. She thought she was in menopause but she just got her period really bad.  She is on a blood thinner.  Pt states Talya told her to call if she wanted her to prescribe the mini pill.  Pt would like the mini pill and would like to know when to start it.    Jessica Salazar on 5/21/2024 at 9:12 AM

## 2024-05-22 ENCOUNTER — ANTICOAGULATION THERAPY VISIT (OUTPATIENT)
Dept: ANTICOAGULATION | Facility: OTHER | Age: 54
End: 2024-05-22
Attending: PHYSICIAN ASSISTANT
Payer: COMMERCIAL

## 2024-05-22 DIAGNOSIS — Z79.01 LONG TERM CURRENT USE OF ANTICOAGULANT THERAPY: ICD-10-CM

## 2024-05-22 DIAGNOSIS — D68.51 FACTOR 5 LEIDEN MUTATION, HETEROZYGOUS (H): ICD-10-CM

## 2024-05-22 DIAGNOSIS — I82.602 BLOOD CLOT OF VEIN IN SHOULDER AREA, LEFT: Primary | ICD-10-CM

## 2024-05-22 LAB — INR (EXTERNAL): 2.9 (ref 0.9–1.1)

## 2024-05-22 NOTE — PROGRESS NOTES
ANTICOAGULATION  MANAGEMENT-Home Monitor Managed by Exception    Daniela NANCY Ladd 53 year old female is on warfarin with therapeutic INR result. (Goal INR 2.5-3.5)    Recent labs: (last 7 days)     05/22/24  0806   INR 2.9*       Previous INR was Therapeutic  Medication, diet, health changes since last INR:patient called states she did change her dose a bit last few days d/t heavy vaginal bleeding. She is currently switching to OCP's  Contacted within the last 12 weeks by phone on 5/15/24  Last [facility] referral date: 10/04/2023      ANÍBAL     Daniela was NOT contacted regarding therapeutic result today per home monitoring policy manage by exception agreement.   Current warfarin dose is to be continued:     Summary  As of 5/22/2024      Full warfarin instructions:  8 mg every Mon, Fri; 10 mg all other days   Next INR check:  5/29/2024             ?   Yoly Crowe, RN  Anticoagulation Clinic  5/22/2024    _______________________________________________________________________     Anticoagulation Episode Summary       Current INR goal:  2.5-3.5   TTR:  70.6% (1 y)   Target end date:  Indefinite   Send INR reminders to:  [facility]    Indications    Blood clot of vein in shoulder area  left [I82.602]  Factor 5 Leiden mutation  heterozygous (H24) [D68.51]  Long-term (current) use of anticoagulants [Z79.01] [Z79.01]             Comments:  Has home INR machine weekly INR needed                Anticoagulation Care Providers       Provider Role Specialty Phone number    Ling Trammell PA-C Referring Family Medicine [phone]

## 2024-05-23 ENCOUNTER — OFFICE VISIT (OUTPATIENT)
Dept: FAMILY MEDICINE | Facility: OTHER | Age: 54
End: 2024-05-23
Attending: PHYSICIAN ASSISTANT
Payer: COMMERCIAL

## 2024-05-23 VITALS
SYSTOLIC BLOOD PRESSURE: 132 MMHG | DIASTOLIC BLOOD PRESSURE: 80 MMHG | HEART RATE: 81 BPM | WEIGHT: 195 LBS | BODY MASS INDEX: 31 KG/M2 | OXYGEN SATURATION: 95 % | TEMPERATURE: 98.4 F

## 2024-05-23 DIAGNOSIS — E55.9 HYPOVITAMINOSIS D: ICD-10-CM

## 2024-05-23 DIAGNOSIS — N92.1 MENORRHAGIA WITH IRREGULAR CYCLE: Primary | ICD-10-CM

## 2024-05-23 DIAGNOSIS — F17.200 NICOTINE DEPENDENCE, UNCOMPLICATED, UNSPECIFIED NICOTINE PRODUCT TYPE: ICD-10-CM

## 2024-05-23 PROBLEM — Z97.5 IUD (INTRAUTERINE DEVICE) IN PLACE: Status: RESOLVED | Noted: 2019-03-18 | Resolved: 2024-05-23

## 2024-05-23 LAB
BASOPHILS # BLD AUTO: 0.1 10E3/UL (ref 0–0.2)
BASOPHILS NFR BLD AUTO: 1 %
EOSINOPHIL # BLD AUTO: 0.1 10E3/UL (ref 0–0.7)
EOSINOPHIL NFR BLD AUTO: 1 %
ERYTHROCYTE [DISTWIDTH] IN BLOOD BY AUTOMATED COUNT: 12.3 % (ref 10–15)
FSH SERPL IRP2-ACNC: 17.6 MIU/ML
HCT VFR BLD AUTO: 46.5 % (ref 35–47)
HGB BLD-MCNC: 15.6 G/DL (ref 11.7–15.7)
IMM GRANULOCYTES # BLD: 0 10E3/UL
IMM GRANULOCYTES NFR BLD: 0 %
LYMPHOCYTES # BLD AUTO: 3.4 10E3/UL (ref 0.8–5.3)
LYMPHOCYTES NFR BLD AUTO: 41 %
MCH RBC QN AUTO: 31.3 PG (ref 26.5–33)
MCHC RBC AUTO-ENTMCNC: 33.5 G/DL (ref 31.5–36.5)
MCV RBC AUTO: 93 FL (ref 78–100)
MONOCYTES # BLD AUTO: 0.4 10E3/UL (ref 0–1.3)
MONOCYTES NFR BLD AUTO: 5 %
NEUTROPHILS # BLD AUTO: 4.3 10E3/UL (ref 1.6–8.3)
NEUTROPHILS NFR BLD AUTO: 52 %
NRBC # BLD AUTO: 0 10E3/UL
NRBC BLD AUTO-RTO: 0 /100
PLATELET # BLD AUTO: 326 10E3/UL (ref 150–450)
RBC # BLD AUTO: 4.99 10E6/UL (ref 3.8–5.2)
WBC # BLD AUTO: 8.3 10E3/UL (ref 4–11)

## 2024-05-23 PROCEDURE — 83001 ASSAY OF GONADOTROPIN (FSH): CPT | Mod: ZL | Performed by: PHYSICIAN ASSISTANT

## 2024-05-23 PROCEDURE — G2211 COMPLEX E/M VISIT ADD ON: HCPCS | Performed by: PHYSICIAN ASSISTANT

## 2024-05-23 PROCEDURE — 82306 VITAMIN D 25 HYDROXY: CPT | Mod: ZL | Performed by: PHYSICIAN ASSISTANT

## 2024-05-23 PROCEDURE — 36415 COLL VENOUS BLD VENIPUNCTURE: CPT | Mod: ZL | Performed by: PHYSICIAN ASSISTANT

## 2024-05-23 PROCEDURE — 99213 OFFICE O/P EST LOW 20 MIN: CPT | Performed by: PHYSICIAN ASSISTANT

## 2024-05-23 PROCEDURE — 85004 AUTOMATED DIFF WBC COUNT: CPT | Mod: ZL | Performed by: PHYSICIAN ASSISTANT

## 2024-05-23 NOTE — NURSING NOTE
"Chief Complaint   Patient presents with    Abnormal Bleeding Problem     Heavy bleeding     Patient has clotting disorder and is on Warfarin. She had Mirena IUD until Jan and had it removed. Was fine until 5/17 when she started bleeding heavily. Did start \"Mini pill\" yesterday. Starting to feel fatigued. Is also wanting hormone levels checked.   Initial /80   Pulse 81   Temp 98.4  F (36.9  C) (Tympanic)   Wt 88.5 kg (195 lb)   LMP 05/17/2024   SpO2 95%   BMI 31.00 kg/m   Estimated body mass index is 31 kg/m  as calculated from the following:    Height as of 2/26/24: 1.689 m (5' 6.5\").    Weight as of this encounter: 88.5 kg (195 lb).  Medication Review: complete    The next two questions are to help us understand your food security.  If you are feeling you need any assistance in this area, we have resources available to support you today.          2/26/2024   SDOH- Food Insecurity   Within the past 12 months, did you worry that your food would run out before you got money to buy more? N   Within the past 12 months, did the food you bought just not last and you didn t have money to get more? N         Health Care Directive:  Patient does not have a Health Care Directive or Living Will: Discussed advance care planning with patient; however, patient declined at this time.    Nicole Gamino MA      "

## 2024-05-23 NOTE — PROGRESS NOTES
Assessment & Plan   Problem List Items Addressed This Visit    None  Visit Diagnoses       Menorrhagia with irregular cycle    -  Primary    Relevant Orders    FSH    CBC and Differential    Hypovitaminosis D        Relevant Orders    Vitamin D Total    Nicotine dependence, uncomplicated, unspecified nicotine product type               Menorrhagia with irregular cycle: Will complete CBC and FSH for monitoring.  Encouraged to start the minipill.  Encouraged to closely monitor INR over the next week once she starts the medication.  Encourage close follow-up with OB/GYN if needed.    Hypovitaminosis D: Rechecked lab work today for monitoring.    Nicotine dependence: Encouraged to quit smoking.  Declines at this time.    See Patient Instructions    No follow-ups on file.    The longitudinal plan of care for the diagnosis(es)/condition(s) as documented were addressed during this visit. Due to the added complexity in care, I will continue to support Daniela in the subsequent management and with ongoing continuity of care.    Rolando Suarez is a 53 year old, presenting for the following health issues:  Abnormal Bleeding Problem (Heavy bleeding)        5/23/2024     9:56 AM   Additional Questions   Roomed by Nicole RICHARDSON CMA     History of Present Illness       Reason for visit:  Heavy bleeding. Am i anemic? Questions about mini pill and menopause  Symptom onset:  3-7 days ago  Symptoms include:  Extreme heavy bleeding and clotting  Symptom intensity:  Severe  Symptom progression:  Staying the same  Had these symptoms before:  Yes  Has tried/received treatment for these symptoms:  Yes  Previous treatment was successful:  Yes  Prior treatment description:  Iud    She eats 2-3 servings of fruits and vegetables daily.She consumes 0 sweetened beverage(s) daily.She exercises with enough effort to increase her heart rate 10 to 19 minutes per day.  She exercises with enough effort to increase her heart rate 4 days per week.    She is taking medications regularly.       Menstrual Concern  Onset/Duration: 5/17/24  Description:   Duration of bleeding episodes: 6 days  Frequency of periods: (1st day of one to 1st day of next):  not since before IUD  Describe bleeding/flow:   Clots: YES  Number of pads/day: 15-16        Cramping: mild  Accompanying Signs & Symptoms:  Lightheadedness: YES  Temperature intolerance: No  Nosebleeds/Easy bruising: No  Vaginal Discharge: No  Acne: YES  Change in body hair: No  History:  Patient's last menstrual period was 05/17/2024.  Previous normal periods: no   Contraceptive use: IUD   Sexually active: YES  Any bleeding after intercourse: No  Abnormal PAP Smears: No  Precipitating or alleviating factors: Clotting disorder, on blood thinners  Therapies tried and outcome: mini-pill    Patient is coming today to discuss her menstrual cycle.  History of heavy menstrual bleeding in the past.  She is on the able to take progesterone only pills with history of DVTs.  Had her IUD taken out at the end of January as she thought there was complications with the IUD.  No menses since then.  On 5/17 she started having light bleeding.  She then started bleeding very heavily.  Went through a full box of tampons in 3 days.  Nonstop bleeding.  Wondering if she is anemic.  No chest pain, palpitations, rales breathing, lightheadedness, or dizziness.  Changing her tampon every 45 minutes to 1 hour.  Using super plus now.  Checked her INR at home and it has ranged between 2.6 and 3.1.  Monitoring with the nurse.  Started taking a minipill yesterday.  Wondering about getting hormone testing.  Would like her CBC and FSH tested today.    Would like her vitamin D rechecked for monitoring.    Review of Systems  Constitutional, HEENT, cardiovascular, pulmonary, gi and gu systems are negative, except as otherwise noted.      Objective    /80   Pulse 81   Temp 98.4  F (36.9  C) (Tympanic)   Wt 88.5 kg (195 lb)   LMP 05/17/2024    SpO2 95%   BMI 31.00 kg/m    Body mass index is 31 kg/m .  Physical Exam  Vitals and nursing note reviewed.   Constitutional:       General: She is not in acute distress.     Appearance: Normal appearance. She is well-developed.   Cardiovascular:      Rate and Rhythm: Normal rate and regular rhythm.      Heart sounds: Normal heart sounds, S1 normal and S2 normal. No murmur heard.  Pulmonary:      Effort: Pulmonary effort is normal. No respiratory distress.      Breath sounds: Normal breath sounds. No wheezing or rales.   Abdominal:      General: Abdomen is flat. Bowel sounds are normal.      Palpations: Abdomen is soft. There is no mass.      Tenderness: There is no abdominal tenderness. There is no right CVA tenderness, left CVA tenderness, guarding or rebound.      Hernia: No hernia is present.   Musculoskeletal:         General: Normal range of motion.   Skin:     General: Skin is warm and dry.      Findings: No rash.   Neurological:      General: No focal deficit present.      Mental Status: She is alert and oriented to person, place, and time.   Psychiatric:         Mood and Affect: Mood normal.         Behavior: Behavior normal.         Thought Content: Thought content normal.         Judgment: Judgment normal.          No results found for any visits on 05/23/24.        Signed Electronically by: Ling Trammell PA-C

## 2024-05-24 LAB — VIT D+METAB SERPL-MCNC: 52 NG/ML (ref 20–50)

## 2024-05-29 ENCOUNTER — ANTICOAGULATION THERAPY VISIT (OUTPATIENT)
Dept: ANTICOAGULATION | Facility: OTHER | Age: 54
End: 2024-05-29
Attending: PHYSICIAN ASSISTANT
Payer: COMMERCIAL

## 2024-05-29 DIAGNOSIS — Z79.01 LONG TERM CURRENT USE OF ANTICOAGULANT THERAPY: ICD-10-CM

## 2024-05-29 DIAGNOSIS — D68.51 FACTOR 5 LEIDEN MUTATION, HETEROZYGOUS (H): ICD-10-CM

## 2024-05-29 DIAGNOSIS — I82.602 BLOOD CLOT OF VEIN IN SHOULDER AREA, LEFT: Primary | ICD-10-CM

## 2024-05-29 LAB — INR (EXTERNAL): 2.8

## 2024-05-29 NOTE — PROGRESS NOTES
ANTICOAGULATION MANAGEMENT     Daniela Ladd 53 year old female is on warfarin with therapeutic INR result. (Goal INR 2.5-3.5)    Recent labs: (last 7 days)     05/29/24  0737   INR 2.8       ASSESSMENT     Source(s): Patient/ Care giver call     Warfarin doses taken: Warfarin taken as instructed  Diet: No new diet changes identified  New illness, injury, or hospitalization: No  Medication/supplement changes: None noted  Signs or symptoms of bleeding or clotting: No  Previous INR: Therapeutic last 2(+) visits  Additional findings: None     PLAN     Recommended plan for ongoing change(s) affecting INR     Dosing Instructions: Continue your current warfarin dose with next INR in 1 week       Summary  As of 5/29/2024      Full warfarin instructions:  8 mg every Mon, Fri; 10 mg all other days   Next INR check:  6/5/2024               In person    Lab visit scheduled    Education provided: Please call back if any changes to your diet, medications or how you've been taking warfarin    Plan made per [facility] anticoagulation protocol    Hawa Lechuga RN  Anticoagulation Clinic  5/29/2024    _______________________________________________________________________     Anticoagulation Episode Summary       Current INR goal:  2.5-3.5   TTR:  70.6% (1 y)   Target end date:  Indefinite   Send INR reminders to:  [facility]    Indications    Blood clot of vein in shoulder area  left [I82.602]  Factor 5 Leiden mutation  heterozygous (H24) [D68.51]  Long-term (current) use of anticoagulants [Z79.01] [Z79.01]             Comments:  Has home INR machine weekly INR needed                Anticoagulation Care Providers       Provider Role Specialty Phone number    Valeri, Ling Rodriguez PA-C Referring Family Medicine [phone]

## 2024-06-05 ENCOUNTER — ANTICOAGULATION THERAPY VISIT (OUTPATIENT)
Dept: ANTICOAGULATION | Facility: OTHER | Age: 54
End: 2024-06-05
Attending: PHYSICIAN ASSISTANT
Payer: COMMERCIAL

## 2024-06-05 DIAGNOSIS — D68.51 FACTOR 5 LEIDEN MUTATION, HETEROZYGOUS (H): ICD-10-CM

## 2024-06-05 DIAGNOSIS — Z79.01 LONG TERM CURRENT USE OF ANTICOAGULANT THERAPY: ICD-10-CM

## 2024-06-05 DIAGNOSIS — I82.602 BLOOD CLOT OF VEIN IN SHOULDER AREA, LEFT: Primary | ICD-10-CM

## 2024-06-05 LAB — INR (EXTERNAL): 3.9 (ref 0.9–1.1)

## 2024-06-05 NOTE — PROGRESS NOTES
ANTICOAGULATION MANAGEMENT     Daniela Ladd 53 year old female is on warfarin with supratherapeutic INR result. (Goal INR 2.5-3.5)    Recent labs: (last 7 days)     06/05/24  0842   INR 3.9*       ASSESSMENT     Source(s): Chart Review and Patient/Caregiver Call     Warfarin doses taken: Warfarin taken as instructed  Diet: No new diet changes identified  Medication/supplement changes: None noted  New illness, injury, or hospitalization: No  Signs or symptoms of bleeding or clotting: No  Previous result: Therapeutic last 2(+) visits  Additional findings: None       PLAN     Recommended plan for no diet, medication or health factor changes affecting INR     Dosing Instructions: decrease your warfarin dose (6.1% change) with next INR in 1 week       Summary  As of 6/5/2024      Full warfarin instructions:  10 mg every Mon, Wed, Fri; 8 mg all other days   Next INR check:  6/12/2024               Telephone call with Daniela who verbalizes understanding and agrees to plan    Patient to recheck with home meter    Education provided:   None required    Plan made per [facility] anticoagulation protocol    Yoly Crowe, RN  Anticoagulation Clinic  6/5/2024    _______________________________________________________________________     Anticoagulation Episode Summary       Current INR goal:  2.5-3.5   TTR:  69.9% (1 y)   Target end date:  Indefinite   Send INR reminders to:  [facility]    Indications    Blood clot of vein in shoulder area  left [I82.602]  Factor 5 Leiden mutation  heterozygous (H24) [D68.51]  Long-term (current) use of anticoagulants [Z79.01] [Z79.01]             Comments:  Has home INR machine weekly INR needed                Anticoagulation Care Providers       Provider Role Specialty Phone number    Ling Trammell PA-C Referring Family Medicine [phone]

## 2024-06-11 NOTE — PROGRESS NOTES
ANTICOAGULATION FOLLOW-UP CLINIC VISIT    Patient Name:  Daniela Ladd  Date:  3/21/2018  Contact Type:  Face to Face    SUBJECTIVE:     Patient Findings     Positives Other complaints (saw Dr Kitchen today. INR goal was increased and frequency changed to q 2 weeks. she has clot by her heart that is not going away.)           OBJECTIVE    INR Protime   Date Value Ref Range Status   03/21/2018 1.7 (A) 2.5 - 3.5 Final       ASSESSMENT / PLAN  INR assessment SUB    Recheck INR In: 5 DAYS    INR Location Clinic      Anticoagulation Summary as of 3/21/2018     INR goal 2.5-3.5   Today's INR 1.7!   Maintenance plan 8 mg (2 mg x 4) every day   Full instructions 8 mg every day   Weekly total 56 mg   Plan last modified Yoly Crowe, RN (3/21/2018)   Next INR check 3/26/2018   Priority INR   Target end date Indefinite    Indications   Arm DVT (deep venous thromboembolism)  acute  left (H) [I82.622]  Factor 5 Leiden mutation  heterozygous (H) [D68.51]  Long-term (current) use of anticoagulants [Z79.01] [Z79.01]         Anticoagulation Episode Summary     INR check location     Preferred lab     Send INR reminders to  INR    Comments INR goal changed per Dr Kitchen 3/21/18 he wants INR checked q 2 weeks        Anticoagulation Care Providers     Provider Role Specialty Phone number    Krystin Black MD [facility] [phone]            See the Encounter Report to view Anticoagulation Flowsheet and Dosing Calendar (Go to Encounters tab in chart review, and find the Anticoagulation Therapy Visit)        Yoly Crowe, RN               
No

## 2024-06-12 ENCOUNTER — ANTICOAGULATION THERAPY VISIT (OUTPATIENT)
Dept: ANTICOAGULATION | Facility: OTHER | Age: 54
End: 2024-06-12
Attending: PHYSICIAN ASSISTANT
Payer: COMMERCIAL

## 2024-06-12 DIAGNOSIS — D68.51 FACTOR 5 LEIDEN MUTATION, HETEROZYGOUS (H): ICD-10-CM

## 2024-06-12 DIAGNOSIS — I82.602 BLOOD CLOT OF VEIN IN SHOULDER AREA, LEFT: Primary | ICD-10-CM

## 2024-06-12 DIAGNOSIS — Z79.01 LONG TERM CURRENT USE OF ANTICOAGULANT THERAPY: ICD-10-CM

## 2024-06-12 LAB — INR (EXTERNAL): 3.2 (ref 0.9–1.1)

## 2024-06-12 NOTE — PROGRESS NOTES
ANTICOAGULATION MANAGEMENT     Daniela Ladd 53 year old female is on warfarin with therapeutic INR result. (Goal INR 2.5-3.5)    Recent labs: (last 7 days)     06/12/24  0759   INR 3.2*       ASSESSMENT     Source(s): Chart Review and Patient/Caregiver Call     Warfarin doses taken: Less warfarin taken than planned which may be affecting INR  Diet: No new diet changes identified  Medication/supplement changes: None noted  New illness, injury, or hospitalization: No  Signs or symptoms of bleeding or clotting: No  Previous result: Supratherapeutic  Additional findings: None       PLAN     Recommended plan for no diet, medication or health factor changes affecting INR     Dosing Instructions: Continue your current warfarin dose with next INR in 1 week       Summary  As of 6/12/2024      Full warfarin instructions:  10 mg every Mon, Wed, Fri; 8 mg all other days   Next INR check:  6/19/2024               Telephone call with Daniela who verbalizes understanding and agrees to plan    Patient to recheck with home meter    Education provided:   Resume manage by exception with home monitor. Continue to submit INR results to home monitor company.You will only be called when your result is out of range. Please call and notify [facility] if new medication started, dose missed, signs or symptoms of bleeding or clotting, or a surgery/procedure is scheduled. Due for next call no later than: 9/10/24.    Plan made per [facility] anticoagulation protocol    Yoly Crowe RN  Anticoagulation Clinic  6/12/2024    _______________________________________________________________________     Anticoagulation Episode Summary       Current INR goal:  2.5-3.5   TTR:  68.8% (1 y)   Target end date:  Indefinite   Send INR reminders to:  [facility]    Indications    Blood clot of vein in shoulder area  left [I82.602]  Factor 5 Leiden mutation  heterozygous (H24) [D68.51]  Long-term (current) use of anticoagulants [Z79.01] [Z79.01]              Comments:  Has home INR machine weekly INR needed                Anticoagulation Care Providers       Provider Role Specialty Phone number    Ling Trammell PA-C Referring Family Medicine [phone]

## 2024-06-19 ENCOUNTER — ANTICOAGULATION THERAPY VISIT (OUTPATIENT)
Dept: ANTICOAGULATION | Facility: OTHER | Age: 54
End: 2024-06-19
Attending: PHYSICIAN ASSISTANT
Payer: COMMERCIAL

## 2024-06-19 DIAGNOSIS — Z79.01 LONG TERM CURRENT USE OF ANTICOAGULANT THERAPY: ICD-10-CM

## 2024-06-19 DIAGNOSIS — D68.51 FACTOR 5 LEIDEN MUTATION, HETEROZYGOUS (H): ICD-10-CM

## 2024-06-19 DIAGNOSIS — I82.602 BLOOD CLOT OF VEIN IN SHOULDER AREA, LEFT: Primary | ICD-10-CM

## 2024-06-19 LAB — INR (EXTERNAL): 2.9 (ref 0.9–1.1)

## 2024-06-19 NOTE — PROGRESS NOTES
ANTICOAGULATION  MANAGEMENT-Home Monitor Managed by Exception    Daniela CRUZ Julee 53 year old female is on warfarin with therapeutic INR result. (Goal INR 2.5-3.5)    Recent labs: (last 7 days)     06/19/24  0808   INR 2.9*       Previous INR was Therapeutic  Medication, diet, health changes since last INR:chart reviewed; none identified  Contacted within the last 12 weeks by phone on 6/12/24  Last [facility] referral date: 10/04/2023      ANÍBAL     Daniela was NOT contacted regarding therapeutic result today per home monitoring policy manage by exception agreement.   Current warfarin dose is to be continued:     Summary  As of 6/19/2024      Full warfarin instructions:  10 mg every Mon, Wed, Fri; 8 mg all other days   Next INR check:  6/26/2024             ?   Yoly Crowe, RN  Anticoagulation Clinic  6/19/2024    _______________________________________________________________________     Anticoagulation Episode Summary       Current INR goal:  2.5-3.5   TTR:  68.8% (1 y)   Target end date:  Indefinite   Send INR reminders to:  [facility]    Indications    Blood clot of vein in shoulder area  left [I82.602]  Factor 5 Leiden mutation  heterozygous (H24) [D68.51]  Long-term (current) use of anticoagulants [Z79.01] [Z79.01]             Comments:  Has home INR machine weekly INR needed                Anticoagulation Care Providers       Provider Role Specialty Phone number    Valeri, Ling Rodriguez PA-C Referring Family Medicine [phone]

## 2024-06-21 ENCOUNTER — HOSPITAL ENCOUNTER (OUTPATIENT)
Dept: MAMMOGRAPHY | Facility: OTHER | Age: 54
Discharge: HOME | End: 2024-06-21
Attending: PHYSICIAN ASSISTANT | Admitting: PHYSICIAN ASSISTANT
Payer: COMMERCIAL

## 2024-06-21 DIAGNOSIS — Z12.31 VISIT FOR SCREENING MAMMOGRAM: ICD-10-CM

## 2024-06-21 PROCEDURE — 77063 BREAST TOMOSYNTHESIS BI: CPT

## 2024-06-26 ENCOUNTER — ANTICOAGULATION THERAPY VISIT (OUTPATIENT)
Dept: ANTICOAGULATION | Facility: OTHER | Age: 54
End: 2024-06-26
Attending: PHYSICIAN ASSISTANT
Payer: COMMERCIAL

## 2024-06-26 DIAGNOSIS — I82.602 BLOOD CLOT OF VEIN IN SHOULDER AREA, LEFT: Primary | ICD-10-CM

## 2024-06-26 DIAGNOSIS — D68.51 FACTOR 5 LEIDEN MUTATION, HETEROZYGOUS (H): ICD-10-CM

## 2024-06-26 DIAGNOSIS — Z79.01 LONG TERM CURRENT USE OF ANTICOAGULANT THERAPY: ICD-10-CM

## 2024-06-26 LAB — INR (EXTERNAL): 2.9 (ref 0.9–1.1)

## 2024-06-26 NOTE — PROGRESS NOTES
ANTICOAGULATION  MANAGEMENT-Home Monitor Managed by Exception    Daniela CRUZ Julee 53 year old female is on warfarin with therapeutic INR result. (Goal INR 2.5-3.5)    Recent labs: (last 7 days)     06/26/24  0747   INR 2.9*       Previous INR was Therapeutic  Medication, diet, health changes since last INR:chart reviewed; none identified  Contacted within the last 12 weeks by phone on 6/12/24  Last [facility] referral date: 10/04/2023      ANÍBAL Limel was NOT contacted regarding therapeutic result today per home monitoring policy manage by exception agreement.   Current warfarin dose is to be continued:     Summary  As of 6/26/2024      Full warfarin instructions:  10 mg every Mon, Wed, Fri; 8 mg all other days   Next INR check:  7/3/2024             ?   Ambreen Romano RN  Anticoagulation Clinic  6/26/2024    _______________________________________________________________________     Anticoagulation Episode Summary       Current INR goal:  2.5-3.5   TTR:  68.8% (1 y)   Target end date:  Indefinite   Send INR reminders to:  [facility]    Indications    Blood clot of vein in shoulder area  left [I82.602]  Factor 5 Leiden mutation  heterozygous (H24) [D68.51]  Long-term (current) use of anticoagulants [Z79.01] [Z79.01]             Comments:  Has home INR machine weekly INR needed                Anticoagulation Care Providers       Provider Role Specialty Phone number    Ling Trammell PA-C Referring Family Medicine [phone]

## 2024-06-27 DIAGNOSIS — Z79.01 LONG TERM CURRENT USE OF ANTICOAGULANT THERAPY: ICD-10-CM

## 2024-06-27 DIAGNOSIS — I82.622 ARM DVT (DEEP VENOUS THROMBOEMBOLISM), ACUTE, LEFT (H): ICD-10-CM

## 2024-06-27 RX ORDER — WARFARIN SODIUM 2 MG/1
TABLET ORAL
Qty: 430 TABLET | Refills: 1 | Status: SHIPPED | OUTPATIENT
Start: 2024-06-27

## 2024-06-27 NOTE — TELEPHONE ENCOUNTER
ANTICOAGULATION MANAGEMENT:  Medication Refill    Anticoagulation Summary  As of 6/26/2024      Warfarin maintenance plan:  10 mg (2 mg x 5) every Mon, Wed, Fri; 8 mg (2 mg x 4) all other days   Next INR check:  7/3/2024   Target end date:  Indefinite    Indications    Blood clot of vein in shoulder area  left [I82.602]  Factor 5 Leiden mutation  heterozygous (H24) [D68.51]  Long-term (current) use of anticoagulants [Z79.01] [Z79.01]                 Anticoagulation Care Providers       Provider Role Specialty Phone number    Valeri Ling Rodriguez PA-C Referring Family Medicine [phone]            Refill Criteria    Visit with referring provider/group: Meets criteria: office visit within referring provider group in the last 1 year on 5/23/24    [facility] referral last signed: 10/04/2023; within last year: Yes    Lab monitoring not exceeding 2 weeks overdue: Yes    Daniela meets all criteria for refill. Rx instructions and quantity supplied updated to match patient's current dosing plan. Warfarin 90 day supply with 1 refill granted per [facility] protocol     Yoly Crowe, RN  Anticoagulation Clinic

## 2024-07-03 ENCOUNTER — ANTICOAGULATION THERAPY VISIT (OUTPATIENT)
Dept: ANTICOAGULATION | Facility: OTHER | Age: 54
End: 2024-07-03
Attending: PHYSICIAN ASSISTANT
Payer: COMMERCIAL

## 2024-07-03 DIAGNOSIS — Z79.01 LONG TERM CURRENT USE OF ANTICOAGULANT THERAPY: ICD-10-CM

## 2024-07-03 DIAGNOSIS — I82.602 BLOOD CLOT OF VEIN IN SHOULDER AREA, LEFT: Primary | ICD-10-CM

## 2024-07-03 DIAGNOSIS — D68.51 FACTOR 5 LEIDEN MUTATION, HETEROZYGOUS (H): ICD-10-CM

## 2024-07-03 LAB — INR (EXTERNAL): 3.8

## 2024-07-03 NOTE — PROGRESS NOTES
ANTICOAGULATION MANAGEMENT     Daniela Ladd 53 year old female is on warfarin with supratherapeutic INR result. (Goal INR 2.5-3.5)    Recent labs: (last 7 days)     07/03/24  1400   INR 3.8       ASSESSMENT     Source(s): Chart Review and Patient/Caregiver Call     Warfarin doses taken: Warfarin taken as instructed  Diet: No new diet changes identified  Medication/supplement changes: None noted  New illness, injury, or hospitalization: No  Signs or symptoms of bleeding or clotting: No  Previous result: Therapeutic last 2(+) visits  Additional findings: None       PLAN     Recommended plan for no diet, medication or health factor changes affecting INR     Dosing Instructions: partial hold then continue your current warfarin dose with next INR in 1 week       Summary  As of 7/3/2024      Full warfarin instructions:  7/3: 8 mg; Otherwise 10 mg every Mon, Wed, Fri; 8 mg all other days   Next INR check:  7/10/2024               Telephone call with Daniela who verbalizes understanding and agrees to plan    Patient to recheck with home meter    Education provided: None required    Plan made per [facility] anticoagulation protocol    Yoly Crowe, RN  Anticoagulation Clinic  7/3/2024    _______________________________________________________________________     Anticoagulation Episode Summary       Current INR goal:  2.5-3.5   TTR:  69.0% (1 y)   Target end date:  Indefinite   Send INR reminders to:  [facility]    Indications    Blood clot of vein in shoulder area  left [I82.602]  Factor 5 Leiden mutation  heterozygous (H24) [D68.51]  Long-term (current) use of anticoagulants [Z79.01] [Z79.01]             Comments:  Has home INR machine weekly INR needed                Anticoagulation Care Providers       Provider Role Specialty Phone number    Ling Trammell PA-C Referring Family Medicine [phone]

## 2024-07-10 ENCOUNTER — ANTICOAGULATION THERAPY VISIT (OUTPATIENT)
Dept: ANTICOAGULATION | Facility: OTHER | Age: 54
End: 2024-07-10
Attending: PHYSICIAN ASSISTANT
Payer: COMMERCIAL

## 2024-07-10 DIAGNOSIS — Z79.01 LONG TERM CURRENT USE OF ANTICOAGULANT THERAPY: ICD-10-CM

## 2024-07-10 DIAGNOSIS — I82.602 BLOOD CLOT OF VEIN IN SHOULDER AREA, LEFT: Primary | ICD-10-CM

## 2024-07-10 DIAGNOSIS — D68.51 FACTOR 5 LEIDEN MUTATION, HETEROZYGOUS (H): ICD-10-CM

## 2024-07-10 LAB — INR (EXTERNAL): 3.6

## 2024-07-17 ENCOUNTER — ANTICOAGULATION THERAPY VISIT (OUTPATIENT)
Dept: ANTICOAGULATION | Facility: OTHER | Age: 54
End: 2024-07-17
Attending: PHYSICIAN ASSISTANT
Payer: COMMERCIAL

## 2024-07-17 DIAGNOSIS — I82.602 BLOOD CLOT OF VEIN IN SHOULDER AREA, LEFT: Primary | ICD-10-CM

## 2024-07-17 DIAGNOSIS — D68.51 FACTOR 5 LEIDEN MUTATION, HETEROZYGOUS (H): ICD-10-CM

## 2024-07-17 DIAGNOSIS — Z79.01 LONG TERM CURRENT USE OF ANTICOAGULANT THERAPY: ICD-10-CM

## 2024-07-17 LAB — INR (EXTERNAL): 4.1

## 2024-07-17 NOTE — PROGRESS NOTES
ANTICOAGULATION MANAGEMENT     Daniela Ladd 53 year old female is on warfarin with supratherapeutic INR result. (Goal INR 2.5-3.5)    Recent labs: (last 7 days)     07/17/24  0723   INR 4.1       ASSESSMENT     Source(s): Chart Review and Patient/Caregiver Call     Warfarin doses taken: Warfarin taken as instructed  Diet: No new diet changes identified  Medication/supplement changes: None noted  New illness, injury, or hospitalization: No  Signs or symptoms of bleeding or clotting: No  Previous result: Supratherapeutic  Additional findings: None       PLAN     Recommended plan for no diet, medication or health factor changes affecting INR     Dosing Instructions: partial hold then decrease your warfarin dose (3.2% change) with next INR in 1 week       Summary  As of 7/17/2024      Full warfarin instructions:  7/17: 6 mg; Otherwise 10 mg every Mon, Fri; 8 mg all other days   Next INR check:  7/24/2024               Telephone call with Daniela who verbalizes understanding and agrees to plan    Patient to recheck with home meter    Education provided: None required    Plan made per [facility] anticoagulation protocol    Yoly Crowe, RN  Anticoagulation Clinic  7/17/2024    _______________________________________________________________________     Anticoagulation Episode Summary       Current INR goal:  2.5-3.5   TTR:  67.8% (1 y)   Target end date:  Indefinite   Send INR reminders to:  [facility]    Indications    Blood clot of vein in shoulder area  left [I82.602]  Factor 5 Leiden mutation  heterozygous (H24) [D68.51]  Long-term (current) use of anticoagulants [Z79.01] [Z79.01]             Comments:  Has home INR machine weekly INR needed                Anticoagulation Care Providers       Provider Role Specialty Phone number    Ling Trammell PA-C Referring Family Medicine [phone]

## 2024-07-24 ENCOUNTER — ANTICOAGULATION THERAPY VISIT (OUTPATIENT)
Dept: ANTICOAGULATION | Facility: OTHER | Age: 54
End: 2024-07-24
Attending: PHYSICIAN ASSISTANT
Payer: COMMERCIAL

## 2024-07-24 DIAGNOSIS — I82.602 BLOOD CLOT OF VEIN IN SHOULDER AREA, LEFT: Primary | ICD-10-CM

## 2024-07-24 DIAGNOSIS — Z79.01 LONG TERM CURRENT USE OF ANTICOAGULANT THERAPY: ICD-10-CM

## 2024-07-24 DIAGNOSIS — D68.51 FACTOR 5 LEIDEN MUTATION, HETEROZYGOUS (H): ICD-10-CM

## 2024-07-24 LAB — INR (EXTERNAL): 3.9

## 2024-07-24 NOTE — PROGRESS NOTES
ANTICOAGULATION MANAGEMENT     Daniela NANCY Ladd 53 year old female is on warfarin with supratherapeutic INR result. (Goal INR 2.5-3.5)    Recent labs: (last 7 days)     07/24/24  0725   INR 3.9       ASSESSMENT     Source(s): Patient/ Care giver call     Warfarin doses taken: Warfarin taken as instructed  Diet: No new diet changes identified  New illness, injury, or hospitalization: No  Medication/supplement changes: None noted  Signs or symptoms of bleeding or clotting: No  Previous INR: Supratherapeutic  Additional findings: None     PLAN     Recommended plan for no diet, medication or health factor changes affecting INR     Dosing Instructions: decrease your warfarin dose (5% change) with next INR in 1 week       Summary  As of 7/24/2024      Full warfarin instructions:  9 mg every Mon; 8 mg all other days   Next INR check:  7/31/2024               Telephone call with Daniela who verbalizes understanding and agrees to plan    Patient to recheck with home meter    Education provided: Please call back if any changes to your diet, medications or how you've been taking warfarin    Plan made per [facility] anticoagulation protocol    Ambreen Romano RN  Anticoagulation Clinic  7/24/2024    _______________________________________________________________________     Anticoagulation Episode Summary       Current INR goal:  2.5-3.5   TTR:  65.9% (1 y)   Target end date:  Indefinite   Send INR reminders to:  [facility]    Indications    Blood clot of vein in shoulder area  left [I82.602]  Factor 5 Leiden mutation  heterozygous (H24) [D68.51]  Long-term (current) use of anticoagulants [Z79.01] [Z79.01]             Comments:  Has home INR machine weekly INR needed                Anticoagulation Care Providers       Provider Role Specialty Phone number    Ling Trammell PA-C Referring Family Medicine [phone]

## 2024-07-31 ENCOUNTER — ANTICOAGULATION THERAPY VISIT (OUTPATIENT)
Dept: ANTICOAGULATION | Facility: OTHER | Age: 54
End: 2024-07-31
Attending: PHYSICIAN ASSISTANT
Payer: COMMERCIAL

## 2024-07-31 DIAGNOSIS — I82.602 BLOOD CLOT OF VEIN IN SHOULDER AREA, LEFT: Primary | ICD-10-CM

## 2024-07-31 DIAGNOSIS — D68.51 FACTOR 5 LEIDEN MUTATION, HETEROZYGOUS (H): ICD-10-CM

## 2024-07-31 DIAGNOSIS — Z79.01 LONG TERM CURRENT USE OF ANTICOAGULANT THERAPY: ICD-10-CM

## 2024-07-31 LAB — INR (EXTERNAL): 3.3

## 2024-07-31 NOTE — PROGRESS NOTES
ANTICOAGULATION MANAGEMENT     Daniela Ladd 54 year old female is on warfarin with therapeutic INR result. (Goal INR 2.5-3.5)    Recent labs: (last 7 days)     07/31/24  0738   INR 3.3       ASSESSMENT     Source(s): Patient/ Care giver call     Warfarin doses taken: Warfarin taken as instructed  Diet: No new diet changes identified  New illness, injury, or hospitalization: No  Medication/supplement changes: None noted  Signs or symptoms of bleeding or clotting: No  Previous INR: Supratherapeutic  Additional findings: None     PLAN     Recommended plan for no diet, medication or health factor changes affecting INR     Dosing Instructions: Continue your current warfarin dose with next INR in 1 week       Summary  As of 7/31/2024      Full warfarin instructions:  9 mg every Mon; 8 mg all other days   Next INR check:  8/7/2024               Telephone call with Daniela who verbalizes understanding and agrees to plan    Patient to recheck with home meter    Education provided: Please call back if any changes to your diet, medications or how you've been taking warfarin    Plan made per [facility] anticoagulation protocol    Ambreen Romano RN  Anticoagulation Clinic  7/31/2024    _______________________________________________________________________     Anticoagulation Episode Summary       Current INR goal:  2.5-3.5   TTR:  66.5% (1 y)   Target end date:  Indefinite   Send INR reminders to:  [facility]    Indications    Blood clot of vein in shoulder area  left [I82.602]  Factor 5 Leiden mutation  heterozygous (H24) [D68.51]  Long-term (current) use of anticoagulants [Z79.01] [Z79.01]             Comments:  Has home INR machine weekly INR needed                Anticoagulation Care Providers       Provider Role Specialty Phone number    Ling Trammell PA-C Referring Family Medicine [phone]

## 2024-08-07 ENCOUNTER — ANTICOAGULATION THERAPY VISIT (OUTPATIENT)
Dept: ANTICOAGULATION | Facility: OTHER | Age: 54
End: 2024-08-07
Attending: PHYSICIAN ASSISTANT
Payer: COMMERCIAL

## 2024-08-07 DIAGNOSIS — I82.602 BLOOD CLOT OF VEIN IN SHOULDER AREA, LEFT: Primary | ICD-10-CM

## 2024-08-07 DIAGNOSIS — Z79.01 LONG TERM CURRENT USE OF ANTICOAGULANT THERAPY: ICD-10-CM

## 2024-08-07 DIAGNOSIS — D68.51 FACTOR 5 LEIDEN MUTATION, HETEROZYGOUS (H): ICD-10-CM

## 2024-08-07 LAB — INR (EXTERNAL): 3.5

## 2024-08-07 NOTE — PROGRESS NOTES
ANTICOAGULATION MANAGEMENT     Daniela Ladd 54 year old female is on warfarin with therapeutic INR result. (Goal INR 2.5-3.5)    Recent labs: (last 7 days)     08/07/24  0531   INR 3.5       ASSESSMENT     Source(s): Patient/ Care giver call     Warfarin doses taken: Warfarin taken as instructed  Diet: No new diet changes identified  New illness, injury, or hospitalization: No  Medication/supplement changes: None noted  Signs or symptoms of bleeding or clotting: No  Previous INR: Therapeutic last visit; previously outside of goal range  Additional findings: None     PLAN     Recommended plan for no diet, medication or health factor changes affecting INR     Dosing Instructions: Continue your current warfarin dose with next INR in 1 week       Summary  As of 8/7/2024      Full warfarin instructions:  9 mg every Mon; 8 mg all other days   Next INR check:  8/14/2024               Telephone call with Daniela who verbalizes understanding and agrees to plan    Patient to recheck with home meter    Education provided: None required    Plan made per [facility] anticoagulation protocol    Mera Maguire RN  Anticoagulation Clinic  8/7/2024    _______________________________________________________________________     Anticoagulation Episode Summary       Current INR goal:  2.5-3.5   TTR:  66.9% (1 y)   Target end date:  Indefinite   Send INR reminders to:  [facility]    Indications    Blood clot of vein in shoulder area  left [I82.602]  Factor 5 Leiden mutation  heterozygous (H24) [D68.51]  Long-term (current) use of anticoagulants [Z79.01] [Z79.01]             Comments:  Has home INR machine weekly INR needed                Anticoagulation Care Providers       Provider Role Specialty Phone number    Ling Trammell PA-C Referring Family Medicine [phone]

## 2024-08-14 ENCOUNTER — ANTICOAGULATION THERAPY VISIT (OUTPATIENT)
Dept: ANTICOAGULATION | Facility: OTHER | Age: 54
End: 2024-08-14
Attending: PHYSICIAN ASSISTANT
Payer: COMMERCIAL

## 2024-08-14 DIAGNOSIS — D68.51 FACTOR 5 LEIDEN MUTATION, HETEROZYGOUS (H): ICD-10-CM

## 2024-08-14 DIAGNOSIS — I82.602 BLOOD CLOT OF VEIN IN SHOULDER AREA, LEFT: Primary | ICD-10-CM

## 2024-08-14 DIAGNOSIS — Z79.01 LONG TERM CURRENT USE OF ANTICOAGULANT THERAPY: ICD-10-CM

## 2024-08-14 LAB — INR (EXTERNAL): 3.7

## 2024-08-14 NOTE — PROGRESS NOTES
ANTICOAGULATION MANAGEMENT     Daniela Crumpartney 54 year old female is on warfarin with supratherapeutic INR result. (Goal INR 2.5-3.5)    Recent labs: (last 7 days)     08/14/24  0730   INR 3.7       ASSESSMENT     Source(s): Patient/ Care giver call     Warfarin doses taken: Warfarin taken as instructed  Diet: No new diet changes identified  New illness, injury, or hospitalization: No  Medication/supplement changes: None noted  Signs or symptoms of bleeding or clotting: No  Previous INR: Therapeutic last 2(+) visits  Additional findings: None     PLAN     Recommended plan for no diet, medication or health factor changes affecting INR     Dosing Instructions: decrease your warfarin dose (3.5% change) with next INR in 1 week       Summary  As of 8/14/2024      Full warfarin instructions:  7 mg every Wed; 8 mg all other days   Next INR check:  8/21/2024               Telephone call with Daniela who verbalizes understanding and agrees to plan    Patient to recheck with home meter    Education provided: Please call back if any changes to your diet, medications or how you've been taking warfarin    Plan made per [facility] anticoagulation protocol    Ambreen Romano RN  Anticoagulation Clinic  8/14/2024    _______________________________________________________________________     Anticoagulation Episode Summary       Current INR goal:  2.5-3.5   TTR:  64.9% (1 y)   Target end date:  Indefinite   Send INR reminders to:  [facility]    Indications    Blood clot of vein in shoulder area  left [I82.602]  Factor 5 Leiden mutation  heterozygous (H24) [D68.51]  Long-term (current) use of anticoagulants [Z79.01] [Z79.01]             Comments:  Has home INR machine weekly INR needed                Anticoagulation Care Providers       Provider Role Specialty Phone number    Valeri, Ling Rodriguez PA-C Referring Family Medicine [phone]

## 2024-08-21 ENCOUNTER — ANTICOAGULATION THERAPY VISIT (OUTPATIENT)
Dept: ANTICOAGULATION | Facility: OTHER | Age: 54
End: 2024-08-21
Attending: PHYSICIAN ASSISTANT
Payer: COMMERCIAL

## 2024-08-21 DIAGNOSIS — D68.51 FACTOR 5 LEIDEN MUTATION, HETEROZYGOUS (H): ICD-10-CM

## 2024-08-21 DIAGNOSIS — Z79.01 LONG TERM CURRENT USE OF ANTICOAGULANT THERAPY: ICD-10-CM

## 2024-08-21 DIAGNOSIS — I82.602 BLOOD CLOT OF VEIN IN SHOULDER AREA, LEFT: Primary | ICD-10-CM

## 2024-08-21 LAB — INR (EXTERNAL): 2.8

## 2024-08-21 NOTE — PROGRESS NOTES
ANTICOAGULATION MANAGEMENT     Daniela Ladd 54 year old female is on warfarin with therapeutic INR result. (Goal INR 2.5-3.5)    Recent labs: (last 7 days)     08/21/24  0520   INR 2.8       ASSESSMENT     Source(s): Patient/ Care giver call     Warfarin doses taken: Warfarin taken as instructed  Diet: No new diet changes identified  New illness, injury, or hospitalization: No  Medication/supplement changes: None noted  Signs or symptoms of bleeding or clotting: No  Previous INR: Supratherapeutic  Additional findings: None     PLAN     Recommended plan for no diet, medication or health factor changes affecting INR     Dosing Instructions: Continue your current warfarin dose with next INR in 1 week       Summary  As of 8/21/2024      Full warfarin instructions:  7 mg every Wed; 8 mg all other days   Next INR check:  8/28/2024               Telephone call with Daniela who verbalizes understanding and agrees to plan    Patient to recheck with home meter    Education provided: Please call back if any changes to your diet, medications or how you've been taking warfarin    Plan made per [facility] anticoagulation protocol    Abmreen Romano RN  Anticoagulation Clinic  8/21/2024    _______________________________________________________________________     Anticoagulation Episode Summary       Current INR goal:  2.5-3.5   TTR:  64.5% (1 y)   Target end date:  Indefinite   Send INR reminders to:  [facility]    Indications    Blood clot of vein in shoulder area  left [I82.602]  Factor 5 Leiden mutation  heterozygous (H24) [D68.51]  Long-term (current) use of anticoagulants [Z79.01] [Z79.01]             Comments:  Has home INR machine weekly INR needed                Anticoagulation Care Providers       Provider Role Specialty Phone number    Ling Trammell PA-C Referring Family Medicine [phone]

## 2024-08-28 ENCOUNTER — ANTICOAGULATION THERAPY VISIT (OUTPATIENT)
Dept: ANTICOAGULATION | Facility: OTHER | Age: 54
End: 2024-08-28
Attending: PHYSICIAN ASSISTANT
Payer: COMMERCIAL

## 2024-08-28 DIAGNOSIS — D68.51 FACTOR 5 LEIDEN MUTATION, HETEROZYGOUS (H): ICD-10-CM

## 2024-08-28 DIAGNOSIS — Z79.01 LONG TERM CURRENT USE OF ANTICOAGULANT THERAPY: ICD-10-CM

## 2024-08-28 DIAGNOSIS — I82.602 BLOOD CLOT OF VEIN IN SHOULDER AREA, LEFT: Primary | ICD-10-CM

## 2024-08-28 LAB — INR (EXTERNAL): 3

## 2024-08-28 NOTE — PROGRESS NOTES
ANTICOAGULATION MANAGEMENT     Daniela Ladd 54 year old female is on warfarin with therapeutic INR result. (Goal INR 2.5-3.5)    Recent labs: (last 7 days)     08/28/24  0800   INR 3.0       ASSESSMENT     Source(s): Patient/ Care giver call     Warfarin doses taken: Warfarin taken as instructed  Diet: No new diet changes identified  New illness, injury, or hospitalization: No  Medication/supplement changes: None noted  Signs or symptoms of bleeding or clotting: No  Previous INR: Therapeutic last visit; previously outside of goal range  Additional findings: None     PLAN     Recommended plan for no diet, medication or health factor changes affecting INR     Dosing Instructions: Continue your current warfarin dose with next INR in 1 week       Summary  As of 8/28/2024      Full warfarin instructions:  7 mg every Wed; 8 mg all other days   Next INR check:  9/4/2024               Telephone call with Daniela who verbalizes understanding and agrees to plan    Patient to recheck with home meter    Education provided: Please call back if any changes to your diet, medications or how you've been taking warfarin and Resume manage by exception with home monitor. Continue to submit INR results to home monitor company.You will only be called when your result is out of range. Please call and notify [facility] if new medication started, dose missed, signs or symptoms of bleeding or clotting, or a surgery/procedure is scheduled.    Plan made per [facility] anticoagulation protocol    Ambreen Romano RN  Anticoagulation Clinic  8/28/2024    _______________________________________________________________________     Anticoagulation Episode Summary       Current INR goal:  2.5-3.5   TTR:  66.1% (1 y)   Target end date:  Indefinite   Send INR reminders to:  [facility]    Indications    Blood clot of vein in shoulder area  left [I82.602]  Factor 5 Leiden mutation  heterozygous (H24) [D68.51]  Long-term (current) use of  anticoagulants [Z79.01] [Z79.01]             Comments:  Has home INR machine weekly INR needed                Anticoagulation Care Providers       Provider Role Specialty Phone number    Ling Trammell PA-C Referring Family Medicine [phone]

## 2024-09-04 ENCOUNTER — ANTICOAGULATION THERAPY VISIT (OUTPATIENT)
Dept: ANTICOAGULATION | Facility: OTHER | Age: 54
End: 2024-09-04
Attending: PHYSICIAN ASSISTANT
Payer: COMMERCIAL

## 2024-09-04 DIAGNOSIS — I82.602 BLOOD CLOT OF VEIN IN SHOULDER AREA, LEFT: Primary | ICD-10-CM

## 2024-09-04 DIAGNOSIS — D68.51 FACTOR 5 LEIDEN MUTATION, HETEROZYGOUS (H): ICD-10-CM

## 2024-09-04 DIAGNOSIS — Z79.01 LONG TERM CURRENT USE OF ANTICOAGULANT THERAPY: ICD-10-CM

## 2024-09-04 LAB — INR (EXTERNAL): 3.5

## 2024-09-04 NOTE — PROGRESS NOTES
ANTICOAGULATION  MANAGEMENT-Home Monitor Managed by Exception    Daniela CRUZ Julee 54 year old female is on warfarin with therapeutic INR result. (Goal INR 2.5-3.5)    Recent labs: (last 7 days)     09/04/24  0734   INR 3.5       Previous INR was Therapeutic  Medication, diet, health changes since last INR:chart reviewed; none identified  Contacted within the last 12 weeks by phone on 8/28/24  Last [facility] referral date: 08/21/2024      PLAN     Daniela was NOT contacted regarding therapeutic result today per home monitoring policy manage by exception agreement.   Current warfarin dose is to be continued:     Summary  As of 9/4/2024      Full warfarin instructions:  7 mg every Wed; 8 mg all other days   Next INR check:  9/11/2024             ?   Ambreen Romano RN  Anticoagulation Clinic  9/4/2024    _______________________________________________________________________     Anticoagulation Episode Summary       Current INR goal:  2.5-3.5   TTR:  68.0% (1 y)   Target end date:  Indefinite   Send INR reminders to:  [facility]    Indications    Blood clot of vein in shoulder area  left [I82.602]  Factor 5 Leiden mutation  heterozygous (H24) [D68.51]  Long-term (current) use of anticoagulants [Z79.01] [Z79.01]             Comments:  Has home INR machine weekly INR needed                Anticoagulation Care Providers       Provider Role Specialty Phone number    Ling Trammell PA-C Referring Family Medicine [phone]

## 2024-09-11 ENCOUNTER — ANTICOAGULATION THERAPY VISIT (OUTPATIENT)
Dept: ANTICOAGULATION | Facility: OTHER | Age: 54
End: 2024-09-11
Attending: PHYSICIAN ASSISTANT
Payer: COMMERCIAL

## 2024-09-11 DIAGNOSIS — D68.51 FACTOR 5 LEIDEN MUTATION, HETEROZYGOUS (H): ICD-10-CM

## 2024-09-11 DIAGNOSIS — I82.602 BLOOD CLOT OF VEIN IN SHOULDER AREA, LEFT: Primary | ICD-10-CM

## 2024-09-11 DIAGNOSIS — Z79.01 LONG TERM CURRENT USE OF ANTICOAGULANT THERAPY: ICD-10-CM

## 2024-09-11 LAB — INR (EXTERNAL): 3.2

## 2024-09-11 NOTE — PROGRESS NOTES
ANTICOAGULATION  MANAGEMENT-Home Monitor Managed by Exception    Daniela CRUZ Julee 54 year old female is on warfarin with therapeutic INR result. (Goal INR 2.5-3.5)    Recent labs: (last 7 days)     09/11/24  0427   INR 3.2       Previous INR was Therapeutic  Medication, diet, health changes since last INR:chart reviewed; none identified  Contacted within the last 12 weeks by phone on 8/28/24  Last [facility] referral date: 08/21/2024      PLAN     Daniela was NOT contacted regarding therapeutic result today per home monitoring policy manage by exception agreement.   Current warfarin dose is to be continued:     Summary  As of 9/11/2024      Full warfarin instructions:  7 mg every Wed; 8 mg all other days   Next INR check:  9/18/2024             ?   Ambreen Romano RN  Anticoagulation Clinic  9/11/2024    _______________________________________________________________________     Anticoagulation Episode Summary       Current INR goal:  2.5-3.5   TTR:  69.9% (1 y)   Target end date:  Indefinite   Send INR reminders to:  [facility]    Indications    Blood clot of vein in shoulder area  left [I82.602]  Factor 5 Leiden mutation  heterozygous (H24) [D68.51]  Long-term (current) use of anticoagulants [Z79.01] [Z79.01]             Comments:  Has home INR machine weekly INR needed                Anticoagulation Care Providers       Provider Role Specialty Phone number    Ling Trammell PA-C Referring Family Medicine [phone]

## 2024-09-18 ENCOUNTER — ANTICOAGULATION THERAPY VISIT (OUTPATIENT)
Dept: ANTICOAGULATION | Facility: OTHER | Age: 54
End: 2024-09-18
Attending: PHYSICIAN ASSISTANT
Payer: COMMERCIAL

## 2024-09-18 DIAGNOSIS — D68.51 FACTOR 5 LEIDEN MUTATION, HETEROZYGOUS (H): ICD-10-CM

## 2024-09-18 DIAGNOSIS — I82.602 BLOOD CLOT OF VEIN IN SHOULDER AREA, LEFT: Primary | ICD-10-CM

## 2024-09-18 DIAGNOSIS — Z79.01 LONG TERM CURRENT USE OF ANTICOAGULANT THERAPY: ICD-10-CM

## 2024-09-18 LAB — INR (EXTERNAL): 2.6 (ref 0.9–1.1)

## 2024-09-18 NOTE — PROGRESS NOTES
ANTICOAGULATION  MANAGEMENT-Home Monitor Managed by Exception    Daniela Crumpartney 54 year old female is on warfarin with therapeutic INR result. (Goal INR 2.5-3.5)    Recent labs: (last 7 days)     09/18/24  0759   INR 2.6*       Previous INR was Therapeutic  Medication, diet, health changes since last INR:chart reviewed; none identified  Contacted within the last 12 weeks by phone on 8/28/24  Last [facility] referral date: 08/21/2024      PLAN     Daniela was NOT contacted regarding therapeutic result today per home monitoring policy manage by exception agreement.   Current warfarin dose is to be continued:     Summary  As of 9/18/2024      Full warfarin instructions:  7 mg every Wed; 8 mg all other days   Next INR check:  9/25/2024             ?   Yoly Crowe RN  Anticoagulation Clinic  9/18/2024    _______________________________________________________________________     Anticoagulation Episode Summary       Current INR goal:  2.5-3.5   TTR:  71.0% (1 y)   Target end date:  Indefinite   Send INR reminders to:  [facility]    Indications    Blood clot of vein in shoulder area  left [I82.602]  Factor 5 Leiden mutation  heterozygous (H24) [D68.51]  Long-term (current) use of anticoagulants [Z79.01] [Z79.01]             Comments:  Has home INR machine weekly INR needed                Anticoagulation Care Providers       Provider Role Specialty Phone number    Ling Trammell PA-C Referring Family Medicine [phone]

## 2024-09-24 ENCOUNTER — ANTICOAGULATION THERAPY VISIT (OUTPATIENT)
Dept: ANTICOAGULATION | Facility: OTHER | Age: 54
End: 2024-09-24
Attending: PHYSICIAN ASSISTANT
Payer: COMMERCIAL

## 2024-09-24 DIAGNOSIS — D68.51 FACTOR 5 LEIDEN MUTATION, HETEROZYGOUS (H): ICD-10-CM

## 2024-09-24 DIAGNOSIS — Z79.01 LONG TERM CURRENT USE OF ANTICOAGULANT THERAPY: ICD-10-CM

## 2024-09-24 DIAGNOSIS — I82.602 BLOOD CLOT OF VEIN IN SHOULDER AREA, LEFT: Primary | ICD-10-CM

## 2024-09-24 LAB — INR (EXTERNAL): 2.3

## 2024-09-24 NOTE — PROGRESS NOTES
ANTICOAGULATION MANAGEMENT     Daniela Ladd 54 year old female is on warfarin with subtherapeutic INR result. (Goal INR 2.5-3.5)    Recent labs: (last 7 days)     09/24/24  0746   INR 2.3       ASSESSMENT     Source(s): Chart Review and Patient/Caregiver Call     Warfarin doses taken: Warfarin taken as instructed  Diet: No new diet changes identified  Medication/supplement changes: None noted  New illness, injury, or hospitalization: No  Signs or symptoms of bleeding or clotting: No  Previous result: Therapeutic last visit; previously outside of goal range  Additional findings: None       PLAN     Recommended plan for no diet, medication or health factor changes affecting INR     Dosing Instructions: Increase your warfarin dose (5.5% change) with next INR in 1 week       Summary  As of 9/24/2024      Full warfarin instructions:  10 mg every Wed; 8 mg all other days   Next INR check:  10/2/2024               Telephone call with Daniela who verbalizes understanding and agrees to plan    Patient to recheck with home meter    Education provided: None required    Plan made per [facility] anticoagulation protocol    Yoly Crowe RN  9/24/2024  Anticoagulation Clinic  [employer] for routing messages: p [facility]          _______________________________________________________________________     Anticoagulation Episode Summary       Current INR goal:  2.5-3.5   TTR:  69.9% (1 y)   Target end date:  Indefinite   Send INR reminders to:  [facility]    Indications    Blood clot of vein in shoulder area  left [I82.602]  Factor 5 Leiden mutation  heterozygous (H24) [D68.51]  Long-term (current) use of anticoagulants [Z79.01] [Z79.01]             Comments:  Has home INR machine weekly INR needed                Anticoagulation Care Providers       Provider Role Specialty Phone number    Ling Trammell PA-C Referring Family Medicine [phone]

## 2024-10-02 ENCOUNTER — ANTICOAGULATION THERAPY VISIT (OUTPATIENT)
Dept: ANTICOAGULATION | Facility: OTHER | Age: 54
End: 2024-10-02
Payer: COMMERCIAL

## 2024-10-02 DIAGNOSIS — D68.51 FACTOR 5 LEIDEN MUTATION, HETEROZYGOUS (H): ICD-10-CM

## 2024-10-02 DIAGNOSIS — Z79.01 LONG TERM CURRENT USE OF ANTICOAGULANT THERAPY: ICD-10-CM

## 2024-10-02 DIAGNOSIS — I82.602 BLOOD CLOT OF VEIN IN SHOULDER AREA, LEFT: Primary | ICD-10-CM

## 2024-10-02 LAB — INR (EXTERNAL): 3.1 (ref 0.9–1.1)

## 2024-10-02 NOTE — PROGRESS NOTES
ANTICOAGULATION MANAGEMENT     Daniela Ladd 54 year old female is on warfarin with therapeutic INR result. (Goal INR 2.5-3.5)    Recent labs: (last 7 days)     10/02/24  0815   INR 3.1*       ASSESSMENT     Source(s): Chart Review and Patient/Caregiver Call     Warfarin doses taken: Warfarin taken as instructed  Diet: No new diet changes identified  Medication/supplement changes: None noted  New illness, injury, or hospitalization: No  Signs or symptoms of bleeding or clotting: No  Previous result: Subtherapeutic  Additional findings: None       PLAN     Recommended plan for no diet, medication or health factor changes affecting INR     Dosing Instructions: Continue your current warfarin dose with next INR in 1 week       Summary  As of 10/2/2024      Full warfarin instructions:  10 mg every Tue; 8 mg all other days   Next INR check:  10/9/2024               Telephone call with Daniela who verbalizes understanding and agrees to plan    Patient to recheck with home meter    Education provided: Resume manage by exception with home monitor. Continue to submit INR results to home monitor company.You will only be called when your result is out of range. Please call and notify [facility] if new medication started, dose missed, signs or symptoms of bleeding or clotting, or a surgery/procedure is scheduled. Due for next call no later than: 12/31/24.    Plan made per [facility] anticoagulation protocol    Yoly Crowe RN  10/2/2024  Anticoagulation Clinic  [facility] for routing messages: p [facility]          _______________________________________________________________________     Anticoagulation Episode Summary       Current INR goal:  2.5-3.5   TTR:  69.3% (1 y)   Target end date:  Indefinite   Send INR reminders to:  [facility]    Indications    Blood clot of vein in shoulder area  left [I82.602]  Factor 5 Leiden mutation  heterozygous (H) [D68.51]  Long-term (current) use of anticoagulants  [Z79.01] [Z79.01]             Comments:  Has home INR machine weekly INR needed                Anticoagulation Care Providers       Provider Role Specialty Phone number    Ling Trammell PA-C Referring Family Medicine [phone]

## 2024-10-09 ENCOUNTER — ANTICOAGULATION THERAPY VISIT (OUTPATIENT)
Dept: ANTICOAGULATION | Facility: OTHER | Age: 54
End: 2024-10-09
Payer: COMMERCIAL

## 2024-10-09 DIAGNOSIS — D68.51 FACTOR 5 LEIDEN MUTATION, HETEROZYGOUS (H): ICD-10-CM

## 2024-10-09 DIAGNOSIS — I82.602 BLOOD CLOT OF VEIN IN SHOULDER AREA, LEFT: Primary | ICD-10-CM

## 2024-10-09 DIAGNOSIS — Z79.01 LONG TERM CURRENT USE OF ANTICOAGULANT THERAPY: ICD-10-CM

## 2024-10-09 LAB — INR (EXTERNAL): 2.9

## 2024-10-09 NOTE — PROGRESS NOTES
ANTICOAGULATION  MANAGEMENT-Home Monitor Managed by Exception    Daniela CRUZ Julee 54 year old female is on warfarin with therapeutic INR result. (Goal INR 2.5-3.5)    Recent labs: (last 7 days)     10/09/24  0517   INR 2.9       Previous INR was Therapeutic  Medication, diet, health changes since last INR:chart reviewed; none identified  Contacted within the last 12 weeks by phone on 10/2/24  Last [facility] referral date: 08/21/2024      PLAN     Daniela was NOT contacted regarding therapeutic result today per home monitoring policy manage by exception agreement.   Current warfarin dose is to be continued:     Summary  As of 10/9/2024      Full warfarin instructions:  10 mg every Tue; 8 mg all other days   Next INR check:  10/16/2024             ?   Ambreen Romano RN  Anticoagulation Clinic  10/9/2024    _______________________________________________________________________     Anticoagulation Episode Summary       Current INR goal:  2.5-3.5   TTR:  69.3% (1 y)   Target end date:  Indefinite   Send INR reminders to:  [facility]    Indications    Blood clot of vein in shoulder area  left [I82.602]  Factor 5 Leiden mutation  heterozygous (H) [D68.51]  Long-term (current) use of anticoagulants [Z79.01] [Z79.01]             Comments:  Has home INR machine weekly INR needed                Anticoagulation Care Providers       Provider Role Specialty Phone number    Ling Trammell PA-C Referring Family Medicine [phone]

## 2024-10-16 ENCOUNTER — ANTICOAGULATION THERAPY VISIT (OUTPATIENT)
Dept: ANTICOAGULATION | Facility: OTHER | Age: 54
End: 2024-10-16
Payer: COMMERCIAL

## 2024-10-16 DIAGNOSIS — D68.51 FACTOR 5 LEIDEN MUTATION, HETEROZYGOUS (H): ICD-10-CM

## 2024-10-16 DIAGNOSIS — Z79.01 LONG TERM CURRENT USE OF ANTICOAGULANT THERAPY: ICD-10-CM

## 2024-10-16 DIAGNOSIS — I82.602 BLOOD CLOT OF VEIN IN SHOULDER AREA, LEFT: Primary | ICD-10-CM

## 2024-10-16 LAB — INR (EXTERNAL): 2.8

## 2024-10-16 NOTE — PROGRESS NOTES
ANTICOAGULATION  MANAGEMENT-Home Monitor Managed by Exception    Daniela CRUZ Julee 54 year old female is on warfarin with therapeutic INR result. (Goal INR 2.5-3.5)    Recent labs: (last 7 days)     10/16/24  0521   INR 2.8       Previous INR was Therapeutic  Medication, diet, health changes since last INR:chart reviewed; none identified  Contacted within the last 12 weeks by phone on 10/02/24  Last [facility] referral date: 08/21/2024      PLAN     Daniela was NOT contacted regarding therapeutic result today per home monitoring policy manage by exception agreement.   Current warfarin dose is to be continued:     Summary  As of 10/16/2024      Full warfarin instructions:  10 mg every Tue; 8 mg all other days   Next INR check:  10/23/2024             ?   Matilde Lara RN  Anticoagulation Clinic  10/16/2024    _______________________________________________________________________     Anticoagulation Episode Summary       Current INR goal:  2.5-3.5   TTR:  69.5% (1 y)   Target end date:  Indefinite   Send INR reminders to:  [facility]    Indications    Blood clot of vein in shoulder area  left [I82.602]  Factor 5 Leiden mutation  heterozygous (H) [D68.51]  Long-term (current) use of anticoagulants [Z79.01] [Z79.01]             Comments:  Has home INR machine weekly INR needed                Anticoagulation Care Providers       Provider Role Specialty Phone number    Ling Trammell PA-C Referring Family Medicine [phone]

## 2024-10-23 ENCOUNTER — ANTICOAGULATION THERAPY VISIT (OUTPATIENT)
Dept: ANTICOAGULATION | Facility: OTHER | Age: 54
End: 2024-10-23
Payer: COMMERCIAL

## 2024-10-23 DIAGNOSIS — I82.602 BLOOD CLOT OF VEIN IN SHOULDER AREA, LEFT: Primary | ICD-10-CM

## 2024-10-23 DIAGNOSIS — Z79.01 LONG TERM CURRENT USE OF ANTICOAGULANT THERAPY: ICD-10-CM

## 2024-10-23 DIAGNOSIS — D68.51 FACTOR 5 LEIDEN MUTATION, HETEROZYGOUS (H): ICD-10-CM

## 2024-10-23 LAB — INR (EXTERNAL): 2.6

## 2024-10-23 NOTE — PROGRESS NOTES
ANTICOAGULATION  MANAGEMENT-Home Monitor Managed by Exception    Danielamariusz Ladd 54 year old female is on warfarin with therapeutic INR result. (Goal INR 2.5-3.5)    Recent labs: (last 7 days)     10/23/24  0732   INR 2.6       Previous INR was Therapeutic  Medication, diet, health changes since last INR:chart reviewed; none identified  Contacted within the last 12 weeks by phone on 10/2/24  Last [facility] referral date: 08/21/2024  Patient called states she does not like INR on the lower end of her goal. States she feels terrible when her INR is lower. She would like to be able to take 10 mg another day and so she can get her INR higher. Increase would be 3.4%. Yoly Crowe RN    10/23/2024  8:12 AM      ANÍBAL     Daniela was NOT contacted regarding therapeutic result today per home monitoring policy manage by exception agreement.   Current warfarin dose is to be continued:     Summary  As of 10/23/2024      Full warfarin instructions:  10 mg every Tue; 8 mg all other days   Next INR check:  10/30/2024             ?   Ambreen Romano, RN  Anticoagulation Clinic  10/23/2024    _______________________________________________________________________     Anticoagulation Episode Summary       Current INR goal:  2.5-3.5   TTR:  71.3% (1 y)   Target end date:  Indefinite   Send INR reminders to:  [facility]    Indications    Blood clot of vein in shoulder area  left [I82.602]  Factor 5 Leiden mutation  heterozygous (H) [D68.51]  Long-term (current) use of anticoagulants [Z79.01] [Z79.01]             Comments:  Has home INR machine weekly INR needed                Anticoagulation Care Providers       Provider Role Specialty Phone number    Ling Trammell PA-C Referring Family Medicine [phone]

## 2024-10-30 ENCOUNTER — ANTICOAGULATION THERAPY VISIT (OUTPATIENT)
Dept: ANTICOAGULATION | Facility: OTHER | Age: 54
End: 2024-10-30
Payer: COMMERCIAL

## 2024-10-30 DIAGNOSIS — I82.602 BLOOD CLOT OF VEIN IN SHOULDER AREA, LEFT: Primary | ICD-10-CM

## 2024-10-30 DIAGNOSIS — Z79.01 LONG TERM CURRENT USE OF ANTICOAGULANT THERAPY: ICD-10-CM

## 2024-10-30 DIAGNOSIS — D68.51 FACTOR 5 LEIDEN MUTATION, HETEROZYGOUS (H): ICD-10-CM

## 2024-10-30 LAB — INR (EXTERNAL): 2.5

## 2024-10-30 NOTE — PROGRESS NOTES
ANTICOAGULATION  MANAGEMENT-Home Monitor Managed by Exception    Daniela CRUZ Julee 54 year old female is on warfarin with therapeutic INR result. (Goal INR 2.5-3.5)    Recent labs: (last 7 days)     10/30/24  0515   INR 2.5       Previous INR was Therapeutic  Medication, diet, health changes since last INR:chart reviewed; none identified  Contacted within the last 12 weeks by phone on 10/23/24  Last [facility] referral date: 08/21/2024      PLAN     Daniela was NOT contacted regarding therapeutic result today per home monitoring policy manage by exception agreement.   Current warfarin dose is to be continued:     Summary  As of 10/30/2024      Full warfarin instructions:  10 mg every Sun, Wed; 8 mg all other days   Next INR check:  11/6/2024             ?   Ambreen Romano RN  Anticoagulation Clinic  10/30/2024    _______________________________________________________________________     Anticoagulation Episode Summary       Current INR goal:  2.5-3.5   TTR:  71.3% (1 y)   Target end date:  Indefinite   Send INR reminders to:  [facility]    Indications    Blood clot of vein in shoulder area  left [I82.602]  Factor 5 Leiden mutation  heterozygous (H) [D68.51]  Long-term (current) use of anticoagulants [Z79.01] [Z79.01]             Comments:  Has home INR machine weekly INR needed                Anticoagulation Care Providers       Provider Role Specialty Phone number    Ling Trammell PA-C Referring Family Medicine [phone]

## 2024-11-03 SDOH — HEALTH STABILITY: PHYSICAL HEALTH: ON AVERAGE, HOW MANY DAYS PER WEEK DO YOU ENGAGE IN MODERATE TO STRENUOUS EXERCISE (LIKE A BRISK WALK)?: 2 DAYS

## 2024-11-03 SDOH — HEALTH STABILITY: PHYSICAL HEALTH: ON AVERAGE, HOW MANY MINUTES DO YOU ENGAGE IN EXERCISE AT THIS LEVEL?: 20 MIN

## 2024-11-03 ASSESSMENT — SOCIAL DETERMINANTS OF HEALTH (SDOH): HOW OFTEN DO YOU GET TOGETHER WITH FRIENDS OR RELATIVES?: PATIENT DECLINED

## 2024-11-05 ASSESSMENT — PATIENT HEALTH QUESTIONNAIRE - PHQ9
10. IF YOU CHECKED OFF ANY PROBLEMS, HOW DIFFICULT HAVE THESE PROBLEMS MADE IT FOR YOU TO DO YOUR WORK, TAKE CARE OF THINGS AT HOME, OR GET ALONG WITH OTHER PEOPLE: NOT DIFFICULT AT ALL
SUM OF ALL RESPONSES TO PHQ QUESTIONS 1-9: 0
SUM OF ALL RESPONSES TO PHQ QUESTIONS 1-9: 0

## 2024-11-05 ASSESSMENT — ANXIETY QUESTIONNAIRES
4. TROUBLE RELAXING: NOT AT ALL
8. IF YOU CHECKED OFF ANY PROBLEMS, HOW DIFFICULT HAVE THESE MADE IT FOR YOU TO DO YOUR WORK, TAKE CARE OF THINGS AT HOME, OR GET ALONG WITH OTHER PEOPLE?: NOT DIFFICULT AT ALL
GAD7 TOTAL SCORE: 0
2. NOT BEING ABLE TO STOP OR CONTROL WORRYING: NOT AT ALL
GAD7 TOTAL SCORE: 0
5. BEING SO RESTLESS THAT IT IS HARD TO SIT STILL: NOT AT ALL
7. FEELING AFRAID AS IF SOMETHING AWFUL MIGHT HAPPEN: NOT AT ALL
IF YOU CHECKED OFF ANY PROBLEMS ON THIS QUESTIONNAIRE, HOW DIFFICULT HAVE THESE PROBLEMS MADE IT FOR YOU TO DO YOUR WORK, TAKE CARE OF THINGS AT HOME, OR GET ALONG WITH OTHER PEOPLE: NOT DIFFICULT AT ALL
7. FEELING AFRAID AS IF SOMETHING AWFUL MIGHT HAPPEN: NOT AT ALL
GAD7 TOTAL SCORE: 0
6. BECOMING EASILY ANNOYED OR IRRITABLE: NOT AT ALL
3. WORRYING TOO MUCH ABOUT DIFFERENT THINGS: NOT AT ALL
1. FEELING NERVOUS, ANXIOUS, OR ON EDGE: NOT AT ALL

## 2024-11-06 ENCOUNTER — ANTICOAGULATION THERAPY VISIT (OUTPATIENT)
Dept: ANTICOAGULATION | Facility: OTHER | Age: 54
End: 2024-11-06
Attending: PHYSICIAN ASSISTANT
Payer: COMMERCIAL

## 2024-11-06 ENCOUNTER — OFFICE VISIT (OUTPATIENT)
Dept: FAMILY MEDICINE | Facility: OTHER | Age: 54
End: 2024-11-06
Attending: PHYSICIAN ASSISTANT
Payer: COMMERCIAL

## 2024-11-06 VITALS
DIASTOLIC BLOOD PRESSURE: 70 MMHG | HEART RATE: 94 BPM | SYSTOLIC BLOOD PRESSURE: 114 MMHG | BODY MASS INDEX: 30.97 KG/M2 | WEIGHT: 194.8 LBS | OXYGEN SATURATION: 95 % | TEMPERATURE: 97.8 F

## 2024-11-06 DIAGNOSIS — D68.51 FACTOR 5 LEIDEN MUTATION, HETEROZYGOUS (H): ICD-10-CM

## 2024-11-06 DIAGNOSIS — Z79.01 LONG TERM CURRENT USE OF ANTICOAGULANT THERAPY: ICD-10-CM

## 2024-11-06 DIAGNOSIS — Z13.1 SCREENING FOR DIABETES MELLITUS: ICD-10-CM

## 2024-11-06 DIAGNOSIS — I82.622 ARM DVT (DEEP VENOUS THROMBOEMBOLISM), ACUTE, LEFT (H): ICD-10-CM

## 2024-11-06 DIAGNOSIS — I82.602 BLOOD CLOT OF VEIN IN SHOULDER AREA, LEFT: Primary | ICD-10-CM

## 2024-11-06 DIAGNOSIS — R09.82 PND (POST-NASAL DRIP): ICD-10-CM

## 2024-11-06 DIAGNOSIS — Z01.84 IMMUNITY STATUS TESTING: ICD-10-CM

## 2024-11-06 DIAGNOSIS — R73.03 PREDIABETES: ICD-10-CM

## 2024-11-06 DIAGNOSIS — Z13.220 SCREENING CHOLESTEROL LEVEL: ICD-10-CM

## 2024-11-06 DIAGNOSIS — N91.2 AMENORRHEA: ICD-10-CM

## 2024-11-06 DIAGNOSIS — Z00.00 ROUTINE GENERAL MEDICAL EXAMINATION AT A HEALTH CARE FACILITY: Primary | ICD-10-CM

## 2024-11-06 DIAGNOSIS — R91.8 PULMONARY NODULES: ICD-10-CM

## 2024-11-06 LAB
ALBUMIN SERPL BCG-MCNC: 4.4 G/DL (ref 3.5–5.2)
ALP SERPL-CCNC: 81 U/L (ref 40–150)
ALT SERPL W P-5'-P-CCNC: 18 U/L (ref 0–50)
ANION GAP SERPL CALCULATED.3IONS-SCNC: 11 MMOL/L (ref 7–15)
AST SERPL W P-5'-P-CCNC: 19 U/L (ref 0–45)
BASOPHILS # BLD AUTO: 0.1 10E3/UL (ref 0–0.2)
BASOPHILS NFR BLD AUTO: 1 %
BILIRUB SERPL-MCNC: 0.2 MG/DL
BUN SERPL-MCNC: 13.9 MG/DL (ref 6–20)
CALCIUM SERPL-MCNC: 9.2 MG/DL (ref 8.8–10.4)
CHLORIDE SERPL-SCNC: 102 MMOL/L (ref 98–107)
CHOLEST SERPL-MCNC: 238 MG/DL
CREAT SERPL-MCNC: 0.68 MG/DL (ref 0.51–0.95)
EGFRCR SERPLBLD CKD-EPI 2021: >90 ML/MIN/1.73M2
EOSINOPHIL # BLD AUTO: 0.3 10E3/UL (ref 0–0.7)
EOSINOPHIL NFR BLD AUTO: 3 %
ERYTHROCYTE [DISTWIDTH] IN BLOOD BY AUTOMATED COUNT: 12.9 % (ref 10–15)
EST. AVERAGE GLUCOSE BLD GHB EST-MCNC: 126 MG/DL
FASTING STATUS PATIENT QL REPORTED: NO
FASTING STATUS PATIENT QL REPORTED: NO
FSH SERPL IRP2-ACNC: 50 MIU/ML
GLUCOSE SERPL-MCNC: 96 MG/DL (ref 70–99)
HBA1C MFR BLD: 6 %
HCO3 SERPL-SCNC: 28 MMOL/L (ref 22–29)
HCT VFR BLD AUTO: 47.7 % (ref 35–47)
HDLC SERPL-MCNC: 37 MG/DL
HGB BLD-MCNC: 16 G/DL (ref 11.7–15.7)
IMM GRANULOCYTES # BLD: 0 10E3/UL
IMM GRANULOCYTES NFR BLD: 0 %
INR (EXTERNAL): 2.6
LDLC SERPL CALC-MCNC: 138 MG/DL
LYMPHOCYTES # BLD AUTO: 4 10E3/UL (ref 0.8–5.3)
LYMPHOCYTES NFR BLD AUTO: 41 %
MCH RBC QN AUTO: 30.1 PG (ref 26.5–33)
MCHC RBC AUTO-ENTMCNC: 33.5 G/DL (ref 31.5–36.5)
MCV RBC AUTO: 90 FL (ref 78–100)
MONOCYTES # BLD AUTO: 0.5 10E3/UL (ref 0–1.3)
MONOCYTES NFR BLD AUTO: 5 %
NEUTROPHILS # BLD AUTO: 5 10E3/UL (ref 1.6–8.3)
NEUTROPHILS NFR BLD AUTO: 51 %
NONHDLC SERPL-MCNC: 201 MG/DL
NRBC # BLD AUTO: 0 10E3/UL
NRBC BLD AUTO-RTO: 0 /100
PLATELET # BLD AUTO: 333 10E3/UL (ref 150–450)
POTASSIUM SERPL-SCNC: 4.4 MMOL/L (ref 3.4–5.3)
PROT SERPL-MCNC: 7.6 G/DL (ref 6.4–8.3)
RBC # BLD AUTO: 5.31 10E6/UL (ref 3.8–5.2)
SODIUM SERPL-SCNC: 141 MMOL/L (ref 135–145)
TRIGL SERPL-MCNC: 317 MG/DL
WBC # BLD AUTO: 9.8 10E3/UL (ref 4–11)

## 2024-11-06 PROCEDURE — 83036 HEMOGLOBIN GLYCOSYLATED A1C: CPT | Mod: ZL | Performed by: PHYSICIAN ASSISTANT

## 2024-11-06 PROCEDURE — 84075 ASSAY ALKALINE PHOSPHATASE: CPT | Mod: ZL | Performed by: PHYSICIAN ASSISTANT

## 2024-11-06 PROCEDURE — 99396 PREV VISIT EST AGE 40-64: CPT | Performed by: PHYSICIAN ASSISTANT

## 2024-11-06 PROCEDURE — 82465 ASSAY BLD/SERUM CHOLESTEROL: CPT | Mod: ZL | Performed by: PHYSICIAN ASSISTANT

## 2024-11-06 PROCEDURE — 87340 HEPATITIS B SURFACE AG IA: CPT | Mod: ZL | Performed by: PHYSICIAN ASSISTANT

## 2024-11-06 PROCEDURE — 85025 COMPLETE CBC W/AUTO DIFF WBC: CPT | Mod: ZL | Performed by: PHYSICIAN ASSISTANT

## 2024-11-06 PROCEDURE — 36415 COLL VENOUS BLD VENIPUNCTURE: CPT | Mod: ZL | Performed by: PHYSICIAN ASSISTANT

## 2024-11-06 PROCEDURE — 86706 HEP B SURFACE ANTIBODY: CPT | Mod: ZL | Performed by: PHYSICIAN ASSISTANT

## 2024-11-06 PROCEDURE — 99213 OFFICE O/P EST LOW 20 MIN: CPT | Mod: 25 | Performed by: PHYSICIAN ASSISTANT

## 2024-11-06 PROCEDURE — 83001 ASSAY OF GONADOTROPIN (FSH): CPT | Mod: ZL | Performed by: PHYSICIAN ASSISTANT

## 2024-11-06 RX ORDER — FLUTICASONE PROPIONATE 50 MCG
2 SPRAY, SUSPENSION (ML) NASAL DAILY
Qty: 16 G | Refills: 3 | Status: SHIPPED | OUTPATIENT
Start: 2024-11-06

## 2024-11-06 RX ORDER — WARFARIN SODIUM 2 MG/1
TABLET ORAL
Qty: 430 TABLET | Refills: 4 | Status: SHIPPED | OUTPATIENT
Start: 2024-11-06

## 2024-11-06 NOTE — PROGRESS NOTES
Preventive Care Visit  [facility]  Ling Trammell PA-C, Family Medicine  Nov 6, 2024      Assessment & Plan   Problem List Items Addressed This Visit          Respiratory    Pulmonary nodules    Relevant Orders    CT Chest w Contrast       Endocrine    Prediabetes    Relevant Orders    Hemoglobin A1c (Completed)    CBC and Differential (Completed)    Comprehensive Metabolic Panel       Other    Long-term (current) use of anticoagulants [Z79.01]    Relevant Medications    warfarin ANTICOAGULANT (COUMADIN) 2 MG tablet     Other Visit Diagnoses       Routine general medical examination at a health care facility    -  Primary    PND (post-nasal drip)        Relevant Medications    fluticasone (FLONASE) 50 MCG/ACT nasal spray    Arm DVT (deep venous thromboembolism), acute, left (H)        Relevant Medications    warfarin ANTICOAGULANT (COUMADIN) 2 MG tablet    Other Relevant Orders    CBC and Differential (Completed)    Comprehensive Metabolic Panel    Immunity status testing        Relevant Orders    Hepatitis B Surface Antibody    Hepatitis B Surface Antigen    Screening cholesterol level        Relevant Orders    Lipid Panel    CBC and Differential (Completed)    Comprehensive Metabolic Panel    Screening for diabetes mellitus        Relevant Orders    Hemoglobin A1c (Completed)    CBC and Differential (Completed)    Comprehensive Metabolic Panel    Amenorrhea        Relevant Orders    FSH           Pulmonary nodules: Ordered chest CT for monitoring.  Highly stressed the need to quit smoking.  Discussed options at length.    Completed hepatitis B immunity status testing.    Completed cholesterol and diabetes screening.  Also completed hemoglobin A1c with history of prediabetes.  Completed FSH for monitoring with recent amenorrhea.  Labs are pending.    History of arm DVT: Refilled warfarin.  Completed lab work for monitoring.  No acute concerns at this time.  Continue to monitor INR.    Postnasal  "drip: Refilled flu is on nasal spray.  Stable.  No acute concerns at this time.    Declines vaccines at this time.    Repeat physical in 1 year.    Patient has been advised of split billing requirements and indicates understanding: Yes       BMI  Estimated body mass index is 30.97 kg/m  as calculated from the following:    Height as of 24: 1.689 m (5' 6.5\").    Weight as of this encounter: 88.4 kg (194 lb 12.8 oz).   Weight management plan: Discussed healthy diet and exercise guidelines    Counseling  Appropriate preventive services were addressed with this patient via screening, questionnaire, or discussion as appropriate for fall prevention, nutrition, physical activity, Tobacco-use cessation, social engagement, weight loss and cognition.  Checklist reviewing preventive services available has been given to the patient.  Reviewed patient's diet, addressing concerns and/or questions.   She is at risk for lack of exercise and has been provided with information to increase physical activity for the benefit of her well-being.     See Patient Instructions    Return in about 53 weeks (around 2025) for Annual Wellness Visit.      Rolando Suarez is a 54 year old, presenting for the following:  Physical        2024    11:25 AM   Additional Questions   Roomed by Nicole RICHARDSON CMA          HPI    No LMP recorded. Patient is perimenopausal.   Contraception: perimenopausal  Risk for STI?: none  Last pap: 2022 - normal pap and HPV, repeat in 5 years  Any hx of abnormal paps:  yes  FH of early CA?: no  Cholesterol/DM concerns/screenin, repeated  Tobacco?: yes  Lung cancer screening: none, ordered chest CT  Calcium intake: yes  DEXA: na  Last mammo: 2024  Colonoscopy: 3/15/2023 - negative cologuard, repeat in 3 years  Hepatitis C screen: 2022 - negative   HIV screen: 2022 - negative   Immunizations: declines vaccines    Patient is history of lung nodules.  Last chest CT was in .  " History of seeing pulmonology in [place] for nodules.  They encouraged her to quit smoking prior to repeat lung CT.  Patient is tried quitting in the past.  Allergic to the adhesive and nicotine patches.  Wellbutrin did not help.  The gums burned her mouth.  Interested in quitting.    Patient is currently taking vitamin D.  Previously was taking 2000 units in the summertime.  Recently increased to 4000 units for the wintertime.    History of DVTs.  Tolerating warfarin well.  No side effects noted.  Continues to monitor her INR.    Patient would like hepatitis B immunity status testing.    No current menstrual cycle in the last 5 to 6 months.  Previously given a prescription for norethindrone however patient did not start the medication.        Health Care Directive  Patient does not have a Health Care Directive: Discussed advance care planning with patient; however, patient declined at this time.      11/3/2024   General Health   How would you rate your overall physical health? Good   Feel stress (tense, anxious, or unable to sleep) Not at all            11/3/2024   Nutrition   Three or more servings of calcium each day? Yes   Diet: Regular (no restrictions)   How many servings of fruit and vegetables per day? (!) 2-3   How many sweetened beverages each day? 0-1            11/3/2024   Exercise   Days per week of moderate/strenous exercise 2 days   Average minutes spent exercising at this level 20 min      (!) EXERCISE CONCERN      11/3/2024   Social Factors   Frequency of gathering with friends or relatives Patient declined   Worry food won't last until get money to buy more No   Food not last or not have enough money for food? No   Do you have housing? (Housing is defined as stable permanent housing and does not include staying ouside in a car, in a tent, in an abandoned building, in an overnight shelter, or couch-surfing.) Yes   Are you worried about losing your housing? No   Lack of transportation? No   Unable to  get utilities (heat,electricity)? No            11/3/2024   Fall Risk   Fallen 2 or more times in the past year? No    Trouble with walking or balance? No        Patient-reported          11/3/2024   Dental   Dentist two times every year? Yes            6/11/2024   TB Screening   Were you born outside of the [place]? No          Today's PHQ-9 Score:       11/5/2024    12:22 PM   PHQ-9 SCORE   PHQ-9 Total Score MyChart 0   PHQ-9 Total Score 0        Patient-reported         11/3/2024   Substance Use   Alcohol more than 3/day or more than 7/wk Not Applicable   Do you use any other substances recreationally? No        Social History     Tobacco Use    Smoking status: Every Day     Current packs/day: 1.00     Average packs/day: 1 pack/day for 39.8 years (39.8 ttl pk-yrs)     Types: Cigarettes     Start date: 1985    Smokeless tobacco: Never    Tobacco comments:     Will contact PCP for patches   Vaping Use    Vaping status: Never Used   Substance Use Topics    Alcohol use: No     Alcohol/week: 0.0 standard drinks of alcohol    Drug use: No           6/21/2024   LAST FHS-7 RESULTS   1st degree relative breast or ovarian cancer Yes   Any relative bilateral breast cancer No   Any male have breast cancer No   Any ONE woman have BOTH breast AND ovarian cancer No   Any woman with breast cancer before 50yrs No   2 or more relatives with breast AND/OR ovarian cancer No   2 or more relatives with breast AND/OR bowel cancer No           Mammogram Screening - Mammogram every 1-2 years updated in Health Maintenance based on mutual decision making        11/3/2024   STI Screening   New sexual partner(s) since last STI/HIV test? No        History of abnormal Pap smear: No - age 30-64 HPV with reflex Pap every 5 years recommended        Latest Ref Rng & Units 4/26/2022     9:36 AM 3/18/2019    11:05 AM   PAP / HPV   PAP  Negative for Intraepithelial Lesion or Malignancy (NILM)     PAP (Historical)   UNSAT    HPV 16 DNA Negative Negative   Negative    HPV 18 DNA Negative Negative  Negative    Other HR HPV Negative Negative  Negative      ASCVD Risk   The 10-year ASCVD risk score (Prashant VALENZUELA, et al., 2019) is: 5.6%    Values used to calculate the score:      Age: 54 years      Sex: Female      Is Non- : No      Diabetic: No      Tobacco smoker: Yes      Systolic Blood Pressure: 114 mmHg      Is BP treated: No      HDL Cholesterol: 42 mg/dL      Total Cholesterol: 248 mg/dL           Reviewed and updated as needed this visit by Provider   Tobacco  Allergies  Meds  Problems  Med Hx  Surg Hx  Fam Hx            Past Medical History:   Diagnosis Date    Abdominal pain     2010    Contact dermatitis     Atrophic dermatitis    Encounter for insertion of Mirena IUD 2018    Hidradenitis suppurativa     2010    Major depressive disorder, single episode     ,Situational related to marital discord    Nicotine dependence, uncomplicated     2010    Other acne     Facial acne    Personal history of other medical treatment (CODE)     10/00, III, para 1-0-2-1, vaginal delivery , two spontaneous first trimester miscarriage    Personal history of other medical treatment (CODE)     S/P cryotherapy Aug 2003.  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)    Raynaud's syndrome without gangrene     2010    Ventricular premature depolarization     10/25/2013     Past Surgical History:   Procedure Laterality Date    ANKLE SURGERY Left     [place]    EXTRACTION(S) DENTAL      LAPAROSCOPIC TUBAL LIGATION  2004    SALPINGO-OOPHORECTOMY BILATERAL Right 2011    Planned Lap RSO    TONSILLECTOMY           OB History    Para Term  AB Living   3 1 1 0 1 1   SAB IAB Ectopic Multiple Live Births   1 0 0 0 1      # Outcome Date GA Lbr Steven/2nd Weight Sex Type Anes PTL Lv   3 Term 1899   3.374 kg (7 lb 7 oz) M Vag-Spont None N RYNE      Name: Orlando   2             1 SAB  8w0d             Labs reviewed in EPIC  BP Readings from Last 3 Encounters:   24 114/70   24 132/80   24 124/82    Wt Readings from Last 3 Encounters:   24 88.4 kg (194 lb 12.8 oz)   24 88.5 kg (195 lb)   24 88.5 kg (195 lb 3.2 oz)                  Patient Active Problem List   Diagnosis    Blood clot of vein in shoulder area, left    Dyshidrotic hand dermatitis    Endometriosis    Migraine with aura and without status migrainosus, not intractable    Other acne    Raynaud's syndrome    Tobacco abuse    Factor 5 Leiden mutation, heterozygous (H)    Long-term (current) use of anticoagulants [Z79.01]    Pulmonary nodules    Prediabetes    Squamous cell carcinoma of neck     Past Surgical History:   Procedure Laterality Date    ANKLE SURGERY Left     [place]    EXTRACTION(S) DENTAL      LAPAROSCOPIC TUBAL LIGATION  2004    SALPINGO-OOPHORECTOMY BILATERAL Right 2011    Planned Lap RSO    TONSILLECTOMY             Social History     Tobacco Use    Smoking status: Every Day     Current packs/day: 1.00     Average packs/day: 1 pack/day for 39.8 years (39.8 ttl pk-yrs)     Types: Cigarettes     Start date:     Smokeless tobacco: Never    Tobacco comments:     Will contact PCP for patches   Substance Use Topics    Alcohol use: No     Alcohol/week: 0.0 standard drinks of alcohol     Family History   Problem Relation Age of Onset    Allergy (Severe) Mother         Allergies    Heart Disease Mother         Heart Disease    Diabetes Mother         Diabetes    Peripheral Vascular Disease Mother     Unknown/Adopted Father         Suspected cardiac.     Other - See Comments Sister         transgender, born male, identifies as female    Breast Cancer Maternal Grandmother         Cancer-breast,CREST syndrome, Raynaud's, scleroderma,  of CHF, small veins and arteries    Cancer Maternal Grandfather         Cancer,lung cancer, dm    Heart Disease Paternal Grandfather         Heart  Disease,CAD    Diabetes Maternal Aunt         Diabetes,also has had a borderline ovarian mass (not benign or cancerous)    Clotting Disorder Paternal Uncle         Factor V    Thyroid Disease Other         Thyroid Disease,several members of family with thyroid disease.    Other - See Comments Brother         lymphedema         Current Outpatient Medications   Medication Sig Dispense Refill    acetaminophen (TYLENOL) 500 MG tablet Take 1,000 mg by mouth every 6 hours as needed for pain       fluticasone (FLONASE) 50 MCG/ACT nasal spray Spray 2 sprays into both nostrils daily. 16 g 3    NONFORMULARY Amberen      vitamin D3 (CHOLECALCIFEROL) 50 mcg (2000 units) tablet Take 2 tablets by mouth daily      warfarin ANTICOAGULANT (COUMADIN) 2 MG tablet TAKE 8 MG DAILY FOR 4 DAYS AND 10 MG x 3 DAYS PER WEEK OR AS DIRECTED BY [facility] 430 tablet 4     Allergies   Allergen Reactions    Amoxicillin-Pot Clavulanate Nausea    Prednisone     Adhesive Tape Itching and Rash    Cyclobenzaprine Rash    No Clinical Screening - See Comments Rash     Nicoderm patch     Recent Labs   Lab Test 11/06/24  1213 12/08/23  1010 06/28/23  0727 03/27/23  1013 04/26/22  0956 04/26/22  0956 01/14/21  0929 06/09/20  0818   A1C 6.0* 5.9 6.0 6.0   < >  --   --   --    LDL  --  161* 122*  --   --  105*  --   --    HDL  --  42* 35* 31*   < > 32  --   --    TRIG  --  224* 253* 476*   < > 320*  --   --    ALT  --  20  --  21  --   --  12 17   CR  --  0.64 0.70 0.69   < > 0.74 0.76 0.73   GFRESTIMATED  --  >90 >90 >90   < > >90 81 85   GFRESTBLACK  --   --   --   --   --   --  >90 >90   POTASSIUM  --  4.4 4.3 4.5   < > 4.0 3.9 4.1   TSH  --  2.18  --  2.28  --   --   --   --     < > = values in this interval not displayed.          Review of Systems  Constitutional, neuro, ENT, endocrine, pulmonary, cardiac, gastrointestinal, genitourinary, musculoskeletal, integument and psychiatric systems are negative, except as otherwise noted.     Objective   "  Exam  /70 (BP Location: Right arm, Patient Position: Sitting, Cuff Size: Adult Large)   Pulse 94   Temp 97.8  F (36.6  C) (Tympanic)   Wt 88.4 kg (194 lb 12.8 oz)   SpO2 95%   BMI 30.97 kg/m     Estimated body mass index is 30.97 kg/m  as calculated from the following:    Height as of 2/26/24: 1.689 m (5' 6.5\").    Weight as of this encounter: 88.4 kg (194 lb 12.8 oz).    Physical Exam  GENERAL: alert and no distress  EYES: Eyes grossly normal to inspection, PERRL and conjunctivae and sclerae normal  HENT: ear canals and TM's normal, nose and mouth without ulcers or lesions  NECK: no adenopathy, no asymmetry, masses, or scars  RESP: lungs clear to auscultation - no rales, rhonchi or wheezes  CV: regular rate and rhythm, normal S1 S2, no S3 or S4, no murmur, click or rub, no peripheral edema  ABDOMEN: soft, nontender, no hepatosplenomegaly, no masses and bowel sounds normal  MS: no gross musculoskeletal defects noted, no edema  SKIN: no suspicious lesions or rashes  NEURO: Normal strength and tone, mentation intact and speech normal  PSYCH: mentation appears normal, affect normal/bright        Signed Electronically by: Ling Trammell PA-C    Answers submitted by the patient for this visit:  Patient Health Questionnaire (Submitted on 11/5/2024)  If you checked off any problems, how difficult have these problems made it for you to do your work, take care of things at home, or get along with other people?: Not difficult at all  PHQ9 TOTAL SCORE: 0  Patient Health Questionnaire (G7) (Submitted on 11/5/2024)  FABY 7 TOTAL SCORE: 0    "

## 2024-11-06 NOTE — PATIENT INSTRUCTIONS
Constipation - Encouraged increase of water and apple, pear and prune juice to decrease symptoms and discomfort.  Use age appropriate over the counter medications such as stool softeners for constipation relief.  Encouraged soft stools. Return to clinic with change/worsening of symptoms.     Patient Education   Preventive Care Advice   This is general advice given by our system to help you stay healthy. However, your care team may have specific advice just for you. Please talk to your care team about your preventive care needs.  Nutrition  Eat 5 or more servings of fruits and vegetables each day.  Try wheat bread, brown rice and whole grain pasta (instead of white bread, rice, and pasta).  Get enough calcium and vitamin D. Check the label on foods and aim for 100% of the RDA (recommended daily allowance).  Lifestyle  Exercise at least 150 minutes each week  (30 minutes a day, 5 days a week).  Do muscle strengthening activities 2 days a week. These help control your weight and prevent disease.  No smoking.  Wear sunscreen to prevent skin cancer.  Have a dental exam and cleaning every 6 months.  Yearly exams  See your health care team every year to talk about:  Any changes in your health.  Any medicines your care team has prescribed.  Preventive care, family planning, and ways to prevent chronic diseases.  Shots (vaccines)   HPV shots (up to age 26), if you've never had them before.  Hepatitis B shots (up to age 59), if you've never had them before.  COVID-19 shot: Get this shot when it's due.  Flu shot: Get a flu shot every year.  Tetanus shot: Get a tetanus shot every 10 years.  Pneumococcal, hepatitis A, and RSV shots: Ask your care team if you need these based on your risk.  Shingles shot (for age 50 and up)  General health tests  Diabetes screening:  Starting at age 35, Get screened for diabetes at least every 3 years.  If you are younger than age 35, ask your care team if you should be screened for  diabetes.  Cholesterol test: At age 39, start having a cholesterol test every 5 years, or more often if advised.  Bone density scan (DEXA): At age 50, ask your care team if you should have this scan for osteoporosis (brittle bones).  Hepatitis C: Get tested at least once in your life.  STIs (sexually transmitted infections)  Before age 24: Ask your care team if you should be screened for STIs.  After age 24: Get screened for STIs if you're at risk. You are at risk for STIs (including HIV) if:  You are sexually active with more than one person.  You don't use condoms every time.  You or a partner was diagnosed with a sexually transmitted infection.  If you are at risk for HIV, ask about PrEP medicine to prevent HIV.  Get tested for HIV at least once in your life, whether you are at risk for HIV or not.  Cancer screening tests  Cervical cancer screening: If you have a cervix, begin getting regular cervical cancer screening tests starting at age 21.  Breast cancer scan (mammogram): If you've ever had breasts, begin having regular mammograms starting at age 40. This is a scan to check for breast cancer.  Colon cancer screening: It is important to start screening for colon cancer at age 45.  Have a colonoscopy test every 10 years (or more often if you're at risk) Or, ask your provider about stool tests like a FIT test every year or Cologuard test every 3 years.  To learn more about your testing options, visit:   .  For help making a decision, visit:   https://bit.ly/uv08530.  Prostate cancer screening test: If you have a prostate, ask your care team if a prostate cancer screening test (PSA) at age 55 is right for you.  Lung cancer screening: If you are a current or former smoker ages 50 to 80, ask your care team if ongoing lung cancer screenings are right for you.  For informational purposes only. Not to replace the advice of your health care provider. Copyright   2023 [employer]. All rights reserved.  "Clinically reviewed by the [facility] Transitions Program. Che[employer] Feed [id number] - REV 01/24.  Learning About Being Physically Active  What is physical activity?     Being physically active means doing any kind of activity that gets your body moving.  The types of physical activity that can help you get fit and stay healthy include:  Aerobic or \"cardio\" activities. These make your heart beat faster and make you breathe harder, such as brisk walking, riding a bike, or running. They strengthen your heart and lungs and build up your endurance.  Strength training activities. These make your muscles work against, or \"resist,\" something. Examples include lifting weights or doing push-ups. These activities help tone and strengthen your muscles and bones.  Stretches. These let you move your joints and muscles through their full range of motion. Stretching helps you be more flexible.  Reaching a balance between these three types of physical activity is important because each one contributes to your overall fitness.  What are the benefits of being active?  Being active is one of the best things you can do for your health. It helps you to:  Feel stronger and have more energy to do all the things you like to do.  Focus better at school or work.  Feel, think, and sleep better.  Reach and stay at a healthy weight.  Lose fat and build lean muscle.  Lower your risk for serious health problems, including diabetes, heart attack, high blood pressure, and some cancers.  Keep your heart, lungs, bones, muscles, and joints strong and healthy.  How can you make being active part of your life?  Start slowly. Make it your long-term goal to get at least 30 minutes of exercise on most days of the week. Walking is a good choice. You also may want to do other activities, such as running, swimming, cycling, or playing tennis or team sports.  Pick activities that you like--ones that make your heart beat faster, your muscles stronger, and your " "muscles and joints more flexible. If you find more than one thing you like doing, do them all. You don't have to do the same thing every day.  Get your heart pumping every day. Any activity that makes your heart beat faster and keeps it at that rate for a while counts.  Here are some great ways to get your heart beating faster:  Go for a brisk walk, run, or hike.  Go for a swim or bike ride.  Take an online exercise class or dance.  Play a game of touch football, basketball, or soccer.  Play tennis, pickleball, or racquetball.  Climb stairs.  Even some household chores can be aerobic. Just do them at a faster pace. Raking or mowing the lawn, sweeping the garage, and vacuuming and cleaning your home all can help get your heart rate up.  Strengthen your muscles during the week. You don't have to lift heavy weights or grow big, bulky muscles to get stronger. Doing a few simple activities that make your muscles work against, or \"resist,\" something can help you get stronger. Aim for at least twice a week.  For example, you can:  Do push-ups or sit-ups, which use your own body weight as resistance.  Lift weights or dumbbells or use stretch bands at home or in a gym or community center.  Stretch your muscles often. Stretching will help you as you become more active. It can help you stay flexible and loosen tight muscles. It can also help improve your balance and posture and can be a great way to relax.  Be sure to stretch the muscles you'll be using when you work out. It's best to warm your muscles slightly before you stretch them. Walk or do some other light aerobic activity for a few minutes. Then start stretching.  When you stretch your muscles:  Do it slowly. Stretching is not about going fast or making sudden movements.  Don't push or bounce during a stretch.  Hold each stretch for at least 15 to 30 seconds, if you can. You should feel a stretch in the muscle, but not pain.  Breathe out as you do the stretch. Then " "breathe in as you hold the stretch. Don't hold your breath.  If you're worried about how more activity might affect your health, have a checkup before you start. Follow any special advice your doctor gives you for getting a smart start.  Where can you learn more?  Go to https://www.[employer].net/patiented  Enter W332 in the search box to learn more about \"Learning About Being Physically Active.\"  Current as of: June 5, 2023  Content Version: 14.2 2024 [employer].   Care instructions adapted under license by your healthcare professional. If you have questions about a medical condition or this instruction, always ask your healthcare professional. [employer] disclaims any warranty or liability for your use of this information.    Eating Healthy Foods: Care Instructions  With every meal, you can make healthy food choices. Try to eat a variety of fruits, vegetables, whole grains, lean proteins, and low-fat dairy products. This can help you get the right balance of nutrients, including vitamins and minerals. Small changes add up over time. You can start by adding one healthy food to your meals each day.    Try to make half your plate fruits and vegetables, one-fourth whole grains, and one-fourth lean proteins. Try including dairy with your meals.   Eat more fruits and vegetables. Try to have them with most meals and snacks.   Foods for healthy eating        Fruits   These can be fresh, frozen, canned, or dried.  Try to choose whole fruit rather than fruit juice.  Eat a variety of colors.        Vegetables   These can be fresh, frozen, canned, or dried.  Beans, peas, and lentils count too.        Whole grains   Choose whole-grain breads, cereals, and noodles.  Try brown rice.        Lean proteins   These can include lean meat, poultry, fish, and eggs.  You can also have tofu, beans, peas, lentils, nuts, and seeds.        Dairy   Try milk, yogurt, and cheese.  Choose low-fat or fat-free when " "you can.  If you need to, use lactose-free milk or fortified plant-based milk products, such as soy milk.        Water   Drink water when you're thirsty.  Limit sugar-sweetened drinks, including soda, fruit drinks, and sports drinks.  Where can you learn more?  Go to https://www.[employer].net/patiented  Enter T756 in the search box to learn more about \"Eating Healthy Foods: Care Instructions.\"  Current as of: September 20, 2023  Content Version: 14.2 2024 [facility] [employer] [facility].   Care instructions adapted under license by your healthcare professional. If you have questions about a medical condition or this instruction, always ask your healthcare professional. Healthwise, Incorporated disclaims any warranty or liability for your use of this information.       "

## 2024-11-06 NOTE — PROGRESS NOTES
ANTICOAGULATION  MANAGEMENT-Home Monitor Managed by Exception    Daniela Ladd 54 year old female is on warfarin with therapeutic INR result. (Goal INR 2.5-3.5)    Recent labs: (last 7 days)     11/06/24  0735   INR 2.6       Previous INR was Therapeutic  Medication, diet, health changes since last INR:chart reviewed; none identified  Contacted within the last 12 weeks by phone on 10/23/24  Last [facility] referral date: 08/21/2024  Patient called back and she has been taking 10 mg x 3 days a week after she got the 2.5 INR last week. We will have her continue with this since she does weekly checks of her INR. Patient prefers INR in 3.0-3.5 range. Yoly Crowe RN    11/6/2024  8:44 AM      ANÍBAL Limel called regarding therapeutic result today per home monitoring policy manage by exception agreement.   Current warfarin dose is to be continued:     Summary  As of 11/6/2024      Full warfarin instructions:  10 mg every Mon, Wed, Fri; 8 mg all other days   Next INR check:  11/13/2024             ?   Ambreen Romano, RN  Anticoagulation Clinic  11/6/2024    _______________________________________________________________________     Anticoagulation Episode Summary       Current INR goal:  2.5-3.5   TTR:  71.3% (1 y)   Target end date:  Indefinite   Send INR reminders to:  [facility]    Indications    Blood clot of vein in shoulder area  left [I82.602]  Factor 5 Leiden mutation  heterozygous (H) [D68.51]  Long-term (current) use of anticoagulants [Z79.01] [Z79.01]             Comments:  Has home INR machine weekly INR needed                Anticoagulation Care Providers       Provider Role Specialty Phone number    Ling Trammell PA-C Referring Family Medicine [phone]

## 2024-11-06 NOTE — NURSING NOTE
"Chief Complaint   Patient presents with    Physical     Patient here for physical. She does not want any vaccines.  Initial BP (!) 142/79   Pulse 94   Temp 97.8  F (36.6  C) (Tympanic)   Wt 88.4 kg (194 lb 12.8 oz)   SpO2 95%   BMI 30.97 kg/m   Estimated body mass index is 30.97 kg/m  as calculated from the following:    Height as of 2/26/24: 1.689 m (5' 6.5\").    Weight as of this encounter: 88.4 kg (194 lb 12.8 oz).  Medication Review: complete    The next two questions are to help us understand your food security.  If you are feeling you need any assistance in this area, we have resources available to support you today.          11/3/2024   SDOH- Food Insecurity   Within the past 12 months, did you worry that your food would run out before you got money to buy more? N    Within the past 12 months, did the food you bought just not last and you didn t have money to get more? N        Patient-reported         Health Care Directive:  Patient does not have a Health Care Directive: Discussed advance care planning with patient; however, patient declined at this time.    Nicole Gamino MA      "

## 2024-11-07 LAB
HBV SURFACE AB SERPL IA-ACNC: <3.5 M[IU]/ML
HBV SURFACE AB SERPL IA-ACNC: NONREACTIVE M[IU]/ML
HBV SURFACE AG SERPL QL IA: NONREACTIVE

## 2024-11-12 ENCOUNTER — HOSPITAL ENCOUNTER (OUTPATIENT)
Dept: CT IMAGING | Facility: OTHER | Age: 54
Discharge: HOME | End: 2024-11-12
Attending: PHYSICIAN ASSISTANT | Admitting: PHYSICIAN ASSISTANT
Payer: COMMERCIAL

## 2024-11-12 DIAGNOSIS — R91.8 PULMONARY NODULES: ICD-10-CM

## 2024-11-12 PROCEDURE — 250N000009 HC RX 250: Performed by: PHYSICIAN ASSISTANT

## 2024-11-12 PROCEDURE — 250N000011 HC RX IP 250 OP 636: Performed by: PHYSICIAN ASSISTANT

## 2024-11-12 PROCEDURE — 71260 CT THORAX DX C+: CPT

## 2024-11-12 RX ORDER — IOPAMIDOL 755 MG/ML
90 INJECTION, SOLUTION INTRAVASCULAR ONCE
Status: COMPLETED | OUTPATIENT
Start: 2024-11-12 | End: 2024-11-12

## 2024-11-12 RX ADMIN — SODIUM CHLORIDE 60 ML: 9 INJECTION, SOLUTION INTRAVENOUS at 09:29

## 2024-11-12 RX ADMIN — IOPAMIDOL 90 ML: 755 INJECTION, SOLUTION INTRAVENOUS at 09:29

## 2024-11-13 ENCOUNTER — ANTICOAGULATION THERAPY VISIT (OUTPATIENT)
Dept: ANTICOAGULATION | Facility: OTHER | Age: 54
End: 2024-11-13
Attending: PHYSICIAN ASSISTANT
Payer: COMMERCIAL

## 2024-11-13 DIAGNOSIS — D68.51 FACTOR 5 LEIDEN MUTATION, HETEROZYGOUS (H): ICD-10-CM

## 2024-11-13 DIAGNOSIS — Z79.01 LONG TERM CURRENT USE OF ANTICOAGULANT THERAPY: ICD-10-CM

## 2024-11-13 DIAGNOSIS — I82.602 BLOOD CLOT OF VEIN IN SHOULDER AREA, LEFT: Primary | ICD-10-CM

## 2024-11-13 LAB — INR (EXTERNAL): 2.8

## 2024-11-13 NOTE — PROGRESS NOTES
ANTICOAGULATION  MANAGEMENT-Home Monitor Managed by Exception    Daniela CRUZ Julee 54 year old female is on warfarin with therapeutic INR result. (Goal INR 2.5-3.5)    Recent labs: (last 7 days)     11/13/24  0757   INR 2.8       Previous INR was Therapeutic  Medication, diet, health changes since last INR:chart reviewed; none identified  Contacted within the last 12 weeks by phone on 11/6/24  Last [facility] referral date: 08/21/2024      PLAN     Daniela was NOT contacted regarding therapeutic result today per home monitoring policy manage by exception agreement.   Current warfarin dose is to be continued:     Summary  As of 11/13/2024      Full warfarin instructions:  10 mg every Mon, Wed, Fri; 8 mg all other days   Next INR check:  11/20/2024             ?   Ambreen Romano RN  Anticoagulation Clinic  11/13/2024    _______________________________________________________________________     Anticoagulation Episode Summary       Current INR goal:  2.5-3.5   TTR:  71.3% (1 y)   Target end date:  Indefinite   Send INR reminders to:  [facility]    Indications    Blood clot of vein in shoulder area  left [I82.602]  Factor 5 Leiden mutation  heterozygous (H) [D68.51]  Long-term (current) use of anticoagulants [Z79.01] [Z79.01]             Comments:  Has home INR machine weekly INR needed                Anticoagulation Care Providers       Provider Role Specialty Phone number    Valeri, Ling Rodriguez PA-C Referring Family Medicine [phone]

## 2024-11-20 ENCOUNTER — ANTICOAGULATION THERAPY VISIT (OUTPATIENT)
Dept: ANTICOAGULATION | Facility: OTHER | Age: 54
End: 2024-11-20
Attending: PHYSICIAN ASSISTANT
Payer: COMMERCIAL

## 2024-11-20 DIAGNOSIS — I82.602 BLOOD CLOT OF VEIN IN SHOULDER AREA, LEFT: Primary | ICD-10-CM

## 2024-11-20 DIAGNOSIS — I82.622 ARM DVT (DEEP VENOUS THROMBOEMBOLISM), ACUTE, LEFT (H): ICD-10-CM

## 2024-11-20 DIAGNOSIS — Z79.01 LONG TERM CURRENT USE OF ANTICOAGULANT THERAPY: ICD-10-CM

## 2024-11-20 DIAGNOSIS — D68.51 FACTOR 5 LEIDEN MUTATION, HETEROZYGOUS (H): ICD-10-CM

## 2024-11-20 LAB — INR (EXTERNAL): 2.5

## 2024-11-20 RX ORDER — WARFARIN SODIUM 2 MG/1
TABLET ORAL
Qty: 430 TABLET | Refills: 4 | Status: SHIPPED | OUTPATIENT
Start: 2024-11-20

## 2024-11-20 NOTE — PROGRESS NOTES
ANTICOAGULATION  MANAGEMENT-Home Monitor Managed by Exception    Daniela CRUZ Julee 54 year old female is on warfarin with therapeutic INR result. (Goal INR 2.5-3.5)    Recent labs: (last 7 days)     11/20/24  0510   INR 2.5       Previous INR was Therapeutic  Medication, diet, health changes since last INR:chart reviewed; none identified  Contacted within the last 12 weeks by phone on 11/6/24  Last [facility] referral date: 08/21/2024  Spoke with patient as prefers her INR to be upper end of range 3.0-3.5. We will increase dose and recheck it next week.    PLAN     Daniela contacted regarding therapeutic result today per home monitoring policy manage by exception agreement.   Current warfarin dose is to be continued:     Summary  As of 11/20/2024      Full warfarin instructions:  10 mg every Mon, Wed, Fri; 8 mg all other days   Next INR check:  11/27/2024             ?   Ambreen Romano RN  Anticoagulation Clinic  11/20/2024    _______________________________________________________________________     Anticoagulation Episode Summary       Current INR goal:  2.5-3.5   TTR:  71.3% (1 y)   Target end date:  Indefinite   Send INR reminders to:  [facility]    Indications    Blood clot of vein in shoulder area  left [I82.602]  Factor 5 Leiden mutation  heterozygous (H) [D68.51]  Long-term (current) use of anticoagulants [Z79.01] [Z79.01]             Comments:  Has home INR machine weekly INR needed                Anticoagulation Care Providers       Provider Role Specialty Phone number    Ling Trammell PA-C Referring Family Medicine [phone]

## 2024-11-27 ENCOUNTER — ANTICOAGULATION THERAPY VISIT (OUTPATIENT)
Dept: ANTICOAGULATION | Facility: OTHER | Age: 54
End: 2024-11-27
Attending: PHYSICIAN ASSISTANT
Payer: COMMERCIAL

## 2024-11-27 DIAGNOSIS — I82.602 BLOOD CLOT OF VEIN IN SHOULDER AREA, LEFT: Primary | ICD-10-CM

## 2024-11-27 DIAGNOSIS — Z79.01 LONG TERM CURRENT USE OF ANTICOAGULANT THERAPY: ICD-10-CM

## 2024-11-27 DIAGNOSIS — D68.51 FACTOR 5 LEIDEN MUTATION, HETEROZYGOUS (H): ICD-10-CM

## 2024-11-27 LAB — INR (EXTERNAL): 2.6

## 2024-11-27 NOTE — PROGRESS NOTES
ANTICOAGULATION  MANAGEMENT-Home Monitor Managed by Exception    Daniela CRUZ Julee 54 year old female is on warfarin with therapeutic INR result. (Goal INR 2.5-3.5)    Recent labs: (last 7 days)     11/27/24  0510   INR 2.6       Previous INR was Therapeutic  Medication, diet, health changes since last INR:chart reviewed; none identified  Contacted within the last 12 weeks by phone on 11/6/24  Last [facility] referral date: 08/21/2024      PLAN     Daniela was NOT contacted regarding therapeutic result today per home monitoring policy manage by exception agreement.   Current warfarin dose is to be continued:     Summary  As of 11/27/2024      Full warfarin instructions:  8 mg every Mon, Fri; 10 mg all other days   Next INR check:  12/4/2024             ?   Ambreen Romano RN  Anticoagulation Clinic  11/27/2024    _______________________________________________________________________     Anticoagulation Episode Summary       Current INR goal:  2.5-3.5   TTR:  71.3% (1 y)   Target end date:  Indefinite   Send INR reminders to:  [facility]    Indications    Blood clot of vein in shoulder area  left [I82.602]  Factor 5 Leiden mutation  heterozygous (H) [D68.51]  Long-term (current) use of anticoagulants [Z79.01] [Z79.01]             Comments:  Has home INR machine weekly INR needed                Anticoagulation Care Providers       Provider Role Specialty Phone number    Ling Trammell PA-C Referring Family Medicine [phone]

## 2024-12-04 ENCOUNTER — ANTICOAGULATION THERAPY VISIT (OUTPATIENT)
Dept: ANTICOAGULATION | Facility: OTHER | Age: 54
End: 2024-12-04
Attending: PHYSICIAN ASSISTANT
Payer: COMMERCIAL

## 2024-12-04 DIAGNOSIS — R09.82 PND (POST-NASAL DRIP): ICD-10-CM

## 2024-12-04 DIAGNOSIS — D68.51 FACTOR 5 LEIDEN MUTATION, HETEROZYGOUS (H): ICD-10-CM

## 2024-12-04 DIAGNOSIS — Z79.01 LONG TERM CURRENT USE OF ANTICOAGULANT THERAPY: ICD-10-CM

## 2024-12-04 DIAGNOSIS — I82.602 BLOOD CLOT OF VEIN IN SHOULDER AREA, LEFT: Primary | ICD-10-CM

## 2024-12-04 LAB — INR (EXTERNAL): 3.5 (ref 0.9–1.1)

## 2024-12-04 NOTE — PROGRESS NOTES
ANTICOAGULATION  MANAGEMENT-Home Monitor Managed by Exception    Daniela NANCY Ladd 54 year old female is on warfarin with therapeutic INR result. (Goal INR 2.5-3.5)    Recent labs: (last 7 days)     12/04/24  0802   INR 3.5*       Previous INR was Therapeutic  Medication, diet, health changes since last INR:chart reviewed; none identified  Contacted within the last 12 weeks by phone on 11/6/24  Last [facility] referral date: 08/21/2024      PLAN     Daniela was NOT contacted regarding therapeutic result today per home monitoring policy manage by exception agreement.   Current warfarin dose is to be continued:     Summary  As of 12/4/2024      Full warfarin instructions:  8 mg every Mon, Fri; 10 mg all other days   Next INR check:  12/11/2024             ?   Yoly Crowe RN  Anticoagulation Clinic  12/4/2024    _______________________________________________________________________     Anticoagulation Episode Summary       Current INR goal:  2.5-3.5   TTR:  72.1% (1 y)   Target end date:  Indefinite   Send INR reminders to:  [facility]    Indications    Blood clot of vein in shoulder area  left [I82.602]  Factor 5 Leiden mutation  heterozygous (H) [D68.51]  Long-term (current) use of anticoagulants [Z79.01] [Z79.01]             Comments:  Has home INR machine weekly INR needed                Anticoagulation Care Providers       Provider Role Specialty Phone number    Ling Trammell PA-C Referring Family Medicine [phone]

## 2024-12-05 RX ORDER — FLUTICASONE PROPIONATE 50 MCG
2 SPRAY, SUSPENSION (ML) NASAL DAILY
Qty: 48 G | Refills: 3 | Status: SHIPPED | OUTPATIENT
Start: 2024-12-05

## 2024-12-05 NOTE — TELEPHONE ENCOUNTER
[facility] in #[id number] in [employer] [facility] [place] sent Rx request for the following:      Requested Prescriptions   Pending Prescriptions Disp Refills    fluticasone (FLONASE) 50 MCG/ACT nasal spray 48 g 3     Sig: Spray 2 sprays into both nostrils daily.       Nasal Allergy Protocol Passed - 12/5/2024  2:51 PM        Passed - Patient is age 12 or older        Passed - Medication is active on med list        Passed - Recent (12 mo) or future (90 days) visit within the authorizing provider's specialty     The patient must have completed an in-person or virtual visit within the past 12 months or has a future visit scheduled within the next 90 days with the authorizing provider s specialty.  Urgent care and e-visits do not qualify as an office visit for this protocol.          Passed - Medication indicated for associated diagnosis     Medication is associated with one or more of the following diagnoses:   Allergic conjunctivitis  Allergies  Nasal congestion  Nasal discharge  Rhinitis  Sinus congestion  Recurrent acute maxillary sinusitis  Environmental allergies   Acute sinusitis   Cystic Fibrosis  Bronchiectasis               Last Prescription Date:   11/6/24  Last Fill Qty/Refills:         16 g, R-3    Last Office Visit:              11/6/24   Future Office visit:           none    Overridden by Ling Trammell PA-C on Nov 6, 2024 12:05 PM   Allergy/Contraindication   1. PREDNISONE [Level: Drug Class Match] [Reason: Tolerated medication/side effects in past]       Prescription approved per [facility] Refill Protocol.  Breonna White RN on 12/5/2024 at 2:52 PM

## 2024-12-11 ENCOUNTER — ANTICOAGULATION THERAPY VISIT (OUTPATIENT)
Dept: ANTICOAGULATION | Facility: OTHER | Age: 54
End: 2024-12-11
Attending: PHYSICIAN ASSISTANT
Payer: COMMERCIAL

## 2024-12-11 DIAGNOSIS — D68.51 FACTOR 5 LEIDEN MUTATION, HETEROZYGOUS (H): ICD-10-CM

## 2024-12-11 DIAGNOSIS — Z79.01 LONG TERM CURRENT USE OF ANTICOAGULANT THERAPY: ICD-10-CM

## 2024-12-11 DIAGNOSIS — I82.602 BLOOD CLOT OF VEIN IN SHOULDER AREA, LEFT: Primary | ICD-10-CM

## 2024-12-11 LAB — INR (EXTERNAL): 2.7 (ref 0.9–1.1)

## 2024-12-11 NOTE — PROGRESS NOTES
ANTICOAGULATION  MANAGEMENT-Home Monitor Managed by Exception    Daniela NANCY Ladd 54 year old female is on warfarin with therapeutic INR result. (Goal INR 2.5-3.5)    Recent labs: (last 7 days)     12/11/24  0803   INR 2.7*       Previous INR was Therapeutic  Medication, diet, health changes since last INR:chart reviewed; none identified  Contacted within the last 12 weeks by phone on 11/6/24  Last [facility] referral date: 08/21/2024      PLAN     Daniela was NOT contacted regarding therapeutic result today per home monitoring policy manage by exception agreement.   Current warfarin dose is to be continued:     Summary  As of 12/11/2024      Full warfarin instructions:  8 mg every Mon, Fri; 10 mg all other days   Next INR check:  12/18/2024             ?   Yoly Crowe RN  Anticoagulation Clinic  12/11/2024    _______________________________________________________________________     Anticoagulation Episode Summary       Current INR goal:  2.5-3.5   TTR:  72.1% (1 y)   Target end date:  Indefinite   Send INR reminders to:  [facility]    Indications    Blood clot of vein in shoulder area  left [I82.602]  Factor 5 Leiden mutation  heterozygous (H) [D68.51]  Long-term (current) use of anticoagulants [Z79.01] [Z79.01]             Comments:  Has home INR machine weekly INR needed                Anticoagulation Care Providers       Provider Role Specialty Phone number    Ling Trammell PA-C Referring Family Medicine [phone]

## 2024-12-18 ENCOUNTER — ANTICOAGULATION THERAPY VISIT (OUTPATIENT)
Dept: ANTICOAGULATION | Facility: OTHER | Age: 54
End: 2024-12-18
Attending: PHYSICIAN ASSISTANT
Payer: COMMERCIAL

## 2024-12-18 DIAGNOSIS — D68.51 FACTOR 5 LEIDEN MUTATION, HETEROZYGOUS (H): ICD-10-CM

## 2024-12-18 DIAGNOSIS — Z79.01 LONG TERM CURRENT USE OF ANTICOAGULANT THERAPY: ICD-10-CM

## 2024-12-18 DIAGNOSIS — I82.602 BLOOD CLOT OF VEIN IN SHOULDER AREA, LEFT: Primary | ICD-10-CM

## 2024-12-18 LAB — INR (EXTERNAL): 2.9

## 2024-12-18 NOTE — PROGRESS NOTES
ANTICOAGULATION  MANAGEMENT-Home Monitor Managed by Exception    Daniela CRUZ Julee 54 year old female is on warfarin with therapeutic INR result. (Goal INR 2.5-3.5)    Recent labs: (last 7 days)     12/18/24  0510   INR 2.9       Previous INR was Therapeutic  Medication, diet, health changes since last INR:chart reviewed; none identified  Contacted within the last 12 weeks by phone on 11/6/24  Last [facility] referral date: 08/21/2024      PLAN     Daniela was NOT contacted regarding therapeutic result today per home monitoring policy manage by exception agreement.   Current warfarin dose is to be continued:     Summary  As of 12/18/2024      Full warfarin instructions:  8 mg every Mon, Fri; 10 mg all other days   Next INR check:  12/26/2024             ?   Ambreen Romano RN  Anticoagulation Clinic  12/18/2024    _______________________________________________________________________     Anticoagulation Episode Summary       Current INR goal:  2.5-3.5   TTR:  72.1% (1 y)   Target end date:  Indefinite   Send INR reminders to:  [facility]    Indications    Blood clot of vein in shoulder area  left [I82.602]  Factor 5 Leiden mutation  heterozygous (H) [D68.51]  Long-term (current) use of anticoagulants [Z79.01] [Z79.01]             Comments:  Has home INR machine weekly INR needed                Anticoagulation Care Providers       Provider Role Specialty Phone number    Ling Trammell PA-C Referring Family Medicine [phone]

## 2024-12-26 ENCOUNTER — ANTICOAGULATION THERAPY VISIT (OUTPATIENT)
Dept: ANTICOAGULATION | Facility: OTHER | Age: 54
End: 2024-12-26
Attending: PHYSICIAN ASSISTANT
Payer: COMMERCIAL

## 2024-12-26 DIAGNOSIS — D68.51 FACTOR 5 LEIDEN MUTATION, HETEROZYGOUS (H): ICD-10-CM

## 2024-12-26 DIAGNOSIS — I82.602 BLOOD CLOT OF VEIN IN SHOULDER AREA, LEFT: Primary | ICD-10-CM

## 2024-12-26 DIAGNOSIS — Z79.01 LONG TERM CURRENT USE OF ANTICOAGULANT THERAPY: ICD-10-CM

## 2024-12-26 LAB — INR (EXTERNAL): 3.2 (ref 0.9–1.1)

## 2024-12-26 NOTE — PROGRESS NOTES
ANTICOAGULATION  MANAGEMENT-Home Monitor Managed by Exception    Daniela NANCY Ladd 54 year old female is on warfarin with therapeutic INR result. (Goal INR 2.5-3.5)    Recent labs: (last 7 days)     12/26/24  0837   INR 3.2*       Previous INR was Therapeutic  Medication, diet, health changes since last INR:chart reviewed; none identified  Contacted within the last 12 weeks by phone on 11/6/24  Last [facility] referral date: 08/21/2024      PLAN     Daniela was NOT contacted regarding therapeutic result today per home monitoring policy manage by exception agreement.   Current warfarin dose is to be continued:     Summary  As of 12/26/2024      Full warfarin instructions:  8 mg every Mon, Fri; 10 mg all other days   Next INR check:  1/2/2025             ?   Yoly Crowe RN  Anticoagulation Clinic  12/26/2024    _______________________________________________________________________     Anticoagulation Episode Summary       Current INR goal:  2.5-3.5   TTR:  73.5% (1 y)   Target end date:  Indefinite   Send INR reminders to:  [facility]    Indications    Blood clot of vein in shoulder area  left [I82.602]  Factor 5 Leiden mutation  heterozygous (H) [D68.51]  Long-term (current) use of anticoagulants [Z79.01] [Z79.01]             Comments:  Has home INR machine weekly INR needed                Anticoagulation Care Providers       Provider Role Specialty Phone number    Ling Trammell PA-C Referring Family Medicine [phone]

## 2025-01-02 ENCOUNTER — ANTICOAGULATION THERAPY VISIT (OUTPATIENT)
Dept: ANTICOAGULATION | Facility: OTHER | Age: 55
End: 2025-01-02
Attending: PHYSICIAN ASSISTANT
Payer: COMMERCIAL

## 2025-01-02 DIAGNOSIS — D68.51 FACTOR 5 LEIDEN MUTATION, HETEROZYGOUS (H): ICD-10-CM

## 2025-01-02 DIAGNOSIS — I82.602 BLOOD CLOT OF VEIN IN SHOULDER AREA, LEFT: Primary | ICD-10-CM

## 2025-01-02 DIAGNOSIS — Z79.01 LONG TERM CURRENT USE OF ANTICOAGULANT THERAPY: ICD-10-CM

## 2025-01-02 LAB — INR (EXTERNAL): 3.7 (ref 0.9–1.1)

## 2025-01-02 NOTE — PROGRESS NOTES
ANTICOAGULATION MANAGEMENT     Daniela Ladd 54 year old female is on warfarin with therapeutic INR result. (Goal INR 2.5-3.5)    Recent labs: (last 7 days)     01/02/25  0844   INR 3.7*       ASSESSMENT     Source(s): Chart Review and Patient/Caregiver Call     Warfarin doses taken: Warfarin taken as instructed  Diet: Decreased greens/vitamin K in diet; plans to resume previous intake  Medication/supplement changes: None noted  New illness, injury, or hospitalization: No  Signs or symptoms of bleeding or clotting: No  Previous result: Therapeutic last 2(+) visits  Additional findings: None       PLAN     Recommended plan for temporary change(s) affecting INR     Dosing Instructions: Continue your current warfarin dose with next INR in 1 week       Summary  As of 1/2/2025      Full warfarin instructions:  8 mg every Mon, Fri; 10 mg all other days   Next INR check:  1/8/2025               Telephone call with Daniela who verbalizes understanding and agrees to plan    Patient to recheck with home meter    Education provided: None required    Plan made per [facility] anticoagulation protocol    Yoly Crowe RN  1/2/2025  Anticoagulation Clinic  [facility] for routing messages: p [facility]  [facility] patient phone line: [phone]        _______________________________________________________________________     Anticoagulation Episode Summary       Current INR goal:  2.5-3.5   TTR:  74.7% (1 y)   Target end date:  Indefinite   Send INR reminders to:  [facility]    Indications    Blood clot of vein in shoulder area  left [I82.602]  Factor 5 Leiden mutation  heterozygous (H) [D68.51]  Long-term (current) use of anticoagulants [Z79.01] [Z79.01]             Comments:  Has home INR machine weekly INR needed                Anticoagulation Care Providers       Provider Role Specialty Phone number    Ling Trammell PA-C Referring Family Medicine [phone]

## 2025-01-08 ENCOUNTER — ANTICOAGULATION THERAPY VISIT (OUTPATIENT)
Dept: ANTICOAGULATION | Facility: OTHER | Age: 55
End: 2025-01-08
Attending: PHYSICIAN ASSISTANT
Payer: COMMERCIAL

## 2025-01-08 DIAGNOSIS — Z79.01 LONG TERM CURRENT USE OF ANTICOAGULANT THERAPY: ICD-10-CM

## 2025-01-08 DIAGNOSIS — I82.602 BLOOD CLOT OF VEIN IN SHOULDER AREA, LEFT: Primary | ICD-10-CM

## 2025-01-08 DIAGNOSIS — D68.51 FACTOR 5 LEIDEN MUTATION, HETEROZYGOUS: ICD-10-CM

## 2025-01-08 LAB — INR (EXTERNAL): 2.4

## 2025-01-08 NOTE — PROGRESS NOTES
ANTICOAGULATION MANAGEMENT     Daniela Ladd 54 year old female is on warfarin with subtherapeutic INR result. (Goal INR 2.5-3.5)    Recent labs: (last 7 days)     01/08/25  0512   INR 2.4       ASSESSMENT     Source(s): Chart Review and Patient/Caregiver Call     Warfarin doses taken: Warfarin taken as instructed  Diet: Increased greens/vitamin K in diet; plans to resume previous intake  Ongoing protein intake with chicken, protein balls, pancakes  https://pubmed.ncbi.nlm.nih.gov/-  high-protein diet resulted in a series of subtherapeutic INRs that led to a 16% increase in the dosage requirement to maintain therapeutic INRs.   Had more maicol lettuce  Patient is going to try and stabilize her intake of protein  New illness, injury, or hospitalization: No  Medication/supplement changes: None noted  Signs or symptoms of bleeding or clotting: No  Previous INR: Supratherapeutic  Additional findings:  Patient might test over the weekend to check INR again per her request.        PLAN     Recommended plan for temporary change(s) affecting INR     Dosing Instructions: Continue your current warfarin dose with next INR in 1 week       Summary  As of 1/8/2025      Full warfarin instructions:  8 mg every Mon, Fri; 10 mg all other days   Next INR check:  1/15/2025               Telephone call with Daniela who verbalizes understanding and agrees to plan    Patient to recheck with home meter    Education provided: Please call back if any changes to your diet, medications or how you've been taking warfarin    Plan made per [facility] anticoagulation protocol    Ambreen Romano, RN  Anticoagulation Clinic  1/8/2025    _______________________________________________________________________     Anticoagulation Episode Summary       Current INR goal:  2.5-3.5   TTR:  75.9% (1 y)   Target end date:  Indefinite   Send INR reminders to:  [facility]    Indications    Blood clot of vein in shoulder area  left  [I82.602]  Factor 5 Leiden mutation  heterozygous [D68.51]  Long-term (current) use of anticoagulants [Z79.01] [Z79.01]             Comments:  Has home INR machine weekly INR needed                Anticoagulation Care Providers       Provider Role Specialty Phone number    Ling Trammell PA-C [facility] [phone]

## 2025-01-15 ENCOUNTER — ANTICOAGULATION THERAPY VISIT (OUTPATIENT)
Dept: ANTICOAGULATION | Facility: OTHER | Age: 55
End: 2025-01-15
Attending: PHYSICIAN ASSISTANT
Payer: COMMERCIAL

## 2025-01-15 DIAGNOSIS — D68.51 FACTOR 5 LEIDEN MUTATION, HETEROZYGOUS: ICD-10-CM

## 2025-01-15 DIAGNOSIS — I82.602 BLOOD CLOT OF VEIN IN SHOULDER AREA, LEFT: Primary | ICD-10-CM

## 2025-01-15 DIAGNOSIS — Z79.01 LONG TERM CURRENT USE OF ANTICOAGULANT THERAPY: ICD-10-CM

## 2025-01-15 LAB — INR (EXTERNAL): 3

## 2025-01-15 NOTE — PROGRESS NOTES
ANTICOAGULATION MANAGEMENT     Daniela Ladd 54 year old female is on warfarin with therapeutic INR result. (Goal INR 2.5-3.5)    Recent labs: (last 7 days)     01/15/25  0742   INR 3.0       ASSESSMENT     Source(s): Chart Review and Patient/Caregiver Call     Warfarin doses taken: Warfarin taken as instructed  Diet: No new diet changes identified  New illness, injury, or hospitalization: No  Medication/supplement changes: None noted  Signs or symptoms of bleeding or clotting: No  Previous INR: Subtherapeutic  Additional findings: None       PLAN     Recommended plan for no diet, medication or health factor changes affecting INR     Dosing Instructions: Continue your current warfarin dose with next INR in 1 week       Summary  As of 1/15/2025      Full warfarin instructions:  8 mg every Mon, Fri; 10 mg all other days   Next INR check:  1/22/2025               Telephone call with Daniela who verbalizes understanding and agrees to plan    Patient to recheck with home meter    Education provided: Please call back if any changes to your diet, medications or how you've been taking warfarin and Resume manage by exception with home monitor. Continue to submit INR results to home monitor company.You will only be called when your result is out of range. Please call and notify [facility] if new medication started, dose missed, signs or symptoms of bleeding or clotting, or a surgery/procedure is scheduled.    Plan made per [facility] anticoagulation protocol    Ambreen Romano RN  Anticoagulation Clinic  1/15/2025    _______________________________________________________________________     Anticoagulation Episode Summary       Current INR goal:  2.5-3.5   TTR:  77.2% (1 y)   Target end date:  Indefinite   Send INR reminders to:  [facility]    Indications    Blood clot of vein in shoulder area  left [I82.602]  Factor 5 Leiden mutation  heterozygous [D68.51]  Long-term (current) use of anticoagulants [Z79.01]  [Z79.01]             Comments:  Has home INR machine weekly INR needed                Anticoagulation Care Providers       Provider Role Specialty Phone number    Ling Trammell PA-C Referring Family Medicine [phone]

## 2025-01-22 ENCOUNTER — ANTICOAGULATION THERAPY VISIT (OUTPATIENT)
Dept: ANTICOAGULATION | Facility: OTHER | Age: 55
End: 2025-01-22
Attending: PHYSICIAN ASSISTANT
Payer: COMMERCIAL

## 2025-01-22 DIAGNOSIS — I82.602 BLOOD CLOT OF VEIN IN SHOULDER AREA, LEFT: Primary | ICD-10-CM

## 2025-01-22 DIAGNOSIS — Z79.01 LONG TERM CURRENT USE OF ANTICOAGULANT THERAPY: ICD-10-CM

## 2025-01-22 DIAGNOSIS — D68.51 FACTOR 5 LEIDEN MUTATION, HETEROZYGOUS: ICD-10-CM

## 2025-01-22 LAB — INR (EXTERNAL): 2.7

## 2025-01-22 NOTE — PROGRESS NOTES
ANTICOAGULATION  MANAGEMENT-Home Monitor Managed by Exception    Daniela CRUZ Julee 54 year old female is on warfarin with therapeutic INR result. (Goal INR 2.5-3.5)    Recent labs: (last 7 days)     01/22/25  0515   INR 2.7       Previous INR was Therapeutic  Medication, diet, health changes since last INR:chart reviewed; none identified  Contacted within the last 12 weeks by phone on 1/15/25  Last [facility] referral date: 08/21/2024      PLAN     Daniela was NOT contacted regarding therapeutic result today per home monitoring policy manage by exception agreement.   Current warfarin dose is to be continued:     Summary  As of 1/22/2025      Full warfarin instructions:  8 mg every Mon, Fri; 10 mg all other days   Next INR check:  1/29/2025             ?   Ambreen Romano RN  Anticoagulation Clinic  1/22/2025    _______________________________________________________________________     Anticoagulation Episode Summary       Current INR goal:  2.5-3.5   TTR:  77.2% (1 y)   Target end date:  Indefinite   Send INR reminders to:  [facility]    Indications    Blood clot of vein in shoulder area  left [I82.602]  Factor 5 Leiden mutation  heterozygous [D68.51]  Long-term (current) use of anticoagulants [Z79.01] [Z79.01]             Comments:  Has home INR machine weekly INR needed                Anticoagulation Care Providers       Provider Role Specialty Phone number    Ling Trammell PA-C Referring Family Medicine [phone]

## 2025-01-29 ENCOUNTER — ANTICOAGULATION THERAPY VISIT (OUTPATIENT)
Dept: ANTICOAGULATION | Facility: OTHER | Age: 55
End: 2025-01-29
Attending: PHYSICIAN ASSISTANT
Payer: COMMERCIAL

## 2025-01-29 DIAGNOSIS — D68.51 FACTOR 5 LEIDEN MUTATION, HETEROZYGOUS: ICD-10-CM

## 2025-01-29 DIAGNOSIS — Z79.01 LONG TERM CURRENT USE OF ANTICOAGULANT THERAPY: ICD-10-CM

## 2025-01-29 DIAGNOSIS — I82.602 BLOOD CLOT OF VEIN IN SHOULDER AREA, LEFT: Primary | ICD-10-CM

## 2025-01-29 LAB — INR (EXTERNAL): 3

## 2025-01-29 NOTE — PROGRESS NOTES
ANTICOAGULATION  MANAGEMENT-Home Monitor Managed by Exception    Daniela CRUZ Julee 54 year old female is on warfarin with therapeutic INR result. (Goal INR 2.5-3.5)    Recent labs: (last 7 days)     01/29/25  0526   INR 3.0       Previous INR was Therapeutic  Medication, diet, health changes since last INR:chart reviewed; none identified  Contacted within the last 12 weeks by phone on 1/15/25  Last [facility] referral date: 08/21/2024      PLAN     Daniela was NOT contacted regarding therapeutic result today per home monitoring policy manage by exception agreement.   Current warfarin dose is to be continued:     Summary  As of 1/29/2025      Full warfarin instructions:  8 mg every Mon, Fri; 10 mg all other days   Next INR check:  2/5/2025             ?   Ambreen Romano RN  Anticoagulation Clinic  1/29/2025    _______________________________________________________________________     Anticoagulation Episode Summary       Current INR goal:  2.5-3.5   TTR:  77.2% (1 y)   Target end date:  Indefinite   Send INR reminders to:  [facility]    Indications    Blood clot of vein in shoulder area  left [I82.602]  Factor 5 Leiden mutation  heterozygous [D68.51]  Long-term (current) use of anticoagulants [Z79.01] [Z79.01]             Comments:  Has home INR machine weekly INR needed                Anticoagulation Care Providers       Provider Role Specialty Phone number    Ling Trammell PA-C Referring Family Medicine [phone]

## 2025-02-05 ENCOUNTER — ANTICOAGULATION THERAPY VISIT (OUTPATIENT)
Dept: ANTICOAGULATION | Facility: OTHER | Age: 55
End: 2025-02-05
Attending: PHYSICIAN ASSISTANT
Payer: COMMERCIAL

## 2025-02-05 DIAGNOSIS — D68.51 FACTOR 5 LEIDEN MUTATION, HETEROZYGOUS: ICD-10-CM

## 2025-02-05 DIAGNOSIS — I82.602 BLOOD CLOT OF VEIN IN SHOULDER AREA, LEFT: Primary | ICD-10-CM

## 2025-02-05 DIAGNOSIS — Z79.01 LONG TERM CURRENT USE OF ANTICOAGULANT THERAPY: ICD-10-CM

## 2025-02-05 LAB — INR (EXTERNAL): 2.8 (ref 0.9–1.1)

## 2025-02-05 NOTE — PROGRESS NOTES
ANTICOAGULATION  MANAGEMENT-Home Monitor Managed by Exception    Daniela Crumpartney 54 year old female is on warfarin with therapeutic INR result. (Goal INR 2.5-3.5)    Recent labs: (last 7 days)     02/05/25  0757   INR 2.8*       Previous INR was Therapeutic  Medication, diet, health changes since last INR:chart reviewed; none identified  Contacted within the last 12 weeks by phone on 1/15/25  Last [facility] referral date: 08/21/2024      PLAN     Daniela was NOT contacted regarding therapeutic result today per home monitoring policy manage by exception agreement.   Current warfarin dose is to be continued:     Summary  As of 2/5/2025      Full warfarin instructions:  8 mg every Mon, Fri; 10 mg all other days   Next INR check:  2/12/2025             ?   Yoly Crowe RN  Anticoagulation Clinic  2/5/2025    _______________________________________________________________________     Anticoagulation Episode Summary       Current INR goal:  2.5-3.5   TTR:  77.2% (1 y)   Target end date:  Indefinite   Send INR reminders to:  [facility]    Indications    Blood clot of vein in shoulder area  left [I82.602]  Factor 5 Leiden mutation  heterozygous [D68.51]  Long-term (current) use of anticoagulants [Z79.01] [Z79.01]             Comments:  Has home INR machine weekly INR needed                Anticoagulation Care Providers       Provider Role Specialty Phone number    Ling Trammell PA-C Referring Family Medicine [phone]

## 2025-02-12 ENCOUNTER — ANTICOAGULATION THERAPY VISIT (OUTPATIENT)
Dept: ANTICOAGULATION | Facility: OTHER | Age: 55
End: 2025-02-12
Attending: PHYSICIAN ASSISTANT
Payer: COMMERCIAL

## 2025-02-12 DIAGNOSIS — I82.602 BLOOD CLOT OF VEIN IN SHOULDER AREA, LEFT: Primary | ICD-10-CM

## 2025-02-12 DIAGNOSIS — D68.51 FACTOR 5 LEIDEN MUTATION, HETEROZYGOUS: ICD-10-CM

## 2025-02-12 DIAGNOSIS — Z79.01 LONG TERM CURRENT USE OF ANTICOAGULANT THERAPY: ICD-10-CM

## 2025-02-12 LAB — INR (EXTERNAL): 3.1

## 2025-02-12 NOTE — PROGRESS NOTES
ANTICOAGULATION  MANAGEMENT-Home Monitor Managed by Exception    Daniela CRUZ Julee 54 year old female is on warfarin with therapeutic INR result. (Goal INR 2.5-3.5)    Recent labs: (last 7 days)     02/12/25  0515   INR 3.1       Previous INR was Therapeutic  Medication, diet, health changes since last INR:chart reviewed; none identified  Contacted within the last 12 weeks by phone on 1/15/25  Last [facility] referral date: 08/21/2024      PLAN     Daniela was NOT contacted regarding therapeutic result today per home monitoring policy manage by exception agreement.   Current warfarin dose is to be continued:     Summary  As of 2/12/2025      Full warfarin instructions:  8 mg every Mon, Fri; 10 mg all other days   Next INR check:  2/19/2025             ?   Ambreen Romano RN  Anticoagulation Clinic  2/12/2025    _______________________________________________________________________     Anticoagulation Episode Summary       Current INR goal:  2.5-3.5   TTR:  77.2% (1 y)   Target end date:  Indefinite   Send INR reminders to:  [facility]    Indications    Blood clot of vein in shoulder area  left [I82.602]  Factor 5 Leiden mutation  heterozygous [D68.51]  Long-term (current) use of anticoagulants [Z79.01] [Z79.01]             Comments:  Has home INR machine weekly INR needed                Anticoagulation Care Providers       Provider Role Specialty Phone number    Ling Trammell PA-C Referring Family Medicine [phone]

## 2025-02-19 ENCOUNTER — ANTICOAGULATION THERAPY VISIT (OUTPATIENT)
Dept: ANTICOAGULATION | Facility: OTHER | Age: 55
End: 2025-02-19
Attending: PHYSICIAN ASSISTANT
Payer: COMMERCIAL

## 2025-02-19 DIAGNOSIS — I82.602 BLOOD CLOT OF VEIN IN SHOULDER AREA, LEFT: Primary | ICD-10-CM

## 2025-02-19 DIAGNOSIS — D68.51 FACTOR 5 LEIDEN MUTATION, HETEROZYGOUS: ICD-10-CM

## 2025-02-19 DIAGNOSIS — Z79.01 LONG TERM CURRENT USE OF ANTICOAGULANT THERAPY: ICD-10-CM

## 2025-02-19 LAB — INR (EXTERNAL): 3.2

## 2025-02-19 NOTE — PROGRESS NOTES
ANTICOAGULATION  MANAGEMENT-Home Monitor Managed by Exception    Daniela CRUZ Julee 54 year old female is on warfarin with therapeutic INR result. (Goal INR 2.5-3.5)    Recent labs: (last 7 days)     02/19/25  0516   INR 3.2       Previous INR was Therapeutic  Medication, diet, health changes since last INR:chart reviewed; none identified  Contacted within the last 12 weeks by phone on 1/15/25  Last [facility] referral date: 08/21/2024      PLAN     Daniela was NOT contacted regarding therapeutic result today per home monitoring policy manage by exception agreement.   Current warfarin dose is to be continued:     Summary  As of 2/19/2025      Full warfarin instructions:  8 mg every Mon, Fri; 10 mg all other days   Next INR check:  2/26/2025             ?   Ambreen Romano RN  Anticoagulation Clinic  2/19/2025    _______________________________________________________________________     Anticoagulation Episode Summary       Current INR goal:  2.5-3.5   TTR:  77.2% (1 y)   Target end date:  Indefinite   Send INR reminders to:  [facility]    Indications    Blood clot of vein in shoulder area  left [I82.602]  Factor 5 Leiden mutation  heterozygous [D68.51]  Long-term (current) use of anticoagulants [Z79.01] [Z79.01]             Comments:  Has home INR machine weekly INR needed                Anticoagulation Care Providers       Provider Role Specialty Phone number    Ling Trammell PA-C Referring Family Medicine [phone]

## 2025-02-26 ENCOUNTER — ANTICOAGULATION THERAPY VISIT (OUTPATIENT)
Dept: ANTICOAGULATION | Facility: OTHER | Age: 55
End: 2025-02-26
Attending: PHYSICIAN ASSISTANT
Payer: COMMERCIAL

## 2025-02-26 DIAGNOSIS — D68.51 FACTOR 5 LEIDEN MUTATION, HETEROZYGOUS: ICD-10-CM

## 2025-02-26 DIAGNOSIS — Z79.01 LONG TERM CURRENT USE OF ANTICOAGULANT THERAPY: ICD-10-CM

## 2025-02-26 DIAGNOSIS — I82.602 BLOOD CLOT OF VEIN IN SHOULDER AREA, LEFT: Primary | ICD-10-CM

## 2025-02-26 LAB — INR (EXTERNAL): 3.3

## 2025-02-26 NOTE — PROGRESS NOTES
ANTICOAGULATION  MANAGEMENT-Home Monitor Managed by Exception    Daniela CRUZ Julee 54 year old female is on warfarin with therapeutic INR result. (Goal INR 2.5-3.5)    Recent labs: (last 7 days)     02/26/25  0459   INR 3.3       Previous INR was Therapeutic  Medication, diet, health changes since last INR:chart reviewed; none identified  Contacted within the last 12 weeks by phone on 1/15/25  Last [facility] referral date: 08/21/2024      PLAN     Daniela was NOT contacted regarding therapeutic result today per home monitoring policy manage by exception agreement.   Current warfarin dose is to be continued:     Summary  As of 2/26/2025      Full warfarin instructions:  8 mg every Mon, Fri; 10 mg all other days   Next INR check:  3/5/2025             ?   Ambreen Romano RN  Anticoagulation Clinic  2/26/2025    _______________________________________________________________________     Anticoagulation Episode Summary       Current INR goal:  2.5-3.5   TTR:  77.2% (1 y)   Target end date:  Indefinite   Send INR reminders to:  [facility]    Indications    Blood clot of vein in shoulder area  left [I82.602]  Factor 5 Leiden mutation  heterozygous [D68.51]  Long-term (current) use of anticoagulants [Z79.01] [Z79.01]             Comments:  Has home INR machine weekly INR needed                Anticoagulation Care Providers       Provider Role Specialty Phone number    Ling Trammell PA-C Referring Family Medicine [phone]

## 2025-03-05 ENCOUNTER — ANTICOAGULATION THERAPY VISIT (OUTPATIENT)
Dept: ANTICOAGULATION | Facility: OTHER | Age: 55
End: 2025-03-05
Payer: COMMERCIAL

## 2025-03-05 DIAGNOSIS — D68.51 FACTOR 5 LEIDEN MUTATION, HETEROZYGOUS: ICD-10-CM

## 2025-03-05 DIAGNOSIS — I82.602 BLOOD CLOT OF VEIN IN SHOULDER AREA, LEFT: Primary | ICD-10-CM

## 2025-03-05 DIAGNOSIS — Z79.01 LONG TERM CURRENT USE OF ANTICOAGULANT THERAPY: ICD-10-CM

## 2025-03-05 LAB — INR (EXTERNAL): 3.2

## 2025-03-05 NOTE — PROGRESS NOTES
ANTICOAGULATION  MANAGEMENT-Home Monitor Managed by Exception    Daniela CRUZ Julee 54 year old female is on warfarin with therapeutic INR result. (Goal INR 2.5-3.5)    Recent labs: (last 7 days)     03/05/25  0452   INR 3.2       Previous INR was Therapeutic  Medication, diet, health changes since last INR:chart reviewed; none identified  Contacted within the last 12 weeks by phone on 1/15/25  Last [facility] referral date: 08/21/2024      PLAN     Daniela was NOT contacted regarding therapeutic result today per home monitoring policy manage by exception agreement.   Current warfarin dose is to be continued:     Summary  As of 3/5/2025      Full warfarin instructions:  8 mg every Mon, Fri; 10 mg all other days   Next INR check:  3/12/2025             ?   Ambreen Romano RN  Anticoagulation Clinic  3/5/2025    _______________________________________________________________________     Anticoagulation Episode Summary       Current INR goal:  2.5-3.5   TTR:  77.2% (1 y)   Target end date:  Indefinite   Send INR reminders to:  [facility]    Indications    Blood clot of vein in shoulder area  left [I82.602]  Factor 5 Leiden mutation  heterozygous [D68.51]  Long-term (current) use of anticoagulants [Z79.01] [Z79.01]             Comments:  Has home INR machine weekly INR needed                Anticoagulation Care Providers       Provider Role Specialty Phone number    Ling Trammell PA-C Referring Family Medicine [phone]

## 2025-03-12 ENCOUNTER — ANTICOAGULATION THERAPY VISIT (OUTPATIENT)
Dept: ANTICOAGULATION | Facility: OTHER | Age: 55
End: 2025-03-12
Payer: COMMERCIAL

## 2025-03-12 DIAGNOSIS — Z79.01 LONG TERM CURRENT USE OF ANTICOAGULANT THERAPY: ICD-10-CM

## 2025-03-12 DIAGNOSIS — D68.51 FACTOR 5 LEIDEN MUTATION, HETEROZYGOUS: ICD-10-CM

## 2025-03-12 DIAGNOSIS — I82.602 BLOOD CLOT OF VEIN IN SHOULDER AREA, LEFT: Primary | ICD-10-CM

## 2025-03-12 LAB — INR (EXTERNAL): 3.5

## 2025-03-12 NOTE — PROGRESS NOTES
ANTICOAGULATION  MANAGEMENT-Home Monitor Managed by Exception    Daniela CRUZ Julee 54 year old female is on warfarin with therapeutic INR result. (Goal INR 2.5-3.5)    Recent labs: (last 7 days)     03/12/25  0515   INR 3.5       Previous INR was Therapeutic  Medication, diet, health changes since last INR:chart reviewed; none identified  Contacted within the last 12 weeks by phone on 1/15/25  Last [facility] referral date: 08/21/2024      PLAN     Daniela was NOT contacted regarding therapeutic result today per home monitoring policy manage by exception agreement.   Current warfarin dose is to be continued:     Summary  As of 3/12/2025      Full warfarin instructions:  8 mg every Mon, Fri; 10 mg all other days   Next INR check:  3/19/2025             ?   Ambreen Romano RN  Anticoagulation Clinic  3/12/2025    _______________________________________________________________________     Anticoagulation Episode Summary       Current INR goal:  2.5-3.5   TTR:  77.2% (1 y)   Target end date:  Indefinite   Send INR reminders to:  [facility]    Indications    Blood clot of vein in shoulder area  left [I82.602]  Factor 5 Leiden mutation  heterozygous [D68.51]  Long-term (current) use of anticoagulants [Z79.01] [Z79.01]             Comments:  Has home INR machine weekly INR needed                Anticoagulation Care Providers       Provider Role Specialty Phone number    Ling Trammell PA-C Referring Family Medicine [phone]

## 2025-03-19 ENCOUNTER — ANTICOAGULATION THERAPY VISIT (OUTPATIENT)
Dept: ANTICOAGULATION | Facility: OTHER | Age: 55
End: 2025-03-19
Payer: COMMERCIAL

## 2025-03-19 DIAGNOSIS — I82.602 BLOOD CLOT OF VEIN IN SHOULDER AREA, LEFT: Primary | ICD-10-CM

## 2025-03-19 DIAGNOSIS — D68.51 FACTOR 5 LEIDEN MUTATION, HETEROZYGOUS: ICD-10-CM

## 2025-03-19 DIAGNOSIS — Z79.01 LONG TERM CURRENT USE OF ANTICOAGULANT THERAPY: ICD-10-CM

## 2025-03-19 LAB — INR (EXTERNAL): 3.3

## 2025-03-19 NOTE — PROGRESS NOTES
ANTICOAGULATION MANAGEMENT     Daniela Ladd 54 year old female is on warfarin with therapeutic INR result. (Goal INR 2.5-3.5)    Recent labs: (last 7 days)     03/19/25  0521   INR 3.3       ASSESSMENT     Source(s): Chart Review and Patient/Caregiver Call     Warfarin doses taken: Warfarin taken as instructed  Diet: No new diet changes identified  Medication/supplement changes: None noted  New illness, injury, or hospitalization: No  Signs or symptoms of bleeding or clotting: No  Previous result: Supratherapeutic  Additional findings: None       PLAN     Recommended plan for no diet, medication or health factor changes affecting INR     Dosing Instructions: Continue your current warfarin dose with next INR in 1 week       Summary  As of 3/19/2025      Full warfarin instructions:  8 mg every Mon, Fri; 10 mg all other days   Next INR check:  3/26/2025               Telephone call with Daniela who verbalizes understanding and agrees to plan    Patient to recheck with home meter    Education provided: Resume manage by exception with home monitor. Continue to submit INR results to home monitor company.You will only be called when your result is out of range and at 90 day check in. Please call and notify [facility] if new medication started, dose missed, signs or symptoms of bleeding or clotting, or a surgery/procedure is scheduled. Due for next call no later than: 6/17/25.    Plan made per [facility] anticoagulation protocol    Yoly Crowe RN  3/19/2025  Anticoagulation Clinic  [facility] for routing messages: p [facility]  [facility] patient phone line: [phone]        _______________________________________________________________________     Anticoagulation Episode Summary       Current INR goal:  2.5-3.5   TTR:  75.8% (1 y)   Target end date:  Indefinite   Send INR reminders to:  [facility]    Indications    Blood clot of vein in shoulder area  left [O86.612]  Factor 5 Leiden mutation  heterozygous  [D68.51]  Long-term (current) use of anticoagulants [Z79.01] [Z79.01]             Comments:  Has home INR machine weekly INR needed                Anticoagulation Care Providers       Provider Role Specialty Phone number    Ling Trammell PA-C Referring Family Medicine [phone]

## 2025-03-26 ENCOUNTER — APPOINTMENT (OUTPATIENT)
Dept: ANTICOAGULATION | Facility: OTHER | Age: 55
End: 2025-03-26
Payer: COMMERCIAL

## 2025-04-02 ENCOUNTER — ANTICOAGULATION THERAPY VISIT (OUTPATIENT)
Dept: ANTICOAGULATION | Facility: OTHER | Age: 55
End: 2025-04-02
Attending: PHYSICIAN ASSISTANT
Payer: COMMERCIAL

## 2025-04-02 DIAGNOSIS — Z79.01 LONG TERM CURRENT USE OF ANTICOAGULANT THERAPY: ICD-10-CM

## 2025-04-02 DIAGNOSIS — D68.51 FACTOR 5 LEIDEN MUTATION, HETEROZYGOUS: ICD-10-CM

## 2025-04-02 DIAGNOSIS — I82.602 BLOOD CLOT OF VEIN IN SHOULDER AREA, LEFT: Primary | ICD-10-CM

## 2025-04-02 LAB — INR (EXTERNAL): 3 (ref 0.9–1.1)

## 2025-04-02 NOTE — PROGRESS NOTES
ANTICOAGULATION  MANAGEMENT-Home Monitor Managed by Exception    Daniela Crumpartney 54 year old female is on warfarin with therapeutic INR result. (Goal INR 2.5-3.5)    Recent labs: (last 7 days)     04/02/25  0827   INR 3.0*       Previous INR was Therapeutic  Medication, diet, health changes since last INR:chart reviewed; none identified  Contacted within the last 12 weeks by phone on 3/19/25  Last [facility] referral date: 08/21/2024      PLAN     Daniela was NOT contacted regarding therapeutic result today per home monitoring policy manage by exception agreement.   Current warfarin dose is to be continued:     Summary  As of 4/2/2025      Full warfarin instructions:  8 mg every Mon, Fri; 10 mg all other days   Next INR check:  4/9/2025             ?   Yoly Crowe RN  Anticoagulation Clinic  4/2/2025    _______________________________________________________________________     Anticoagulation Episode Summary       Current INR goal:  2.5-3.5   TTR:  78.3% (1 y)   Target end date:  Indefinite   Send INR reminders to:  [facility]    Indications    Blood clot of vein in shoulder area  left [I82.602]  Factor 5 Leiden mutation  heterozygous [D68.51]  Long-term (current) use of anticoagulants [Z79.01] [Z79.01]             Comments:  Has home INR machine weekly INR needed                Anticoagulation Care Providers       Provider Role Specialty Phone number    Ling Trammell PA-C Referring Family Medicine [phone]

## 2025-04-08 LAB — INR (EXTERNAL): 3.6 (ref 0.9–1.1)

## 2025-04-09 ENCOUNTER — ANTICOAGULATION THERAPY VISIT (OUTPATIENT)
Dept: ANTICOAGULATION | Facility: OTHER | Age: 55
End: 2025-04-09
Attending: PHYSICIAN ASSISTANT
Payer: COMMERCIAL

## 2025-04-09 DIAGNOSIS — D68.51 FACTOR 5 LEIDEN MUTATION, HETEROZYGOUS: ICD-10-CM

## 2025-04-09 DIAGNOSIS — Z79.01 LONG TERM CURRENT USE OF ANTICOAGULANT THERAPY: ICD-10-CM

## 2025-04-09 DIAGNOSIS — I82.602 BLOOD CLOT OF VEIN IN SHOULDER AREA, LEFT: Primary | ICD-10-CM

## 2025-04-09 NOTE — PROGRESS NOTES
ANTICOAGULATION MANAGEMENT     Daniela Ladd 54 year old female is on warfarin with supratherapeutic INR result. (Goal INR 2.5-3.5)    Recent labs: (last 7 days)     04/08/25  1600   INR 3.6*       ASSESSMENT     Source(s): Chart Review and Patient/Caregiver Call     Warfarin doses taken: Warfarin taken as instructed  Diet: No new diet changes identified  Medication/supplement changes: None noted  New illness, injury, or hospitalization: No  Signs or symptoms of bleeding or clotting: No  Previous result: Therapeutic last 2(+) visits  Additional findings: None       PLAN     Recommended plan for no diet, medication or health factor changes and previous 2 INR results within goal affecting INR     Dosing Instructions: Continue your current warfarin dose with next INR in 1 week       Summary  As of 4/9/2025      Full warfarin instructions:  8 mg every Mon, Fri; 10 mg all other days   Next INR check:  4/16/2025               Telephone call with Daniela who verbalizes understanding and agrees to plan    Patient to recheck with home meter    Education provided: None required    Plan made per [facility] anticoagulation protocol    Yoly Crowe RN  4/9/2025  Anticoagulation Clinic  [employer] for routing messages: p [facility]  [facility] patient phone line: [phone]        _______________________________________________________________________     Anticoagulation Episode Summary       Current INR goal:  2.5-3.5   TTR:  79.9% (1 y)   Target end date:  Indefinite   Send INR reminders to:  [facility]    Indications    Blood clot of vein in shoulder area  left [I82.602]  Factor 5 Leiden mutation  heterozygous [D68.51]  Long-term (current) use of anticoagulants [Z79.01] [Z79.01]             Comments:  Has home INR machine weekly INR needed                Anticoagulation Care Providers       Provider Role Specialty Phone number    Ling Trammell PA-C Referring Family Medicine [phone]

## 2025-04-16 ENCOUNTER — ANTICOAGULATION THERAPY VISIT (OUTPATIENT)
Dept: ANTICOAGULATION | Facility: OTHER | Age: 55
End: 2025-04-16
Attending: PHYSICIAN ASSISTANT
Payer: COMMERCIAL

## 2025-04-16 DIAGNOSIS — I82.602 BLOOD CLOT OF VEIN IN SHOULDER AREA, LEFT: Primary | ICD-10-CM

## 2025-04-16 DIAGNOSIS — Z79.01 LONG TERM CURRENT USE OF ANTICOAGULANT THERAPY: ICD-10-CM

## 2025-04-16 DIAGNOSIS — D68.51 FACTOR 5 LEIDEN MUTATION, HETEROZYGOUS: ICD-10-CM

## 2025-04-16 LAB — INR (EXTERNAL): 2.8

## 2025-04-16 NOTE — PROGRESS NOTES
ANTICOAGULATION MANAGEMENT     Daniela Ladd 54 year old female is on warfarin with therapeutic INR result. (Goal INR 2.5-3.5)    Recent labs: (last 7 days)     04/16/25  0410   INR 2.8       ASSESSMENT     Source(s): Chart Review and Patient/Caregiver Call     Warfarin doses taken: Warfarin taken as instructed  Diet: Increased greens/vitamin K in diet; plans to resume previous intake  New illness, injury, or hospitalization: No  Medication/supplement changes: None noted  Signs or symptoms of bleeding or clotting: No  Previous INR: Supratherapeutic  Additional findings: None       PLAN     Recommended plan for temporary change(s) affecting INR     Dosing Instructions: Continue your current warfarin dose with next INR in 1 week       Summary  As of 4/16/2025      Full warfarin instructions:  8 mg every Mon, Fri; 10 mg all other days   Next INR check:  4/23/2025               Telephone call with Daniela who verbalizes understanding and agrees to plan    Patient to recheck with home meter    Education provided: Please call back if any changes to your diet, medications or how you've been taking warfarin and Resume manage by exception with home monitor. Continue to submit INR results to home monitor company.You will only be called when your result is out of range. Please call and notify [facility] if new medication started, dose missed, signs or symptoms of bleeding or clotting, or a surgery/procedure is scheduled.    Plan made per [facility] anticoagulation protocol    Ambreen Romano, RN  Anticoagulation Clinic  4/16/2025    _______________________________________________________________________     Anticoagulation Episode Summary       Current INR goal:  2.5-3.5   TTR:  81.6% (1 y)   Target end date:  Indefinite   Send INR reminders to:  [facility]    Indications    Blood clot of vein in shoulder area  left [I82.602]  Factor 5 Leiden mutation  heterozygous [D68.51]  Long-term (current) use of anticoagulants  [Z79.01] [Z79.01]             Comments:  Has home INR machine weekly INR needed                Anticoagulation Care Providers       Provider Role Specialty Phone number    Ling Trammell PA-C Referring Family Medicine [phone]

## 2025-04-23 ENCOUNTER — ANTICOAGULATION THERAPY VISIT (OUTPATIENT)
Dept: ANTICOAGULATION | Facility: OTHER | Age: 55
End: 2025-04-23
Attending: PHYSICIAN ASSISTANT
Payer: COMMERCIAL

## 2025-04-23 DIAGNOSIS — D68.51 FACTOR 5 LEIDEN MUTATION, HETEROZYGOUS: ICD-10-CM

## 2025-04-23 DIAGNOSIS — Z79.01 LONG TERM CURRENT USE OF ANTICOAGULANT THERAPY: ICD-10-CM

## 2025-04-23 DIAGNOSIS — I82.602 BLOOD CLOT OF VEIN IN SHOULDER AREA, LEFT: Primary | ICD-10-CM

## 2025-04-23 LAB — INR (EXTERNAL): 2.7

## 2025-04-23 NOTE — PROGRESS NOTES
ANTICOAGULATION  MANAGEMENT-Home Monitor Managed by Exception    Daniela CRUZ Julee 54 year old female is on warfarin with therapeutic INR result. (Goal INR 2.5-3.5)    Recent labs: (last 7 days)     04/23/25  0458   INR 2.7       Previous INR was Therapeutic  Medication, diet, health changes since last INR:chart reviewed; none identified  Contacted within the last 12 weeks by phone on 4/16/25  Last [facility] referral date: 08/21/2024      PLAN     Daniela was NOT contacted regarding therapeutic result today per home monitoring policy manage by exception agreement.   Current warfarin dose is to be continued:     Summary  As of 4/23/2025      Full warfarin instructions:  8 mg every Mon, Fri; 10 mg all other days   Next INR check:  4/30/2025             ?   Ambreen Romano RN  Anticoagulation Clinic  4/23/2025    _______________________________________________________________________     Anticoagulation Episode Summary       Current INR goal:  2.5-3.5   TTR:  81.9% (1 y)   Target end date:  Indefinite   Send INR reminders to:  [facility]    Indications    Blood clot of vein in shoulder area  left [I82.602]  Factor 5 Leiden mutation  heterozygous [D68.51]  Long-term (current) use of anticoagulants [Z79.01] [Z79.01]             Comments:  Has home INR machine weekly INR needed                Anticoagulation Care Providers       Provider Role Specialty Phone number    Ling Trammell PA-C Referring Family Medicine [phone]

## 2025-04-30 ENCOUNTER — ANTICOAGULATION THERAPY VISIT (OUTPATIENT)
Dept: ANTICOAGULATION | Facility: OTHER | Age: 55
End: 2025-04-30
Attending: PHYSICIAN ASSISTANT
Payer: COMMERCIAL

## 2025-04-30 DIAGNOSIS — Z79.01 LONG TERM CURRENT USE OF ANTICOAGULANT THERAPY: ICD-10-CM

## 2025-04-30 DIAGNOSIS — I82.602 BLOOD CLOT OF VEIN IN SHOULDER AREA, LEFT: Primary | ICD-10-CM

## 2025-04-30 DIAGNOSIS — D68.51 FACTOR 5 LEIDEN MUTATION, HETEROZYGOUS: ICD-10-CM

## 2025-04-30 LAB — INR (EXTERNAL): 3.3

## 2025-04-30 NOTE — PROGRESS NOTES
ANTICOAGULATION  MANAGEMENT-Home Monitor Managed by Exception    Daniela CRUZ Julee 54 year old female is on warfarin with therapeutic INR result. (Goal INR 2.5-3.5)    Recent labs: (last 7 days)     04/30/25  0459   INR 3.3       Previous INR was Therapeutic  Medication, diet, health changes since last INR:chart reviewed; none identified  Contacted within the last 12 weeks by phone on 4/16/25  Last [facility] referral date: 08/21/2024      PLAN     Daniela was NOT contacted regarding therapeutic result today per home monitoring policy manage by exception agreement.   Current warfarin dose is to be continued:     Summary  As of 4/30/2025      Full warfarin instructions:  8 mg every Mon, Fri; 10 mg all other days   Next INR check:  5/7/2025             ?   Ambreen Romano RN  Anticoagulation Clinic  4/30/2025    _______________________________________________________________________     Anticoagulation Episode Summary       Current INR goal:  2.5-3.5   TTR:  83.1% (1 y)   Target end date:  Indefinite   Send INR reminders to:  [facility]    Indications    Blood clot of vein in shoulder area  left [I82.602]  Factor 5 Leiden mutation  heterozygous [D68.51]  Long-term (current) use of anticoagulants [Z79.01] [Z79.01]             Comments:  Has home INR machine weekly INR needed                Anticoagulation Care Providers       Provider Role Specialty Phone number    Ling Trammell PA-C Referring Family Medicine [phone]

## 2025-05-07 ENCOUNTER — ANTICOAGULATION THERAPY VISIT (OUTPATIENT)
Dept: ANTICOAGULATION | Facility: OTHER | Age: 55
End: 2025-05-07
Payer: COMMERCIAL

## 2025-05-07 DIAGNOSIS — D68.51 FACTOR 5 LEIDEN MUTATION, HETEROZYGOUS: ICD-10-CM

## 2025-05-07 DIAGNOSIS — I82.602 BLOOD CLOT OF VEIN IN SHOULDER AREA, LEFT: Primary | ICD-10-CM

## 2025-05-07 DIAGNOSIS — Z79.01 LONG TERM CURRENT USE OF ANTICOAGULANT THERAPY: ICD-10-CM

## 2025-05-07 LAB — INR (EXTERNAL): 3.2

## 2025-05-07 NOTE — PROGRESS NOTES
ANTICOAGULATION  MANAGEMENT-Home Monitor Managed by Exception    Daniela CRUZ Julee 54 year old female is on warfarin with therapeutic INR result. (Goal INR 2.5-3.5)    Recent labs: (last 7 days)     05/07/25  0456   INR 3.2       Previous INR was Therapeutic  Medication, diet, health changes since last INR:chart reviewed; none identified  Contacted within the last 12 weeks by phone on 4/16/25  Last [facility] referral date: 08/21/2024      PLAN     Daniela was NOT contacted regarding therapeutic result today per home monitoring policy manage by exception agreement.   Current warfarin dose is to be continued:     Summary  As of 5/7/2025      Full warfarin instructions:  8 mg every Mon, Fri; 10 mg all other days   Next INR check:  5/14/2025             ?   Ambreen Romano RN  Anticoagulation Clinic  5/7/2025    _______________________________________________________________________     Anticoagulation Episode Summary       Current INR goal:  2.5-3.5   TTR:  83.2% (1 y)   Target end date:  Indefinite   Send INR reminders to:   ANTICOAGULATION NURSE    Indications    Blood clot of vein in shoulder area  left [I82.602]  Factor 5 Leiden mutation  heterozygous [D68.51]  Long-term (current) use of anticoagulants [Z79.01] [Z79.01]             Comments:  Has home INR machine weekly INR needed                Anticoagulation Care Providers       Provider Role Specialty Phone number    Ling Trammell PA-C Referring Family Medicine [phone]

## 2025-05-14 ENCOUNTER — ANTICOAGULATION THERAPY VISIT (OUTPATIENT)
Dept: ANTICOAGULATION | Facility: OTHER | Age: 55
End: 2025-05-14
Payer: COMMERCIAL

## 2025-05-14 DIAGNOSIS — I82.602 BLOOD CLOT OF VEIN IN SHOULDER AREA, LEFT: Primary | ICD-10-CM

## 2025-05-14 DIAGNOSIS — Z79.01 LONG TERM CURRENT USE OF ANTICOAGULANT THERAPY: ICD-10-CM

## 2025-05-14 DIAGNOSIS — D68.51 FACTOR 5 LEIDEN MUTATION, HETEROZYGOUS: ICD-10-CM

## 2025-05-14 LAB — INR (EXTERNAL): 3.1

## 2025-05-14 NOTE — PROGRESS NOTES
ANTICOAGULATION  MANAGEMENT-Home Monitor Managed by Exception    Daniela NANCY Ladd 54 year old female is on warfarin with therapeutic INR result. (Goal INR 2.5-3.5)    Recent labs: (last 7 days)     05/14/25  0425   INR 3.1       Previous INR was Therapeutic  Medication, diet, health changes since last INR:chart reviewed; none identified  Contacted within the last 12 weeks by phone on 4/16/25  Last [facility] referral date: 08/21/2024      PLAN     Daniela was NOT contacted regarding therapeutic result today per home monitoring policy manage by exception agreement.   Current warfarin dose is to be continued:     Summary  As of 5/14/2025      Full warfarin instructions:  8 mg every Mon, Fri; 10 mg all other days   Next INR check:  5/21/2025             ?   Ambreen Romano RN  Anticoagulation Clinic  5/14/2025    _______________________________________________________________________     Anticoagulation Episode Summary       Current INR goal:  2.5-3.5   TTR:  83.2% (1 y)   Target end date:  Indefinite   Send INR reminders to:   ANTICOAGULATION NURSE    Indications    Blood clot of vein in shoulder area  left [I82.602]  Factor 5 Leiden mutation  heterozygous [D68.51]  Long-term (current) use of anticoagulants [Z79.01] [Z79.01]             Comments:  Has home INR machine weekly INR needed                Anticoagulation Care Providers       Provider Role Specialty Phone number    Ling Trammell PA-C Referring Family Medicine [phone]

## 2025-05-21 ENCOUNTER — ANTICOAGULATION THERAPY VISIT (OUTPATIENT)
Dept: ANTICOAGULATION | Facility: OTHER | Age: 55
End: 2025-05-21
Payer: COMMERCIAL

## 2025-05-21 DIAGNOSIS — Z79.01 LONG TERM CURRENT USE OF ANTICOAGULANT THERAPY: ICD-10-CM

## 2025-05-21 DIAGNOSIS — D68.51 FACTOR 5 LEIDEN MUTATION, HETEROZYGOUS: ICD-10-CM

## 2025-05-21 DIAGNOSIS — I82.602 BLOOD CLOT OF VEIN IN SHOULDER AREA, LEFT: Primary | ICD-10-CM

## 2025-05-21 LAB — INR (EXTERNAL): 2.8

## 2025-05-21 NOTE — PROGRESS NOTES
ANTICOAGULATION  MANAGEMENT-Home Monitor Managed by Exception    Daniela CRUZ Julee 54 year old female is on warfarin with therapeutic INR result. (Goal INR 2.5-3.5)    Recent labs: (last 7 days)     05/21/25  0428   INR 2.8       Previous INR was Therapeutic  Medication, diet, health changes since last INR:chart reviewed; none identified  Contacted within the last 12 weeks by phone on 4/16/25  Last [facility] referral date: 08/21/2024      PLAN     Daniela was NOT contacted regarding therapeutic result today per home monitoring policy manage by exception agreement.   Current warfarin dose is to be continued:     Summary  As of 5/21/2025      Full warfarin instructions:  8 mg every Mon, Fri; 10 mg all other days   Next INR check:  5/28/2025             ?   Ambreen Romano RN  Anticoagulation Clinic  5/21/2025    _______________________________________________________________________     Anticoagulation Episode Summary       Current INR goal:  2.5-3.5   TTR:  83.2% (1 y)   Target end date:  Indefinite   Send INR reminders to:   ANTICOAGULATION NURSE    Indications    Blood clot of vein in shoulder area  left [I82.602]  Factor 5 Leiden mutation  heterozygous [D68.51]  Long-term (current) use of anticoagulants [Z79.01] [Z79.01]             Comments:  Has home INR machine weekly INR needed                Anticoagulation Care Providers       Provider Role Specialty Phone number    Ling Trammell PA-C Referring Family Medicine [phone]

## 2025-05-28 ENCOUNTER — ANTICOAGULATION THERAPY VISIT (OUTPATIENT)
Dept: ANTICOAGULATION | Facility: OTHER | Age: 55
End: 2025-05-28
Payer: COMMERCIAL

## 2025-05-28 DIAGNOSIS — Z79.01 LONG TERM CURRENT USE OF ANTICOAGULANT THERAPY: ICD-10-CM

## 2025-05-28 DIAGNOSIS — D68.51 FACTOR 5 LEIDEN MUTATION, HETEROZYGOUS: ICD-10-CM

## 2025-05-28 DIAGNOSIS — I82.602 BLOOD CLOT OF VEIN IN SHOULDER AREA, LEFT: Primary | ICD-10-CM

## 2025-05-28 LAB — INR (EXTERNAL): 2.6

## 2025-05-28 NOTE — PROGRESS NOTES
ANTICOAGULATION  MANAGEMENT-Home Monitor Managed by Exception    Daniela NANCY Ladd 54 year old female is on warfarin with therapeutic INR result. (Goal INR 2.5-3.5)    Recent labs: (last 7 days)     05/28/25  0442   INR 2.6       Previous INR was Therapeutic  Medication, diet, health changes since last INR:chart reviewed; none identified  Contacted within the last 12 weeks by phone on 4/16/25  Last [facility] referral date: 08/21/2024      PLAN     Daniela was NOT contacted regarding therapeutic result today per home monitoring policy manage by exception agreement.   Current warfarin dose is to be continued:     Summary  As of 5/28/2025      Full warfarin instructions:  8 mg every Mon, Fri; 10 mg all other days   Next INR check:  6/4/2025             ?   Ambreen Romano RN  Anticoagulation Clinic  5/28/2025    _______________________________________________________________________     Anticoagulation Episode Summary       Current INR goal:  2.5-3.5   TTR:  83.2% (1 y)   Target end date:  Indefinite   Send INR reminders to:   ANTICOAGULATION NURSE    Indications    Blood clot of vein in shoulder area  left [I82.602]  Factor 5 Leiden mutation  heterozygous [D68.51]  Long-term (current) use of anticoagulants [Z79.01] [Z79.01]             Comments:  Has home INR machine weekly INR needed                Anticoagulation Care Providers       Provider Role Specialty Phone number    Ling Trammell PA-C Referring Family Medicine [phone]

## 2025-06-04 ENCOUNTER — ANTICOAGULATION THERAPY VISIT (OUTPATIENT)
Dept: ANTICOAGULATION | Facility: OTHER | Age: 55
End: 2025-06-04
Attending: PHYSICIAN ASSISTANT
Payer: COMMERCIAL

## 2025-06-04 DIAGNOSIS — I82.602 BLOOD CLOT OF VEIN IN SHOULDER AREA, LEFT: Primary | ICD-10-CM

## 2025-06-04 DIAGNOSIS — D68.51 FACTOR 5 LEIDEN MUTATION, HETEROZYGOUS: ICD-10-CM

## 2025-06-04 DIAGNOSIS — Z79.01 LONG TERM CURRENT USE OF ANTICOAGULANT THERAPY: ICD-10-CM

## 2025-06-04 LAB — INR (EXTERNAL): 3

## 2025-06-04 NOTE — PROGRESS NOTES
ANTICOAGULATION  MANAGEMENT-Home Monitor Managed by Exception    Daniela NANCY Ladd 54 year old female is on warfarin with therapeutic INR result. (Goal INR 2.5-3.5)    Recent labs: (last 7 days)     06/04/25  0424   INR 3.0       Previous INR was Therapeutic  Medication, diet, health changes since last INR:chart reviewed; none identified  Contacted within the last 12 weeks by phone on 4/16/25  Last [facility] referral date: 08/21/2024      PLAN     Daniela was NOT contacted regarding therapeutic result today per home monitoring policy manage by exception agreement.   Current warfarin dose is to be continued:     Summary  As of 6/4/2025      Full warfarin instructions:  8 mg every Mon, Fri; 10 mg all other days   Next INR check:  6/11/2025             ?   Ambreen Romano RN  Anticoagulation Clinic  6/4/2025    _______________________________________________________________________     Anticoagulation Episode Summary       Current INR goal:  2.5-3.5   TTR:  83.5% (1 y)   Target end date:  Indefinite   Send INR reminders to:   ANTICOAGULATION NURSE    Indications    Blood clot of vein in shoulder area  left [I82.602]  Factor 5 Leiden mutation  heterozygous [D68.51]  Long-term (current) use of anticoagulants [Z79.01] [Z79.01]             Comments:  Has home INR machine weekly INR needed                Anticoagulation Care Providers       Provider Role Specialty Phone number    Ling Trammell PA-C Referring Family Medicine [phone]

## 2025-06-11 ENCOUNTER — ANTICOAGULATION THERAPY VISIT (OUTPATIENT)
Dept: ANTICOAGULATION | Facility: OTHER | Age: 55
End: 2025-06-11
Attending: PHYSICIAN ASSISTANT
Payer: COMMERCIAL

## 2025-06-11 DIAGNOSIS — Z79.01 LONG TERM CURRENT USE OF ANTICOAGULANT THERAPY: ICD-10-CM

## 2025-06-11 DIAGNOSIS — I82.602 BLOOD CLOT OF VEIN IN SHOULDER AREA, LEFT: Primary | ICD-10-CM

## 2025-06-11 DIAGNOSIS — D68.51 FACTOR 5 LEIDEN MUTATION, HETEROZYGOUS: ICD-10-CM

## 2025-06-11 LAB — INR (EXTERNAL): 2.7

## 2025-06-11 NOTE — PROGRESS NOTES
ANTICOAGULATION  MANAGEMENT-Home Monitor Managed by Exception    Daniela NANCY Ladd 54 year old female is on warfarin with therapeutic INR result. (Goal INR 2.5-3.5)    Recent labs: (last 7 days)     06/11/25  0423   INR 2.7       Previous INR was Therapeutic  Medication, diet, health changes since last INR:chart reviewed; none identified  Contacted within the last 12 weeks by phone on 4/16/25  Last [facility] referral date: 08/21/2024      PLAN     Daniela was NOT contacted regarding therapeutic result today per home monitoring policy manage by exception agreement.   Current warfarin dose is to be continued:     Summary  As of 6/11/2025      Full warfarin instructions:  8 mg every Mon, Fri; 10 mg all other days   Next INR check:  6/18/2025             ?   Ambreen Romano RN  Anticoagulation Clinic  6/11/2025    _______________________________________________________________________     Anticoagulation Episode Summary       Current INR goal:  2.5-3.5   TTR:  85.0% (1 y)   Target end date:  Indefinite   Send INR reminders to:   ANTICOAGULATION NURSE    Indications    Blood clot of vein in shoulder area  left [I82.602]  Factor 5 Leiden mutation  heterozygous [D68.51]  Long-term (current) use of anticoagulants [Z79.01] [Z79.01]             Comments:  Has home INR machine weekly INR needed                Anticoagulation Care Providers       Provider Role Specialty Phone number    Ling Trammell PA-C Referring Family Medicine [phone]

## 2025-06-18 ENCOUNTER — ANTICOAGULATION THERAPY VISIT (OUTPATIENT)
Dept: ANTICOAGULATION | Facility: OTHER | Age: 55
End: 2025-06-18
Attending: PHYSICIAN ASSISTANT
Payer: COMMERCIAL

## 2025-06-18 DIAGNOSIS — Z79.01 LONG TERM CURRENT USE OF ANTICOAGULANT THERAPY: ICD-10-CM

## 2025-06-18 DIAGNOSIS — I82.602 BLOOD CLOT OF VEIN IN SHOULDER AREA, LEFT: Primary | ICD-10-CM

## 2025-06-18 DIAGNOSIS — D68.51 FACTOR 5 LEIDEN MUTATION, HETEROZYGOUS: ICD-10-CM

## 2025-06-18 LAB — INR (EXTERNAL): 3.3

## 2025-06-18 NOTE — PROGRESS NOTES
ANTICOAGULATION  MANAGEMENT-Home Monitor Managed by Exception    Daniela NANCY Ladd 54 year old female is on warfarin with therapeutic INR result. (Goal INR 2.5-3.5)    Recent labs: (last 7 days)     06/18/25  0440   INR 3.3       Previous INR was Therapeutic  Medication, diet, health changes since last INR:chart reviewed; none identified  Contacted within the last 12 weeks by phone on 4/16/25  Last [facility] referral date: 08/21/2024      PLAN     Daniela was NOT contacted regarding therapeutic result today per home monitoring policy manage by exception agreement.   Current warfarin dose is to be continued:     Summary  As of 6/18/2025      Full warfarin instructions:  8 mg every Mon, Fri; 10 mg all other days   Next INR check:  6/25/2025             ?   Ambreen Romano RN  Anticoagulation Clinic  6/18/2025    _______________________________________________________________________     Anticoagulation Episode Summary       Current INR goal:  2.5-3.5   TTR:  85.0% (1 y)   Target end date:  Indefinite   Send INR reminders to:   ANTICOAGULATION NURSE    Indications    Blood clot of vein in shoulder area  left [I82.602]  Factor 5 Leiden mutation  heterozygous [D68.51]  Long-term (current) use of anticoagulants [Z79.01] [Z79.01]             Comments:  Has home INR machine weekly INR needed                Anticoagulation Care Providers       Provider Role Specialty Phone number    Ling Trammell PA-C Referring Family Medicine [phone]

## 2025-06-25 ENCOUNTER — ANTICOAGULATION THERAPY VISIT (OUTPATIENT)
Dept: ANTICOAGULATION | Facility: OTHER | Age: 55
End: 2025-06-25
Attending: PHYSICIAN ASSISTANT
Payer: COMMERCIAL

## 2025-06-25 DIAGNOSIS — Z79.01 LONG TERM CURRENT USE OF ANTICOAGULANT THERAPY: ICD-10-CM

## 2025-06-25 DIAGNOSIS — D68.51 FACTOR 5 LEIDEN MUTATION, HETEROZYGOUS: ICD-10-CM

## 2025-06-25 DIAGNOSIS — I82.602 BLOOD CLOT OF VEIN IN SHOULDER AREA, LEFT: Primary | ICD-10-CM

## 2025-06-25 LAB — INR (EXTERNAL): 3.1

## 2025-06-25 NOTE — PROGRESS NOTES
ANTICOAGULATION  MANAGEMENT-Home Monitor Managed by Exception    Daniela NANCY Ladd 54 year old female is on warfarin with therapeutic INR result. (Goal INR 2.5-3.5)    Recent labs: (last 7 days)     06/25/25  0505   INR 3.1       Previous INR was Therapeutic  Medication, diet, health changes since last INR:chart reviewed; none identified  Contacted within the last 12 weeks by phone on 4/16/25  Last [facility] referral date: 08/21/2024      PLAN     Daniela was NOT contacted regarding therapeutic result today per home monitoring policy manage by exception agreement.   Current warfarin dose is to be continued:     Summary  As of 6/25/2025      Full warfarin instructions:  8 mg every Mon, Fri; 10 mg all other days   Next INR check:  7/2/2025             ?   Ambreen Romano RN  Anticoagulation Clinic  6/25/2025    _______________________________________________________________________     Anticoagulation Episode Summary       Current INR goal:  2.5-3.5   TTR:  85.0% (1 y)   Target end date:  Indefinite   Send INR reminders to:   ANTICOAGULATION NURSE    Indications    Blood clot of vein in shoulder area  left [I82.602]  Factor 5 Leiden mutation  heterozygous [D68.51]  Long-term (current) use of anticoagulants [Z79.01] [Z79.01]             Comments:  Has home INR machine weekly INR needed                Anticoagulation Care Providers       Provider Role Specialty Phone number    Ling Trammell PA-C Referring Family Medicine [phone]

## 2025-07-02 ENCOUNTER — ANTICOAGULATION THERAPY VISIT (OUTPATIENT)
Dept: ANTICOAGULATION | Facility: OTHER | Age: 55
End: 2025-07-02
Attending: PHYSICIAN ASSISTANT
Payer: COMMERCIAL

## 2025-07-02 DIAGNOSIS — I82.602 BLOOD CLOT OF VEIN IN SHOULDER AREA, LEFT: Primary | ICD-10-CM

## 2025-07-02 DIAGNOSIS — Z79.01 LONG TERM CURRENT USE OF ANTICOAGULANT THERAPY: ICD-10-CM

## 2025-07-02 DIAGNOSIS — D68.51 FACTOR 5 LEIDEN MUTATION, HETEROZYGOUS: ICD-10-CM

## 2025-07-02 LAB — INR (EXTERNAL): 2.8 (ref 0.9–1.1)

## 2025-07-02 NOTE — PROGRESS NOTES
ANTICOAGULATION  MANAGEMENT-Home Monitor Managed by Exception    Daniela NANCY Julee 54 year old female is on warfarin with therapeutic INR result. (Goal INR 2.5-3.5)    Recent labs: (last 7 days)     07/02/25  0804   INR 2.8*       Previous INR was Therapeutic  Medication, diet, health changes since last INR:chart reviewed; none identified  Contacted within the last 12 weeks by phone on 4/16/25  Last [facility] referral date: 08/21/2024      PLAN     Daniela was NOT contacted regarding therapeutic result today per home monitoring policy manage by exception agreement.   Current warfarin dose is to be continued:     Summary  As of 7/2/2025      Full warfarin instructions:  8 mg every Mon, Fri; 10 mg all other days   Next INR check:  7/9/2025             ?   Yoly Crowe, RN  Anticoagulation Clinic  7/2/2025    _______________________________________________________________________     Anticoagulation Episode Summary       Current INR goal:  2.5-3.5   TTR:  85.2% (1 y)   Target end date:  Indefinite   Send INR reminders to:   ANTICOAGULATION NURSE    Indications    Blood clot of vein in shoulder area  left [I82.602]  Factor 5 Leiden mutation  heterozygous [D68.51]  Long-term (current) use of anticoagulants [Z79.01] [Z79.01]             Comments:  Has home INR machine weekly INR needed                Anticoagulation Care Providers       Provider Role Specialty Phone number    Ling Trammell PA-C Referring Family Medicine [phone]

## 2025-07-09 ENCOUNTER — TELEPHONE (OUTPATIENT)
Dept: FAMILY MEDICINE | Facility: OTHER | Age: 55
End: 2025-07-09

## 2025-07-09 ENCOUNTER — ANTICOAGULATION THERAPY VISIT (OUTPATIENT)
Dept: ANTICOAGULATION | Facility: OTHER | Age: 55
End: 2025-07-09
Attending: PHYSICIAN ASSISTANT
Payer: COMMERCIAL

## 2025-07-09 DIAGNOSIS — D68.51 FACTOR 5 LEIDEN MUTATION, HETEROZYGOUS: ICD-10-CM

## 2025-07-09 DIAGNOSIS — Z79.01 LONG TERM CURRENT USE OF ANTICOAGULANT THERAPY: ICD-10-CM

## 2025-07-09 DIAGNOSIS — I82.602 BLOOD CLOT OF VEIN IN SHOULDER AREA, LEFT: ICD-10-CM

## 2025-07-09 DIAGNOSIS — I82.602 BLOOD CLOT OF VEIN IN SHOULDER AREA, LEFT: Primary | ICD-10-CM

## 2025-07-09 DIAGNOSIS — D68.51 FACTOR 5 LEIDEN MUTATION, HETEROZYGOUS: Primary | ICD-10-CM

## 2025-07-09 LAB — INR (EXTERNAL): 2.6

## 2025-07-09 NOTE — TELEPHONE ENCOUNTER
ANTICOAGULATION CLINIC REFERRAL RENEWAL REQUEST       An annual renewal order is required for all patients referred to [facility].?  Please review and sign the pended referral order for Daniela Ladd.       ANTICOAGULATION SUMMARY      Warfarin indication(s)   Heterozygous Factor V Leiden and Blood clot of vein in shoulder area, left     Mechanical heart valve present?  NO       Current goal range   INR: 2.5-3.5     Goal appropriate for indication? Goal INR 2.5-3.5 standard for indication(s) above     Time in Therapeutic Range (TTR)  (Goal > 60%) 87.1%       Office visit with referring provider's group within last year yes on 11/6/24   Additional standing orders Bridging with enoxaparin for INR < 1.7       Ambreen Romano, RN  [facility]

## 2025-07-09 NOTE — PROGRESS NOTES
ANTICOAGULATION  MANAGEMENT-Home Monitor Managed by Exception    Daniela NANCY Ladd 54 year old female is on warfarin with therapeutic INR result. (Goal INR 2.5-3.5)    Recent labs: (last 7 days)     07/09/25  0437   INR 2.6       Previous INR was Therapeutic  Medication, diet, health changes since last INR:chart reviewed; none identified  Contacted within the last 12 weeks by phone on 4/16/25  Last [facility] referral date: 08/21/2024      PLAN     Daniela was NOT contacted regarding therapeutic result today per home monitoring policy manage by exception agreement.   Current warfarin dose is to be continued:     Summary  As of 7/9/2025      Full warfarin instructions:  8 mg every Mon, Fri; 10 mg all other days   Next INR check:  7/16/2025             ?   Ambreen Romano RN  Anticoagulation Clinic  7/9/2025    _______________________________________________________________________     Anticoagulation Episode Summary       Current INR goal:  2.5-3.5   TTR:  87.1% (1 y)   Target end date:  Indefinite   Send INR reminders to:   ANTICOAGULATION NURSE    Indications    Blood clot of vein in shoulder area  left [I82.602]  Factor 5 Leiden mutation  heterozygous [D68.51]  Long-term (current) use of anticoagulants [Z79.01] [Z79.01]             Comments:  Has home INR machine weekly INR needed                Anticoagulation Care Providers       Provider Role Specialty Phone number    Ling Trammell PA-C Referring Family Medicine [phone]

## 2025-07-16 ENCOUNTER — ANTICOAGULATION THERAPY VISIT (OUTPATIENT)
Dept: ANTICOAGULATION | Facility: OTHER | Age: 55
End: 2025-07-16
Attending: PHYSICIAN ASSISTANT
Payer: COMMERCIAL

## 2025-07-16 DIAGNOSIS — I82.602 BLOOD CLOT OF VEIN IN SHOULDER AREA, LEFT: Primary | ICD-10-CM

## 2025-07-16 DIAGNOSIS — D68.51 FACTOR 5 LEIDEN MUTATION, HETEROZYGOUS: ICD-10-CM

## 2025-07-16 DIAGNOSIS — Z79.01 LONG TERM CURRENT USE OF ANTICOAGULANT THERAPY: ICD-10-CM

## 2025-07-16 LAB — INR (EXTERNAL): 2.8

## 2025-07-16 NOTE — PROGRESS NOTES
"ANTICOAGULATION MANAGEMENT     Daniela Ladd 54 year old female is on warfarin with therapeutic INR result. (Goal INR 2.5-3.5)    Recent labs: (last 7 days)     07/16/25  0441   INR 2.8       ASSESSMENT     Source(s): Chart Review and Patient/Caregiver Call     Warfarin doses taken: More warfarin taken than planned which may be affecting INR. Patient has been taking 10 mg on Friday for the past 2 weeks. Calendar updated.   Diet: Decreased greens/vitamin K in diet; plans to resume previous intake  Medication/supplement changes: Increased in Tylenol.   New illness, injury, or hospitalization: No  Signs or symptoms of bleeding or clotting: No  Previous result: Therapeutic last 2(+) visits  Additional findings: Patient has been more tired recently, boated and constipated. Patient wishes to do 10 mg everyday instead of 8 mg every Mon, Friday. Patient states she can tell when she is running on the lower end and feels \"like crap.\"  With patient doing 10 mg every day this is an increase on 6.1. Informed patient of risks of self dosing and patient verbalized understanding. Will change dosing instructions on calendar to reflect what patient is taking. Patient wished to have a call next week.        PLAN     Recommended plan for temporary change(s) and ongoing change(s) affecting INR     Dosing Instructions: Increase your warfarin dose (6.1% change) with next INR in 1 week   (this is what patient verbalized she will be doing).     Summary  As of 7/16/2025      Full warfarin instructions:  10 mg every day   Next INR check:  7/23/2025               Telephone call with Daniela who verbalizes understanding and agrees to plan and who agrees to plan and repeated back plan correctly    Patient to recheck with home meter    Education provided: Please call back if any changes to your diet, medications or how you've been taking warfarin  Symptom monitoring: monitoring for bleeding signs and symptoms, monitoring for clotting " signs and symptoms, monitoring for stroke signs and symptoms, when to seek medical attention/emergency care, and if you hit your head or have a bad fall seek emergency care    Plan made per [facility] anticoagulation protocol    Bethel Escobar RN  7/16/2025  Anticoagulation Clinic  [facility] for routing messages: rambo  ANTICOAGULATION NURSE          _______________________________________________________________________     Anticoagulation Episode Summary       Current INR goal:  2.5-3.5   TTR:  89.1% (1 y)   Target end date:  Indefinite   Send INR reminders to:   ANTICOAGULATION NURSE    Indications    Blood clot of vein in shoulder area  left [I82.602]  Factor 5 Leiden mutation  heterozygous [D68.51]  Long-term (current) use of anticoagulants [Z79.01] [Z79.01]             Comments:  Has home INR machine weekly INR needed                Anticoagulation Care Providers       Provider Role Specialty Phone number    Ling Trammell PA-C Referring Family Medicine [phone]

## 2025-07-23 ENCOUNTER — ANTICOAGULATION THERAPY VISIT (OUTPATIENT)
Dept: ANTICOAGULATION | Facility: OTHER | Age: 55
End: 2025-07-23
Attending: PHYSICIAN ASSISTANT
Payer: COMMERCIAL

## 2025-07-23 ENCOUNTER — HOSPITAL ENCOUNTER (OUTPATIENT)
Dept: MAMMOGRAPHY | Facility: OTHER | Age: 55
Discharge: HOME | End: 2025-07-23
Attending: PHYSICIAN ASSISTANT
Payer: COMMERCIAL

## 2025-07-23 DIAGNOSIS — Z12.31 VISIT FOR SCREENING MAMMOGRAM: ICD-10-CM

## 2025-07-23 DIAGNOSIS — D68.51 FACTOR 5 LEIDEN MUTATION, HETEROZYGOUS: ICD-10-CM

## 2025-07-23 DIAGNOSIS — I82.602 BLOOD CLOT OF VEIN IN SHOULDER AREA, LEFT: Primary | ICD-10-CM

## 2025-07-23 DIAGNOSIS — Z79.01 LONG TERM CURRENT USE OF ANTICOAGULANT THERAPY: ICD-10-CM

## 2025-07-23 LAB — INR (EXTERNAL): 3.8

## 2025-07-23 PROCEDURE — 77063 BREAST TOMOSYNTHESIS BI: CPT

## 2025-07-23 NOTE — PROGRESS NOTES
ANTICOAGULATION MANAGEMENT     Daniela Ladd 54 year old female is on warfarin with therapeutic INR result. (Goal INR 2.5-3.5)    Recent labs: (last 7 days)     07/23/25  0410   INR 3.8       ASSESSMENT     Source(s): Chart Review and Patient/Caregiver Call     Warfarin doses taken: Warfarin taken as instructed  Diet: Decreased greens/vitamin K in diet; plans to resume previous intake  patient only had 2 days of broccoli; plans to go back to 3 days of broccoli and one day of a maicol lettuce salad  Medication/supplement changes: None noted  New illness, injury, or hospitalization: No  Signs or symptoms of bleeding or clotting: No  Previous result: Therapeutic last 2(+) visits  Additional findings: None       PLAN     Recommended plan for temporary change(s) affecting INR     Dosing Instructions: Continue your current warfarin dose with next INR in 1 week       Summary  As of 7/23/2025      Full warfarin instructions:  10 mg every day   Next INR check:  7/30/2025               In person    Patient to recheck with home meter    Education provided: Please call back if any changes to your diet, medications or how you've been taking warfarin    Plan made per [facility] anticoagulation protocol    Ambreen Romano RN  7/23/2025  Anticoagulation Clinic  [employer] for routing messages: p  ANTICOAGULATION NURSE  [facility] patient phone line: [phone]        _______________________________________________________________________     Anticoagulation Episode Summary       Current INR goal:  2.5-3.5   TTR:  90.4% (1 y)   Target end date:  Indefinite   Send INR reminders to:   ANTICOAGULATION NURSE    Indications    Blood clot of vein in shoulder area  left [I82.602]  Factor 5 Leiden mutation  heterozygous [D68.51]  Long-term (current) use of anticoagulants [Z79.01] [Z79.01]             Comments:  Has home INR machine weekly INR needed                Anticoagulation Care Providers       Provider Role Specialty Phone  number    Oja, Ling Rodriguez PA-C [facility] Medicine [phone]

## 2025-07-24 ENCOUNTER — HOSPITAL ENCOUNTER (OUTPATIENT)
Dept: MAMMOGRAPHY | Facility: OTHER | Age: 55
End: 2025-07-24
Attending: PHYSICIAN ASSISTANT
Payer: COMMERCIAL

## 2025-07-24 DIAGNOSIS — R92.8 ABNORMAL FINDING ON BREAST IMAGING: ICD-10-CM

## 2025-07-24 LAB — INR (EXTERNAL): 3.6

## 2025-07-24 PROCEDURE — 77065 DX MAMMO INCL CAD UNI: CPT | Mod: LT

## 2025-07-24 NOTE — PROGRESS NOTES
Patient education performed regarding what to expect with stereotactic breast biopsy.  Patient verbalizes understanding.  Biopsy appointment scheduled for 7/25 at 0745.  Patient reports use of blood thinners- Coumadin.  INR 2 days ago = 3.8.  Patient states she drops quickly if holds.  Contacted Dr of the day, Daysi Horton, who states of for patient to hold coumadin today.  Patient will test INR at home in the morning and let us know the result.  Patient reports no allergy to lidocaine or epinephrine, but does have a reaction to adhesives.   Peggy Kumar RN.

## 2025-07-24 NOTE — PROGRESS NOTES
Called and spoke with patient, verbalized that she was told by Peggy Kumar RN who spoke with covering provider to hold dose of warfarin tonight for biopsy 7/25/25. To have INR at 3.0 or less. Patient to retest INR tomorrow at home for biopsy.    Patient education performed regarding what to expect with stereotactic breast biopsy.  Patient verbalizes understanding.  Biopsy appointment scheduled for 7/25 at 0745.  Patient reports use of blood thinners- Coumadin.  INR 2 days ago = 3.8.  Patient states she drops quickly if holds.  Contacted Dr of the day, Daysi Horton, who states of for patient to hold coumadin today.  Patient will test INR at home in the morning and let us know the result.  Patient reports no allergy to lidocaine or epinephrine, but does have a reaction to adhesives.   Peggy Kumar RN.         Breonna White RN on 7/24/2025 at 4:00 PM

## 2025-07-25 ENCOUNTER — HOSPITAL ENCOUNTER (OUTPATIENT)
Dept: MAMMOGRAPHY | Facility: OTHER | Age: 55
Discharge: HOME | End: 2025-07-25
Attending: PHYSICIAN ASSISTANT
Payer: COMMERCIAL

## 2025-07-25 VITALS — SYSTOLIC BLOOD PRESSURE: 140 MMHG | DIASTOLIC BLOOD PRESSURE: 80 MMHG | RESPIRATION RATE: 14 BRPM | HEART RATE: 90 BPM

## 2025-07-25 DIAGNOSIS — R92.8 ABNORMAL FINDING ON BREAST IMAGING: ICD-10-CM

## 2025-07-25 PROCEDURE — A4648 IMPLANTABLE TISSUE MARKER: HCPCS

## 2025-07-25 PROCEDURE — 76098 X-RAY EXAM SURGICAL SPECIMEN: CPT | Mod: 26 | Performed by: RADIOLOGY

## 2025-07-25 PROCEDURE — 76098 X-RAY EXAM SURGICAL SPECIMEN: CPT

## 2025-07-25 PROCEDURE — 250N000009 HC RX 250: Performed by: RADIOLOGY

## 2025-07-25 PROCEDURE — 19081 BX BREAST 1ST LESION STRTCTC: CPT | Mod: LT | Performed by: RADIOLOGY

## 2025-07-25 PROCEDURE — 999N000065 MA POST PROCEDURE LEFT

## 2025-07-25 PROCEDURE — 250N000011 HC RX IP 250 OP 636: Performed by: RADIOLOGY

## 2025-07-25 RX ORDER — LIDOCAINE HYDROCHLORIDE AND EPINEPHRINE 10; 10 MG/ML; UG/ML
20 INJECTION, SOLUTION INFILTRATION; PERINEURAL ONCE
Status: COMPLETED | OUTPATIENT
Start: 2025-07-25 | End: 2025-07-25

## 2025-07-25 RX ORDER — LIDOCAINE HYDROCHLORIDE 10 MG/ML
20 INJECTION, SOLUTION INFILTRATION; PERINEURAL ONCE
Status: COMPLETED | OUTPATIENT
Start: 2025-07-25 | End: 2025-07-25

## 2025-07-25 RX ADMIN — LIDOCAINE HYDROCHLORIDE AND EPINEPHRINE 15 ML: 10; 10 INJECTION, SOLUTION INFILTRATION; PERINEURAL at 08:26

## 2025-07-25 RX ADMIN — LIDOCAINE HYDROCHLORIDE 5 ML: 10 INJECTION, SOLUTION EPIDURAL; INFILTRATION; INTRACAUDAL; PERINEURAL at 08:23

## 2025-07-25 NOTE — DISCHARGE INSTRUCTIONS
After Your Breast Biopsy  Bleeding, bruising, and pain  Breast tenderness and some bruising is normal and may last several days. You may wear your bra overnight to support the breast.  You may use an ice pack for pain. Place it over the area for 15 to 20 minutes, several times a day.  You may take over-the-counter pain medicine:  On the day of the biopsy, we recommend Tylenol (acetaminophen) because it does not raise your risk of bleeding.  The next day, you may take an anti-inflammatory medicine (aspirin, ibuprofen, Motrin, Aleve, Advil), unless your doctor tells you not to.  Bandages and showering  Keep your bandage in place until tomorrow morning. Don't get it wet.  If you have small pieces of tape on the skin, leave them in place. They will fall off on their own, or you can remove them after 5 days.  You may shower the next morning after your biopsy.  Activity  No heavy activity (no running, no gym workouts, no lifting, no vacuuming, etc.) on the day of your biopsy.  You may go back to normal activity the next day. But limit what you do if you still have pain or discomfort.  Infection  Infection is rare. Signs of infection include:  Fever (including sweats and chills)  Redness  Pain that gets worse  Fluid draining from the biopsy site  Biopsy results  Results may take up to 5 business days.  A nurse or doctor from the Breast Center will call with your results. We will also send the results to the doctor that ordered your biopsy.  If you have not gotten your results in 5 days, please call the Breast Center.  Call the Breast Center with questions or if:   You have bleeding that lasts more than 20 minutes.  You have pain that you can't control.  You have signs of infection (fever, sweats, chills, redness, increasing pain, or drainage).  After hours, please call the doctor who ordered your biopsy.  For informational purposes only. Not to replace the advice of your health care provider. Copyright   2010 [facility]. All rights reserved. Clinically reviewed by Faviola Silverman, Director, [facility]. [employer] [id number] - REV 08/23.

## 2025-07-25 NOTE — PROGRESS NOTES
Patient here for stereotactic guided biopsy of left breast.  INR draw today 3.0.  Patient will contact INR clinic today to review instructions for coumadin.  Procedure reviewed with patient by writer and radiologist, questions answered.  Time out performed prior to biopsy.  Biopsy completed by radiologist, clip placed.  Pressure held to biopsy site for 10 minutes.  No dressing applied per adhesive allergy.   Post clip mammogram completed.  Sports bra and ice pack applied over dressing.  Discharge instructions reviewed with patient, patient verbalizes understanding of instructions.  Discharged to home in stable condition with no evidence of bleeding from biopsy site.   Peggy Kumar RN.

## 2025-07-29 ENCOUNTER — OFFICE VISIT (OUTPATIENT)
Dept: SURGERY | Facility: OTHER | Age: 55
End: 2025-07-29
Attending: SURGERY
Payer: COMMERCIAL

## 2025-07-29 VITALS
WEIGHT: 194 LBS | SYSTOLIC BLOOD PRESSURE: 128 MMHG | RESPIRATION RATE: 16 BRPM | TEMPERATURE: 98 F | BODY MASS INDEX: 31.18 KG/M2 | HEART RATE: 82 BPM | OXYGEN SATURATION: 95 % | DIASTOLIC BLOOD PRESSURE: 84 MMHG | HEIGHT: 66 IN

## 2025-07-29 DIAGNOSIS — D24.2 FIBROADENOMA OF LEFT BREAST: ICD-10-CM

## 2025-07-29 DIAGNOSIS — Z91.89 AT HIGH RISK FOR BREAST CANCER: ICD-10-CM

## 2025-07-29 DIAGNOSIS — R92.8 ABNORMAL MAMMOGRAM OF LEFT BREAST: Primary | ICD-10-CM

## 2025-07-29 LAB
PATH REPORT.COMMENTS IMP SPEC: NORMAL
PATH REPORT.FINAL DX SPEC: NORMAL
PHOTO IMAGE: NORMAL

## 2025-07-29 ASSESSMENT — PAIN SCALES - GENERAL: PAINLEVEL_OUTOF10: NO PAIN (0)

## 2025-07-29 NOTE — PATIENT INSTRUCTIONS
You will get a call to schedule a virtual genetics discussion when [employer] is available. We will watch for that visit and any testing results. If the results will change your screening recommendations, we will see you in the office for discussion.   You will be due for left mammogram and bilateral MRI in December/January.   You will get a call to schedule those exams.   Call if you have concerns before follow up!      No

## 2025-07-29 NOTE — NURSING NOTE
"Chief Complaint   Patient presents with    Consult     Left breast       Initial /84 (BP Location: Right arm, Patient Position: Sitting, Cuff Size: Adult Large)   Pulse 82   Temp 98  F (36.7  C) (Tympanic)   Resp 16   Ht 1.676 m (5' 6\")   Wt 88 kg (194 lb)   LMP 03/05/2024 (Within Weeks)   SpO2 95%   BMI 31.31 kg/m   Estimated body mass index is 31.31 kg/m  as calculated from the following:    Height as of this encounter: 1.676 m (5' 6\").    Weight as of this encounter: 88 kg (194 lb).  Medication Reconciliation: complete    At what age did you start menopause? 53  What age did your menstrual cycle start? 12-13  Are you on or have you ever taken any hormone replacement or birth control? Birth control  How many children do you have? 1  How old were you when your first child was born? 29  Did you breast feed? no  Do you have a family history of breast cancer? Maternal grandma, 2 maternal great aunts  Janeen Wolf LPN..........7/29/2025  11:22 AM  "

## 2025-07-29 NOTE — PROGRESS NOTES
Primary Care Physician: Ling Trammell PA-C    I was requested to see this patient in consultation by Ling Trammell PA-C for evaluation of left breast calcifications. A copy of this note will be sent to Ling Trammell PA-C.    HPI:   The patient is 54 year old female with new calcifications nodule noted in the left breast on recent mammogram. The patient hasn't noted any skin, nipple or breast changes. No previous breast cancer. No previous breast biopsy. Family history of breast cancer-maternal grandmother, and 2 maternal great aunts. The patient had biopsy performed that showed fibroadenoma with associated calcifications. No atypia or malignancy.  Some bruising but not bad pain since the biopsy.        CONSULTATION ASSESSMENT AND PLAN/RECOMMENDATIONS: Fibroadenoma with calcifications left breast  I discussed with the patient the pathophysiology of breast nodules and breast disease. We specifically discussed that most abnormalities seen on mammogram and US are not breast cancer. I explained that fibroadenomas are not a precancerous condition and that they don't turn into breast cancer.  I discussed with the patient that family history can increase her lifetime risk for breast cancer. We discussed current NCCN guidelines for high risk screening and surveillance in patients with a greater than 20 % lifetime risk of breast cancer.  We discussed that her estimated lifetime breast cancer risk is estimated to be 29.3% by the AMELIA model. This is elevated. It is recommended that she consider high risk screening strategies. Currently, this would include yearly mammogram and yearly MRI, alternating the studies every 6 months. She wishes to proceed with high risk screening. She will be due for MRI in Dec/January and will be due for a left breast mammogram at that time as well.  We discussed the option of a genetic evaluation for further information about her breast cancer risk and the risks she may have for other cancers. The  patient expressed understanding and denies further questions at this time. She would like to pursue the genetic counseling referral when [employer] becomes available. We will call her to set that up. We will watch for this appointment and any testing results. If results will change her screening recommendations or risk, we will see her in the office to discuss. The patient will call with questions or concerns.      REVIEW OF SYSTEMS  GENERAL: No fevers or chills. Denies fatigue, recent weight loss.  HEENT: No sinus drainage. No changes with vision or hearing. No difficulty swallowing.   LYMPHATICS:  No swollen nodes in axilla, neck or groin.  CARDIOVASCULAR: Denies chest pain, palpitations and dyspnea on exertion.  PULMONARY: No shortness of breath or cough. No increase in sputum production.  GI:Denies melena, bright red blood in stools. No hematemesis. No constipation or diarrhea.  : No dysuria or hematuria.  SKIN: No recent rashes or ulcers.   HEMATOLOGY:  Has history of easy bruising and bleeding-on coumadin  ENDOCRINE:  No history of diabetes or thyroid problems.  NEUROLOGY:  No history of seizures or headaches. No motor or sensory changes.  BREAST:  Denies breast pain or changes.    Past Medical History:   Diagnosis Date    Abdominal pain     2010    Contact dermatitis     Atrophic dermatitis    Encounter for insertion of Mirena IUD 2018    Hidradenitis suppurativa     2010    Major depressive disorder, single episode     ,Situational related to marital discord    Nicotine dependence, uncomplicated     2010    Other acne     Facial acne    Personal history of other medical treatment (CODE)     10/00, III, para 1-0-2-1, vaginal delivery , two spontaneous first trimester miscarriage    Personal history of other medical treatment (CODE)     S/P cryotherapy Aug 2003.  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)    Raynaud's syndrome without gangrene     2010    Ventricular premature  depolarization     10/25/2013       Past Surgical History:   Procedure Laterality Date    ANKLE SURGERY Left     [place]    EXTRACTION(S) DENTAL      LAPAROSCOPIC TUBAL LIGATION  2004    SALPINGO-OOPHORECTOMY BILATERAL Right 2011    Planned Lap RSO    TONSILLECTOMY      1980s       Current Outpatient Medications   Medication Sig Dispense Refill    acetaminophen (TYLENOL) 500 MG tablet Take 1,000 mg by mouth every 6 hours as needed for pain       fluticasone (FLONASE) 50 MCG/ACT nasal spray Spray 2 sprays into both nostrils daily. 48 g 3    vitamin D3 (CHOLECALCIFEROL) 50 mcg (2000 units) tablet Take 2 tablets by mouth daily      warfarin ANTICOAGULANT (COUMADIN) 2 MG tablet TAKE 8 MG DAILY FOR 2 DAYS AND 10 MG x 5 DAYS PER WEEK OR AS [facility] tablet 4     No current facility-administered medications for this visit.       Allergies   Allergen Reactions    Amoxicillin-Pot Clavulanate Nausea    Prednisone     Adhesive Tape Itching and Rash    Cyclobenzaprine Rash    No Clinical Screening - See Comments Rash     Nicoderm patch       Family History   Problem Relation Age of Onset    Allergy (Severe) Mother         Allergies    Heart Disease Mother         Heart Disease    Diabetes Mother         Diabetes    Peripheral Vascular Disease Mother     Unknown/Adopted Father         Suspected cardiac.     Other - See Comments Sister         transgender, born male, identifies as female    Other - See Comments Brother         lymphedema    Breast Cancer Maternal Grandmother 58 - 60        Cancer-breast,CREST syndrome, Raynaud's, scleroderma,  of CHF, small veins and arteries    Cancer Maternal Grandfather         Cancer,lung cancer, dm    Heart Disease Paternal Grandfather         Heart Disease,CAD    Diabetes Maternal Aunt         Diabetes,also has had a borderline ovarian mass (not benign or cancerous)    Clotting Disorder Paternal Uncle         Factor V    Thyroid Disease Other          Thyroid Disease,several members of family with thyroid disease.    Breast Cancer Other        Social History     Socioeconomic History    Marital status:      Spouse name: Luigi    Number of children: None    Years of education: None    Highest education level: None   Tobacco Use    Smoking status: Every Day     Current packs/day: 1.00     Average packs/day: 1 pack/day for 40.6 years (40.6 ttl pk-yrs)     Types: Cigarettes     Start date: 1985    Smokeless tobacco: Never    Tobacco comments:     Will contact PCP for patches   Vaping Use    Vaping status: Never Used   Substance and Sexual Activity    Alcohol use: No     Alcohol/week: 0.0 standard drinks of alcohol    Drug use: No    Sexual activity: Yes     Partners: Male     Birth control/protection: Surgical, I.U.D.     Comment: Mirena Placed 8/9/2018   Social History Narrative    She is currently working part time at [employer]     Sibling 2 half brothers by her mother    Sibling 2 half brothers by her father    Son - Orlando Damico, 1999     - Luigi     Social Drivers of Health     Financial Resource Strain: Low Risk  (11/3/2024)    Financial Resource Strain     Within the past 12 months, have you or your family members you live with been unable to get utilities (heat, electricity) when it was really needed?: No   Food Insecurity: Low Risk  (11/3/2024)    Food Insecurity     Within the past 12 months, did you worry that your food would run out before you got money to buy more?: No     Within the past 12 months, did the food you bought just not last and you didn t have money to get more?: No   Transportation Needs: Low Risk  (11/3/2024)    Transportation Needs     Within the past 12 months, has lack of transportation kept you from medical appointments, getting your medicines, non-medical meetings or appointments, work, or from getting things that you need?: No   Physical Activity: Insufficiently Active (11/3/2024)    Exercise Vital Sign     Days of Exercise  "per Week: 2 days     Minutes of Exercise per Session: 20 min   Stress: No Stress Concern Present (11/3/2024)    [place] [employer] - Occupational Stress Questionnaire     Feeling of Stress : Not at all   Social Connections: Unknown (11/3/2024)    Social Connection and Isolation Panel [NHANES]     Frequency of Social Gatherings with Friends and Family: Patient declined   Interpersonal Safety: Low Risk  (7/29/2025)    Interpersonal Safety     Do you feel physically and emotionally safe where you currently live?: Yes     Within the past 12 months, have you been hit, slapped, kicked or otherwise physically hurt by someone?: No     Within the past 12 months, have you been humiliated or emotionally abused in other ways by your partner or ex-partner?: No   Housing Stability: Low Risk  (11/3/2024)    Housing Stability     Do you have housing? : Yes     Are you worried about losing your housing?: No     The above history was reviewed today, 7/29/2025    PHYSICAL EXAM  Vitals: /84 (BP Location: Right arm, Patient Position: Sitting, Cuff Size: Adult Large)   Pulse 82   Temp 98  F (36.7  C) (Tympanic)   Resp 16   Ht 1.676 m (5' 6\")   Wt 88 kg (194 lb)   LMP 03/05/2024 (Within Weeks)   SpO2 95%   BMI 31.31 kg/m    GENERAL: Healthy appearing patient in no acute distress. Pleasant and cooperative with exam and interview.   HEENT: Head-normocephalic. Eyes-no scleral icterus. Nose-no nasal drainage. No lesions. Mouth-oral mucosa pink and moist, no lesions.  NECK: Supple. No thyroid nodules. Trachea midline.  LYMPHATICS:  No cervical, axillary or supraclavicular adenopathy.  SKIN: Pink, warm and dry. No jaundice. No rash.  NEURO:  Cranial nerves II-XII grossly intact. Alert and oriented.  PSYCH: Appropriate mood and affect.  BREAST: Breasts were examined in theseated and supine position. No mass noted bilaterally. No nipple changes or discharge bilaterally. Post biopsy changes noted left breast. " Resolving ecchymosis with no sign of infection. Appropriate tenderness noted.    IMAGING/LAB  I personally reviewed patient's recent mammogram and biopsy images and report and pathology report.

## 2025-07-30 ENCOUNTER — ANTICOAGULATION THERAPY VISIT (OUTPATIENT)
Dept: ANTICOAGULATION | Facility: OTHER | Age: 55
End: 2025-07-30
Attending: PHYSICIAN ASSISTANT
Payer: COMMERCIAL

## 2025-07-30 DIAGNOSIS — Z79.01 LONG TERM CURRENT USE OF ANTICOAGULANT THERAPY: ICD-10-CM

## 2025-07-30 DIAGNOSIS — I82.602 BLOOD CLOT OF VEIN IN SHOULDER AREA, LEFT: Primary | ICD-10-CM

## 2025-07-30 DIAGNOSIS — D68.51 FACTOR 5 LEIDEN MUTATION, HETEROZYGOUS: ICD-10-CM

## 2025-07-30 LAB — INR (EXTERNAL): 3

## 2025-07-30 NOTE — PROGRESS NOTES
ANTICOAGULATION MANAGEMENT     Daniela NANCY Ladd 54 year old female is on warfarin with therapeutic INR result. (Goal INR 2.5-3.5)    Recent labs: (last 7 days)     07/30/25  0451   INR 3.0       ASSESSMENT     Source(s): Chart Review and Patient/Caregiver Call     Warfarin doses taken: Booster dose(s) recently taken which may be affecting INR and Held for procedure  recently which may be affecting INR  Diet: No new diet changes identified  Medication/supplement changes: None noted  New illness, injury, or hospitalization: No  Signs or symptoms of bleeding or clotting: No  Previous result: Supratherapeutic  Additional findings: None       PLAN     Recommended plan for temporary change(s) affecting INR     Dosing Instructions: Continue your current warfarin dose with next INR in 1 week       Summary  As of 7/30/2025      Full warfarin instructions:  10 mg every day   Next INR check:  8/6/2025               Telephone call with Daniela who verbalizes understanding and agrees to plan    Patient to recheck with home meter    Education provided: Please call back if any changes to your diet, medications or how you've been taking warfarin  Symptom monitoring: monitoring for bleeding signs and symptoms    Plan made per [facility] anticoagulation protocol    Ambreen Romano RN  7/30/2025  Anticoagulation Clinic  [employer] for routing messages: p  ANTICOAGULATION NURSE  [facility] patient phone line: [phone]        _______________________________________________________________________     Anticoagulation Episode Summary       Current INR goal:  2.5-3.5   TTR:  91.4% (1 y)   Target end date:  Indefinite   Send INR reminders to:   ANTICOAGULATION NURSE    Indications    Blood clot of vein in shoulder area  left [I82.602]  Factor 5 Leiden mutation  heterozygous [D68.51]  Long-term (current) use of anticoagulants [Z79.01] [Z79.01]             Comments:  Has home INR machine weekly INR needed                Anticoagulation  Care Providers       Provider Role Specialty Phone number    Ling Trammell PA-C Referring Family Medicine [phone]

## 2025-08-06 ENCOUNTER — ANTICOAGULATION THERAPY VISIT (OUTPATIENT)
Dept: ANTICOAGULATION | Facility: OTHER | Age: 55
End: 2025-08-06
Attending: PHYSICIAN ASSISTANT
Payer: COMMERCIAL

## 2025-08-06 DIAGNOSIS — D68.51 FACTOR 5 LEIDEN MUTATION, HETEROZYGOUS: ICD-10-CM

## 2025-08-06 DIAGNOSIS — I82.602 BLOOD CLOT OF VEIN IN SHOULDER AREA, LEFT: Primary | ICD-10-CM

## 2025-08-06 DIAGNOSIS — Z79.01 LONG TERM CURRENT USE OF ANTICOAGULANT THERAPY: ICD-10-CM

## 2025-08-06 LAB — INR (EXTERNAL): 3.8

## 2025-08-13 ENCOUNTER — ANTICOAGULATION THERAPY VISIT (OUTPATIENT)
Dept: ANTICOAGULATION | Facility: OTHER | Age: 55
End: 2025-08-13
Attending: PHYSICIAN ASSISTANT
Payer: COMMERCIAL

## 2025-08-13 DIAGNOSIS — D68.51 FACTOR 5 LEIDEN MUTATION, HETEROZYGOUS: ICD-10-CM

## 2025-08-13 DIAGNOSIS — I82.602 BLOOD CLOT OF VEIN IN SHOULDER AREA, LEFT: Primary | ICD-10-CM

## 2025-08-13 DIAGNOSIS — Z79.01 LONG TERM CURRENT USE OF ANTICOAGULANT THERAPY: ICD-10-CM

## 2025-08-13 LAB — INR (EXTERNAL): 3.8

## 2025-08-20 ENCOUNTER — ANTICOAGULATION THERAPY VISIT (OUTPATIENT)
Dept: ANTICOAGULATION | Facility: OTHER | Age: 55
End: 2025-08-20
Attending: PHYSICIAN ASSISTANT
Payer: COMMERCIAL

## 2025-08-20 DIAGNOSIS — Z79.01 LONG TERM CURRENT USE OF ANTICOAGULANT THERAPY: ICD-10-CM

## 2025-08-20 DIAGNOSIS — I82.602 BLOOD CLOT OF VEIN IN SHOULDER AREA, LEFT: Primary | ICD-10-CM

## 2025-08-20 DIAGNOSIS — D68.51 FACTOR 5 LEIDEN MUTATION, HETEROZYGOUS: ICD-10-CM

## 2025-08-20 LAB — INR (EXTERNAL): 2.7

## 2025-08-27 ENCOUNTER — ANTICOAGULATION THERAPY VISIT (OUTPATIENT)
Dept: ANTICOAGULATION | Facility: OTHER | Age: 55
End: 2025-08-27
Attending: PHYSICIAN ASSISTANT
Payer: COMMERCIAL

## 2025-08-27 DIAGNOSIS — Z79.01 LONG TERM CURRENT USE OF ANTICOAGULANT THERAPY: ICD-10-CM

## 2025-08-27 DIAGNOSIS — D68.51 FACTOR 5 LEIDEN MUTATION, HETEROZYGOUS: ICD-10-CM

## 2025-08-27 DIAGNOSIS — I82.602 BLOOD CLOT OF VEIN IN SHOULDER AREA, LEFT: Primary | ICD-10-CM

## 2025-08-27 LAB — INR (EXTERNAL): 2.8

## 2025-09-03 ENCOUNTER — ANTICOAGULATION THERAPY VISIT (OUTPATIENT)
Dept: ANTICOAGULATION | Facility: OTHER | Age: 55
End: 2025-09-03
Attending: PHYSICIAN ASSISTANT
Payer: COMMERCIAL

## 2025-09-03 DIAGNOSIS — D68.51 FACTOR 5 LEIDEN MUTATION, HETEROZYGOUS: ICD-10-CM

## 2025-09-03 DIAGNOSIS — Z79.01 LONG TERM CURRENT USE OF ANTICOAGULANT THERAPY: ICD-10-CM

## 2025-09-03 DIAGNOSIS — I82.602 BLOOD CLOT OF VEIN IN SHOULDER AREA, LEFT: Primary | ICD-10-CM

## 2025-09-03 LAB — INR (EXTERNAL): 3.2 (ref 0.9–1.1)

## (undated) RX ORDER — SODIUM CHLORIDE 9 MG/ML
INJECTION, SOLUTION INTRAVENOUS
Status: DISPENSED
Start: 2018-08-03

## (undated) RX ORDER — FENTANYL CITRATE 50 UG/ML
INJECTION, SOLUTION INTRAMUSCULAR; INTRAVENOUS
Status: DISPENSED
Start: 2018-08-03